# Patient Record
Sex: MALE | Race: ASIAN | NOT HISPANIC OR LATINO
[De-identification: names, ages, dates, MRNs, and addresses within clinical notes are randomized per-mention and may not be internally consistent; named-entity substitution may affect disease eponyms.]

---

## 2017-02-07 ENCOUNTER — APPOINTMENT (OUTPATIENT)
Dept: OPHTHALMOLOGY | Facility: CLINIC | Age: 82
End: 2017-02-07

## 2017-02-07 PROBLEM — Z00.00 ENCOUNTER FOR PREVENTIVE HEALTH EXAMINATION: Status: ACTIVE | Noted: 2017-02-07

## 2017-06-12 ENCOUNTER — APPOINTMENT (OUTPATIENT)
Dept: OPHTHALMOLOGY | Facility: CLINIC | Age: 82
End: 2017-06-12

## 2017-07-05 ENCOUNTER — INPATIENT (INPATIENT)
Facility: HOSPITAL | Age: 82
LOS: 2 days | Discharge: HOME | DRG: 682 | End: 2017-07-08
Attending: INTERNAL MEDICINE | Admitting: INTERNAL MEDICINE
Payer: MEDICARE

## 2017-07-05 VITALS
HEART RATE: 93 BPM | TEMPERATURE: 98 F | HEIGHT: 66 IN | SYSTOLIC BLOOD PRESSURE: 167 MMHG | OXYGEN SATURATION: 94 % | DIASTOLIC BLOOD PRESSURE: 65 MMHG | WEIGHT: 130.07 LBS | RESPIRATION RATE: 20 BRPM

## 2017-07-05 DIAGNOSIS — R07.89 OTHER CHEST PAIN: ICD-10-CM

## 2017-07-05 DIAGNOSIS — I10 ESSENTIAL (PRIMARY) HYPERTENSION: ICD-10-CM

## 2017-07-05 DIAGNOSIS — D63.1 ANEMIA IN CHRONIC KIDNEY DISEASE: ICD-10-CM

## 2017-07-05 DIAGNOSIS — R06.02 SHORTNESS OF BREATH: ICD-10-CM

## 2017-07-05 DIAGNOSIS — I48.91 UNSPECIFIED ATRIAL FIBRILLATION: ICD-10-CM

## 2017-07-05 DIAGNOSIS — J90 PLEURAL EFFUSION, NOT ELSEWHERE CLASSIFIED: ICD-10-CM

## 2017-07-05 DIAGNOSIS — E11.9 TYPE 2 DIABETES MELLITUS WITHOUT COMPLICATIONS: ICD-10-CM

## 2017-07-05 DIAGNOSIS — I12.0 HYPERTENSIVE CHRONIC KIDNEY DISEASE WITH STAGE 5 CHRONIC KIDNEY DISEASE OR END STAGE RENAL DISEASE: ICD-10-CM

## 2017-07-05 DIAGNOSIS — Z29.9 ENCOUNTER FOR PROPHYLACTIC MEASURES, UNSPECIFIED: ICD-10-CM

## 2017-07-05 DIAGNOSIS — R60.0 LOCALIZED EDEMA: ICD-10-CM

## 2017-07-05 DIAGNOSIS — N18.6 END STAGE RENAL DISEASE: ICD-10-CM

## 2017-07-05 DIAGNOSIS — R94.31 ABNORMAL ELECTROCARDIOGRAM [ECG] [EKG]: ICD-10-CM

## 2017-07-05 LAB
ALBUMIN SERPL ELPH-MCNC: 3.3 G/DL — LOW (ref 3.5–5)
ALP SERPL-CCNC: 93 U/L — SIGNIFICANT CHANGE UP (ref 40–120)
ALT FLD-CCNC: 19 U/L DA — SIGNIFICANT CHANGE UP (ref 10–60)
ANION GAP SERPL CALC-SCNC: 14 MMOL/L — SIGNIFICANT CHANGE UP (ref 5–17)
APPEARANCE UR: ABNORMAL
APTT BLD: 29.2 SEC — SIGNIFICANT CHANGE UP (ref 27.5–37.4)
AST SERPL-CCNC: 14 U/L — SIGNIFICANT CHANGE UP (ref 10–40)
BASE EXCESS BLDA CALC-SCNC: -4.7 MMOL/L — LOW (ref -2–2)
BASE EXCESS BLDV CALC-SCNC: -5.7 MMOL/L — LOW (ref -2–2)
BASOPHILS # BLD AUTO: 0 K/UL — SIGNIFICANT CHANGE UP (ref 0–0.2)
BASOPHILS NFR BLD AUTO: 0.4 % — SIGNIFICANT CHANGE UP (ref 0–2)
BILIRUB SERPL-MCNC: 0.4 MG/DL — SIGNIFICANT CHANGE UP (ref 0.2–1.2)
BILIRUB UR-MCNC: NEGATIVE — SIGNIFICANT CHANGE UP
BUN SERPL-MCNC: 67 MG/DL — HIGH (ref 7–18)
CALCIUM SERPL-MCNC: 7 MG/DL — LOW (ref 8.4–10.5)
CHLORIDE SERPL-SCNC: 109 MMOL/L — HIGH (ref 96–108)
CK MB BLD-MCNC: 1.8 % — SIGNIFICANT CHANGE UP (ref 0–3.5)
CK MB BLD-MCNC: 2.2 % — SIGNIFICANT CHANGE UP (ref 0–3.5)
CK MB CFR SERPL CALC: 3.4 NG/ML — SIGNIFICANT CHANGE UP (ref 0–3.6)
CK MB CFR SERPL CALC: 3.7 NG/ML — HIGH (ref 0–3.6)
CK SERPL-CCNC: 167 U/L — SIGNIFICANT CHANGE UP (ref 35–232)
CK SERPL-CCNC: 192 U/L — SIGNIFICANT CHANGE UP (ref 35–232)
CO2 SERPL-SCNC: 20 MMOL/L — LOW (ref 22–31)
COLOR SPEC: YELLOW — SIGNIFICANT CHANGE UP
CREAT ?TM UR-MCNC: 140 MG/DL — SIGNIFICANT CHANGE UP
CREAT SERPL-MCNC: 7.23 MG/DL — HIGH (ref 0.5–1.3)
DIFF PNL FLD: NEGATIVE — SIGNIFICANT CHANGE UP
EOSINOPHIL # BLD AUTO: 0 K/UL — SIGNIFICANT CHANGE UP (ref 0–0.5)
EOSINOPHIL NFR BLD AUTO: 0.2 % — SIGNIFICANT CHANGE UP (ref 0–6)
GLUCOSE SERPL-MCNC: 184 MG/DL — HIGH (ref 70–99)
GLUCOSE UR QL: 100 MG/DL
HBA1C BLD-MCNC: 5.7 % — HIGH (ref 4–5.6)
HCO3 BLDA-SCNC: 19 MMOL/L — LOW (ref 23–27)
HCO3 BLDV-SCNC: 19 MMOL/L — LOW (ref 21–29)
HCT VFR BLD CALC: 26.7 % — LOW (ref 39–50)
HGB BLD-MCNC: 9 G/DL — LOW (ref 13–17)
HOROWITZ INDEX BLDA+IHG-RTO: 28 — SIGNIFICANT CHANGE UP
HOROWITZ INDEX BLDV+IHG-RTO: 21 — SIGNIFICANT CHANGE UP
INR BLD: 0.98 RATIO — SIGNIFICANT CHANGE UP (ref 0.88–1.16)
KETONES UR-MCNC: NEGATIVE — SIGNIFICANT CHANGE UP
LEUKOCYTE ESTERASE UR-ACNC: NEGATIVE — SIGNIFICANT CHANGE UP
LYMPHOCYTES # BLD AUTO: 0.4 K/UL — LOW (ref 1–3.3)
LYMPHOCYTES # BLD AUTO: 4.8 % — LOW (ref 13–44)
MCHC RBC-ENTMCNC: 30.6 PG — SIGNIFICANT CHANGE UP (ref 27–34)
MCHC RBC-ENTMCNC: 33.7 GM/DL — SIGNIFICANT CHANGE UP (ref 32–36)
MCV RBC AUTO: 90.8 FL — SIGNIFICANT CHANGE UP (ref 80–100)
MONOCYTES # BLD AUTO: 0.4 K/UL — SIGNIFICANT CHANGE UP (ref 0–0.9)
MONOCYTES NFR BLD AUTO: 4.7 % — SIGNIFICANT CHANGE UP (ref 2–14)
NEUTROPHILS # BLD AUTO: 7.5 K/UL — HIGH (ref 1.8–7.4)
NEUTROPHILS NFR BLD AUTO: 89.9 % — HIGH (ref 43–77)
NITRITE UR-MCNC: NEGATIVE — SIGNIFICANT CHANGE UP
NT-PROBNP SERPL-SCNC: 3566 PG/ML — HIGH (ref 0–450)
OB PNL STL: NEGATIVE — SIGNIFICANT CHANGE UP
OSMOLALITY UR: 353 MOS/KG — SIGNIFICANT CHANGE UP (ref 50–1200)
PCO2 BLDA: 33 MMHG — SIGNIFICANT CHANGE UP (ref 32–46)
PCO2 BLDV: 36 MMHG — SIGNIFICANT CHANGE UP (ref 35–50)
PH BLDA: 7.39 — SIGNIFICANT CHANGE UP (ref 7.35–7.45)
PH BLDV: 7.34 — LOW (ref 7.35–7.45)
PH UR: 5 — SIGNIFICANT CHANGE UP (ref 5–8)
PLATELET # BLD AUTO: 188 K/UL — SIGNIFICANT CHANGE UP (ref 150–400)
PO2 BLDA: 68 MMHG — LOW (ref 74–108)
PO2 BLDV: 76 MMHG — HIGH (ref 25–45)
POTASSIUM SERPL-MCNC: 4.2 MMOL/L — SIGNIFICANT CHANGE UP (ref 3.5–5.3)
POTASSIUM SERPL-SCNC: 4.2 MMOL/L — SIGNIFICANT CHANGE UP (ref 3.5–5.3)
POTASSIUM UR-SCNC: 30 MMOL/L — SIGNIFICANT CHANGE UP (ref 25–125)
PROT ?TM UR-MCNC: 537 MG/DL — HIGH (ref 0–12)
PROT SERPL-MCNC: 7.1 G/DL — SIGNIFICANT CHANGE UP (ref 6–8.3)
PROT UR-MCNC: 500 MG/DL
PROTHROM AB SERPL-ACNC: 10.7 SEC — SIGNIFICANT CHANGE UP (ref 9.8–12.7)
RBC # BLD: 2.94 M/UL — LOW (ref 4.2–5.8)
RBC # FLD: 13.7 % — SIGNIFICANT CHANGE UP (ref 10.3–14.5)
SAO2 % BLDA: 93 % — SIGNIFICANT CHANGE UP (ref 92–96)
SAO2 % BLDV: 95 % — HIGH (ref 67–88)
SODIUM SERPL-SCNC: 143 MMOL/L — SIGNIFICANT CHANGE UP (ref 135–145)
SODIUM UR-SCNC: 34 MMOL/L — LOW (ref 40–220)
SP GR SPEC: 1.02 — SIGNIFICANT CHANGE UP (ref 1.01–1.02)
TROPONIN I SERPL-MCNC: 0.02 NG/ML — SIGNIFICANT CHANGE UP (ref 0–0.04)
TROPONIN I SERPL-MCNC: 0.05 NG/ML — HIGH (ref 0–0.04)
TROPONIN I SERPL-MCNC: 0.17 NG/ML — HIGH (ref 0–0.04)
UROBILINOGEN FLD QL: NEGATIVE — SIGNIFICANT CHANGE UP
WBC # BLD: 8.4 K/UL — SIGNIFICANT CHANGE UP (ref 3.8–10.5)
WBC # FLD AUTO: 8.4 K/UL — SIGNIFICANT CHANGE UP (ref 3.8–10.5)

## 2017-07-05 PROCEDURE — 71020: CPT | Mod: 26

## 2017-07-05 PROCEDURE — 76775 US EXAM ABDO BACK WALL LIM: CPT | Mod: 26

## 2017-07-05 PROCEDURE — 99223 1ST HOSP IP/OBS HIGH 75: CPT | Mod: AI,GC

## 2017-07-05 PROCEDURE — 99285 EMERGENCY DEPT VISIT HI MDM: CPT

## 2017-07-05 RX ORDER — INSULIN LISPRO 100/ML
VIAL (ML) SUBCUTANEOUS
Qty: 0 | Refills: 0 | Status: DISCONTINUED | OUTPATIENT
Start: 2017-07-05 | End: 2017-07-08

## 2017-07-05 RX ORDER — HYDRALAZINE HCL 50 MG
50 TABLET ORAL THREE TIMES A DAY
Qty: 0 | Refills: 0 | Status: DISCONTINUED | OUTPATIENT
Start: 2017-07-05 | End: 2017-07-08

## 2017-07-05 RX ORDER — SODIUM CHLORIDE 9 MG/ML
1000 INJECTION, SOLUTION INTRAVENOUS
Qty: 0 | Refills: 0 | Status: DISCONTINUED | OUTPATIENT
Start: 2017-07-05 | End: 2017-07-08

## 2017-07-05 RX ORDER — ATORVASTATIN CALCIUM 80 MG/1
40 TABLET, FILM COATED ORAL AT BEDTIME
Qty: 0 | Refills: 0 | Status: DISCONTINUED | OUTPATIENT
Start: 2017-07-05 | End: 2017-07-08

## 2017-07-05 RX ORDER — DEXTROSE 50 % IN WATER 50 %
25 SYRINGE (ML) INTRAVENOUS ONCE
Qty: 0 | Refills: 0 | Status: DISCONTINUED | OUTPATIENT
Start: 2017-07-05 | End: 2017-07-08

## 2017-07-05 RX ORDER — ASPIRIN/CALCIUM CARB/MAGNESIUM 324 MG
81 TABLET ORAL DAILY
Qty: 0 | Refills: 0 | Status: DISCONTINUED | OUTPATIENT
Start: 2017-07-05 | End: 2017-07-08

## 2017-07-05 RX ORDER — SODIUM CHLORIDE 9 MG/ML
3 INJECTION INTRAMUSCULAR; INTRAVENOUS; SUBCUTANEOUS ONCE
Qty: 0 | Refills: 0 | Status: COMPLETED | OUTPATIENT
Start: 2017-07-05 | End: 2017-07-05

## 2017-07-05 RX ORDER — FUROSEMIDE 40 MG
40 TABLET ORAL DAILY
Qty: 0 | Refills: 0 | Status: DISCONTINUED | OUTPATIENT
Start: 2017-07-05 | End: 2017-07-05

## 2017-07-05 RX ORDER — DEXTROSE 50 % IN WATER 50 %
12.5 SYRINGE (ML) INTRAVENOUS ONCE
Qty: 0 | Refills: 0 | Status: DISCONTINUED | OUTPATIENT
Start: 2017-07-05 | End: 2017-07-08

## 2017-07-05 RX ORDER — DEXTROSE 50 % IN WATER 50 %
1 SYRINGE (ML) INTRAVENOUS ONCE
Qty: 0 | Refills: 0 | Status: DISCONTINUED | OUTPATIENT
Start: 2017-07-05 | End: 2017-07-08

## 2017-07-05 RX ORDER — GLUCAGON INJECTION, SOLUTION 0.5 MG/.1ML
1 INJECTION, SOLUTION SUBCUTANEOUS ONCE
Qty: 0 | Refills: 0 | Status: DISCONTINUED | OUTPATIENT
Start: 2017-07-05 | End: 2017-07-08

## 2017-07-05 RX ORDER — FUROSEMIDE 40 MG
80 TABLET ORAL ONCE
Qty: 0 | Refills: 0 | Status: DISCONTINUED | OUTPATIENT
Start: 2017-07-05 | End: 2017-07-07

## 2017-07-05 RX ORDER — HEPARIN SODIUM 5000 [USP'U]/ML
5000 INJECTION INTRAVENOUS; SUBCUTANEOUS EVERY 8 HOURS
Qty: 0 | Refills: 0 | Status: DISCONTINUED | OUTPATIENT
Start: 2017-07-05 | End: 2017-07-08

## 2017-07-05 RX ORDER — METOPROLOL TARTRATE 50 MG
12.5 TABLET ORAL
Qty: 0 | Refills: 0 | Status: DISCONTINUED | OUTPATIENT
Start: 2017-07-05 | End: 2017-07-08

## 2017-07-05 RX ORDER — AMLODIPINE BESYLATE 2.5 MG/1
10 TABLET ORAL DAILY
Qty: 0 | Refills: 0 | Status: DISCONTINUED | OUTPATIENT
Start: 2017-07-05 | End: 2017-07-08

## 2017-07-05 RX ADMIN — SODIUM CHLORIDE 3 MILLILITER(S): 9 INJECTION INTRAMUSCULAR; INTRAVENOUS; SUBCUTANEOUS at 10:22

## 2017-07-05 RX ADMIN — Medication 50 MILLIGRAM(S): at 14:48

## 2017-07-05 RX ADMIN — Medication 81 MILLIGRAM(S): at 18:08

## 2017-07-05 RX ADMIN — HEPARIN SODIUM 5000 UNIT(S): 5000 INJECTION INTRAVENOUS; SUBCUTANEOUS at 23:34

## 2017-07-05 RX ADMIN — Medication 12.5 MILLIGRAM(S): at 18:07

## 2017-07-05 RX ADMIN — ATORVASTATIN CALCIUM 40 MILLIGRAM(S): 80 TABLET, FILM COATED ORAL at 23:34

## 2017-07-05 RX ADMIN — Medication 50 MILLIGRAM(S): at 23:34

## 2017-07-05 RX ADMIN — Medication 40 MILLIGRAM(S): at 12:47

## 2017-07-05 NOTE — H&P ADULT - PROBLEM SELECTOR PLAN 6
- Improve VTE score is 1 with 3 month risk of VTE is 0.6 , since patient is currently in bed , will give heparin .   - No need for GI prophylaxis - Anemia of chronic disease , likely due to CKD  - Hb stable , occult negative   - F/up on CBC, anemia panel

## 2017-07-05 NOTE — ED PROVIDER NOTE - MEDICAL DECISION MAKING DETAILS
Pt with SOB new onset, unclear etiology. Possibly related to CKD, with orthopnea possible PE. Will reassess. No signs of respiratory distress, vital signs WNL.

## 2017-07-05 NOTE — CONSULT NOTE ADULT - PROBLEM SELECTOR RECOMMENDATION 2
Likely due to volume overload with renal failure.  Maximize UF with HD.  Give Lasix 80kmg IV X 1 now to help temporize until urgent HD can be done

## 2017-07-05 NOTE — H&P ADULT - NSHPREVIEWOFSYSTEMS_GEN_ALL_CORE
CONSTITUTIONAL: No fever,   EYES: no acute visual disturbances  NECK: No pain or stiffness  RESPIRATORY: No cough; No shortness of breath  CARDIOVASCULAR: No chest pain, no palpitations  GASTROINTESTINAL: No pain. No nausea or vomiting; No diarrhea   NEUROLOGICAL: No headache or numbness, no tremors  HEME/LYMPH: No  bleeding gums CONSTITUTIONAL: No fever,   EYES: no acute visual disturbances  NECK: No pain or stiffness  RESPIRATORY: productive cough; shortness of breath  CARDIOVASCULAR: Intermittent  chest pain, no palpitations  GASTROINTESTINAL: No pain. No nausea or vomiting; No diarrhea   NEUROLOGICAL: No headache or numbness, no tremors  HEME/LYMPH: No  bleeding gums

## 2017-07-05 NOTE — CONSULT NOTE ADULT - SUBJECTIVE AND OBJECTIVE BOX
Patient is a 81y old  Male who presents with a chief complaint of worsening shortness of breath , chest pain, lower extremity swelling x couple of days (05 Jul 2017 14:39)      Called see and eval 81y.o. Male w/PMH significant for ?CKD, HTN, DM for Shiley catheter placement. Pt admitted today for increasing SOB, associated with productive cough, worse at night. According to chart, pt has been seeing nephrologist Dr Nik Cisneros. He was told that he is going to have the dialysis and surgery for creating the AV fistula. Last visit to PMD Dr Kulkarni ws a month ago. Last visit to nephrologist was about week ago for routine check up. He was told by PMD that his sugar is under control.     PAST MEDICAL & SURGICAL HISTORY:  DM  HTN  ?CKD    MEDICATIONS  (STANDING):  aspirin  chewable 81 milliGRAM(s) Oral daily  atorvastatin 40 milliGRAM(s) Oral at bedtime  hydrALAZINE 50 milliGRAM(s) Oral three times a day  amLODIPine   Tablet 10 milliGRAM(s) Oral daily  insulin lispro (HumaLOG) corrective regimen sliding scale   SubCutaneous three times a day before meals  dextrose 5%. 1000 milliLiter(s) (50 mL/Hr) IV Continuous <Continuous>  dextrose 50% Injectable 12.5 Gram(s) IV Push once  dextrose 50% Injectable 25 Gram(s) IV Push once  dextrose 50% Injectable 25 Gram(s) IV Push once  metoprolol 12.5 milliGRAM(s) Oral two times a day  heparin  Injectable 5000 Unit(s) SubCutaneous every 8 hours  furosemide   Injectable 80 milliGRAM(s) IV Push once    MEDICATIONS  (PRN):  dextrose Gel 1 Dose(s) Oral once PRN Blood Glucose LESS THAN 70 milliGRAM(s)/deciLiter  glucagon  Injectable 1 milliGRAM(s) IntraMuscular once PRN Glucose <70 milliGRAM(s)/deciLiter  guaiFENesin   Syrup  (Sugar-Free) 100 milliGRAM(s) Oral every 6 hours PRN Cough      Allergies    No Known Allergies    Intolerances        Vital Signs Last 24 Hrs  T(C): 36.5 (05 Jul 2017 16:10), Max: 36.9 (05 Jul 2017 14:08)  T(F): 97.7 (05 Jul 2017 16:10), Max: 98.4 (05 Jul 2017 14:08)  HR: 83 (05 Jul 2017 16:10) (83 - 93)  BP: 159/60 (05 Jul 2017 16:10) (156/62 - 168/63)  BP(mean): --  RR: 19 (05 Jul 2017 16:10) (18 - 20)  SpO2: 95% (05 Jul 2017 16:10) (94% - 98%)    Physical:  Gen: A&Ox3. NAD  LUE: Warm soft, radial pulse 2+  Pelvis: 2+ femoral pulse b/l    LABS:                        9.0    8.4   )-----------( 188      ( 05 Jul 2017 10:24 )             26.7              07-05    143  |  109<H>  |  67<H>  ----------------------------<  184<H>  4.2   |  20<L>  |  7.23<H>    Ca    7.0<L>      05 Jul 2017 10:24    TPro  7.1  /  Alb  3.3<L>  /  TBili  0.4  /  DBili  x   /  AST  14  /  ALT  19  /  AlkPhos  93  07-05            PT/INR - ( 05 Jul 2017 10:24 )   PT: 10.7 sec;   INR: 0.98 ratio

## 2017-07-05 NOTE — PATIENT PROFILE ADULT. - VISION (WITH CORRECTIVE LENSES IF THE PATIENT USUALLY WEARS THEM):
WITH GLASSES/Normal vision: sees adequately in most situations; can see medication labels, newsprint

## 2017-07-05 NOTE — H&P ADULT - ATTENDING COMMENTS
I have seen and examined this 82 yo man at the bedside with Dr. Lawrence.  His wife is also at the bedside.  Dr. Lawrence has called the office of his PMD, Dr. Benito Kulkarni, and I called the office of his nephrologist, Dr. Manuel Cisneros.  Both doctors are out of town.   The patient presented with worsening SOB for several days.  He has a 30 year history of DM and has known that he has progressive CKD.  He was told that he would soon need dialysis, and was referred to a surgeon for vascular access.   SOB worsened and he came to the hospital.   Examination reveals a WD 81 man in moderate respiratory distress  Vital Signs Last 24 Hrs  T(C): 36.5 (2017 16:10), Max: 36.9 (2017 14:08)  T(F): 97.7 (2017 16:10), Max: 98.4 (2017 14:08)  HR: 83 (2017 16:10) (83 - 93)  BP: 159/60 (2017 16:10) (156/62 - 168/63)  BP(mean): --  RR: 19 (2017 16:10) (18 - 20)  SpO2: 95% (2017 16:10) (94% - 98%)  Generalized edema  Lungs, bilateral rales and rhonchi.  Dullness to percussion at bases  Cor, irreg  Abdomen, soft  Ext + edema  EKG afib, rate about 100, no acute ischemic changes.   Troponin I, Serum (17 @ 15:57)                          9.0    8.4   )-----------( 188      ( 2017 10:24 )             26.7       07-    143  |  109<H>  |  67<H>  ----------------------------<  184<H>  4.2   |  20<L>  |  7.23<H>    Ca    7.0<L>      2017 10:24    TPro  7.1  /  Alb  3.3<L>  /  TBili  0.4  /  DBili  x   /  AST  14  /  ALT  19  /  AlkPhos  93  07-05          ABG - ( 2017 14:55 )  pH: 7.39  /  pCO2: 33    /  pO2: 68    / HCO3: 19    / Base Excess: -4.7  /  SaO2: 93                  Urinalysis Basic - ( 2017 13:15 )    Color: Yellow / Appearance: x / S.020 / pH: x  Gluc: x / Ketone: Negative  / Bili: Negative / Urobili: Negative   Blood: x / Protein: 500 mg/dL / Nitrite: Negative   Leuk Esterase: Negative / RBC: 0-2 /HPF / WBC 0-2 /HPF   Sq Epi: x / Non Sq Epi: Few / Bacteria: Few /HPF        PT/INR - ( 2017 10:24 )   PT: 10.7 sec;   INR: 0.98 ratio         PTT - ( 2017 10:24 )  PTT:29.2 sec    CARDIAC MARKERS ( 2017 15:57 )  0.054 ng/mL / x     / 192 U/L / x     / 3.4 ng/mL  CARDIAC MARKERS ( 2017 10:24 )  0.024 ng/mL / x     / x     / x     / x          < from: Xray Chest 2 Views PA/Lat (17 @ 10:34) >    Moderate left pleural effusion, small right pleural effusion tracking   towards the minor fissure. Mild diffuse interstitial prominence.    IMP:  Fluid overload secondary to ESRD          CHF, r/o ACS.  Afib of unknown duration, but patient has had no chest pain and troponins are rosas.           DM, longstanding, with ESRD likely a complication of DM          CKD 5  Plan: Trial of diuretic:  IV lasix, 40 mg IV push, given          Ultrasound of kidneys and bladder to r/o obstruction          Nephrology consultation-- will need dialysis urgently          Surgery consultation for placement of temporary (then permanent) dialysis access.

## 2017-07-05 NOTE — ED PROVIDER NOTE - OBJECTIVE STATEMENT
80 y/o male with significant PMHx of DM and "kidney problems" presents to the ED c/o SOB and b/l pedal edema x 1 day. Pt describes SOB as intermittent worsened with lying down and walking up/down stairs. Pt also notes associated cough: non productive, non-bloody in nature. Pt also reports CP described as constant and heavy in nature. Pt denies similar Sx or breathing problems in the past. Pt denies recent sick contacts, recent travel, long periods of immobilization or Hx of DVT/PE/CHF. Pt denies diaphoresis, palpitations, calf pain, fever, dysuria, hematuria, urine/bowel incontinence, or any other complaints. Pt relates he had stress test x 3 years ago, all results WNL. NKDA. Currently taking: Furosemide. 82 y/o male with significant PMHx of DM and "kidney problems" presents to the ED c/o SOB and b/l pedal edema x 1 day. Pt describes SOB as intermittent worsened with lying down and walking up/down stairs. Pt also notes associated cough: non productive, non-bloody in nature. Pt also reports CP described as constant and heavy in nature. Pt denies similar Sx or breathing problems in the past. Pt denies recent sick contacts, recent travel, long periods of immobilization or Hx of DVT/PE/CHF. Pt denies diaphoresis, palpitations, calf pain, fever, dysuria, hematuria, urine/bowel incontinence, or any other complaints. Pt relates he had stress test x 3 years ago, all results WNL. NKDA. Currently taking: Furosemide. PMD: Dr. Benito Kulkarni. Nephrologist: Dr. Ellen Lundberg.

## 2017-07-05 NOTE — ED PROVIDER NOTE - PROGRESS NOTE DETAILS
ekg with afib unclear if it is new or not, ac withheld as per admitting team to come assess. rate controlled, renal scan ordered r/o post obstructive uropathy with por renal function, no baseline. admitting team to f/u with his renal doc.   ashanti lu md

## 2017-07-05 NOTE — CONSULT NOTE ADULT - ATTENDING COMMENTS
[facility] NEPHROLOGY  Nahum Grubbs M.D.  Francisco Javier Hicks D.O.  Yovana Quiroz M.D.  Makeda Duron, MSN, ANP-C    Telephone: [phone]  Facsimile: [phone]    [address] [place] [address]

## 2017-07-05 NOTE — H&P ADULT - ASSESSMENT
In the ED initial vitals shows the RR 20/ 94, /60, physical exam generalized anasarca, 3 + pitting edema , bilateral crackles, EKG shows the new onset afib( doubt afib , has P waves ) , CXR shows left sided pleural effusion got 40 mg of IV lasix, has good urine output , UA shows protein around more than 500 mg protein 5-10 granular casts. Admitted to telemetry for ACS ruled out and evaluation  of arrythmia , also fluid overloaded .

## 2017-07-05 NOTE — ED ADULT NURSE REASSESSMENT NOTE - NS ED NURSE REASSESS COMMENT FT1
Patient transported to [facility] via patient escort and RN, cardiac monitor, and oxygen 2L via nasal cannula. Family at bedside.

## 2017-07-05 NOTE — H&P ADULT - PROBLEM SELECTOR PLAN 7
- Improve VTE score is 1 with 3 month risk of VTE is 0.6 , since patient is currently in bed , will give heparin .   - No need for GI prophylaxis

## 2017-07-05 NOTE — H&P ADULT - NSHPLABSRESULTS_GEN_ALL_CORE
9.0    8.4   )-----------( 188      ( 2017 10:24 )             26.7   -    143  |  109<H>  |  67<H>  ----------------------------<  184<H>  4.2   |  20<L>  |  7.23<H>    Ca    7.0<L>      2017 10:24    TPro  7.1  /  Alb  3.3<L>  /  TBili  0.4  /  DBili  x   /  AST  14  /  ALT  19  /  AlkPhos  93    Urinalysis Basic - ( 2017 13:15 )    Color: Yellow / Appearance: x / S.020 / pH: x  Gluc: x / Ketone: Negative  / Bili: Negative / Urobili: Negative   Blood: x / Protein: 500 mg/dL / Nitrite: Negative   Leuk Esterase: Negative / RBC: 0-2 /HPF / WBC 0-2 /HPF   Sq Epi: x / Non Sq Epi: Few / Bacteria: Few /HPF    CARDIAC MARKERS ( 2017 10:24 )  0.024 ng/mL / x     / x     / x     / x        US Renal (17 @ 14:09) >  Findings suggest medical renal disease. No hydronephrosis. Partially visualized bilateral pleural effusions, left larger than right.     Xray Chest 2 Views PA/Lat (17 @ 10:34) >  Moderate left pleural effusion, small right pleural effusion tracking  towards the minor fissure. Mild diffuse interstitial prominence.

## 2017-07-05 NOTE — H&P ADULT - HISTORY OF PRESENT ILLNESS
80 Y/O M, form home, lives with wife , PMHx of some kidney problem, HTN, Diabetes ( 30 yrs ) , no PShx presented with worsening shortness of breath , chest pain,. lower extremity swelling for few days. As per patient since last couple of days he is getting very short of breath , unable to walks a block  without getting  SOB , unable to lie flat. Chest pain is intermittent , centre , 4/10, worst with exertion, movement, relieved by leaning forward,. Also report productive cough , intermittent , worse at night. Denies any similar kind of pain in past. Denies sick contact, runny nose, recent travel.  According to patient he was told by PMD ( Dr joseline Kulkarni , [phone]) that he has the kidney problem ( he is unable to specify) . He denied any kind of heart problems. He followed the nephrologist Dr Nik montoya ( unable to contact to his nephrologist) . He was told that he is going to have the dialysis and surgery for creating the AV fistula. Last visit to PMD Dr Kulkarni ws a month ago. Last visit to nephrologist was about week ago for routine check up.     Spoke with PMD ( he was not in office so unable to get the prior records ) , as per him patient was told that he was going to get the dialysis soon, also PMD denies any history of atrial fibrillation. Patient last colonoscopy about 5 yrs ago with normal results. He was told by PMD that his sugar is under control. His last visit to ophthalmologist was about 6 months ago.

## 2017-07-05 NOTE — CONSULT NOTE ADULT - ASSESSMENT
81 year-old man with late stage CKD now progressed to ESRD.  Bilateral pleural effusions and LE/UE edema are likely due to volume overload with renal failure.  HTN with BP elevated. Anemia of renal disease.

## 2017-07-05 NOTE — H&P ADULT - PROBLEM SELECTOR PLAN 2
-Atypical kind of chest pain   -Risk factors HTN, DM  -SHERIF score is 2, with 8 % risk of having events in 14 days   -Troponin x 1 negative ,  T2 at 4;00 pm, T3 at 10;00 pm  -EKG shows afib ( unlikely afib as has P waves ) , no ST T wave changes   -Admit to telemetry to rule out ACS   -aspirin, statin, beta blocker, please dc BB if acs ruled out   -Cardio Dr Diehl

## 2017-07-05 NOTE — H&P ADULT - PROBLEM SELECTOR PLAN 3
-Seems to have afib , but doubt as has P waves   - Has QTC prolongation of about the 523 ms  - Admit to tele x 24 hours for evaluating the Afib   - Will hold the anticoagulation for now

## 2017-07-05 NOTE — H&P ADULT - PROBLEM SELECTOR PLAN 5
- Continue with home medications hydralazine 50 mg TID , amlodipine 10mg, torsemide 20 mg   - Hold of the torsemide for now as patient having the dialysis   - DASH/TLC diet

## 2017-07-05 NOTE — H&P ADULT - PROBLEM SELECTOR PLAN 1
-History of kidney problems , told by the outpatient nephrologist that he needs the dialysis and was planed to have the vascular acces in left arm , makes urine normally  -Outpatient nephrologist is Dr Manuel Cisneros ( currently on vacation )   -Labs pertinent for Cr of 7.38 , BUN is 67, mild metabolic acidosis, UA pertinent of protein of 500 mg, 5-10 granular casts, moderate amorphous urates   -Nephro  Dr Ge   -Physical exam generalized anasarca , 3 + pitting edema, bilateral crackles  -Renal U/s medical renal disease , no evidence of hydronephrosis , Fena of 1.2%   -Patient fluid overloaded, no hyperkalemia , no altered mental status , no asterixis   -As per nephrology will get the urgent dialysis via shiley   -Left arm precautions, outpatient dialysis set up , social work consult   -Avoid the nephrotoxic agents

## 2017-07-05 NOTE — CONSULT NOTE ADULT - SUBJECTIVE AND OBJECTIVE BOX
[facility] NEPHROLOGY- CONSULTATION NOTE    Patient is a 81y Male with known late stage kidney disease, was told to have AVF placed in preparation for ESRD, presented to the hospital with SOB worsening over the last 2-3 days with orthopnea.  Sitting up improves SOB; no aggravating factors.  He reports chronic LE edema that is no worse than usual.  He has long-standing DM2 and HTN, both of which are fairly well-controlled.  His outpatient nephrologist is Dr. Manuel Cisneros.      PAST MEDICAL & SURGICAL HISTORY:  DM2, HTN, HLD  No significant past surgical history    No Known Allergies    Home Medications Reviewed  Hospital Medications:   MEDICATIONS  (STANDING):  aspirin  chewable 81 milliGRAM(s) Oral daily  atorvastatin 40 milliGRAM(s) Oral at bedtime  hydrALAZINE 50 milliGRAM(s) Oral three times a day  amLODIPine   Tablet 10 milliGRAM(s) Oral daily  insulin lispro (HumaLOG) corrective regimen sliding scale   SubCutaneous three times a day before meals  dextrose 5%. 1000 milliLiter(s) (50 mL/Hr) IV Continuous <Continuous>  dextrose 50% Injectable 12.5 Gram(s) IV Push once  dextrose 50% Injectable 25 Gram(s) IV Push once  dextrose 50% Injectable 25 Gram(s) IV Push once  metoprolol 12.5 milliGRAM(s) Oral two times a day  heparin  Injectable 5000 Unit(s) SubCutaneous every 8 hours  furosemide   Injectable 80 milliGRAM(s) IV Push once    SOCIAL HISTORY:  Denies ETOh,Smoking,   FAMILY HISTORY: no kidney disease    REVIEW OF SYSTEMS:  CONSTITUTIONAL: No weakness, fevers or chills  EYES/ENT: No visual changes;  No vertigo or throat pain   NECK: No pain or stiffness  RESPIRATORY: No cough, wheezing, hemoptysis; No shortness of breath  CARDIOVASCULAR: No chest pain or palpitations.  GASTROINTESTINAL: No abdominal or epigastric pain. No nausea, vomiting, or hematemesis; No diarrhea or constipation. No melena or hematochezia.  GENITOURINARY: No dysuria, frequency, foamy urine, urinary urgency, incontinence or hematuria  NEUROLOGICAL: No numbness or weakness  SKIN: No itching, burning, rashes, or lesions   VASCULAR: No bilateral lower extremity edema.   All other review of systems is negative unless indicated above.    VITALS:  T(F): 97.7 (17 @ 16:10), Max: 98.4 (17 @ 14:08)  HR: 83 (17 @ 16:10)  BP: 159/60 (17 @ 16:10)  RR: 19 (17 @ 16:10)  SpO2: 95% (17 @ 16:10)  Wt(kg): --    Height (cm): 167.64 ( @ 08:42)  Weight (kg): 63 ( @ 16:10)  BMI (kg/m2): 22.4 ( @ 16:10)  BSA (m2): 1.71 ( @ 16:10)  PHYSICAL EXAM:  Constitutional: NAD  HEENT: anicteric sclera, oropharynx clear, MMM  Neck: No JVD  Respiratory: Decreased BS bilaterally with B basilar crackles  Cardiovascular: S1, S2, RRR  Gastrointestinal: BS+, soft, NT/ND  Extremities: No cyanosis or clubbing. 2+B LE pitting edema.  Arms/hands with 1+ edema as well.  Neurological: A/O x 3, no focal deficits  Psychiatric: Normal mood, normal affect  : No CVA tenderness. No rea.   Skin: No rashes  Vascular Access: none    LABS:      143  |  109<H>  |  67<H>  ----------------------------<  184<H>  4.2   |  20<L>  |  7.23<H>    Ca    7.0<L>      2017 10:24    TPro  7.1  /  Alb  3.3<L>  /  TBili  0.4  /  DBili      /  AST  14  /  ALT  19  /  AlkPhos  93      Creatinine Trend: 7.23 <--                        9.0    8.4   )-----------( 188      ( 2017 10:24 )             26.7     Urine Studies:  Urinalysis Basic - ( 2017 13:15 )    Color: Yellow / Appearance:  / S.020 / pH:   Gluc:  / Ketone: Negative  / Bili: Negative / Urobili: Negative   Blood:  / Protein: 500 mg/dL / Nitrite: Negative   Leuk Esterase: Negative / RBC: 0-2 /HPF / WBC 0-2 /HPF   Sq Epi:  / Non Sq Epi: Few / Bacteria: Few /HPF      Potassium, Random Urine: 30 mmol/L ( @ 13:15)  Osmolality, Random Urine: 353 mos/kg ( @ 13:15)  Sodium, Random Urine: 34 mmol/L ( @ :15)  Creatinine, Random Urine: 140 mg/dL ( @ 13:15)  FENa 1.3%      RADIOLOGY & ADDITIONAL STUDIES:        < from: US Renal (17 @ 14:09) >    EXAM:  US KIDNEY(S)                            PROCEDURE DATE:  2017          INTERPRETATION:  EXAM: US KIDNEY(S)   INDICATION: obstructive uropathy.  COMPARISON: None.    TECHNIQUE: Grayscale ultrasound of the kidneys was performed.    FINDINGS:    Right kidney: Normal size, measures 9.2 x 4.2 x 4.5 cm. Increased   echogenicity. No hydronephrosis, shadowing calculus, or perinephric fluid   collection.    Left kidney: Normal size, measures 9.1 x 5.1 x 3.7 cm. Increased   echogenicity. No hydronephrosis, shadowing calculus, or perinephric fluid   collection.    Mildly distended urinary bladder is grossly unremarkable.    Visualized proximal abdominal aorta measures 2.0 cm in AP dimension.   Visualized IVC is grossly unremarkable.    Partially visualized bilateral pleural effusions, left larger than right.    IMPRESSION:   Findings suggest medical renal disease. No hydronephrosis.    Partially visualized bilateral pleural effusions, left larger than right.                ANTOINETTE WEISS M.D., ATTENDING RADIOLOGIST  This document has been electronically signed. 2017  2:18PM                < end of copied text >    < from: Xray Chest 2 Views PA/Lat (17 @ 10:34) >      < end of copied text >

## 2017-07-05 NOTE — H&P ADULT - NSHPPHYSICALEXAM_GEN_ALL_CORE
PHYSICAL EXAM:  GENERAL: NAD,   HEAD:  , Normocephalic  EYES:  conjunctiva and sclera clear  NECK: Supple, No JVD    NERVOUS SYSTEM:  Alert & Oriented X3,   CHEST/LUNG: Clear to auscuitation bilaterally;   HEART: S1 S2+;   ABDOMEN: Soft, Nontender, Nondistended; Bowel sounds present  EXTREMITIES: no cyanosis; no edema; no calf tenderness PHYSICAL EXAM:  GENERAL: NAD, mild respiratory distress  HEAD:  , Normocephalic, periorbital edema   EYES:  conjunctiva and sclera clear  NECK: Supple, No JVD    NERVOUS SYSTEM:  Alert & Oriented X3,   CHEST/LUNG: inspiratory wheezes, bilateral crackles   HEART: S1 S2+; no murmur   ABDOMEN: Soft, Nontender, distended; Bowel sounds present  EXTREMITIES: no cyanosis; 3 + pitting  edema; no calf tenderness

## 2017-07-05 NOTE — H&P ADULT - NSHPSOCIALHISTORY_GEN_ALL_CORE
Non-smoker, non-alcoholic, no substance abuse   Lives with wife , independent in daily activities , walks w/o assistance

## 2017-07-06 DIAGNOSIS — E83.39 OTHER DISORDERS OF PHOSPHORUS METABOLISM: ICD-10-CM

## 2017-07-06 DIAGNOSIS — N25.81 SECONDARY HYPERPARATHYROIDISM OF RENAL ORIGIN: ICD-10-CM

## 2017-07-06 DIAGNOSIS — E83.51 HYPOCALCEMIA: ICD-10-CM

## 2017-07-06 LAB
24R-OH-CALCIDIOL SERPL-MCNC: 44 NG/ML — SIGNIFICANT CHANGE UP (ref 30–100)
ALBUMIN SERPL ELPH-MCNC: 2.7 G/DL — LOW (ref 3.5–5)
ALP SERPL-CCNC: 83 U/L — SIGNIFICANT CHANGE UP (ref 40–120)
ALT FLD-CCNC: 14 U/L DA — SIGNIFICANT CHANGE UP (ref 10–60)
ANION GAP SERPL CALC-SCNC: 9 MMOL/L — SIGNIFICANT CHANGE UP (ref 5–17)
AST SERPL-CCNC: 10 U/L — SIGNIFICANT CHANGE UP (ref 10–40)
BASOPHILS # BLD AUTO: 0 K/UL — SIGNIFICANT CHANGE UP (ref 0–0.2)
BASOPHILS NFR BLD AUTO: 0.7 % — SIGNIFICANT CHANGE UP (ref 0–2)
BILIRUB SERPL-MCNC: 0.4 MG/DL — SIGNIFICANT CHANGE UP (ref 0.2–1.2)
BUN SERPL-MCNC: 49 MG/DL — HIGH (ref 7–18)
CALCIUM SERPL-MCNC: 6.9 MG/DL — LOW (ref 8.4–10.5)
CALCIUM SERPL-MCNC: 7.2 MG/DL — LOW (ref 8.4–10.5)
CHLORIDE SERPL-SCNC: 107 MMOL/L — SIGNIFICANT CHANGE UP (ref 96–108)
CHOLEST SERPL-MCNC: 152 MG/DL — SIGNIFICANT CHANGE UP (ref 10–199)
CK MB BLD-MCNC: 1.9 % — SIGNIFICANT CHANGE UP (ref 0–3.5)
CK MB CFR SERPL CALC: 3 NG/ML — SIGNIFICANT CHANGE UP (ref 0–3.6)
CK SERPL-CCNC: 155 U/L — SIGNIFICANT CHANGE UP (ref 35–232)
CO2 SERPL-SCNC: 26 MMOL/L — SIGNIFICANT CHANGE UP (ref 22–31)
CREAT ?TM UR-MCNC: 68 MG/DL — SIGNIFICANT CHANGE UP
CREAT SERPL-MCNC: 5.66 MG/DL — HIGH (ref 0.5–1.3)
EOSINOPHIL # BLD AUTO: 0 K/UL — SIGNIFICANT CHANGE UP (ref 0–0.5)
EOSINOPHIL NFR BLD AUTO: 0.8 % — SIGNIFICANT CHANGE UP (ref 0–6)
FERRITIN SERPL-MCNC: 138 NG/ML — SIGNIFICANT CHANGE UP (ref 30–400)
FOLATE SERPL-MCNC: 14.9 NG/ML — SIGNIFICANT CHANGE UP (ref 4.8–24.2)
GLUCOSE SERPL-MCNC: 186 MG/DL — HIGH (ref 70–99)
HCT VFR BLD CALC: 24.9 % — LOW (ref 39–50)
HDLC SERPL-MCNC: 93 MG/DL — SIGNIFICANT CHANGE UP (ref 40–125)
HGB BLD-MCNC: 8.6 G/DL — LOW (ref 13–17)
IRON SATN MFR SERPL: 16 % — LOW (ref 20–55)
IRON SATN MFR SERPL: 35 UG/DL — LOW (ref 65–170)
LIPID PNL WITH DIRECT LDL SERPL: 46 MG/DL — SIGNIFICANT CHANGE UP
LYMPHOCYTES # BLD AUTO: 0.7 K/UL — LOW (ref 1–3.3)
LYMPHOCYTES # BLD AUTO: 12.8 % — LOW (ref 13–44)
MAGNESIUM SERPL-MCNC: 1.9 MG/DL — SIGNIFICANT CHANGE UP (ref 1.6–2.6)
MCHC RBC-ENTMCNC: 31.2 PG — SIGNIFICANT CHANGE UP (ref 27–34)
MCHC RBC-ENTMCNC: 34.5 GM/DL — SIGNIFICANT CHANGE UP (ref 32–36)
MCV RBC AUTO: 90.5 FL — SIGNIFICANT CHANGE UP (ref 80–100)
MICROALBUMIN UR-MCNC: 192.9 MG/DL — HIGH (ref 0–0.1)
MICROALBUMIN/CREAT UR-RTO: 2837 MG/G — HIGH (ref 0–30)
MONOCYTES # BLD AUTO: 0.5 K/UL — SIGNIFICANT CHANGE UP (ref 0–0.9)
MONOCYTES NFR BLD AUTO: 10.1 % — SIGNIFICANT CHANGE UP (ref 2–14)
NEUTROPHILS # BLD AUTO: 3.9 K/UL — SIGNIFICANT CHANGE UP (ref 1.8–7.4)
NEUTROPHILS NFR BLD AUTO: 75.5 % — SIGNIFICANT CHANGE UP (ref 43–77)
PHOSPHATE SERPL-MCNC: 5.5 MG/DL — HIGH (ref 2.5–4.5)
PLATELET # BLD AUTO: 178 K/UL — SIGNIFICANT CHANGE UP (ref 150–400)
POTASSIUM SERPL-MCNC: 4.5 MMOL/L — SIGNIFICANT CHANGE UP (ref 3.5–5.3)
POTASSIUM SERPL-SCNC: 4.5 MMOL/L — SIGNIFICANT CHANGE UP (ref 3.5–5.3)
PROT SERPL-MCNC: 6 G/DL — SIGNIFICANT CHANGE UP (ref 6–8.3)
PTH-INTACT FLD-MCNC: 149 PG/ML — HIGH (ref 15–65)
RBC # BLD: 2.75 M/UL — LOW (ref 4.2–5.8)
RBC # FLD: 14.2 % — SIGNIFICANT CHANGE UP (ref 10.3–14.5)
SODIUM SERPL-SCNC: 142 MMOL/L — SIGNIFICANT CHANGE UP (ref 135–145)
TIBC SERPL-MCNC: 223 UG/DL — LOW (ref 250–450)
TOTAL CHOLESTEROL/HDL RATIO MEASUREMENT: 1.6 RATIO — LOW (ref 3.4–9.6)
TRANSFERRIN SERPL-MCNC: 149 MG/DL — LOW (ref 200–360)
TRIGL SERPL-MCNC: 63 MG/DL — SIGNIFICANT CHANGE UP (ref 10–149)
TROPONIN I SERPL-MCNC: 0.08 NG/ML — HIGH (ref 0–0.04)
UIBC SERPL-MCNC: 188 UG/DL — SIGNIFICANT CHANGE UP (ref 110–370)
VIT B12 SERPL-MCNC: >2000 PG/ML — HIGH (ref 243–894)
WBC # BLD: 5.2 K/UL — SIGNIFICANT CHANGE UP (ref 3.8–10.5)
WBC # FLD AUTO: 5.2 K/UL — SIGNIFICANT CHANGE UP (ref 3.8–10.5)

## 2017-07-06 PROCEDURE — 99233 SBSQ HOSP IP/OBS HIGH 50: CPT | Mod: GC

## 2017-07-06 RX ORDER — IRON SUCROSE 20 MG/ML
100 INJECTION, SOLUTION INTRAVENOUS ONCE
Qty: 0 | Refills: 0 | Status: COMPLETED | OUTPATIENT
Start: 2017-07-07 | End: 2017-07-08

## 2017-07-06 RX ORDER — CALCIUM ACETATE 667 MG
667 TABLET ORAL
Qty: 0 | Refills: 0 | Status: DISCONTINUED | OUTPATIENT
Start: 2017-07-06 | End: 2017-07-08

## 2017-07-06 RX ORDER — ERYTHROPOIETIN 10000 [IU]/ML
6000 INJECTION, SOLUTION INTRAVENOUS; SUBCUTANEOUS ONCE
Qty: 0 | Refills: 0 | Status: COMPLETED | OUTPATIENT
Start: 2017-07-07 | End: 2017-07-08

## 2017-07-06 RX ORDER — TUBERCULIN PURIFIED PROTEIN DERIVATIVE 5 [IU]/.1ML
5 INJECTION, SOLUTION INTRADERMAL ONCE
Qty: 0 | Refills: 0 | Status: DISCONTINUED | OUTPATIENT
Start: 2017-07-06 | End: 2017-07-08

## 2017-07-06 RX ORDER — CALCIUM GLUCONATE 100 MG/ML
1 VIAL (ML) INTRAVENOUS ONCE
Qty: 1 | Refills: 0 | Status: COMPLETED | OUTPATIENT
Start: 2017-07-06 | End: 2017-07-06

## 2017-07-06 RX ADMIN — Medication 50 MILLIGRAM(S): at 06:26

## 2017-07-06 RX ADMIN — Medication 50 MILLIGRAM(S): at 14:06

## 2017-07-06 RX ADMIN — HEPARIN SODIUM 5000 UNIT(S): 5000 INJECTION INTRAVENOUS; SUBCUTANEOUS at 14:05

## 2017-07-06 RX ADMIN — Medication 81 MILLIGRAM(S): at 13:05

## 2017-07-06 RX ADMIN — Medication 200 GRAM(S): at 01:48

## 2017-07-06 RX ADMIN — ATORVASTATIN CALCIUM 40 MILLIGRAM(S): 80 TABLET, FILM COATED ORAL at 21:41

## 2017-07-06 RX ADMIN — Medication 12.5 MILLIGRAM(S): at 17:58

## 2017-07-06 RX ADMIN — Medication 1: at 17:56

## 2017-07-06 RX ADMIN — AMLODIPINE BESYLATE 10 MILLIGRAM(S): 2.5 TABLET ORAL at 06:26

## 2017-07-06 RX ADMIN — HEPARIN SODIUM 5000 UNIT(S): 5000 INJECTION INTRAVENOUS; SUBCUTANEOUS at 06:26

## 2017-07-06 RX ADMIN — Medication 50 MILLIGRAM(S): at 21:41

## 2017-07-06 RX ADMIN — Medication 12.5 MILLIGRAM(S): at 06:26

## 2017-07-06 RX ADMIN — HEPARIN SODIUM 5000 UNIT(S): 5000 INJECTION INTRAVENOUS; SUBCUTANEOUS at 21:41

## 2017-07-06 NOTE — CHART NOTE - NSCHARTNOTEFT_GEN_A_CORE
Last troponin 3 results: 0.166, an EKG was ordered and it showed no ST depressions or elevations, normal sinus rhythm. T wave inversions in V3 and V4. Troponin 3 to be followed by morning team. The corrected calcium was borderline low (7.8) and one gram of Calcium gluconate IV was ordered. Management discussed with PGY-3.

## 2017-07-06 NOTE — CONSULT NOTE ADULT - ASSESSMENT
82yo male from home, PMH HTN, DM, CKD p/w chest pain, shortness of breath, and leg swelling 2/2 worsening renal function

## 2017-07-06 NOTE — PROGRESS NOTE ADULT - PROBLEM SELECTOR PLAN 6
- Anemia of chronic disease , likely due to CKD  - Hb stable , occult negative   - F/up on CBC, anemia panel - Anemia of chronic disease , likely due to CKD  - Hb stable , occult negative   -

## 2017-07-06 NOTE — CONSULT NOTE ADULT - SUBJECTIVE AND OBJECTIVE BOX
80yo male from home PMH DM, HTN, CKD p/w chest pain, sob, cough, leg edema.  Patient's home medications HPI:  80 Y/O M, form home PMH DM, HTN, CKD p/w chest pain, shortness of breath and leg edema.  Patient has been followed by nephrologist and has been told will need dialysis, dialysis access to be scheduled. Denies fever, chills, vomiting, abdominal pain, diarrhea. Home medications include hydralazine, amlodipine and torsemide.     Interval History:     s/p right femoral shiley catheter  s/p 2 dialysis sessions with symptomatic improvement    MEDICATIONS  (STANDING):  aspirin  chewable 81 milliGRAM(s) Oral daily  atorvastatin 40 milliGRAM(s) Oral at bedtime  hydrALAZINE 50 milliGRAM(s) Oral three times a day  amLODIPine   Tablet 10 milliGRAM(s) Oral daily  insulin lispro (HumaLOG) corrective regimen sliding scale   SubCutaneous three times a day before meals  dextrose 5%. 1000 milliLiter(s) (50 mL/Hr) IV Continuous <Continuous>  dextrose 50% Injectable 12.5 Gram(s) IV Push once  dextrose 50% Injectable 25 Gram(s) IV Push once  dextrose 50% Injectable 25 Gram(s) IV Push once  metoprolol 12.5 milliGRAM(s) Oral two times a day  heparin  Injectable 5000 Unit(s) SubCutaneous every 8 hours  furosemide   Injectable 80 milliGRAM(s) IV Push once  PPD  5 Tuberculin Unit(s) Injectable 5 Unit(s) IntraDermal once  iron sucrose IVPB 100 milliGRAM(s) IV Intermittent once  epoetin zach Injectable 6000 Unit(s) IV Push once  calcium acetate 667 milliGRAM(s) Oral three times a day with meals    MEDICATIONS  (PRN):  dextrose Gel 1 Dose(s) Oral once PRN Blood Glucose LESS THAN 70 milliGRAM(s)/deciLiter  glucagon  Injectable 1 milliGRAM(s) IntraMuscular once PRN Glucose <70 milliGRAM(s)/deciLiter  guaiFENesin   Syrup  (Sugar-Free) 100 milliGRAM(s) Oral every 6 hours PRN Cough    PAST MEDICAL & SURGICAL HISTORY:  No pertinent past medical history  No significant past surgical history    SOCIAL HISTORY:  Smoker:  YES / NO        PACK YEARS:                         WHEN QUIT?  ETOH use:  YES / NO               FREQUENCY / QUANTITY:  Ilicit Drug use:  YES / NO      REVIEW OF SYSTEMS:    CONSTITUTIONAL: No weakness, fevers or chills  RESPIRATORY: No cough, wheezing, hemoptysis; No shortness of breath  CARDIOVASCULAR: No chest pain or palpitations  GASTROINTESTINAL: No abdominal or epigastric pain. No nausea, vomiting, or hematemesis; No diarrhea or constipation. No melena or hematochezia.  NEUROLOGICAL: No numbness or weakness  All other review of systems is negative unless indicated above.    Vital Signs Last 24 Hrs  T(C): 36.8 (07 Jul 2017 05:27), Max: 37.1 (06 Jul 2017 20:28)  T(F): 98.2 (07 Jul 2017 05:27), Max: 98.7 (06 Jul 2017 20:28)  HR: 60 (07 Jul 2017 05:27) (57 - 67)  BP: 156/55 (07 Jul 2017 05:27) (141/58 - 156/55)  BP(mean): --  RR: 17 (07 Jul 2017 05:27) (17 - 17)  SpO2: 100% (07 Jul 2017 05:27) (91% - 100%)  General: A/ox 3, No acute Distress  Neck: Supple, NO JVD  Cardiac: S1 S2, No M/R/G  Pulmonary: CTAB, Breathing unlabored, No Rhonchi/Rales/Wheezing  Abdomen: Soft, Non -tender, +BS x 4 quads  Extremities: No Rashes, No edema  Neuro: A/o x 3, No focal deficits        Telemetry: intermittent atrial tachycardia  [facility]                          8.5    5.1   )-----------( 164      ( 07 Jul 2017 06:16 )             25.1     07-07    144  |  108  |  55<H>  ----------------------------<  78  4.1   |  23  |  5.30<H>    Ca    7.2<L>      07 Jul 2017 06:16  Phos  4.8     07-07  Mg     2.1     07-07    TPro  6.0  /  Alb  2.7<L>  /  TBili  0.4  /  DBili  x   /  AST  10  /  ALT  14  /  AlkPhos  83  07-06 HPI:  82 Y/O M, form home PMH DM, HTN, CKD p/w chest pain, shortness of breath and leg edema.  Patient has been followed by nephrologist and has been told will need dialysis, dialysis access to be scheduled. Denies fever, chills, vomiting, abdominal pain, diarrhea. Home medications include hydralazine, amlodipine and torsemide.     Interval History:     s/p right femoral shiley catheter  s/p 2 dialysis sessions with symptomatic improvement    MEDICATIONS  (STANDING):  aspirin  chewable 81 milliGRAM(s) Oral daily  atorvastatin 40 milliGRAM(s) Oral at bedtime  hydrALAZINE 50 milliGRAM(s) Oral three times a day  amLODIPine   Tablet 10 milliGRAM(s) Oral daily  insulin lispro (HumaLOG) corrective regimen sliding scale   SubCutaneous three times a day before meals  dextrose 5%. 1000 milliLiter(s) (50 mL/Hr) IV Continuous <Continuous>  dextrose 50% Injectable 12.5 Gram(s) IV Push once  dextrose 50% Injectable 25 Gram(s) IV Push once  dextrose 50% Injectable 25 Gram(s) IV Push once  metoprolol 12.5 milliGRAM(s) Oral two times a day  heparin  Injectable 5000 Unit(s) SubCutaneous every 8 hours  furosemide   Injectable 80 milliGRAM(s) IV Push once  PPD  5 Tuberculin Unit(s) Injectable 5 Unit(s) IntraDermal once  iron sucrose IVPB 100 milliGRAM(s) IV Intermittent once  epoetin zach Injectable 6000 Unit(s) IV Push once  calcium acetate 667 milliGRAM(s) Oral three times a day with meals    MEDICATIONS  (PRN):  dextrose Gel 1 Dose(s) Oral once PRN Blood Glucose LESS THAN 70 milliGRAM(s)/deciLiter  glucagon  Injectable 1 milliGRAM(s) IntraMuscular once PRN Glucose <70 milliGRAM(s)/deciLiter  guaiFENesin   Syrup  (Sugar-Free) 100 milliGRAM(s) Oral every 6 hours PRN Cough    PAST MEDICAL & SURGICAL HISTORY:  No pertinent past medical history  No significant past surgical history    SOCIAL HISTORY:  Smoker:  YES / NO        PACK YEARS:                         WHEN QUIT?  ETOH use:  YES / NO               FREQUENCY / QUANTITY:  Ilicit Drug use:  YES / NO      REVIEW OF SYSTEMS:  All other review of systems is negative unless indicated above.    Vital Signs Last 24 Hrs  T(C): 36.8 (07 Jul 2017 05:27), Max: 37.1 (06 Jul 2017 20:28)  T(F): 98.2 (07 Jul 2017 05:27), Max: 98.7 (06 Jul 2017 20:28)  HR: 60 (07 Jul 2017 05:27) (57 - 67)  BP: 156/55 (07 Jul 2017 05:27) (141/58 - 156/55)  BP(mean): --  RR: 17 (07 Jul 2017 05:27) (17 - 17)  SpO2: 100% (07 Jul 2017 05:27) (91% - 100%)    General: A/ox 3, No acute Distress  Neck: Supple, No JVD  Cardiac: S1 S2, No M/R/G  Pulmonary: CTAB, Breathing unlabored, No Rhonchi/Rales/Wheezing; decreased breath sounds bilateral base  Abdomen: Soft, Non -tender, + BS  Extremities: No Rashes, No edema  Neuro: A/o x 3, No focal deficits        Telemetry: intermittent atrial tachycardia  EKG: IFRAH [facility]: NA                          8.5    5.1   )-----------( 164      ( 07 Jul 2017 06:16 )             25.1     07-07    144  |  108  |  55<H>  ----------------------------<  78  4.1   |  23  |  5.30<H>    Ca    7.2<L>      07 Jul 2017 06:16  Phos  4.8     07-07  Mg     2.1     07-07    TPro  6.0  /  Alb  2.7<L>  /  TBili  0.4  /  DBili  x   /  AST  10  /  ALT  14  /  AlkPhos  83  07-06

## 2017-07-06 NOTE — CONSULT NOTE ADULT - PROBLEM SELECTOR RECOMMENDATION 2
no evidence of atrial fibrillation on ekg and telemetry, possible MAT  - no need for AC  - can dc telemetry no evidence of atrial fibrillation on ekg and telemetry, possible MAT  - no need for AC  - c/w BB  - can dc telemetry

## 2017-07-06 NOTE — CONSULT NOTE ADULT - PROBLEM SELECTOR RECOMMENDATION 9
Right groin shiley catheter placed at bedside without complications. Pt tolerated procedure.  HD tonight.  LUE precaution.  Renal diet  Plan for AVF w/ PC when medically optimized.
Will have temporary HD catheter placed and begin dialysis urgently.  Will maximize UF.  Plan for HD tonight and again tomorrow.    Will evaluate for MBD as well.
presentation likely 2/2 worsening renal function. symptomatic and clinical improvement after dialysis, weight decreasing.  scheduled for tunneled catheter today and awaiting DC planning for outpatient dialysis  - f/u TTE but presentation unlikely 2/2 CHF

## 2017-07-07 DIAGNOSIS — I47.1 SUPRAVENTRICULAR TACHYCARDIA: ICD-10-CM

## 2017-07-07 DIAGNOSIS — I10 ESSENTIAL (PRIMARY) HYPERTENSION: ICD-10-CM

## 2017-07-07 DIAGNOSIS — E11.22 TYPE 2 DIABETES MELLITUS WITH DIABETIC CHRONIC KIDNEY DISEASE: ICD-10-CM

## 2017-07-07 LAB
ANION GAP SERPL CALC-SCNC: 13 MMOL/L — SIGNIFICANT CHANGE UP (ref 5–17)
BUN SERPL-MCNC: 55 MG/DL — HIGH (ref 7–18)
CALCIUM SERPL-MCNC: 7.2 MG/DL — LOW (ref 8.4–10.5)
CHLORIDE SERPL-SCNC: 108 MMOL/L — SIGNIFICANT CHANGE UP (ref 96–108)
CO2 SERPL-SCNC: 23 MMOL/L — SIGNIFICANT CHANGE UP (ref 22–31)
CREAT SERPL-MCNC: 5.3 MG/DL — HIGH (ref 0.5–1.3)
GLUCOSE SERPL-MCNC: 78 MG/DL — SIGNIFICANT CHANGE UP (ref 70–99)
HAV IGM SER-ACNC: SIGNIFICANT CHANGE UP
HBV CORE IGM SER-ACNC: SIGNIFICANT CHANGE UP
HBV SURFACE AG SER-ACNC: SIGNIFICANT CHANGE UP
HCT VFR BLD CALC: 25.1 % — LOW (ref 39–50)
HCV AB S/CO SERPL IA: 0.06 S/CO — SIGNIFICANT CHANGE UP
HCV AB SERPL-IMP: SIGNIFICANT CHANGE UP
HGB BLD-MCNC: 8.5 G/DL — LOW (ref 13–17)
MAGNESIUM SERPL-MCNC: 2.1 MG/DL — SIGNIFICANT CHANGE UP (ref 1.6–2.6)
MCHC RBC-ENTMCNC: 30.7 PG — SIGNIFICANT CHANGE UP (ref 27–34)
MCHC RBC-ENTMCNC: 33.7 GM/DL — SIGNIFICANT CHANGE UP (ref 32–36)
MCV RBC AUTO: 91.2 FL — SIGNIFICANT CHANGE UP (ref 80–100)
PHOSPHATE SERPL-MCNC: 4.8 MG/DL — HIGH (ref 2.5–4.5)
PLATELET # BLD AUTO: 164 K/UL — SIGNIFICANT CHANGE UP (ref 150–400)
POTASSIUM SERPL-MCNC: 4.1 MMOL/L — SIGNIFICANT CHANGE UP (ref 3.5–5.3)
POTASSIUM SERPL-SCNC: 4.1 MMOL/L — SIGNIFICANT CHANGE UP (ref 3.5–5.3)
RBC # BLD: 2.75 M/UL — LOW (ref 4.2–5.8)
RBC # FLD: 13.6 % — SIGNIFICANT CHANGE UP (ref 10.3–14.5)
SODIUM SERPL-SCNC: 144 MMOL/L — SIGNIFICANT CHANGE UP (ref 135–145)
WBC # BLD: 5.1 K/UL — SIGNIFICANT CHANGE UP (ref 3.8–10.5)
WBC # FLD AUTO: 5.1 K/UL — SIGNIFICANT CHANGE UP (ref 3.8–10.5)

## 2017-07-07 PROCEDURE — 77001 FLUOROGUIDE FOR VEIN DEVICE: CPT | Mod: 26

## 2017-07-07 PROCEDURE — 36558 INSERT TUNNELED CV CATH: CPT

## 2017-07-07 PROCEDURE — 99233 SBSQ HOSP IP/OBS HIGH 50: CPT | Mod: GC

## 2017-07-07 PROCEDURE — 76937 US GUIDE VASCULAR ACCESS: CPT | Mod: 26

## 2017-07-07 RX ORDER — FUROSEMIDE 40 MG
80 TABLET ORAL DAILY
Qty: 0 | Refills: 0 | Status: DISCONTINUED | OUTPATIENT
Start: 2017-07-07 | End: 2017-07-08

## 2017-07-07 RX ORDER — ACETAMINOPHEN 500 MG
650 TABLET ORAL EVERY 6 HOURS
Qty: 0 | Refills: 0 | Status: DISCONTINUED | OUTPATIENT
Start: 2017-07-07 | End: 2017-07-08

## 2017-07-07 RX ADMIN — Medication 650 MILLIGRAM(S): at 12:50

## 2017-07-07 RX ADMIN — ATORVASTATIN CALCIUM 40 MILLIGRAM(S): 80 TABLET, FILM COATED ORAL at 21:54

## 2017-07-07 RX ADMIN — Medication 50 MILLIGRAM(S): at 05:32

## 2017-07-07 RX ADMIN — Medication 50 MILLIGRAM(S): at 21:54

## 2017-07-07 RX ADMIN — Medication 667 MILLIGRAM(S): at 12:09

## 2017-07-07 RX ADMIN — AMLODIPINE BESYLATE 10 MILLIGRAM(S): 2.5 TABLET ORAL at 05:32

## 2017-07-07 RX ADMIN — Medication 650 MILLIGRAM(S): at 12:08

## 2017-07-07 RX ADMIN — Medication 81 MILLIGRAM(S): at 12:09

## 2017-07-07 RX ADMIN — Medication 12.5 MILLIGRAM(S): at 05:32

## 2017-07-07 RX ADMIN — HEPARIN SODIUM 5000 UNIT(S): 5000 INJECTION INTRAVENOUS; SUBCUTANEOUS at 15:42

## 2017-07-07 RX ADMIN — Medication 12.5 MILLIGRAM(S): at 17:49

## 2017-07-07 RX ADMIN — Medication 667 MILLIGRAM(S): at 17:49

## 2017-07-07 RX ADMIN — Medication 50 MILLIGRAM(S): at 15:42

## 2017-07-07 RX ADMIN — HEPARIN SODIUM 5000 UNIT(S): 5000 INJECTION INTRAVENOUS; SUBCUTANEOUS at 21:54

## 2017-07-07 RX ADMIN — Medication 2: at 17:49

## 2017-07-08 VITALS
RESPIRATION RATE: 17 BRPM | TEMPERATURE: 98 F | HEART RATE: 53 BPM | SYSTOLIC BLOOD PRESSURE: 165 MMHG | DIASTOLIC BLOOD PRESSURE: 61 MMHG | OXYGEN SATURATION: 99 %

## 2017-07-08 LAB
ALBUMIN SERPL ELPH-MCNC: 2.7 G/DL — LOW (ref 3.5–5)
ALP SERPL-CCNC: 77 U/L — SIGNIFICANT CHANGE UP (ref 40–120)
ALT FLD-CCNC: 17 U/L DA — SIGNIFICANT CHANGE UP (ref 10–60)
ANION GAP SERPL CALC-SCNC: 11 MMOL/L — SIGNIFICANT CHANGE UP (ref 5–17)
AST SERPL-CCNC: 13 U/L — SIGNIFICANT CHANGE UP (ref 10–40)
BASOPHILS # BLD AUTO: 0 K/UL — SIGNIFICANT CHANGE UP (ref 0–0.2)
BASOPHILS NFR BLD AUTO: 0.7 % — SIGNIFICANT CHANGE UP (ref 0–2)
BILIRUB SERPL-MCNC: 0.3 MG/DL — SIGNIFICANT CHANGE UP (ref 0.2–1.2)
BUN SERPL-MCNC: 61 MG/DL — HIGH (ref 7–18)
CALCIUM SERPL-MCNC: 7.7 MG/DL — LOW (ref 8.4–10.5)
CHLORIDE SERPL-SCNC: 106 MMOL/L — SIGNIFICANT CHANGE UP (ref 96–108)
CO2 SERPL-SCNC: 23 MMOL/L — SIGNIFICANT CHANGE UP (ref 22–31)
CREAT SERPL-MCNC: 5.82 MG/DL — HIGH (ref 0.5–1.3)
EOSINOPHIL # BLD AUTO: 0.2 K/UL — SIGNIFICANT CHANGE UP (ref 0–0.5)
EOSINOPHIL NFR BLD AUTO: 3 % — SIGNIFICANT CHANGE UP (ref 0–6)
GLUCOSE SERPL-MCNC: 91 MG/DL — SIGNIFICANT CHANGE UP (ref 70–99)
HCT VFR BLD CALC: 27.6 % — LOW (ref 39–50)
HGB BLD-MCNC: 9.2 G/DL — LOW (ref 13–17)
LYMPHOCYTES # BLD AUTO: 1.4 K/UL — SIGNIFICANT CHANGE UP (ref 1–3.3)
LYMPHOCYTES # BLD AUTO: 20.5 % — SIGNIFICANT CHANGE UP (ref 13–44)
MAGNESIUM SERPL-MCNC: 2 MG/DL — SIGNIFICANT CHANGE UP (ref 1.6–2.6)
MCHC RBC-ENTMCNC: 30.4 PG — SIGNIFICANT CHANGE UP (ref 27–34)
MCHC RBC-ENTMCNC: 33.3 GM/DL — SIGNIFICANT CHANGE UP (ref 32–36)
MCV RBC AUTO: 91.1 FL — SIGNIFICANT CHANGE UP (ref 80–100)
MONOCYTES # BLD AUTO: 0.9 K/UL — SIGNIFICANT CHANGE UP (ref 0–0.9)
MONOCYTES NFR BLD AUTO: 13 % — SIGNIFICANT CHANGE UP (ref 2–14)
NEUTROPHILS # BLD AUTO: 4.2 K/UL — SIGNIFICANT CHANGE UP (ref 1.8–7.4)
NEUTROPHILS NFR BLD AUTO: 62.8 % — SIGNIFICANT CHANGE UP (ref 43–77)
PHOSPHATE SERPL-MCNC: 4.8 MG/DL — HIGH (ref 2.5–4.5)
PLATELET # BLD AUTO: 174 K/UL — SIGNIFICANT CHANGE UP (ref 150–400)
POTASSIUM SERPL-MCNC: 4.2 MMOL/L — SIGNIFICANT CHANGE UP (ref 3.5–5.3)
POTASSIUM SERPL-SCNC: 4.2 MMOL/L — SIGNIFICANT CHANGE UP (ref 3.5–5.3)
PROT SERPL-MCNC: 6.2 G/DL — SIGNIFICANT CHANGE UP (ref 6–8.3)
RBC # BLD: 3.03 M/UL — LOW (ref 4.2–5.8)
RBC # FLD: 14 % — SIGNIFICANT CHANGE UP (ref 10.3–14.5)
SODIUM SERPL-SCNC: 140 MMOL/L — SIGNIFICANT CHANGE UP (ref 135–145)
WBC # BLD: 6.6 K/UL — SIGNIFICANT CHANGE UP (ref 3.8–10.5)
WBC # FLD AUTO: 6.6 K/UL — SIGNIFICANT CHANGE UP (ref 3.8–10.5)

## 2017-07-08 PROCEDURE — 71045 X-RAY EXAM CHEST 1 VIEW: CPT

## 2017-07-08 PROCEDURE — 84133 ASSAY OF URINE POTASSIUM: CPT

## 2017-07-08 PROCEDURE — 36415 COLL VENOUS BLD VENIPUNCTURE: CPT

## 2017-07-08 PROCEDURE — 76937 US GUIDE VASCULAR ACCESS: CPT

## 2017-07-08 PROCEDURE — 83550 IRON BINDING TEST: CPT

## 2017-07-08 PROCEDURE — 82043 UR ALBUMIN QUANTITATIVE: CPT

## 2017-07-08 PROCEDURE — 85610 PROTHROMBIN TIME: CPT

## 2017-07-08 PROCEDURE — 86900 BLOOD TYPING SEROLOGIC ABO: CPT

## 2017-07-08 PROCEDURE — 82746 ASSAY OF FOLIC ACID SERUM: CPT

## 2017-07-08 PROCEDURE — 81001 URINALYSIS AUTO W/SCOPE: CPT

## 2017-07-08 PROCEDURE — 86480 TB TEST CELL IMMUN MEASURE: CPT

## 2017-07-08 PROCEDURE — 97161 PT EVAL LOW COMPLEX 20 MIN: CPT

## 2017-07-08 PROCEDURE — 80061 LIPID PANEL: CPT

## 2017-07-08 PROCEDURE — 82607 VITAMIN B-12: CPT

## 2017-07-08 PROCEDURE — 93005 ELECTROCARDIOGRAM TRACING: CPT

## 2017-07-08 PROCEDURE — 82306 VITAMIN D 25 HYDROXY: CPT

## 2017-07-08 PROCEDURE — 83735 ASSAY OF MAGNESIUM: CPT

## 2017-07-08 PROCEDURE — 71020: CPT | Mod: 26

## 2017-07-08 PROCEDURE — 77001 FLUOROGUIDE FOR VEIN DEVICE: CPT

## 2017-07-08 PROCEDURE — C1769: CPT

## 2017-07-08 PROCEDURE — 83036 HEMOGLOBIN GLYCOSYLATED A1C: CPT

## 2017-07-08 PROCEDURE — 76775 US EXAM ABDO BACK WALL LIM: CPT

## 2017-07-08 PROCEDURE — 82570 ASSAY OF URINE CREATININE: CPT

## 2017-07-08 PROCEDURE — 82728 ASSAY OF FERRITIN: CPT

## 2017-07-08 PROCEDURE — C1894: CPT

## 2017-07-08 PROCEDURE — 99261: CPT

## 2017-07-08 PROCEDURE — 82272 OCCULT BLD FECES 1-3 TESTS: CPT

## 2017-07-08 PROCEDURE — 80048 BASIC METABOLIC PNL TOTAL CA: CPT

## 2017-07-08 PROCEDURE — 80053 COMPREHEN METABOLIC PANEL: CPT

## 2017-07-08 PROCEDURE — 82553 CREATINE MB FRACTION: CPT

## 2017-07-08 PROCEDURE — 71046 X-RAY EXAM CHEST 2 VIEWS: CPT

## 2017-07-08 PROCEDURE — 84484 ASSAY OF TROPONIN QUANT: CPT

## 2017-07-08 PROCEDURE — 84443 ASSAY THYROID STIM HORMONE: CPT

## 2017-07-08 PROCEDURE — 80074 ACUTE HEPATITIS PANEL: CPT

## 2017-07-08 PROCEDURE — 83880 ASSAY OF NATRIURETIC PEPTIDE: CPT

## 2017-07-08 PROCEDURE — 82550 ASSAY OF CK (CPK): CPT

## 2017-07-08 PROCEDURE — 83935 ASSAY OF URINE OSMOLALITY: CPT

## 2017-07-08 PROCEDURE — 99285 EMERGENCY DEPT VISIT HI MDM: CPT | Mod: 25

## 2017-07-08 PROCEDURE — 86850 RBC ANTIBODY SCREEN: CPT

## 2017-07-08 PROCEDURE — 84300 ASSAY OF URINE SODIUM: CPT

## 2017-07-08 PROCEDURE — 36558 INSERT TUNNELED CV CATH: CPT

## 2017-07-08 PROCEDURE — 86901 BLOOD TYPING SEROLOGIC RH(D): CPT

## 2017-07-08 PROCEDURE — 84156 ASSAY OF PROTEIN URINE: CPT

## 2017-07-08 PROCEDURE — 84466 ASSAY OF TRANSFERRIN: CPT

## 2017-07-08 PROCEDURE — 83970 ASSAY OF PARATHORMONE: CPT

## 2017-07-08 PROCEDURE — 96372 THER/PROPH/DIAG INJ SC/IM: CPT

## 2017-07-08 PROCEDURE — 82310 ASSAY OF CALCIUM: CPT

## 2017-07-08 PROCEDURE — 82803 BLOOD GASES ANY COMBINATION: CPT

## 2017-07-08 PROCEDURE — 85027 COMPLETE CBC AUTOMATED: CPT

## 2017-07-08 PROCEDURE — 93306 TTE W/DOPPLER COMPLETE: CPT

## 2017-07-08 PROCEDURE — 85730 THROMBOPLASTIN TIME PARTIAL: CPT

## 2017-07-08 PROCEDURE — 84100 ASSAY OF PHOSPHORUS: CPT

## 2017-07-08 PROCEDURE — 71010: CPT | Mod: 26

## 2017-07-08 PROCEDURE — C1750: CPT

## 2017-07-08 RX ORDER — METOPROLOL TARTRATE 50 MG
0.5 TABLET ORAL
Qty: 30 | Refills: 0 | OUTPATIENT
Start: 2017-07-08 | End: 2017-08-07

## 2017-07-08 RX ORDER — ATORVASTATIN CALCIUM 80 MG/1
1 TABLET, FILM COATED ORAL
Qty: 30 | Refills: 0
Start: 2017-07-08 | End: 2017-08-07

## 2017-07-08 RX ORDER — HYDRALAZINE HCL 50 MG
1 TABLET ORAL
Qty: 90 | Refills: 0
Start: 2017-07-08 | End: 2017-08-07

## 2017-07-08 RX ORDER — FUROSEMIDE 40 MG
1 TABLET ORAL
Qty: 30 | Refills: 0
Start: 2017-07-08 | End: 2017-08-07

## 2017-07-08 RX ORDER — ASPIRIN/CALCIUM CARB/MAGNESIUM 324 MG
1 TABLET ORAL
Qty: 30 | Refills: 0
Start: 2017-07-08 | End: 2017-08-07

## 2017-07-08 RX ORDER — AMLODIPINE BESYLATE 2.5 MG/1
1 TABLET ORAL
Qty: 30 | Refills: 0
Start: 2017-07-08 | End: 2017-08-07

## 2017-07-08 RX ADMIN — HEPARIN SODIUM 5000 UNIT(S): 5000 INJECTION INTRAVENOUS; SUBCUTANEOUS at 21:39

## 2017-07-08 RX ADMIN — Medication 2: at 17:59

## 2017-07-08 RX ADMIN — Medication 667 MILLIGRAM(S): at 12:20

## 2017-07-08 RX ADMIN — HEPARIN SODIUM 5000 UNIT(S): 5000 INJECTION INTRAVENOUS; SUBCUTANEOUS at 14:11

## 2017-07-08 RX ADMIN — Medication 12.5 MILLIGRAM(S): at 05:56

## 2017-07-08 RX ADMIN — ATORVASTATIN CALCIUM 40 MILLIGRAM(S): 80 TABLET, FILM COATED ORAL at 21:39

## 2017-07-08 RX ADMIN — Medication 81 MILLIGRAM(S): at 12:20

## 2017-07-08 RX ADMIN — Medication 80 MILLIGRAM(S): at 05:56

## 2017-07-08 RX ADMIN — Medication 667 MILLIGRAM(S): at 17:59

## 2017-07-08 RX ADMIN — Medication 50 MILLIGRAM(S): at 14:10

## 2017-07-08 RX ADMIN — Medication 12.5 MILLIGRAM(S): at 17:59

## 2017-07-08 RX ADMIN — AMLODIPINE BESYLATE 10 MILLIGRAM(S): 2.5 TABLET ORAL at 05:56

## 2017-07-08 RX ADMIN — Medication 50 MILLIGRAM(S): at 05:56

## 2017-07-08 RX ADMIN — HEPARIN SODIUM 5000 UNIT(S): 5000 INJECTION INTRAVENOUS; SUBCUTANEOUS at 05:57

## 2017-07-08 RX ADMIN — ERYTHROPOIETIN 6000 UNIT(S): 10000 INJECTION, SOLUTION INTRAVENOUS; SUBCUTANEOUS at 08:27

## 2017-07-08 RX ADMIN — IRON SUCROSE 210 MILLIGRAM(S): 20 INJECTION, SOLUTION INTRAVENOUS at 08:26

## 2017-07-08 RX ADMIN — Medication 50 MILLIGRAM(S): at 21:39

## 2017-07-08 NOTE — PROGRESS NOTE ADULT - PROBLEM SELECTOR PLAN 7
- Improve VTE score is 1 with 3 month risk of VTE is 0.6 , since patient is currently in bed , will give heparin .   - No need for GI prophylaxis
- Improve VTE score is 1 with 3 month risk of VTE is 0.6 , since patient is currently in bed , will give heparin .   - No need for GI prophylaxis
iPTH at goal.  Phos control will likely help control iPTH as well.

## 2017-07-08 NOTE — PROGRESS NOTE ADULT - PROBLEM SELECTOR PROBLEM 7
Secondary hyperparathyroidism
Need for prophylactic measure
Need for prophylactic measure
Secondary hyperparathyroidism

## 2017-07-08 NOTE — PROGRESS NOTE ADULT - NSHPATTENDINGPLANDISCUSS_GEN_ALL_CORE
Dr. Tolliver
Patient, resident, IR, Dr. Grubbs
JOSHUA Horan, Dr. Grubbs.
Housestaff
Housestaff, Dr. Tolliver

## 2017-07-08 NOTE — PROGRESS NOTE ADULT - ATTENDING COMMENTS
[facility] NEPHROLOGY  Nahum Grubbs M.D.  Francisco Javier Hicks D.O.  Yovana Quiroz M.D.  Makeda Duron, MSN, ANP-C    Telephone: [phone]  Facsimile: [phone]    [address] [place] [address]
I have examined this patient at the bedside and discussed his care with JOSHUA Horan, and Dr. Grubbs.   He is feeling much better after dialysis.  He has moderate pain at the site of catheter placement, which is relieved by analgesic.   Lungs are clear  He is ready to be discharged home with follow up dialysis as outpatient.   PPD will be placed tomorrow AM, and can be read either Monday or Tuesday by Dr. Grubbs.
Patient was examined at bedside and discussed with resident and with consultant.   Patient had his first dialysis last night.  He is feeling much better.   Generalized anasarca has resolved.   Lungs, clear  Cor, irreg  Abdomen, soft.   Discussed with Dr. Grubbs, patient will need to have a tunnel catheter placed for temporary dialysis access.   Fistula placement can be arranged after discharge.   We can make arrangements for him to be dialyzed at [facility] with his primary nephrologist, Dr. Manuel Cisneros.   We have taken the request to interventional radiology for the tunnel catheter.
DISPO- stable for discharge home today  Outpatient dialysis to be started on Tuesday.  Plan discussed with patient in details at bedside and all their questions / concerns were addressed.  Discussed with  and primary nurse.
[facility] NEPHROLOGY  Nahum Grubbs M.D.  Francisco Javier Hicks D.O.  Yovana Quiroz M.D.  Makeda Duron, MSN, ANP-C    Telephone: [phone]  Facsimile: [phone]    [address] [place] [address]
[facility] NEPHROLOGY  Nahum Grubbs M.D.  Francisco Javier Hicks D.O.  Yovana Quiroz M.D.  Makeda Duron, MSN, ANP-C    Telephone: [phone]  Facsimile: [phone]    [address] [place] [address]

## 2017-07-08 NOTE — PROGRESS NOTE ADULT - PROVIDER SPECIALTY LIST ADULT
Internal Medicine
Internal Medicine
Nephrology
Nephrology
Vascular Surgery
Nephrology
Internal Medicine

## 2017-07-08 NOTE — DISCHARGE NOTE ADULT - CARE PLAN
Principal Discharge DX:	Chronic kidney disease, unspecified stage  Goal:	Please follow up with Dialysis, and close follow up with nephrologist and PCP  Instructions for follow-up, activity and diet:	Pt to go to [facility] Dialysis center on Tuesday at 10AM.    [facility] (Dialysis Center)  [address] [place] [id number].   Please have close f/u with PCP and Nephrologist.  Secondary Diagnosis:	Essential hypertension  Goal:	Please maintain a pressure of <140/<80, and check pressures regularly  Instructions for follow-up, activity and diet:	Please continue BP medications and follow closely with PCP Principal Discharge DX:	Chronic kidney disease, unspecified stage  Goal:	Please follow up with Dialysis, and close follow up with nephrologist and PCP  Instructions for follow-up, activity and diet:	Pt to go to [facility] Dialysis center on Tuesday at 10AM.    [facility] (Dialysis Center)  [address] [place] [id number].   Please have close f/u with PCP and Nephrologist.  Secondary Diagnosis:	Essential hypertension  Goal:	Please maintain a pressure of <140/<80, and check pressures regularly  Instructions for follow-up, activity and diet:	Please continue BP medications and follow closely with PCP

## 2017-07-08 NOTE — PROGRESS NOTE ADULT - PROBLEM SELECTOR PROBLEM 1
ESRD (end stage renal disease)
ESRD (end stage renal disease)
End-stage renal disease (ESRD)
ESRD (end stage renal disease)

## 2017-07-08 NOTE — DISCHARGE NOTE ADULT - PLAN OF CARE
Please follow up with Dialysis, and close follow up with nephrologist and PCP Pt to go to [facility] Dialysis center on Tuesday at 10AM.    [facility] (Dialysis Center)  [address] [place] [id number].   Please have close f/u with PCP and Nephrologist. Please maintain a pressure of <140/<80, and check pressures regularly Please continue BP medications and follow closely with PCP

## 2017-07-08 NOTE — PROGRESS NOTE ADULT - PROBLEM SELECTOR PLAN 5
- Continue with home medications hydralazine 50 mg TID , amlodipine 10mg, torsemide 20 mg   - Hold of the torsemide for now as patient having the dialysis   - DASH/TLC diet
- Continue with home medications hydralazine 50 mg TID , amlodipine 10mg, torsemide 20 mg   - Hold of the torsemide for now as patient having the dialysis   - DASH/TLC diet
Begin SON with hemodialysis (Epogen 6000 units) starting tomorrow.  Give Venofer 100mg IV with HD X 10 doses, starting tomorrow.   Monitor hemoglobin.
Continue SON with hemodialysis. Monitor hemoglobin.
hemoglobin stable continue with procirt
Begin SON with hemodialysis (Epogen 6000 units) starting tomorrow.  Give Venofer 100mg IV with HD X 10 doses, starting tomorrow.   Monitor hemoglobin.

## 2017-07-08 NOTE — PROGRESS NOTE ADULT - PROBLEM SELECTOR PLAN 1
-History of kidney problems , told by the outpatient nephrologist that he needs the dialysis and was planed to have the vascular acces in left arm , makes urine normally  -Outpatient nephrologist is Dr Manuel Cisneros ( currently on vacation).    Dr. Grubbs on board for nephro.    -Labs pertinent for Cr of 5.66 today , BUN is 49  -Nephro  Dr Ge    -Physical exam: anasarca has reduced.  -Renal U/s medical renal disease , no evidence of hydronephrosis , Fena of 1.2%   -Patient fluid overloaded, no hyperkalemia , no altered mental status , no asterixis   -Pt will have tunnel catheter placed tomorrow with IR, and shiley will be removed.   -social work consult for dialysis as OP.  Possible d/c sat  -Avoid the nephrotoxic agents
-Outpatient nephrologist is Dr Manuel Cisneros ( currently on vacation).    Dr. Grubbs on board for nephro.    -Labs pertinent for Cr of 5.3 today , BUN is 55  -Nephro  Dr Ge    -Physical exam: anasarca has reduced.  -Renal U/s medical renal disease , no evidence of hydronephrosis , Fena of 1.2%   -No hyperkalemia , no altered mental status , no asterixis   -Tunnel catherter placed today, and shiley will be removed tomorrow after dialysis in the A.M.   -Avoid the nephrotoxic agents  -Social work coordinating with dialysis center- possible d/c monday.
HD #3 tomorrow.  Will need IR to place tunneled dialysis catheter.    (then can remove temporary HD catheter)  Will need AVF/G placement, but this can be done as an outpatient.  D/w social work re: HD placement  Continue with maintenance hemodialysis treatment. Monitor BMP.
Pt examined on dialysis.  Tunneled catheter working well.    MUST remove temporary HD catheter before patient  can be discharged home  Will need AVF/G placement, but this can be done as an outpatient.  Pt to f/u on Tuesday 7/11 @10am at  [facility] (Dialysis Center)  [address] [place] [id number]
had dialysis session this morning using the new perm cath and he tolerated well.   S/P removal of right groin tripp michelle cathter.  Feels ok.  Offers no new complaints.   Will be starting outpatient dialysis  using permacth on r chest wall- next session is Tuesday.
R alice ibrahim removed at bedside. Pt tolerated procedure. Good hemostasis achieved. Bedrest for 1 hr. Nurse to check dressing.  con't HD via PC per renal  outpatient f/u with Dr. Pratt for AVF placement  con't TANIYAE precaution
HD Rx #2 today and #3 tomorrow.  Will need IR to place tunneled dialysis catheter.    (then can remove temporary HD catheter)  Will need AVF/G placement, but this can be done as an outpatient.  D/w social work re: HD placement  Continue with maintenance hemodialysis treatment. Monitor BMP.

## 2017-07-08 NOTE — PROGRESS NOTE ADULT - ASSESSMENT
81 year-old man with late stage CKD now progressed to ESRD.  Bilateral pleural effusions and LE/UE edema are likely due to volume overload with renal failure.  HTN with BP elevated. Anemia of renal disease AND Iron deficiency.  Hyperphosphatremia is mild.  Secondary Hyperparathyroidism is mild and iPTH is at goal.
In the ED initial vitals shows the RR 20/ 94, /60, physical exam generalized anasarca, 3 + pitting edema , bilateral crackles, EKG shows the new onset afib( doubt afib , has P waves ) , CXR shows left sided pleural effusion got 40 mg of IV lasix, has good urine output , UA shows protein around more than 500 mg protein 5-10 granular casts. Admitted to telemetry for ACS ruled out and evaluation  of arrythmia , also fluid overloaded .
81 year-old man with late stage CKD now progressed to ESRD.  Bilateral pleural effusions and LE/UE edema are likely due to volume overload with renal failure.  HTN with BP elevated. Anemia of renal disease AND Iron deficiency.  Hyperphosphatremia is mild.  Secondary Hyperparathyroidism is mild and iPTH is at goal.
81y.o. Male w/ ESRD on HD via PC
In the ED initial vitals shows the RR 20/ 94, /60, physical exam generalized anasarca, 3 + pitting edema , bilateral crackles, EKG shows the new onset afib( doubt afib , has P waves ) , CXR shows left sided pleural effusion got 40 mg of IV lasix, has good urine output , UA shows protein around more than 500 mg protein 5-10 granular casts. Admitted to telemetry for ACS ruled out and evaluation  of arrythmia , also fluid overloaded .  Pt receiving hemodialysis therapy, and doing much better.

## 2017-07-08 NOTE — DISCHARGE NOTE ADULT - MEDICATION SUMMARY - MEDICATIONS TO TAKE
I will START or STAY ON the medications listed below when I get home from the hospital:    aspirin 81 mg oral tablet, chewable  -- 1 tab(s) by mouth once a day  -- Indication: For Need for prophylactic measure    atorvastatin 40 mg oral tablet  -- 1 tab(s) by mouth once a day (at bedtime)  -- Indication: For HTN (hypertension)    metoprolol tartrate 25 mg oral tablet  -- 0.5 tab(s) by mouth 2 times a day  -- It is very important that you take or use this exactly as directed.  Do not skip doses or discontinue unless directed by your doctor.  May cause drowsiness.  Alcohol may intensify this effect.  Use care when operating dangerous machinery.  Some non-prescription drugs may aggravate your condition.  Read all labels carefully.  If a warning appears, check with your doctor before taking.  Take with food or milk.  This drug may impair the ability to drive or operate machinery.  Use care until you become familiar with its effects.    -- Indication: For HTN (hypertension)    amLODIPine 10 mg oral tablet  -- 1 tab(s) by mouth once a day  -- Indication: For HTN (hypertension)    furosemide 80 mg oral tablet  -- 1 tab(s) by mouth once a day  -- Indication: For HTN (hypertension)    hydrALAZINE 50 mg oral tablet  -- 1 tab(s) by mouth 3 times a day  -- Indication: For HTN (hypertension)

## 2017-07-08 NOTE — PROGRESS NOTE ADULT - PROBLEM SELECTOR PROBLEM 3
Bilateral edema of lower extremity
EKG abnormalities
Bilateral edema of lower extremity
EKG abnormalities

## 2017-07-08 NOTE — PROGRESS NOTE ADULT - PROBLEM SELECTOR PLAN 4
- At home takes glimepiride 2 mg, januvia 25 mg daily   - Hold of the PO meds, c/w HSS   - 500 mg protein on UA  - F/up on HBA1c
- At home takes glimepiride 2 mg, januvia 25 mg daily   - Hold of the PO meds, c/w HSS   - 500 mg protein on UA  - F/up on HBA1c
BP stable;   continue with current meds.
Continue with current anti-hypertensive medications. Monitor BP.

## 2017-07-08 NOTE — PROGRESS NOTE ADULT - PROBLEM SELECTOR PLAN 8
Mild hypocalcemia.  No need to treat.  Additionally, phoslo (calcium acetate) will likely increase calcium levels mildly.
Mild hypocalcemia.  No need to treat.  Additionally, phoslo (calcium acetate) will likely increase calcium levels mildly.
stable; adjust dose of phoslo
Mild hypocalcemia.  No need to treat.  Additionally, phoslo (calcium acetate) will likely increase calcium levels mildly.

## 2017-07-08 NOTE — PROGRESS NOTE ADULT - SUBJECTIVE AND OBJECTIVE BOX
INTERVAL HPI/OVERNIGHT EVENTS:  Pt resting comfortably. No acute complaints. HD today via PC completed without issues. Denies numbness/tingling.    MEDICATIONS  (STANDING):  aspirin  chewable 81 milliGRAM(s) Oral daily  atorvastatin 40 milliGRAM(s) Oral at bedtime  hydrALAZINE 50 milliGRAM(s) Oral three times a day  amLODIPine   Tablet 10 milliGRAM(s) Oral daily  insulin lispro (HumaLOG) corrective regimen sliding scale   SubCutaneous three times a day before meals  dextrose 5%. 1000 milliLiter(s) (50 mL/Hr) IV Continuous <Continuous>  dextrose 50% Injectable 12.5 Gram(s) IV Push once  dextrose 50% Injectable 25 Gram(s) IV Push once  dextrose 50% Injectable 25 Gram(s) IV Push once  metoprolol 12.5 milliGRAM(s) Oral two times a day  heparin  Injectable 5000 Unit(s) SubCutaneous every 8 hours  PPD  5 Tuberculin Unit(s) Injectable 5 Unit(s) IntraDermal once  calcium acetate 667 milliGRAM(s) Oral three times a day with meals  furosemide    Tablet 80 milliGRAM(s) Oral daily    MEDICATIONS  (PRN):  dextrose Gel 1 Dose(s) Oral once PRN Blood Glucose LESS THAN 70 milliGRAM(s)/deciLiter  glucagon  Injectable 1 milliGRAM(s) IntraMuscular once PRN Glucose <70 milliGRAM(s)/deciLiter  guaiFENesin   Syrup  (Sugar-Free) 100 milliGRAM(s) Oral every 6 hours PRN Cough  acetaminophen   Tablet. 650 milliGRAM(s) Oral every 6 hours PRN Moderate Pain (4 - 6)      Vital Signs Last 24 Hrs  T(C): 36.3 (08 Jul 2017 11:15), Max: 37 (08 Jul 2017 05:10)  T(F): 97.3 (08 Jul 2017 11:15), Max: 98.6 (08 Jul 2017 05:10)  HR: 68 (08 Jul 2017 11:15) (54 - 80)  BP: 146/70 (08 Jul 2017 11:15) (120/57 - 157/73)  BP(mean): --  RR: 18 (08 Jul 2017 11:15) (17 - 18)  SpO2: 98% (08 Jul 2017 11:15) (92% - 100%)    Physical:  General: A&Ox3. NAD.  RLE: Warm, NVI, distal pulse 2+. Groin dressing C/D/I
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 Pt tolerating dialysis well.  SOB resolved.    Hospital Medications: Medications reviewed.  ROS: NO H/a, CP, abd pain.      VITALS:  T(F): 97.8 (17 @ 08:15), Max: 98.6 (17 @ 05:10)  HR: 59 (17 @ 08:15)  BP: 126/79 (17 @ 08:15)  RR: 17 (17 @ 08:15)  SpO2: 100% (17 @ 08:15)  Wt(kg): --     @ 07:01  -   @ 07:00  --------------------------------------------------------  IN: 350 mL / OUT: 0 mL / NET: 350 mL        PHYSICAL EXAM:  Constitutional: NAD  Neuroogical: A/O x 3  Psychiatric: Normal mood, normal affect  HEENT: anicteric sclera, oropharynx clear, MMM  Respiratory: CTAB, no wheezes, rales or rhonchi  Cardiovascular: S1, S2, RRR  Gastrointestinal: BS+, soft, NT/ND  : No CVA tenderness. No rea.  Extremities: No cyanosis or clubbing. + B LE peripheral edema improving          LABS:      140  |  106  |  61<H>  ----------------------------<  91  4.2   |  23  |  5.82<H>    Ca    7.7<L>      2017 08:18  Phos  4.8     -08  Mg     2.0     -08    TPro  6.2  /  Alb  2.7<L>  /  TBili  0.3  /  DBili      /  AST  13  /  ALT  17  /  AlkPhos  77  -    Creatinine Trend: 5.82 <--, 5.30 <--, 5.66 <--, 7.23 <--                        9.2    6.6   )-----------( 174      ( 2017 08:18 )             27.6     Urine Studies:  Urinalysis Basic - ( 2017 13:15 )    Color: Yellow / Appearance:  / S.020 / pH:   Gluc:  / Ketone: Negative  / Bili: Negative / Urobili: Negative   Blood:  / Protein: 500 mg/dL / Nitrite: Negative   Leuk Esterase: Negative / RBC: 0-2 /HPF / WBC 0-2 /HPF   Sq Epi:  / Non Sq Epi: Few / Bacteria: Few /HPF      Creatinine, Random Urine: 68 mg/dL ( @ 10:12)  Potassium, Random Urine: 30 mmol/L ( @ 13:15)  Osmolality, Random Urine: 353 mos/kg ( @ 13:15)  Sodium, Random Urine: 34 mmol/L ( @ 13:15)  Creatinine, Random Urine: 140 mg/dL ( @ 13:15)
MEDICAL ATTENDING NOTE    Patient is a 81y old  Male who presents with a chief complaint of worsening shortness of breath , chest pain, lower extremity swelling x couple of days (08 Jul 2017 11:24)      INTERVAL HPI/OVERNIGHT EVENTS: no new complaints    MEDICATIONS  (STANDING):  aspirin  chewable 81 milliGRAM(s) Oral daily  atorvastatin 40 milliGRAM(s) Oral at bedtime  hydrALAZINE 50 milliGRAM(s) Oral three times a day  amLODIPine   Tablet 10 milliGRAM(s) Oral daily  insulin lispro (HumaLOG) corrective regimen sliding scale   SubCutaneous three times a day before meals  dextrose 5%. 1000 milliLiter(s) (50 mL/Hr) IV Continuous <Continuous>  dextrose 50% Injectable 12.5 Gram(s) IV Push once  dextrose 50% Injectable 25 Gram(s) IV Push once  dextrose 50% Injectable 25 Gram(s) IV Push once  metoprolol 12.5 milliGRAM(s) Oral two times a day  heparin  Injectable 5000 Unit(s) SubCutaneous every 8 hours  PPD  5 Tuberculin Unit(s) Injectable 5 Unit(s) IntraDermal once  calcium acetate 667 milliGRAM(s) Oral three times a day with meals  furosemide    Tablet 80 milliGRAM(s) Oral daily    MEDICATIONS  (PRN):  dextrose Gel 1 Dose(s) Oral once PRN Blood Glucose LESS THAN 70 milliGRAM(s)/deciLiter  glucagon  Injectable 1 milliGRAM(s) IntraMuscular once PRN Glucose <70 milliGRAM(s)/deciLiter  guaiFENesin   Syrup  (Sugar-Free) 100 milliGRAM(s) Oral every 6 hours PRN Cough  acetaminophen   Tablet. 650 milliGRAM(s) Oral every 6 hours PRN Moderate Pain (4 - 6)      __________________________________________________  REVIEW OF SYSTEMS:    CONSTITUTIONAL: No fever,   EYES: no acute visual disturbances  NECK: No pain or stiffness  RESPIRATORY: No cough; No shortness of breath  CARDIOVASCULAR: No chest pain, no palpitations  GASTROINTESTINAL: No pain. No nausea or vomiting; No diarrhea   ALL OTHER  ROS negative        Vital Signs Last 24 Hrs  T(C): 36.2 (08 Jul 2017 12:12), Max: 37 (08 Jul 2017 05:10)  T(F): 97.2 (08 Jul 2017 12:12), Max: 98.6 (08 Jul 2017 05:10)  HR: 66 (08 Jul 2017 12:12) (54 - 68)  BP: 180/60 (08 Jul 2017 12:12) (126/79 - 180/60)  BP(mean): --  RR: 18 (08 Jul 2017 12:12) (17 - 18)  SpO2: 98% (08 Jul 2017 12:12) (92% - 100%)    ________________________________________________  PHYSICAL EXAM:  GENERAL: NAD  HEENT: Normocephalic;  conjunctivae and sclerae clear; moist mucous membranes;   NECK : supple  CHEST/LUNG: Clear to auscultation bilaterally with good air entry ; right upper chest wall PermCath clean and dry dressing; no swelling or erythema  HEART: S1 S2  regular; no murmurs, gallops or rubs  ABDOMEN: Soft, Nontender, Nondistended; Bowel sounds present  EXTREMITIES: no cyanosis; no edema; no calf tenderness  SKIN: warm and dry; no rash  NERVOUS SYSTEM:  Awake and alert; Oriented  to place, person and time ; no new deficits    _________________________________________________  LABS:                        9.2    6.6   )-----------( 174      ( 08 Jul 2017 08:18 )             27.6     07-08    140  |  106  |  61<H>  ----------------------------<  91  4.2   |  23  |  5.82<H>    Ca    7.7<L>      08 Jul 2017 08:18  Phos  4.8     07-08  Mg     2.0     07-08    TPro  6.2  /  Alb  2.7<L>  /  TBili  0.3  /  DBili  x   /  AST  13  /  ALT  17  /  AlkPhos  77  07-08        CAPILLARY BLOOD GLUCOSE  109 (08 Jul 2017 12:12)  97 (08 Jul 2017 11:15)  93 (08 Jul 2017 08:15)  84 (08 Jul 2017 08:08)  281 (07 Jul 2017 21:03)  248 (07 Jul 2017 15:40)            Consultant(s) Notes Reviewed:   YES- vascular and nephrology      Plan of care was discussed with patient; all questions and concerns were addressed and care was aligned with patient's wishes.
PGY 1 Note discussed with supervising resident and primary attending    Patient is a 81y old  Male who presents with a chief complaint of worsening shortness of breath , chest pain, lower extremity swelling x 2 days (2017 14:39)  Pt received dialysis last night. Overnight no acute events were reported.    Today pt presents in no acute distress.  Pt received tunnel catheter today.  Resting comfortably in bed, responding appropriately.  No complaints today.        INTERVAL HPI/OVERNIGHT EVENTS: no new complaints    MEDICATIONS  (STANDING):  aspirin  chewable 81 milliGRAM(s) Oral daily  atorvastatin 40 milliGRAM(s) Oral at bedtime  hydrALAZINE 50 milliGRAM(s) Oral three times a day  amLODIPine   Tablet 10 milliGRAM(s) Oral daily  insulin lispro (HumaLOG) corrective regimen sliding scale   SubCutaneous three times a day before meals  dextrose 5%. 1000 milliLiter(s) (50 mL/Hr) IV Continuous <Continuous>  dextrose 50% Injectable 12.5 Gram(s) IV Push once  dextrose 50% Injectable 25 Gram(s) IV Push once  dextrose 50% Injectable 25 Gram(s) IV Push once  metoprolol 12.5 milliGRAM(s) Oral two times a day  heparin  Injectable 5000 Unit(s) SubCutaneous every 8 hours  PPD  5 Tuberculin Unit(s) Injectable 5 Unit(s) IntraDermal once  iron sucrose IVPB 100 milliGRAM(s) IV Intermittent once  epoetin zach Injectable 6000 Unit(s) IV Push once  calcium acetate 667 milliGRAM(s) Oral three times a day with meals  furosemide    Tablet 80 milliGRAM(s) Oral daily    MEDICATIONS  (PRN):  dextrose Gel 1 Dose(s) Oral once PRN Blood Glucose LESS THAN 70 milliGRAM(s)/deciLiter  glucagon  Injectable 1 milliGRAM(s) IntraMuscular once PRN Glucose <70 milliGRAM(s)/deciLiter  guaiFENesin   Syrup  (Sugar-Free) 100 milliGRAM(s) Oral every 6 hours PRN Cough  acetaminophen   Tablet. 650 milliGRAM(s) Oral every 6 hours PRN Moderate Pain (4 - 6)  Cough      __________________________________________________  REVIEW OF SYSTEMS:    CONSTITUTIONAL: No fever,   EYES: no acute visual disturbances  NECK: No pain or stiffness  RESPIRATORY: No cough; No shortness of breath  CARDIOVASCULAR: No chest pain, no palpitations  GASTROINTESTINAL: No pain. No nausea or vomiting; No diarrhea   NEUROLOGICAL: No headache or numbness, no tremors  MUSCULOSKELETAL: No joint pain, no muscle pain  GENITOURINARY: no dysuria, no frequency, no hesitancy.  Temporary Shiley Catheter inserted into rt groin.  PSYCHIATRY: no depression , no anxiety  ALL OTHER  ROS negative        Vital Signs Last 24 Hrs  T(C): 36.5 (2017 12:11), Max: 37.1 (2017 20:28)  T(F): 97.7 (2017 12:11), Max: 98.7 (2017 20:28)  HR: 57 (2017 12:31) (56 - 80)  BP: 157/73 (2017 12:31) (120/57 - 157/73)  BP(mean): --  RR: 18 (2017 12:11) (17 - 18)  SpO2: 94% (2017 12:11) (94% - 100%)    ________________________________________________  PHYSICAL EXAM:  GENERAL: NAD  HEENT:Normocephalic;  conjunctivae and sclerae clear; moist mucous membranes;   NECK : supple  CHEST/LUNG: Clear to auscuitation bilaterally with good air entry   HEART: S1 S2  regular; no murmurs, gallops or rubs  ABDOMEN: Soft, Nontender, Nondistended; Bowel sounds present  EXTREMITIES: no cyanosis; no edema; no calf tenderness  NERVOUS SYSTEM:  Awake and alert; Oriented  to place, person and time ; no new deficits    _________________________________________________  LABS:                                    8.5    5.1   )-----------( 164      ( 2017 06:16 )             25.1       07-07    144  |  108  |  55<H>  ----------------------------<  78  4.1   |  23  |  5.30<H>    Ca    7.2<L>      2017 06:16  Phos  4.8     -  Mg     2.1     -    TPro  6.0  /  Alb  2.7<L>  /  TBili  0.4  /  DBili  x   /  AST  10  /  ALT  14  /  AlkPhos  83  07-06      PT/INR - ( 2017 10:24 )   PT: 10.7 sec;   INR: 0.98 ratio         PTT - ( 2017 10:24 )  PTT:29.2 sec  Urinalysis Basic - ( 2017 13:15 )    Color: Yellow / Appearance: x / S.020 / pH: x  Gluc: x / Ketone: Negative  / Bili: Negative / Urobili: Negative   Blood: x / Protein: 500 mg/dL / Nitrite: Negative   Leuk Esterase: Negative / RBC: 0-2 /HPF / WBC 0-2 /HPF   Sq Epi: x / Non Sq Epi: Few / Bacteria: Few /HPF      CAPILLARY BLOOD GLUCOSE  200 (2017 18:00)      RADIOLOGY & ADDITIONAL TESTS:   Xray Chest 2 Views PA/Lat (17 @ 10:34) >  IMPRESSION:  Moderate left pleural effusion, small right pleural effusion tracking   towards the minor fissure. Mild diffuse interstitial prominence.          Imaging Personally Reviewed:  YES    Consultant(s) Notes Reviewed:   YES    Care Discussed with Consultants :     Plan of care was discussed with patient and /or primary care giver; all questions and concerns were addressed and care was aligned with patient's wishes.
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 Pt tolerating dialysis well.  SOB resolved.    Hospital Medications: Medications reviewed.  ROS: NO H/a, CP, abd pain.      VITALS:  T(F): 98 (17 @ 07:41), Max: 98.7 (17 @ 20:28)  HR: 56 (17 @ 07:41)  BP: 143/48 (17 @ 07:41)  RR: 18 (17 @ 07:41)  SpO2: 97% (17 @ 07:41)  Wt(kg): --      PHYSICAL EXAM:  Constitutional: NAD  Neuroogical: A/O x 3  Psychiatric: Normal mood, normal affect  HEENT: anicteric sclera, oropharynx clear, MMM  Respiratory: CTAB, no wheezes, rales or rhonchi  Cardiovascular: S1, S2, RRR  Gastrointestinal: BS+, soft, NT/ND  : No CVA tenderness. No rea.  Extremities: No cyanosis or clubbing. + B LE peripheral edema    LABS:      144  |  108  |  55<H>  ----------------------------<  78  4.1   |  23  |  5.30<H>    Ca    7.2<L>      2017 06:16  Phos  4.8       Mg     2.1         TPro  6.0  /  Alb  2.7<L>  /  TBili  0.4  /  DBili      /  AST  10  /  ALT  14  /  AlkPhos  83  -    Creatinine Trend: 5.30 <--, 5.66 <--, 7.23 <--                        8.5    5.1   )-----------( 164      ( 2017 06:16 )             25.1     Urine Studies:  Urinalysis Basic - ( 2017 13:15 )    Color: Yellow / Appearance:  / S.020 / pH:   Gluc:  / Ketone: Negative  / Bili: Negative / Urobili: Negative   Blood:  / Protein: 500 mg/dL / Nitrite: Negative   Leuk Esterase: Negative / RBC: 0-2 /HPF / WBC 0-2 /HPF   Sq Epi:  / Non Sq Epi: Few / Bacteria: Few /HPF      Creatinine, Random Urine: 68 mg/dL ( @ 10:12)  Potassium, Random Urine: 30 mmol/L ( @ 13:15)  Osmolality, Random Urine: 353 mos/kg ( @ 13:15)  Sodium, Random Urine: 34 mmol/L ( @ 13:15)  Creatinine, Random Urine: 140 mg/dL ( @ 13:15)
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 Pt tolerated first dialysis treatment well. Pt feels better.  Still with mild SOB.    Hospital Medications: Medications reviewed.  ROS: NO H/a, CP, abd pain.      VITALS:  T(F): 98.7 (17 @ 20:28), Max: 98.7 (17 @ 20:28)  HR: 57 (17 @ 20:28)  BP: 142/52 (17 @ 20:28)  RR: 17 (17 @ 20:28)  SpO2: 97% (17 @ 20:28)  Wt(kg): --  Height (cm): 167.64 ( @ 08:42)  Weight (kg): 63 ( @ 16:10)  BMI (kg/m2): 22.4 ( @ 16:10)  BSA (m2): 1.71 ( @ 16:10)    PHYSICAL EXAM:  Constitutional: NAD  Neuroogical: A/O x 3  Psychiatric: Normal mood, normal affect  HEENT: anicteric sclera, oropharynx clear, MMM  Respiratory: CTAB, no wheezes, rales or rhonchi  Cardiovascular: S1, S2, RRR  Gastrointestinal: BS+, soft, NT/ND  : No CVA tenderness. No rea.  Extremities: No cyanosis or clubbing. + B LE peripheral edema    LABS:      142  |  107  |  49<H>  ----------------------------<  186<H>  4.5   |  26  |  5.66<H>    Ca    6.9<L>      2017 09:54   Corrected calcium 7.9  Phos  5.5       Mg     1.9     -    Parathyroid Hormone Intact, Serum (17 @ 14:03)    Intact PTH: 149:    TPro  6.0  /  Alb  2.7<L>  /  TBili  0.4  /  DBili      /  AST  10  /  ALT  14  /  AlkPhos  83      Creatinine Trend: 5.66 <--, 7.23 <--                        8.6    5.2   )-----------( 178      ( 2017 09:54 )             24.9   Iron with Total Binding Capacity in AM (17 @ 09:54)    % Saturation, Iron: 16 %    Iron - Total Binding Capacity.: 223 ug/dL    Iron Total, Serum: 35 ug/dL    Unsaturated Iron Binding Capacity: 188 ug/dL    Ferritin, Serum: 138.0 ng/mL      Urine Studies:  Urinalysis Basic - ( 2017 13:15 )    Color: Yellow / Appearance:  / S.020 / pH:   Gluc:  / Ketone: Negative  / Bili: Negative / Urobili: Negative   Blood:  / Protein: 500 mg/dL / Nitrite: Negative   Leuk Esterase: Negative / RBC: 0-2 /HPF / WBC 0-2 /HPF   Sq Epi:  / Non Sq Epi: Few / Bacteria: Few /HPF      Creatinine, Random Urine: 68 mg/dL ( @ 10:12)  Potassium, Random Urine: 30 mmol/L ( @ 13:15)  Osmolality, Random Urine: 353 mos/kg ( @ 13:15)  Sodium, Random Urine: 34 mmol/L ( @ 13:15)  Creatinine, Random Urine: 140 mg/dL ( @ 13:15)
PGY 1 Note discussed with supervising resident and primary attending    Patient is a 81y old  Male who presents with a chief complaint of worsening shortness of breath , chest pain, lower extremity swelling x 2 days (2017 14:39)  Pt received dialysis last night. Overnight no acute events were reported.    Today pt presents in no acute distress.  Resting comfortably in bed responding appropriately.  No complaints today.        INTERVAL HPI/OVERNIGHT EVENTS: no new complaints    MEDICATIONS  (STANDING):  aspirin  chewable 81 milliGRAM(s) Oral daily  atorvastatin 40 milliGRAM(s) Oral at bedtime  hydrALAZINE 50 milliGRAM(s) Oral three times a day  amLODIPine   Tablet 10 milliGRAM(s) Oral daily  insulin lispro (HumaLOG) corrective regimen sliding scale   SubCutaneous three times a day before meals  dextrose 5%. 1000 milliLiter(s) (50 mL/Hr) IV Continuous <Continuous>  dextrose 50% Injectable 12.5 Gram(s) IV Push once  dextrose 50% Injectable 25 Gram(s) IV Push once  dextrose 50% Injectable 25 Gram(s) IV Push once  metoprolol 12.5 milliGRAM(s) Oral two times a day  heparin  Injectable 5000 Unit(s) SubCutaneous every 8 hours  furosemide   Injectable 80 milliGRAM(s) IV Push once    MEDICATIONS  (PRN):  dextrose Gel 1 Dose(s) Oral once PRN Blood Glucose LESS THAN 70 milliGRAM(s)/deciLiter  glucagon  Injectable 1 milliGRAM(s) IntraMuscular once PRN Glucose <70 milliGRAM(s)/deciLiter  guaiFENesin   Syrup  (Sugar-Free) 100 milliGRAM(s) Oral every 6 hours PRN Cough      __________________________________________________  REVIEW OF SYSTEMS:    CONSTITUTIONAL: No fever,   EYES: no acute visual disturbances  NECK: No pain or stiffness  RESPIRATORY: No cough; No shortness of breath  CARDIOVASCULAR: No chest pain, no palpitations  GASTROINTESTINAL: No pain. No nausea or vomiting; No diarrhea   NEUROLOGICAL: No headache or numbness, no tremors  MUSCULOSKELETAL: No joint pain, no muscle pain  GENITOURINARY: no dysuria, no frequency, no hesitancy.  Temporary Shiley Catheter inserted into rt groin.  PSYCHIATRY: no depression , no anxiety  ALL OTHER  ROS negative        Vital Signs Last 24 Hrs  T(C): 36.9 (2017 09:40), Max: 36.9 (2017 09:40)  T(F): 98.5 (2017 09:40), Max: 98.5 (2017 09:40)  HR: 67 (2017 09:40) (67 - 83)  BP: 154/67 (2017 09:40) (146/68 - 165/61)  BP(mean): --  RR: 17 (2017 09:40) (16 - 19)  SpO2: 91% (2017 09:40) (91% - 98%)    ________________________________________________  PHYSICAL EXAM:  GENERAL: NAD  HEENT:Normocephalic;  conjunctivae and sclerae clear; moist mucous membranes;   NECK : supple  CHEST/LUNG: Clear to auscuitation bilaterally with good air entry   HEART: S1 S2  regular; no murmurs, gallops or rubs  ABDOMEN: Soft, Nontender, Nondistended; Bowel sounds present  EXTREMITIES: no cyanosis; no edema; no calf tenderness  NERVOUS SYSTEM:  Awake and alert; Oriented  to place, person and time ; no new deficits    _________________________________________________  LABS:                         8.6    5.2   )-----------( 178      ( 2017 09:54 )             24.9       07-06    142  |  107  |  49<H>  ----------------------------<  186<H>  4.5   |  26  |  5.66<H>    Ca    6.9<L>      2017 09:54  Phos  5.5     07-06  Mg     1.9     07-06    TPro  6.0  /  Alb  2.7<L>  /  TBili  0.4  /  DBili  x   /  AST  10  /  ALT  14  /  AlkPhos  83  07-06      PT/INR - ( 2017 10:24 )   PT: 10.7 sec;   INR: 0.98 ratio         PTT - ( 2017 10:24 )  PTT:29.2 sec  Urinalysis Basic - ( 2017 13:15 )    Color: Yellow / Appearance: x / S.020 / pH: x  Gluc: x / Ketone: Negative  / Bili: Negative / Urobili: Negative   Blood: x / Protein: 500 mg/dL / Nitrite: Negative   Leuk Esterase: Negative / RBC: 0-2 /HPF / WBC 0-2 /HPF   Sq Epi: x / Non Sq Epi: Few / Bacteria: Few /HPF      CAPILLARY BLOOD GLUCOSE  200 (2017 18:00)      RADIOLOGY & ADDITIONAL TESTS:   Xray Chest 2 Views PA/Lat (17 @ 10:34) >  IMPRESSION:  Moderate left pleural effusion, small right pleural effusion tracking   towards the minor fissure. Mild diffuse interstitial prominence.          Imaging Personally Reviewed:  YES    Consultant(s) Notes Reviewed:   YES    Care Discussed with Consultants :     Plan of care was discussed with patient and /or primary care giver; all questions and concerns were addressed and care was aligned with patient's wishes.

## 2017-07-08 NOTE — DISCHARGE NOTE ADULT - PATIENT PORTAL LINK FT
“You can access the Follow[employer] Patient Portal, offered by [facility], by registering with the following website: [email or web address]”

## 2017-07-08 NOTE — PROGRESS NOTE ADULT - PROBLEM SELECTOR PLAN 3
-Seems to have afib , but doubt as has P waves   - Has QTC prolongation of about the 523 ms  - Admit to tele x 24 hours for evaluating the Afib   - Will hold the anticoagulation for now
-Seems to have afib , but doubt as has P waves   - Has QTC prolongation of about the 523 ms  - Admit to tele x 24 hours for evaluating the Afib   - Will hold the anticoagulation for now
UF with HD  Change lasix to 80mg po daily for now and continue pat discharge.
UF with HD  Continue lasix to 80mg po on discharge
UF with HD  Continue lasix to 80mg po on discharge
UF with HD

## 2017-07-08 NOTE — DISCHARGE NOTE ADULT - HOSPITAL COURSE
Patient is an 81y Male with CKD 5, progressed to ESRD, admitted with volume overload, improving with HD.  Pt received four treatments of HD, and renal function is slowly improving.  Pt tolerating dialysis well.  Pt had temporary shiley catheter placed, and a permanent tunnel catheter placed.  Shiley catheter was removed by the surgery dept prior to D/C.  Pt also complained of shortness of breath on admission which has resolved.  Pt was followed by nephrologist, Dr. Grubbs, who has arranged for pt to receive dialysis at the [facility] upon discharge.  Pt received a hepatitis panel, and cxr which were negative for TB.  Pt also has a QuantiFeron pending which will be read as an OP.

## 2017-07-08 NOTE — PROGRESS NOTE ADULT - PROBLEM SELECTOR PLAN 2
-Atypical kind of chest pain   -Risk factors HTN, DM  -SHERIF score is 2, with 8 % risk of having events in 14 days   -Troponin x 1 negative ,  T2 at 4;00 pm, T3 at 10;00 pm  -EKG shows afib ( unlikely afib as has P waves ) , no ST T wave changes   -Admit to telemetry to rule out ACS   -aspirin, statin, beta blocker, please dc BB if acs ruled out   -Cardio Dr Diehl
-Atypical kind of chest pain   -Risk factors HTN, DM  -SHERIF score is 2, with 8 % risk of having events in 14 days   -Troponin elevation likely due to CKD, troponind falling post dialysis  -EKG shows afib ( unlikely afib as has P waves ) , no ST T wave changes   -Admit to telemetry to rule out ACS   -aspirin, statin, beta blocker, please dc BB if acs ruled out   -Cardio Dr Diehl
UF with HD  Change lasix to 80mg po daily for now and continue pat discharge.
UF with HD  Continue lasix to 80mg po on discharge
UF with HD  Continue lasix to 80mg po on discharge
UF with HD

## 2017-07-08 NOTE — PROGRESS NOTE ADULT - PROBLEM SELECTOR PROBLEM 5
Anemia of renal disease
HTN (hypertension)
Anemia of renal disease
HTN (hypertension)

## 2017-07-11 DIAGNOSIS — N18.6 END STAGE RENAL DISEASE: ICD-10-CM

## 2017-07-11 DIAGNOSIS — D63.1 ANEMIA IN CHRONIC KIDNEY DISEASE: ICD-10-CM

## 2017-07-11 DIAGNOSIS — I47.1 SUPRAVENTRICULAR TACHYCARDIA: ICD-10-CM

## 2017-07-11 DIAGNOSIS — J90 PLEURAL EFFUSION, NOT ELSEWHERE CLASSIFIED: ICD-10-CM

## 2017-07-11 DIAGNOSIS — Z99.2 DEPENDENCE ON RENAL DIALYSIS: ICD-10-CM

## 2017-07-11 DIAGNOSIS — I12.0 HYPERTENSIVE CHRONIC KIDNEY DISEASE WITH STAGE 5 CHRONIC KIDNEY DISEASE OR END STAGE RENAL DISEASE: ICD-10-CM

## 2017-07-11 DIAGNOSIS — E11.9 TYPE 2 DIABETES MELLITUS WITHOUT COMPLICATIONS: ICD-10-CM

## 2017-07-11 DIAGNOSIS — R94.31 ABNORMAL ELECTROCARDIOGRAM [ECG] [EKG]: ICD-10-CM

## 2017-07-11 DIAGNOSIS — R07.89 OTHER CHEST PAIN: ICD-10-CM

## 2017-07-11 DIAGNOSIS — E83.51 HYPOCALCEMIA: ICD-10-CM

## 2017-07-11 DIAGNOSIS — E21.3 HYPERPARATHYROIDISM, UNSPECIFIED: ICD-10-CM

## 2017-07-12 LAB
M TB TUBERC IFN-G BLD QL: 0.02 IU/ML — SIGNIFICANT CHANGE UP
M TB TUBERC IFN-G BLD QL: 0.5 IU/ML — SIGNIFICANT CHANGE UP
M TB TUBERC IFN-G BLD QL: POSITIVE
MITOGEN IGNF BCKGRD COR BLD-ACNC: >10 IU/ML — SIGNIFICANT CHANGE UP

## 2017-08-14 ENCOUNTER — OUTPATIENT (OUTPATIENT)
Dept: OUTPATIENT SERVICES | Facility: HOSPITAL | Age: 82
LOS: 1 days | End: 2017-08-14
Payer: MEDICARE

## 2017-08-14 VITALS
HEIGHT: 66 IN | SYSTOLIC BLOOD PRESSURE: 135 MMHG | WEIGHT: 119.93 LBS | RESPIRATION RATE: 16 BRPM | DIASTOLIC BLOOD PRESSURE: 70 MMHG | OXYGEN SATURATION: 99 % | HEART RATE: 99 BPM | TEMPERATURE: 99 F

## 2017-08-14 DIAGNOSIS — J90 PLEURAL EFFUSION, NOT ELSEWHERE CLASSIFIED: ICD-10-CM

## 2017-08-14 DIAGNOSIS — Z99.2 DEPENDENCE ON RENAL DIALYSIS: Chronic | ICD-10-CM

## 2017-08-14 DIAGNOSIS — N18.6 END STAGE RENAL DISEASE: ICD-10-CM

## 2017-08-14 DIAGNOSIS — Z90.49 ACQUIRED ABSENCE OF OTHER SPECIFIED PARTS OF DIGESTIVE TRACT: Chronic | ICD-10-CM

## 2017-08-14 DIAGNOSIS — I48.91 UNSPECIFIED ATRIAL FIBRILLATION: ICD-10-CM

## 2017-08-14 DIAGNOSIS — Z01.818 ENCOUNTER FOR OTHER PREPROCEDURAL EXAMINATION: ICD-10-CM

## 2017-08-14 PROCEDURE — 71010: CPT | Mod: 26

## 2017-08-14 PROCEDURE — 71045 X-RAY EXAM CHEST 1 VIEW: CPT

## 2017-08-14 PROCEDURE — G0463: CPT

## 2017-08-14 NOTE — H&P PST ADULT - NSANTHOSAYNRD_GEN_A_CORE
No. GENE screening performed.  STOP BANG Legend: 0-2 = LOW Risk; 3-4 = INTERMEDIATE Risk; 5-8 = HIGH Risk

## 2017-08-14 NOTE — H&P PST ADULT - MUSCULOSKELETAL
details… detailed exam no joint erythema/ROM intact/no joint warmth/normal strength/no calf tenderness/no joint swelling

## 2017-08-14 NOTE — H&P PST ADULT - ASSESSMENT
83 yo male is scheduled for Left Basilic Vein Transportation with Arteriovenous Fistula Creation, on 8/15/17

## 2017-08-14 NOTE — H&P PST ADULT - PROBLEM SELECTOR PLAN 3
Repeat CXR Repeat CXR 8/14/17, shows no evidence for focal infiltrate or lobar consolidation. No pleural effusion identified

## 2017-08-14 NOTE — H&P PST ADULT - NEGATIVE OPHTHALMOLOGIC SYMPTOMS
no photophobia/no lacrimation L/no lacrimation R/no blurred vision R/no diplopia/no blurred vision L

## 2017-08-14 NOTE — H&P PST ADULT - NEGATIVE ALLERGY TYPES
no reactions to medicines/no reactions to animals/no indoor environmental allergies/no outdoor environmental allergies/no reactions to insect bites/no reactions to food

## 2017-08-14 NOTE — H&P PST ADULT - PROBLEM SELECTOR PLAN 2
AFib in July 5, 2017 at [facility]  NSR on 7/27/17 at Dr joseline Kulkarni.   As per Dr Kulkarni no w/u done. Pt is cleared by Dr Kulkarni AFib in July 5, 2017 at [facility]  NSR on 7/27/17 at Dr joseline Kulkarni's office   As per Dr Kulkarni no w/u done. Pt is cleared by Dr Shad Oliver, cardiologist

## 2017-08-14 NOTE — H&P PST ADULT - FAMILY HISTORY
Sibling  Still living? Yes, Estimated age: Age Unknown  Family history of diabetes mellitus in sister, Age at diagnosis: Age Unknown

## 2017-08-14 NOTE — H&P PST ADULT - HISTORY OF PRESENT ILLNESS
83 yo male with history of HTN, ESRD, DM, HLD reports the above.  Pt is receiving HD via right chest catheter, now

## 2017-08-14 NOTE — H&P PST ADULT - NEGATIVE ENMT SYMPTOMS
no hearing difficulty/no tinnitus/no nasal discharge/no nasal congestion/no ear pain/no vertigo/no sinus symptoms

## 2017-08-15 ENCOUNTER — OUTPATIENT (OUTPATIENT)
Dept: OUTPATIENT SERVICES | Facility: HOSPITAL | Age: 82
LOS: 1 days | Discharge: HOME | End: 2017-08-15
Payer: MEDICARE

## 2017-08-15 ENCOUNTER — TRANSCRIPTION ENCOUNTER (OUTPATIENT)
Age: 82
End: 2017-08-15

## 2017-08-15 VITALS
RESPIRATION RATE: 14 BRPM | WEIGHT: 119.93 LBS | HEIGHT: 66 IN | SYSTOLIC BLOOD PRESSURE: 144 MMHG | HEART RATE: 87 BPM | DIASTOLIC BLOOD PRESSURE: 68 MMHG | TEMPERATURE: 98 F | OXYGEN SATURATION: 97 %

## 2017-08-15 VITALS
RESPIRATION RATE: 16 BRPM | SYSTOLIC BLOOD PRESSURE: 148 MMHG | HEART RATE: 87 BPM | DIASTOLIC BLOOD PRESSURE: 66 MMHG | TEMPERATURE: 98 F

## 2017-08-15 DIAGNOSIS — Z01.818 ENCOUNTER FOR OTHER PREPROCEDURAL EXAMINATION: ICD-10-CM

## 2017-08-15 DIAGNOSIS — I12.9 HYPERTENSIVE CHRONIC KIDNEY DISEASE WITH STAGE 1 THROUGH STAGE 4 CHRONIC KIDNEY DISEASE, OR UNSPECIFIED CHRONIC KIDNEY DISEASE: ICD-10-CM

## 2017-08-15 DIAGNOSIS — E11.22 TYPE 2 DIABETES MELLITUS WITH DIABETIC CHRONIC KIDNEY DISEASE: ICD-10-CM

## 2017-08-15 DIAGNOSIS — N18.6 END STAGE RENAL DISEASE: ICD-10-CM

## 2017-08-15 DIAGNOSIS — N18.9 CHRONIC KIDNEY DISEASE, UNSPECIFIED: ICD-10-CM

## 2017-08-15 DIAGNOSIS — Z99.2 DEPENDENCE ON RENAL DIALYSIS: Chronic | ICD-10-CM

## 2017-08-15 DIAGNOSIS — Z90.49 ACQUIRED ABSENCE OF OTHER SPECIFIED PARTS OF DIGESTIVE TRACT: Chronic | ICD-10-CM

## 2017-08-15 LAB
ANION GAP SERPL CALC-SCNC: 7 MMOL/L — SIGNIFICANT CHANGE UP (ref 5–17)
BUN SERPL-MCNC: 36 MG/DL — HIGH (ref 7–18)
CALCIUM SERPL-MCNC: 8.6 MG/DL — SIGNIFICANT CHANGE UP (ref 8.4–10.5)
CHLORIDE SERPL-SCNC: 103 MMOL/L — SIGNIFICANT CHANGE UP (ref 96–108)
CO2 SERPL-SCNC: 31 MMOL/L — SIGNIFICANT CHANGE UP (ref 22–31)
CREAT SERPL-MCNC: 4.71 MG/DL — HIGH (ref 0.5–1.3)
GLUCOSE SERPL-MCNC: 92 MG/DL — SIGNIFICANT CHANGE UP (ref 70–99)
POTASSIUM SERPL-MCNC: 4.6 MMOL/L — SIGNIFICANT CHANGE UP (ref 3.5–5.3)
POTASSIUM SERPL-SCNC: 4.6 MMOL/L — SIGNIFICANT CHANGE UP (ref 3.5–5.3)
SODIUM SERPL-SCNC: 141 MMOL/L — SIGNIFICANT CHANGE UP (ref 135–145)

## 2017-08-15 PROCEDURE — 80048 BASIC METABOLIC PNL TOTAL CA: CPT

## 2017-08-15 PROCEDURE — 36819 AV FUSE UPPR ARM BASILIC: CPT | Mod: LT

## 2017-08-15 RX ORDER — SODIUM CHLORIDE 9 MG/ML
3 INJECTION INTRAMUSCULAR; INTRAVENOUS; SUBCUTANEOUS EVERY 8 HOURS
Qty: 0 | Refills: 0 | Status: DISCONTINUED | OUTPATIENT
Start: 2017-08-15 | End: 2017-08-15

## 2017-08-15 RX ORDER — OXYCODONE AND ACETAMINOPHEN 5; 325 MG/1; MG/1
2 TABLET ORAL EVERY 4 HOURS
Qty: 0 | Refills: 0 | Status: DISCONTINUED | OUTPATIENT
Start: 2017-08-15 | End: 2017-08-15

## 2017-08-15 RX ORDER — OXYCODONE AND ACETAMINOPHEN 5; 325 MG/1; MG/1
2 TABLET ORAL EVERY 6 HOURS
Qty: 0 | Refills: 0 | Status: DISCONTINUED | OUTPATIENT
Start: 2017-08-15 | End: 2017-08-16

## 2017-08-15 RX ORDER — SODIUM CHLORIDE 9 MG/ML
1000 INJECTION INTRAMUSCULAR; INTRAVENOUS; SUBCUTANEOUS
Qty: 0 | Refills: 0 | Status: DISCONTINUED | OUTPATIENT
Start: 2017-08-15 | End: 2017-08-16

## 2017-08-15 RX ORDER — LABETALOL HCL 100 MG
10 TABLET ORAL ONCE
Qty: 0 | Refills: 0 | Status: COMPLETED | OUTPATIENT
Start: 2017-08-15 | End: 2017-08-15

## 2017-08-15 RX ORDER — FENTANYL CITRATE 50 UG/ML
25 INJECTION INTRAVENOUS
Qty: 0 | Refills: 0 | Status: DISCONTINUED | OUTPATIENT
Start: 2017-08-15 | End: 2017-08-16

## 2017-08-15 RX ADMIN — Medication 10 MILLIGRAM(S): at 19:22

## 2017-08-15 RX ADMIN — SODIUM CHLORIDE 3 MILLILITER(S): 9 INJECTION INTRAMUSCULAR; INTRAVENOUS; SUBCUTANEOUS at 13:13

## 2017-08-15 NOTE — BRIEF OPERATIVE NOTE - PROCEDURE
Anastomosis, arteriovenous, open, by basilic vein transposition  08/15/2017    Active  KRISTINACOMISA

## 2017-08-15 NOTE — ASU DISCHARGE PLAN (ADULT/PEDIATRIC). - MEDICATION SUMMARY - MEDICATIONS TO TAKE
I will START or STAY ON the medications listed below when I get home from the hospital:    oxycodone-acetaminophen 5mg-325mg oral tablet  -- 1 tab(s) by mouth every 6 hours MDD:4  -- Caution federal law prohibits the transfer of this drug to any person other  than the person for whom it was prescribed.  May cause drowsiness.  Alcohol may intensify this effect.  Use care when operating dangerous machinery.  This prescription cannot be refilled.  This product contains acetaminophen.  Do not use  with any other product containing acetaminophen to prevent possible liver damage.  Using more of this medication than prescribed may cause serious breathing problems.    -- Indication: For Pain management    Januvia 25 mg oral tablet  -- 1 tab(s) by mouth once a day  -- Indication: For Home medication    glimepiride 2 mg oral tablet  -- 1 tab(s) by mouth once a day  -- Indication: For Home medication    simvastatin 20 mg oral tablet  -- 1 tab(s) by mouth once a day (at bedtime)  -- Indication: For Home medication    amLODIPine 10 mg oral tablet  -- 1 tab(s) by mouth once a day  -- Indication: For Home medication    torsemide 20 mg oral tablet  -- 1 tab(s) by mouth once a day  -- Indication: For Home medication    sodium polystyrene sulfonate 15 g/60 mL oral and rectal suspension  --  between cheek and gums , As Needed  -- Indication: For Home medication    hydrALAZINE 50 mg oral tablet  -- 1 tab(s) by mouth 3 times a day  -- Indication: For Home medication

## 2017-08-15 NOTE — BRIEF OPERATIVE NOTE - OPERATION/FINDINGS
Left upper extremity basilic vein transposition w/ creation of brachiobasilic anastomosis.  Good thrill postoperatively.   MILENA drain placed.

## 2017-08-15 NOTE — ASU DISCHARGE PLAN (ADULT/PEDIATRIC). - NOTIFY
Swelling that continues/Fever greater than 101/Pain not relieved by Medications/Bleeding that does not stop

## 2017-08-15 NOTE — ASU DISCHARGE PLAN (ADULT/PEDIATRIC). - PAIN
prescription called to pharmacy/Take over-the-counter ibuprofen for pain. You have also been prescribed percocet for any additional pain, take as directed.

## 2017-12-11 ENCOUNTER — APPOINTMENT (OUTPATIENT)
Dept: OPHTHALMOLOGY | Facility: CLINIC | Age: 82
End: 2017-12-11
Payer: MEDICARE

## 2017-12-11 PROCEDURE — 92134 CPTRZ OPH DX IMG PST SGM RTA: CPT

## 2017-12-11 PROCEDURE — 92012 INTRM OPH EXAM EST PATIENT: CPT

## 2018-06-11 ENCOUNTER — APPOINTMENT (OUTPATIENT)
Dept: OPHTHALMOLOGY | Facility: CLINIC | Age: 83
End: 2018-06-11
Payer: MEDICARE

## 2018-06-11 PROCEDURE — 92134 CPTRZ OPH DX IMG PST SGM RTA: CPT

## 2018-06-11 PROCEDURE — 92226: CPT | Mod: RT

## 2018-06-11 PROCEDURE — 92014 COMPRE OPH EXAM EST PT 1/>: CPT

## 2018-12-10 ENCOUNTER — APPOINTMENT (OUTPATIENT)
Dept: OPHTHALMOLOGY | Facility: CLINIC | Age: 83
End: 2018-12-10
Payer: MEDICARE

## 2018-12-10 PROBLEM — I10 ESSENTIAL (PRIMARY) HYPERTENSION: Chronic | Status: ACTIVE | Noted: 2017-08-14

## 2018-12-10 PROBLEM — E78.5 HYPERLIPIDEMIA, UNSPECIFIED: Chronic | Status: ACTIVE | Noted: 2017-08-14

## 2018-12-10 PROBLEM — E11.9 TYPE 2 DIABETES MELLITUS WITHOUT COMPLICATIONS: Chronic | Status: ACTIVE | Noted: 2017-08-14

## 2018-12-10 PROBLEM — N18.6 END STAGE RENAL DISEASE: Chronic | Status: ACTIVE | Noted: 2017-08-14

## 2018-12-10 PROCEDURE — 92020 GONIOSCOPY: CPT

## 2018-12-10 PROCEDURE — 92014 COMPRE OPH EXAM EST PT 1/>: CPT

## 2018-12-10 PROCEDURE — 92134 CPTRZ OPH DX IMG PST SGM RTA: CPT

## 2019-04-24 NOTE — H&P PST ADULT - NS MD HP PULSE RADIAL
Pre-Chart Review                                                                              Recent labs:  PRX (DIABETES & CHOLESTEROL & GFR & PSA)  No results found for: GLUCOSE, CHOLESTEROL, CALCLDL, HDL, TRIGLYCERIDE, GFRNA, GFRA, PSA  BMP/LFT/CBC  Lab Results   Component Value Date/Time    WBC 10.3 2014 11:29 AM    HCT 34.5 (L) 2014 10:14 AM    HGB 12.7 2014 11:29 AM    MCV 90.1 2014 11:29 AM     2014 11:29 AM     ENDOCRINAL  No results found for: HGBA1C, MALBCR, VITD25, TSH, T4FREE, T3FREE                                                                                                               PRx: (OB) Pap (17; d22). Col (FHx, grandfather at 55) >>> LP. BMP.  Sum (M//Qsm/Medicare  at [employer]/Searching for a job)  - C sin (Tiffany-18)  - Trigger finger (Tiffany)    WELLNESS EXAM - FEMALE      CHIEF COMPLAINT:   Chief Complaint   Patient presents with   • Physical       HISTORY OF PRESENT ILLNESS:    Emerita Obrien is a 39 year old female who presented requesting female wellness visit and the followings issues:    1. Sinus congestion & headache.    Has been taking Allegra. Has been dealing with H/A since yesterday. Hadn't had any issues after sinus surgery last year.    OB/GYNECOLOGIC HISTORY:   - : 1, Living children: 1.  - Menstrual period:   Last menstrual period N/A,     - Contraception: IUD    ROS: 10 point review of systems is otherwise negative except as mentioned above.    MEDICATIONS:  Current Outpatient Medications   Medication Sig Dispense Refill   • fexofenadine (ALLEGRA) 180 MG tablet Take 180 mg by mouth daily.     • Levonorgestrel (MIRENA, 52 MG, IU) Placed 17 NDC [id number]     • clindamycin (CLINDAGEL) 1 % gel Apply to affected lesions BID prn. 60 g 0     No current facility-administered medications for this visit.      ALLERGIES:   Allergies as of 2019 - Reviewed 2019   Allergen Reaction Noted   • Sulfa antibiotics HIVES  1993     PAST MEDICAL HISTORY:     Sinusitis, chronic                                          SURGICAL HISTORY:    TRIGGER FINGER RELEASE                                      FAMILY HISTORY:  Family History   Problem Relation Age of Onset   • Stroke Paternal Grandmother    • Hypertension Paternal Grandmother    • Stroke Paternal Grandfather    • Hypertension Maternal Grandmother    • Diabetes Maternal Grandmother         type 2   • Hyperlipidemia Maternal Grandmother    • Hypertension Father    • Hyperlipidemia Father    • Cancer Father         lung   • Cancer Maternal Grandfather         colon   • Thyroid Mother      SOCIAL HISTORY:  Social History     Tobacco Use   • Smoking status: Former Smoker     Packs/day: 0.50     Years: 14.00     Pack years: 7.00     Types: Cigarettes     Last attempt to quit: 2014     Years since quittin.0   • Smokeless tobacco: Never Used   Substance Use Topics   • Alcohol use: Yes     Comment: rare   • Drug use: No        PHYSICAL EXAM:    Vital Signs:    Visit Vitals  /64   Pulse 72   Ht 5' 2.75\" (1.594 m)   Wt 66 kg   BMI 26.00 kg/m²     General: Well-developed, well-nourished, well-attired 39 year old female in no acute distress. Well-appearing.  Head:  Normocephalic, atraumatic.   Eyes:  Pupils equal, round and reactive to light. Extraocular movements intact.  Normal conjunctivae without pallor, erythema, injections. No icterus.   Ears:  Normal external ear anatomy, bilaterally. Normal TMs bilaterally without erythema, bulging or effusion. +R maxillary sinus tenderness.  Nose: The nasal septum is in midline. Pink, moist nasal mucosa.   Throat: No pharyngeal erythema. No tonsillar exudates.   Neck: Supple. Normal thyroid without mass or tenderness on palpation.   Cardiovascular:  S1 and S2 audible. Regular rate. Regular rhythm. No murmurs or gallops. 2+ radial pulse, bilaterally. No peripheral edema in lower extremity bilaterally.  Respiratory:  Normal respiratory  effort.  Lungs clear to auscultation bilaterally.  No wheezes. No crackles. No rhonchi.   Gastrointestinal:  Normoactive bowel sounds present. Soft. No TTP. No hepatomegaly or splenomegaly. No guarding or rigidity.   Musculoskeletal: Gait is normal. No clubbing. No cyanosis. 5/5 Strength in upper and lower extremity, bilaterally.  Neurologic: Sensation intact to light touch in upper and lower extremities, bilaterally. Cranial nerves 2 to 12 grossly intact.  Psychiatric: Alert and Oriented to person, place, and time. Appropriate mood. Appropriate affect.  Skin:  Warm and dry. No rashes, lesions, ulcers.    LABS:     IMAGES:     ASSESSMENT AND PLAN:    Well female exam without gynecological exam    Screening for diabetes mellitus (DM)  - GLUCOSE LEVEL; Future    Screening for cholesterol level  - LIPID PANEL WITH REFLEX; Future    Sinus congestion  - a trial of nasal steroid until resolution of sinus symptoms    Health Maintenance:    Immunizations: The following recommended.  - Tetanus booster  - Flu shot    Labs: The following recommended.  - Lipid screening if >35 with risk factors or >45 with risk factors  - Diabetes mellitus screening starting at age 40 if +risk factors    Preventive care: The following recommended.  - Annual visit to optometrist/ophthalmologist for vision screening   - Dental visit every 6 months for check-up  - Cervical cancer screening starting at age 21 until 65 (If history of CIN2 or more severe diagnosis, routine screening continued for at least 20 years)  - Mammogram for breast cancer screening starting at age 50 until 74, every 2 years(<50 based on breast cancer risk assessment tool)  - Colonoscopy for colorectal cancer screening starting age 50 until 75.    Lifestyle modification & Weight control Body mass index is 26 kg/m².: The following recommended.  - Smoking cessation encouraged  - If alcohol is consumed, up to one drink per day  - Healthy eating pattern: Limit saturated fats and  trans fats, added sugars, and sodium no more than 2.3 gram daily. Include whole grains, 5 servings of fruits (especially, whole fruits) and vegetables, fat-free or low-fat dairy (milk, yogurt, cheese, fortified soy beverages), oils.  - A moderate-intensity exercise at least 30 min daily 5x/week (brisk walking, swimming).     Advance directives: Five wishes handout given - No    Supplements: The following recommended.  - 400 mcg of folic acid daily for women of childbearing ages    Follow-up:  Return if symptoms worsen or fail to improve, for Complete Physical (30 min), Labs or Image fasting.    Labs and other orders to be done now or prior to next follow-up:    Orders Placed This Encounter   • Glucose Level   • Lipid Panel with Reflex        right normal/left normal

## 2019-06-10 ENCOUNTER — APPOINTMENT (OUTPATIENT)
Dept: OPHTHALMOLOGY | Facility: CLINIC | Age: 84
End: 2019-06-10
Payer: MEDICARE

## 2019-06-10 ENCOUNTER — NON-APPOINTMENT (OUTPATIENT)
Age: 84
End: 2019-06-10

## 2019-06-10 PROCEDURE — ZZZZZ: CPT

## 2019-06-10 PROCEDURE — 92014 COMPRE OPH EXAM EST PT 1/>: CPT

## 2019-06-10 PROCEDURE — 92134 CPTRZ OPH DX IMG PST SGM RTA: CPT

## 2019-12-11 ENCOUNTER — APPOINTMENT (OUTPATIENT)
Dept: OPHTHALMOLOGY | Facility: CLINIC | Age: 84
End: 2019-12-11
Payer: MEDICARE

## 2019-12-11 ENCOUNTER — NON-APPOINTMENT (OUTPATIENT)
Age: 84
End: 2019-12-11

## 2019-12-11 PROCEDURE — 92014 COMPRE OPH EXAM EST PT 1/>: CPT

## 2019-12-11 PROCEDURE — 92132 CPTRZD OPH DX IMG ANT SGM: CPT

## 2019-12-11 PROCEDURE — 92134 CPTRZ OPH DX IMG PST SGM RTA: CPT

## 2020-06-04 ENCOUNTER — EMERGENCY (EMERGENCY)
Facility: HOSPITAL | Age: 85
LOS: 1 days | Discharge: HOME | End: 2020-06-04
Attending: EMERGENCY MEDICINE
Payer: MEDICARE

## 2020-06-04 VITALS
RESPIRATION RATE: 16 BRPM | DIASTOLIC BLOOD PRESSURE: 66 MMHG | WEIGHT: 115.08 LBS | OXYGEN SATURATION: 97 % | TEMPERATURE: 98 F | HEART RATE: 71 BPM | SYSTOLIC BLOOD PRESSURE: 185 MMHG

## 2020-06-04 DIAGNOSIS — Z90.49 ACQUIRED ABSENCE OF OTHER SPECIFIED PARTS OF DIGESTIVE TRACT: Chronic | ICD-10-CM

## 2020-06-04 DIAGNOSIS — Z99.2 DEPENDENCE ON RENAL DIALYSIS: Chronic | ICD-10-CM

## 2020-06-04 PROCEDURE — 82962 GLUCOSE BLOOD TEST: CPT

## 2020-06-04 PROCEDURE — 99283 EMERGENCY DEPT VISIT LOW MDM: CPT

## 2020-06-04 NOTE — ED ADULT TRIAGE NOTE - CHIEF COMPLAINT QUOTE
as per ems report pt was confused and hypoglycemic when ems arrived in the house, blood glucose level was 56 , pt  was given dextrose, pt takes tablets for DM, on dialysis ( TTS )

## 2020-06-04 NOTE — ED PROVIDER NOTE - OBJECTIVE STATEMENT
84 year old male PMh ESRD on HD (TTS, last tuesday), DM, HTN, HLD coming in with hypoglycemia. as per pts family pt was acting strangely this morning so EMS called and fingerstick found to be 50. Given D50 and return to baseline. at this time pt states has no complaints. states takes glipizide for DM. states ate normally yesterday. denies all complaints.

## 2020-06-04 NOTE — ED PROVIDER NOTE - PATIENT PORTAL LINK FT
You can access the FollowMyHealth Patient Portal offered by [facility] by registering at the following website: [email or web address]. By joining [employer]’s FollowMyHealth portal, you will also be able to view your health information using other applications (apps) compatible with our system.

## 2020-06-04 NOTE — ED ADULT NURSE NOTE - OBJECTIVE STATEMENT
Patient brought in from home, found confused by family, EMS FS on scene was 56, dextrose given, patient is AXOX3 in ED  HD T,Th,Sa, has dialysis at 9AM today

## 2020-06-04 NOTE — ED PROVIDER NOTE - CLINICAL SUMMARY MEDICAL DECISION MAKING FREE TEXT BOX
84 year old male with episode of hypoglycemia. vitals WNL. PE as above. 84 year old male with episode of hypoglycemia. vitals WNL. PE as above.  pt ate in the ED. no complaints. repeat fingerstick after an hour stable. asking to leave with fam to go to dialysis. will dc. f/u with PMD. return precautions discussed.

## 2020-06-17 ENCOUNTER — NON-APPOINTMENT (OUTPATIENT)
Age: 85
End: 2020-06-17

## 2020-06-17 ENCOUNTER — APPOINTMENT (OUTPATIENT)
Dept: OPHTHALMOLOGY | Facility: CLINIC | Age: 85
End: 2020-06-17
Payer: MEDICARE

## 2020-06-17 PROCEDURE — 92134 CPTRZ OPH DX IMG PST SGM RTA: CPT

## 2020-06-17 PROCEDURE — 92020 GONIOSCOPY: CPT

## 2020-06-17 PROCEDURE — 92014 COMPRE OPH EXAM EST PT 1/>: CPT

## 2020-07-22 NOTE — CONSULT NOTE ADULT - CONSULT REQUESTED BY NAME
Called and spoke with patients daughter Yanni Graham  Offered 7/24, and 8/11  Patient unable to due to daughters work schedule as she transports patient  Patient on wait list for a cancellation   Patient currently scheduled 9/11/20 at 11:45am 
Patient of Dr Clif Ball seen in [facility] office  Patient daughter called to reschedule missed appointment due to covid  Patient was suppose to come in for a  3 MONTH F/U WITH MRI PTV  First opening 09/11/20 with Dr Clif Ball  Patient also added to waiting list   Please advise if acceptable time frame  Daughter offered appointment for 07/23/20 but she could not make it 
Romie
Dr Tolliver
Dr. Tolliver

## 2020-12-14 ENCOUNTER — APPOINTMENT (OUTPATIENT)
Dept: OPHTHALMOLOGY | Facility: CLINIC | Age: 85
End: 2020-12-14
Payer: MEDICARE

## 2020-12-14 ENCOUNTER — NON-APPOINTMENT (OUTPATIENT)
Age: 85
End: 2020-12-14

## 2020-12-14 PROCEDURE — 99072 ADDL SUPL MATRL&STAF TM PHE: CPT

## 2020-12-14 PROCEDURE — 92134 CPTRZ OPH DX IMG PST SGM RTA: CPT

## 2020-12-14 PROCEDURE — 92014 COMPRE OPH EXAM EST PT 1/>: CPT

## 2021-04-30 ENCOUNTER — INPATIENT (INPATIENT)
Facility: HOSPITAL | Age: 86
LOS: 3 days | Discharge: HOME | DRG: 308 | End: 2021-05-04
Attending: INTERNAL MEDICINE | Admitting: INTERNAL MEDICINE
Payer: MEDICARE

## 2021-04-30 VITALS
OXYGEN SATURATION: 92 % | WEIGHT: 115.74 LBS | DIASTOLIC BLOOD PRESSURE: 108 MMHG | HEART RATE: 137 BPM | TEMPERATURE: 99 F | RESPIRATION RATE: 18 BRPM | SYSTOLIC BLOOD PRESSURE: 196 MMHG | HEIGHT: 66 IN

## 2021-04-30 DIAGNOSIS — I48.91 UNSPECIFIED ATRIAL FIBRILLATION: ICD-10-CM

## 2021-04-30 DIAGNOSIS — N18.6 END STAGE RENAL DISEASE: ICD-10-CM

## 2021-04-30 DIAGNOSIS — Z99.2 DEPENDENCE ON RENAL DIALYSIS: Chronic | ICD-10-CM

## 2021-04-30 DIAGNOSIS — Z90.49 ACQUIRED ABSENCE OF OTHER SPECIFIED PARTS OF DIGESTIVE TRACT: Chronic | ICD-10-CM

## 2021-04-30 DIAGNOSIS — R77.8 OTHER SPECIFIED ABNORMALITIES OF PLASMA PROTEINS: ICD-10-CM

## 2021-04-30 DIAGNOSIS — I16.1 HYPERTENSIVE EMERGENCY: ICD-10-CM

## 2021-04-30 DIAGNOSIS — Z29.9 ENCOUNTER FOR PROPHYLACTIC MEASURES, UNSPECIFIED: ICD-10-CM

## 2021-04-30 LAB
ALBUMIN SERPL ELPH-MCNC: 3.5 G/DL — SIGNIFICANT CHANGE UP (ref 3.5–5)
ALP SERPL-CCNC: 140 U/L — HIGH (ref 40–120)
ALT FLD-CCNC: 36 U/L DA — SIGNIFICANT CHANGE UP (ref 10–60)
ANION GAP SERPL CALC-SCNC: 7 MMOL/L — SIGNIFICANT CHANGE UP (ref 5–17)
ANISOCYTOSIS BLD QL: SLIGHT — SIGNIFICANT CHANGE UP
APTT BLD: 34.6 SEC — SIGNIFICANT CHANGE UP (ref 27.5–35.5)
AST SERPL-CCNC: 22 U/L — SIGNIFICANT CHANGE UP (ref 10–40)
BASOPHILS # BLD AUTO: 0 K/UL — SIGNIFICANT CHANGE UP (ref 0–0.2)
BASOPHILS NFR BLD AUTO: 0 % — SIGNIFICANT CHANGE UP (ref 0–2)
BILIRUB SERPL-MCNC: 0.6 MG/DL — SIGNIFICANT CHANGE UP (ref 0.2–1.2)
BUN SERPL-MCNC: 38 MG/DL — HIGH (ref 7–18)
BURR CELLS BLD QL SMEAR: PRESENT — SIGNIFICANT CHANGE UP
CALCIUM SERPL-MCNC: 8.4 MG/DL — SIGNIFICANT CHANGE UP (ref 8.4–10.5)
CHLORIDE SERPL-SCNC: 102 MMOL/L — SIGNIFICANT CHANGE UP (ref 96–108)
CO2 SERPL-SCNC: 26 MMOL/L — SIGNIFICANT CHANGE UP (ref 22–31)
CREAT SERPL-MCNC: 6.18 MG/DL — HIGH (ref 0.5–1.3)
ELLIPTOCYTES BLD QL SMEAR: SLIGHT — SIGNIFICANT CHANGE UP
EOSINOPHIL # BLD AUTO: 0 K/UL — SIGNIFICANT CHANGE UP (ref 0–0.5)
EOSINOPHIL NFR BLD AUTO: 0 % — SIGNIFICANT CHANGE UP (ref 0–6)
GLUCOSE BLDC GLUCOMTR-MCNC: 136 MG/DL — HIGH (ref 70–99)
GLUCOSE BLDC GLUCOMTR-MCNC: 138 MG/DL — HIGH (ref 70–99)
GLUCOSE SERPL-MCNC: 177 MG/DL — HIGH (ref 70–99)
HCT VFR BLD CALC: 34.6 % — LOW (ref 39–50)
HGB BLD-MCNC: 11.1 G/DL — LOW (ref 13–17)
INR BLD: 0.99 RATIO — SIGNIFICANT CHANGE UP (ref 0.88–1.16)
LYMPHOCYTES # BLD AUTO: 0.38 K/UL — LOW (ref 1–3.3)
LYMPHOCYTES # BLD AUTO: 6 % — LOW (ref 13–44)
MANUAL SMEAR VERIFICATION: SIGNIFICANT CHANGE UP
MCHC RBC-ENTMCNC: 30.5 PG — SIGNIFICANT CHANGE UP (ref 27–34)
MCHC RBC-ENTMCNC: 32.1 GM/DL — SIGNIFICANT CHANGE UP (ref 32–36)
MCV RBC AUTO: 95.1 FL — SIGNIFICANT CHANGE UP (ref 80–100)
MICROCYTES BLD QL: SIGNIFICANT CHANGE UP
MONOCYTES # BLD AUTO: 0.25 K/UL — SIGNIFICANT CHANGE UP (ref 0–0.9)
MONOCYTES NFR BLD AUTO: 4 % — SIGNIFICANT CHANGE UP (ref 2–14)
NEUTROPHILS # BLD AUTO: 5.72 K/UL — SIGNIFICANT CHANGE UP (ref 1.8–7.4)
NEUTROPHILS NFR BLD AUTO: 90 % — HIGH (ref 43–77)
NRBC # BLD: 0 /100 — SIGNIFICANT CHANGE UP (ref 0–0)
OVALOCYTES BLD QL SMEAR: SLIGHT — SIGNIFICANT CHANGE UP
PLAT MORPH BLD: NORMAL — SIGNIFICANT CHANGE UP
PLATELET # BLD AUTO: 149 K/UL — LOW (ref 150–400)
POIKILOCYTOSIS BLD QL AUTO: SLIGHT — SIGNIFICANT CHANGE UP
POTASSIUM SERPL-MCNC: 5 MMOL/L — SIGNIFICANT CHANGE UP (ref 3.5–5.3)
POTASSIUM SERPL-SCNC: 5 MMOL/L — SIGNIFICANT CHANGE UP (ref 3.5–5.3)
PROT SERPL-MCNC: 7.1 G/DL — SIGNIFICANT CHANGE UP (ref 6–8.3)
PROTHROM AB SERPL-ACNC: 11.8 SEC — SIGNIFICANT CHANGE UP (ref 10.6–13.6)
RBC # BLD: 3.64 M/UL — LOW (ref 4.2–5.8)
RBC # FLD: 14.6 % — HIGH (ref 10.3–14.5)
RBC BLD AUTO: ABNORMAL
SARS-COV-2 RNA SPEC QL NAA+PROBE: SIGNIFICANT CHANGE UP
SODIUM SERPL-SCNC: 135 MMOL/L — SIGNIFICANT CHANGE UP (ref 135–145)
TARGETS BLD QL SMEAR: SLIGHT — SIGNIFICANT CHANGE UP
TROPONIN I SERPL-MCNC: 0.33 NG/ML — HIGH (ref 0–0.04)
TROPONIN I SERPL-MCNC: 0.38 NG/ML — HIGH (ref 0–0.04)
WBC # BLD: 6.35 K/UL — SIGNIFICANT CHANGE UP (ref 3.8–10.5)
WBC # FLD AUTO: 6.35 K/UL — SIGNIFICANT CHANGE UP (ref 3.8–10.5)

## 2021-04-30 PROCEDURE — 71045 X-RAY EXAM CHEST 1 VIEW: CPT | Mod: 26

## 2021-04-30 PROCEDURE — 99285 EMERGENCY DEPT VISIT HI MDM: CPT

## 2021-04-30 RX ORDER — METOPROLOL TARTRATE 50 MG
25 TABLET ORAL EVERY 12 HOURS
Refills: 0 | Status: DISCONTINUED | OUTPATIENT
Start: 2021-04-30 | End: 2021-04-30

## 2021-04-30 RX ORDER — CALCIUM ACETATE 667 MG
667 TABLET ORAL
Refills: 0 | Status: DISCONTINUED | OUTPATIENT
Start: 2021-04-30 | End: 2021-05-03

## 2021-04-30 RX ORDER — AMLODIPINE BESYLATE 2.5 MG/1
10 TABLET ORAL DAILY
Refills: 0 | Status: DISCONTINUED | OUTPATIENT
Start: 2021-04-30 | End: 2021-04-30

## 2021-04-30 RX ORDER — FUROSEMIDE 40 MG
80 TABLET ORAL
Refills: 0 | Status: DISCONTINUED | OUTPATIENT
Start: 2021-04-30 | End: 2021-05-04

## 2021-04-30 RX ORDER — HYDRALAZINE HCL 50 MG
1 TABLET ORAL
Qty: 0 | Refills: 0 | DISCHARGE

## 2021-04-30 RX ORDER — ERYTHROPOIETIN 10000 [IU]/ML
4000 INJECTION, SOLUTION INTRAVENOUS; SUBCUTANEOUS
Refills: 0 | Status: DISCONTINUED | OUTPATIENT
Start: 2021-04-30 | End: 2021-05-04

## 2021-04-30 RX ORDER — DILTIAZEM HCL 120 MG
30 CAPSULE, EXT RELEASE 24 HR ORAL EVERY 6 HOURS
Refills: 0 | Status: DISCONTINUED | OUTPATIENT
Start: 2021-04-30 | End: 2021-05-04

## 2021-04-30 RX ORDER — HEPARIN SODIUM 5000 [USP'U]/ML
INJECTION INTRAVENOUS; SUBCUTANEOUS
Qty: 25000 | Refills: 0 | Status: DISCONTINUED | OUTPATIENT
Start: 2021-04-30 | End: 2021-05-03

## 2021-04-30 RX ORDER — FINASTERIDE 5 MG/1
5 TABLET, FILM COATED ORAL DAILY
Refills: 0 | Status: DISCONTINUED | OUTPATIENT
Start: 2021-04-30 | End: 2021-05-04

## 2021-04-30 RX ORDER — SIMVASTATIN 20 MG/1
1 TABLET, FILM COATED ORAL
Qty: 0 | Refills: 0 | DISCHARGE

## 2021-04-30 RX ORDER — FUROSEMIDE 40 MG
40 TABLET ORAL ONCE
Refills: 0 | Status: COMPLETED | OUTPATIENT
Start: 2021-04-30 | End: 2021-04-30

## 2021-04-30 RX ORDER — LOSARTAN POTASSIUM 100 MG/1
100 TABLET, FILM COATED ORAL DAILY
Refills: 0 | Status: DISCONTINUED | OUTPATIENT
Start: 2021-04-30 | End: 2021-05-04

## 2021-04-30 RX ORDER — NIFEDIPINE 30 MG
60 TABLET, EXTENDED RELEASE 24 HR ORAL DAILY
Refills: 0 | Status: DISCONTINUED | OUTPATIENT
Start: 2021-04-30 | End: 2021-04-30

## 2021-04-30 RX ORDER — HYDRALAZINE HCL 50 MG
150 TABLET ORAL
Refills: 0 | Status: DISCONTINUED | OUTPATIENT
Start: 2021-04-30 | End: 2021-05-01

## 2021-04-30 RX ORDER — SITAGLIPTIN 50 MG/1
1 TABLET, FILM COATED ORAL
Qty: 0 | Refills: 0 | DISCHARGE

## 2021-04-30 RX ORDER — NITROGLYCERIN 6.5 MG
1 CAPSULE, EXTENDED RELEASE ORAL ONCE
Refills: 0 | Status: COMPLETED | OUTPATIENT
Start: 2021-04-30 | End: 2021-04-30

## 2021-04-30 RX ORDER — DILTIAZEM HCL 120 MG
10 CAPSULE, EXT RELEASE 24 HR ORAL ONCE
Refills: 0 | Status: COMPLETED | OUTPATIENT
Start: 2021-04-30 | End: 2021-04-30

## 2021-04-30 RX ORDER — ATORVASTATIN CALCIUM 80 MG/1
10 TABLET, FILM COATED ORAL AT BEDTIME
Refills: 0 | Status: DISCONTINUED | OUTPATIENT
Start: 2021-04-30 | End: 2021-05-04

## 2021-04-30 RX ORDER — SODIUM POLYSTYRENE SULFONATE 4.1 MEQ/G
0 POWDER, FOR SUSPENSION ORAL
Qty: 0 | Refills: 0 | DISCHARGE

## 2021-04-30 RX ORDER — GLIMEPIRIDE 1 MG
1 TABLET ORAL
Qty: 0 | Refills: 0 | DISCHARGE

## 2021-04-30 RX ADMIN — Medication 40 MILLIGRAM(S): at 15:34

## 2021-04-30 RX ADMIN — Medication 1 INCH(S): at 17:00

## 2021-04-30 RX ADMIN — Medication 10 MILLIGRAM(S): at 15:33

## 2021-04-30 RX ADMIN — ATORVASTATIN CALCIUM 10 MILLIGRAM(S): 80 TABLET, FILM COATED ORAL at 23:13

## 2021-04-30 RX ADMIN — HEPARIN SODIUM 1000 UNIT(S)/HR: 5000 INJECTION INTRAVENOUS; SUBCUTANEOUS at 23:11

## 2021-04-30 NOTE — H&P ADULT - BIRTH SEX
NEPHROLOGY INTERVAL HPI/OVERNIGHT EVENTS:  minimal diarrhea   toelrating po well  3 episodes of UOP yesterday          MEDICATIONS  (STANDING):  ALBUTerol/ipratropium for Nebulization. 3 milliLiter(s) Nebulizer once  allopurinol 100 milliGRAM(s) Oral daily  aspirin  chewable 81 milliGRAM(s) Oral daily  buDESOnide   0.5 milliGRAM(s) Respule 0.5 milliGRAM(s) Inhalation two times a day  cholestyramine Powder (Sugar-Free) 4 Gram(s) Oral two times a day  diltiazem    Tablet 30 milliGRAM(s) Oral every 6 hours  doxazosin 8 milliGRAM(s) Oral at bedtime  epoetin nelly Injectable 02794 Unit(s) IV Push <User Schedule>  finasteride 5 milliGRAM(s) Oral daily  heparin  Injectable 5000 Unit(s) SubCutaneous every 12 hours  metoclopramide 5 milliGRAM(s) Oral three times a day  pantoprazole    Tablet 40 milliGRAM(s) Oral every 12 hours  simvastatin 10 milliGRAM(s) Oral at bedtime  sodium ferric gluconate complex Injectable 125 milliGRAM(s) IV Push <User Schedule>  vancomycin    Solution 500 milliGRAM(s) Oral every 6 hours    MEDICATIONS  (PRN):  acetaminophen   Tablet 650 milliGRAM(s) Oral every 8 hours PRN For Temp greater than 38 C (100.4 F)  acetaminophen   Tablet. 650 milliGRAM(s) Oral every 8 hours PRN Mild Pain (1 - 3)  albumin human 25% IVPB 50 milliLiter(s) IV Intermittent <User Schedule> PRN sbp<=120mmhg  ALBUTerol   0.5% 2.5 milliGRAM(s) Nebulizer every 4 hours PRN Shortness of Breath and/or Wheezing  diphenhydrAMINE   Capsule 25 milliGRAM(s) Oral at bedtime PRN sleep  fluticasone propionate 50 MICROgram(s)/spray Nasal Spray 1 Spray(s) Both Nostrils two times a day PRN runny nose  nystatin Powder 1 Application(s) Topical two times a day PRN fungal rash  ondansetron Injectable 4 milliGRAM(s) IV Push every 6 hours PRN Nausea and/or Vomiting      Allergies    Zosyn (Rash)    Intolerances        I&O's Detail    04 Aug 2018 07:01  -  05 Aug 2018 07:00  --------------------------------------------------------  IN:    Oral Fluid: 300 mL  Total IN: 300 mL    OUT:  Total OUT: 0 mL    Total NET: 300 mL          Vital Signs Last 24 Hrs  T(C): 36.6 (05 Aug 2018 12:00), Max: 37.1 (04 Aug 2018 17:25)  T(F): 97.9 (05 Aug 2018 12:00), Max: 98.7 (04 Aug 2018 17:25)  HR: 77 (05 Aug 2018 12:00) (61 - 77)  BP: 139/48 (05 Aug 2018 12:00) (135/32 - 154/36)  BP(mean): --  RR: 18 (05 Aug 2018 12:00) (18 - 18)  SpO2: 96% (05 Aug 2018 12:00) (96% - 100%)  Daily     Daily     PHYSICAL EXAM:  General: alert. awake Ox3  HEENT: MMM  CV: s1s2 rrr  LUNGS: B/L CTA  EXT: no edema    LABS:                        7.6    6.24  )-----------( 202      ( 05 Aug 2018 08:00 )             24.9     08-03    143  |  110<H>  |  39<H>  ----------------------------<  116<H>  3.7   |  23  |  3.81<H>    Ca    7.8<L>      03 Aug 2018 13:55  Phos  2.7     08-03  Mg     1.9     08-05    TPro  x   /  Alb  2.6<L>  /  TBili  x   /  DBili  x   /  AST  x   /  ALT  x   /  AlkPhos  x   08-03        Magnesium, Serum: 1.9 mg/dL (08-05 @ 08:00) Male

## 2021-04-30 NOTE — H&P ADULT - PROBLEM SELECTOR PLAN 1
p/w SOB and palpitations since morning  EKG showed Afib with RVR @147  CHADVASC score is 3  Pt got diltiazem 10 IV push with improvement in HR to 90s  Trop 0.330  Started on cardizem 30 q6  Switched nifedipine to cardizem due to new onset Afib  Started on Heparin gtt  tele monitoring  f/u Serial troponins, TSH  f/u Echo  Cardio Dr. Diehl

## 2021-04-30 NOTE — H&P ADULT - PROBLEM SELECTOR PLAN 5
RISK                                                          Points  [  ] Previous VTE                                                3  [  ] Thrombophilia                                             2  [  ] Lower limb paralysis                                   2        (unable to hold up >15 seconds)    [  ] Current Cancer                                             2         (within 6 months)  [ x ] Immobilization > 24 hrs                              1  [  ] ICU/CCU stay > 24 hours                             1  [ x ] Age > 60                                                         1    IMPROVE VTE Score: 2  Heparin gtt for VTE prophylaxis.

## 2021-04-30 NOTE — CONSULT NOTE ADULT - SUBJECTIVE AND OBJECTIVE BOX
[facility] NEPHROLOGY- CONSULTATION NOTE    Patient is a 86yo Male with ESRD on HD, DM type 2, HTN, Anemia of renal dz, hyperphosphatemia p/w SOB. Pt a/w Hypertensive urgency, new onset Afib, and fluid overload. Nephrology consulted for ESRD status.     Pt well known to me; pt ESRD on HD TTS @ [facility]. Last HD 4/29 with 2L removed.   Pt states he felt SOB this am with dry cough. Denies any fevers or chills. Pt feels heart racing. He denies any chest pain, n/v/d or abd pain. Pt with LE edema which he thinks is worse since switching from Amlodipine to Nifedipine 60 mg PO qd ~1.5 weeks ago.         PAST MEDICAL & SURGICAL HISTORY:  ESRD (end stage renal disease)    Diabetes    Hypertension    Hyperlipidemia    S/P hemodialysis catheter insertion  7/17    History of appendectomy      No Known Allergies    Home Medications Reviewed  Hospital Medications:   MEDICATIONS  (STANDING):  nitroglycerin    2% Ointment 1 Inch(s) Transdermal Once    SOCIAL HISTORY:  Denies ETOh, Smoking, or drug use  FAMILY HISTORY:  Family history of diabetes mellitus in sister (Sibling)        REVIEW OF SYSTEMS:  Gen: no changes in weight  HEENT: no rhinorrhea  Neck: no sore throat  Cards: no chest pain  Resp: no dyspnea  GI: no nausea or vomiting or diarrhea  : no dysuria or hematuria  Vascular: no LE edema  Derm: no rashes  Neuro: no numbness/tingling  All other review of systems is negative unless indicated above.    VITALS:  T(F): 98.6 (04-30-21 @ 13:37), Max: 98.6 (04-30-21 @ 13:37)  HR: 137 (04-30-21 @ 13:37)  BP: 196/108 (04-30-21 @ 13:37)  RR: 18 (04-30-21 @ 13:37)  SpO2: 92% (04-30-21 @ 13:37)  Wt(kg): --    Height (cm): 167.6 (04-30 @ 13:37)  Weight (kg): 52.5 (04-30 @ 13:37)  BMI (kg/m2): 18.7 (04-30 @ 13:37)  BSA (m2): 1.59 (04-30 @ 13:37)  PHYSICAL EXAM:  Gen: NAD, calm  HEENT: MMM  Neck: no JVD  Cards: RRR, +S1/S2, no M/G/R  Resp: CTA B/L  GI: soft, NT/ND, NABS  : no CVA tenderness  Extremities: no LE edema B/L  Derm: no rashes  Neuro: non-focal    LABS:  04-30    135  |  102  |  38<H>  ----------------------------<  177<H>  5.0   |  26  |  6.18<H>    Ca    8.4      30 Apr 2021 15:48    TPro  7.1  /  Alb  3.5  /  TBili  0.6  /  DBili      /  AST  22  /  ALT  36  /  AlkPhos  140<H>  04-30    Creatinine Trend: 6.18 <--    Urine Studies:      RADIOLOGY & ADDITIONAL STUDIES:                 [facility] NEPHROLOGY- CONSULTATION NOTE    Patient is a 86yo Male with ESRD on HD, DM type 2, HTN, Anemia of renal dz, hyperphosphatemia p/w SOB. Pt a/w Hypertensive urgency, new onset Afib, and fluid overload. Nephrology consulted for ESRD status.     Pt well known to me; pt ESRD on HD TTS @ [facility]. Last HD 4/29 with 2L removed.   Pt states he felt SOB this am with dry cough. Denies any fevers or chills. Pt feels like his heart is racing. He denies any chest pain, n/v/d or abd pain. Pt with LE edema which he thinks is worse since switching from Amlodipine to Nifedipine 60 mg PO qd ~1.5 weeks ago.     No sick contacts    PAST MEDICAL & SURGICAL HISTORY:  ESRD (end stage renal disease)    Diabetes    Hypertension    Hyperlipidemia    S/P hemodialysis catheter insertion  7/17    History of appendectomy      No Known Allergies    Home Medications Reviewed  Hospital Medications:   MEDICATIONS  (STANDING):  nitroglycerin    2% Ointment 1 Inch(s) Transdermal Once    SOCIAL HISTORY:  Denies ETOh, Smoking, or drug use  FAMILY HISTORY:  Family history of diabetes mellitus in sister (Sibling)        REVIEW OF SYSTEMS:  Gen: no changes in weight  HEENT: no rhinorrhea  Neck: no sore throat  Cards: no chest pain +palipitations  Resp: +dyspnea +dry cough  GI: no nausea or vomiting or diarrhea  : no dysuria or hematuria  Vascular: +LE edema  Derm: no rashes  Neuro: no numbness/tingling  All other review of systems is negative unless indicated above.    VITALS:  T(F): 98.6 (04-30-21 @ 13:37), Max: 98.6 (04-30-21 @ 13:37)  HR: 137 (04-30-21 @ 13:37)  BP: 196/108 (04-30-21 @ 13:37)  RR: 18 (04-30-21 @ 13:37)  SpO2: 92% (04-30-21 @ 13:37)  Wt(kg): --    Height (cm): 167.6 (04-30 @ 13:37)  Weight (kg): 52.5 (04-30 @ 13:37)  BMI (kg/m2): 18.7 (04-30 @ 13:37)  BSA (m2): 1.59 (04-30 @ 13:37)    PHYSICAL EXAM:  Gen: NAD, calm  HEENT: MMM  Neck: no JVD  Cards: irregular/ tachy, +S1/S2, no M/G/R  Resp: +b/l rales at bases  GI: soft, NT/ND, NABS  : no CVA tenderness  Extremities: +2 pitting LE edema B/L  Derm: no rashes  Neuro: non-focal  Access: Left AVF +thrill +bruit    LABS:  04-30    135  |  102  |  38<H>  ----------------------------<  177<H>  5.0   |  26  |  6.18<H>    Ca    8.4      30 Apr 2021 15:48    TPro  7.1  /  Alb  3.5  /  TBili  0.6  /  DBili      /  AST  22  /  ALT  36  /  AlkPhos  140<H>  04-30    Creatinine Trend: 6.18 <--    Urine Studies:      RADIOLOGY & ADDITIONAL STUDIES:      < from: Xray Chest 1 View-PORTABLE IMMEDIATE (Xray Chest 1 View-PORTABLE IMMEDIATE .) (04.30.21 @ 15:23) >    EXAM:  XR CHEST PORTABLE IMMED 1V                            PROCEDURE DATE:  04/30/2021          INTERPRETATION:  CLINICAL STATEMENT: Chest Pain.    TECHNIQUE: AP view of the chest.    COMPARISON: 8/14/2017    FINDINGS/  IMPRESSION:  New moderate size left pleural effusion. Nonspecific patchy opacities right lung base with a round density overlying the right midlung zone. CT chest recommended.    Small right pleural effusion.    Heart size cannot be accurately assessed in this projection.      < end of copied text >

## 2021-04-30 NOTE — ED PROVIDER NOTE - CARE PLAN
Principal Discharge DX:	Rapid atrial fibrillation  Secondary Diagnosis:	Congestive heart failure, unspecified HF chronicity, unspecified heart failure type
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
## 2021-04-30 NOTE — H&P ADULT - PROBLEM SELECTOR PLAN 3
p/w /108  c/w losartan, hydralazine and Lasix  Switched nifedipine to cardizem due to new onset Afib  Started on cardizem 30 q6  Monitor BP

## 2021-04-30 NOTE — H&P ADULT - HISTORY OF PRESENT ILLNESS
85 yr M from home with PMH of HTN, ESRD on HD TTS via left AVF presents to ED with SOB and palpiations since morning. Pt reports he has been having SOB since last 3-4 days when he gets up in the morning but it gets relieved on its own and he is able to carry out his daily activities but today he had difficulty doing any activities so he came to hospital. Pt also reports palpitations since morning. Pt denies any chest pain. Pt reports his last HD was yesterday. Pt denies any  N/V/D, abdominal pain, urinary symptoms or any other acute complaints.   In ED, EKG showed Afib with RVR @147. Pt got diltiazem 10 IV push with improvement in HR to 90s.    In ED, /108,

## 2021-04-30 NOTE — H&P ADULT - ASSESSMENT
85 yr M from home with PMH of HTN, ESRD on HD TTS via left AVF presents to ED with SOB and palpiations since morning.     In ED, /108,       Pt is admitted for new onset Afib and HTN Urgency

## 2021-04-30 NOTE — H&P ADULT - NSICDXPASTMEDICALHX_GEN_ALL_CORE_FT
PAST MEDICAL HISTORY:  Diabetes     ESRD (end stage renal disease)     Hyperlipidemia     Hypertension

## 2021-04-30 NOTE — H&P ADULT - NSHPPHYSICALEXAM_GEN_ALL_CORE
General - NAD  Eyes - PERRLA, EOM intact  ENT - Nonicteric sclerae, PERRLA, EOMI. Oropharynx clear. Moist mucous membranes. Conjunctivae appear well perfused.   Neck - No noticeable or palpable swelling, redness or rash around throat or on face  Lymph Nodes - No lymphadenopathy  Cardiovascular - RRR no m/r/g, no JVD, no carotid bruits  Lungs - Clear to ascultation, no use of accessory muscles, no crackles or wheezes.  Skin - No rashes, skin warm and dry, no erythematous areas  Abdomen - Normal bowel sounds, abdomen soft and nontender, no hepatosplenomegaly.  Extremities -1+ pitting edema both legs   MusculoSkeletal - 5/5 strength, normal range of motion, no swollen or erythematous  joints.  Neurological – Alert and oriented x 3, CN 2-12 grossly intact.

## 2021-04-30 NOTE — ED ADULT NURSE NOTE - OBJECTIVE STATEMENT
As per pt, c/o racing heartbeat and SOB x4days. PT denies CP, h/a, f/c, n/v/d, or cough. As per pt, c/o racing heartbeat and SOB x4days. PT denies CP, h/a, f/c, n/v/d, or cough. AV fistula intact, +thrills/bruit, HD day tu/TH/Sat.

## 2021-04-30 NOTE — ED PROVIDER NOTE - OBJECTIVE STATEMENT
86 y/o M with a significant PMHx of HTN, HLD, DM and ESRD, on dialysis, presents to the ED with complaints of rapid heartrate and shortness of breath. Patient also relates cough. Denies chest pain, nausea, vomiting, diarrhea, fever or any other acute complaints.

## 2021-04-30 NOTE — ED PROVIDER NOTE - CLINICAL SUMMARY MEDICAL DECISION MAKING FREE TEXT BOX
86 y/o M presents with palpitations and shortness of breath. Will get labs, CXR, give Cardizem, Lasix and reevaluate. Consider BIPAP.

## 2021-04-30 NOTE — H&P ADULT - NSICDXFAMILYHX_GEN_ALL_CORE_FT
FAMILY HISTORY:  Sibling  Still living? Yes, Estimated age: Age Unknown  Family history of diabetes mellitus in sister, Age at diagnosis: Age Unknown

## 2021-04-30 NOTE — CONSULT NOTE ADULT - ASSESSMENT
y Female or Male with history of ESRD on HD presents with . Nephrology consulted for ESRD status.    1) ESRD: Last HD on 4/22/20 tolerated well with 1L fluid removal. Plan for next maintenance HD today; 4/24. Monitor electrolytes.  2) HTN with ESRD: BP well controlled. Continue with current medications and low sodium diet. Monitor BP.  3) Anemia of renal disease: Hb elevated. No need for SON at this time. Patient on micera as an outpatient. Monitor Hb.  4) Hyperphosphatemia: Phosphorus acceptable. Continue with renvela with meals. Monitor serum calcium and phosphorus.      [facility] NEPHROLOGY  Nahum Grubbs M.D.  Francisco Javier Hicks D.O.  Yovana Quiroz M.D.  Makeda Duron, MSN, ANP-C  [phone]    [address] [place] [address]   86 yo Male with history of ESRD on HD presents with SOB a/w Hypertensive urgency, new onset Afib, and fluid overload. Nephrology consulted for ESRD status. . Nephrology consulted for ESRD status.    1) ESRD: Last HD on 4/29 @ [facility]. Pt with fluid overload; plan for 2 hour PUF today with UF goal 3L. Plan for next maintenance HD 5/1. HepBsAg pending. Monitor electrolytes.  2) Hypertensive Urgency: BP uncontrolled. Restart home medications. Consider switching Nifedipine to Cardizem for rate control. Recc low sodium diet. Monitor BP. Increase UF with HD.   3) Anemia of renal disease: Hb acceptable. c/w Epogen 4k IV tiw with HD. Monitor Hb.  4) Hyperphosphatemia: Check serum phosphorus. Serum calcium acceptable. c/w Phoslo 1667mg PO tid with meals. Recc low phos/ renal diet. Monitor serum calcium and phosphorus.  5) Atrial Fibrillation- new onset. Plan as per Cards/ primary team. Monitor HR.     [facility] NEPHROLOGY  Nahum Grubbs M.D.  Francisco Javier Hicks D.O.  Yovana Quiroz M.D.  Makeda Duron, MSN, ANP-C  [phone]    [address] [place] [address]

## 2021-04-30 NOTE — H&P ADULT - PROBLEM SELECTOR PLAN 4
on HD TTS via left AVF   Last HD yesterday  Electrolytes wnl  c/w Phoslo with meals   Plan for HD today  Nephro

## 2021-04-30 NOTE — PHARMACOTHERAPY INTERVENTION NOTE - COMMENTS
Patient's home pharmacy ([facility] on [address] [facility] [phone]) was contacted and the Outside Medication Record was updated based on the prescription history.    Patient last picked up diabetes medication on 3/2020 (Glimepiride with 1 refill remaining, script is now ).

## 2021-05-01 LAB
A1C WITH ESTIMATED AVERAGE GLUCOSE RESULT: 6.4 % — HIGH (ref 4–5.6)
ALBUMIN SERPL ELPH-MCNC: 3.1 G/DL — LOW (ref 3.5–5)
ALP SERPL-CCNC: 123 U/L — HIGH (ref 40–120)
ALT FLD-CCNC: 26 U/L DA — SIGNIFICANT CHANGE UP (ref 10–60)
ANION GAP SERPL CALC-SCNC: 8 MMOL/L — SIGNIFICANT CHANGE UP (ref 5–17)
APTT BLD: 81.9 SEC — HIGH (ref 27.5–35.5)
APTT BLD: 96.3 SEC — HIGH (ref 27.5–35.5)
AST SERPL-CCNC: 12 U/L — SIGNIFICANT CHANGE UP (ref 10–40)
BASOPHILS # BLD AUTO: 0.02 K/UL — SIGNIFICANT CHANGE UP (ref 0–0.2)
BASOPHILS NFR BLD AUTO: 0.4 % — SIGNIFICANT CHANGE UP (ref 0–2)
BILIRUB SERPL-MCNC: 0.4 MG/DL — SIGNIFICANT CHANGE UP (ref 0.2–1.2)
BUN SERPL-MCNC: 55 MG/DL — HIGH (ref 7–18)
CALCIUM SERPL-MCNC: 8.1 MG/DL — LOW (ref 8.4–10.5)
CHLORIDE SERPL-SCNC: 102 MMOL/L — SIGNIFICANT CHANGE UP (ref 96–108)
CHOLEST SERPL-MCNC: 167 MG/DL — SIGNIFICANT CHANGE UP
CO2 SERPL-SCNC: 26 MMOL/L — SIGNIFICANT CHANGE UP (ref 22–31)
COVID-19 SPIKE DOMAIN AB INTERP: POSITIVE
COVID-19 SPIKE DOMAIN ANTIBODY RESULT: 138 U/ML — HIGH
CREAT SERPL-MCNC: 7.6 MG/DL — HIGH (ref 0.5–1.3)
EOSINOPHIL # BLD AUTO: 0.26 K/UL — SIGNIFICANT CHANGE UP (ref 0–0.5)
EOSINOPHIL NFR BLD AUTO: 5.1 % — SIGNIFICANT CHANGE UP (ref 0–6)
ESTIMATED AVERAGE GLUCOSE: 137 MG/DL — HIGH (ref 68–114)
FERRITIN SERPL-MCNC: 1291 NG/ML — HIGH (ref 30–400)
FOLATE SERPL-MCNC: >20 NG/ML — SIGNIFICANT CHANGE UP
GLUCOSE BLDC GLUCOMTR-MCNC: 200 MG/DL — HIGH (ref 70–99)
GLUCOSE SERPL-MCNC: 164 MG/DL — HIGH (ref 70–99)
HBV SURFACE AG SER-ACNC: SIGNIFICANT CHANGE UP
HCT VFR BLD CALC: 31 % — LOW (ref 39–50)
HDLC SERPL-MCNC: 95 MG/DL — SIGNIFICANT CHANGE UP
HGB BLD-MCNC: 10 G/DL — LOW (ref 13–17)
IMM GRANULOCYTES NFR BLD AUTO: 0.4 % — SIGNIFICANT CHANGE UP (ref 0–1.5)
IRON SATN MFR SERPL: 40 % — SIGNIFICANT CHANGE UP (ref 20–55)
IRON SATN MFR SERPL: 67 UG/DL — SIGNIFICANT CHANGE UP (ref 65–170)
LIPID PNL WITH DIRECT LDL SERPL: 61 MG/DL — SIGNIFICANT CHANGE UP
LYMPHOCYTES # BLD AUTO: 0.94 K/UL — LOW (ref 1–3.3)
LYMPHOCYTES # BLD AUTO: 18.5 % — SIGNIFICANT CHANGE UP (ref 13–44)
MAGNESIUM SERPL-MCNC: 2.4 MG/DL — SIGNIFICANT CHANGE UP (ref 1.6–2.6)
MCHC RBC-ENTMCNC: 30.6 PG — SIGNIFICANT CHANGE UP (ref 27–34)
MCHC RBC-ENTMCNC: 32.3 GM/DL — SIGNIFICANT CHANGE UP (ref 32–36)
MCV RBC AUTO: 94.8 FL — SIGNIFICANT CHANGE UP (ref 80–100)
MONOCYTES # BLD AUTO: 0.74 K/UL — SIGNIFICANT CHANGE UP (ref 0–0.9)
MONOCYTES NFR BLD AUTO: 14.5 % — HIGH (ref 2–14)
NEUTROPHILS # BLD AUTO: 3.11 K/UL — SIGNIFICANT CHANGE UP (ref 1.8–7.4)
NEUTROPHILS NFR BLD AUTO: 61.1 % — SIGNIFICANT CHANGE UP (ref 43–77)
NON HDL CHOLESTEROL: 72 MG/DL — SIGNIFICANT CHANGE UP
NRBC # BLD: 0 /100 WBCS — SIGNIFICANT CHANGE UP (ref 0–0)
PHOSPHATE SERPL-MCNC: 4.9 MG/DL — HIGH (ref 2.5–4.5)
PLATELET # BLD AUTO: 143 K/UL — LOW (ref 150–400)
POTASSIUM SERPL-MCNC: 4.9 MMOL/L — SIGNIFICANT CHANGE UP (ref 3.5–5.3)
POTASSIUM SERPL-SCNC: 4.9 MMOL/L — SIGNIFICANT CHANGE UP (ref 3.5–5.3)
PROT SERPL-MCNC: 6.4 G/DL — SIGNIFICANT CHANGE UP (ref 6–8.3)
RBC # BLD: 3.27 M/UL — LOW (ref 4.2–5.8)
RBC # FLD: 14.2 % — SIGNIFICANT CHANGE UP (ref 10.3–14.5)
SARS-COV-2 IGG+IGM SERPL QL IA: 138 U/ML — HIGH
SARS-COV-2 IGG+IGM SERPL QL IA: POSITIVE
SODIUM SERPL-SCNC: 136 MMOL/L — SIGNIFICANT CHANGE UP (ref 135–145)
TIBC SERPL-MCNC: 167 UG/DL — LOW (ref 250–450)
TRIGL SERPL-MCNC: 55 MG/DL — SIGNIFICANT CHANGE UP
TSH SERPL-MCNC: 2.01 UU/ML — SIGNIFICANT CHANGE UP (ref 0.34–4.82)
UIBC SERPL-MCNC: 100 UG/DL — LOW (ref 110–370)
VIT B12 SERPL-MCNC: 992 PG/ML — SIGNIFICANT CHANGE UP (ref 232–1245)
WBC # BLD: 5.09 K/UL — SIGNIFICANT CHANGE UP (ref 3.8–10.5)
WBC # FLD AUTO: 5.09 K/UL — SIGNIFICANT CHANGE UP (ref 3.8–10.5)

## 2021-05-01 RX ORDER — HYDRALAZINE HCL 50 MG
100 TABLET ORAL
Refills: 0 | Status: DISCONTINUED | OUTPATIENT
Start: 2021-05-01 | End: 2021-05-03

## 2021-05-01 RX ORDER — METOPROLOL TARTRATE 50 MG
25 TABLET ORAL
Refills: 0 | Status: DISCONTINUED | OUTPATIENT
Start: 2021-05-01 | End: 2021-05-04

## 2021-05-01 RX ADMIN — Medication 25 MILLIGRAM(S): at 22:05

## 2021-05-01 RX ADMIN — Medication 30 MILLIGRAM(S): at 22:05

## 2021-05-01 RX ADMIN — Medication 150 MILLIGRAM(S): at 05:26

## 2021-05-01 RX ADMIN — Medication 1 INCH(S): at 05:13

## 2021-05-01 RX ADMIN — ERYTHROPOIETIN 4000 UNIT(S): 10000 INJECTION, SOLUTION INTRAVENOUS; SUBCUTANEOUS at 19:40

## 2021-05-01 RX ADMIN — Medication 667 MILLIGRAM(S): at 11:59

## 2021-05-01 RX ADMIN — HEPARIN SODIUM 1000 UNIT(S)/HR: 5000 INJECTION INTRAVENOUS; SUBCUTANEOUS at 07:44

## 2021-05-01 RX ADMIN — FINASTERIDE 5 MILLIGRAM(S): 5 TABLET, FILM COATED ORAL at 11:59

## 2021-05-01 RX ADMIN — ATORVASTATIN CALCIUM 10 MILLIGRAM(S): 80 TABLET, FILM COATED ORAL at 22:05

## 2021-05-01 RX ADMIN — Medication 30 MILLIGRAM(S): at 11:59

## 2021-05-01 RX ADMIN — Medication 667 MILLIGRAM(S): at 08:26

## 2021-05-01 RX ADMIN — LOSARTAN POTASSIUM 100 MILLIGRAM(S): 100 TABLET, FILM COATED ORAL at 06:44

## 2021-05-01 RX ADMIN — Medication 30 MILLIGRAM(S): at 05:26

## 2021-05-01 RX ADMIN — Medication 30 MILLIGRAM(S): at 01:14

## 2021-05-01 RX ADMIN — Medication 100 MILLIGRAM(S): at 22:04

## 2021-05-01 RX ADMIN — HEPARIN SODIUM 1000 UNIT(S)/HR: 5000 INJECTION INTRAVENOUS; SUBCUTANEOUS at 16:19

## 2021-05-01 NOTE — CONSULT NOTE ADULT - SUBJECTIVE AND OBJECTIVE BOX
DATE OF SERVICE: 05/01/2021   Patient was seen,examined and evaluated  by me.ER evaluation, Labs and Hospital course was reviewed,    CHIEF COMPLAINT:Dyspnea    HPI:85 yr M from home with PMH of HTN, ESRD on HD TTS via left AVF presents to ED with SOB and palpiations since morning.   Pt reports he has been having SOB since last 3-4 days when he gets up in the morning but it gets relieved on its own and he is able to carry out his daily activities but today he had difficulty doing any activities so he came to hospital.   Pt also reports palpitations since morning. Pt denies any chest pain. Pt reports his last HD was yesterday. Pt denies any  N/V/D, abdominal pain, urinary symptoms or any other acute complaints.   In ED, EKG showed Afib with RVR @147. Pt got diltiazem 10 IV push with improvement in HR to 90s.Patient started on HD in 2017 prior ECG reviewed noted atrial Tachycardia     In ED, /108,  (30 Apr 2021 19:32)      PAST MEDICAL & SURGICAL HISTORY:  ESRD (end stage renal disease)  Diabetes  Hypertension  Hyperlipidemia    S/P hemodialysis catheter insertion  7/17    History of appendectomy        MEDICATIONS  (STANDING):  atorvastatin 10 milliGRAM(s) Oral at bedtime  calcium acetate 667 milliGRAM(s) Oral three times a day with meals  diltiazem    Tablet 30 milliGRAM(s) Oral every 6 hours  epoetin zach-epbx (RETACRIT) Injectable 4000 Unit(s) IV Push <User Schedule>  finasteride 5 milliGRAM(s) Oral daily  furosemide    Tablet 80 milliGRAM(s) Oral <User Schedule>  heparin  Infusion.  Unit(s)/Hr (10 mL/Hr) IV Continuous <Continuous>  hydrALAZINE 150 milliGRAM(s) Oral two times a day  losartan 100 milliGRAM(s) Oral daily        FAMILY HISTORY:  Family history of diabetes mellitus in sister (Sibling)  No family history of premature coronary artery disease or sudden cardiac death    SOCIAL HISTORY:  Smoking-[ ] Active  [ ] Former [x ] Non Smoker  Alcohol-[ x] Denies [ ] Social [ ] Daily  Ilicit Drug use-[x ] Denies [ ] Active user    REVIEW OF SYSTEMS:  Constitutional: [ ] fever, [ ]weight loss, [x ]fatigue   Activity [ ] Bedbound,[ x] Ambulates [x ] Unassisted[ ] Cane/Walker [ ] Assistence.  Effort tolerance:[ ] Excellent [ ] Good [ ] Fair [x ] Poor [ ]  Eyes: [ ] visual changes  Respiratory: [x ]shortness of breath;  [ ] cough, [ ]wheezing, [ ]chills, [ ]hemoptysis  Cardiovascular: [ ] chest pain, [x]palpitations, [ ]dizziness,  [ ]leg swelling[ ]orthopnea [ ]PND  Gastrointestinal: [ ] abdominal pain, [ ]nausea, [ ]vomiting,  [ ]diarrhea,[ ]constipation  Genitourinary: [ ] dysuria, [ ] hematuria  Neurologic: [ ] headaches [ ] tremors[ ] weakness  Skin: [ ] itching, [ ]burning, [ ] rashes  Endocrine: [ ] heat or cold intolerance  Musculoskeletal: [ ] joint pain or swelling; [ ] muscle, back, or extremity pain  Psychiatric: [ ] depression, [ ]anxiety, [ ]mood swings, or [ ]difficulty sleeping  Hematologic: [ ] easy bruising, [ ] bleeding gums       [ x] All others negative	  [ ] Unable to obtain    Vital Signs Last 24 Hrs  T(C): 36.9 (01 May 2021 04:38), Max: 37.1 (30 Apr 2021 17:35)  T(F): 98.4 (01 May 2021 04:38), Max: 98.8 (30 Apr 2021 17:35)  HR: 96 (01 May 2021 04:38) (87 - 137)  BP: 129/79 (01 May 2021 04:38) (114/62 - 196/108)  RR: 18 (01 May 2021 04:38) (18 - 18)  SpO2: 97% (01 May 2021 04:38) (92% - 100%)  I&O's Summary      PHYSICAL EXAM:  General: No acute distress BMI-25  HEENT: EOMI, PERRL[ ] Icteric  Neck: Supple, No JVD  Lungs: Equal air entry bilaterally; [ ] Rales [ ] Rhonchi [ ] Wheezing  Heart: Irregular rate and rhythm;[x ] Murmurs-   2/6 [x ] Systolic [ ] Diastolic [ ] Radiation,No rubs, or gallops  Abdomen: Nontender, bowel sounds present  Extremities: No clubbing, cyanosis, or edema[ ] Calf tenderness  Nervous system:  Alert & Oriented X3, no focal deficits  Psychiatric: Normal affect  Skin: No rashes or lesions      LABS:  05-01    136  |  102  |  55<H>  ----------------------------<  164<H>  4.9   |  26  |  7.60<H>    Ca    8.1<L>      01 May 2021 06:20  Phos  4.9     05-01  Mg     2.4     05-01    TPro  6.4  /  Alb  3.1<L>  /  TBili  0.4  /  DBili  x   /  AST  12  /  ALT  26  /  AlkPhos  123<H>  05-01    Creatinine Trend: 7.60<--, 6.18<--                        10.0   5.09  )-----------( 143      ( 01 May 2021 06:20 )             31.0     PT/INR - ( 30 Apr 2021 15:48 )   PT: 11.8 sec;   INR: 0.99 ratio         PTT - ( 01 May 2021 07:00 )  PTT:81.9 sec    Lipid Panel: Cholesterol, Serum 167  HDL Cholesterol, Serum 95  Triglycerides, Serum 55    Cardiac Enzymes: CARDIAC MARKERS ( 30 Apr 2021 21:38 )  0.378 ng/mL / x     / x     / x     / x      CARDIAC MARKERS ( 30 Apr 2021 15:48 )  0.330 ng/mL / x     / x     / x     / x              RADIOLOGY:XR CHEST PORTABLE IMMED 1V    FINDINGS/  IMPRESSION:  New moderate size left pleural effusion. Nonspecific patchy opacities right lung base with a round density overlying the right midlung zone. CT chest recommended.  Small right pleural effusion.  Heart size cannot be accurately assessed in this projection.    ECG [my interpretation]:Atrial Fibrillation No acute ST T wave abnormalities    TELEMETRY:Atrial Fibrillation    ECHO:Study Date: 7/6/2017  CONCLUSIONS:  1. Mitral annular calcification. Mild mitral regurgitation.  2. Moderate left atrial enlargement.  3. Moderate concentric left ventricular hypertrophy.  4. Endocardium not well visualized; grossly normal left ventricular function. Moderate diastolic dysfunction (Stage II).  5. Normal right ventricular size andfunction.  6. RVS Pressure is 52 mm Hg. Moderate pulmonary hypertension.  7. Bilateral pleural effusions.

## 2021-05-01 NOTE — CONSULT NOTE ADULT - ASSESSMENT
85 yr M from home with PMH of HTN, ESRD on HD TTS via left AVF presents to ED with SOB and palpiations    In ED, /108,       Pt is admitted for Atrial Fibrillation and HTN Urgency  Problem/Plan - 1:  ·  Problem: [place] fibrillation.  Plan: p/w SOB and palpitations since morning  EKG showed Afib with RVR @147  CHADVASC score is 3  Pt got diltiazem 10 IV push with improvement in HR to 90s  Trop 0.330  Started on cardizem 30 q6  Switched nifedipine to cardizem due to new onset Afib  Started on Heparin gtt  -Repeat TTE  -Will need long term AC      Problem/Plan - 2:  ·  Problem: Elevated troponin I level.  Plan: p/w trop 0.330  EKG showed Afib with RVR @147  Tele monitoring  Ischemic work-up NST       Problem/Plan - 3:  ·  Problem: Hypertensive emergency.  Plan: p/w /108  c/w losartan, hydralazine and Lasix  Switched nifedipine to cardizem due to new onset Afib  Started on cardizem 30 q6  Monitor BP.     Problem/Plan - 4:  ·  Problem: ESRD (end stage renal disease).  Plan: on HD TTS via left AVF   Last HD yesterday  Electrolytes wnl  c/w Phoslo with meals     Problem/Plan - 5:  ·  Problem: Need for prophylactic measure.  Plan: RISK                                                          Points  [  ] Previous VTE                                                3  [  ] Thrombophilia                                             2  [  ] Lower limb paralysis                                   2        (unable to hold up >15 seconds)    [  ] Current Cancer                                             2         (within 6 months)  [ x ] Immobilization > 24 hrs                              1  [  ] ICU/CCU stay > 24 hours                             1  [ x ] Age > 60                                                         1    IMPROVE VTE Score: 2  Heparin gtt for VTE prophylaxis.

## 2021-05-01 NOTE — CONSULT NOTE ADULT - TIME BILLING
- Review of records, telemetry, vital signs and daily labs.   - General and cardiovascular physical examination.  - Generation of cardiovascular treatment plan.  - Coordination of care.    Patient was seen and examined by me on 05/01/2021,interim events noted,labs and radiology studies reviewed.  Foster Diehl MD,FACC.  [address] [place], [place] [id number].  [phone]

## 2021-05-02 LAB
ALBUMIN SERPL ELPH-MCNC: 2.9 G/DL — LOW (ref 3.5–5)
ALP SERPL-CCNC: 119 U/L — SIGNIFICANT CHANGE UP (ref 40–120)
ALT FLD-CCNC: 23 U/L DA — SIGNIFICANT CHANGE UP (ref 10–60)
ANION GAP SERPL CALC-SCNC: 9 MMOL/L — SIGNIFICANT CHANGE UP (ref 5–17)
APTT BLD: 106.1 SEC — HIGH (ref 27.5–35.5)
APTT BLD: 71.1 SEC — HIGH (ref 27.5–35.5)
APTT BLD: 83.9 SEC — HIGH (ref 27.5–35.5)
AST SERPL-CCNC: 10 U/L — SIGNIFICANT CHANGE UP (ref 10–40)
BASOPHILS # BLD AUTO: 0.03 K/UL — SIGNIFICANT CHANGE UP (ref 0–0.2)
BASOPHILS NFR BLD AUTO: 0.6 % — SIGNIFICANT CHANGE UP (ref 0–2)
BILIRUB SERPL-MCNC: 0.5 MG/DL — SIGNIFICANT CHANGE UP (ref 0.2–1.2)
BUN SERPL-MCNC: 38 MG/DL — HIGH (ref 7–18)
CALCIUM SERPL-MCNC: 8.1 MG/DL — LOW (ref 8.4–10.5)
CHLORIDE SERPL-SCNC: 98 MMOL/L — SIGNIFICANT CHANGE UP (ref 96–108)
CO2 SERPL-SCNC: 28 MMOL/L — SIGNIFICANT CHANGE UP (ref 22–31)
CREAT SERPL-MCNC: 5.52 MG/DL — HIGH (ref 0.5–1.3)
EOSINOPHIL # BLD AUTO: 0.31 K/UL — SIGNIFICANT CHANGE UP (ref 0–0.5)
EOSINOPHIL NFR BLD AUTO: 6.4 % — HIGH (ref 0–6)
GLUCOSE SERPL-MCNC: 141 MG/DL — HIGH (ref 70–99)
HCT VFR BLD CALC: 32 % — LOW (ref 39–50)
HGB BLD-MCNC: 10.7 G/DL — LOW (ref 13–17)
IMM GRANULOCYTES NFR BLD AUTO: 0.4 % — SIGNIFICANT CHANGE UP (ref 0–1.5)
LYMPHOCYTES # BLD AUTO: 0.82 K/UL — LOW (ref 1–3.3)
LYMPHOCYTES # BLD AUTO: 17 % — SIGNIFICANT CHANGE UP (ref 13–44)
MAGNESIUM SERPL-MCNC: 2.3 MG/DL — SIGNIFICANT CHANGE UP (ref 1.6–2.6)
MCHC RBC-ENTMCNC: 30.9 PG — SIGNIFICANT CHANGE UP (ref 27–34)
MCHC RBC-ENTMCNC: 33.4 GM/DL — SIGNIFICANT CHANGE UP (ref 32–36)
MCV RBC AUTO: 92.5 FL — SIGNIFICANT CHANGE UP (ref 80–100)
MONOCYTES # BLD AUTO: 0.6 K/UL — SIGNIFICANT CHANGE UP (ref 0–0.9)
MONOCYTES NFR BLD AUTO: 12.4 % — SIGNIFICANT CHANGE UP (ref 2–14)
NEUTROPHILS # BLD AUTO: 3.05 K/UL — SIGNIFICANT CHANGE UP (ref 1.8–7.4)
NEUTROPHILS NFR BLD AUTO: 63.2 % — SIGNIFICANT CHANGE UP (ref 43–77)
NRBC # BLD: 0 /100 WBCS — SIGNIFICANT CHANGE UP (ref 0–0)
PHOSPHATE SERPL-MCNC: 4.1 MG/DL — SIGNIFICANT CHANGE UP (ref 2.5–4.5)
PLATELET # BLD AUTO: 148 K/UL — LOW (ref 150–400)
POTASSIUM SERPL-MCNC: 4.2 MMOL/L — SIGNIFICANT CHANGE UP (ref 3.5–5.3)
POTASSIUM SERPL-SCNC: 4.2 MMOL/L — SIGNIFICANT CHANGE UP (ref 3.5–5.3)
PROT SERPL-MCNC: 6.4 G/DL — SIGNIFICANT CHANGE UP (ref 6–8.3)
RBC # BLD: 3.46 M/UL — LOW (ref 4.2–5.8)
RBC # FLD: 14.1 % — SIGNIFICANT CHANGE UP (ref 10.3–14.5)
SODIUM SERPL-SCNC: 135 MMOL/L — SIGNIFICANT CHANGE UP (ref 135–145)
WBC # BLD: 4.83 K/UL — SIGNIFICANT CHANGE UP (ref 3.8–10.5)
WBC # FLD AUTO: 4.83 K/UL — SIGNIFICANT CHANGE UP (ref 3.8–10.5)

## 2021-05-02 RX ADMIN — LOSARTAN POTASSIUM 100 MILLIGRAM(S): 100 TABLET, FILM COATED ORAL at 06:18

## 2021-05-02 RX ADMIN — FINASTERIDE 5 MILLIGRAM(S): 5 TABLET, FILM COATED ORAL at 12:21

## 2021-05-02 RX ADMIN — Medication 30 MILLIGRAM(S): at 17:31

## 2021-05-02 RX ADMIN — Medication 100 MILLIGRAM(S): at 06:18

## 2021-05-02 RX ADMIN — Medication 667 MILLIGRAM(S): at 12:20

## 2021-05-02 RX ADMIN — Medication 667 MILLIGRAM(S): at 08:25

## 2021-05-02 RX ADMIN — Medication 80 MILLIGRAM(S): at 06:18

## 2021-05-02 RX ADMIN — Medication 30 MILLIGRAM(S): at 23:53

## 2021-05-02 RX ADMIN — ATORVASTATIN CALCIUM 10 MILLIGRAM(S): 80 TABLET, FILM COATED ORAL at 22:10

## 2021-05-02 RX ADMIN — Medication 30 MILLIGRAM(S): at 12:20

## 2021-05-02 RX ADMIN — Medication 30 MILLIGRAM(S): at 06:18

## 2021-05-02 RX ADMIN — Medication 25 MILLIGRAM(S): at 06:19

## 2021-05-02 RX ADMIN — Medication 25 MILLIGRAM(S): at 17:35

## 2021-05-02 RX ADMIN — HEPARIN SODIUM 900 UNIT(S)/HR: 5000 INJECTION INTRAVENOUS; SUBCUTANEOUS at 15:20

## 2021-05-02 RX ADMIN — HEPARIN SODIUM 900 UNIT(S)/HR: 5000 INJECTION INTRAVENOUS; SUBCUTANEOUS at 21:33

## 2021-05-02 RX ADMIN — HEPARIN SODIUM 900 UNIT(S)/HR: 5000 INJECTION INTRAVENOUS; SUBCUTANEOUS at 08:54

## 2021-05-02 RX ADMIN — Medication 667 MILLIGRAM(S): at 17:31

## 2021-05-02 RX ADMIN — Medication 100 MILLIGRAM(S): at 17:32

## 2021-05-03 ENCOUNTER — TRANSCRIPTION ENCOUNTER (OUTPATIENT)
Age: 86
End: 2021-05-03

## 2021-05-03 LAB
ALBUMIN SERPL ELPH-MCNC: 2.8 G/DL — LOW (ref 3.5–5)
ALP SERPL-CCNC: 103 U/L — SIGNIFICANT CHANGE UP (ref 40–120)
ALT FLD-CCNC: 17 U/L DA — SIGNIFICANT CHANGE UP (ref 10–60)
ANION GAP SERPL CALC-SCNC: 12 MMOL/L — SIGNIFICANT CHANGE UP (ref 5–17)
APTT BLD: 81.1 SEC — HIGH (ref 27.5–35.5)
AST SERPL-CCNC: 6 U/L — LOW (ref 10–40)
BASOPHILS # BLD AUTO: 0.04 K/UL — SIGNIFICANT CHANGE UP (ref 0–0.2)
BASOPHILS NFR BLD AUTO: 0.8 % — SIGNIFICANT CHANGE UP (ref 0–2)
BILIRUB SERPL-MCNC: 0.4 MG/DL — SIGNIFICANT CHANGE UP (ref 0.2–1.2)
BUN SERPL-MCNC: 54 MG/DL — HIGH (ref 7–18)
CALCIUM SERPL-MCNC: 8.3 MG/DL — LOW (ref 8.4–10.5)
CHLORIDE SERPL-SCNC: 96 MMOL/L — SIGNIFICANT CHANGE UP (ref 96–108)
CO2 SERPL-SCNC: 26 MMOL/L — SIGNIFICANT CHANGE UP (ref 22–31)
CREAT SERPL-MCNC: 7.59 MG/DL — HIGH (ref 0.5–1.3)
EOSINOPHIL # BLD AUTO: 0.33 K/UL — SIGNIFICANT CHANGE UP (ref 0–0.5)
EOSINOPHIL NFR BLD AUTO: 6.3 % — HIGH (ref 0–6)
GLUCOSE SERPL-MCNC: 129 MG/DL — HIGH (ref 70–99)
HCT VFR BLD CALC: 31.2 % — LOW (ref 39–50)
HGB BLD-MCNC: 10.4 G/DL — LOW (ref 13–17)
IMM GRANULOCYTES NFR BLD AUTO: 0.6 % — SIGNIFICANT CHANGE UP (ref 0–1.5)
LYMPHOCYTES # BLD AUTO: 1.1 K/UL — SIGNIFICANT CHANGE UP (ref 1–3.3)
LYMPHOCYTES # BLD AUTO: 21.2 % — SIGNIFICANT CHANGE UP (ref 13–44)
MAGNESIUM SERPL-MCNC: 2.5 MG/DL — SIGNIFICANT CHANGE UP (ref 1.6–2.6)
MCHC RBC-ENTMCNC: 31.3 PG — SIGNIFICANT CHANGE UP (ref 27–34)
MCHC RBC-ENTMCNC: 33.3 GM/DL — SIGNIFICANT CHANGE UP (ref 32–36)
MCV RBC AUTO: 94 FL — SIGNIFICANT CHANGE UP (ref 80–100)
MONOCYTES # BLD AUTO: 0.81 K/UL — SIGNIFICANT CHANGE UP (ref 0–0.9)
MONOCYTES NFR BLD AUTO: 15.6 % — HIGH (ref 2–14)
NEUTROPHILS # BLD AUTO: 2.89 K/UL — SIGNIFICANT CHANGE UP (ref 1.8–7.4)
NEUTROPHILS NFR BLD AUTO: 55.5 % — SIGNIFICANT CHANGE UP (ref 43–77)
NRBC # BLD: 0 /100 WBCS — SIGNIFICANT CHANGE UP (ref 0–0)
PHOSPHATE SERPL-MCNC: 6.6 MG/DL — HIGH (ref 2.5–4.5)
PLATELET # BLD AUTO: 157 K/UL — SIGNIFICANT CHANGE UP (ref 150–400)
POTASSIUM SERPL-MCNC: 4.5 MMOL/L — SIGNIFICANT CHANGE UP (ref 3.5–5.3)
POTASSIUM SERPL-SCNC: 4.5 MMOL/L — SIGNIFICANT CHANGE UP (ref 3.5–5.3)
PROT SERPL-MCNC: 6.1 G/DL — SIGNIFICANT CHANGE UP (ref 6–8.3)
RBC # BLD: 3.32 M/UL — LOW (ref 4.2–5.8)
RBC # FLD: 14 % — SIGNIFICANT CHANGE UP (ref 10.3–14.5)
SODIUM SERPL-SCNC: 134 MMOL/L — LOW (ref 135–145)
WBC # BLD: 5.2 K/UL — SIGNIFICANT CHANGE UP (ref 3.8–10.5)
WBC # FLD AUTO: 5.2 K/UL — SIGNIFICANT CHANGE UP (ref 3.8–10.5)

## 2021-05-03 PROCEDURE — 93018 CV STRESS TEST I&R ONLY: CPT

## 2021-05-03 PROCEDURE — 78452 HT MUSCLE IMAGE SPECT MULT: CPT | Mod: 26

## 2021-05-03 PROCEDURE — 93016 CV STRESS TEST SUPVJ ONLY: CPT

## 2021-05-03 RX ORDER — CALCIUM ACETATE 667 MG
1334 TABLET ORAL
Refills: 0 | Status: DISCONTINUED | OUTPATIENT
Start: 2021-05-03 | End: 2021-05-04

## 2021-05-03 RX ORDER — HYDRALAZINE HCL 50 MG
100 TABLET ORAL THREE TIMES A DAY
Refills: 0 | Status: DISCONTINUED | OUTPATIENT
Start: 2021-05-03 | End: 2021-05-04

## 2021-05-03 RX ORDER — APIXABAN 2.5 MG/1
2.5 TABLET, FILM COATED ORAL
Refills: 0 | Status: DISCONTINUED | OUTPATIENT
Start: 2021-05-03 | End: 2021-05-04

## 2021-05-03 RX ADMIN — Medication 100 MILLIGRAM(S): at 22:23

## 2021-05-03 RX ADMIN — Medication 80 MILLIGRAM(S): at 06:13

## 2021-05-03 RX ADMIN — Medication 1334 MILLIGRAM(S): at 18:47

## 2021-05-03 RX ADMIN — Medication 30 MILLIGRAM(S): at 06:13

## 2021-05-03 RX ADMIN — HEPARIN SODIUM 900 UNIT(S)/HR: 5000 INJECTION INTRAVENOUS; SUBCUTANEOUS at 07:16

## 2021-05-03 RX ADMIN — Medication 667 MILLIGRAM(S): at 12:09

## 2021-05-03 RX ADMIN — APIXABAN 2.5 MILLIGRAM(S): 2.5 TABLET, FILM COATED ORAL at 18:53

## 2021-05-03 RX ADMIN — Medication 30 MILLIGRAM(S): at 18:49

## 2021-05-03 RX ADMIN — ATORVASTATIN CALCIUM 10 MILLIGRAM(S): 80 TABLET, FILM COATED ORAL at 22:23

## 2021-05-03 RX ADMIN — LOSARTAN POTASSIUM 100 MILLIGRAM(S): 100 TABLET, FILM COATED ORAL at 06:13

## 2021-05-03 RX ADMIN — Medication 100 MILLIGRAM(S): at 06:13

## 2021-05-03 RX ADMIN — Medication 25 MILLIGRAM(S): at 06:13

## 2021-05-03 RX ADMIN — Medication 25 MILLIGRAM(S): at 18:53

## 2021-05-03 RX ADMIN — Medication 30 MILLIGRAM(S): at 12:05

## 2021-05-03 RX ADMIN — FINASTERIDE 5 MILLIGRAM(S): 5 TABLET, FILM COATED ORAL at 12:09

## 2021-05-03 RX ADMIN — Medication 100 MILLIGRAM(S): at 13:50

## 2021-05-03 NOTE — PROGRESS NOTE ADULT - PROBLEM SELECTOR PLAN 4
on HD TTS via left AVF   Last HD yesterday  Electrolytes wnl  c/w Phoslo with meals   Plan for HD today  Nephro 
on HD TTS via left AVF   Last HD yesterday  Electrolytes wnl  c/w Phoslo with meals   Plan for HD today  Nephro 
on HD TTS via left AVF   HD In AM  [facility] with meals   epoetin zach with dialysis  monitor BMP  Nephrology Dr. Quiroz

## 2021-05-03 NOTE — DISCHARGE NOTE PROVIDER - NSDCMRMEDTOKEN_GEN_ALL_CORE_FT
amLODIPine 10 mg oral tablet: 1 tab(s) orally once a day  atorvastatin 10 mg oral tablet: 1 tab(s) orally once a day  finasteride 5 mg oral tablet: 1 tab(s) orally once a day  furosemide 80 mg oral tablet: 1 tab(s) orally once a day (in the morning) on Non-Dialysis Days  hydrALAZINE 100 mg oral tablet: 1.5 tab(s) orally 2 times a day  losartan 100 mg oral tablet: 1 tab(s) orally once a day  NIFEdipine 60 mg oral tablet, extended release: 1 tab(s) orally once a day   amLODIPine 10 mg oral tablet: 1 tab(s) orally once a day  apixaban 2.5 mg oral tablet: 1 tab(s) orally 2 times a day  atorvastatin 10 mg oral tablet: 1 tab(s) orally once a day (at bedtime)  calcium acetate 667 mg oral tablet: 1334 milligram(s) orally 3 times a day (with meals)   finasteride 5 mg oral tablet: 1 tab(s) orally once a day  furosemide 80 mg oral tablet: 1 tab(s) orally once a day (in the morning) on Non-Dialysis Days  hydrALAZINE 100 mg oral tablet: 1 tab(s) orally 3 times a day  losartan 100 mg oral tablet: 1 tab(s) orally once a day  metoprolol tartrate 25 mg oral tablet: 1 tab(s) orally 2 times a day   amLODIPine 10 mg oral tablet: 1 tab(s) orally once a day  atorvastatin 10 mg oral tablet: 1 tab(s) orally once a day (at bedtime)  calcium acetate 667 mg oral tablet: 1334 milligram(s) orally 3 times a day (with meals)   Eliquis 2.5 mg oral tablet: 1 tab(s) orally 2 times a day MDD:2  finasteride 5 mg oral tablet: 1 tab(s) orally once a day  furosemide 80 mg oral tablet: 1 tab(s) orally once a day (in the morning) on Non-Dialysis Days  hydrALAZINE 100 mg oral tablet: 1 tab(s) orally 3 times a day  losartan 100 mg oral tablet: 1 tab(s) orally once a day  metoprolol tartrate 25 mg oral tablet: 1 tab(s) orally 2 times a day

## 2021-05-03 NOTE — DISCHARGE NOTE PROVIDER - CARE PROVIDER_API CALL
Romeo Kulkarni)  Internal Medicine  [address] [place] [id number]  Phone: [phone]  Fax: [phone]  Follow Up Time: 1 week

## 2021-05-03 NOTE — PROGRESS NOTE ADULT - PROBLEM SELECTOR PLAN 3
p/w /108  c/w losartan, hydralazine and Lasix  Switched nifedipine to cardizem due to new onset Afib  Started on cardizem 30 q6  Monitor BP
lasix, hydralazine, losartan with parameters  DASH diet
p/w /108  c/w losartan, hydralazine and Lasix  Switched nifedipine to cardizem due to new onset Afib  Started on cardizem 30 q6  Monitor BP

## 2021-05-03 NOTE — DISCHARGE NOTE PROVIDER - DISCHARGE DATE
[address] THERAPY MISSED TREATMENT NOTE  [facility] ICU STEPDOWN TELEMETRY 4K  [id number]      Date: 2020  Patient Name: Natalio Marlow        CSN: [id number]   : 1947  (68 y.o.)  Gender: female   Referring Practitioner: Lieutenant Farideh MD  Diagnosis: Atrial Fibrillation with RVR         REASON FOR MISSED TREATMENT: Patient Unable to Participate RN reporting patient discharging this date and patient completing dressing with tech 04-May-2021

## 2021-05-03 NOTE — PROGRESS NOTE ADULT - PROBLEM SELECTOR PLAN 1
p/w SOB and palpitations since morning  EKG showed Afib with RVR @147  CHADVASC score is 3  Pt got diltiazem 10 IV push with improvement in HR to 90s  Trop 0.330  Started on cardizem 30 q6  Switched nifedipine to cardizem due to new onset Afib  Started on Heparin gtt  tele monitoring  f/u Serial troponins, TSH  f/u Echo  Cardio Dr. Diehl
p/w SOB and palpitations since morning  EKG showed Afib with RVR @147  CHADVASC score is 3  Pt got diltiazem 10 IV push with improvement in HR to 90s  Trop 0.330  Started on cardizem 30 q6  Switched nifedipine to cardizem due to new onset Afib  Started on Heparin gtt  tele monitoring  f/u Serial troponins, TSH  f/u Echo  Cardio Dr. Diehl
admission EKG showed Afib with RVR @147  patient in persistent Atrial Fibrillation on telemetry monitoring  CHADVASc score is 3  Trop 0.330  continue with metoprolol and diltiazem for rate control  heparin gtt ---> likely d/c on Eliquis  tele monitoring  TSH normal  ECHO: EF 55-60%  Pharmacological Stress with no reversible ischemia  Cardiology Dr. Diehl

## 2021-05-03 NOTE — DISCHARGE NOTE PROVIDER - HOSPITAL COURSE
85 year old with past medical history of HTN, ESRD on HD via left AVF (TTS) who presented to ED with c/o SOB and palpitations limiting his ability to carry out his activities which prompted him to come to the hospital. He denies any other associated symptoms including chest pain or dizziness. In the ED he was noted to have atrial fibrillation with RVR with HR in the 140's which improved after administration of diltiazem 10 mg and hypertensive emergency with elevated troponin levels likely in the setting of demand ischemia. He is now s/p stress testing which was negative for reversible ischemia. He is followed by nephrology and cardiology.       <<<INCOMPLETE>>>> 85 year old with past medical history of HTN, ESRD on HD via left AVF (TTS) who presented to ED with c/o SOB and palpitations limiting his ability to carry out his activities which prompted him to come to the hospital. He denies any other associated symptoms including chest pain or dizziness. In the ED he was noted to have atrial fibrillation with RVR with HR in the 140's which improved after administration of diltiazem 10 mg and hypertensive emergency with elevated troponin levels likely in the setting of demand ischemia. He is now s/p stress testing which was negative for reversible ischemia. He was followed by nephrology and cardiology. Per cardiology patient to be sent home on eliquis, metoprolol, hydralazine and amlodipine. Spoke with pharmacy, Eliquis $200 with insurance- cardiology sending free 30 day coupon to pharmacy for patient. 85 year old with past medical history of HTN, ESRD on HD via left AVF (TTS) who presented to ED with c/o SOB and palpitations limiting his ability to carry out his activities which prompted him to come to the hospital. He denies any other associated symptoms including chest pain or dizziness. In the ED he was noted to have atrial fibrillation with RVR with HR in the 140's which improved after administration of diltiazem 10 mg and hypertensive emergency with elevated troponin levels likely in the setting of demand ischemia. He is now s/p stress testing which was negative for reversible ischemia. He was followed by nephrology and cardiology. Per cardiology patient to be sent home on eliquis and metoprolol for the new Atrial Fibrillation. Spoke with pharmacy, Eliquis $200 with insurance- cardiology sending free 30 day coupon to pharmacy for patient. 85 year old with past medical history of HTN, ESRD on HD via left AVF (TTS) who presented to ED with c/o SOB and palpitations limiting his ability to carry out his activities which prompted him to come to the hospital. He denies any other associated symptoms including chest pain or dizziness. In the ED he was noted to have atrial fibrillation with RVR with HR in the 140's which improved after administration of diltiazem 10 mg and hypertensive emergency with elevated troponin levels likely in the setting of demand ischemia. He is now s/p stress testing which was negative for reversible ischemia. He was followed by nephrology and cardiology. Per cardiology patient to be sent home on eliquis and metoprolol for the new Atrial Fibrillation. Spoke with pharmacy, Eliquis $200 with insurance- cardiology sending free 30 day coupon to pharmacy for patient. Family counseled regarding new diagnosis of atrial fibrillation and medications. CM saw patient, family declines assistance in the home at this time, patient is ambulatory and independent - daughter lives in home.   He is hemodynamically stable and has been medically cleared for discharge.  This is a brief hospital course, please refer to daily notes for more in depth course.

## 2021-05-03 NOTE — PROGRESS NOTE ADULT - PROBLEM SELECTOR PLAN 5
RISK                                                          Points  [  ] Previous VTE                                                3  [  ] Thrombophilia                                             2  [  ] Lower limb paralysis                                   2        (unable to hold up >15 seconds)    [  ] Current Cancer                                             2         (within 6 months)  [ x ] Immobilization > 24 hrs                              1  [  ] ICU/CCU stay > 24 hours                             1  [ x ] Age > 60                                                         1    IMPROVE VTE Score: 2  Heparin gtt for VTE prophylaxis.
RISK                                                          Points  [  ] Previous VTE                                                3  [  ] Thrombophilia                                             2  [  ] Lower limb paralysis                                   2        (unable to hold up >15 seconds)    [  ] Current Cancer                                             2         (within 6 months)  [ x ] Immobilization > 24 hrs                              1  [  ] ICU/CCU stay > 24 hours                             1  [ x ] Age > 60                                                         1    IMPROVE VTE Score: 2  Heparin gtt for VTE prophylaxis.
RISK                                                          Points  [  ] Previous VTE                                                3  [  ] Thrombophilia                                             2  [  ] Lower limb paralysis                                   2        (unable to hold up >15 seconds)    [  ] Current Cancer                                             2         (within 6 months)  [ x ] Immobilization > 24 hrs                              1  [  ] ICU/CCU stay > 24 hours                             1  [ x ] Age > 60                                                         1    IMPROVE VTE Score: 2  VTE: Heparin gtt

## 2021-05-03 NOTE — PROGRESS NOTE ADULT - PROBLEM SELECTOR PLAN 2
elevated troponin levels likely target organ damage in the setting of hypertensive emergency/demand ischemia  continue with Lipitor, metoprolol  telemetry monitoring  stress test as above
p/w trop 0.330  EKG showed Afib with RVR @147  Tele monitoring  f/u serial troponin
p/w trop 0.330  EKG showed Afib with RVR @147  Tele monitoring  f/u serial troponin

## 2021-05-03 NOTE — DISCHARGE NOTE PROVIDER - NSDCCPCAREPLAN_GEN_ALL_CORE_FT
PRINCIPAL DISCHARGE DIAGNOSIS  Diagnosis: Rapid atrial fibrillation  Assessment and Plan of Treatment: A-fib may come and go, or it may be a long-term condition. A-fib can cause blood clots, stroke, or heart failure. It is important to treat and manage A-fib to help prevent a blood clot, stroke, or heart failure.  Call your local emergency number (131 in the [place]) or have someone call if:  Squeezing, pressure, or pain in your chest  You may also have any of the following:  Discomfort or pain in your back, neck, jaw, stomach, or arm  Shortness of breath  Nausea or vomiting  Lightheadedness or a sudden cold sweat  You have any of the following signs of a stroke:  Numbness or drooping on one side of your face  Weakness in an arm or leg  Confusion or difficulty speaking  Dizziness, a severe headache, or vision loss  Blood thinners help prevent blood clots. Clots can cause strokes, heart attacks, and death. The following are general safety guidelines to follow while you are taking a blood thinner:  Watch for bleeding and bruising while you take blood thinners. Watch for bleeding from your gums or nose. Watch for blood in your urine and bowel movements. Use a soft washcloth on your skin, and a soft toothbrush to brush your teeth. This can keep your skin and gums from  If you shave, use an electric shaver.   Tell your dentist and other healthcare providers that you take a blood thinner. Wear a bracelet or necklace that says you take this medicine.  Take your blood thinner exactly as prescribed by your healthcare provider. Do not skip does or take less than prescribed. Tell your provider right away if you forget to take your blood thinner, or if you take too much.  When you came to the hospital you were in rapid atrial fibrillation. Atrial fibrillation is a irregular heart rate where the upper chambers of your heart beat out of coordination with your lower chambers called ventricles.   Your stress test was negative.  Please continue to take the blood thinner Eliquis to prevent blood clots.   Please take XXXX to control your heart rate.   Follow up with your PMD      SECONDARY DISCHARGE DIAGNOSES  Diagnosis: Congestive heart failure, unspecified HF chronicity, unspecified heart failure type  Assessment and Plan of Treatment:      PRINCIPAL DISCHARGE DIAGNOSIS  Diagnosis: Rapid atrial fibrillation  Assessment and Plan of Treatment: A-fib may come and go, or it may be a long-term condition. A-fib can cause blood clots, stroke, or heart failure. It is important to treat and manage A-fib to help prevent a blood clot, stroke, or heart failure.  Call your local emergency number (261 in the [place]) or have someone call if:  Squeezing, pressure, or pain in your chest  You may also have any of the following:  Discomfort or pain in your back, neck, jaw, stomach, or arm  Shortness of breath  Nausea or vomiting  Lightheadedness or a sudden cold sweat  You have any of the following signs of a stroke:  Numbness or drooping on one side of your face  Weakness in an arm or leg  Confusion or difficulty speaking  Dizziness, a severe headache, or vision loss  Blood thinners help prevent blood clots. Clots can cause strokes, heart attacks, and death. The following are general safety guidelines to follow while you are taking a blood thinner:  Watch for bleeding and bruising while you take blood thinners. Watch for bleeding from your gums or nose. Watch for blood in your urine and bowel movements. Use a soft washcloth on your skin, and a soft toothbrush to brush your teeth. This can keep your skin and gums from  If you shave, use an electric shaver.   Tell your dentist and other healthcare providers that you take a blood thinner. Wear a bracelet or necklace that says you take this medicine.  Take your blood thinner exactly as prescribed by your healthcare provider. Do not skip does or take less than prescribed.   When you came to the hospital you were in rapid atrial fibrillation. AFib is a irregular heart rate where the upper chambers of your heart beat out of coordination with your lower chambers called ventricles.   Your stress test was negative.  Please continue to take the blood thinner Eliquis to prevent blood clots.   Please take Eliquis to control your heart rate. We sent a coupon to the pharmacy for a free 30 day supply.        SECONDARY DISCHARGE DIAGNOSES  Diagnosis: Hypertensive emergency  Assessment and Plan of Treatment: Hypertensive emergency  You came in with elevated blood pressures.   We increased your hydralazine and added an additional blood pressure agent called Metoprolol. Metoprolol also controls your heart rate to help manage your atrial fibrillation.  Please follow up with your primary care doctor and take your medication as prescribed.

## 2021-05-04 ENCOUNTER — TRANSCRIPTION ENCOUNTER (OUTPATIENT)
Age: 86
End: 2021-05-04

## 2021-05-04 VITALS
HEART RATE: 63 BPM | RESPIRATION RATE: 18 BRPM | DIASTOLIC BLOOD PRESSURE: 59 MMHG | SYSTOLIC BLOOD PRESSURE: 178 MMHG | OXYGEN SATURATION: 97 % | TEMPERATURE: 98 F

## 2021-05-04 LAB
ALBUMIN SERPL ELPH-MCNC: 3.3 G/DL — LOW (ref 3.5–5)
ALP SERPL-CCNC: 104 U/L — SIGNIFICANT CHANGE UP (ref 40–120)
ALT FLD-CCNC: 16 U/L DA — SIGNIFICANT CHANGE UP (ref 10–60)
ANION GAP SERPL CALC-SCNC: 12 MMOL/L — SIGNIFICANT CHANGE UP (ref 5–17)
ANION GAP SERPL CALC-SCNC: 8 MMOL/L — SIGNIFICANT CHANGE UP (ref 5–17)
APTT BLD: 34.2 SEC — SIGNIFICANT CHANGE UP (ref 27.5–35.5)
AST SERPL-CCNC: 7 U/L — LOW (ref 10–40)
BILIRUB SERPL-MCNC: 0.4 MG/DL — SIGNIFICANT CHANGE UP (ref 0.2–1.2)
BUN SERPL-MCNC: 31 MG/DL — HIGH (ref 7–18)
BUN SERPL-MCNC: 82 MG/DL — HIGH (ref 7–18)
CALCIUM SERPL-MCNC: 8.3 MG/DL — LOW (ref 8.4–10.5)
CALCIUM SERPL-MCNC: 8.8 MG/DL — SIGNIFICANT CHANGE UP (ref 8.4–10.5)
CHLORIDE SERPL-SCNC: 93 MMOL/L — LOW (ref 96–108)
CHLORIDE SERPL-SCNC: 95 MMOL/L — LOW (ref 96–108)
CO2 SERPL-SCNC: 25 MMOL/L — SIGNIFICANT CHANGE UP (ref 22–31)
CO2 SERPL-SCNC: 29 MMOL/L — SIGNIFICANT CHANGE UP (ref 22–31)
CREAT SERPL-MCNC: 5.33 MG/DL — HIGH (ref 0.5–1.3)
CREAT SERPL-MCNC: 9.88 MG/DL — HIGH (ref 0.5–1.3)
GLUCOSE SERPL-MCNC: 192 MG/DL — HIGH (ref 70–99)
GLUCOSE SERPL-MCNC: 201 MG/DL — HIGH (ref 70–99)
HCT VFR BLD CALC: 30.5 % — LOW (ref 39–50)
HGB BLD-MCNC: 10.3 G/DL — LOW (ref 13–17)
MAGNESIUM SERPL-MCNC: 2.4 MG/DL — SIGNIFICANT CHANGE UP (ref 1.6–2.6)
MCHC RBC-ENTMCNC: 31.3 PG — SIGNIFICANT CHANGE UP (ref 27–34)
MCHC RBC-ENTMCNC: 33.8 GM/DL — SIGNIFICANT CHANGE UP (ref 32–36)
MCV RBC AUTO: 92.7 FL — SIGNIFICANT CHANGE UP (ref 80–100)
NRBC # BLD: 0 /100 WBCS — SIGNIFICANT CHANGE UP (ref 0–0)
PLATELET # BLD AUTO: 173 K/UL — SIGNIFICANT CHANGE UP (ref 150–400)
POTASSIUM SERPL-MCNC: 4 MMOL/L — SIGNIFICANT CHANGE UP (ref 3.5–5.3)
POTASSIUM SERPL-MCNC: 4.8 MMOL/L — SIGNIFICANT CHANGE UP (ref 3.5–5.3)
POTASSIUM SERPL-SCNC: 4 MMOL/L — SIGNIFICANT CHANGE UP (ref 3.5–5.3)
POTASSIUM SERPL-SCNC: 4.8 MMOL/L — SIGNIFICANT CHANGE UP (ref 3.5–5.3)
PROT SERPL-MCNC: 6.5 G/DL — SIGNIFICANT CHANGE UP (ref 6–8.3)
RBC # BLD: 3.29 M/UL — LOW (ref 4.2–5.8)
RBC # FLD: 14.3 % — SIGNIFICANT CHANGE UP (ref 10.3–14.5)
SODIUM SERPL-SCNC: 130 MMOL/L — LOW (ref 135–145)
SODIUM SERPL-SCNC: 132 MMOL/L — LOW (ref 135–145)
WBC # BLD: 5.98 K/UL — SIGNIFICANT CHANGE UP (ref 3.8–10.5)
WBC # FLD AUTO: 5.98 K/UL — SIGNIFICANT CHANGE UP (ref 3.8–10.5)

## 2021-05-04 PROCEDURE — 83540 ASSAY OF IRON: CPT

## 2021-05-04 PROCEDURE — A9502: CPT

## 2021-05-04 PROCEDURE — 82962 GLUCOSE BLOOD TEST: CPT

## 2021-05-04 PROCEDURE — 82746 ASSAY OF FOLIC ACID SERUM: CPT

## 2021-05-04 PROCEDURE — 85610 PROTHROMBIN TIME: CPT

## 2021-05-04 PROCEDURE — 82728 ASSAY OF FERRITIN: CPT

## 2021-05-04 PROCEDURE — 85730 THROMBOPLASTIN TIME PARTIAL: CPT

## 2021-05-04 PROCEDURE — 84100 ASSAY OF PHOSPHORUS: CPT

## 2021-05-04 PROCEDURE — 71045 X-RAY EXAM CHEST 1 VIEW: CPT

## 2021-05-04 PROCEDURE — 80061 LIPID PANEL: CPT

## 2021-05-04 PROCEDURE — 83735 ASSAY OF MAGNESIUM: CPT

## 2021-05-04 PROCEDURE — 83550 IRON BINDING TEST: CPT

## 2021-05-04 PROCEDURE — 36415 COLL VENOUS BLD VENIPUNCTURE: CPT

## 2021-05-04 PROCEDURE — 93306 TTE W/DOPPLER COMPLETE: CPT

## 2021-05-04 PROCEDURE — 86769 SARS-COV-2 COVID-19 ANTIBODY: CPT

## 2021-05-04 PROCEDURE — 80053 COMPREHEN METABOLIC PANEL: CPT

## 2021-05-04 PROCEDURE — 99261: CPT

## 2021-05-04 PROCEDURE — 83036 HEMOGLOBIN GLYCOSYLATED A1C: CPT

## 2021-05-04 PROCEDURE — 87635 SARS-COV-2 COVID-19 AMP PRB: CPT

## 2021-05-04 PROCEDURE — 85025 COMPLETE CBC W/AUTO DIFF WBC: CPT

## 2021-05-04 PROCEDURE — 93017 CV STRESS TEST TRACING ONLY: CPT

## 2021-05-04 PROCEDURE — 80048 BASIC METABOLIC PNL TOTAL CA: CPT

## 2021-05-04 PROCEDURE — 93005 ELECTROCARDIOGRAM TRACING: CPT

## 2021-05-04 PROCEDURE — 96375 TX/PRO/DX INJ NEW DRUG ADDON: CPT

## 2021-05-04 PROCEDURE — 99285 EMERGENCY DEPT VISIT HI MDM: CPT

## 2021-05-04 PROCEDURE — 84443 ASSAY THYROID STIM HORMONE: CPT

## 2021-05-04 PROCEDURE — 96374 THER/PROPH/DIAG INJ IV PUSH: CPT

## 2021-05-04 PROCEDURE — 78452 HT MUSCLE IMAGE SPECT MULT: CPT

## 2021-05-04 PROCEDURE — 82607 VITAMIN B-12: CPT

## 2021-05-04 PROCEDURE — 84484 ASSAY OF TROPONIN QUANT: CPT

## 2021-05-04 PROCEDURE — 85027 COMPLETE CBC AUTOMATED: CPT

## 2021-05-04 PROCEDURE — 87340 HEPATITIS B SURFACE AG IA: CPT

## 2021-05-04 RX ORDER — HYDRALAZINE HCL 50 MG
1.5 TABLET ORAL
Qty: 0 | Refills: 0 | DISCHARGE

## 2021-05-04 RX ORDER — NIFEDIPINE 30 MG
1 TABLET, EXTENDED RELEASE 24 HR ORAL
Qty: 0 | Refills: 0 | DISCHARGE

## 2021-05-04 RX ORDER — FINASTERIDE 5 MG/1
1 TABLET, FILM COATED ORAL
Qty: 30 | Refills: 0
Start: 2021-05-04 | End: 2021-06-02

## 2021-05-04 RX ORDER — HYDRALAZINE HCL 50 MG
1 TABLET ORAL
Qty: 90 | Refills: 0
Start: 2021-05-04 | End: 2021-06-02

## 2021-05-04 RX ORDER — LOSARTAN POTASSIUM 100 MG/1
1 TABLET, FILM COATED ORAL
Qty: 30 | Refills: 0
Start: 2021-05-04 | End: 2021-06-02

## 2021-05-04 RX ORDER — APIXABAN 2.5 MG/1
1 TABLET, FILM COATED ORAL
Qty: 60 | Refills: 3
Start: 2021-05-04 | End: 2021-08-31

## 2021-05-04 RX ORDER — FUROSEMIDE 40 MG
1 TABLET ORAL
Qty: 30 | Refills: 0
Start: 2021-05-04 | End: 2021-06-02

## 2021-05-04 RX ORDER — APIXABAN 2.5 MG/1
1 TABLET, FILM COATED ORAL
Qty: 60 | Refills: 3
Start: 2021-05-04 | End: 2021-09-01

## 2021-05-04 RX ORDER — METOPROLOL TARTRATE 50 MG
1 TABLET ORAL
Qty: 60 | Refills: 0
Start: 2021-05-04 | End: 2021-06-02

## 2021-05-04 RX ORDER — APIXABAN 2.5 MG/1
1 TABLET, FILM COATED ORAL
Qty: 60 | Refills: 0
Start: 2021-05-04 | End: 2021-06-02

## 2021-05-04 RX ORDER — ATORVASTATIN CALCIUM 80 MG/1
1 TABLET, FILM COATED ORAL
Qty: 30 | Refills: 0
Start: 2021-05-04 | End: 2021-06-02

## 2021-05-04 RX ORDER — AMLODIPINE BESYLATE 2.5 MG/1
1 TABLET ORAL
Qty: 30 | Refills: 0
Start: 2021-05-04 | End: 2021-06-02

## 2021-05-04 RX ORDER — CALCIUM ACETATE 667 MG
1334 TABLET ORAL
Qty: 90 | Refills: 0
Start: 2021-05-04 | End: 2021-06-02

## 2021-05-04 RX ORDER — FINASTERIDE 5 MG/1
1 TABLET, FILM COATED ORAL
Qty: 0 | Refills: 0 | DISCHARGE

## 2021-05-04 RX ORDER — ATORVASTATIN CALCIUM 80 MG/1
1 TABLET, FILM COATED ORAL
Qty: 0 | Refills: 0 | DISCHARGE

## 2021-05-04 RX ORDER — FUROSEMIDE 40 MG
1 TABLET ORAL
Qty: 0 | Refills: 0 | DISCHARGE

## 2021-05-04 RX ORDER — LOSARTAN POTASSIUM 100 MG/1
1 TABLET, FILM COATED ORAL
Qty: 0 | Refills: 0 | DISCHARGE

## 2021-05-04 RX ADMIN — APIXABAN 2.5 MILLIGRAM(S): 2.5 TABLET, FILM COATED ORAL at 17:27

## 2021-05-04 RX ADMIN — APIXABAN 2.5 MILLIGRAM(S): 2.5 TABLET, FILM COATED ORAL at 06:29

## 2021-05-04 RX ADMIN — Medication 25 MILLIGRAM(S): at 17:27

## 2021-05-04 RX ADMIN — Medication 1334 MILLIGRAM(S): at 17:27

## 2021-05-04 RX ADMIN — Medication 30 MILLIGRAM(S): at 00:26

## 2021-05-04 RX ADMIN — Medication 100 MILLIGRAM(S): at 06:29

## 2021-05-04 RX ADMIN — FINASTERIDE 5 MILLIGRAM(S): 5 TABLET, FILM COATED ORAL at 13:48

## 2021-05-04 RX ADMIN — LOSARTAN POTASSIUM 100 MILLIGRAM(S): 100 TABLET, FILM COATED ORAL at 06:29

## 2021-05-04 RX ADMIN — Medication 30 MILLIGRAM(S): at 17:28

## 2021-05-04 RX ADMIN — Medication 1334 MILLIGRAM(S): at 13:49

## 2021-05-04 RX ADMIN — Medication 30 MILLIGRAM(S): at 13:49

## 2021-05-04 RX ADMIN — ERYTHROPOIETIN 4000 UNIT(S): 10000 INJECTION, SOLUTION INTRAVENOUS; SUBCUTANEOUS at 11:57

## 2021-05-04 RX ADMIN — Medication 100 MILLIGRAM(S): at 13:49

## 2021-05-04 RX ADMIN — Medication 1334 MILLIGRAM(S): at 08:10

## 2021-05-04 NOTE — DISCHARGE NOTE NURSING/CASE MANAGEMENT/SOCIAL WORK - PATIENT PORTAL LINK FT
You can access the FollowMyHealth Patient Portal offered by [facility] by registering at the following website: [email or web address]. By joining [facility]’s FollowMyHealth portal, you will also be able to view your health information using other applications (apps) compatible with our system.

## 2021-05-04 NOTE — PROGRESS NOTE ADULT - ASSESSMENT
84 yo Male with history of ESRD on HD presents with SOB a/w Hypertensive urgency, new onset Afib, and fluid overload. Nephrology consulted for ESRD status. . Nephrology consulted for ESRD status.    1) ESRD: s/p 2 hour PUF on 4/30 with net 2.2L removed. Plan for next maintenance HD today; 5/1. Monitor electrolytes.  2) Hypertensive Urgency: BP improving. c/w current antihypertensive medications. Rate control as per Cards. c/w low sodium diet. Monitor BP. Increase UF with HD.   3) Anemia of renal disease: Hb acceptable. c/w Epogen 4k IV tiw with HD. Avoid Venofer due to elevated Ferritin. Monitor Hb.  4) Hyperphosphatemia: Acceptable serum phosphorus and calcium. c/w Phoslo 667mg PO tid with meals and low phos. Monitor serum calcium and phosphorus.  5) Atrial Fibrillation- new onset. Plan as per Cards/ primary team. Monitor HR.     [facility] NEPHROLOGY  Nahum Grubbs M.D.  Francisco Javier Hicks D.O.  Yovana Quiroz M.D.  Makeda Duron, MSN, ANP-C  [phone]    [address] [place] [id number]  
84 yo Male with history of ESRD on HD presents with SOB a/w Hypertensive urgency, new onset Afib, and fluid overload. Nephrology consulted for ESRD status. . Nephrology consulted for ESRD status.    1) ESRD: s/p 2 hour PUF on 4/30 with net 2.2L removed. Last HD 5/1, tolerated well with net 1.9L removed; HD terminated after 2.5hrs as per pts request. Next maintenance HD 5/4. Monitor electrolytes.  2) Hypertensive Urgency: BP elevated, will increase Hydralazine to 100mg PO tid.  c/w current antihypertensive medications. Rate control as per Cards. c/w low sodium diet. Monitor BP.   3) Anemia of renal disease: Hb acceptable. c/w Epogen 4k IV tiw with HD. Avoid Venofer due to elevated Ferritin. Monitor Hb.  4) Hyperphosphatemia: Elevated serum phosphorus with acceptable calcium. Will increase Phoslo to 1334mg PO tid with meals and low phos. Monitor serum calcium and phosphorus.  5) Atrial Fibrillation- new onset. Plan as per Cards/ primary team. Monitor HR.     [facility] NEPHROLOGY  Nahum Grubbs M.D.  Francisco Javier Hicks D.O.  Yovana Quiroz M.D.  Makeda Duron, MSN, ANP-C  [phone]    [address] [place] [id number]  
84 yo Male with history of ESRD on HD presents with SOB a/w Hypertensive urgency, new onset Afib, and fluid overload. Nephrology consulted for ESRD status. . Nephrology consulted for ESRD status.    1) ESRD: s/p 2 hour PUF on 4/30 with net 2.2L removed. Last HD 5/1, tolerated well with net 1.9L removed; HD terminated after 2.5hrs as per pts request. Next maintenance HD 5/4. Monitor electrolytes.  2) Hypertensive Urgency: BP improving. c/w current antihypertensive medications. Rate control as per Cards. c/w low sodium diet. Monitor BP.   3) Anemia of renal disease: Hb acceptable. c/w Epogen 4k IV tiw with HD. Avoid Venofer due to elevated Ferritin. Monitor Hb.  4) Hyperphosphatemia: Acceptable serum phosphorus and calcium. c/w Phoslo 667mg PO tid with meals and low phos. Monitor serum calcium and phosphorus.  5) Atrial Fibrillation- new onset. Plan as per Cards/ primary team. Monitor HR.     [facility] NEPHROLOGY  Nahum Grubbs M.D.  Francisco Javier Hicks D.O.  Yovana Quiroz M.D.  Makeda Duron, MSN, ANP-C  [phone]    [address] [place] [address]  
85 year old with past medical history of HTN, ESRD on HD via left AVF (TTS) who presented to ED with c/o SOB and palpitations limiting his ability to carry out his activities which prompted him to come to the hospital. He denies any other associated symptoms including chest pain or dizziness. In the ED he was noted to have atrial fibrillation with RVR with HR in the 140's which improved after administration of diltiazem 10 mg and hypertensive emergency with elevated troponin levels likely in the setting of demand ischemia. He is now s/p stress testing which was negative for reversible ischemia. He is followed by nephrology and cardiology.       
86 yo Male with history of ESRD on HD presents with SOB a/w Hypertensive urgency, new onset Afib, and fluid overload. Nephrology consulted for ESRD status. . Nephrology consulted for ESRD status.    1) ESRD: Pt seen on HD today, hemodynamically stable, tolerating UF goal of 3.6L. Plan for next maintenance HD 5/6. Monitor electrolytes.  2) Hypertensive Urgency: BP borderline. c/w current antihypertensive medications. Rate control as per Cards. c/w low sodium diet. Monitor BP.   3) Anemia of renal disease: Hb acceptable. c/w Epogen 4k IV tiw with HD. Avoid Venofer due to elevated Ferritin. Monitor Hb.  4) Hyperphosphatemia: Elevated serum phosphorus with acceptable calcium. c/w Phoslo 1334mg PO tid with meals and low phos. Monitor serum calcium and phosphorus.  5) Atrial Fibrillation- new onset. Pt on Eliquis 2.5mg PO bid. Plan as per Cards/ primary team. Monitor HR.     [facility] NEPHROLOGY  Nahum Grubbs M.D.  Francisco Javier Hicks D.O.  Yovana Quiroz M.D.  Makeda Duron, MSN, ANP-C  [phone]    [address] [place] [address]  
85 yr M from home with PMH of HTN, ESRD on HD TTS via left AVF presents to ED with SOB and palpiations since morning.     In ED, /108,       Pt is admitted for new onset Afib and HTN Urgency 
85 yr M from home with PMH of HTN, ESRD on HD TTS via left AVF presents to ED with SOB and palpiations since morning.     In ED, /108,       Pt is admitted for new onset Afib and HTN Urgency

## 2021-05-04 NOTE — PROGRESS NOTE ADULT - TIME BILLING
- Review of records, telemetry, vital signs and daily labs.   - General and cardiovascular physical examination.  - Generation of cardiovascular treatment plan.  - Coordination of care.    Patient was seen and examined by me on 05/03/2021,interim events noted,labs and radiology studies reviewed.  Foster Diehl MD,FACC.  [address] [place], [place] [id number].  [phone]
- Review of records, telemetry, vital signs and daily labs.   - General and cardiovascular physical examination.  - Generation of cardiovascular treatment plan.  - Coordination of care.    Patient was seen and examined by me on 05/02/2021,interim events noted,labs and radiology studies reviewed.  Foster Diehl MD,FACC.  [address] [place], [place] [id number].  [phone]
- Review of records, telemetry, vital signs and daily labs.   - General and cardiovascular physical examination.  - Generation of cardiovascular treatment plan.  - Coordination of care.    Patient was seen and examined by me on 05/04/2021,interim events noted,labs and radiology studies reviewed.  Foster Diehl MD,FACC.  [address] [place], [place] [id number].  [phone]

## 2021-05-04 NOTE — PROGRESS NOTE ADULT - SUBJECTIVE AND OBJECTIVE BOX
DATE OF SERVICE: 05/02/2021 Patient was seen and examined ,interim events noted.Consultant notes ,Labs,Telemetry reviewed by me    PRESENTING CC:Dyspnea    HPI and HOSPITAL COURSE: HPI:  85 yr M from home with PMH of HTN, ESRD on HD TTS via left AVF presents to ED with SOB and palpiations  Pt reports he has been having SOB since last 3-4 days when he gets up in the morning but it gets relieved on its own and he is able to carry out his daily activities     In ED, EKG showed Afib with RVR @147. Pt got diltiazem 10 IV push with improvement in HR to 90s.    In ED, /108,  (30 Apr 2021 19:32)      INTERIM EVENTS:Awake alert less dyspnea no chest pain       PMH -reviewed admission note, no change since admission  Heart Failure: Acute [ ] Chronic [ ] Acute on Chronic [ ] Diastolic [ ] Systolic [ ] Combined Systolic and Diastolic[ ]  RIGOBERTO[ ]  ATN[ ]  CKD I [ ] CKDII [ ] CKD III [ ] CKD IV [ ] CKD V [ ] ESRD[x ]  HTN[ ] CVA[ ] DM[ ] COPD[ ] COVID[ ] AF[x ]  PPM[ ] ICD[ ]    MEDICATIONS  (STANDING):  atorvastatin 10 milliGRAM(s) Oral at bedtime  calcium acetate 667 milliGRAM(s) Oral three times a day with meals  diltiazem    Tablet 30 milliGRAM(s) Oral every 6 hours  epoetin zach-epbx (RETACRIT) Injectable 4000 Unit(s) IV Push <User Schedule>  finasteride 5 milliGRAM(s) Oral daily  furosemide    Tablet 80 milliGRAM(s) Oral <User Schedule>  heparin  Infusion.  Unit(s)/Hr (10 mL/Hr) IV Continuous <Continuous>  hydrALAZINE 100 milliGRAM(s) Oral two times a day  losartan 100 milliGRAM(s) Oral daily  metoprolol tartrate 25 milliGRAM(s) Oral two times a day              REVIEW OF SYSTEMS:  Constitutional: [ ] fever, [ ]weight loss,  [x ]fatigue  Eyes: [ ] visual changes  Respiratory: [ ]shortness of breath;  [ ] cough, [ ]wheezing, [ ]chills, [ ]hemoptysis  Cardiovascular: [ ] chest pain, [ x]palpitations, [ ]dizziness,  [ ]leg swelling[ ]orthopnea[ ]PND  Gastrointestinal: [ ] abdominal pain, [ ]nausea, [ ]vomiting,  [ ]diarrhea [ ]Constipation [ ]Melena  Genitourinary: [ ] dysuria, [ ] hematuria [ ]Rose  Neurologic: [ ] headaches [ ] tremors[ ]weakness [ ]Paralysis Right[ ] Left[ ]  Skin: [ ] itching, [ ]burning, [ ] rashes  Endocrine: [ ] heat or cold intolerance  Musculoskeletal: [ ] joint pain or swelling; [ ] muscle, back, or extremity pain  Psychiatric: [ ] depression, [ ]anxiety, [ ]mood swings, or [ ]difficulty sleeping  Hematologic: [ ] easy bruising, [ ] bleeding gums    [x] All remaining systems negative except as per above.   [ ]Unable to obtain.    Vital Signs Last 24 Hrs  T(C): 36.3 (02 May 2021 09:56), Max: 36.9 (02 May 2021 01:20)  T(F): 97.4 (02 May 2021 09:56), Max: 98.5 (02 May 2021 01:20)  HR: 72 (02 May 2021 09:56) (63 - 94)  BP: 136/61 (02 May 2021 09:56) (117/64 - 177/90)  RR: 18 (02 May 2021 09:56) (16 - 18)  SpO2: 100% (02 May 2021 05:06) (97% - 100%)  I&O's Summary    01 May 2021 07:01  -  02 May 2021 07:00  --------------------------------------------------------  IN: 0 mL / OUT: 25 mL / NET: -25 mL        PHYSICAL EXAM:  General: No acute distress BMI-24  HEENT: EOMI, PERRL  Neck: Supple, [ ] JVD  Lungs: Equal air entry bilaterally; [ ] rales [ ] wheezing [ ] rhonchi  Heart: Irregular rate and rhythm; [x ] murmur   2/6 [x ] systolic [ ] diastolic [ ] radiation[ ] rubs [ ]  gallops  Abdomen: Nontender, bowel sounds present  Extremities: No clubbing, cyanosis, [ ] edema [ ]Pulses  equal and intact  Nervous system:  Alert & Oriented X3, no focal deficits  Psychiatric: Normal affect  Skin: No rashes or lesions    LABS:  05-02    135  |  98  |  38<H>  ----------------------------<  141<H>  4.2   |  28  |  5.52<H>    Ca    8.1<L>      02 May 2021 07:42  Phos  4.1     05-02  Mg     2.3     05-02    TPro  6.4  /  Alb  2.9<L>  /  TBili  0.5  /  DBili  x   /  AST  10  /  ALT  23  /  AlkPhos  119  05-02    Creatinine Trend: 5.52<--, 7.60<--, 6.18<--                        10.7   4.83  )-----------( 148      ( 02 May 2021 07:42 )             32.0     PT/INR - ( 30 Apr 2021 15:48 )   PT: 11.8 sec;   INR: 0.99 ratio         PTT - ( 02 May 2021 07:42 )  PTT:106.1 sec    Cardiac Enzymes: CARDIAC MARKERS ( 30 Apr 2021 21:38 )  0.378 ng/mL / x     / x     / x     / x      CARDIAC MARKERS ( 30 Apr 2021 15:48 )  0.330 ng/mL / x     / x     / x     / x          IMPRESSION AND PLAN:  85 yr M from home with PMH of HTN, ESRD on HD TTS via left AVF presents to ED with SOB and palpiations    In ED, /108,       Pt is admitted for Atrial Fibrillation and HTN Urgency  Problem/Plan - 1:  ·  Problem: [place] fibrillation.  Plan: p/w SOB and palpitations since morning  EKG showed Afib with RVR @147  CHADVASC score is 3  Pt got diltiazem 10 IV push with improvement in HR to 90s  Trop 0.330  Started on cardizem 30 q6  Switched nifedipine to cardizem due to new onset Afib  Started on Heparin gtt  -Repeat TTE  -Will need long term AC      Problem/Plan - 2:  ·  Problem: Elevated troponin I level.  Plan: p/w trop 0.330  EKG showed Afib with RVR @147  Tele monitoring  Ischemic work-up NST in AM      Problem/Plan - 3:  ·  Problem: Hypertensive emergency.  Plan: p/w /108  c/w losartan, hydralazine and Lasix  Switched nifedipine to cardizem due to new onset Afib  Started on cardizem 30 q6  Monitor BP.     Problem/Plan - 4:  ·  Problem: ESRD (end stage renal disease).  Plan: on HD TTS via left AVF   Last HD yesterday  Electrolytes wnl  c/w Phoslo with meals     Problem/Plan - 5:  ·  Problem: Need for prophylactic measure.  Plan: RISK                                                          Points  [  ] Previous VTE                                                3  [  ] Thrombophilia                                             2  [  ] Lower limb paralysis                                   2        (unable to hold up >15 seconds)    [  ] Current Cancer                                             2         (within 6 months)  [ x ] Immobilization > 24 hrs                              1  [  ] ICU/CCU stay > 24 hours                             1  [ x ] Age > 60                                                         1    IMPROVE VTE Score: 2  Heparin gtt for VTE prophylaxis.     
DATE OF SERVICE: 05/04/2021 Patient was seen and examined ,interim events noted.Consultant notes ,Labs,Telemetry reviewed by me    PRESENTING CC:Dyspnea    HPI and HOSPITAL COURSE: HPI:  85 yr M from home with PMH of HTN, ESRD on HD TTS via left AVF presents to ED with SOB and palpiations since morning. Pt reports he has been having SOB since last 3-4 days when he gets up in the morning but it gets relieved on its own and he is able to carry out his daily activities but today he had difficulty doing any activities so he came to hospital. Pt also reports palpitations since morning. Pt denies any chest pain. Pt reports his last HD was yesterday. Pt denies any  N/V/D, abdominal pain, urinary symptoms or any other acute complaints.   In ED, EKG showed Afib with RVR @147. Pt got diltiazem 10 IV push with improvement in HR to 90s.    In ED, /108,  (30 Apr 2021 19:32)      INTERIM EVENTS:Patient awake alert no dyspnea hasreverted back to sinus rhythm      PMH -reviewed admission note, no change since admission  Heart Failure: Acute [ ] Chronic [ x] Acute on Chronic [ ] Diastolic [x ] Systolic [ ] Combined Systolic and Diastolic[ ]  RIGOBERTO[ ]  ATN[ ]  CKD I [ ] CKDII [ ] CKD III [ ] CKD IV [ ] CKD V [ ] ESRD[ x]  HTN[ ] CVA[ ] DM[ ] COPD[ ] COVID[ ] AF[x ]  PPM[ ] ICD[ ]    MEDICATIONS  (STANDING):  atorvastatin 10 milliGRAM(s) Oral at bedtime  calcium acetate 1334 milliGRAM(s) Oral three times a day with meals  diltiazem    Tablet 30 milliGRAM(s) Oral every 6 hours  epoetin zach-epbx (RETACRIT) Injectable 4000 Unit(s) IV Push <User Schedule>  finasteride 5 milliGRAM(s) Oral daily  furosemide    Tablet 80 milliGRAM(s) Oral <User Schedule>  Eliquis 2.5 mg BID  hydrALAZINE 100 milliGRAM(s) Oral three times a day  losartan 100 milliGRAM(s) Oral daily  metoprolol tartrate 25 milliGRAM(s) Oral two times a day    REVIEW OF SYSTEMS:  Constitutional: [ ] fever, [ ]weight loss,  [ ]fatigue  Eyes: [ ] visual changes  Respiratory: [ ]shortness of breath;  [ ] cough, [ ]wheezing, [ ]chills, [ ]hemoptysis  Cardiovascular: [ ] chest pain, [ ]palpitations, [ ]dizziness,  [ ]leg swelling[ ]orthopnea[ ]PND  Gastrointestinal: [ ] abdominal pain, [ ]nausea, [ ]vomiting,  [ ]diarrhea [ ]Constipation [ ]Melena  Genitourinary: [ ] dysuria, [ ] hematuria [ ]Rose  Neurologic: [ ] headaches [ ] tremors[ ]weakness [ ]Paralysis Right[ ] Left[ ]  Skin: [ ] itching, [ ]burning, [ ] rashes  Endocrine: [ ] heat or cold intolerance  Musculoskeletal: [ ] joint pain or swelling; [ ] muscle, back, or extremity pain  Psychiatric: [ ] depression, [ ]anxiety, [ ]mood swings, or [ ]difficulty sleeping  Hematologic: [ ] easy bruising, [ ] bleeding gums    [x] All remaining systems negative except as per above.   [ ]Unable to obtain.    Vital Signs Last 24 Hrs  T(C): 36.8 (04 May 2021 15:57), Max: 37.1 (04 May 2021 09:20)  T(F): 98.3 (04 May 2021 15:57), Max: 98.7 (04 May 2021 09:20)  HR: 63 (04 May 2021 15:57) (59 - 78)  BP: 178/59 (04 May 2021 15:57) (168/66 - 187/60)  RR: 18 (04 May 2021 15:57) (16 - 21)  SpO2: 97% (04 May 2021 15:57) (97% - 99%)  I&O's Summary    04 May 2021 07:01  -  05 May 2021 06:57  --------------------------------------------------------  IN: 600 mL / OUT: 3600 mL / NET: -3000 mL        PHYSICAL EXAM:  General: No acute distress BMI-24  HEENT: EOMI, PERRL  Neck: Supple, [ ] JVD  Lungs: Equal air entry bilaterally; [ ] rales [ ] wheezing [ ] rhonchi  Heart: Regular rate and rhythm; [x ] murmur   2/6 [x ] systolic [ ] diastolic [ ] radiation[ ] rubs [ ]  gallops  Abdomen: Nontender, bowel sounds present  Extremities: No clubbing, cyanosis, [ ] edema [ ]Pulses  equal and intact  Nervous system:  Alert & Oriented X3, no focal deficits  Psychiatric: Normal affect  Skin: No rashes or lesions    LABS:  05-04    132<L>  |  95<L>  |  31<H>  ----------------------------<  192<H>  4.0   |  29  |  5.33<H>    Ca    8.8      04 May 2021 15:58  Mg     2.4     05-04    TPro  6.5  /  Alb  3.3<L>  /  TBili  0.4  /  DBili  x   /  AST  7<L>  /  ALT  16  /  AlkPhos  104  05-04    Creatinine Trend: 5.33<--, 9.88<--, 7.59<--, 5.52<--, 7.60<--, 6.18<--                        10.3   5.98  )-----------( 173      ( 04 May 2021 10:35 )             30.5     PTT - ( 04 May 2021 10:35 )  PTT:34.2 sec        IMPRESSION AND PLAN:  85 year old with past medical history of HTN, ESRD on HD via left AVF (TTS) who presented to ED with c/o SOB and palpitations limiting his ability to carry out his activities which prompted him to come to the hospital. He denies any other associated symptoms including chest pain or dizziness. In the ED he was noted to have atrial fibrillation with RVR with HR in the 140's which improved after administration of diltiazem 10 mg and hypertensive emergency with elevated troponin levels likely in the setting of demand ischemia. He is now s/p stress testing which was negative for reversible ischemia.    Patient to be sent home on eliquis and metoprolol for the new Atrial Fibrillation.     Problem/Plan - 1:  ·  Problem: New onset atrial fibrillation.  Plan: admission EKG showed Afib with RVR @147  patient in persistent Atrial Fibrillation on telemetry monitoring  CHADVASc score is 3  Trop 0.330  continue with metoprolol  for rate control  heparin gtt --->d/c on Eliquis  TSH normal  ECHO: EF 55-60%  Pharmacological Stress with no reversible ischemia    Problem/Plan - 2:  ·  Problem: Elevated troponin I level.  Plan: elevated troponin levels likely target organ damage in the setting of hypertensive emergency/demand ischemia  continue with Lipitor, metoprolol  telemetry monitoring  stress test as above.     Problem/Plan - 3:  ·  Problem: Hypertensive emergency.  Plan: lasix, hydralazine, losartan with parameters  DASH diet.     Problem/Plan - 4:  ·  Problem: ESRD (end stage renal disease).  Plan: on HD TTS via left AVF   HD In AM  [facility] with meals   epoetin zach with dialysis  monitor BMP      Problem/Plan - 5:  ·  Problem: Need for prophylactic measure.  Plan: RISK                                                          Points  [  ] Previous VTE                                                3  [  ] Thrombophilia                                             2  [  ] Lower limb paralysis                                   2        (unable to hold up >15 seconds)    [  ] Current Cancer                                             2         (within 6 months)  [ x ] Immobilization > 24 hrs                              1  [  ] ICU/CCU stay > 24 hours                             1  [ x ] Age > 60                                                         1    IMPROVE VTE Score: 2  
[facility] NEPHROLOGY- PROGRESS NOTE    86 yo Male with history of ESRD on HD presents with SOB a/w Hypertensive urgency, new onset Afib, and fluid overload. Nephrology consulted for ESRD status. . Nephrology consulted for ESRD status.  As per family, pt has h/o GI bleed and use to be on A/C in past (unknown reason why he was on A/C)    Hospital Medications: Medications reviewed.  REVIEW OF SYSTEMS:  CONSTITUTIONAL: No fevers or chills  RESPIRATORY: No shortness of breath  CARDIOVASCULAR: No chest pain.  GASTROINTESTINAL: No nausea, vomiting, diarrhea or abdominal pain.   VASCULAR: No bilateral lower extremity edema.     VITALS:  T(F): 98.7 (05-04-21 @ 09:20), Max: 98.7 (05-04-21 @ 09:20)  HR: 78 (05-04-21 @ 09:20)  BP: 168/66 (05-04-21 @ 09:20)  RR: 18 (05-04-21 @ 09:20)  SpO2: 98% (05-04-21 @ 09:20)  Wt(kg): --      PHYSICAL EXAM:  Constitutional: NAD  Neurological: Awake and Alert  HEENT: anicteric sclera,   Respiratory: decreased BS at bases  Cardiovascular: S1, S2, irregular  Gastrointestinal: BS+, soft, NT/ND  Extremities: No peripheral edema- resolved  Access: Left AVF +thrill +bruit       LABS:  05-04    130<L>  |  93<L>  |  82<H>  ----------------------------<  201<H>  4.8   |  25  |  9.88<H>    Ca    8.3<L>      04 May 2021 10:35  Phos  6.6     05-03  Mg     2.4     05-04    TPro  6.5  /  Alb  3.3<L>  /  TBili  0.4  /  DBili      /  AST  7<L>  /  ALT  16  /  AlkPhos  104  05-04    Creatinine Trend: 9.88 <--, 7.59 <--, 5.52 <--, 7.60 <--, 6.18 <--                        10.3   5.98  )-----------( 173      ( 04 May 2021 10:35 )             30.5     Urine Studies:                
PATIENT SEEN AND EXAMINED ON -05/03/2021  DATE OF SERVICE:   05/03/2021     Interim events noted,Labs ,Radiological studies and Cardiology tests reviewed .    Patient is a 85y old  Male who presents with a chief complaint of SOB (02 May 2021 13:49)      INTERVAL HPI/OVERNIGHT EVENTS: no new complaints    MEDICATIONS  (STANDING):  atorvastatin 10 milliGRAM(s) Oral at bedtime  calcium acetate 1334 milliGRAM(s) Oral three times a day with meals  diltiazem    Tablet 30 milliGRAM(s) Oral every 6 hours  epoetin zach-epbx (RETACRIT) Injectable 4000 Unit(s) IV Push <User Schedule>  finasteride 5 milliGRAM(s) Oral daily  furosemide    Tablet 80 milliGRAM(s) Oral <User Schedule>  heparin  Infusion.  Unit(s)/Hr (10 mL/Hr) IV Continuous <Continuous>  hydrALAZINE 100 milliGRAM(s) Oral three times a day  losartan 100 milliGRAM(s) Oral daily  metoprolol tartrate 25 milliGRAM(s) Oral two times a day    MEDICATIONS  (PRN):      __________________________________________________  REVIEW OF SYSTEMS:    CONSTITUTIONAL: No fever,   EYES: no acute visual disturbances  NECK: No pain or stiffness  RESPIRATORY: No cough; No shortness of breath  CARDIOVASCULAR: No chest pain, no palpitations  GASTROINTESTINAL: No pain. No nausea or vomiting; No diarrhea   NEUROLOGICAL: No headache or numbness, no tremors  MUSCULOSKELETAL: No joint pain, no muscle pain  GENITOURINARY: no dysuria, no frequency, no hesitancy  PSYCHIATRY: no depression , no anxiety  ALL OTHER  ROS negative        Vital Signs Last 24 Hrs  T(C): 36.4 (03 May 2021 05:23), Max: 36.6 (02 May 2021 18:35)  T(F): 97.6 (03 May 2021 05:23), Max: 97.9 (02 May 2021 18:35)  HR: 72 (03 May 2021 12:05) (51 - 98)  BP: 171/70 (03 May 2021 12:05) (141/65 - 171/70)  BP(mean): --  RR: 19 (03 May 2021 12:05) (17 - 19)  SpO2: 97% (03 May 2021 12:05) (97% - 100%)    ________________________________________________  PHYSICAL EXAM:  GENERAL: NAD  HEENT: Normocephalic;  conjunctivae and sclerae clear; moist mucous membranes;   NECK : supple  CHEST/LUNG: Clear to auscultation bilaterally with good air entry   HEART: S1 S2  regular; no murmurs, gallops or rubs  ABDOMEN: Soft, Nontender, Nondistended; Bowel sounds present  EXTREMITIES: no cyanosis; no edema; no calf tenderness  SKIN: warm and dry; no rash  NERVOUS SYSTEM:  Awake and alert; Oriented  to place, person and time ; no new deficits    _________________________________________________  LABS:                        10.4   5.20  )-----------( 157      ( 03 May 2021 06:36 )             31.2     05-03    134<L>  |  96  |  54<H>  ----------------------------<  129<H>  4.5   |  26  |  7.59<H>    Ca    8.3<L>      03 May 2021 06:36  Phos  6.6     05-03  Mg     2.5     05-03    TPro  6.1  /  Alb  2.8<L>  /  TBili  0.4  /  DBili  x   /  AST  6<L>  /  ALT  17  /  AlkPhos  103  05-03    PTT - ( 03 May 2021 06:36 )  PTT:81.1 sec    CAPILLARY BLOOD GLUCOSE            RADIOLOGY & ADDITIONAL TESTS:    Imaging  Reviewed:  YES  < from: Xray Chest 1 View-PORTABLE IMMEDIATE (Xray Chest 1 View-PORTABLE IMMEDIATE .) (04.30.21 @ 15:23) >  New moderate size left pleural effusion. Nonspecific patchy opacities right lung base with a round density overlying the right midlung zone. CT chest recommended.    Small right pleural effusion.          Assessment and Plan:   · Assessment	  85 year old with past medical history of HTN, ESRD on HD via left AVF (TTS) who presented to ED with c/o SOB and palpitations limiting his ability to carry out his activities which prompted him to come to the hospital. He denies any other associated symptoms including chest pain or dizziness. In the ED he was noted to have atrial fibrillation with RVR with HR in the 140's which improved after administration of diltiazem 10 mg and hypertensive emergency with elevated troponin levels likely in the setting of demand ischemia. He is now s/p stress testing which was negative for reversible ischemia. He is followed by nephrology and cardiology.           Problem/Plan - 1:  ·  Problem: New onset atrial fibrillation.  Plan: admission EKG showed Afib with RVR @147  patient in persistent Atrial Fibrillation on telemetry monitoring  CHADVASc score is 3  Trop 0.330  continue with metoprolol and diltiazem for rate control  heparin gtt ---> likely d/c on Eliquis  tele monitoring  TSH normal  ECHO: EF 55-60%  Pharmacological Stress with no reversible ischemia    Problem/Plan - 2:  ·  Problem: Elevated troponin I level.  Plan: elevated troponin levels likely target organ damage in the setting of hypertensive emergency/demand ischemia  continue with Lipitor, metoprolol  telemetry monitoring  stress test as above.     Problem/Plan - 3:  ·  Problem: Hypertensive emergency.  Plan: lasix, hydralazine, losartan with parameters  DASH diet.     Problem/Plan - 4:  ·  Problem: ESRD (end stage renal disease).  Plan: on HD TTS via left AVF   HD In AM  [facility] with meals   epoetin zach with dialysis  monitor BMP  Nephrology Dr. Quiroz.     Problem/Plan - 5:  ·  Problem: Need for prophylactic measure.  Plan: RISK                                                          Points  [  ] Previous VTE                                                3  [  ] Thrombophilia                                             2  [  ] Lower limb paralysis                                   2        (unable to hold up >15 seconds)    [  ] Current Cancer                                             2         (within 6 months)  [ x ] Immobilization > 24 hrs                              1  [  ] ICU/CCU stay > 24 hours                             1  [ x ] Age > 60                                                         1    IMPROVE VTE Score: 2  VTE: Heparin gtt. 
[facility] NEPHROLOGY- PROGRESS NOTE    86 yo Male with history of ESRD on HD presents with SOB a/w Hypertensive urgency, new onset Afib, and fluid overload. Nephrology consulted for ESRD status. . Nephrology consulted for ESRD status.  As per family, pt has h/o GI bleed and use to be on A/C in past (unknown reason why he was on A/C)    Hospital Medications: Medications reviewed.  REVIEW OF SYSTEMS:  CONSTITUTIONAL: No fevers or chills  RESPIRATORY: No shortness of breath  CARDIOVASCULAR: No chest pain.  GASTROINTESTINAL: No nausea, vomiting, diarrhea or abdominal pain.   VASCULAR: No bilateral lower extremity edema. +LUE AVF bleeding which was bandaged O/N     VITALS:  T(F): 97.4 (05-02-21 @ 09:56), Max: 98.5 (05-02-21 @ 01:20)  HR: 72 (05-02-21 @ 09:56)  BP: 136/61 (05-02-21 @ 09:56)  RR: 18 (05-02-21 @ 09:56)  SpO2: 100% (05-02-21 @ 05:06)  Wt(kg): --    05-01 @ 07:01  -  05-02 @ 07:00  --------------------------------------------------------  IN: 0 mL / OUT: 25 mL / NET: -25 mL      PHYSICAL EXAM:  Constitutional: NAD  Neurological: Awake and Alert  HEENT: anicteric sclera,   Respiratory: decreased BS at bases  Cardiovascular: S1, S2, irregular  Gastrointestinal: BS+, soft, NT/ND  Extremities: No peripheral edema- resolved  Access: Left AVF +thrill +bruit +bandage with dried blood; no active bleeding      LABS:  05-02    135  |  98  |  38<H>  ----------------------------<  141<H>  4.2   |  28  |  5.52<H>    Ca    8.1<L>      02 May 2021 07:42  Phos  4.1     05-02  Mg     2.3     05-02    TPro  6.4  /  Alb  2.9<L>  /  TBili  0.5  /  DBili      /  AST  10  /  ALT  23  /  AlkPhos  119  05-02    Creatinine Trend: 5.52 <--, 7.60 <--, 6.18 <--                        10.7   4.83  )-----------( 148      ( 02 May 2021 07:42 )             32.0     Urine Studies:      
    SUBJECTIVE / OVERNIGHT EVENTS: pt seen and examined    MEDICATIONS  (STANDING):  atorvastatin 10 milliGRAM(s) Oral at bedtime  calcium acetate 667 milliGRAM(s) Oral three times a day with meals  diltiazem    Tablet 30 milliGRAM(s) Oral every 6 hours  epoetin zach-epbx (RETACRIT) Injectable 4000 Unit(s) IV Push <User Schedule>  finasteride 5 milliGRAM(s) Oral daily  furosemide    Tablet 80 milliGRAM(s) Oral <User Schedule>  heparin  Infusion.  Unit(s)/Hr (10 mL/Hr) IV Continuous <Continuous>  hydrALAZINE 100 milliGRAM(s) Oral two times a day  losartan 100 milliGRAM(s) Oral daily  metoprolol tartrate 25 milliGRAM(s) Oral two times a day    MEDICATIONS  (PRN):    Vital Signs Last 24 Hrs  T(C): 36.6 (02 May 2021 21:29), Max: 36.9 (02 May 2021 01:20)  T(F): 97.8 (02 May 2021 21:29), Max: 98.5 (02 May 2021 01:20)  HR: 73 (02 May 2021 21:29) (51 - 98)  BP: 144/67 (02 May 2021 21:29) (136/61 - 169/71)  BP(mean): --  RR: 18 (02 May 2021 21:29) (17 - 18)  SpO2: 100% (02 May 2021 21:29) (100% - 100%)    Constitutional: No fever, fatigue  Skin: No rash.  Eyes: No recent vision problems or eye pain.  ENT: No congestion, ear pain, or sore throat.  Cardiovascular: No chest pain or palpation.  Respiratory: No cough, shortness of breath, congestion, or wheezing.  Gastrointestinal: No abdominal pain, nausea, vomiting, or diarrhea.  Genitourinary: No dysuria.  Musculoskeletal: No joint swelling.  Neurologic: No headache.    PHYSICAL EXAM:  GENERAL: NAD  EYES: EOMI, PERRLA  NECK: Supple, No JVD  CHEST/LUNG: dec breath sounds at bases  HEART:  S1 , S2 +  ABDOMEN: soft , bs+  EXTREMITIES:  no edema  NEUROLOGY:alert awake      LABS:  05-02    135  |  98  |  38<H>  ----------------------------<  141<H>  4.2   |  28  |  5.52<H>    Ca    8.1<L>      02 May 2021 07:42  Phos  4.1     05-02  Mg     2.3     05-02    TPro  6.4  /  Alb  2.9<L>  /  TBili  0.5  /  DBili      /  AST  10  /  ALT  23  /  AlkPhos  119  05-02    Creatinine Trend: 5.52 <--, 7.60 <--, 6.18 <--                        10.7   4.83  )-----------( 148      ( 02 May 2021 07:42 )             32.0     Urine Studies:            LIVER FUNCTIONS - ( 02 May 2021 07:42 )  Alb: 2.9 g/dL / Pro: 6.4 g/dL / ALK PHOS: 119 U/L / ALT: 23 U/L DA / AST: 10 U/L / GGT: x           PTT - ( 02 May 2021 21:09 )  PTT:71.1 sec  
[facility] NEPHROLOGY- PROGRESS NOTE    84 yo Male with history of ESRD on HD presents with SOB a/w Hypertensive urgency, new onset Afib, and fluid overload. Nephrology consulted for ESRD status. . Nephrology consulted for ESRD status.  As per family, pt has h/o GI bleed and use to be on A/C in past (unknown reason why he was on A/C)    Hospital Medications: Medications reviewed.  REVIEW OF SYSTEMS:  CONSTITUTIONAL: No fevers or chills  RESPIRATORY: No shortness of breath  CARDIOVASCULAR: No chest pain.  GASTROINTESTINAL: No nausea, vomiting, diarrhea or abdominal pain.   VASCULAR: No bilateral lower extremity edema.     VITALS:  T(F): 98 (05-03-21 @ 13:27), Max: 98 (05-03-21 @ 13:27)  HR: 76 (05-03-21 @ 13:27)  BP: 160/70 (05-03-21 @ 13:27)  RR: 18 (05-03-21 @ 13:27)  SpO2: 97% (05-03-21 @ 13:27)  Wt(kg): --    05-02 @ 07:01  -  05-03 @ 07:00  --------------------------------------------------------  IN: 0 mL / OUT: 1 mL / NET: -1 mL      PHYSICAL EXAM:  Constitutional: NAD  Neurological: Awake and Alert  HEENT: anicteric sclera,   Respiratory: decreased BS at bases  Cardiovascular: S1, S2, irregular  Gastrointestinal: BS+, soft, NT/ND  Extremities: No peripheral edema- resolved  Access: Left AVF +thrill +bruit       LABS:  05-03    134<L>  |  96  |  54<H>  ----------------------------<  129<H>  4.5   |  26  |  7.59<H>    Ca    8.3<L>      03 May 2021 06:36  Phos  6.6     05-03  Mg     2.5     05-03    TPro  6.1  /  Alb  2.8<L>  /  TBili  0.4  /  DBili      /  AST  6<L>  /  ALT  17  /  AlkPhos  103  05-03    Creatinine Trend: 7.59 <--, 5.52 <--, 7.60 <--, 6.18 <--                        10.4   5.20  )-----------( 157      ( 03 May 2021 06:36 )             31.2     Urine Studies:            
[facility] NEPHROLOGY- PROGRESS NOTE    86 yo Male with history of ESRD on HD presents with SOB a/w Hypertensive urgency, new onset Afib, and fluid overload. Nephrology consulted for ESRD status. . Nephrology consulted for ESRD status.  As per family, pt has h/o GI bleed and use to be on A/C in past (unknown reason why he was on A/C)    Hospital Medications: Medications reviewed.  REVIEW OF SYSTEMS:  CONSTITUTIONAL: No fevers or chills  RESPIRATORY: No shortness of breath  CARDIOVASCULAR: No chest pain.  GASTROINTESTINAL: No nausea, vomiting, diarrhea or abdominal pain.   VASCULAR: No bilateral lower extremity edema.     VITALS:  T(F): 98 (05-01-21 @ 14:08), Max: 98.8 (04-30-21 @ 17:35)  HR: 63 (05-01-21 @ 14:08)  BP: 136/63 (05-01-21 @ 14:08)  RR: 17 (05-01-21 @ 14:08)  SpO2: 97% (05-01-21 @ 14:08)  Wt(kg): --  Height (cm): 167.6 (04-30 @ 13:37)  Weight (kg): 52.5 (04-30 @ 13:37)  BMI (kg/m2): 18.7 (04-30 @ 13:37)  BSA (m2): 1.59 (04-30 @ 13:37)    05-01 @ 07:01  -  05-01 @ 15:15  --------------------------------------------------------  IN: 0 mL / OUT: 25 mL / NET: -25 mL      PHYSICAL EXAM:  Constitutional: NAD  Neurological: Awake and Alert  HEENT: anicteric sclera,   Respiratory: CTAB,   Cardiovascular: S1, S2, irregular  Gastrointestinal: BS+, soft, NT/ND  Extremities: No peripheral edema- resolved  Access: Left AVF +thrill +bruit      LABS:  05-01    136  |  102  |  55<H>  ----------------------------<  164<H>  4.9   |  26  |  7.60<H>    Ca    8.1<L>      01 May 2021 06:20  Phos  4.9     05-01  Mg     2.4     05-01    TPro  6.4  /  Alb  3.1<L>  /  TBili  0.4  /  DBili      /  AST  12  /  ALT  26  /  AlkPhos  123<H>  05-01    Creatinine Trend: 7.60 <--, 6.18 <--                        10.0   5.09  )-----------( 143      ( 01 May 2021 06:20 )             31.0     Urine Studies:      RADIOLOGY & ADDITIONAL STUDIES:      < from: Xray Chest 1 View-PORTABLE IMMEDIATE (Xray Chest 1 View-PORTABLE IMMEDIATE .) (04.30.21 @ 15:23) >    EXAM:  XR CHEST PORTABLE IMMED 1V                            PROCEDURE DATE:  04/30/2021          INTERPRETATION:  CLINICAL STATEMENT: Chest Pain.    TECHNIQUE: AP view of the chest.    COMPARISON: 8/14/2017    FINDINGS/  IMPRESSION:  New moderate size left pleural effusion. Nonspecific patchy opacities right lung base with a round density overlying the right midlung zone. CT chest recommended.    Small right pleural effusion.    Heart size cannot be accurately assessed in this projection.    < end of copied text >  
NP Note discussed with  Primary Attending    Patient is a 85y old  Male who presents with a chief complaint of SOB (02 May 2021 13:49)      INTERVAL HPI/OVERNIGHT EVENTS: no new complaints    MEDICATIONS  (STANDING):  atorvastatin 10 milliGRAM(s) Oral at bedtime  calcium acetate 1334 milliGRAM(s) Oral three times a day with meals  diltiazem    Tablet 30 milliGRAM(s) Oral every 6 hours  epoetin zach-epbx (RETACRIT) Injectable 4000 Unit(s) IV Push <User Schedule>  finasteride 5 milliGRAM(s) Oral daily  furosemide    Tablet 80 milliGRAM(s) Oral <User Schedule>  heparin  Infusion.  Unit(s)/Hr (10 mL/Hr) IV Continuous <Continuous>  hydrALAZINE 100 milliGRAM(s) Oral three times a day  losartan 100 milliGRAM(s) Oral daily  metoprolol tartrate 25 milliGRAM(s) Oral two times a day    MEDICATIONS  (PRN):      __________________________________________________  REVIEW OF SYSTEMS:    CONSTITUTIONAL: No fever,   EYES: no acute visual disturbances  NECK: No pain or stiffness  RESPIRATORY: No cough; No shortness of breath  CARDIOVASCULAR: No chest pain, no palpitations  GASTROINTESTINAL: No pain. No nausea or vomiting; No diarrhea   NEUROLOGICAL: No headache or numbness, no tremors  MUSCULOSKELETAL: No joint pain, no muscle pain  GENITOURINARY: no dysuria, no frequency, no hesitancy  PSYCHIATRY: no depression , no anxiety  ALL OTHER  ROS negative        Vital Signs Last 24 Hrs  T(C): 36.4 (03 May 2021 05:23), Max: 36.6 (02 May 2021 18:35)  T(F): 97.6 (03 May 2021 05:23), Max: 97.9 (02 May 2021 18:35)  HR: 72 (03 May 2021 12:05) (51 - 98)  BP: 171/70 (03 May 2021 12:05) (141/65 - 171/70)  BP(mean): --  RR: 19 (03 May 2021 12:05) (17 - 19)  SpO2: 97% (03 May 2021 12:05) (97% - 100%)    ________________________________________________  PHYSICAL EXAM:  GENERAL: NAD  HEENT: Normocephalic;  conjunctivae and sclerae clear; moist mucous membranes;   NECK : supple  CHEST/LUNG: Clear to auscultation bilaterally with good air entry   HEART: S1 S2  regular; no murmurs, gallops or rubs  ABDOMEN: Soft, Nontender, Nondistended; Bowel sounds present  EXTREMITIES: no cyanosis; no edema; no calf tenderness  SKIN: warm and dry; no rash  NERVOUS SYSTEM:  Awake and alert; Oriented  to place, person and time ; no new deficits    _________________________________________________  LABS:                        10.4   5.20  )-----------( 157      ( 03 May 2021 06:36 )             31.2     05-03    134<L>  |  96  |  54<H>  ----------------------------<  129<H>  4.5   |  26  |  7.59<H>    Ca    8.3<L>      03 May 2021 06:36  Phos  6.6     05-03  Mg     2.5     05-03    TPro  6.1  /  Alb  2.8<L>  /  TBili  0.4  /  DBili  x   /  AST  6<L>  /  ALT  17  /  AlkPhos  103  05-03    PTT - ( 03 May 2021 06:36 )  PTT:81.1 sec    CAPILLARY BLOOD GLUCOSE            RADIOLOGY & ADDITIONAL TESTS:    Imaging  Reviewed:  YES  < from: Xray Chest 1 View-PORTABLE IMMEDIATE (Xray Chest 1 View-PORTABLE IMMEDIATE .) (04.30.21 @ 15:23) >  New moderate size left pleural effusion. Nonspecific patchy opacities right lung base with a round density overlying the right midlung zone. CT chest recommended.    Small right pleural effusion.        Consultant(s) Notes Reviewed:   YES      Plan of care was discussed with patient and /or primary care giver; all questions and concerns were addressed 
    SUBJECTIVE / OVERNIGHT EVENTS: pt seen and examined      MEDICATIONS  (STANDING):  atorvastatin 10 milliGRAM(s) Oral at bedtime  calcium acetate 667 milliGRAM(s) Oral three times a day with meals  diltiazem    Tablet 30 milliGRAM(s) Oral every 6 hours  epoetin zach-epbx (RETACRIT) Injectable 4000 Unit(s) IV Push <User Schedule>  finasteride 5 milliGRAM(s) Oral daily  furosemide    Tablet 80 milliGRAM(s) Oral <User Schedule>  heparin  Infusion.  Unit(s)/Hr (10 mL/Hr) IV Continuous <Continuous>  hydrALAZINE 100 milliGRAM(s) Oral two times a day  losartan 100 milliGRAM(s) Oral daily  metoprolol tartrate 25 milliGRAM(s) Oral two times a day    MEDICATIONS  (PRN):  Vital Signs Last 24 Hrs  T(C): 36.6 (01 May 2021 22:06), Max: 36.9 (01 May 2021 00:37)  T(F): 97.9 (01 May 2021 22:06), Max: 98.5 (01 May 2021 00:37)  HR: 88 (01 May 2021 22:06) (63 - 97)  BP: 152/72 (01 May 2021 22:06) (114/62 - 177/90)  BP(mean): --  RR: 16 (01 May 2021 22:06) (16 - 18)  SpO2: 99% (01 May 2021 22:06) (97% - 99%)      CAPILLARY BLOOD GLUCOSE      POCT Blood Glucose.: 200 mg/dL (01 May 2021 21:47)    I&O's Summary    01 May 2021 07:01  -  01 May 2021 22:34  --------------------------------------------------------  IN: 0 mL / OUT: 25 mL / NET: -25 mL        Constitutional: No fever, fatigue  Skin: No rash.  Eyes: No recent vision problems or eye pain.  ENT: No congestion, ear pain, or sore throat.  Cardiovascular: No chest pain or palpation.  Respiratory: No cough, shortness of breath, congestion, or wheezing.  Gastrointestinal: No abdominal pain, nausea, vomiting, or diarrhea.  Genitourinary: No dysuria.  Musculoskeletal: No joint swelling.  Neurologic: No headache.    PHYSICAL EXAM:  GENERAL: NAD  EYES: EOMI, PERRLA  NECK: Supple, No JVD  CHEST/LUNG: dec breath sounds at bases  HEART:  S1 , S2 +  ABDOMEN: soft , bs+  EXTREMITIES:  no edema  NEUROLOGY:alert awake      LABS:                        10.0   5.09  )-----------( 143      ( 01 May 2021 06:20 )             31.0     05-01    136  |  102  |  55<H>  ----------------------------<  164<H>  4.9   |  26  |  7.60<H>    Ca    8.1<L>      01 May 2021 06:20  Phos  4.9     05-01  Mg     2.4     05-01    TPro  6.4  /  Alb  3.1<L>  /  TBili  0.4  /  DBili  x   /  AST  12  /  ALT  26  /  AlkPhos  123<H>  05-01    PT/INR - ( 30 Apr 2021 15:48 )   PT: 11.8 sec;   INR: 0.99 ratio         PTT - ( 01 May 2021 15:56 )  PTT:96.3 sec  CARDIAC MARKERS ( 30 Apr 2021 21:38 )  0.378 ng/mL / x     / x     / x     / x      CARDIAC MARKERS ( 30 Apr 2021 15:48 )  0.330 ng/mL / x     / x     / x     / x              RADIOLOGY & ADDITIONAL TESTS:    Imaging Personally Reviewed:    Consultant(s) Notes Reviewed:      Care Discussed with Consultants/Other Providers:

## 2021-05-04 NOTE — PROGRESS NOTE ADULT - PROVIDER SPECIALTY LIST ADULT
Nephrology
Cardiology
Internal Medicine
Nephrology
Cardiology
Internal Medicine
Nephrology
Nephrology
Cardiology
Internal Medicine

## 2021-06-14 ENCOUNTER — APPOINTMENT (OUTPATIENT)
Dept: OPHTHALMOLOGY | Facility: CLINIC | Age: 86
End: 2021-06-14

## 2021-06-21 ENCOUNTER — APPOINTMENT (OUTPATIENT)
Dept: OPHTHALMOLOGY | Facility: CLINIC | Age: 86
End: 2021-06-21
Payer: MEDICARE

## 2021-06-21 ENCOUNTER — NON-APPOINTMENT (OUTPATIENT)
Age: 86
End: 2021-06-21

## 2021-06-21 PROCEDURE — 92134 CPTRZ OPH DX IMG PST SGM RTA: CPT

## 2021-06-21 PROCEDURE — 92014 COMPRE OPH EXAM EST PT 1/>: CPT

## 2021-06-21 PROCEDURE — 99072 ADDL SUPL MATRL&STAF TM PHE: CPT

## 2021-06-22 ENCOUNTER — INPATIENT (INPATIENT)
Facility: HOSPITAL | Age: 86
LOS: 8 days | Discharge: HOME | DRG: 186 | End: 2021-07-01
Attending: INTERNAL MEDICINE | Admitting: INTERNAL MEDICINE
Payer: MEDICARE

## 2021-06-22 VITALS
SYSTOLIC BLOOD PRESSURE: 161 MMHG | HEART RATE: 112 BPM | TEMPERATURE: 97 F | WEIGHT: 113.54 LBS | DIASTOLIC BLOOD PRESSURE: 78 MMHG | OXYGEN SATURATION: 88 % | RESPIRATION RATE: 18 BRPM | HEIGHT: 66 IN

## 2021-06-22 DIAGNOSIS — I10 ESSENTIAL (PRIMARY) HYPERTENSION: ICD-10-CM

## 2021-06-22 DIAGNOSIS — N18.6 END STAGE RENAL DISEASE: ICD-10-CM

## 2021-06-22 DIAGNOSIS — Z90.49 ACQUIRED ABSENCE OF OTHER SPECIFIED PARTS OF DIGESTIVE TRACT: Chronic | ICD-10-CM

## 2021-06-22 DIAGNOSIS — Z99.2 DEPENDENCE ON RENAL DIALYSIS: Chronic | ICD-10-CM

## 2021-06-22 DIAGNOSIS — Z29.9 ENCOUNTER FOR PROPHYLACTIC MEASURES, UNSPECIFIED: ICD-10-CM

## 2021-06-22 DIAGNOSIS — J90 PLEURAL EFFUSION, NOT ELSEWHERE CLASSIFIED: ICD-10-CM

## 2021-06-22 DIAGNOSIS — J81.0 ACUTE PULMONARY EDEMA: ICD-10-CM

## 2021-06-22 DIAGNOSIS — I48.91 UNSPECIFIED ATRIAL FIBRILLATION: ICD-10-CM

## 2021-06-22 DIAGNOSIS — E11.9 TYPE 2 DIABETES MELLITUS WITHOUT COMPLICATIONS: ICD-10-CM

## 2021-06-22 DIAGNOSIS — E78.5 HYPERLIPIDEMIA, UNSPECIFIED: ICD-10-CM

## 2021-06-22 LAB
ALBUMIN SERPL ELPH-MCNC: 3.4 G/DL — LOW (ref 3.5–5)
ALP SERPL-CCNC: 155 U/L — HIGH (ref 40–120)
ALT FLD-CCNC: 25 U/L DA — SIGNIFICANT CHANGE UP (ref 10–60)
ANION GAP SERPL CALC-SCNC: 16 MMOL/L — SIGNIFICANT CHANGE UP (ref 5–17)
AST SERPL-CCNC: 12 U/L — SIGNIFICANT CHANGE UP (ref 10–40)
BASOPHILS # BLD AUTO: 0.02 K/UL — SIGNIFICANT CHANGE UP (ref 0–0.2)
BASOPHILS NFR BLD AUTO: 0.3 % — SIGNIFICANT CHANGE UP (ref 0–2)
BILIRUB SERPL-MCNC: 0.5 MG/DL — SIGNIFICANT CHANGE UP (ref 0.2–1.2)
BUN SERPL-MCNC: 90 MG/DL — HIGH (ref 7–18)
CALCIUM SERPL-MCNC: 9.1 MG/DL — SIGNIFICANT CHANGE UP (ref 8.4–10.5)
CHLORIDE SERPL-SCNC: 98 MMOL/L — SIGNIFICANT CHANGE UP (ref 96–108)
CO2 SERPL-SCNC: 22 MMOL/L — SIGNIFICANT CHANGE UP (ref 22–31)
CREAT SERPL-MCNC: 9.63 MG/DL — HIGH (ref 0.5–1.3)
EOSINOPHIL # BLD AUTO: 0.09 K/UL — SIGNIFICANT CHANGE UP (ref 0–0.5)
EOSINOPHIL NFR BLD AUTO: 1.4 % — SIGNIFICANT CHANGE UP (ref 0–6)
GLUCOSE BLDC GLUCOMTR-MCNC: 223 MG/DL — HIGH (ref 70–99)
GLUCOSE BLDC GLUCOMTR-MCNC: 86 MG/DL — SIGNIFICANT CHANGE UP (ref 70–99)
GLUCOSE SERPL-MCNC: 252 MG/DL — HIGH (ref 70–99)
HCT VFR BLD CALC: 32.1 % — LOW (ref 39–50)
HGB BLD-MCNC: 10.6 G/DL — LOW (ref 13–17)
IMM GRANULOCYTES NFR BLD AUTO: 0.3 % — SIGNIFICANT CHANGE UP (ref 0–1.5)
LYMPHOCYTES # BLD AUTO: 0.35 K/UL — LOW (ref 1–3.3)
LYMPHOCYTES # BLD AUTO: 5.6 % — LOW (ref 13–44)
MAGNESIUM SERPL-MCNC: 2.4 MG/DL — SIGNIFICANT CHANGE UP (ref 1.6–2.6)
MCHC RBC-ENTMCNC: 31.2 PG — SIGNIFICANT CHANGE UP (ref 27–34)
MCHC RBC-ENTMCNC: 33 GM/DL — SIGNIFICANT CHANGE UP (ref 32–36)
MCV RBC AUTO: 94.4 FL — SIGNIFICANT CHANGE UP (ref 80–100)
MONOCYTES # BLD AUTO: 0.44 K/UL — SIGNIFICANT CHANGE UP (ref 0–0.9)
MONOCYTES NFR BLD AUTO: 7.1 % — SIGNIFICANT CHANGE UP (ref 2–14)
NEUTROPHILS # BLD AUTO: 5.3 K/UL — SIGNIFICANT CHANGE UP (ref 1.8–7.4)
NEUTROPHILS NFR BLD AUTO: 85.3 % — HIGH (ref 43–77)
NRBC # BLD: 0 /100 WBCS — SIGNIFICANT CHANGE UP (ref 0–0)
NT-PROBNP SERPL-SCNC: HIGH PG/ML (ref 0–450)
PHOSPHATE SERPL-MCNC: 4.3 MG/DL — SIGNIFICANT CHANGE UP (ref 2.5–4.5)
PLATELET # BLD AUTO: 144 K/UL — LOW (ref 150–400)
POTASSIUM SERPL-MCNC: 5.1 MMOL/L — SIGNIFICANT CHANGE UP (ref 3.5–5.3)
POTASSIUM SERPL-SCNC: 5.1 MMOL/L — SIGNIFICANT CHANGE UP (ref 3.5–5.3)
PROT SERPL-MCNC: 7.1 G/DL — SIGNIFICANT CHANGE UP (ref 6–8.3)
RAPID RVP RESULT: SIGNIFICANT CHANGE UP
RBC # BLD: 3.4 M/UL — LOW (ref 4.2–5.8)
RBC # FLD: 13.9 % — SIGNIFICANT CHANGE UP (ref 10.3–14.5)
SARS-COV-2 RNA SPEC QL NAA+PROBE: SIGNIFICANT CHANGE UP
SODIUM SERPL-SCNC: 136 MMOL/L — SIGNIFICANT CHANGE UP (ref 135–145)
TROPONIN I SERPL-MCNC: 0.03 NG/ML — SIGNIFICANT CHANGE UP (ref 0–0.04)
WBC # BLD: 6.22 K/UL — SIGNIFICANT CHANGE UP (ref 3.8–10.5)
WBC # FLD AUTO: 6.22 K/UL — SIGNIFICANT CHANGE UP (ref 3.8–10.5)

## 2021-06-22 PROCEDURE — 71045 X-RAY EXAM CHEST 1 VIEW: CPT | Mod: 26

## 2021-06-22 PROCEDURE — 71250 CT THORAX DX C-: CPT | Mod: 26

## 2021-06-22 PROCEDURE — 71045 X-RAY EXAM CHEST 1 VIEW: CPT | Mod: 26,77

## 2021-06-22 PROCEDURE — 99285 EMERGENCY DEPT VISIT HI MDM: CPT

## 2021-06-22 RX ORDER — HYDRALAZINE HCL 50 MG
100 TABLET ORAL EVERY 8 HOURS
Refills: 0 | Status: DISCONTINUED | OUTPATIENT
Start: 2021-06-22 | End: 2021-06-26

## 2021-06-22 RX ORDER — CALCIUM ACETATE 667 MG
1334 TABLET ORAL
Refills: 0 | Status: DISCONTINUED | OUTPATIENT
Start: 2021-06-22 | End: 2021-06-23

## 2021-06-22 RX ORDER — DEXTROSE 50 % IN WATER 50 %
12.5 SYRINGE (ML) INTRAVENOUS ONCE
Refills: 0 | Status: DISCONTINUED | OUTPATIENT
Start: 2021-06-22 | End: 2021-07-01

## 2021-06-22 RX ORDER — METOPROLOL TARTRATE 50 MG
25 TABLET ORAL
Refills: 0 | Status: DISCONTINUED | OUTPATIENT
Start: 2021-06-22 | End: 2021-07-01

## 2021-06-22 RX ORDER — FINASTERIDE 5 MG/1
5 TABLET, FILM COATED ORAL DAILY
Refills: 0 | Status: DISCONTINUED | OUTPATIENT
Start: 2021-06-22 | End: 2021-07-01

## 2021-06-22 RX ORDER — APIXABAN 2.5 MG/1
2.5 TABLET, FILM COATED ORAL
Refills: 0 | Status: DISCONTINUED | OUTPATIENT
Start: 2021-06-22 | End: 2021-06-23

## 2021-06-22 RX ORDER — ATORVASTATIN CALCIUM 80 MG/1
10 TABLET, FILM COATED ORAL AT BEDTIME
Refills: 0 | Status: DISCONTINUED | OUTPATIENT
Start: 2021-06-22 | End: 2021-07-01

## 2021-06-22 RX ORDER — INSULIN LISPRO 100/ML
VIAL (ML) SUBCUTANEOUS
Refills: 0 | Status: DISCONTINUED | OUTPATIENT
Start: 2021-06-22 | End: 2021-07-01

## 2021-06-22 RX ORDER — ERYTHROPOIETIN 10000 [IU]/ML
4000 INJECTION, SOLUTION INTRAVENOUS; SUBCUTANEOUS
Refills: 0 | Status: DISCONTINUED | OUTPATIENT
Start: 2021-06-24 | End: 2021-06-25

## 2021-06-22 RX ORDER — FUROSEMIDE 40 MG
80 TABLET ORAL
Refills: 0 | Status: DISCONTINUED | OUTPATIENT
Start: 2021-06-22 | End: 2021-07-01

## 2021-06-22 RX ORDER — AMLODIPINE BESYLATE 2.5 MG/1
10 TABLET ORAL DAILY
Refills: 0 | Status: DISCONTINUED | OUTPATIENT
Start: 2021-06-22 | End: 2021-06-27

## 2021-06-22 RX ORDER — HEPARIN SODIUM 5000 [USP'U]/ML
5000 INJECTION INTRAVENOUS; SUBCUTANEOUS EVERY 8 HOURS
Refills: 0 | Status: DISCONTINUED | OUTPATIENT
Start: 2021-06-22 | End: 2021-06-22

## 2021-06-22 RX ORDER — LOSARTAN POTASSIUM 100 MG/1
100 TABLET, FILM COATED ORAL DAILY
Refills: 0 | Status: DISCONTINUED | OUTPATIENT
Start: 2021-06-22 | End: 2021-07-01

## 2021-06-22 RX ADMIN — AMLODIPINE BESYLATE 10 MILLIGRAM(S): 2.5 TABLET ORAL at 18:03

## 2021-06-22 RX ADMIN — Medication 2: at 22:22

## 2021-06-22 RX ADMIN — Medication 25 MILLIGRAM(S): at 18:03

## 2021-06-22 RX ADMIN — Medication 1334 MILLIGRAM(S): at 18:03

## 2021-06-22 RX ADMIN — Medication 25 MILLIGRAM(S): at 11:33

## 2021-06-22 RX ADMIN — Medication 100 MILLIGRAM(S): at 22:22

## 2021-06-22 RX ADMIN — ATORVASTATIN CALCIUM 10 MILLIGRAM(S): 80 TABLET, FILM COATED ORAL at 22:22

## 2021-06-22 RX ADMIN — Medication 2: at 11:20

## 2021-06-22 RX ADMIN — LOSARTAN POTASSIUM 100 MILLIGRAM(S): 100 TABLET, FILM COATED ORAL at 18:03

## 2021-06-22 NOTE — H&P ADULT - ASSESSMENT
86yo Male with ESRD on HD, DM type 2, HTN, Anemia of renal disease, hyperphosphatemia, Afib on A/C Presented with SOB. Pt a/w acute hypoxic respiratory failure 2/2 fluid overload. 86yo Male with ESRD on HD, DM type 2, HTN, Anemia of renal disease, hyperphosphatemia, Afib on A/C Presented with SOB. Pt a/w acute hypoxic respiratory failure 2/2 fluid overload.  Primary team to confirm Medication from Family; unable to reach during day at [phone]

## 2021-06-22 NOTE — ED PROVIDER NOTE - OBJECTIVE STATEMENT
Patient's wife reports patient has been short of breath since last night. Dry cough. No fever, cp, ap, n/v/d. Due for dialysis today. Last dialysis Saturday.

## 2021-06-22 NOTE — ED PROVIDER NOTE - PMH
Atrial fibrillation    Diabetes    ESRD (end stage renal disease)    Hyperlipidemia    Hypertension

## 2021-06-22 NOTE — H&P ADULT - PROBLEM SELECTOR PLAN 3
Pt wwith ESRD on HD t/th/s  may benefit from increase fluid removal  last HD 6/22   electrolytes wnl   plan as above

## 2021-06-22 NOTE — ED PROVIDER NOTE - CLINICAL SUMMARY MEDICAL DECISION MAKING FREE TEXT BOX
Patient with hypoxia, infectious, vs cardiac vs fluid overload. WIll check core temp, labs, CXR, admit.

## 2021-06-22 NOTE — H&P ADULT - NSHPPOAPULMEMBOLUS_GEN_A_CORE
no Mohs Histo Method Verbiage: Each section was then chromacoded and processed in the Mohs lab using the Mohs protocol and submitted for frozen section.

## 2021-06-22 NOTE — PATIENT PROFILE ADULT - DOMESTIC TRAVEL HIGH RISK QUESTION
This note was copied from a baby's chart.  INPATIENT LACTATION CONSULT      Consult with Nury and mercedes regarding breastfeeding.  Obvious rooting with a strong latch observed this feeding session.  Rhythmic and aggressive suckling also noted.  Instructed Nury on correct positioning and technique when latching babe on.  Nury is independent with latching babe onto breast.  Minimal assistance required.  Encouraged Nury on the importance of frequent feedings throughout the day (at least 8-12 feedings in a 24 hour period) and skin to skin contact.  Nury demonstrated and states she understands all information given.    Joaquina Lopez RN, IBCLC  Lactation Consultant  [facility]  
No

## 2021-06-22 NOTE — H&P ADULT - PROBLEM SELECTOR PLAN 1
Pt presented with SOB   CXR showed Moderate left and small right pleural effusion. There is compressive atelectasis at the left lung base. A rounded area of opacity in the right midlung may represent loculated fluid.  Last HD on 6/19 @ [facility]; Pt was taken to HD 6/22  will cw fluid restriction and repeat CXR   Dr. Quiroz following   Pulm Dr. Brandon consulted

## 2021-06-22 NOTE — H&P ADULT - NSICDXPASTMEDICALHX_GEN_ALL_CORE_FT
PAST MEDICAL HISTORY:  Atrial fibrillation     Diabetes     ESRD (end stage renal disease)     Hyperlipidemia     Hypertension

## 2021-06-22 NOTE — H&P ADULT - NSHPLABSRESULTS_GEN_ALL_CORE
< from: Xray Chest 1 View- PORTABLE-Urgent (06.22.21 @ 10:34) >    FINDINGS:    The heart size is not well assessed on AP film.  There is a moderate left pleural effusion and small right pleural effusion again identified.  There is compressive atelectasis at the left lung base. A rounded opacity in the right midlung may be related to loculated fluid.  There are degenerative changes in the thoracic spine.    IMPRESSION:    Moderate left and small right pleural effusion. There is compressive atelectasis at the left lung base.    A rounded area of opacity in the right midlung may represent loculated fluid.    A chest CT could be performed as clinically.      < end of copied text >

## 2021-06-22 NOTE — H&P ADULT - PROBLEM SELECTOR PLAN 8
IMPROVE VTE Individual Risk Assessment  RISK                                                         Points  [  ] Previous VTE                                      3  [  ] Thrombophilia                                   2  [  ] Lower limb paralysis                         2 (unable to hold up >15 seconds)    [  ] Current Cancer                                  2       (within 6 months)  [  ] Immobilization > 24 hrs                    1  [  ] ICU/CCU stay > 24 hrs                         1  [  ] Age > 60                                              1  eliquis bid for dvt ppx

## 2021-06-22 NOTE — H&P ADULT - PROBLEM SELECTOR PLAN 4
Pt at home on lopressor  continue with home medications Pt at home on lopressor and eliquis   continue with home medications

## 2021-06-22 NOTE — ED PROVIDER NOTE - PROGRESS NOTE DETAILS
Spoke with Dr. Quiroz. Requested admission to Dr. Krishnan. She will dialyze patient today. Spoke with Dr. Quiroz. Requested admission to Dr. Krishnan. She will dialyze patient today. Reports patient is supposed to f/u with pulmonary for abnormal lung findings but has refused. Patient is resting comfortably, NAD. Endorsed to Dr. Krishnan.

## 2021-06-22 NOTE — H&P ADULT - PROBLEM SELECTOR PLAN 2
Pt with Loculated Right sided pleural effusion and left basilar Pleural effusion   Fu CT chest   will consult IR for Pleural effusion   Pulm Dr. Brandon consulted

## 2021-06-22 NOTE — H&P ADULT - NSHPPHYSICALEXAM_GEN_ALL_CORE
Vital Signs Last 24 Hrs  T(C): 36.6 (22 Jun 2021 12:38), Max: 36.6 (22 Jun 2021 12:38)  T(F): 97.9 (22 Jun 2021 12:38), Max: 97.9 (22 Jun 2021 12:38)  HR: 82 (22 Jun 2021 12:38) (82 - 113)  BP: 171/85 (22 Jun 2021 12:38) (158/76 - 181/92)  BP(mean): --  RR: 18 (22 Jun 2021 12:38) (18 - 20)  SpO2: 97% (22 Jun 2021 12:38) (88% - 100%)    Physical exam:  GENERAL: NAD, lying in bed comfortably  HEAD:  Atraumatic, Normocephalic  EYES: EOMI, PERRLA, conjunctiva and sclera clear  ENT: Moist mucous membranes  NECK: Supple, No JVD  CHEST/LUNG: Rhales on Left lower lobes   HEART: Regular rate and rhythm; S1+ S2+   ABDOMEN: Bowel sounds present; Soft, Nontender, Nondistended   EXTREMITIES:  2+ Peripheral Pulses, brisk capillary refill. No clubbing, cyanosis, or edema  NERVOUS SYSTEM:  Alert & awake responding appropriately   MSK: FROM all 4 extremities, full and equal strength, LAVf   SKIN: No rashes or lesions

## 2021-06-22 NOTE — CONSULT NOTE ADULT - TIME BILLING
- Review of records, telemetry, vital signs and daily labs.   - General and cardiovascular physical examination.  - Generation of cardiovascular treatment plan.  - Coordination of care.      Patient was seen and examined by me on 06/22/2021,interim events noted,labs and radiology studies reviewed.  Foster Diehl MD,FACC.  [address] [place], [place] [id number].  [phone]

## 2021-06-22 NOTE — H&P ADULT - HISTORY OF PRESENT ILLNESS
86 yo Male with ESRD on HD T/Th/sat, DM type 2, HTN, Anemia of renal disease, hyperphosphatemia, Afib on A/C Presented with SOB.Pt was feeling sob since yesterday.  Pt was recently seen by outpt Pulmonologist who recc thoracentesis of pleural effusion but pt declined since he was asymptomatic. Pt denies any fever chills abdominal pain or any other complaints  84 yo Male with ESRD on HD T/Th/sat, DM type 2, HTN, Anemia of renal disease, hyperphosphatemia, Afib on A/C Presented with SOB.Pt was feeling sob since yesterday.  Pt was recently seen by outpt Pulmonologist who recc thoracentesis of pleural effusion but pt declined since he was asymptomatic. Pt denies any fever chills abdominal pain or any other complaints     GOC: unable to reach family, primary team to follow up

## 2021-06-23 DIAGNOSIS — Z02.9 ENCOUNTER FOR ADMINISTRATIVE EXAMINATIONS, UNSPECIFIED: ICD-10-CM

## 2021-06-23 DIAGNOSIS — Z71.89 OTHER SPECIFIED COUNSELING: ICD-10-CM

## 2021-06-23 LAB
A1C WITH ESTIMATED AVERAGE GLUCOSE RESULT: 7.2 % — HIGH (ref 4–5.6)
ANION GAP SERPL CALC-SCNC: 13 MMOL/L — SIGNIFICANT CHANGE UP (ref 5–17)
BUN SERPL-MCNC: 58 MG/DL — HIGH (ref 7–18)
CALCIUM SERPL-MCNC: 8.5 MG/DL — SIGNIFICANT CHANGE UP (ref 8.4–10.5)
CHLORIDE SERPL-SCNC: 97 MMOL/L — SIGNIFICANT CHANGE UP (ref 96–108)
CHOLEST SERPL-MCNC: 152 MG/DL — SIGNIFICANT CHANGE UP
CO2 SERPL-SCNC: 27 MMOL/L — SIGNIFICANT CHANGE UP (ref 22–31)
COVID-19 SPIKE DOMAIN AB INTERP: POSITIVE
COVID-19 SPIKE DOMAIN ANTIBODY RESULT: 67.7 U/ML — HIGH
CREAT SERPL-MCNC: 6.95 MG/DL — HIGH (ref 0.5–1.3)
ESTIMATED AVERAGE GLUCOSE: 160 MG/DL — HIGH (ref 68–114)
FOLATE SERPL-MCNC: 15.2 NG/ML — SIGNIFICANT CHANGE UP
GLUCOSE BLDC GLUCOMTR-MCNC: 110 MG/DL — HIGH (ref 70–99)
GLUCOSE BLDC GLUCOMTR-MCNC: 170 MG/DL — HIGH (ref 70–99)
GLUCOSE BLDC GLUCOMTR-MCNC: 228 MG/DL — HIGH (ref 70–99)
GLUCOSE BLDC GLUCOMTR-MCNC: 237 MG/DL — HIGH (ref 70–99)
GLUCOSE SERPL-MCNC: 111 MG/DL — HIGH (ref 70–99)
HBV SURFACE AG SER-ACNC: SIGNIFICANT CHANGE UP
HCT VFR BLD CALC: 31.6 % — LOW (ref 39–50)
HDLC SERPL-MCNC: 93 MG/DL — SIGNIFICANT CHANGE UP
HGB BLD-MCNC: 10.5 G/DL — LOW (ref 13–17)
IRON SATN MFR SERPL: 23 % — SIGNIFICANT CHANGE UP (ref 20–55)
IRON SATN MFR SERPL: 41 UG/DL — LOW (ref 65–170)
LDH SERPL L TO P-CCNC: 197 U/L — SIGNIFICANT CHANGE UP (ref 120–225)
LIPID PNL WITH DIRECT LDL SERPL: 50 MG/DL — SIGNIFICANT CHANGE UP
MAGNESIUM SERPL-MCNC: 2.3 MG/DL — SIGNIFICANT CHANGE UP (ref 1.6–2.6)
MCHC RBC-ENTMCNC: 31.3 PG — SIGNIFICANT CHANGE UP (ref 27–34)
MCHC RBC-ENTMCNC: 33.2 GM/DL — SIGNIFICANT CHANGE UP (ref 32–36)
MCV RBC AUTO: 94.3 FL — SIGNIFICANT CHANGE UP (ref 80–100)
NON HDL CHOLESTEROL: 59 MG/DL — SIGNIFICANT CHANGE UP
NRBC # BLD: 0 /100 WBCS — SIGNIFICANT CHANGE UP (ref 0–0)
PHOSPHATE SERPL-MCNC: 3.7 MG/DL — SIGNIFICANT CHANGE UP (ref 2.5–4.5)
PLATELET # BLD AUTO: 154 K/UL — SIGNIFICANT CHANGE UP (ref 150–400)
POTASSIUM SERPL-MCNC: 4 MMOL/L — SIGNIFICANT CHANGE UP (ref 3.5–5.3)
POTASSIUM SERPL-SCNC: 4 MMOL/L — SIGNIFICANT CHANGE UP (ref 3.5–5.3)
RBC # BLD: 3.35 M/UL — LOW (ref 4.2–5.8)
RBC # BLD: 3.35 M/UL — LOW (ref 4.2–5.8)
RBC # FLD: 13.7 % — SIGNIFICANT CHANGE UP (ref 10.3–14.5)
RETICS #: 46.2 K/UL — SIGNIFICANT CHANGE UP (ref 25–125)
RETICS/RBC NFR: 1.4 % — SIGNIFICANT CHANGE UP (ref 0.5–2.5)
SARS-COV-2 IGG+IGM SERPL QL IA: 67.7 U/ML — HIGH
SARS-COV-2 IGG+IGM SERPL QL IA: POSITIVE
SODIUM SERPL-SCNC: 137 MMOL/L — SIGNIFICANT CHANGE UP (ref 135–145)
TIBC SERPL-MCNC: 175 UG/DL — LOW (ref 250–450)
TRIGL SERPL-MCNC: 44 MG/DL — SIGNIFICANT CHANGE UP
TSH SERPL-MCNC: 2.32 UU/ML — SIGNIFICANT CHANGE UP (ref 0.34–4.82)
UIBC SERPL-MCNC: 134 UG/DL — SIGNIFICANT CHANGE UP (ref 110–370)
VIT B12 SERPL-MCNC: 1122 PG/ML — SIGNIFICANT CHANGE UP (ref 232–1245)
WBC # BLD: 5.2 K/UL — SIGNIFICANT CHANGE UP (ref 3.8–10.5)
WBC # FLD AUTO: 5.2 K/UL — SIGNIFICANT CHANGE UP (ref 3.8–10.5)

## 2021-06-23 RX ORDER — CHLORHEXIDINE GLUCONATE 213 G/1000ML
1 SOLUTION TOPICAL DAILY
Refills: 0 | Status: DISCONTINUED | OUTPATIENT
Start: 2021-06-23 | End: 2021-07-01

## 2021-06-23 RX ORDER — CALCIUM ACETATE 667 MG
667 TABLET ORAL
Refills: 0 | Status: DISCONTINUED | OUTPATIENT
Start: 2021-06-23 | End: 2021-06-26

## 2021-06-23 RX ADMIN — ATORVASTATIN CALCIUM 10 MILLIGRAM(S): 80 TABLET, FILM COATED ORAL at 21:22

## 2021-06-23 RX ADMIN — Medication 2: at 17:35

## 2021-06-23 RX ADMIN — APIXABAN 2.5 MILLIGRAM(S): 2.5 TABLET, FILM COATED ORAL at 17:34

## 2021-06-23 RX ADMIN — APIXABAN 2.5 MILLIGRAM(S): 2.5 TABLET, FILM COATED ORAL at 06:04

## 2021-06-23 RX ADMIN — FINASTERIDE 5 MILLIGRAM(S): 5 TABLET, FILM COATED ORAL at 15:23

## 2021-06-23 RX ADMIN — Medication 1334 MILLIGRAM(S): at 14:08

## 2021-06-23 RX ADMIN — Medication 1334 MILLIGRAM(S): at 08:38

## 2021-06-23 RX ADMIN — Medication 100 MILLIGRAM(S): at 21:22

## 2021-06-23 RX ADMIN — Medication 2: at 21:30

## 2021-06-23 RX ADMIN — Medication 100 MILLIGRAM(S): at 14:08

## 2021-06-23 RX ADMIN — Medication 80 MILLIGRAM(S): at 06:59

## 2021-06-23 RX ADMIN — Medication 667 MILLIGRAM(S): at 17:34

## 2021-06-23 RX ADMIN — Medication 100 MILLIGRAM(S): at 06:04

## 2021-06-23 RX ADMIN — LOSARTAN POTASSIUM 100 MILLIGRAM(S): 100 TABLET, FILM COATED ORAL at 06:04

## 2021-06-23 RX ADMIN — Medication 25 MILLIGRAM(S): at 06:04

## 2021-06-23 RX ADMIN — AMLODIPINE BESYLATE 10 MILLIGRAM(S): 2.5 TABLET ORAL at 06:08

## 2021-06-23 NOTE — PROGRESS NOTE ADULT - ASSESSMENT
Patient is a 86yo Male with ESRD on HD, DM type 2, HTN, Anemia of renal dz, hyperphosphatemia, Afib on A/C p/w SOB. Pt a/w acute hypoxic respiratory failure 2/2 fluid overload. Nephrology consulted for ESRD status.     1) ESRD: Last HD 6/22. s/p 2hour PUF today with net 2.5L removed. Plan for next maintenance HD 6/24. Monitor electrolytes.  2) Hypertension 2/2 CKD: BP improved.  Continue with current anti-hypertensive medications. c/w low sodium diet and fluid restriction. Monitor BP. Increase UF with HD.   3) Anemia of renal disease: Hb acceptable. c/w Epogen 4k IV tiw with HD. Monitor Hb.  4) Hyperphosphatemia: serum phosphorus and serum calcium acceptable. Will decrease Phoslo to 667mg PO tid with meals. c/w low phos diet. Monitor serum calcium and phosphorus.  5) Acute hypoxic resp failure : 2/2 fluid overload. c/w fluid restriction. Will increase UF with HD. s/p CT chest; Pulmonary following. Pt for IR thoracentesis on Monday 6/28    [facility] NEPHROLOGY  Nahum Grubbs M.D.  Francisco Javier Hicks D.O.  Yovana Quiroz M.D.  Makeda Duron, MSN, ANP-C  [phone]    [address] [place] [address]

## 2021-06-23 NOTE — PROGRESS NOTE ADULT - PROBLEM SELECTOR PLAN 3
Pt wwith ESRD on HD t/th/s  may benefit from increase fluid removal  s/p HD 6/22, 6/23  electrolytes wnl   nephrology dr. Quiroz (also outpt nephrology)  Nutrition consulted  plan as above

## 2021-06-23 NOTE — PROGRESS NOTE ADULT - SUBJECTIVE AND OBJECTIVE BOX
Time of Visit:  Patient seen and examined.     MEDICATIONS  (STANDING):  amLODIPine   Tablet 10 milliGRAM(s) Oral daily  atorvastatin 10 milliGRAM(s) Oral at bedtime  calcium acetate 667 milliGRAM(s) Oral three times a day with meals  chlorhexidine 2% Cloths 1 Application(s) Topical daily  dextrose 50% Injectable 12.5 Gram(s) IV Push once  finasteride 5 milliGRAM(s) Oral daily  furosemide    Tablet 80 milliGRAM(s) Oral <User Schedule>  hydrALAZINE 100 milliGRAM(s) Oral every 8 hours  insulin lispro (ADMELOG) corrective regimen sliding scale   SubCutaneous Before meals and at bedtime  losartan 100 milliGRAM(s) Oral daily  metoprolol tartrate 25 milliGRAM(s) Oral two times a day      MEDICATIONS  (PRN):       Medications up to date at time of exam.      PHYSICAL EXAMINATION:  Patient has no new complaints.  GENERAL: The patient is a well-developed, well-nourished, in no apparent distress.     Vital Signs Last 24 Hrs  T(C): 36.8 (23 Jun 2021 21:12), Max: 37.1 (22 Jun 2021 21:42)  T(F): 98.2 (23 Jun 2021 21:12), Max: 98.7 (22 Jun 2021 21:42)  HR: 86 (23 Jun 2021 21:12) (71 - 98)  BP: 135/48 (23 Jun 2021 21:12) (103/54 - 165/78)  BP(mean): --  RR: 18 (23 Jun 2021 21:12) (18 - 18)  SpO2: 98% (23 Jun 2021 21:12) (96% - 100%)   (if applicable)    Chest Tube (if applicable)    HEENT: Head is normocephalic and atraumatic. Extraocular muscles are intact. Mucous membranes are moist.     NECK: Supple, no palpable adenopathy.    LUNGS: Clear to auscultation, no wheezing, rales, or rhonchi.    HEART: Regular rate and rhythm without murmur.    ABDOMEN: Soft, nontender, and nondistended.  No hepatosplenomegaly is noted.    : No painful voiding, no pelvic pain    EXTREMITIES: Without any cyanosis, clubbing, rash, lesions or edema.    NEUROLOGIC: Awake, alert, oriented, grossly intact    SKIN: Warm, dry, good turgor.      LABS:                        10.5   5.20  )-----------( 154      ( 23 Jun 2021 06:51 )             31.6     06-23    137  |  97  |  58<H>  ----------------------------<  111<H>  4.0   |  27  |  6.95<H>    Ca    8.5      23 Jun 2021 06:51  Phos  3.7     06-23  Mg     2.3     06-23    TPro  7.1  /  Alb  3.4<L>  /  TBili  0.5  /  DBili  x   /  AST  12  /  ALT  25  /  AlkPhos  155<H>  06-22          CARDIAC MARKERS ( 22 Jun 2021 09:44 )  0.034 ng/mL / x     / x     / x     / x            Serum Pro-Brain Natriuretic Peptide: [id number] pg/mL (06-22-21 @ 09:44)          MICROBIOLOGY: (if applicable)    RADIOLOGY & ADDITIONAL STUDIES:  EKG:   CXR:  ECHO:    IMPRESSION: 85y Male PAST MEDICAL & SURGICAL HISTORY:  ESRD (end stage renal disease)    Diabetes    Hypertension    Hyperlipidemia    Atrial fibrillation    S/P hemodialysis catheter insertion  7/17    History of appendectomy     p/w     IMP: This is an  85 yr old man  with ESRD on HD T/Th/sat, DM type 2, HTN, Anemia of renal disease, hyperphosphatemia, Afib on A/C Presented with SOB. Pt was feeling sob since yesterday.  Pt was recently seen by outpt Pulmonologist who recc thoracentesis of pleural effusion but pt declined since he was asymptomatic. Pat sob is due to pulmonary edema from fluid over load . Doubt if  loculated effusion contributed to his symptoms     Sugg  -dyspnea improved   -CXR  -continue dialysis as per Neph  -IR eval for thoracentesis   -eliquis d/c 6/23  -continue meds   -dvt/gi prophy

## 2021-06-23 NOTE — CHART NOTE - NSCHARTNOTEFT_GEN_A_CORE
Paged by RN, pt is on supplemental O2, needs active order. Admitted for shortness of breath sob due to pulmonary edema from fluid over load.     -Supplemental O2 2L, placed  -Continue current/present care Paged by RN, pt is on supplemental O2, needs active order. Admitted for shortness of breath due to pulmonary edema from fluid over load.     -Supplemental O2 2L, placed  -Continue current/present care

## 2021-06-23 NOTE — PROGRESS NOTE ADULT - SUBJECTIVE AND OBJECTIVE BOX
PATIENT SEEN AND EXAMINED ON :-06/23/2021  DATE OF SERVICE:     06/23/2021       Interim events noted,Labs ,Radiological studies and Cardiology tests reviewed .    Patient is a 85y old  Male who presents with a chief complaint of Shortness of breath (22 Jun 2021 22:51)      INTERVAL HPI/OVERNIGHT EVENTS: seen at bedside, states feeling a lot better today. denies any pain or sob.     MEDICATIONS  (STANDING):  amLODIPine   Tablet 10 milliGRAM(s) Oral daily  apixaban 2.5 milliGRAM(s) Oral two times a day  atorvastatin 10 milliGRAM(s) Oral at bedtime  calcium acetate 1334 milliGRAM(s) Oral three times a day with meals  chlorhexidine 2% Cloths 1 Application(s) Topical daily  dextrose 50% Injectable 12.5 Gram(s) IV Push once  finasteride 5 milliGRAM(s) Oral daily  furosemide    Tablet 80 milliGRAM(s) Oral <User Schedule>  hydrALAZINE 100 milliGRAM(s) Oral every 8 hours  insulin lispro (ADMELOG) corrective regimen sliding scale   SubCutaneous Before meals and at bedtime  losartan 100 milliGRAM(s) Oral daily  metoprolol tartrate 25 milliGRAM(s) Oral two times a day        __________________________________________________  REVIEW OF SYSTEMS:    CONSTITUTIONAL: No fever,   EYES: no acute visual disturbances  NECK: No pain or stiffness  RESPIRATORY: No cough; No shortness of breath  CARDIOVASCULAR: No chest pain, no palpitations  GASTROINTESTINAL: No pain. No nausea or vomiting; No diarrhea   NEUROLOGICAL: No headache or numbness, no tremors  MUSCULOSKELETAL: No joint pain, no muscle pain  GENITOURINARY: no dysuria, no frequency, no hesitancy  PSYCHIATRY: no depression , no anxiety  ALL OTHER  ROS negative        Vital Signs Last 24 Hrs  T(C): 36.4 (23 Jun 2021 13:41), Max: 37.1 (22 Jun 2021 21:42)  T(F): 97.5 (23 Jun 2021 13:41), Max: 98.7 (22 Jun 2021 21:42)  HR: 87 (23 Jun 2021 14:32) (71 - 98)  BP: 103/54 (23 Jun 2021 14:32) (103/54 - 165/78)  RR: 18 (23 Jun 2021 13:41) (18 - 18)  SpO2: 100% (23 Jun 2021 14:32) (96% - 100%)    ________________________________________________  PHYSICAL EXAM:  GENERAL: NAD weakness  HEENT: Normocephalic;  conjunctivae and sclerae clear; moist mucous membranes;   NECK : supple  CHEST/LUNG: Clear to auscultation bilaterally with good air entry   HEART: Irregular; n2/6 systolic murmur, No gallops or rubs  ABDOMEN: Soft, Nontender, Nondistended; Bowel sounds present  EXTREMITIES: no cyanosis; no edema; no calf tenderness  SKIN: warm and dry; no rash, left upper arm HD access + bruit  NERVOUS SYSTEM:  Awake and alert; Oriented  to place, person and time ;      _________________________________________________  LABS:                        10.5   5.20  )-----------( 154      ( 23 Jun 2021 06:51 )             31.6     06-23    137  |  97  |  58<H>  ----------------------------<  111<H>  4.0   |  27  |  6.95<H>    Ca    8.5      23 Jun 2021 06:51  Phos  3.7     06-23  Mg     2.3     06-23    TPro  7.1  /  Alb  3.4<L>  /  TBili  0.5  /  DBili  x   /  AST  12  /  ALT  25  /  AlkPhos  155<H>  06-22        CAPILLARY BLOOD GLUCOSE      POCT Blood Glucose.: 170 mg/dL (23 Jun 2021 13:31)  POCT Blood Glucose.: 110 mg/dL (23 Jun 2021 07:55)  POCT Blood Glucose.: 223 mg/dL (22 Jun 2021 22:07)  POCT Blood Glucose.: 86 mg/dL (22 Jun 2021 16:11)    EKG: Atrial Fibrillation at 105 BPM No acute ST T wave abnormalities    CXR: FINDINGS:  The heart size is not well assessed on AP film.  There is a moderate left pleural effusion and small right pleural effusion again identified.  There is compressive atelectasis at the left lung base. A rounded opacity in the right midlung may be related to loculated fluid.  There are degenerative changes in the thoracic spine.  IMPRESSION:  Moderate left and small right pleural effusion. There is compressive atelectasis at the left lung base.  A rounded area of opacity in the right midlung may represent loculated fluid.    EXAM:  CT CHEST        IMPRESSION:  Density on the chest x-ray corresponds to loculated fluid in the right fissures. There is a small amount of free pleural fluid on the right. There is moderate left pleural effusion  There is patchy linear atelectasis versus infiltrate in the right lower lobe.  Significant collapse in the left lower lobe likely secondary to pleural fluid          Assessment and Recommendation:   · Assessment	  84yo Male with ESRD on HD, DM type 2, HTN, Anemia of renal disease, hyperphosphatemia, Afib on A/C Presented with SOB. Pt a/w acute hypoxic respiratory failure 2/2 fluid overload.    Problem/Plan - 1:  ·  Problem: Acute pulmonary edema.  Plan: Pt presented with SOB   CXR showed Moderate left and small right pleural effusion. There is compressive atelectasis at the left lung base. A rounded area of opacity in the right midlung may represent loculated fluid.  Last HD on 6/19 @ [facility]; Pt was taken to HD 6/22  will cw fluid restriction and repeat CXR     Problem/Plan - 2:  ·  Problem: Pleural effusion.  Plan: Pt with Loculated Right sided pleural effusion and left basilar Pleural effusion   - CT chest -IMPRESSION:  Density on the chest x-ray corresponds to loculated fluid in the right fissures. There is a small amount of free pleural fluid on the right. There is moderate left pleural effusion  There is patchy linear atelectasis versus infiltrate in the right lower lobe.  Significant collapse in the left lower lobe likely secondary to pleural fluid  will consult IR for Pleural effusion       Problem/Plan - 3:  ·  Problem: ESRD (end stage renal disease).  Plan: Pt wwith ESRD on HD t/th/s  may benefit from increase fluid removal  last HD 6/22   electrolytes wnl   plan as above.     Problem/Plan - 4:  ·  Problem: Atrial fibrillation.  Plan: Pt at home on lopressor and eliquis   continue with home medications.   Rate controlled  Stroke prevention Eliquis 2.5 mg BID    Problem/Plan - 5:  ·  Problem: Hypertension.  Plan: continue with home medications.     Problem/Plan - 6:  Problem: Diabetes. Plan: will start on HSS   a1c in april 6.4  unable to reach family for home medication.    Problem/Plan - 7:  ·  Problem: Hyperlipidemia.  Plan: fu lipid panel and resume home medications.     Problem/Plan - 8:  ·  Problem: Prophylactic measure.  Plan: IMPROVE VTE Individual Risk Assessment  RISK                                                         Points  [  ] Previous VTE                                      3  [  ] Thrombophilia                                   2  [  ] Lower limb paralysis                         2 (unable to hold up >15 seconds)    [  ] Current Cancer                                  2       (within 6 months)  [  ] Immobilization > 24 hrs                    1  [  ] ICU/CCU stay > 24 hrs                         1  [  ] Age > 60                                              1  eliquis bid for dvt ppx.

## 2021-06-23 NOTE — PROGRESS NOTE ADULT - PROBLEM SELECTOR PLAN 10
pending clinical improvement  will repeat CXR in AM  possible IR thoracentesis on Monday  need to hold Eliquis on Friday x 3days

## 2021-06-23 NOTE — PHYSICAL THERAPY INITIAL EVALUATION ADULT - DIAGNOSIS, PT EVAL
Patient was slightly unsteady during ambulation; spo2 99 and 98 before and after ambulation without supplemental oxygen

## 2021-06-23 NOTE — PROGRESS NOTE ADULT - PROBLEM SELECTOR PLAN 9
Full code  pt has a capacity for health decision  wife Stewart [phone], [phone], spoke with daughter and wife at bedside. wants aggressive treatment for now.

## 2021-06-23 NOTE — PROGRESS NOTE ADULT - PROBLEM SELECTOR PLAN 2
-Pt with Loculated Right sided pleural effusion and left basilar Pleural effusion   -Fu CT chest as above  -consulted IR for Pleural effusion, will need to hold Eliquis x  3days prior to procedure  -Pulm Dr. Brandon consulted  -outside pulm dr. Kulkarni ([phone])  -f/u CXR in AM

## 2021-06-23 NOTE — PROGRESS NOTE ADULT - ASSESSMENT
84yo Male with ESRD on HD, DM type 2, HTN, Anemia of renal disease, hyperphosphatemia, Afib on A/C Presented with SOB. Pt a/w acute hypoxic respiratory failure 2/2 fluid overload.  Followed by pulmonary and nephrology. s/p urgent HD 6/22. CT chest result Density on the chest x-ray corresponds to loculated fluid in the right fissures. There is a small amount of free pleural fluid on the right. There is moderate left pleural effusion. There is patchy linear atelectasis versus infiltrate in the right lower lobe. Significant collapse in the left lower lobe likely secondary to pleural fluid.  IR consulted for possible thoracentesis. will need to hold Eliquis x 3 days prior to procedure. pt already took AM dose today.   Pt had HD for extra fluid removal today. PT consulted for weakness, recommended home PT.  Nutrition consulted.

## 2021-06-23 NOTE — PROGRESS NOTE ADULT - SUBJECTIVE AND OBJECTIVE BOX
NP Note discussed with  Primary Attending    Patient is a 85y old  Male who presents with a chief complaint of Shortness of breath (22 Jun 2021 22:51)      INTERVAL HPI/OVERNIGHT EVENTS: seen at bedside, states feeling a lot better today. denies any pain or sob.     MEDICATIONS  (STANDING):  amLODIPine   Tablet 10 milliGRAM(s) Oral daily  apixaban 2.5 milliGRAM(s) Oral two times a day  atorvastatin 10 milliGRAM(s) Oral at bedtime  calcium acetate 1334 milliGRAM(s) Oral three times a day with meals  chlorhexidine 2% Cloths 1 Application(s) Topical daily  dextrose 50% Injectable 12.5 Gram(s) IV Push once  finasteride 5 milliGRAM(s) Oral daily  furosemide    Tablet 80 milliGRAM(s) Oral <User Schedule>  hydrALAZINE 100 milliGRAM(s) Oral every 8 hours  insulin lispro (ADMELOG) corrective regimen sliding scale   SubCutaneous Before meals and at bedtime  losartan 100 milliGRAM(s) Oral daily  metoprolol tartrate 25 milliGRAM(s) Oral two times a day    MEDICATIONS  (PRN):      __________________________________________________  REVIEW OF SYSTEMS:    CONSTITUTIONAL: No fever,   EYES: no acute visual disturbances  NECK: No pain or stiffness  RESPIRATORY: No cough; No shortness of breath  CARDIOVASCULAR: No chest pain, no palpitations  GASTROINTESTINAL: No pain. No nausea or vomiting; No diarrhea   NEUROLOGICAL: No headache or numbness, no tremors  MUSCULOSKELETAL: No joint pain, no muscle pain  GENITOURINARY: no dysuria, no frequency, no hesitancy  PSYCHIATRY: no depression , no anxiety  ALL OTHER  ROS negative        Vital Signs Last 24 Hrs  T(C): 36.4 (23 Jun 2021 13:41), Max: 37.1 (22 Jun 2021 21:42)  T(F): 97.5 (23 Jun 2021 13:41), Max: 98.7 (22 Jun 2021 21:42)  HR: 87 (23 Jun 2021 14:32) (71 - 98)  BP: 103/54 (23 Jun 2021 14:32) (103/54 - 165/78)  BP(mean): --  RR: 18 (23 Jun 2021 13:41) (18 - 18)  SpO2: 100% (23 Jun 2021 14:32) (96% - 100%)    ________________________________________________  PHYSICAL EXAM:  GENERAL: NAD weakness  HEENT: Normocephalic;  conjunctivae and sclerae clear; moist mucous membranes;   NECK : supple  CHEST/LUNG: Clear to auscultation bilaterally with good air entry   HEART: S1 S2  regular; no murmurs, gallops or rubs  ABDOMEN: Soft, Nontender, Nondistended; Bowel sounds present  EXTREMITIES: no cyanosis; no edema; no calf tenderness  SKIN: warm and dry; no rash, left upper arm HD access + bruit  NERVOUS SYSTEM:  Awake and alert; Oriented  to place, person and time ;      _________________________________________________  LABS:                        10.5   5.20  )-----------( 154      ( 23 Jun 2021 06:51 )             31.6     06-23    137  |  97  |  58<H>  ----------------------------<  111<H>  4.0   |  27  |  6.95<H>    Ca    8.5      23 Jun 2021 06:51  Phos  3.7     06-23  Mg     2.3     06-23    TPro  7.1  /  Alb  3.4<L>  /  TBili  0.5  /  DBili  x   /  AST  12  /  ALT  25  /  AlkPhos  155<H>  06-22        CAPILLARY BLOOD GLUCOSE      POCT Blood Glucose.: 170 mg/dL (23 Jun 2021 13:31)  POCT Blood Glucose.: 110 mg/dL (23 Jun 2021 07:55)  POCT Blood Glucose.: 223 mg/dL (22 Jun 2021 22:07)  POCT Blood Glucose.: 86 mg/dL (22 Jun 2021 16:11)        RADIOLOGY & ADDITIONAL TESTS:    Imaging  Reviewed:  YES  < from: CT Chest No Cont (06.22.21 @ 19:00) >    EXAM:  CT CHEST                            PROCEDURE DATE:  06/22/2021          INTERPRETATION:  CLINICAL INFORMATION: Chest x-ray question loculated right pleural fluid    COMPARISON: Chest x-ray of earlier in the day    CONTRAST/COMPLICATIONS:  IV Contrast: NONE  Oral Contrast: NONE  Complications: None reported at time of study completion    PROCEDURE:  CT of the Chest was performed.  Sagittal and coronal reformats were performed.    FINDINGS:    LUNGS AND AIRWAYS: Patent central airways.  Lungs are remarkable for significant compressive atelectasis in the left lower lobe and a small amount of passive atelectasis in the lingula. There is linear atelectasis versus infiltrate in the right lower lobe  PLEURA: Moderate free-flowing left pleural effusion as seen on the chest x-ray . Loculated fluid in the right ager and minor fissures corresponding to the x-ray abnormality. Small amount of free-flowing right pleural fluid.  MEDIASTINUM AND LETITIA: No lymphadenopathy.  VESSELS: Atherosclerotic calcification of aorta and coronary arteries  HEART: Heart size is normal. No pericardial effusion. Mitral annular calcification  CHEST WALL AND LOWER NECK: Within normal limits.  VISUALIZED UPPER ABDOMEN: Within normal limits.  BONES: Degenerativechanges of the spine. Osteopenia    IMPRESSION:  Density on the chest x-ray corresponds to loculated fluid in the right fissures. There is a small amount of free pleural fluid on the right. There is moderate left pleural effusion  There is patchy linear atelectasis versus infiltrate in the right lower lobe.  Significant collapse in the left lower lobe likely secondary to pleural fluid            FLO LANIER MD; Attending Radiologist  This document has been electronically signed. Jun 22 2021  7:18PM    < end of copied text >  < from: Xray Chest 1 View-PORTABLE IMMEDIATE (Xray Chest 1 View-PORTABLE IMMEDIATE .) (06.22.21 @ 19:17) >    EXAM:  XR CHEST PORTABLE IMMED 1V                            PROCEDURE DATE:  06/22/2021          INTERPRETATION:  DATE OF STUDY: 6/22/21 a 7:09PM    PRIOR:   6/22/21 plain chest. Had 6/22/21 CT scan of chest.    CLINICAL INDICATION: Post hemodialysis.    TECHNIQUE: portable chest.    IMPRESSION:  Heart is top normal in size.  Mild right pleural effusion with associated right basilar atelectasis.  Moderate, left pleural effusion -lower left lung atelectasis and/or pneumonia.  Persistent left-sided, rounded opacity in mid-right lung probably due to interfissural fluid.  No pneumothorax.  Degenerative changes of the thoracic spine.              FLORIN SANCHEZ MD; Attending Radiologist  This document has been electronically signed. Jun 23 202110:39AM    < end of copied text >    Consultant(s) Notes Reviewed:   YES      Plan of care was discussed with patient and /or primary care giver; all questions and concerns were addressed

## 2021-06-23 NOTE — PROGRESS NOTE ADULT - PROBLEM SELECTOR PLAN 1
-Pt presented with SOB   -CXR showed Moderate left and small right pleural effusion. There is compressive atelectasis at the left lung base. A rounded area of opacity in the right midlung may represent loculated fluid.  -CT chest (6/22) Density on the chest x-ray corresponds to loculated fluid in the right fissures. There is a small amount of free pleural fluid on the right. There is moderate left pleural effusion  There is patchy linear atelectasis versus infiltrate in the right lower lobe.  Significant collapse in the left lower lobe likely secondary to pleural fluid  -Last HD on 6/19 @ [facility]; Pt was taken to HD 6/22  -cw fluid restriction and repeat CXR   -Nephro [facility]iro[facility] following   -Pulm Dr. Brandon consulted

## 2021-06-23 NOTE — PROGRESS NOTE ADULT - SUBJECTIVE AND OBJECTIVE BOX
[facility] NEPHROLOGY- PROGRESS NOTE    Patient is a 84yo Male with ESRD on HD, DM type 2, HTN, Anemia of renal dz, hyperphosphatemia, Afib on A/C p/w SOB. Pt a/w acute hypoxic respiratory failure 2/2 fluid overload. Nephrology consulted for ESRD status.     Hospital Medications: Medications reviewed.  REVIEW OF SYSTEMS:  CONSTITUTIONAL: No fevers or chills  RESPIRATORY: No shortness of breath- resolved, +dry cough intermittent  CARDIOVASCULAR: No chest pain.  GASTROINTESTINAL: No nausea, vomiting, diarrhea or abdominal pain.   VASCULAR: +bilateral lower extremity edema improved    VITALS:  T(F): 97.5 (06-23-21 @ 13:41), Max: 98.7 (06-22-21 @ 21:42)  HR: 87 (06-23-21 @ 14:32)  BP: 103/54 (06-23-21 @ 14:32)  RR: 18 (06-23-21 @ 13:41)  SpO2: 100% (06-23-21 @ 14:32)  Wt(kg): --  Height (cm): 167.6 (06-22 @ 08:32)  Weight (kg): 51.5 (06-22 @ 08:32)  BMI (kg/m2): 18.3 (06-22 @ 08:32)  BSA (m2): 1.57 (06-22 @ 08:32)    06-23 @ 07:01  -  06-23 @ 16:46  --------------------------------------------------------  IN: 500 mL / OUT: 3000 mL / NET: -2500 mL        PHYSICAL EXAM:  Gen: NAD, calm  Cards: +S1/S2, no M/G/R  Resp: decreased BS at basese  GI: soft, NT/ND, NABS  Extremities: No LE edema B/L- resolved  Access: Left AVF +thrill +bruit      LABS:  06-23    137  |  97  |  58<H>  ----------------------------<  111<H>  4.0   |  27  |  6.95<H>    Ca    8.5      23 Jun 2021 06:51  Phos  3.7     06-23  Mg     2.3     06-23    TPro  7.1  /  Alb  3.4<L>  /  TBili  0.5  /  DBili      /  AST  12  /  ALT  25  /  AlkPhos  155<H>  06-22    Creatinine Trend: 6.95 <--, 9.63 <--                        10.5   5.20  )-----------( 154      ( 23 Jun 2021 06:51 )             31.6     Urine Studies:      RADIOLOGY & ADDITIONAL STUDIES:    < from: CT Chest No Cont (06.22.21 @ 19:00) >    EXAM:  CT CHEST                            PROCEDURE DATE:  06/22/2021      < end of copied text >  < from: CT Chest No Cont (06.22.21 @ 19:00) >  IMPRESSION:  Density on the chest x-ray corresponds to loculated fluid in the right fissures. There is a small amount of free pleural fluid on the right. There is moderate left pleural effusion  There is patchy linear atelectasis versus infiltrate in the right lower lobe.  Significant collapse in the left lower lobe likely secondary to pleural fluid        < end of copied text >

## 2021-06-24 LAB
ALBUMIN SERPL ELPH-MCNC: 3 G/DL — LOW (ref 3.5–5)
ALP SERPL-CCNC: 139 U/L — HIGH (ref 40–120)
ALT FLD-CCNC: 17 U/L DA — SIGNIFICANT CHANGE UP (ref 10–60)
ANION GAP SERPL CALC-SCNC: 14 MMOL/L — SIGNIFICANT CHANGE UP (ref 5–17)
AST SERPL-CCNC: 8 U/L — LOW (ref 10–40)
BILIRUB SERPL-MCNC: 0.4 MG/DL — SIGNIFICANT CHANGE UP (ref 0.2–1.2)
BUN SERPL-MCNC: 92 MG/DL — HIGH (ref 7–18)
CALCIUM SERPL-MCNC: 8.4 MG/DL — SIGNIFICANT CHANGE UP (ref 8.4–10.5)
CHLORIDE SERPL-SCNC: 97 MMOL/L — SIGNIFICANT CHANGE UP (ref 96–108)
CO2 SERPL-SCNC: 25 MMOL/L — SIGNIFICANT CHANGE UP (ref 22–31)
CREAT SERPL-MCNC: 8.8 MG/DL — HIGH (ref 0.5–1.3)
GLUCOSE BLDC GLUCOMTR-MCNC: 135 MG/DL — HIGH (ref 70–99)
GLUCOSE BLDC GLUCOMTR-MCNC: 154 MG/DL — HIGH (ref 70–99)
GLUCOSE BLDC GLUCOMTR-MCNC: 244 MG/DL — HIGH (ref 70–99)
GLUCOSE BLDC GLUCOMTR-MCNC: 271 MG/DL — HIGH (ref 70–99)
GLUCOSE SERPL-MCNC: 146 MG/DL — HIGH (ref 70–99)
HCT VFR BLD CALC: 32.6 % — LOW (ref 39–50)
HGB BLD-MCNC: 10.8 G/DL — LOW (ref 13–17)
MAGNESIUM SERPL-MCNC: 2.4 MG/DL — SIGNIFICANT CHANGE UP (ref 1.6–2.6)
MCHC RBC-ENTMCNC: 31.1 PG — SIGNIFICANT CHANGE UP (ref 27–34)
MCHC RBC-ENTMCNC: 33.1 GM/DL — SIGNIFICANT CHANGE UP (ref 32–36)
MCV RBC AUTO: 93.9 FL — SIGNIFICANT CHANGE UP (ref 80–100)
NRBC # BLD: 0 /100 WBCS — SIGNIFICANT CHANGE UP (ref 0–0)
PHOSPHATE SERPL-MCNC: 5.5 MG/DL — HIGH (ref 2.5–4.5)
PLATELET # BLD AUTO: 168 K/UL — SIGNIFICANT CHANGE UP (ref 150–400)
POTASSIUM SERPL-MCNC: 4.3 MMOL/L — SIGNIFICANT CHANGE UP (ref 3.5–5.3)
POTASSIUM SERPL-SCNC: 4.3 MMOL/L — SIGNIFICANT CHANGE UP (ref 3.5–5.3)
PREALB SERPL-MCNC: 25 MG/DL — SIGNIFICANT CHANGE UP (ref 20–40)
PROT SERPL-MCNC: 6.4 G/DL — SIGNIFICANT CHANGE UP (ref 6–8.3)
RBC # BLD: 3.47 M/UL — LOW (ref 4.2–5.8)
RBC # FLD: 13.6 % — SIGNIFICANT CHANGE UP (ref 10.3–14.5)
SODIUM SERPL-SCNC: 136 MMOL/L — SIGNIFICANT CHANGE UP (ref 135–145)
WBC # BLD: 5.88 K/UL — SIGNIFICANT CHANGE UP (ref 3.8–10.5)
WBC # FLD AUTO: 5.88 K/UL — SIGNIFICANT CHANGE UP (ref 3.8–10.5)

## 2021-06-24 PROCEDURE — 71045 X-RAY EXAM CHEST 1 VIEW: CPT | Mod: 26

## 2021-06-24 RX ORDER — APIXABAN 2.5 MG/1
2.5 TABLET, FILM COATED ORAL ONCE
Refills: 0 | Status: COMPLETED | OUTPATIENT
Start: 2021-06-24 | End: 2021-06-24

## 2021-06-24 RX ORDER — APIXABAN 2.5 MG/1
2.5 TABLET, FILM COATED ORAL EVERY 12 HOURS
Refills: 0 | Status: DISCONTINUED | OUTPATIENT
Start: 2021-06-24 | End: 2021-06-24

## 2021-06-24 RX ADMIN — Medication 667 MILLIGRAM(S): at 07:57

## 2021-06-24 RX ADMIN — CHLORHEXIDINE GLUCONATE 1 APPLICATION(S): 213 SOLUTION TOPICAL at 17:24

## 2021-06-24 RX ADMIN — Medication 100 MILLIGRAM(S): at 21:12

## 2021-06-24 RX ADMIN — Medication 25 MILLIGRAM(S): at 18:17

## 2021-06-24 RX ADMIN — Medication 2: at 21:32

## 2021-06-24 RX ADMIN — Medication 3: at 17:24

## 2021-06-24 RX ADMIN — FINASTERIDE 5 MILLIGRAM(S): 5 TABLET, FILM COATED ORAL at 17:25

## 2021-06-24 RX ADMIN — Medication 25 MILLIGRAM(S): at 05:31

## 2021-06-24 RX ADMIN — LOSARTAN POTASSIUM 100 MILLIGRAM(S): 100 TABLET, FILM COATED ORAL at 05:31

## 2021-06-24 RX ADMIN — Medication 1: at 07:57

## 2021-06-24 RX ADMIN — ERYTHROPOIETIN 4000 UNIT(S): 10000 INJECTION, SOLUTION INTRAVENOUS; SUBCUTANEOUS at 13:38

## 2021-06-24 RX ADMIN — APIXABAN 2.5 MILLIGRAM(S): 2.5 TABLET, FILM COATED ORAL at 17:28

## 2021-06-24 RX ADMIN — AMLODIPINE BESYLATE 10 MILLIGRAM(S): 2.5 TABLET ORAL at 05:31

## 2021-06-24 RX ADMIN — Medication 667 MILLIGRAM(S): at 17:25

## 2021-06-24 RX ADMIN — Medication 100 MILLIGRAM(S): at 05:31

## 2021-06-24 RX ADMIN — ATORVASTATIN CALCIUM 10 MILLIGRAM(S): 80 TABLET, FILM COATED ORAL at 21:12

## 2021-06-24 NOTE — PROGRESS NOTE ADULT - PROBLEM SELECTOR PLAN 10
pending clinical improvement  will repeat CXR in AM  possible IR thoracentesis on Monday  need to hold Eliquis on Friday x 3days pending clinical improvement  possible IR thoracentesis on Monday 6/28  need to hold Eliquis on Friday x 3days

## 2021-06-24 NOTE — PROGRESS NOTE ADULT - PROBLEM SELECTOR PLAN 4
-Pt at home on lopressor and eliquis   -continue with home medications  -renal dosing  -need to hold 3 days prior to IR procedure

## 2021-06-24 NOTE — PROGRESS NOTE ADULT - SUBJECTIVE AND OBJECTIVE BOX
PATIENT SEEN AND EXAMINED ON :-06/24/2021  DATE OF SERVICE:    06/24/2021  Interim events noted,Labs ,Radiological studies and Cardiology tests reviewed .      History of Present Illness:   84 yo Male with ESRD on HD T/Th/sat, DM type 2, HTN, Anemia of renal disease, hyperphosphatemia, Afib on A/C Presented with SOB.Pt was feeling sob since yesterday.  Pt was recently seen by outpt Pulmonologist who recc thoracentesis of pleural effusion but pt declined since he was asymptomatic. Pt denies any fever chills abdominal pain or any other complaints     INTERVAL HPI/OVERNIGHT EVENTS: no new complaints, states feeling good. willing to stay for IR thoracentesis on Monday. Saturating well on room air 95%    MEDICATIONS  (STANDING):  amLODIPine   Tablet 10 milliGRAM(s) Oral daily  atorvastatin 10 milliGRAM(s) Oral at bedtime  calcium acetate 667 milliGRAM(s) Oral three times a day with meals  chlorhexidine 2% Cloths 1 Application(s) Topical daily  dextrose 50% Injectable 12.5 Gram(s) IV Push once  epoetin zach-epbx (RETACRIT) Injectable 4000 Unit(s) IV Push <User Schedule>  finasteride 5 milliGRAM(s) Oral daily  furosemide    Tablet 80 milliGRAM(s) Oral <User Schedule>  hydrALAZINE 100 milliGRAM(s) Oral every 8 hours  insulin lispro (ADMELOG) corrective regimen sliding scale   SubCutaneous Before meals and at bedtime  losartan 100 milliGRAM(s) Oral daily  metoprolol tartrate 25 milliGRAM(s) Oral two times a day    MEDICATIONS  (PRN):      __________________________________________________  REVIEW OF SYSTEMS:    CONSTITUTIONAL: No fever,   EYES: no acute visual disturbances  NECK: No pain or stiffness  RESPIRATORY: No cough; No shortness of breath  CARDIOVASCULAR: No chest pain, no palpitations  GASTROINTESTINAL: No pain. No nausea or vomiting; No diarrhea   NEUROLOGICAL: No headache or numbness, no tremors  MUSCULOSKELETAL: No joint pain, no muscle pain  GENITOURINARY: no dysuria, no frequency, no hesitancy  PSYCHIATRY: no depression , no anxiety  ALL OTHER  ROS negative        Vital Signs Last 24 Hrs  T(C): 36.6 (24 Jun 2021 05:27), Max: 36.8 (23 Jun 2021 10:41)  T(F): 97.8 (24 Jun 2021 05:27), Max: 98.3 (23 Jun 2021 10:41)  HR: 87 (24 Jun 2021 05:27) (71 - 93)  BP: 160/62 (24 Jun 2021 05:27) (103/54 - 160/62)  BP(mean): --  RR: 18 (24 Jun 2021 05:27) (18 - 18)  SpO2: 97% (24 Jun 2021 05:27) (97% - 100%)    ________________________________________________  PHYSICAL EXAM:  GENERAL: NAD  HEENT: Normocephalic;  conjunctivae and sclerae clear; moist mucous membranes;   NECK : supple  CHEST/LUNG: Clear to auscultation bilaterally with decreased air entry   HEART: Irregular; 2/6 systolic  murmur,no gallops or rubs  ABDOMEN: Soft, Nontender, Nondistended; Bowel sounds present  EXTREMITIES: no cyanosis; no edema; no calf tenderness, Left upper arm HD access intact + thril  SKIN: warm and dry; no rash  NERVOUS SYSTEM:  Awake and alert; Oriented  to place, person and time ; no new deficits    _________________________________________________  LABS:                        10.8   5.88  )-----------( 168      ( 24 Jun 2021 06:00 )             32.6     06-24    136  |  97  |  92<H>  ----------------------------<  146<H>  4.3   |  25  |  8.80<H>    Ca    8.4      24 Jun 2021 06:00  Phos  5.5     06-24  Mg     2.4     06-24    TPro  6.4  /  Alb  3.0<L>  /  TBili  0.4  /  DBili  x   /  AST  8<L>  /  ALT  17  /  AlkPhos  139<H>  06-24        CAPILLARY BLOOD GLUCOSE    EKG: Atrial Fibrillation at 105 BPM No acute ST T wave abnormalities    CXR: FINDINGS:  The heart size is not well assessed on AP film.  There is a moderate left pleural effusion and small right pleural effusion again identified.  There is compressive atelectasis at the left lung base. A rounded opacity in the right midlung may be related to loculated fluid.  There are degenerative changes in the thoracic spine.  IMPRESSION:  Moderate left and small right pleural effusion. There is compressive atelectasis at the left lung base.  A rounded area of opacity in the right midlung may represent loculated fluid.    EXAM:  CT CHEST        IMPRESSION:  Density on the chest x-ray corresponds to loculated fluid in the right fissures. There is a small amount of free pleural fluid on the right. There is moderate left pleural effusion  There is patchy linear atelectasis versus infiltrate in the right lower lobe.  Significant collapse in the left lower lobe likely secondary to pleural fluid    ECHO:Study Date: 5/2/2021  [facility]  1. Mitral annular calcification. Mild mitral regurgitation.  2. Calcified trileaflet aortic valve with normal opening.  3. Aortic Root: 3.3 cm.  4. Severely dilated left atrium.  LA volume index = 67 cc/m2.  5. Normal left ventricular internal dimensions and wall thicknesses.  6. Normal Left Ventricular Systolic Function,  (EF = 55 to 60%)  7. Normal diastolic function.  8. Normal right atrium.  9. Normal right ventricular size and systolic function (TAPSE  1.7cm).  10. RV systolic pressure is mildly increased at  46 mm Hg.  11. There is mild tricuspidregurgitation.  12. There is mild pulmonic regurgitation.  13. Normal pericardium with no pericardial effusion.  14. Left pleural effusion.    Assessment and Plan:   · Assessment	  84yo Male with ESRD on HD, DM type 2, HTN, Anemia of renal disease, hyperphosphatemia, Afib on A/C Presented with SOB. Pt a/w acute hypoxic respiratory failure 2/2 fluid overload.Pt had HD 6/22, 6/23, another HD  for extra fluid removal today. IR consulted for possible thoracentesis. will need to hold Eliquis x 3 days prior to procedure. pt already took dose on 6/23. Plan for procedure on Monday 6/28. will hold AC 6/25 x 3 days.       Problem/Plan - 1:  ·  Problem: Acute pulmonary edema.  Plan: -Pt presented with SOB   -CXR showed Moderate left and small right pleural effusion. There is compressive atelectasis at the left lung base. A rounded area of opacity in the right midlung may represent loculated fluid.  -CT chest (6/22) Density on the chest x-ray corresponds to loculated fluid in the right fissures. There is a small amount of free pleural fluid on the right. There is moderate left pleural effusion  There is patchy linear atelectasis versus infiltrate in the right lower lobe.  Significant collapse in the left lower lobe likely secondary to pleural fluid  -Last HD on 6/19 @ [facility]; Pt was taken to HD 6/22, 6/23, 6/24  -cw fluid restriction       Problem/Plan - 2:  ·  Problem: Pleural effusion.  Plan: -Pt with Loculated Right sided pleural effusion and left basilar Pleural effusion moderate   -Fu CT chest as above  -consulted IR for Pleural effusion, will need to hold Eliquis x  3days prior to procedure need both therapeuric/diagnostic       Problem/Plan - 3:  ·  Problem: Atrial fibrillation.  Plan: -Pt at home on lopressor and eliquis   -continue with home medications  -renal dosing  -need to hold 3 days prior to IR procedure.     Problem/Plan - 3:  ·  Problem: ESRD (end stage renal disease).  Plan: -Pt wwith ESRD on HD t/th/s  -may benefit from increase fluid removal  -s/p HD 6/22, 6/23HD 6/24 for extra fluid removal.

## 2021-06-24 NOTE — DIETITIAN INITIAL EVALUATION ADULT. - OTHER INFO
visited patient in dialysis, reports losing involuntarily 14% from usual in 5 years(61 to 52kg) . Reports adequate oral intake in-house , No complaints of Nausea, vomiting or diarrhea. No problems with chewing or swallowing food . No food allergies. Will diagnose patient as malnutrition of moderate degree.

## 2021-06-24 NOTE — PROGRESS NOTE ADULT - PROBLEM SELECTOR PLAN 9
Full code  pt has a capacity for health decision  wife Stewart [phone], [phone], spoke with daughter and wife at bedside. wants aggressive treatment for now.

## 2021-06-24 NOTE — PROGRESS NOTE ADULT - PROBLEM SELECTOR PLAN 2
-Pt with Loculated Right sided pleural effusion and left basilar Pleural effusion moderate   -Fu CT chest as above  -consulted IR for Pleural effusion, will need to hold Eliquis x  3days prior to procedure need both therapeuric/diagnostic   -Pulm Dr. Brandon consulted  -outside pulm dr. Kulkarni ([phone])  -f/u CXR in AM -Pt with Loculated Right sided pleural effusion and left basilar Pleural effusion moderate   -Fu CT chest as above  -consulted IR for Pleural effusion, will need to hold Eliquis x  3days prior to procedure need both therapeuric/diagnostic   -Pulm Dr. Brandon consulted  -outside pulm dr. Kulkarni ([phone])  -repeat CXR improving  -IR thoracentesis on Monday. need to hold eliquis x 3 days

## 2021-06-24 NOTE — PROGRESS NOTE ADULT - ASSESSMENT
84yo Male with ESRD on HD, DM type 2, HTN, Anemia of renal disease, hyperphosphatemia, Afib on A/C Presented with SOB. Pt a/w acute hypoxic respiratory failure 2/2 fluid overload.  Followed by pulmonary and nephrology. s/p urgent HD 6/22. CT chest result Density on the chest x-ray corresponds to loculated fluid in the right fissures. There is a small amount of free pleural fluid on the right. There is moderate left pleural effusion. There is patchy linear atelectasis versus infiltrate in the right lower lobe. Significant collapse in the left lower lobe likely secondary to pleural fluid.  IR consulted for possible thoracentesis. will need to hold Eliquis x 3 days prior to procedure. pt already took dose on 6/23. Pt likely need procedure on Monday by IR.   Pt had HD 6/22, 6/24, another HD  for extra fluid removal today. PT consulted for weakness, recommended home PT.  Nutrition consulted.   CXR ordered for re- evaluation pleural effusion.      86yo Male with ESRD on HD, DM type 2, HTN, Anemia of renal disease, hyperphosphatemia, Afib on A/C Presented with SOB. Pt a/w acute hypoxic respiratory failure 2/2 fluid overload.  Followed by pulmonary and nephrology. s/p urgent HD 6/22. CT chest result Density on the chest x-ray corresponds to loculated fluid in the right fissures. There is a small amount of free pleural fluid on the right. There is moderate left pleural effusion. There is patchy linear atelectasis versus infiltrate in the right lower lobe. Significant collapse in the left lower lobe likely secondary to pleural fluid.  Pt had HD 6/22, 6/23, another HD  for extra fluid removal today. PT consulted for weakness, recommended home PT.  Nutrition consulted.   CXR repeated and slightly improving pleural effusion.   IR consulted for possible thoracentesis. will need to hold Eliquis x 3 days prior to procedure. pt already took dose on 6/23. Plan for procedure on Monday 6/28. will hold AC 6/25 x 3 days.

## 2021-06-24 NOTE — PROGRESS NOTE ADULT - SUBJECTIVE AND OBJECTIVE BOX
[facility] NEPHROLOGY- PROGRESS NOTE    Patient is a 86yo Male with ESRD on HD, DM type 2, HTN, Anemia of renal dz, hyperphosphatemia, Afib on A/C p/w SOB. Pt a/w acute hypoxic respiratory failure 2/2 fluid overload. Nephrology consulted for ESRD status.     Hospital Medications: Medications reviewed.  REVIEW OF SYSTEMS:  CONSTITUTIONAL: No fevers or chills  RESPIRATORY: No shortness of breath- resolved, +dry cough intermittent  CARDIOVASCULAR: No chest pain.  GASTROINTESTINAL: No nausea, vomiting, diarrhea or abdominal pain.   VASCULAR: +bilateral lower extremity edema resolved    VITALS:  T(F): 98 (06-24-21 @ 11:00), Max: 98.2 (06-23-21 @ 21:12)  HR: 68 (06-24-21 @ 11:00)  BP: 114/62 (06-24-21 @ 11:00)  RR: 17 (06-24-21 @ 11:00)  SpO2: 100% (06-24-21 @ 11:00)  Wt(kg): --    06-23 @ 07:01  -  06-24 @ 07:00  --------------------------------------------------------  IN: 500 mL / OUT: 3000 mL / NET: -2500 mL      PHYSICAL EXAM:  Gen: NAD, calm  Cards: +S1/S2, no M/G/R  Resp: decreased BS at bases  GI: soft, NT/ND, NABS  Extremities: No LE edema B/L- resolved  Access: Left AVF +thrill +bruit    LABS:  06-24    136  |  97  |  92<H>  ----------------------------<  146<H>  4.3   |  25  |  8.80<H>    Ca    8.4      24 Jun 2021 06:00  Phos  5.5     06-24  Mg     2.4     06-24    TPro  6.4  /  Alb  3.0<L>  /  TBili  0.4  /  DBili      /  AST  8<L>  /  ALT  17  /  AlkPhos  139<H>  06-24    Creatinine Trend: 8.80 <--, 6.95 <--, 9.63 <--                        10.8   5.88  )-----------( 901      ( 24 Jun 2021 06:00 )             32.6     Urine Studies:

## 2021-06-24 NOTE — PROGRESS NOTE ADULT - PROBLEM SELECTOR PLAN 1
-Pt presented with SOB   -CXR showed Moderate left and small right pleural effusion. There is compressive atelectasis at the left lung base. A rounded area of opacity in the right midlung may represent loculated fluid.  -CT chest (6/22) Density on the chest x-ray corresponds to loculated fluid in the right fissures. There is a small amount of free pleural fluid on the right. There is moderate left pleural effusion  There is patchy linear atelectasis versus infiltrate in the right lower lobe.  Significant collapse in the left lower lobe likely secondary to pleural fluid  -Last HD on 6/19 @ [facility]; Pt was taken to HD 6/22, 6/23, 6/24  -cw fluid restriction   -Nephro Richie following   -Pulm Dr. Brandon consulted  - repeat CXR AM -Pt presented with SOB   -CXR showed Moderate left and small right pleural effusion. There is compressive atelectasis at the left lung base. A rounded area of opacity in the right midlung may represent loculated fluid.  -CT chest (6/22) Density on the chest x-ray corresponds to loculated fluid in the right fissures. There is a small amount of free pleural fluid on the right. There is moderate left pleural effusion  There is patchy linear atelectasis versus infiltrate in the right lower lobe.  Significant collapse in the left lower lobe likely secondary to pleural fluid  -Last HD on 6/19 @ [facility]; Pt was taken to HD 6/22, 6/23, 6/24  -cw fluid restriction   -Nephro Richie following   -Pulm Dr. Brandon consulted  -repeat CXR slightly improving

## 2021-06-24 NOTE — DIETITIAN INITIAL EVALUATION ADULT. - PROBLEM SELECTOR PROBLEM 8
Prophylactic measure Melolabial Interpolation Flap Text: A decision was made to reconstruct the defect utilizing an interpolation axial flap and a staged reconstruction.  A telfa template was made of the defect.  This telfa template was then used to outline the melolabial interpolation flap.  The donor area for the pedicle flap was then injected with anesthesia.  The flap was excised through the skin and subcutaneous tissue down to the layer of the underlying musculature.  The pedicle flap was carefully excised within this deep plane to maintain its blood supply.  The edges of the donor site were undermined.   The donor site was closed in a primary fashion.  The pedicle was then rotated into position and sutured.  Once the tube was sutured into place, adequate blood supply was confirmed with blanching and refill.  The pedicle was then wrapped with xeroform gauze and dressed appropriately with a telfa and gauze bandage to ensure continued blood supply and protect the attached pedicle.

## 2021-06-24 NOTE — PROGRESS NOTE ADULT - SUBJECTIVE AND OBJECTIVE BOX
NP Note discussed with  Primary Attending    Patient is a 85y old  Male who presents with a chief complaint of Shortness of breath (23 Jun 2021 21:34)      INTERVAL HPI/OVERNIGHT EVENTS: no new complaints, states feeling good. willing to stay for IR thoracentesis on Monday. Saturating well on room air 95%    MEDICATIONS  (STANDING):  amLODIPine   Tablet 10 milliGRAM(s) Oral daily  atorvastatin 10 milliGRAM(s) Oral at bedtime  calcium acetate 667 milliGRAM(s) Oral three times a day with meals  chlorhexidine 2% Cloths 1 Application(s) Topical daily  dextrose 50% Injectable 12.5 Gram(s) IV Push once  epoetin zach-epbx (RETACRIT) Injectable 4000 Unit(s) IV Push <User Schedule>  finasteride 5 milliGRAM(s) Oral daily  furosemide    Tablet 80 milliGRAM(s) Oral <User Schedule>  hydrALAZINE 100 milliGRAM(s) Oral every 8 hours  insulin lispro (ADMELOG) corrective regimen sliding scale   SubCutaneous Before meals and at bedtime  losartan 100 milliGRAM(s) Oral daily  metoprolol tartrate 25 milliGRAM(s) Oral two times a day    MEDICATIONS  (PRN):      __________________________________________________  REVIEW OF SYSTEMS:    CONSTITUTIONAL: No fever,   EYES: no acute visual disturbances  NECK: No pain or stiffness  RESPIRATORY: No cough; No shortness of breath  CARDIOVASCULAR: No chest pain, no palpitations  GASTROINTESTINAL: No pain. No nausea or vomiting; No diarrhea   NEUROLOGICAL: No headache or numbness, no tremors  MUSCULOSKELETAL: No joint pain, no muscle pain  GENITOURINARY: no dysuria, no frequency, no hesitancy  PSYCHIATRY: no depression , no anxiety  ALL OTHER  ROS negative        Vital Signs Last 24 Hrs  T(C): 36.6 (24 Jun 2021 05:27), Max: 36.8 (23 Jun 2021 10:41)  T(F): 97.8 (24 Jun 2021 05:27), Max: 98.3 (23 Jun 2021 10:41)  HR: 87 (24 Jun 2021 05:27) (71 - 93)  BP: 160/62 (24 Jun 2021 05:27) (103/54 - 160/62)  BP(mean): --  RR: 18 (24 Jun 2021 05:27) (18 - 18)  SpO2: 97% (24 Jun 2021 05:27) (97% - 100%)    ________________________________________________  PHYSICAL EXAM:  GENERAL: NAD  HEENT: Normocephalic;  conjunctivae and sclerae clear; moist mucous membranes;   NECK : supple  CHEST/LUNG: Clear to auscultation bilaterally with fair air entry Eupneic  HEART: S1 S2  regular; no murmurs, gallops or rubs  ABDOMEN: Soft, Nontender, Nondistended; Bowel sounds present  EXTREMITIES: no cyanosis; no edema; no calf tenderness, Left upper arm HD access intact + thril  SKIN: warm and dry; no rash  NERVOUS SYSTEM:  Awake and alert; Oriented  to place, person and time ; no new deficits    _________________________________________________  LABS:                        10.8   5.88  )-----------( 168      ( 24 Jun 2021 06:00 )             32.6     06-24    136  |  97  |  92<H>  ----------------------------<  146<H>  4.3   |  25  |  8.80<H>    Ca    8.4      24 Jun 2021 06:00  Phos  5.5     06-24  Mg     2.4     06-24    TPro  6.4  /  Alb  3.0<L>  /  TBili  0.4  /  DBili  x   /  AST  8<L>  /  ALT  17  /  AlkPhos  139<H>  06-24        CAPILLARY BLOOD GLUCOSE      POCT Blood Glucose.: 154 mg/dL (24 Jun 2021 07:46)  POCT Blood Glucose.: 228 mg/dL (23 Jun 2021 21:28)  POCT Blood Glucose.: 237 mg/dL (23 Jun 2021 16:42)  POCT Blood Glucose.: 170 mg/dL (23 Jun 2021 13:31)        RADIOLOGY & ADDITIONAL TESTS:    Imaging  Reviewed:  YES  < from: CT Chest No Cont (06.22.21 @ 19:00) >    EXAM:  CT CHEST                            PROCEDURE DATE:  06/22/2021          INTERPRETATION:  CLINICAL INFORMATION: Chest x-ray question loculated right pleural fluid    COMPARISON: Chest x-ray of earlier in the day    CONTRAST/COMPLICATIONS:  IV Contrast: NONE  Oral Contrast: NONE  Complications: None reported at time of study completion    PROCEDURE:  CT of the Chest was performed.  Sagittal and coronal reformats were performed.    FINDINGS:    LUNGS AND AIRWAYS: Patent central airways.  Lungs are remarkable for significant compressive atelectasis in the left lower lobe and a small amount of passive atelectasis in the lingula. There is linear atelectasis versus infiltrate in the right lower lobe  PLEURA: Moderate free-flowing left pleural effusion as seen on the chest x-ray . Loculated fluid in the right ager and minor fissures corresponding to the x-ray abnormality. Small amount of free-flowing right pleural fluid.  MEDIASTINUM AND LETITIA: No lymphadenopathy.  VESSELS: Atherosclerotic calcification of aorta and coronary arteries  HEART: Heart size is normal. No pericardial effusion. Mitral annular calcification  CHEST WALL AND LOWER NECK: Within normal limits.  VISUALIZED UPPER ABDOMEN: Within normal limits.  BONES: Degenerativechanges of the spine. Osteopenia    IMPRESSION:  Density on the chest x-ray corresponds to loculated fluid in the right fissures. There is a small amount of free pleural fluid on the right. There is moderate left pleural effusion  There is patchy linear atelectasis versus infiltrate in the right lower lobe.  Significant collapse in the left lower lobe likely secondary to pleural fluid            FLO LANIER MD; Attending Radiologist  This document has been electronically signed. Jun 22 2021  7:18PM    < end of copied text >  < from: Xray Chest 1 View-PORTABLE IMMEDIATE (Xray Chest 1 View-PORTABLE IMMEDIATE .) (06.22.21 @ 19:17) >    EXAM:  XR CHEST PORTABLE IMMED 1V                            PROCEDURE DATE:  06/22/2021          INTERPRETATION:  DATE OF STUDY: 6/22/21 a 7:09PM    PRIOR:   6/22/21 plain chest. Had 6/22/21 CT scan of chest.    CLINICAL INDICATION: Post hemodialysis.    TECHNIQUE: portable chest.    IMPRESSION:  Heart is top normal in size.  Mild right pleural effusion with associated right basilar atelectasis.  Moderate, left pleural effusion -lower left lung atelectasis and/or pneumonia.  Persistent left-sided, rounded opacity in mid-right lung probably due to interfissural fluid.  No pneumothorax.  Degenerative changes of the thoracic spine.              FLORIN SANCHEZ MD; Attending Radiologist  This document has been electronically signed. Jun 23 202110:39AM    < end of copied text >    Consultant(s) Notes Reviewed:   YES      Plan of care was discussed with patient and /or primary care giver; all questions and concerns were addressed

## 2021-06-24 NOTE — PROGRESS NOTE ADULT - ASSESSMENT
Patient is a 86yo Male with ESRD on HD, DM type 2, HTN, Anemia of renal dz, hyperphosphatemia, Afib on A/C p/w SOB. Pt a/w acute hypoxic respiratory failure 2/2 fluid overload. Nephrology consulted for ESRD status.     1) ESRD: Pt seen on HD today, hemodynamically stable, tolerating UF of 2.5L. Plan for 2 hour PUF 6/25 for extra fluid removal. Monitor electrolytes.  2) Hypertension 2/2 CKD: BP improved.  Continue with current anti-hypertensive medications. c/w low sodium diet and fluid restriction. Monitor BP. Increase UF with HD.   3) Anemia of renal disease: Hb acceptable. c/w Epogen 4k IV tiw with HD. Monitor Hb.  4) Hyperphosphatemia: serum phosphorus and serum calcium acceptable. c/w Phoslo 667mg PO tid with meals. c/w low phos diet. Monitor serum calcium and phosphorus.  5) Acute hypoxic resp failure : 2/2 fluid overload. c/w fluid restriction. Will increase UF with HD. s/p CT chest; Pulmonary following. Pt for IR thoracentesis on Monday 6/28    [facility] NEPHROLOGY  Nahum Grubbs M.D.  Francisco Javier Hicks D.O.  Yovana Quiroz M.D.  Makeda Duron, MSN, ANP-C  [phone]    [id number] [id number]

## 2021-06-24 NOTE — DIETITIAN INITIAL EVALUATION ADULT. - CONTINUE CURRENT NUTRITION CARE PLAN
Provide DASH/TLC, carbohydrate consistent , 800ml fluid restriction, renal replacement, and nepro 1 can/d

## 2021-06-24 NOTE — PROGRESS NOTE ADULT - PROBLEM SELECTOR PLAN 3
-Pt wwith ESRD on HD t/th/s  -may benefit from increase fluid removal  -s/p HD 6/22, 6/23  -electrolytes wnl   -nephrology dr. Quiroz (also outpt nephrology)  -Nutrition consulted  -HD 6/24 for extra fluid removal

## 2021-06-24 NOTE — DIETITIAN NUTRITION RISK NOTIFICATION - TREATMENT: THE FOLLOWING DIET HAS BEEN RECOMMENDED
Diet, DASH/TLC:   Sodium & Cholesterol Restricted  Consistent Carbohydrate {Evening Snacks}  800mL Fluid Restriction ([id number])  For patients receiving Renal Replacement - No Protein Restr, No Conc K, No Conc Phos, Low Sodium (RENAL)  Supplement Feeding Modality:  Oral  Nepro Cans or Servings Per Day:  1       Frequency:  Daily (06-23-21 @ 14:19) [Active]

## 2021-06-24 NOTE — PROGRESS NOTE ADULT - SUBJECTIVE AND OBJECTIVE BOX
Time of Visit:  Patient seen and examined.     MEDICATIONS  (STANDING):  amLODIPine   Tablet 10 milliGRAM(s) Oral daily  apixaban 2.5 milliGRAM(s) Oral once  atorvastatin 10 milliGRAM(s) Oral at bedtime  calcium acetate 667 milliGRAM(s) Oral three times a day with meals  chlorhexidine 2% Cloths 1 Application(s) Topical daily  dextrose 50% Injectable 12.5 Gram(s) IV Push once  epoetin zach-epbx (RETACRIT) Injectable 4000 Unit(s) IV Push <User Schedule>  finasteride 5 milliGRAM(s) Oral daily  furosemide    Tablet 80 milliGRAM(s) Oral <User Schedule>  hydrALAZINE 100 milliGRAM(s) Oral every 8 hours  insulin lispro (ADMELOG) corrective regimen sliding scale   SubCutaneous Before meals and at bedtime  losartan 100 milliGRAM(s) Oral daily  metoprolol tartrate 25 milliGRAM(s) Oral two times a day      MEDICATIONS  (PRN):       Medications up to date at time of exam.    ROS; No fever, chills, cough, congestion.   PHYSICAL EXAMINATION:    Vital Signs Last 24 Hrs  T(C): 36.7 (24 Jun 2021 14:00), Max: 36.8 (23 Jun 2021 21:12)  T(F): 98 (24 Jun 2021 14:00), Max: 98.2 (23 Jun 2021 21:12)  HR: 79 (24 Jun 2021 14:00) (68 - 87)  BP: 106/64 (24 Jun 2021 14:00) (106/64 - 160/62)  BP(mean): --  RR: 16 (24 Jun 2021 14:00) (16 - 18)  SpO2: 100% (24 Jun 2021 14:00) (97% - 100%)   (if applicable)    General; Alert and oriented. Able to answer question with no SOB. No acute distress.     HEENT: Head is normocephalic and atraumatic. No nasal tenderness. Extraocular muscles are intact. Mucous membranes are moist.     NECK: Supple, no palpable adenopathy.    LUNGS: Decreased breath sounds with no wheezing, rales . No use of accessory muscle.     HEART: S1 S2 Regular rate and no click/ rub.     ABDOMEN: Soft, nontender, and nondistended.  No hepatosplenomegaly is noted. Active bowel sounds.     EXTREMITIES: Without any cyanosis, clubbing, rash, lesions or edema. Has Left AV fistula .     NEUROLOGIC: Awake, alert, oriented.     SKIN: Warm and moist. Non diaphoretic.       LABS:                        10.8   5.88  )-----------( 168      ( 24 Jun 2021 06:00 )             32.6     06-24    136  |  97  |  92<H>  ----------------------------<  146<H>  4.3   |  25  |  8.80<H>    Ca    8.4      24 Jun 2021 06:00  Phos  5.5     06-24  Mg     2.4     06-24    TPro  6.4  /  Alb  3.0<L>  /  TBili  0.4  /  DBili  x   /  AST  8<L>  /  ALT  17  /  AlkPhos  139<H>  06-24                Serum Pro-Brain Natriuretic Peptide: [id number] pg/mL (06-22-21 @ 09:44)          MICROBIOLOGY: (if applicable)    RADIOLOGY & ADDITIONAL STUDIES:  EKG:   CXR:  ECHO:    IMPRESSION: 85y Male PAST MEDICAL & SURGICAL HISTORY:  ESRD (end stage renal disease)    Diabetes    Hypertension    Hyperlipidemia    Atrial fibrillation    S/P hemodialysis catheter insertion  7/17    History of appendectomy    Impression; 84 Y/O male presented to ED with SOB x 1 day. Episode of SOB due to Pulmonary Edema from fluid overload . CXR Mild right pleural effusion. Moderate left pleural effusion- left lower lung atelectasis and Pneumonia. CT Chest with Moderate Left Pleural Effusion . Small right pleural free fluid.  Pt was recently seen by outpatient Pulmonologist who recommended  thoracentesis of pleural effusion but pt declined since he was asymptomatic. Negative Blood Cx x2 and Negative RVP .       Suggestion:  O2 saturation 100 % room air. So far saturating good room air.   Renal following , on HD , had dialysis today.   Dyspnea improved .    IR evaluation for thoracentesis for Left moderate pleural effusion ( maybe Monday )   Eliquis to be on hold x 3 days.   GI prophylactic .    Time of Visit:  Patient seen and examined.     MEDICATIONS  (STANDING):  amLODIPine   Tablet 10 milliGRAM(s) Oral daily  apixaban 2.5 milliGRAM(s) Oral once  atorvastatin 10 milliGRAM(s) Oral at bedtime  calcium acetate 667 milliGRAM(s) Oral three times a day with meals  chlorhexidine 2% Cloths 1 Application(s) Topical daily  dextrose 50% Injectable 12.5 Gram(s) IV Push once  epoetin zach-epbx (RETACRIT) Injectable 4000 Unit(s) IV Push <User Schedule>  finasteride 5 milliGRAM(s) Oral daily  furosemide    Tablet 80 milliGRAM(s) Oral <User Schedule>  hydrALAZINE 100 milliGRAM(s) Oral every 8 hours  insulin lispro (ADMELOG) corrective regimen sliding scale   SubCutaneous Before meals and at bedtime  losartan 100 milliGRAM(s) Oral daily  metoprolol tartrate 25 milliGRAM(s) Oral two times a day      MEDICATIONS  (PRN):       Medications up to date at time of exam.    ROS; No fever, chills, cough, congestion.   PHYSICAL EXAMINATION:    Vital Signs Last 24 Hrs  T(C): 36.7 (24 Jun 2021 14:00), Max: 36.8 (23 Jun 2021 21:12)  T(F): 98 (24 Jun 2021 14:00), Max: 98.2 (23 Jun 2021 21:12)  HR: 79 (24 Jun 2021 14:00) (68 - 87)  BP: 106/64 (24 Jun 2021 14:00) (106/64 - 160/62)  BP(mean): --  RR: 16 (24 Jun 2021 14:00) (16 - 18)  SpO2: 100% (24 Jun 2021 14:00) (97% - 100%)   (if applicable)    General; Alert and oriented. Able to answer question with no SOB. No acute distress.     HEENT: Head is normocephalic and atraumatic. No nasal tenderness. Extraocular muscles are intact. Mucous membranes are moist.     NECK: Supple, no palpable adenopathy.    LUNGS: Decreased breath sounds with no wheezing, rales . No use of accessory muscle.     HEART: S1 S2 Regular rate and no click/ rub.     ABDOMEN: Soft, nontender, and nondistended.  No hepatosplenomegaly is noted. Active bowel sounds.     EXTREMITIES: Without any cyanosis, clubbing, rash, lesions or edema. Has Left AV fistula .     NEUROLOGIC: Awake, alert, oriented.     SKIN: Warm and moist. Non diaphoretic.       LABS:                        10.8   5.88  )-----------( 168      ( 24 Jun 2021 06:00 )             32.6     06-24    136  |  97  |  92<H>  ----------------------------<  146<H>  4.3   |  25  |  8.80<H>    Ca    8.4      24 Jun 2021 06:00  Phos  5.5     06-24  Mg     2.4     06-24    TPro  6.4  /  Alb  3.0<L>  /  TBili  0.4  /  DBili  x   /  AST  8<L>  /  ALT  17  /  AlkPhos  139<H>  06-24                Serum Pro-Brain Natriuretic Peptide: 68444 pg/mL (06-22-21 @ 09:44)          MICROBIOLOGY: (if applicable)    RADIOLOGY & ADDITIONAL STUDIES:  EKG:   CXR: improved . f/u official report  ECHO:    IMPRESSION: 85y Male PAST MEDICAL & SURGICAL HISTORY:  ESRD (end stage renal disease)    Diabetes    Hypertension    Hyperlipidemia    Atrial fibrillation    S/P hemodialysis catheter insertion  7/17    History of appendectomy    Impression; 84 Y/O male presented to ED with SOB x 1 day. Episode of SOB due to Pulmonary Edema from fluid overload . CXR Mild right pleural effusion. Moderate left pleural effusion- left lower lung atelectasis and Pneumonia. CT Chest with Moderate Left Pleural Effusion . Small right pleural free fluid.  Pt was recently seen by outpatient Pulmonologist who recommended  thoracentesis of pleural effusion but pt declined since he was asymptomatic. Negative Blood Cx x2 and Negative RVP .       Suggestion:  f/u cxr  official report  O2 saturation 100 % room air. So far saturating good room air.   Renal following , on HD , had dialysis today.   Dyspnea improved .    IR evaluation for thoracentesis for Left moderate pleural effusion ( maybe Monday )   Eliquis to be on hold x 3 days.   GI prophylactic .

## 2021-06-24 NOTE — DIETITIAN INITIAL EVALUATION ADULT. - PERTINENT LABORATORY DATA
06-24 Na136 mmol/L Glu 146 mg/dL<H> K+ 4.3 mmol/L Cr  8.80 mg/dL<H> BUN 92 mg/dL<H> 06-24 Phos 5.5 mg/dL<H> 06-24 Alb 3.0 g/dL<L> 06-24 PAB 25 mg/dL 06-23 Chol 152 mg/dL LDL --    HDL 93 mg/dL Trig 44 mg/dL

## 2021-06-24 NOTE — PROGRESS NOTE ADULT - PROBLEM SELECTOR PLAN 8
IMPROVE VTE Individual Risk Assessment  RISK                                                         Points  [  ] Previous VTE                                      3  [  ] Thrombophilia                                   2  [  ] Lower limb paralysis                         2 (unable to hold up >15 seconds)    [  ] Current Cancer                                  2       (within 6 months)  [  ] Immobilization > 24 hrs                    1  [  ] ICU/CCU stay > 24 hrs                         1  [  ] Age > 60                                              1  eliquis bid for dvt ppx IMPROVE VTE Individual Risk Assessment  RISK                                                         Points  [  ] Previous VTE                                      3  [  ] Thrombophilia                                   2  [  ] Lower limb paralysis                         2 (unable to hold up >15 seconds)    [  ] Current Cancer                                  2       (within 6 months)  [  ] Immobilization > 24 hrs                    1  [  ] ICU/CCU stay > 24 hrs                         1  [  ] Age > 60                                              1  eliquis bid for dvt ppx, need to hold x 3 days

## 2021-06-25 ENCOUNTER — TRANSCRIPTION ENCOUNTER (OUTPATIENT)
Age: 86
End: 2021-06-25

## 2021-06-25 LAB
ALBUMIN SERPL ELPH-MCNC: 3 G/DL — LOW (ref 3.5–5)
ALP SERPL-CCNC: 130 U/L — HIGH (ref 40–120)
ALT FLD-CCNC: 15 U/L DA — SIGNIFICANT CHANGE UP (ref 10–60)
ANION GAP SERPL CALC-SCNC: 14 MMOL/L — SIGNIFICANT CHANGE UP (ref 5–17)
AST SERPL-CCNC: 5 U/L — LOW (ref 10–40)
BILIRUB SERPL-MCNC: 0.4 MG/DL — SIGNIFICANT CHANGE UP (ref 0.2–1.2)
BUN SERPL-MCNC: 66 MG/DL — HIGH (ref 7–18)
CALCIUM SERPL-MCNC: 8.3 MG/DL — LOW (ref 8.4–10.5)
CHLORIDE SERPL-SCNC: 98 MMOL/L — SIGNIFICANT CHANGE UP (ref 96–108)
CO2 SERPL-SCNC: 25 MMOL/L — SIGNIFICANT CHANGE UP (ref 22–31)
CREAT SERPL-MCNC: 7.02 MG/DL — HIGH (ref 0.5–1.3)
GLUCOSE BLDC GLUCOMTR-MCNC: 142 MG/DL — HIGH (ref 70–99)
GLUCOSE BLDC GLUCOMTR-MCNC: 189 MG/DL — HIGH (ref 70–99)
GLUCOSE BLDC GLUCOMTR-MCNC: 209 MG/DL — HIGH (ref 70–99)
GLUCOSE BLDC GLUCOMTR-MCNC: 312 MG/DL — HIGH (ref 70–99)
GLUCOSE BLDC GLUCOMTR-MCNC: 316 MG/DL — HIGH (ref 70–99)
GLUCOSE BLDC GLUCOMTR-MCNC: 325 MG/DL — HIGH (ref 70–99)
GLUCOSE SERPL-MCNC: 142 MG/DL — HIGH (ref 70–99)
HCT VFR BLD CALC: 33.9 % — LOW (ref 39–50)
HGB BLD-MCNC: 11.6 G/DL — LOW (ref 13–17)
MAGNESIUM SERPL-MCNC: 2.2 MG/DL — SIGNIFICANT CHANGE UP (ref 1.6–2.6)
MCHC RBC-ENTMCNC: 31.9 PG — SIGNIFICANT CHANGE UP (ref 27–34)
MCHC RBC-ENTMCNC: 34.2 GM/DL — SIGNIFICANT CHANGE UP (ref 32–36)
MCV RBC AUTO: 93.1 FL — SIGNIFICANT CHANGE UP (ref 80–100)
NRBC # BLD: 0 /100 WBCS — SIGNIFICANT CHANGE UP (ref 0–0)
PHOSPHATE SERPL-MCNC: 4.8 MG/DL — HIGH (ref 2.5–4.5)
PLATELET # BLD AUTO: 178 K/UL — SIGNIFICANT CHANGE UP (ref 150–400)
POTASSIUM SERPL-MCNC: 4 MMOL/L — SIGNIFICANT CHANGE UP (ref 3.5–5.3)
POTASSIUM SERPL-SCNC: 4 MMOL/L — SIGNIFICANT CHANGE UP (ref 3.5–5.3)
PROT SERPL-MCNC: 6.5 G/DL — SIGNIFICANT CHANGE UP (ref 6–8.3)
RBC # BLD: 3.64 M/UL — LOW (ref 4.2–5.8)
RBC # FLD: 13.6 % — SIGNIFICANT CHANGE UP (ref 10.3–14.5)
SODIUM SERPL-SCNC: 137 MMOL/L — SIGNIFICANT CHANGE UP (ref 135–145)
WBC # BLD: 5.26 K/UL — SIGNIFICANT CHANGE UP (ref 3.8–10.5)
WBC # FLD AUTO: 5.26 K/UL — SIGNIFICANT CHANGE UP (ref 3.8–10.5)

## 2021-06-25 RX ORDER — HEPARIN SODIUM 5000 [USP'U]/ML
4000 INJECTION INTRAVENOUS; SUBCUTANEOUS EVERY 6 HOURS
Refills: 0 | Status: DISCONTINUED | OUTPATIENT
Start: 2021-06-25 | End: 2021-06-28

## 2021-06-25 RX ORDER — HEPARIN SODIUM 5000 [USP'U]/ML
2000 INJECTION INTRAVENOUS; SUBCUTANEOUS EVERY 6 HOURS
Refills: 0 | Status: DISCONTINUED | OUTPATIENT
Start: 2021-06-25 | End: 2021-06-28

## 2021-06-25 RX ORDER — HEPARIN SODIUM 5000 [USP'U]/ML
4000 INJECTION INTRAVENOUS; SUBCUTANEOUS ONCE
Refills: 0 | Status: COMPLETED | OUTPATIENT
Start: 2021-06-25 | End: 2021-06-25

## 2021-06-25 RX ORDER — HEPARIN SODIUM 5000 [USP'U]/ML
INJECTION INTRAVENOUS; SUBCUTANEOUS
Qty: 25000 | Refills: 0 | Status: DISCONTINUED | OUTPATIENT
Start: 2021-06-25 | End: 2021-06-28

## 2021-06-25 RX ADMIN — Medication 100 MILLIGRAM(S): at 05:16

## 2021-06-25 RX ADMIN — Medication 25 MILLIGRAM(S): at 17:25

## 2021-06-25 RX ADMIN — ATORVASTATIN CALCIUM 10 MILLIGRAM(S): 80 TABLET, FILM COATED ORAL at 21:37

## 2021-06-25 RX ADMIN — HEPARIN SODIUM 1000 UNIT(S)/HR: 5000 INJECTION INTRAVENOUS; SUBCUTANEOUS at 18:26

## 2021-06-25 RX ADMIN — Medication 1: at 17:09

## 2021-06-25 RX ADMIN — HEPARIN SODIUM 4000 UNIT(S): 5000 INJECTION INTRAVENOUS; SUBCUTANEOUS at 18:25

## 2021-06-25 RX ADMIN — CHLORHEXIDINE GLUCONATE 1 APPLICATION(S): 213 SOLUTION TOPICAL at 12:01

## 2021-06-25 RX ADMIN — Medication 667 MILLIGRAM(S): at 12:01

## 2021-06-25 RX ADMIN — Medication 4: at 12:00

## 2021-06-25 RX ADMIN — Medication 667 MILLIGRAM(S): at 08:03

## 2021-06-25 RX ADMIN — Medication 25 MILLIGRAM(S): at 05:15

## 2021-06-25 RX ADMIN — Medication 80 MILLIGRAM(S): at 05:15

## 2021-06-25 RX ADMIN — AMLODIPINE BESYLATE 10 MILLIGRAM(S): 2.5 TABLET ORAL at 05:16

## 2021-06-25 RX ADMIN — Medication 2: at 21:37

## 2021-06-25 RX ADMIN — LOSARTAN POTASSIUM 100 MILLIGRAM(S): 100 TABLET, FILM COATED ORAL at 05:15

## 2021-06-25 RX ADMIN — FINASTERIDE 5 MILLIGRAM(S): 5 TABLET, FILM COATED ORAL at 12:01

## 2021-06-25 RX ADMIN — Medication 667 MILLIGRAM(S): at 17:09

## 2021-06-25 NOTE — PROGRESS NOTE ADULT - PROBLEM SELECTOR PLAN 2
-Pt with Loculated Right sided pleural effusion and left basilar Pleural effusion moderate   -Fu CT chest as above  -consulted IR for Pleural effusion, will need to hold Eliquis x  3days prior to procedure need both therapeuric/diagnostic   -Pulm Dr. Brandon consulted  -outside pulm dr. Kulkarni ([phone])  -repeat CXR improving  -IR thoracentesis on Monday. need to hold eliquis x 3 days  -started heparin drip, need to hold on 6/28 midnight

## 2021-06-25 NOTE — PROGRESS NOTE ADULT - SUBJECTIVE AND OBJECTIVE BOX
Time of Visit:  Patient seen and examined.     MEDICATIONS  (STANDING):  amLODIPine   Tablet 10 milliGRAM(s) Oral daily  atorvastatin 10 milliGRAM(s) Oral at bedtime  calcium acetate 667 milliGRAM(s) Oral three times a day with meals  chlorhexidine 2% Cloths 1 Application(s) Topical daily  dextrose 50% Injectable 12.5 Gram(s) IV Push once  finasteride 5 milliGRAM(s) Oral daily  furosemide    Tablet 80 milliGRAM(s) Oral <User Schedule>  hydrALAZINE 100 milliGRAM(s) Oral every 8 hours  insulin lispro (ADMELOG) corrective regimen sliding scale   SubCutaneous Before meals and at bedtime  losartan 100 milliGRAM(s) Oral daily  metoprolol tartrate 25 milliGRAM(s) Oral two times a day      MEDICATIONS  (PRN):       Medications up to date at time of exam.    ROS; No fever, chills, cough, congestion. Denies SOB on exam.     PHYSICAL EXAMINATION:    Vital Signs Last 24 Hrs  T(C): 36.3 (25 Jun 2021 14:16), Max: 36.8 (24 Jun 2021 18:06)  T(F): 97.4 (25 Jun 2021 14:16), Max: 98.2 (24 Jun 2021 18:06)  HR: 86 (25 Jun 2021 14:29) (74 - 93)  BP: 92/32 (25 Jun 2021 14:29) (92/32 - 130/66)  BP(mean): --  RR: 18 (25 Jun 2021 14:16) (17 - 18)  SpO2: 97% (25 Jun 2021 14:16) (97% - 100%)   (if applicable)    General; Alert and oriented. Able to answer question with no SOB. No acute distress.     HEENT: Head is normocephalic and atraumatic. No nasal tenderness. Extraocular muscles are intact. Mucous membranes are moist.     NECK: Supple, no palpable adenopathy.    LUNGS: Decreased breath sounds with no wheezing, rales . Non labored. No use of accessory muscle.     HEART: S1 S2 Regular rate and no click/ rub.     ABDOMEN: Soft, nontender, and nondistended.  No hepatosplenomegaly is noted. Active bowel sounds.     EXTREMITIES: Without any cyanosis, clubbing, rash, lesions or edema. Has Left AV fistula .     NEUROLOGIC: Awake, alert, oriented.     SKIN: Warm and moist. Non diaphoretic.       LABS:                        11.6   5.26  )-----------( 178      ( 25 Jun 2021 05:53 )             33.9     06-25    137  |  98  |  66<H>  ----------------------------<  142<H>  4.0   |  25  |  7.02<H>    Ca    8.3<L>      25 Jun 2021 05:53  Phos  4.8     06-25  Mg     2.2     06-25    TPro  6.5  /  Alb  3.0<L>  /  TBili  0.4  /  DBili  x   /  AST  5<L>  /  ALT  15  /  AlkPhos  130<H>  06-25                        MICROBIOLOGY: (if applicable)    RADIOLOGY & ADDITIONAL STUDIES:  EKG:   CXR:  ECHO:    IMPRESSION: 85y Male PAST MEDICAL & SURGICAL HISTORY:  ESRD (end stage renal disease)    Diabetes    Hypertension    Hyperlipidemia    Atrial fibrillation    S/P hemodialysis catheter insertion  7/17    History of appendectomy     Impression; 86 Y/O male presented to ED with SOB x 1 day. Episode of SOB due to Pulmonary Edema from fluid overload . CXR Mild right pleural effusion. Moderate left pleural effusion- left lower lung atelectasis and Pneumonia. CT Chest with Moderate Left Pleural Effusion . Small right pleural free fluid.  Pt was recently seen by outpatient Pulmonologist who recommended  thoracentesis of pleural effusion but pt declined since he was asymptomatic. Negative Blood Cx x2 and Negative RVP .  06-24-21 CXR with  reduction  left pleural effusion.     Suggestion:  O2 saturation ranges 97% - 100 % room air. So far saturating good room air.   Renal following , on HD .   Dyspnea improved .    IR evaluation for thoracentesis for Left moderate pleural effusion ( maybe Monday ) . Will repeat CXR Monday CXR A & P in A.M. on Monday .    Eliquis to be on hold x 3 days.   GI prophylactic .     Time of Visit:  Patient seen and examined.     MEDICATIONS  (STANDING):  amLODIPine   Tablet 10 milliGRAM(s) Oral daily  atorvastatin 10 milliGRAM(s) Oral at bedtime  calcium acetate 667 milliGRAM(s) Oral three times a day with meals  chlorhexidine 2% Cloths 1 Application(s) Topical daily  dextrose 50% Injectable 12.5 Gram(s) IV Push once  finasteride 5 milliGRAM(s) Oral daily  furosemide    Tablet 80 milliGRAM(s) Oral <User Schedule>  hydrALAZINE 100 milliGRAM(s) Oral every 8 hours  insulin lispro (ADMELOG) corrective regimen sliding scale   SubCutaneous Before meals and at bedtime  losartan 100 milliGRAM(s) Oral daily  metoprolol tartrate 25 milliGRAM(s) Oral two times a day      MEDICATIONS  (PRN):       Medications up to date at time of exam.    ROS; No fever, chills, cough, congestion. Denies SOB on exam.     PHYSICAL EXAMINATION:    Vital Signs Last 24 Hrs  T(C): 36.3 (25 Jun 2021 14:16), Max: 36.8 (24 Jun 2021 18:06)  T(F): 97.4 (25 Jun 2021 14:16), Max: 98.2 (24 Jun 2021 18:06)  HR: 86 (25 Jun 2021 14:29) (74 - 93)  BP: 92/32 (25 Jun 2021 14:29) (92/32 - 130/66)  BP(mean): --  RR: 18 (25 Jun 2021 14:16) (17 - 18)  SpO2: 97% (25 Jun 2021 14:16) (97% - 100%)   (if applicable)    General; Alert and oriented. Able to answer question with no SOB. No acute distress.     HEENT: Head is normocephalic and atraumatic. No nasal tenderness. Extraocular muscles are intact. Mucous membranes are moist.     NECK: Supple, no palpable adenopathy.    LUNGS: Decreased breath sounds with no wheezing, rales . Non labored. No use of accessory muscle.     HEART: S1 S2 Regular rate and no click/ rub.     ABDOMEN: Soft, nontender, and nondistended.  No hepatosplenomegaly is noted. Active bowel sounds.     EXTREMITIES: Without any cyanosis, clubbing, rash, lesions or edema. Has Left AV fistula .     NEUROLOGIC: Awake, alert, oriented.     SKIN: Warm and moist. Non diaphoretic.       LABS:                        11.6   5.26  )-----------( 178      ( 25 Jun 2021 05:53 )             33.9     06-25    137  |  98  |  66<H>  ----------------------------<  142<H>  4.0   |  25  |  7.02<H>    Ca    8.3<L>      25 Jun 2021 05:53  Phos  4.8     06-25  Mg     2.2     06-25    TPro  6.5  /  Alb  3.0<L>  /  TBili  0.4  /  DBili  x   /  AST  5<L>  /  ALT  15  /  AlkPhos  130<H>  06-25                        MICROBIOLOGY: (if applicable)    RADIOLOGY & ADDITIONAL STUDIES:  EKG:   CXR:  ECHO:    IMPRESSION: 85y Male PAST MEDICAL & SURGICAL HISTORY:  ESRD (end stage renal disease)    Diabetes    Hypertension    Hyperlipidemia    Atrial fibrillation    S/P hemodialysis catheter insertion  7/17    History of appendectomy     Impression; 84 Y/O male presented to ED with SOB x 1 day. Episode of SOB due to Pulmonary Edema from fluid overload . CXR Mild right pleural effusion. Moderate left pleural effusion- left lower lung atelectasis and Pneumonia. CT Chest with Moderate Left Pleural Effusion . Small right pleural free fluid.  Pt was recently seen by outpatient Pulmonologist who recommended  thoracentesis of pleural effusion but pt declined since he was asymptomatic. Negative Blood Cx x2 and Negative RVP .  06-24-21 CXR with  reduction  left pleural effusion.     Suggestion:  O2 saturation ranges 97% - 100 % room air. So far saturating good room air.   Renal following , on HD .   Dyspnea improved .    IR evaluation for thoracentesis for Left moderate pleural effusion ( maybe Monday ) . Will repeat CXR Monday CXR A & P in A.M. on Monday .    Eliquis to be on hold x 3 days.   GI prophylactic .   Agree with above assessment and plan as transcribed.

## 2021-06-25 NOTE — PROGRESS NOTE ADULT - PROBLEM SELECTOR PLAN 10
pending clinical improvement  possible IR thoracentesis on Monday 6/28  need to hold Eliquis on Friday x 3days, need to hold heparin drip 6/28 3am

## 2021-06-25 NOTE — DISCHARGE NOTE PROVIDER - DETAILS OF MALNUTRITION DIAGNOSIS/DIAGNOSES
This patient has been assessed with a concern for Malnutrition and was treated during this hospitalization for the following Nutrition diagnosis/diagnoses:     -  06/24/2021: Moderate protein-calorie malnutrition   -  06/24/2021: Underweight (BMI < 19)

## 2021-06-25 NOTE — PROGRESS NOTE ADULT - SUBJECTIVE AND OBJECTIVE BOX
NP Note discussed with  Primary Attending    Patient is a 85y old  Male who presents with a chief complaint of Shortness of breath (25 Jun 2021 14:20)      INTERVAL HPI/OVERNIGHT EVENTS: pt states feeling better. denies any pain or  discomfort. appetite fair    MEDICATIONS  (STANDING):  amLODIPine   Tablet 10 milliGRAM(s) Oral daily  atorvastatin 10 milliGRAM(s) Oral at bedtime  calcium acetate 667 milliGRAM(s) Oral three times a day with meals  chlorhexidine 2% Cloths 1 Application(s) Topical daily  dextrose 50% Injectable 12.5 Gram(s) IV Push once  finasteride 5 milliGRAM(s) Oral daily  furosemide    Tablet 80 milliGRAM(s) Oral <User Schedule>  hydrALAZINE 100 milliGRAM(s) Oral every 8 hours  insulin lispro (ADMELOG) corrective regimen sliding scale   SubCutaneous Before meals and at bedtime  losartan 100 milliGRAM(s) Oral daily  metoprolol tartrate 25 milliGRAM(s) Oral two times a day    MEDICATIONS  (PRN):      __________________________________________________  REVIEW OF SYSTEMS:    CONSTITUTIONAL: No fever,   EYES: no acute visual disturbances  NECK: No pain or stiffness  RESPIRATORY: No cough; No shortness of breath  CARDIOVASCULAR: No chest pain, no palpitations  GASTROINTESTINAL: No pain. No nausea or vomiting; No diarrhea   NEUROLOGICAL: No headache or numbness, no tremors  MUSCULOSKELETAL: No joint pain, no muscle pain  GENITOURINARY: no dysuria, no frequency, no hesitancy  PSYCHIATRY: no depression , no anxiety  ALL OTHER  ROS negative        Vital Signs Last 24 Hrs  T(C): 36.3 (25 Jun 2021 14:16), Max: 36.8 (24 Jun 2021 18:06)  T(F): 97.4 (25 Jun 2021 14:16), Max: 98.2 (24 Jun 2021 18:06)  HR: 86 (25 Jun 2021 14:29) (74 - 93)  BP: 92/32 (25 Jun 2021 14:29) (92/32 - 130/66)  BP(mean): --  RR: 18 (25 Jun 2021 14:16) (17 - 18)  SpO2: 97% (25 Jun 2021 14:16) (97% - 100%)    ________________________________________________  PHYSICAL EXAM:  GENERAL: NAD  HEENT: Normocephalic;  conjunctivae and sclerae clear; moist mucous membranes;   NECK : supple  CHEST/LUNG: Clear to auscultation bilaterally with good air entry   HEART: S1 S2  regular; no murmurs, gallops or rubs  ABDOMEN: Soft, Nontender, Nondistended; Bowel sounds present  EXTREMITIES: no cyanosis; no edema; no calf tenderness, left arm AFV + bruit/thrills   SKIN: warm and dry; no rash  NERVOUS SYSTEM:  Awake and alert; Oriented  to place, person and time ; no new deficits    _________________________________________________  LABS:                        11.6   5.26  )-----------( 178      ( 25 Jun 2021 05:53 )             33.9     06-25    137  |  98  |  66<H>  ----------------------------<  142<H>  4.0   |  25  |  7.02<H>    Ca    8.3<L>      25 Jun 2021 05:53  Phos  4.8     06-25  Mg     2.2     06-25    TPro  6.5  /  Alb  3.0<L>  /  TBili  0.4  /  DBili  x   /  AST  5<L>  /  ALT  15  /  AlkPhos  130<H>  06-25        CAPILLARY BLOOD GLUCOSE      POCT Blood Glucose.: 312 mg/dL (25 Jun 2021 11:04)  POCT Blood Glucose.: 316 mg/dL (25 Jun 2021 11:00)  POCT Blood Glucose.: 325 mg/dL (25 Jun 2021 10:58)  POCT Blood Glucose.: 142 mg/dL (25 Jun 2021 07:47)  POCT Blood Glucose.: 244 mg/dL (24 Jun 2021 21:25)  POCT Blood Glucose.: 271 mg/dL (24 Jun 2021 16:44)        RADIOLOGY & ADDITIONAL TESTS:    Imaging  Reviewed:  YES  < from: Xray Chest 1 View- PORTABLE-Routine (Xray Chest 1 View- PORTABLE-Routine in AM.) (06.24.21 @ 09:33) >    EXAM:  XR CHEST PORTABLE ROUTINE 1V                            PROCEDURE DATE:  06/24/2021          INTERPRETATION:  Chest one view    HISTORY: Shortness of breath    COMPARISON STUDY: 6/22/2021    Frontal view of the chest shows the heart to be similar in size. The lungs show reduction of left pleural effusion and what appears to be right pseudotumor in the right minor fissure. There is no evidence of pneumothorax.    IMPRESSION:  Decreased effusions.    Thank you for the courtesy of this referral.            ELIDIA LOPEZ MD; Attending Interventional Radiologist  This document has been electronically signed. Jun 25 2021  8:04AM    < end of copied text >  < from: Xray Chest 1 View-PORTABLE IMMEDIATE (Xray Chest 1 View-PORTABLE IMMEDIATE .) (06.22.21 @ 19:17) >    EXAM:  XR CHEST PORTABLE IMMED 1V                            PROCEDURE DATE:  06/22/2021          INTERPRETATION:  DATE OF STUDY: 6/22/21 a 7:09PM    PRIOR:   6/22/21 plain chest. Had 6/22/21 CT scan of chest.    CLINICAL INDICATION: Post hemodialysis.    TECHNIQUE: portable chest.    IMPRESSION:  Heart is top normal in size.  Mild right pleural effusion with associated right basilar atelectasis.  Moderate, left pleural effusion -lower left lung atelectasis and/or pneumonia.  Persistent left-sided, rounded opacity in mid-right lung probably due to interfissural fluid.  No pneumothorax.  Degenerative changes of the thoracic spine.              FLORIN SANCHEZ MD; Attending Radiologist  This document has been electronically signed. Jun 23 202110:39AM    < end of copied text >    Consultant(s) Notes Reviewed:   YES      Plan of care was discussed with patient and /or primary care giver; all questions and concerns were addressed

## 2021-06-25 NOTE — PROGRESS NOTE ADULT - ASSESSMENT
Patient is a 86yo Male with ESRD on HD, DM type 2, HTN, Anemia of renal dz, hyperphosphatemia, Afib on A/C p/w SOB. Pt a/w acute hypoxic respiratory failure 2/2 fluid overload. Nephrology consulted for ESRD status.     1) ESRD: s/p HD 6/24, tolerated well with net 1.9L removed. s/p 2hour PUF earlier today with net 1.5L removed. Plan for next maintenance HD 6/26.  Monitor electrolytes.  2) Hypertension 2/2 CKD: BP low normal.  Continue with current anti-hypertensive medications. c/w low sodium diet and fluid restriction. Monitor BP. Increase UF with HD.   3) Anemia of renal disease: Hb elevated; will hold Epogen 4k IV tiw with HD. Monitor Hb.  4) Hyperphosphatemia: serum phosphorus and serum calcium acceptable. c/w Phoslo 667mg PO tid with meals. c/w low phos diet. Monitor serum calcium and phosphorus.  5) Acute hypoxic resp failure: 2/2 fluid overload. c/w fluid restriction. Increase UF with HD. s/p CT chest; Pulmonary following. Pt for IR thoracentesis on Monday 6/28 off Eliquis for 3 days    [facility] NEPHROLOGY  Nahum Grubbs M.D.  Francisco Javier Hicks D.O.  Yovana Quiroz M.D.  Makeda Duron, MSN, ANP-C  [phone]    [address] [place] [id number]

## 2021-06-25 NOTE — PROGRESS NOTE ADULT - SUBJECTIVE AND OBJECTIVE BOX
PATIENT SEEN AND EXAMINED ON :-06/25/2021  DATE OF SERVICE:   06/25/2021    Interim events noted,Labs ,Radiological studies and Cardiology tests reviewed .      Patient is a 85y old  Male who presents with a chief complaint of Shortness of breath (25 Jun 2021 14:20)      INTERVAL HPI/OVERNIGHT EVENTS: pt states feeling better. denies any pain or  discomfort. appetite fair    MEDICATIONS  (STANDING):  amLODIPine   Tablet 10 milliGRAM(s) Oral daily  atorvastatin 10 milliGRAM(s) Oral at bedtime  calcium acetate 667 milliGRAM(s) Oral three times a day with meals  chlorhexidine 2% Cloths 1 Application(s) Topical daily  dextrose 50% Injectable 12.5 Gram(s) IV Push once  finasteride 5 milliGRAM(s) Oral daily  furosemide    Tablet 80 milliGRAM(s) Oral <User Schedule>  hydrALAZINE 100 milliGRAM(s) Oral every 8 hours  insulin lispro (ADMELOG) corrective regimen sliding scale   SubCutaneous Before meals and at bedtime  losartan 100 milliGRAM(s) Oral daily  metoprolol tartrate 25 milliGRAM(s) Oral two times a day    MEDICATIONS  (PRN):      __________________________________________________  REVIEW OF SYSTEMS:    CONSTITUTIONAL: No fever,   EYES: no acute visual disturbances  NECK: No pain or stiffness  RESPIRATORY: No cough; No shortness of breath  CARDIOVASCULAR: No chest pain, no palpitations  GASTROINTESTINAL: No pain. No nausea or vomiting; No diarrhea   NEUROLOGICAL: No headache or numbness, no tremors  MUSCULOSKELETAL: No joint pain, no muscle pain  GENITOURINARY: no dysuria, no frequency, no hesitancy  PSYCHIATRY: no depression , no anxiety  ALL OTHER  ROS negative        Vital Signs Last 24 Hrs  T(C): 36.3 (25 Jun 2021 14:16), Max: 36.8 (24 Jun 2021 18:06)  T(F): 97.4 (25 Jun 2021 14:16), Max: 98.2 (24 Jun 2021 18:06)  HR: 86 (25 Jun 2021 14:29) (74 - 93)  BP: 92/32 (25 Jun 2021 14:29) (92/32 - 130/66)  BP(mean): --  RR: 18 (25 Jun 2021 14:16) (17 - 18)  SpO2: 97% (25 Jun 2021 14:16) (97% - 100%)    ________________________________________________  PHYSICAL EXAM:  GENERAL: NAD  HEENT: Normocephalic;  conjunctivae and sclerae clear; moist mucous membranes;   NECK : supple  CHEST/LUNG: Clear to auscultation bilaterally with good air entry   HEART: S1 S2  regular; no murmurs, gallops or rubs  ABDOMEN: Soft, Nontender, Nondistended; Bowel sounds present  EXTREMITIES: no cyanosis; no edema; no calf tenderness, left arm AFV + bruit/thrills   SKIN: warm and dry; no rash  NERVOUS SYSTEM:  Awake and alert; Oriented  to place, person and time ; no new deficits    _________________________________________________  LABS:                        11.6   5.26  )-----------( 178      ( 25 Jun 2021 05:53 )             33.9     06-25    137  |  98  |  66<H>  ----------------------------<  142<H>  4.0   |  25  |  7.02<H>    Ca    8.3<L>      25 Jun 2021 05:53  Phos  4.8     06-25  Mg     2.2     06-25    TPro  6.5  /  Alb  3.0<L>  /  TBili  0.4  /  DBili  x   /  AST  5<L>  /  ALT  15  /  AlkPhos  130<H>  06-25  ASSESSMENT:    Problem/Plan - 1:  ·  Problem: Acute pulmonary edema.  Plan: -Pt presented with SOB   -CXR showed Moderate left and small right pleural effusion. There is compressive atelectasis at the left lung base. A rounded area of opacity in the right midlung may represent loculated fluid.  -CT chest (6/22) Density on the chest x-ray corresponds to loculated fluid in the right fissures. There is a small amount of free pleural fluid on the right. There is moderate left pleural effusion  There is patchy linear atelectasis versus infiltrate in the right lower lobe.  Significant collapse in the left lower lobe likely secondary to pleural fluid  -Last HD on 6/19 @ [facility]; Pt was taken to HD 6/22, 6/23, 6/24, 6/25 to remove extra fluid  -cw fluid restriction   -Nephro Quiroz following   -repeat CXR slightly improving  -pt eupneic on room air.     Problem/Plan - 2:  ·  Problem: Pleural effusion.  Plan: -Pt with Loculated Right sided pleural effusion and left basilar Pleural effusion moderate   -Fu CT chest as above  -consulted IR for Pleural effusion, will need to hold Eliquis x  3days prior to procedure need both therapeuric/diagnostic   -outside pulm dr. Kulkarni ([phone])  -repeat CXR improving  -IR thoracentesis on Monday. need to hold eliquis x 3 days  -started heparin drip, need to hold on 6/28 midnight.     Problem/Plan - 3:  ·  Problem: ESRD (end stage renal disease).  Plan: -Pt wwith ESRD on HD t/th/s  -Left arm AVF + bruit/thril  -may benefit from increase fluid removal  -s/p HD 6/22, 6/23  -electrolytes wnl   --Nutrition consulted  -HD 6/24 for extra fluid removal  -will c/w regular HD 6/25 per dr. Quiroz.     Problem/Plan - 4:  ·  Problem: Atrial fibrillation.  Plan: -Pt at home on lopressor and eliquis   -continue with home medications  -renal dosing  -need to hold 3 days prior to IR procedure  -on heparin drip, need to hold from 6/28 3am per IR.   No cardiac contraindications for IR guided thoracentesis    Problem/Plan - 5:  ·  Problem: Hypertension.  Plan: -continue with home medications  -monitor BP.     Problem/Plan - 6:  Problem: Diabetes. Plan: -will start on HSS   -a1c in april 2021 ---> 6.4  -no DM meds per family and patient.    Problem/Plan - 7:  ·  Problem: Hyperlipidemia.  Plan: c/w home meds.     Problem/Plan - 8:  ·  Problem: Prophylactic measure.  Plan: IMPROVE VTE Individual Risk Assessment  RISK                                                         Points  [  ] Previous VTE                                      3  [  ] Thrombophilia                                   2  [  ] Lower limb paralysis                         2 (unable to hold up >15 seconds)    [  ] Current Cancer                                  2       (within 6 months)  [  ] Immobilization > 24 hrs                    1  [  ] ICU/CCU stay > 24 hrs                         1  [  ] Age > 60                                              1  eliquis bid for dvt ppx, need to hold x 3 days.

## 2021-06-25 NOTE — PROGRESS NOTE ADULT - ASSESSMENT
84yo Male with ESRD on HD, DM type 2, HTN, Anemia of renal disease, hyperphosphatemia, Afib on A/C Presented with SOB. Pt a/w acute hypoxic respiratory failure 2/2 fluid overload.  Followed by pulmonary and nephrology. s/p urgent HD 6/22. CT chest result Density on the chest x-ray corresponds to loculated fluid in the right fissures. There is a small amount of free pleural fluid on the right. There is moderate left pleural effusion. There is patchy linear atelectasis versus infiltrate in the right lower lobe. Significant collapse in the left lower lobe likely secondary to pleural fluid.  Pt had HD 6/22, 6/23, another HD  for extra fluid removal today. PT consulted for weakness, recommended home PT.  Nutrition consulted.   CXR repeated and slightly improving pleural effusion.   IR consulted for possible thoracentesis, scheduled on 6/28, held Eliuis today x 3 days, started heparin drip. need to hold 6/28 at 3 am per IR.

## 2021-06-25 NOTE — PROGRESS NOTE ADULT - PROBLEM SELECTOR PLAN 4
-Pt at home on lopressor and eliquis   -continue with home medications  -renal dosing  -need to hold 3 days prior to IR procedure  -on heparin drip, need to hold from 6/28 3am per IR

## 2021-06-25 NOTE — PROGRESS NOTE ADULT - SUBJECTIVE AND OBJECTIVE BOX
[facility] NEPHROLOGY- PROGRESS NOTE    Patient is a 84yo Male with ESRD on HD, DM type 2, HTN, Anemia of renal dz, hyperphosphatemia, Afib on A/C p/w SOB. Pt a/w acute hypoxic respiratory failure 2/2 fluid overload. Nephrology consulted for ESRD status.     Hospital Medications: Medications reviewed.  REVIEW OF SYSTEMS:  CONSTITUTIONAL: No fevers or chills  RESPIRATORY: No shortness of breath- resolved, +dry cough intermittent  CARDIOVASCULAR: No chest pain.  GASTROINTESTINAL: No nausea, vomiting, diarrhea or abdominal pain.   VASCULAR: +bilateral lower extremity edema resolved    VITALS:  T(F): 97.4 (06-25-21 @ 14:16), Max: 98.2 (06-24-21 @ 18:06)  HR: 76 (06-25-21 @ 14:16)  BP: 100/61 (06-25-21 @ 14:16)  RR: 18 (06-25-21 @ 14:16)  SpO2: 97% (06-25-21 @ 14:16)  Wt(kg): --    06-24 @ 07:01  -  06-25 @ 07:00  --------------------------------------------------------  IN: 240 mL / OUT: 400 mL / NET: -160 mL        PHYSICAL EXAM:  Gen: NAD, calm  Cards: +S1/S2, no M/G/R  Resp: decreased BS at bases L>R  GI: soft, NT/ND, NABS  Extremities: No LE edema B/L- resolved  Access: Left AVF +thrill +bruit    LABS:  06-25    137  |  98  |  66<H>  ----------------------------<  142<H>  4.0   |  25  |  7.02<H>    Ca    8.3<L>      25 Jun 2021 05:53  Phos  4.8     06-25  Mg     2.2     06-25    TPro  6.5  /  Alb  3.0<L>  /  TBili  0.4  /  DBili      /  AST  5<L>  /  ALT  15  /  AlkPhos  130<H>  06-25    Creatinine Trend: 7.02 <--, 8.80 <--, 6.95 <--, 9.63 <--                        11.6   5.26  )-----------( 178      ( 25 Jun 2021 05:53 )             33.9     Urine Studies:      < from: Xray Chest 1 View- PORTABLE-Routine (Xray Chest 1 View- PORTABLE-Routine in AM.) (06.24.21 @ 09:33) >    EXAM:  XR CHEST PORTABLE ROUTINE 1V                            PROCEDURE DATE:  06/24/2021          INTERPRETATION:  Chest one view    HISTORY: Shortness of breath    COMPARISON STUDY: 6/22/2021    Frontal view of the chest shows the heart to be similar in size. The lungs show reduction of left pleural effusion and what appears to be right pseudotumor in the right minor fissure. There is no evidence of pneumothorax.    IMPRESSION:  Decreased effusions.    Thank you for the courtesy of this referral.        < end of copied text >

## 2021-06-25 NOTE — PROGRESS NOTE ADULT - PROBLEM SELECTOR PLAN 8
IMPROVE VTE Individual Risk Assessment  RISK                                                         Points  [  ] Previous VTE                                      3  [  ] Thrombophilia                                   2  [  ] Lower limb paralysis                         2 (unable to hold up >15 seconds)    [  ] Current Cancer                                  2       (within 6 months)  [  ] Immobilization > 24 hrs                    1  [  ] ICU/CCU stay > 24 hrs                         1  [  ] Age > 60                                              1  eliquis bid for dvt ppx, need to hold x 3 days

## 2021-06-25 NOTE — PROGRESS NOTE ADULT - PROBLEM SELECTOR PLAN 3
-Pt wwith ESRD on HD t/th/s  -Left arm AVF + bruit/thril  -may benefit from increase fluid removal  -s/p HD 6/22, 6/23  -electrolytes wnl   -nephrology dr. Quiroz (also outpt nephrology)  -Nutrition consulted  -HD 6/24 for extra fluid removal  -will c/w regular HD 6/25 per dr. Quiroz

## 2021-06-25 NOTE — PROGRESS NOTE ADULT - PROBLEM SELECTOR PLAN 1
-Pt presented with SOB   -CXR showed Moderate left and small right pleural effusion. There is compressive atelectasis at the left lung base. A rounded area of opacity in the right midlung may represent loculated fluid.  -CT chest (6/22) Density on the chest x-ray corresponds to loculated fluid in the right fissures. There is a small amount of free pleural fluid on the right. There is moderate left pleural effusion  There is patchy linear atelectasis versus infiltrate in the right lower lobe.  Significant collapse in the left lower lobe likely secondary to pleural fluid  -Last HD on 6/19 @ [facility]; Pt was taken to HD 6/22, 6/23, 6/24, 6/25 to remove extra fluid  -cw fluid restriction   -Nephro Quiroz following   -Pulm Dr. Brandon consulted  -repeat CXR slightly improving  -pt eupneic on room air.

## 2021-06-25 NOTE — DISCHARGE NOTE PROVIDER - HOSPITAL COURSE
84yo Male with ESRD on HD, DM type 2, HTN, Anemia of renal disease, hyperphosphatemia, Afib on A/C Presented with SOB. Pt a/w acute hypoxic respiratory failure 2/2 fluid overload.  Followed by pulmonary and nephrology. s/p urgent HD 6/22. CT chest result Density on the chest x-ray corresponds to loculated fluid in the right fissures. There is a small amount of free pleural fluid on the right. There is moderate left pleural effusion. There is patchy linear atelectasis versus infiltrate in the right lower lobe. Significant collapse in the left lower lobe likely secondary to pleural fluid.  Pt had HD 6/22, 6/23, another HD  for extra fluid removal 6/24. PT consulted for weakness, recommended home PT.  Nutrition consulted.   CXR repeated and slightly improving pleural effusion.   IR consulted for possible thoracentesis, scheduled on 6/28, held Eliquis today x 3 days, started heparin drip. need to hold 6/28 at 3 am per IR.     incomplete 6/28   86yo Male with ESRD on HD, DM type 2, HTN, Anemia of renal disease, hyperphosphatemia, Afib on A/C Presented with SOB. Pt a/w acute hypoxic respiratory failure 2/2 fluid overload.  Followed by pulmonary and nephrology. s/p urgent HD 6/22. CT chest result Density on the chest x-ray corresponds to loculated fluid in the right fissures. There is a small amount of free pleural fluid on the right. There is moderate left pleural effusion. There is patchy linear atelectasis versus infiltrate in the right lower lobe. Significant collapse in the left lower lobe likely secondary to pleural fluid.  Pt had HD 6/22, 6/23, another HD  for extra fluid removal 6/24. PT consulted for weakness, recommended home PT.  Nutrition consulted.   CXR repeated and slightly improving pleural effusion.   IR consulted for possible thoracentesis, scheduled on 6/28, held Eliquis today x 3 days, started heparin drip. need to hold 6/28 at 3 am per IR.     incomplete 6/25   84yo Male with ESRD on HD, DM type 2, HTN, Anemia of renal disease, hyperphosphatemia, Afib on A/C Presented with SOB. Pt a/w acute hypoxic respiratory failure 2/2 fluid overload.  Followed by pulmonary and nephrology. s/p urgent HD 6/22. CT chest result Density on the chest x-ray corresponds to loculated fluid in the right fissures. There is a small amount of free pleural fluid on the right. There is moderate left pleural effusion. There is patchy linear atelectasis versus infiltrate in the right lower lobe. Significant collapse in the left lower lobe likely secondary to pleural fluid.  Pt had HD 6/22, 6/23, another HD  for extra fluid removal 6/24. PT consulted for weakness, recommended home PT.  Nutrition consulted.   CXR repeated and slightly improving pleural effusion.   IR consulted for  thoracentesis which was performed on 6/28. Patient will undergo HD on 07/01 and discharge afterwards with reinstatement of HD treatment outpatient. CM and SW is fallowing. Patien tA1C on this admission was 7.2 endocrinology consulted patient will be d/c home on tradjenta 5 or januvia 25 with fsg ac and hs    Given patient's improved clinical status and current hemodynamic stability, decision was made to discharge.  Please refer to patient's complete medical chart with documents for a full hospital course, for this is only a brief summary.

## 2021-06-25 NOTE — DISCHARGE NOTE PROVIDER - PROVIDER TOKENS
PROVIDER:[TOKEN:[[id number]],FOLLOWUP:[1 week]] PROVIDER:[TOKEN:[[id number]],FOLLOWUP:[1 week]],PROVIDER:[TOKEN:[[id number]],FOLLOWUP:[1 week]] PROVIDER:[TOKEN:[[id number]],FOLLOWUP:[1 week]],PROVIDER:[TOKEN:[[id number]],FOLLOWUP:[1 week]],PROVIDER:[TOKEN:[[id number]],FOLLOWUP:[2 weeks]]

## 2021-06-25 NOTE — DISCHARGE NOTE PROVIDER - NSDCCPCAREPLAN_GEN_ALL_CORE_FT
PRINCIPAL DISCHARGE DIAGNOSIS  Diagnosis: Acute pulmonary edema  Assessment and Plan of Treatment:       SECONDARY DISCHARGE DIAGNOSES  Diagnosis: Pleural effusion  Assessment and Plan of Treatment:     Diagnosis: Atrial fibrillation  Assessment and Plan of Treatment: Atrial fibrillation is the most common heart rhythm problem.  The condition puts you at risk for has stroke and heart attack  It helps if you control your blood pressure, not drink more than 1-2 alcohol drinks per day, cut down on caffeine, getting treatment for over active thyroid gland, and get regular exercise  Call your doctor if you feel your heart racing or beating unusually, chest tightness or pain, lightheaded, faint, shortness of breath especially with exercise  It is important to take your heart medication as prescribed  You may be on anticoagulation which is very important to take as directed - you may need blood work to monitor drug levels      Diagnosis: Hypertension  Assessment and Plan of Treatment: Low salt diet  Activity as tolerated.  Take all medication as prescribed.  Follow up with your medical doctor for routine blood pressure monitoring at your next visit.  Notify your doctor if you have any of the following symptoms:   Dizziness, Lightheadedness, Blurry vision, Headache, Chest pain, Shortness of breath      Diagnosis: Diabetes  Assessment and Plan of Treatment: HgA1C 7.2 this admission.  Make sure you get your HgA1c checked every three months.  If you take oral diabetes medications, check your blood glucose two times a day.  If you take insulin, check your blood glucose before meals and at bedtime.  It's important not to skip any meals.  Keep a log of your blood glucose results and always take it with you to your doctor appointments.  Keep a list of your current medications including injectables and over the counter medications and bring this medication list with you to all your doctor appointments.  If you have not seen your ophthalmologist this year call for appointment.  Check your feet daily for redness, sores, or openings. Do not self treat. If no improvement in two days call your primary care physician for an appointment.  Low blood sugar (hypoglycemia) is a blood sugar below 70mg/dl. Check your blood sugar if you feel signs/symptoms of hypoglycemia. If your blood sugar is below 70 take 15 grams of carbohydrates (ex 4 oz of apple juice, 3-4 glucose tablets, or 4-6 oz of regular soda) wait 15 minutes and repeat blood sugar to make sure it comes up above 70.  If your blood sugar is above 70 and you are due for a meal, have a meal.  If you are not due for a meal have a snack.  This snack helps keeps your blood sugar at a safe range.      Diagnosis: ESRD (end stage renal disease)  Assessment and Plan of Treatment: Continue medication as prescribed.  Follow up with nephrology for routine HD session.       Diagnosis: Moderate protein-calorie malnutrition  Assessment and Plan of Treatment: Eat balanced food. Follow up with primary MD.        PRINCIPAL DISCHARGE DIAGNOSIS  Diagnosis: Acute pulmonary edema  Assessment and Plan of Treatment: Edema is swelling throughout your body. Edema is usually a sign that you are retaining fluid. The swelling may be caused by heart failure or kidney, thyroid, or liver disease. It may also be caused by medicines such as antidepressants, blood pressure medicines, or hormones.   Seek care immediately if:  You have shortness of breath at rest, especially when you lie down.  You cough up pink, foamy sputum.  You have chest pain.  Your heartbeat is fast or uneven.  Contact your healthcare provider if:  The swollen area feels cold and is pale or blue in color.  The swollen area feels warm, painful, and is red in color.  You have increased swelling or swelling in other parts of your body.  You have questions or concerns about your condition or care.  .        SECONDARY DISCHARGE DIAGNOSES  Diagnosis: ESRD (end stage renal disease)  Assessment and Plan of Treatment: Continue medication as prescribed.  Follow up with nephrology for routine HD session.       Diagnosis: Atrial fibrillation  Assessment and Plan of Treatment: Atrial fibrillation is the most common heart rhythm problem.  The condition puts you at risk for has stroke and heart attack  It helps if you control your blood pressure, not drink more than 1-2 alcohol drinks per day, cut down on caffeine, getting treatment for over active thyroid gland, and get regular exercise  Call your doctor if you feel your heart racing or beating unusually, chest tightness or pain, lightheaded, faint, shortness of breath especially with exercise  It is important to take your heart medication as prescribed  You may be on anticoagulation which is very important to take as directed - you may need blood work to monitor drug levels      Diagnosis: Hypertension  Assessment and Plan of Treatment: Low salt diet  Activity as tolerated.  Take all medication as prescribed.  Follow up with your medical doctor for routine blood pressure monitoring at your next visit.  Notify your doctor if you have any of the following symptoms:   Dizziness, Lightheadedness, Blurry vision, Headache, Chest pain, Shortness of breath      Diagnosis: Diabetes  Assessment and Plan of Treatment: HgA1C 7.2 this admission.  Make sure you get your HgA1c checked every three months.  If you take oral diabetes medications, check your blood glucose two times a day.  If you take insulin, check your blood glucose before meals and at bedtime.  It's important not to skip any meals.  Keep a log of your blood glucose results and always take it with you to your doctor appointments.  Keep a list of your current medications including injectables and over the counter medications and bring this medication list with you to all your doctor appointments.  If you have not seen your ophthalmologist this year call for appointment.  Check your feet daily for redness, sores, or openings. Do not self treat. If no improvement in two days call your primary care physician for an appointment.  Low blood sugar (hypoglycemia) is a blood sugar below 70mg/dl. Check your blood sugar if you feel signs/symptoms of hypoglycemia. If your blood sugar is below 70 take 15 grams of carbohydrates (ex 4 oz of apple juice, 3-4 glucose tablets, or 4-6 oz of regular soda) wait 15 minutes and repeat blood sugar to make sure it comes up above 70.  If your blood sugar is above 70 and you are due for a meal, have a meal.  If you are not due for a meal have a snack.  This snack helps keeps your blood sugar at a safe range.      Diagnosis: Pleural effusion  Assessment and Plan of Treatment:     Diagnosis: Moderate protein-calorie malnutrition  Assessment and Plan of Treatment: Eat balanced food. Follow up with primary MD.

## 2021-06-25 NOTE — DISCHARGE NOTE PROVIDER - CARE PROVIDER_API CALL
Yovana Quiroz)  Internal Medicine  [address] [place] [address]  Phone: [phone]  Fax: [phone]  Follow Up Time: 1 week   Yovana Quiroz)  Internal Medicine  [address] [place] [address]  Phone: [phone]  Fax: [phone]  Follow Up Time: 1 week    [facility] WRIGHT [facility] by [facility]  Cardiology  [address] [place] [address]  Phone: [phone]  Fax: [phone]  Follow Up Time: 1 week   Yovana Quiroz)  Internal Medicine  [address] [place] [address]  Phone: [phone]  Fax: [phone]  Follow Up Time: 1 week    WESLY WRIGHT  Cardiology  [address] [place] [address]  Phone: [phone]  Fax: [phone]  Follow Up Time: 1 week    Sonja Krishnan)  EndocrinologyMetabDiabetes  [address] [place] [address]  Phone: [phone]  Fax: [phone]  Follow Up Time: 2 weeks

## 2021-06-25 NOTE — PROGRESS NOTE ADULT - PROBLEM SELECTOR PLAN 9
Full code  pt has a capacity for health decision  wife Stewart [phone], [phone], spoke with daughter and wife at bedside.

## 2021-06-25 NOTE — DISCHARGE NOTE PROVIDER - NSDCMRMEDTOKEN_GEN_ALL_CORE_FT
amLODIPine 10 mg oral tablet: 1 tab(s) orally once a day  atorvastatin 10 mg oral tablet: 1 tab(s) orally once a day (at bedtime)  calcium acetate 667 mg oral tablet: 1334 milligram(s) orally 3 times a day (with meals)   Eliquis 2.5 mg oral tablet: 1 tab(s) orally 2 times a day  finasteride 5 mg oral tablet: 1 tab(s) orally once a day  furosemide 80 mg oral tablet: 1 tab(s) orally once a day (in the morning) on Non-Dialysis Days  hydrALAZINE 100 mg oral tablet: 1 tab(s) orally 3 times a day  losartan 100 mg oral tablet: 1 tab(s) orally once a day  metoprolol tartrate 25 mg oral tablet: 1 tab(s) orally 2 times a day   alcohol swabs : Apply topically to affected area 4 times a day   amLODIPine 10 mg oral tablet: 1 tab(s) orally once a day  atorvastatin 10 mg oral tablet: 1 tab(s) orally once a day (at bedtime)  calcium acetate 667 mg oral tablet: 1334 milligram(s) orally 3 times a day (with meals)   Eliquis 2.5 mg oral tablet: 1 tab(s) orally 2 times a day  finasteride 5 mg oral tablet: 1 tab(s) orally once a day  furosemide 80 mg oral tablet: 1 tab(s) orally once a day (in the morning) on Non-Dialysis Days  glucometer (per patient&#x27;s insurance): Test blood sugars four times a day. Dispense #1 glucometer.  hydrALAZINE 100 mg oral tablet: 1 tab(s) orally 3 times a day  lancets: 1 application subcutaneously 4 times a day   losartan 100 mg oral tablet: 1 tab(s) orally once a day  metoprolol tartrate 25 mg oral tablet: 1 tab(s) orally 2 times a day   alcohol swabs : Apply topically to affected area 4 times a day   amLODIPine 10 mg oral tablet: 1 tab(s) orally once a day  atorvastatin 10 mg oral tablet: 1 tab(s) orally once a day (at bedtime)  calcium acetate 667 mg oral tablet: 1334 milligram(s) orally 3 times a day (with meals)   Eliquis 2.5 mg oral tablet: 1 tab(s) orally 2 times a day  finasteride 5 mg oral tablet: 1 tab(s) orally once a day  furosemide 80 mg oral tablet: 1 tab(s) orally once a day (in the morning) on Non-Dialysis Days  glucometer (per patient&#x27;s insurance): Test blood sugars four times a day. Dispense #1 glucometer.  hydrALAZINE 100 mg oral tablet: 1 tab(s) orally 3 times a day  Januvia 25 mg oral tablet: 1 tab(s) orally once a day   lancets: 1 application subcutaneously 4 times a day   losartan 100 mg oral tablet: 1 tab(s) orally once a day  metoprolol tartrate 25 mg oral tablet: 1 tab(s) orally 2 times a day   alcohol swabs : Apply topically to affected area 4 times a day   amLODIPine 10 mg oral tablet: 1 tab(s) orally once a day  atorvastatin 10 mg oral tablet: 1 tab(s) orally once a day (at bedtime)  calcium acetate 667 mg oral tablet: 1334 milligram(s) orally 3 times a day (with meals)   Eliquis 2.5 mg oral tablet: 1 tab(s) orally 2 times a day  finasteride 5 mg oral tablet: 1 tab(s) orally once a day  furosemide 80 mg oral tablet: 1 tab(s) orally once a day (in the morning) on Non-Dialysis Days  glucometer (per patient&#x27;s insurance): Test blood sugars four times a day. Dispense #1 glucometer.  hydrALAZINE 100 mg oral tablet: 1 tab(s) orally 3 times a day  Januvia 25 mg oral tablet: 1 tab(s) orally once a day   lancets: 1 application subcutaneously 4 times a day   losartan 100 mg oral tablet: 1 tab(s) orally once a day  metoprolol tartrate 25 mg oral tablet: 1 tab(s) orally 2 times a day  Physical therapy outpatient 3x/week for 4 weeks: 4 week(s) buccal 3 times a week

## 2021-06-26 LAB
ALBUMIN SERPL ELPH-MCNC: 3 G/DL — LOW (ref 3.5–5)
ALP SERPL-CCNC: 119 U/L — SIGNIFICANT CHANGE UP (ref 40–120)
ALT FLD-CCNC: 15 U/L DA — SIGNIFICANT CHANGE UP (ref 10–60)
ANION GAP SERPL CALC-SCNC: 17 MMOL/L — SIGNIFICANT CHANGE UP (ref 5–17)
APTT BLD: 69 SEC — HIGH (ref 27.5–35.5)
APTT BLD: 76.9 SEC — HIGH (ref 27.5–35.5)
APTT BLD: 80.9 SEC — HIGH (ref 27.5–35.5)
APTT BLD: >200 SEC — CRITICAL HIGH (ref 27.5–35.5)
AST SERPL-CCNC: 6 U/L — LOW (ref 10–40)
BILIRUB SERPL-MCNC: 0.4 MG/DL — SIGNIFICANT CHANGE UP (ref 0.2–1.2)
BUN SERPL-MCNC: 102 MG/DL — HIGH (ref 7–18)
CALCIUM SERPL-MCNC: 8.3 MG/DL — LOW (ref 8.4–10.5)
CHLORIDE SERPL-SCNC: 94 MMOL/L — LOW (ref 96–108)
CO2 SERPL-SCNC: 23 MMOL/L — SIGNIFICANT CHANGE UP (ref 22–31)
CREAT SERPL-MCNC: 9.63 MG/DL — HIGH (ref 0.5–1.3)
GLUCOSE BLDC GLUCOMTR-MCNC: 142 MG/DL — HIGH (ref 70–99)
GLUCOSE BLDC GLUCOMTR-MCNC: 143 MG/DL — HIGH (ref 70–99)
GLUCOSE BLDC GLUCOMTR-MCNC: 236 MG/DL — HIGH (ref 70–99)
GLUCOSE BLDC GLUCOMTR-MCNC: 286 MG/DL — HIGH (ref 70–99)
GLUCOSE SERPL-MCNC: 225 MG/DL — HIGH (ref 70–99)
HCT VFR BLD CALC: 33.5 % — LOW (ref 39–50)
HCT VFR BLD CALC: 33.9 % — LOW (ref 39–50)
HGB BLD-MCNC: 11.1 G/DL — LOW (ref 13–17)
HGB BLD-MCNC: 11.7 G/DL — LOW (ref 13–17)
INR BLD: 1.1 RATIO — SIGNIFICANT CHANGE UP (ref 0.88–1.16)
MAGNESIUM SERPL-MCNC: 2.2 MG/DL — SIGNIFICANT CHANGE UP (ref 1.6–2.6)
MCHC RBC-ENTMCNC: 30.7 PG — SIGNIFICANT CHANGE UP (ref 27–34)
MCHC RBC-ENTMCNC: 31.5 PG — SIGNIFICANT CHANGE UP (ref 27–34)
MCHC RBC-ENTMCNC: 33.1 GM/DL — SIGNIFICANT CHANGE UP (ref 32–36)
MCHC RBC-ENTMCNC: 34.5 GM/DL — SIGNIFICANT CHANGE UP (ref 32–36)
MCV RBC AUTO: 91.4 FL — SIGNIFICANT CHANGE UP (ref 80–100)
MCV RBC AUTO: 92.5 FL — SIGNIFICANT CHANGE UP (ref 80–100)
NRBC # BLD: 0 /100 WBCS — SIGNIFICANT CHANGE UP (ref 0–0)
NRBC # BLD: 0 /100 WBCS — SIGNIFICANT CHANGE UP (ref 0–0)
PHOSPHATE SERPL-MCNC: 6.3 MG/DL — HIGH (ref 2.5–4.5)
PLATELET # BLD AUTO: 194 K/UL — SIGNIFICANT CHANGE UP (ref 150–400)
PLATELET # BLD AUTO: 197 K/UL — SIGNIFICANT CHANGE UP (ref 150–400)
POTASSIUM SERPL-MCNC: 4.7 MMOL/L — SIGNIFICANT CHANGE UP (ref 3.5–5.3)
POTASSIUM SERPL-SCNC: 4.7 MMOL/L — SIGNIFICANT CHANGE UP (ref 3.5–5.3)
PROT SERPL-MCNC: 6.5 G/DL — SIGNIFICANT CHANGE UP (ref 6–8.3)
PROTHROM AB SERPL-ACNC: 13 SEC — SIGNIFICANT CHANGE UP (ref 10.6–13.6)
RBC # BLD: 3.62 M/UL — LOW (ref 4.2–5.8)
RBC # BLD: 3.71 M/UL — LOW (ref 4.2–5.8)
RBC # FLD: 13.5 % — SIGNIFICANT CHANGE UP (ref 10.3–14.5)
RBC # FLD: 13.5 % — SIGNIFICANT CHANGE UP (ref 10.3–14.5)
SODIUM SERPL-SCNC: 134 MMOL/L — LOW (ref 135–145)
WBC # BLD: 6.01 K/UL — SIGNIFICANT CHANGE UP (ref 3.8–10.5)
WBC # BLD: 8.98 K/UL — SIGNIFICANT CHANGE UP (ref 3.8–10.5)
WBC # FLD AUTO: 6.01 K/UL — SIGNIFICANT CHANGE UP (ref 3.8–10.5)
WBC # FLD AUTO: 8.98 K/UL — SIGNIFICANT CHANGE UP (ref 3.8–10.5)

## 2021-06-26 RX ORDER — CALCIUM ACETATE 667 MG
1334 TABLET ORAL
Refills: 0 | Status: DISCONTINUED | OUTPATIENT
Start: 2021-06-26 | End: 2021-07-01

## 2021-06-26 RX ADMIN — HEPARIN SODIUM 800 UNIT(S)/HR: 5000 INJECTION INTRAVENOUS; SUBCUTANEOUS at 09:20

## 2021-06-26 RX ADMIN — ATORVASTATIN CALCIUM 10 MILLIGRAM(S): 80 TABLET, FILM COATED ORAL at 21:40

## 2021-06-26 RX ADMIN — HEPARIN SODIUM 800 UNIT(S)/HR: 5000 INJECTION INTRAVENOUS; SUBCUTANEOUS at 15:35

## 2021-06-26 RX ADMIN — Medication 667 MILLIGRAM(S): at 08:20

## 2021-06-26 RX ADMIN — Medication 3: at 17:23

## 2021-06-26 RX ADMIN — FINASTERIDE 5 MILLIGRAM(S): 5 TABLET, FILM COATED ORAL at 14:03

## 2021-06-26 RX ADMIN — Medication 1334 MILLIGRAM(S): at 17:24

## 2021-06-26 RX ADMIN — LOSARTAN POTASSIUM 100 MILLIGRAM(S): 100 TABLET, FILM COATED ORAL at 05:52

## 2021-06-26 RX ADMIN — CHLORHEXIDINE GLUCONATE 1 APPLICATION(S): 213 SOLUTION TOPICAL at 14:04

## 2021-06-26 RX ADMIN — Medication 100 MILLIGRAM(S): at 05:52

## 2021-06-26 RX ADMIN — AMLODIPINE BESYLATE 10 MILLIGRAM(S): 2.5 TABLET ORAL at 05:52

## 2021-06-26 RX ADMIN — Medication 1334 MILLIGRAM(S): at 14:03

## 2021-06-26 RX ADMIN — HEPARIN SODIUM 0 UNIT(S)/HR: 5000 INJECTION INTRAVENOUS; SUBCUTANEOUS at 01:29

## 2021-06-26 RX ADMIN — HEPARIN SODIUM 800 UNIT(S)/HR: 5000 INJECTION INTRAVENOUS; SUBCUTANEOUS at 02:30

## 2021-06-26 RX ADMIN — Medication 2: at 21:46

## 2021-06-26 RX ADMIN — Medication 25 MILLIGRAM(S): at 05:52

## 2021-06-26 NOTE — PROGRESS NOTE ADULT - SUBJECTIVE AND OBJECTIVE BOX
DATE OF SERVICE:    Patient was seen and examined on     .Interim events noted.Consultant notes ,Labs,Telemetry reviewed by me    PRESENTING CC:    HPI and HOSPITAL COURSE: HPI:  86 yo Male with ESRD on HD T/Th/sat, DM type 2, HTN, Anemia of renal disease, hyperphosphatemia, Afib on A/C Presented with SOB.Pt was feeling sob since yesterday.  Pt was recently seen by outpt Pulmonologist who recc thoracentesis of pleural effusion but pt declined since he was asymptomatic. Pt denies any fever chills abdominal pain or any other complaints     GOC: unable to reach family, primary team to follow up  (22 Jun 2021 17:57)      INTERIM EVENTS:      PMH -reviewed admission note, no change since admission  Heart Failure: Acute [ ] Chronic [ ] Acute on Chronic [ ] Diastolic [ ] Systolic [ ] Combined Systolic and Diastolic[ ]  RIGOBERTO[ ]  ATN[ ]  CKD I [ ] CKDII [ ] CKD III [ ] CKD IV [ ] CKD V [ ] ESRD[ ]  HTN[ ] CVA[ ] DM[ ] COPD[ ] COVID[ ] AF[ ]  PPM[ ] ICD[ ]    MEDICATIONS  (STANDING):  amLODIPine   Tablet 10 milliGRAM(s) Oral daily  atorvastatin 10 milliGRAM(s) Oral at bedtime  calcium acetate 667 milliGRAM(s) Oral three times a day with meals  chlorhexidine 2% Cloths 1 Application(s) Topical daily  dextrose 50% Injectable 12.5 Gram(s) IV Push once  finasteride 5 milliGRAM(s) Oral daily  furosemide    Tablet 80 milliGRAM(s) Oral <User Schedule>  heparin  Infusion.  Unit(s)/Hr (10 mL/Hr) IV Continuous <Continuous>  hydrALAZINE 100 milliGRAM(s) Oral every 8 hours  insulin lispro (ADMELOG) corrective regimen sliding scale   SubCutaneous Before meals and at bedtime  losartan 100 milliGRAM(s) Oral daily  metoprolol tartrate 25 milliGRAM(s) Oral two times a day    MEDICATIONS  (PRN):  heparin   Injectable 4000 Unit(s) IV Push every 6 hours PRN For aPTT less than 40  heparin   Injectable 2000 Unit(s) IV Push every 6 hours PRN For aPTT between 40 - 57            REVIEW OF SYSTEMS:  Constitutional: [ ] fever, [ ]weight loss,  [ ]fatigue  Eyes: [ ] visual changes  Respiratory: [ ]shortness of breath;  [ ] cough, [ ]wheezing, [ ]chills, [ ]hemoptysis  Cardiovascular: [ ] chest pain, [ ]palpitations, [ ]dizziness,  [ ]leg swelling[ ]orthopnea[ ]PND  Gastrointestinal: [ ] abdominal pain, [ ]nausea, [ ]vomiting,  [ ]diarrhea [ ]Constipation [ ]Melena  Genitourinary: [ ] dysuria, [ ] hematuria [ ]Rose  Neurologic: [ ] headaches [ ] tremors[ ]weakness [ ]Paralysis Right[ ] Left[ ]  Skin: [ ] itching, [ ]burning, [ ] rashes  Endocrine: [ ] heat or cold intolerance  Musculoskeletal: [ ] joint pain or swelling; [ ] muscle, back, or extremity pain  Psychiatric: [ ] depression, [ ]anxiety, [ ]mood swings, or [ ]difficulty sleeping  Hematologic: [ ] easy bruising, [ ] bleeding gums    [x] All remaining systems negative except as per above.   [ ]Unable to obtain.    Vital Signs Last 24 Hrs  T(C): 36.4 (26 Jun 2021 05:21), Max: 36.4 (26 Jun 2021 05:21)  T(F): 97.5 (26 Jun 2021 05:21), Max: 97.5 (26 Jun 2021 05:21)  HR: 90 (26 Jun 2021 05:21) (45 - 90)  BP: 135/66 (26 Jun 2021 05:21) (92/32 - 135/66)  BP(mean): --  RR: 16 (26 Jun 2021 05:21) (16 - 18)  SpO2: 98% (26 Jun 2021 05:21) (97% - 100%)  I&O's Summary    25 Jun 2021 07:01  -  26 Jun 2021 07:00  --------------------------------------------------------  IN: 500 mL / OUT: 2000 mL / NET: -1500 mL        PHYSICAL EXAM:  General: No acute distress BMI-  HEENT: EOMI, PERRL  Neck: Supple, [ ] JVD  Lungs: Equal air entry bilaterally; [ ] rales [ ] wheezing [ ] rhonchi  Heart: Regular rate and rhythm; [ ] murmur   /6 [ ] systolic [ ] diastolic [ ] radiation[ ] rubs [ ]  gallops  Abdomen: Nontender, bowel sounds present  Extremities: No clubbing, cyanosis, [ ] edema [ ]Pulses  equal and intact  Nervous system:  Alert & Oriented X3, no focal deficits  Psychiatric: Normal affect  Skin: No rashes or lesions    LABS:  06-25    137  |  98  |  66<H>  ----------------------------<  142<H>  4.0   |  25  |  7.02<H>    Ca    8.3<L>      25 Jun 2021 05:53  Phos  4.8     06-25  Mg     2.2     06-25    TPro  6.5  /  Alb  3.0<L>  /  TBili  0.4  /  DBili  x   /  AST  5<L>  /  ALT  15  /  AlkPhos  130<H>  06-25    Creatinine Trend: 7.02<--, 8.80<--, 6.95<--, 9.63<--                        11.1   6.01  )-----------( 197      ( 26 Jun 2021 10:12 )             33.5     PTT - ( 26 Jun 2021 08:39 )  PTT:80.9 sec    Cardiac Enzymes:           RADIOLOGY:    ECG [my interpretation]:    TELEMETRY:Reviewed monitor tracings-    ECHO:    STRESS TEST:    CATHETERIZATION:      IMPRESSION AND PLAN:       DATE OF SERVICE:  06/26/2021  Patient was seen and examined on 06/26/2021    .Interim events noted.Consultant notes ,Labs,Telemetry reviewed by me    PRESENTING CC:Dyspnea    HPI and HOSPITAL COURSE: HPI:  86 yo Male with ESRD on HD T/Th/sat, DM type 2, HTN, Anemia of renal disease, hyperphosphatemia, Afib on A/C Presented with SOB.Pt was feeling sob since yesterday.  Pt was recently seen by outpt Pulmonologist who recc thoracentesis of pleural effusion but pt declined since he was asymptomatic. Pt denies any fever chills abdominal pain or any other complaints     GOC: unable to reach family, primary team to follow up  (22 Jun 2021 17:57)      INTERIM EVENTS:Awake alert scheduled for IR pleural tap      PMH -reviewed admission note, no change since admission  Heart Failure: Acute [ ] Chronic [ ] Acute on Chronic [ ] Diastolic [ ] Systolic [ ] Combined Systolic and Diastolic[ ]  RIGOBERTO[ ]  ATN[ ]  CKD I [ ] CKDII [ ] CKD III [ ] CKD IV [ ] CKD V [ ] ESRD[ ]  HTN[ ] CVA[ ] DM[ ] COPD[ ] COVID[ ] AF[ ]  PPM[ ] ICD[ ]    MEDICATIONS  (STANDING):  amLODIPine   Tablet 10 milliGRAM(s) Oral daily  atorvastatin 10 milliGRAM(s) Oral at bedtime  calcium acetate 667 milliGRAM(s) Oral three times a day with meals  chlorhexidine 2% Cloths 1 Application(s) Topical daily  dextrose 50% Injectable 12.5 Gram(s) IV Push once  finasteride 5 milliGRAM(s) Oral daily  furosemide    Tablet 80 milliGRAM(s) Oral <User Schedule>  heparin  Infusion.  Unit(s)/Hr (10 mL/Hr) IV Continuous <Continuous>  hydrALAZINE 100 milliGRAM(s) Oral every 8 hours  insulin lispro (ADMELOG) corrective regimen sliding scale   SubCutaneous Before meals and at bedtime  losartan 100 milliGRAM(s) Oral daily  metoprolol tartrate 25 milliGRAM(s) Oral two times a day    MEDICATIONS  (PRN):  heparin   Injectable 4000 Unit(s) IV Push every 6 hours PRN For aPTT less than 40  heparin   Injectable 2000 Unit(s) IV Push every 6 hours PRN For aPTT between 40 - 57            REVIEW OF SYSTEMS:  Constitutional: [ ] fever, [ ]weight loss,  [x ]fatigue  Eyes: [ ] visual changes  Respiratory: [x ]shortness of breath;  [ ] cough, [ ]wheezing, [ ]chills, [ ]hemoptysis  Cardiovascular: [ ] chest pain, [ ]palpitations, [ ]dizziness,  [ ]leg swelling[ ]orthopnea[ ]PND  Gastrointestinal: [ ] abdominal pain, [ ]nausea, [ ]vomiting,  [ ]diarrhea [ ]Constipation [ ]Melena  Genitourinary: [ ] dysuria, [ ] hematuria [ ]Rose  Neurologic: [ ] headaches [ ] tremors[ ]weakness [ ]Paralysis Right[ ] Left[ ]  Skin: [ ] itching, [ ]burning, [ ] rashes  Endocrine: [ ] heat or cold intolerance  Musculoskeletal: [ ] joint pain or swelling; [ ] muscle, back, or extremity pain  Psychiatric: [ ] depression, [ ]anxiety, [ ]mood swings, or [ ]difficulty sleeping  Hematologic: [ ] easy bruising, [ ] bleeding gums    [x] All remaining systems negative except as per above.   [ ]Unable to obtain.    Vital Signs Last 24 Hrs  T(C): 36.4 (26 Jun 2021 05:21), Max: 36.4 (26 Jun 2021 05:21)  T(F): 97.5 (26 Jun 2021 05:21), Max: 97.5 (26 Jun 2021 05:21)  HR: 90 (26 Jun 2021 05:21) (45 - 90)  BP: 135/66 (26 Jun 2021 05:21) (92/32 - 135/66)  RR: 16 (26 Jun 2021 05:21) (16 - 18)  SpO2: 98% (26 Jun 2021 05:21) (97% - 100%)  I&O's Summary    25 Jun 2021 07:01  -  26 Jun 2021 07:00  --------------------------------------------------------  IN: 500 mL / OUT: 2000 mL / NET: -1500 mL        PHYSICAL EXAM:  General: No acute distress BMI-26  HEENT: EOMI, PERRL  Neck: Supple, [ ] JVD  Lungs: Equal air entry bilaterally; [ ] rales [ ] wheezing [ ] rhonchi  Heart: Regular rate and rhythm; [x ] murmur  2/6 [x ] systolic [ ] diastolic [ ] radiation[ ] rubs [ ]  gallops  Abdomen: Nontender, bowel sounds present  Extremities: No clubbing, cyanosis, [ ] edema [ ]Pulses  equal and intact  Nervous system:  Alert & Oriented X3, no focal deficits  Psychiatric: Normal affect  Skin: No rashes or lesions    LABS:  06-25    137  |  98  |  66<H>  ----------------------------<  142<H>  4.0   |  25  |  7.02<H>    Ca    8.3<L>      25 Jun 2021 05:53  Phos  4.8     06-25  Mg     2.2     06-25    TPro  6.5  /  Alb  3.0<L>  /  TBili  0.4  /  DBili  x   /  AST  5<L>  /  ALT  15  /  AlkPhos  130<H>  06-25    Creatinine Trend: 7.02<--, 8.80<--, 6.95<--, 9.63<--                        11.1   6.01  )-----------( 197      ( 26 Jun 2021 10:12 )             33.5     PTT - ( 26 Jun 2021 08:39 )  PTT:80.9 sec      IMPRESSION AND PLAN:      Problem/Plan - 1:  ·  Problem: Acute pulmonary edema.  Plan: -Pt presented with SOB   -CXR showed Moderate left and small right pleural effusion. There is compressive atelectasis at the left lung base. A rounded area of opacity in the right midlung may represent loculated fluid.  -CT chest (6/22) Density on the chest x-ray corresponds to loculated fluid in the right fissures. There is a small amount of free pleural fluid on the right. There is moderate left pleural effusion  There is patchy linear atelectasis versus infiltrate in the right lower lobe.  Significant collapse in the left lower lobe likely secondary to pleural fluid  -Last HD on 6/19 @ [facility]; Pt was taken to HD 6/22, 6/23, 6/24, 6/25 to remove extra fluid  -cw fluid restriction   -Nephro Quiroz following   -repeat CXR slightly improving  -pt eupneic on room air.     Problem/Plan - 2:  ·  Problem: Pleural effusion.  Plan: -Pt with Loculated Right sided pleural effusion and left basilar Pleural effusion moderate   -Fu CT chest as above  -consulted IR for Pleural effusion, will need to hold Eliquis x  3days prior to procedure need both therapeuric/diagnostic   -outside pulm dr. Kulkarni ([phone])  -repeat CXR improving  -IR thoracentesis on Monday. need to hold eliquis x 3 days  -started heparin drip, need to hold on 6/28 midnight.     Problem/Plan - 3:  ·  Problem: ESRD (end stage renal disease).  Plan: -Pt wwith ESRD on HD t/th/s  -Left arm AVF + bruit/thril  -may benefit from increase fluid removal  -s/p HD 6/22, 6/23  -electrolytes wnl   --Nutrition consulted  -HD 6/24 for extra fluid removal  -will c/w regular HD 6/25 per dr. Quiroz.     Problem/Plan - 4:  ·  Problem: Atrial fibrillation.  Plan: -Pt at home on lopressor and eliquis   -continue with home medications  -renal dosing  -need to hold 3 days prior to IR procedure  -on heparin drip, need to hold from 6/28 3am per IR.   No cardiac contraindications for IR guided thoracentesis    Problem/Plan - 5:  ·  Problem: Hypertension.  Plan: -continue with home medications  -monitor BP.     Problem/Plan - 6:  Problem: Diabetes. Plan: -will start on HSS   -a1c in april 2021 ---> 6.4  -no DM meds per family and patient.    Problem/Plan - 7:  ·  Problem: Hyperlipidemia.  Plan: c/w home meds.     Problem/Plan - 8:  ·  Problem: Prophylactic measure.  Plan: IMPROVE VTE Individual Risk Assessment  RISK                                                         Points  [  ] Previous VTE                                      3  [  ] Thrombophilia                                   2  [  ] Lower limb paralysis                         2 (unable to hold up >15 seconds)    [  ] Current Cancer                                  2       (within 6 months)  [  ] Immobilization > 24 hrs                    1  [  ] ICU/CCU stay > 24 hrs                         1  [  ] Age > 60                                              1  eliquis bid for dvt ppx, need to hold x 3 days.

## 2021-06-26 NOTE — PROGRESS NOTE ADULT - SUBJECTIVE AND OBJECTIVE BOX
Time of Visit:  Patient seen and examined.     MEDICATIONS  (STANDING):  amLODIPine   Tablet 10 milliGRAM(s) Oral daily  atorvastatin 10 milliGRAM(s) Oral at bedtime  calcium acetate 1334 milliGRAM(s) Oral three times a day with meals  chlorhexidine 2% Cloths 1 Application(s) Topical daily  dextrose 50% Injectable 12.5 Gram(s) IV Push once  finasteride 5 milliGRAM(s) Oral daily  furosemide    Tablet 80 milliGRAM(s) Oral <User Schedule>  heparin  Infusion.  Unit(s)/Hr (10 mL/Hr) IV Continuous <Continuous>  insulin lispro (ADMELOG) corrective regimen sliding scale   SubCutaneous Before meals and at bedtime  losartan 100 milliGRAM(s) Oral daily  metoprolol tartrate 25 milliGRAM(s) Oral two times a day      MEDICATIONS  (PRN):  heparin   Injectable 4000 Unit(s) IV Push every 6 hours PRN For aPTT less than 40  heparin   Injectable 2000 Unit(s) IV Push every 6 hours PRN For aPTT between 40 - 57       Medications up to date at time of exam.      PHYSICAL EXAMINATION:  Patient has no new complaints.  GENERAL: The patient is a well-developed, well-nourished, in no apparent distress.     Vital Signs Last 24 Hrs  T(C): 36.4 (26 Jun 2021 16:53), Max: 36.5 (26 Jun 2021 10:06)  T(F): 97.6 (26 Jun 2021 16:53), Max: 97.7 (26 Jun 2021 10:06)  HR: 76 (26 Jun 2021 16:53) (45 - 93)  BP: 115/54 (26 Jun 2021 16:53) (95/60 - 135/66)  BP(mean): --  RR: 17 (26 Jun 2021 16:53) (16 - 17)  SpO2: 97% (26 Jun 2021 16:53) (97% - 99%)   (if applicable)    Chest Tube (if applicable)    HEENT: Head is normocephalic and atraumatic. Extraocular muscles are intact. Mucous membranes are moist.     NECK: Supple, no palpable adenopathy.    LUNGS: Clear to auscultation, no wheezing, rales, or rhonchi.    HEART: Regular rate and rhythm without murmur.    ABDOMEN: Soft, nontender, and nondistended.  No hepatosplenomegaly is noted.    : No painful voiding, no pelvic pain    EXTREMITIES: Without any cyanosis, clubbing, rash, lesions or edema.    NEUROLOGIC: Awake, alert, oriented, grossly intact    SKIN: Warm, dry, good turgor.      LABS:                        11.1   6.01  )-----------( 197      ( 26 Jun 2021 10:12 )             33.5     06-26    134<L>  |  94<L>  |  102<H>  ----------------------------<  225<H>  4.7   |  23  |  9.63<H>    Ca    8.3<L>      26 Jun 2021 10:12  Phos  6.3     06-26  Mg     2.2     06-26    TPro  6.5  /  Alb  3.0<L>  /  TBili  0.4  /  DBili  x   /  AST  6<L>  /  ALT  15  /  AlkPhos  119  06-26    PT/INR - ( 26 Jun 2021 10:12 )   PT: 13.0 sec;   INR: 1.10 ratio         PTT - ( 26 Jun 2021 15:19 )  PTT:69.0 sec                    MICROBIOLOGY: (if applicable)    RADIOLOGY & ADDITIONAL STUDIES:  EKG:   CXR:  ECHO:    IMPRESSION: 85y Male PAST MEDICAL & SURGICAL HISTORY:  ESRD (end stage renal disease)    Diabetes    Hypertension    Hyperlipidemia    Atrial fibrillation    S/P hemodialysis catheter insertion  7/17    History of appendectomy     p/w           RECOMMENDATIONS:   Time of Visit:  Patient seen and examined.     MEDICATIONS  (STANDING):  amLODIPine   Tablet 10 milliGRAM(s) Oral daily  atorvastatin 10 milliGRAM(s) Oral at bedtime  calcium acetate 1334 milliGRAM(s) Oral three times a day with meals  chlorhexidine 2% Cloths 1 Application(s) Topical daily  dextrose 50% Injectable 12.5 Gram(s) IV Push once  finasteride 5 milliGRAM(s) Oral daily  furosemide    Tablet 80 milliGRAM(s) Oral <User Schedule>  heparin  Infusion.  Unit(s)/Hr (10 mL/Hr) IV Continuous <Continuous>  insulin lispro (ADMELOG) corrective regimen sliding scale   SubCutaneous Before meals and at bedtime  losartan 100 milliGRAM(s) Oral daily  metoprolol tartrate 25 milliGRAM(s) Oral two times a day      MEDICATIONS  (PRN):  heparin   Injectable 4000 Unit(s) IV Push every 6 hours PRN For aPTT less than 40  heparin   Injectable 2000 Unit(s) IV Push every 6 hours PRN For aPTT between 40 - 57       Medications up to date at time of exam.      PHYSICAL EXAMINATION:  Patient has no new complaints.  GENERAL: The patient is a well-developed, well-nourished, in no apparent distress.     Vital Signs Last 24 Hrs  T(C): 36.4 (26 Jun 2021 16:53), Max: 36.5 (26 Jun 2021 10:06)  T(F): 97.6 (26 Jun 2021 16:53), Max: 97.7 (26 Jun 2021 10:06)  HR: 76 (26 Jun 2021 16:53) (45 - 93)  BP: 115/54 (26 Jun 2021 16:53) (95/60 - 135/66)  BP(mean): --  RR: 17 (26 Jun 2021 16:53) (16 - 17)  SpO2: 97% (26 Jun 2021 16:53) (97% - 99%)   (if applicable)    Chest Tube (if applicable)    HEENT: Head is normocephalic and atraumatic. Extraocular muscles are intact. Mucous membranes are moist.     NECK: Supple, no palpable adenopathy.    LUNGS: Clear to auscultation, no wheezing, rales, or rhonchi.    HEART: Regular rate and rhythm without murmur.    ABDOMEN: Soft, nontender, and nondistended.  No hepatosplenomegaly is noted.    : No painful voiding, no pelvic pain    EXTREMITIES: Without any cyanosis, clubbing, rash, lesions or edema.    NEUROLOGIC: Awake, alert, oriented, grossly intact    SKIN: Warm, dry, good turgor.      LABS:                        11.1   6.01  )-----------( 197      ( 26 Jun 2021 10:12 )             33.5     06-26    134<L>  |  94<L>  |  102<H>  ----------------------------<  225<H>  4.7   |  23  |  9.63<H>    Ca    8.3<L>      26 Jun 2021 10:12  Phos  6.3     06-26  Mg     2.2     06-26    TPro  6.5  /  Alb  3.0<L>  /  TBili  0.4  /  DBili  x   /  AST  6<L>  /  ALT  15  /  AlkPhos  119  06-26    PT/INR - ( 26 Jun 2021 10:12 )   PT: 13.0 sec;   INR: 1.10 ratio         PTT - ( 26 Jun 2021 15:19 )  PTT:69.0 sec                    MICROBIOLOGY: (if applicable)    RADIOLOGY & ADDITIONAL STUDIES:  EKG:   CXR:  ECHO:    IMPRESSION: 85y Male PAST MEDICAL & SURGICAL HISTORY:  ESRD (end stage renal disease)    Diabetes    Hypertension    Hyperlipidemia    Atrial fibrillation    S/P hemodialysis catheter insertion  7/17    History of appendectomy     p/w            Impression; 86 Y/O male presented to ED with SOB x 1 day. Episode of SOB due to Pulmonary Edema from fluid overload . CXR Mild right pleural effusion. Moderate left pleural effusion- left lower lung atelectasis and Pneumonia. CT Chest with Moderate Left Pleural Effusion . Small right pleural free fluid.  Pt was recently seen by outpatient Pulmonologist who recommended  thoracentesis of pleural effusion but pt declined since he was asymptomatic. Negative Blood Cx x2 and Negative RVP .  06-24-21 CXR with  reduction  left pleural effusion.     Suggestion:  O2 saturation ranges 97% - 100 % room air. So far saturating good room air.   Renal following , on HD .   Dyspnea improved .    IR evaluation for thoracentesis for Left moderate pleural effusion ( maybe Monday ) . Will repeat CXR Monday CXR A & P in A.M. on Monday .    Eliquis to be on hold x 3 days , on heparin gtt  GI prophylactic .

## 2021-06-26 NOTE — PROGRESS NOTE ADULT - SUBJECTIVE AND OBJECTIVE BOX
[facility] NEPHROLOGY- PROGRESS NOTE    Patient is a 86yo Male with ESRD on HD, DM type 2, HTN, Anemia of renal dz, hyperphosphatemia, Afib on A/C p/w SOB. Pt a/w acute hypoxic respiratory failure 2/2 fluid overload. Nephrology consulted for ESRD status.     Hospital Medications: Medications reviewed.  REVIEW OF SYSTEMS:  CONSTITUTIONAL: No fevers or chills  RESPIRATORY: No shortness of breath- resolved, +dry cough intermittent  CARDIOVASCULAR: No chest pain.  GASTROINTESTINAL: No nausea, vomiting, diarrhea or abdominal pain.   VASCULAR: +bilateral lower extremity edema resolved      VITALS:  T(F): 97.7 (06-26-21 @ 10:06), Max: 97.7 (06-26-21 @ 10:06)  HR: 84 (06-26-21 @ 10:06)  BP: 95/60 (06-26-21 @ 10:06)  RR: 17 (06-26-21 @ 10:06)  SpO2: 99% (06-26-21 @ 10:06)  Wt(kg): --    06-25 @ 07:01  -  06-26 @ 07:00  --------------------------------------------------------  IN: 500 mL / OUT: 2000 mL / NET: -1500 mL        PHYSICAL EXAM:  Gen: NAD, calm  Cards: +S1/S2, no M/G/R  Resp: decreased BS at bases L>R  GI: soft, NT/ND, NABS  Extremities: No LE edema B/L- resolved  Access: Left AVF +thrill +bruit      LABS:  06-26    134<L>  |  94<L>  |  102<H>  ----------------------------<  225<H>  4.7   |  23  |  9.63<H>    Ca    8.3<L>      26 Jun 2021 10:12  Phos  6.3     06-26  Mg     2.2     06-26    TPro  6.5  /  Alb  3.0<L>  /  TBili  0.4  /  DBili      /  AST  6<L>  /  ALT  15  /  AlkPhos  119  06-26    Creatinine Trend: 9.63 <--, 7.02 <--, 8.80 <--, 6.95 <--, 9.63 <--                        11.1   6.01  )-----------( 197      ( 26 Jun 2021 10:12 )             33.5     Urine Studies:

## 2021-06-26 NOTE — PROGRESS NOTE ADULT - ASSESSMENT
Patient is a 84yo Male with ESRD on HD, DM type 2, HTN, Anemia of renal dz, hyperphosphatemia, Afib on A/C p/w SOB. Pt a/w acute hypoxic respiratory failure 2/2 fluid overload. Nephrology consulted for ESRD status.     1) ESRD: s/p HD 6/24, tolerated well with net 1.9L removed. s/p 2hour PUF on 6/25 with net 1.5L removed. Plan for next maintenance HD today.  Monitor electrolytes.  2) Hypertension 2/2 CKD: BP low normal. Discontinue hydralazine 100 mg TID. c/w low sodium diet and fluid restriction. Monitor BP.   3) Anemia of renal disease: Hb elevated; will hold Epogen 4k IV tiw with HD. Monitor Hb.  4) Hyperphosphatemia: serum phosphorus uncontrolled. Increase phoslo to 2 tabs TID with meals. c/w low phos diet. Monitor serum calcium and phosphorus.  5) Acute hypoxic resp failure: 2/2 fluid overload. c/w fluid restriction. Increase UF with HD. s/p CT chest; Pulmonary following. Pt for IR thoracentesis on Monday 6/28 off Eliquis for 3 days    [facility] NEPHROLOGY  Nahum Grubbs M.D.  Francisco Javier Hicks D.O.  Yovana Quiroz M.D.  Makeda Duron, MSN, ANP-C  [phone]    [address] [place] [address]

## 2021-06-27 LAB
ALBUMIN SERPL ELPH-MCNC: 3.1 G/DL — LOW (ref 3.5–5)
ALP SERPL-CCNC: 131 U/L — HIGH (ref 40–120)
ALT FLD-CCNC: 13 U/L DA — SIGNIFICANT CHANGE UP (ref 10–60)
ANION GAP SERPL CALC-SCNC: 14 MMOL/L — SIGNIFICANT CHANGE UP (ref 5–17)
APTT BLD: 73.6 SEC — HIGH (ref 27.5–35.5)
AST SERPL-CCNC: 11 U/L — SIGNIFICANT CHANGE UP (ref 10–40)
BILIRUB SERPL-MCNC: 0.5 MG/DL — SIGNIFICANT CHANGE UP (ref 0.2–1.2)
BUN SERPL-MCNC: 68 MG/DL — HIGH (ref 7–18)
CALCIUM SERPL-MCNC: 9 MG/DL — SIGNIFICANT CHANGE UP (ref 8.4–10.5)
CHLORIDE SERPL-SCNC: 93 MMOL/L — LOW (ref 96–108)
CO2 SERPL-SCNC: 26 MMOL/L — SIGNIFICANT CHANGE UP (ref 22–31)
CREAT SERPL-MCNC: 7.18 MG/DL — HIGH (ref 0.5–1.3)
CULTURE RESULTS: SIGNIFICANT CHANGE UP
CULTURE RESULTS: SIGNIFICANT CHANGE UP
GLUCOSE BLDC GLUCOMTR-MCNC: 154 MG/DL — HIGH (ref 70–99)
GLUCOSE BLDC GLUCOMTR-MCNC: 161 MG/DL — HIGH (ref 70–99)
GLUCOSE BLDC GLUCOMTR-MCNC: 173 MG/DL — HIGH (ref 70–99)
GLUCOSE BLDC GLUCOMTR-MCNC: 242 MG/DL — HIGH (ref 70–99)
GLUCOSE SERPL-MCNC: 134 MG/DL — HIGH (ref 70–99)
HCT VFR BLD CALC: 35.9 % — LOW (ref 39–50)
HGB BLD-MCNC: 12.2 G/DL — LOW (ref 13–17)
MCHC RBC-ENTMCNC: 31.4 PG — SIGNIFICANT CHANGE UP (ref 27–34)
MCHC RBC-ENTMCNC: 34 GM/DL — SIGNIFICANT CHANGE UP (ref 32–36)
MCV RBC AUTO: 92.3 FL — SIGNIFICANT CHANGE UP (ref 80–100)
NRBC # BLD: 0 /100 WBCS — SIGNIFICANT CHANGE UP (ref 0–0)
PLATELET # BLD AUTO: 221 K/UL — SIGNIFICANT CHANGE UP (ref 150–400)
POTASSIUM SERPL-MCNC: 4.3 MMOL/L — SIGNIFICANT CHANGE UP (ref 3.5–5.3)
POTASSIUM SERPL-SCNC: 4.3 MMOL/L — SIGNIFICANT CHANGE UP (ref 3.5–5.3)
PROT SERPL-MCNC: 7.2 G/DL — SIGNIFICANT CHANGE UP (ref 6–8.3)
RBC # BLD: 3.89 M/UL — LOW (ref 4.2–5.8)
RBC # FLD: 13.6 % — SIGNIFICANT CHANGE UP (ref 10.3–14.5)
SODIUM SERPL-SCNC: 133 MMOL/L — LOW (ref 135–145)
SPECIMEN SOURCE: SIGNIFICANT CHANGE UP
SPECIMEN SOURCE: SIGNIFICANT CHANGE UP
WBC # BLD: 6.21 K/UL — SIGNIFICANT CHANGE UP (ref 3.8–10.5)
WBC # FLD AUTO: 6.21 K/UL — SIGNIFICANT CHANGE UP (ref 3.8–10.5)

## 2021-06-27 RX ORDER — AMLODIPINE BESYLATE 2.5 MG/1
5 TABLET ORAL DAILY
Refills: 0 | Status: DISCONTINUED | OUTPATIENT
Start: 2021-06-28 | End: 2021-07-01

## 2021-06-27 RX ADMIN — Medication 1: at 21:22

## 2021-06-27 RX ADMIN — Medication 2: at 16:52

## 2021-06-27 RX ADMIN — FINASTERIDE 5 MILLIGRAM(S): 5 TABLET, FILM COATED ORAL at 11:57

## 2021-06-27 RX ADMIN — Medication 1: at 11:56

## 2021-06-27 RX ADMIN — Medication 1334 MILLIGRAM(S): at 16:53

## 2021-06-27 RX ADMIN — Medication 1334 MILLIGRAM(S): at 07:43

## 2021-06-27 RX ADMIN — ATORVASTATIN CALCIUM 10 MILLIGRAM(S): 80 TABLET, FILM COATED ORAL at 21:22

## 2021-06-27 RX ADMIN — CHLORHEXIDINE GLUCONATE 1 APPLICATION(S): 213 SOLUTION TOPICAL at 13:15

## 2021-06-27 RX ADMIN — Medication 1334 MILLIGRAM(S): at 11:57

## 2021-06-27 RX ADMIN — HEPARIN SODIUM 800 UNIT(S)/HR: 5000 INJECTION INTRAVENOUS; SUBCUTANEOUS at 08:52

## 2021-06-27 RX ADMIN — Medication 1: at 07:43

## 2021-06-27 NOTE — PROGRESS NOTE ADULT - ASSESSMENT
Patient is a 84yo Male with ESRD on HD, DM type 2, HTN, Anemia of renal dz, hyperphosphatemia, Afib on A/C p/w SOB. Pt a/w acute hypoxic respiratory failure 2/2 fluid overload. Nephrology consulted for ESRD status.     1) ESRD: s/p HD on 6/26 with intradialytic hypotension and only 0.9L removed. Plan for next maintenance HD on 6/29. Monitor electrolytes.  2) Hypertension 2/2 CKD: BP low normal for which hydralazine 100 mg TID discontinued on 6/26. BP remains low for which would decrease amlodipine to 5 mg daily. c/w low sodium diet and fluid restriction. Monitor BP.   3) Anemia of renal disease: Hb elevated; will hold Epogen 4k IV tiw with HD. Monitor Hb.  4) Hyperphosphatemia: serum phosphorus uncontrolled for which phoslo increased to 2 tabs TID with meals. c/w low phos diet. Monitor serum calcium and phosphorus.  5) Acute hypoxic resp failure: 2/2 fluid overload. c/w fluid restriction. Increase UF with HD. s/p CT chest; Pulmonary following. Pt for IR thoracentesis on Monday 6/28 off Eliquis for 3 days    [facility] [place] NEPHROLOGY  Nahum Grubbs M.D.  Francisco Javier Hicks D.O.  Yovana Quiroz M.D.  Makeda Duron, MSN, ANP-C  [phone]    [address] [place] [address]

## 2021-06-27 NOTE — PROGRESS NOTE ADULT - PROBLEM SELECTOR PLAN 2
-Pt with Loculated Right sided pleural effusion and left basilar Pleural effusion moderate   -Fu CT chest as above  -consulted IR for Pleural effusion, will need to hold Eliquis x  3days prior to procedure need both therapeuric/diagnostic   -Pulm Dr. Brandon consulted  -outside pulm dr. Kulkarni ([phone])  -repeat CXR improving  -IR thoracentesis on Monday. need to hold eliquis x 3 days  -started heparin drip, need to hold on 6/28 midnight

## 2021-06-27 NOTE — GOALS OF CARE CONVERSATION - ADVANCED CARE PLANNING - CONVERSATION DETAILS
Patient had previously discussed GOC and MOLST form with our team, they have thoroughly discussed amongst themselves and patient and family are comfortable deciding.    Wants DNR/DNI, no abx, no feeding tube, no IV fluids, comfort measures only.    Wants to pursue IR guided thoracentesis tomorrow, made aware DNR/DNI may need to be temporarily reversed for the procedure depending on the provider.

## 2021-06-27 NOTE — PROGRESS NOTE ADULT - SUBJECTIVE AND OBJECTIVE BOX
Time of Visit:  Patient seen and examined.  Pat seen earlier laying in bed sleeping comfortable     MEDICATIONS  (STANDING):  atorvastatin 10 milliGRAM(s) Oral at bedtime  calcium acetate 1334 milliGRAM(s) Oral three times a day with meals  chlorhexidine 2% Cloths 1 Application(s) Topical daily  dextrose 50% Injectable 12.5 Gram(s) IV Push once  finasteride 5 milliGRAM(s) Oral daily  furosemide    Tablet 80 milliGRAM(s) Oral <User Schedule>  heparin  Infusion.  Unit(s)/Hr (10 mL/Hr) IV Continuous <Continuous>  insulin lispro (ADMELOG) corrective regimen sliding scale   SubCutaneous Before meals and at bedtime  losartan 100 milliGRAM(s) Oral daily  metoprolol tartrate 25 milliGRAM(s) Oral two times a day      MEDICATIONS  (PRN):  heparin   Injectable 4000 Unit(s) IV Push every 6 hours PRN For aPTT less than 40  heparin   Injectable 2000 Unit(s) IV Push every 6 hours PRN For aPTT between 40 - 57       Medications up to date at time of exam.      PHYSICAL EXAMINATION:  Patient has no new complaints.  GENERAL: The patient is a well-developed, well-nourished, in no apparent distress.     Vital Signs Last 24 Hrs  T(C): 36.4 (27 Jun 2021 14:28), Max: 36.4 (26 Jun 2021 16:53)  T(F): 97.6 (27 Jun 2021 14:28), Max: 97.6 (26 Jun 2021 16:53)  HR: 86 (27 Jun 2021 14:28) (76 - 93)  BP: 114/52 (27 Jun 2021 14:28) (114/52 - 118/48)  BP(mean): --  RR: 18 (27 Jun 2021 14:28) (17 - 18)  SpO2: 100% (27 Jun 2021 14:28) (97% - 100%)   (if applicable)    Chest Tube (if applicable)    HEENT: Head is normocephalic and atraumatic. Extraocular muscles are intact. Mucous membranes are moist.     NECK: Supple, no palpable adenopathy.    LUNGS: Clear to auscultation, no wheezing, rales, or rhonchi.    HEART: Regular rate and rhythm without murmur.    ABDOMEN: Soft, nontender, and nondistended.  No hepatosplenomegaly is noted.    : No painful voiding, no pelvic pain    EXTREMITIES: Without any cyanosis, clubbing, rash, lesions or edema.    NEUROLOGIC: Awake, alert, oriented, grossly intact    SKIN: Warm, dry, good turgor.      LABS:                        12.2   6.21  )-----------( 221      ( 27 Jun 2021 08:34 )             35.9     06-27    133<L>  |  93<L>  |  68<H>  ----------------------------<  134<H>  4.3   |  26  |  7.18<H>    Ca    9.0      27 Jun 2021 08:34  Phos  6.3     06-26  Mg     2.2     06-26    TPro  7.2  /  Alb  3.1<L>  /  TBili  0.5  /  DBili  x   /  AST  11  /  ALT  13  /  AlkPhos  131<H>  06-27    PT/INR - ( 26 Jun 2021 10:12 )   PT: 13.0 sec;   INR: 1.10 ratio         PTT - ( 27 Jun 2021 08:34 )  PTT:73.6 sec                    MICROBIOLOGY: (if applicable)    RADIOLOGY & ADDITIONAL STUDIES:  EKG:   CXR:  ECHO:    IMPRESSION: 85y Male PAST MEDICAL & SURGICAL HISTORY:  ESRD (end stage renal disease)    Diabetes    Hypertension    Hyperlipidemia    Atrial fibrillation    S/P hemodialysis catheter insertion  7/17    History of appendectomy     p/w       Impression; 84 Y/O male presented to ED with SOB x 1 day. Episode of SOB due to Pulmonary Edema from fluid overload . CXR Mild right pleural effusion. Moderate left pleural effusion- left lower lung atelectasis and Pneumonia. CT Chest with Moderate Left Pleural Effusion . Small right pleural free fluid.  Pt was recently seen by outpatient Pulmonologist who recommended  thoracentesis of pleural effusion but pt declined since he was asymptomatic. Negative Blood Cx x2 and Negative RVP .  06-24-21 CXR with  reduction  left pleural effusion.     Suggestion:  -tolerating room air   -Renal following , on HD .   -Dyspnea improved .    -CXR in AM  before going to IR   -IR evaluation for thoracentesis for Left moderate pleural effusion ( maybe Monday ) . Will repeat CXR Monday CXR A & P in A.M. on Monday .    -Eliquis to be on hold x 3 days , on heparin gtt  -GI prophylactic .

## 2021-06-27 NOTE — PROGRESS NOTE ADULT - SUBJECTIVE AND OBJECTIVE BOX
Patient is a 85y old  Male who presents with a chief complaint of Shortness of breath (25 Jun 2021 14:20)      INTERVAL HPI/OVERNIGHT EVENTS: pt states feeling better. denies any pain or  discomfort. appetite fair    MEDICATIONS  (STANDING):  atorvastatin 10 milliGRAM(s) Oral at bedtime  calcium acetate 1334 milliGRAM(s) Oral three times a day with meals  chlorhexidine 2% Cloths 1 Application(s) Topical daily  dextrose 50% Injectable 12.5 Gram(s) IV Push once  finasteride 5 milliGRAM(s) Oral daily  furosemide    Tablet 80 milliGRAM(s) Oral <User Schedule>  heparin  Infusion.  Unit(s)/Hr (10 mL/Hr) IV Continuous <Continuous>  insulin lispro (ADMELOG) corrective regimen sliding scale   SubCutaneous Before meals and at bedtime  losartan 100 milliGRAM(s) Oral daily  metoprolol tartrate 25 milliGRAM(s) Oral two times a day    MEDICATIONS  (PRN):  heparin   Injectable 4000 Unit(s) IV Push every 6 hours PRN For aPTT less than 40  heparin   Injectable 2000 Unit(s) IV Push every 6 hours PRN For aPTT between 40 - 57    __________________________________________________  REVIEW OF SYSTEMS:    CONSTITUTIONAL: No fever,   EYES: no acute visual disturbances  NECK: No pain or stiffness  RESPIRATORY: No cough; No shortness of breath  CARDIOVASCULAR: No chest pain, no palpitations  GASTROINTESTINAL: No pain. No nausea or vomiting; No diarrhea   NEUROLOGICAL: No headache or numbness, no tremors  MUSCULOSKELETAL: No joint pain, no muscle pain  GENITOURINARY: no dysuria, no frequency, no hesitancy  PSYCHIATRY: no depression , no anxiety  ALL OTHER  ROS negative        Vital Signs Last 24 Hrs  T(C): 36.3 (27 Jun 2021 21:32), Max: 36.4 (27 Jun 2021 05:18)  T(F): 97.3 (27 Jun 2021 21:32), Max: 97.6 (27 Jun 2021 05:18)  HR: 106 (27 Jun 2021 21:32) (74 - 106)  BP: 120/46 (27 Jun 2021 21:32) (111/45 - 120/46)  BP(mean): --  RR: 18 (27 Jun 2021 21:32) (18 - 18)  SpO2: 100% (27 Jun 2021 21:32) (100% - 100%)________________________________________________  PHYSICAL EXAM:  GENERAL: NAD  HEENT: Normocephalic;  conjunctivae and sclerae clear; moist mucous membranes;   NECK : supple  CHEST/LUNG: Clear to auscultation bilaterally with good air entry   HEART: S1 S2  regular; no murmurs, gallops or rubs  ABDOMEN: Soft, Nontender, Nondistended; Bowel sounds present  EXTREMITIES: no cyanosis; no edema; no calf tenderness, left arm AFV + bruit/thrills   SKIN: warm and dry; no rash  NERVOUS SYSTEM:  Awake and alert; Oriented  to place, person and time ; no new deficits    _________________________________________________    LABS:  06-27    133<L>  |  93<L>  |  68<H>  ----------------------------<  134<H>  4.3   |  26  |  7.18<H>    Ca    9.0      27 Jun 2021 08:34  Phos  6.3     06-26  Mg     2.2     06-26    TPro  7.2  /  Alb  3.1<L>  /  TBili  0.5  /  DBili      /  AST  11  /  ALT  13  /  AlkPhos  131<H>  06-27    Creatinine Trend: 7.18 <--, 9.63 <--, 7.02 <--, 8.80 <--, 6.95 <--, 9.63 <--                        12.2   6.21  )-----------( 221      ( 27 Jun 2021 08:34 )             35.9     Urine Studies:            LIVER FUNCTIONS - ( 27 Jun 2021 08:34 )  Alb: 3.1 g/dL / Pro: 7.2 g/dL / ALK PHOS: 131 U/L / ALT: 13 U/L DA / AST: 11 U/L / GGT: x           PT/INR - ( 26 Jun 2021 10:12 )   PT: 13.0 sec;   INR: 1.10 ratio         PTT - ( 27 Jun 2021 08:34 )  PTT:73.6 sec    CAPILLARY BLOOD GLUCOSE      POCT Blood Glucose.: 312 mg/dL (25 Jun 2021 11:04)  POCT Blood Glucose.: 316 mg/dL (25 Jun 2021 11:00)  POCT Blood Glucose.: 325 mg/dL (25 Jun 2021 10:58)  POCT Blood Glucose.: 142 mg/dL (25 Jun 2021 07:47)  POCT Blood Glucose.: 244 mg/dL (24 Jun 2021 21:25)  POCT Blood Glucose.: 271 mg/dL (24 Jun 2021 16:44)        RADIOLOGY & ADDITIONAL TESTS:    Imaging  Reviewed:  YES  < from: Xray Chest 1 View- PORTABLE-Routine (Xray Chest 1 View- PORTABLE-Routine in AM.) (06.24.21 @ 09:33) >    EXAM:  XR CHEST PORTABLE ROUTINE 1V                            PROCEDURE DATE:  06/24/2021          INTERPRETATION:  Chest one view    HISTORY: Shortness of breath    COMPARISON STUDY: 6/22/2021    Frontal view of the chest shows the heart to be similar in size. The lungs show reduction of left pleural effusion and what appears to be right pseudotumor in the right minor fissure. There is no evidence of pneumothorax.    IMPRESSION:  Decreased effusions.    Thank you for the courtesy of this referral.            ELIDIA LOPEZ MD; Attending Interventional Radiologist  This document has been electronically signed. Jun 25 2021  8:04AM    < end of copied text >  < from: Xray Chest 1 View-PORTABLE IMMEDIATE (Xray Chest 1 View-PORTABLE IMMEDIATE .) (06.22.21 @ 19:17) >    EXAM:  XR CHEST PORTABLE IMMED 1V                            PROCEDURE DATE:  06/22/2021          INTERPRETATION:  DATE OF STUDY: 6/22/21 a 7:09PM    PRIOR:   6/22/21 plain chest. Had 6/22/21 CT scan of chest.    CLINICAL INDICATION: Post hemodialysis.    TECHNIQUE: portable chest.    IMPRESSION:  Heart is top normal in size.  Mild right pleural effusion with associated right basilar atelectasis.  Moderate, left pleural effusion -lower left lung atelectasis and/or pneumonia.  Persistent left-sided, rounded opacity in mid-right lung probably due to interfissural fluid.  No pneumothorax.  Degenerative changes of the thoracic spine.              FLORIN SANCHEZ MD; Attending Radiologist  This document has been electronically signed. Jun 23 202110:39AM    < end of copied text >    Consultant(s) Notes Reviewed:   YES      Plan of care was discussed with patient and /or primary care giver; all questions and concerns were addressed

## 2021-06-27 NOTE — PROGRESS NOTE ADULT - SUBJECTIVE AND OBJECTIVE BOX
[facility] NEPHROLOGY- PROGRESS NOTE    Patient is a 84yo Male with ESRD on HD, DM type 2, HTN, Anemia of renal dz, hyperphosphatemia, Afib on A/C p/w SOB. Pt a/w acute hypoxic respiratory failure 2/2 fluid overload. Nephrology consulted for ESRD status.     Hospital Medications: Medications reviewed.    REVIEW OF SYSTEMS:  CONSTITUTIONAL: No fevers or chills  RESPIRATORY: No shortness of breath- resolved, +dry cough intermittent  CARDIOVASCULAR: No chest pain.  GASTROINTESTINAL: No nausea, vomiting, diarrhea or abdominal pain.   VASCULAR: +bilateral lower extremity edema resolved      VITALS:  T(F): 97.6 (06-27-21 @ 05:18), Max: 97.6 (06-26-21 @ 16:53)  HR: 82 (06-27-21 @ 05:18)  BP: 118/48 (06-27-21 @ 05:18)  RR: 18 (06-27-21 @ 05:18)  SpO2: 100% (06-27-21 @ 05:18)  Wt(kg): --      PHYSICAL EXAM:  Gen: NAD, calm  Cards: +S1/S2, no M/G/R  Resp: decreased BS at bases L>R  GI: soft, NT/ND, NABS  Extremities: No LE edema B/L- resolved  Access: Left AVF +thrill +bruit      LABS:  06-27    133<L>  |  93<L>  |  68<H>  ----------------------------<  134<H>  4.3   |  26  |  7.18<H>    Ca    9.0      27 Jun 2021 08:34  Phos  6.3     06-26  Mg     2.2     06-26    TPro  7.2  /  Alb  3.1<L>  /  TBili  0.5  /  DBili      /  AST  11  /  ALT  13  /  AlkPhos  131<H>  06-27    Creatinine Trend: 7.18 <--, 9.63 <--, 7.02 <--, 8.80 <--, 6.95 <--, 9.63 <--                        12.2   6.21  )-----------( 221      ( 27 Jun 2021 08:34 )             35.9     Urine Studies:

## 2021-06-27 NOTE — PROGRESS NOTE ADULT - PROBLEM SELECTOR PLAN 9
Full code  pt has a capacity for health decision  wife Stewart [phone], [phone], spoke with daughter and wife at bedside.

## 2021-06-27 NOTE — GOALS OF CARE CONVERSATION - ADVANCED CARE PLANNING - TREATMENT GUIDELINES
DNR Order/Comfort measures only/Do not re-hospitalize/No artificial nutrition/No antibiotics/No IV fluids

## 2021-06-27 NOTE — PROGRESS NOTE ADULT - SUBJECTIVE AND OBJECTIVE BOX
PATIENT SEEN AND EXAMINED ON :-06/27/2021  DATE OF SERVICE: 06/27/2021   Interim events noted,Labs ,Radiological studies and Cardiology tests reviewed .      Patient is a 85y old  Male who presents with a chief complaint of Shortness of breath (25 Jun 2021 14:20)      INTERVAL HPI/OVERNIGHT EVENTS: pt states feeling better. denies any pain or  discomfort. appetite fair    MEDICATIONS  (STANDING):  atorvastatin 10 milliGRAM(s) Oral at bedtime  calcium acetate 1334 milliGRAM(s) Oral three times a day with meals  chlorhexidine 2% Cloths 1 Application(s) Topical daily  dextrose 50% Injectable 12.5 Gram(s) IV Push once  finasteride 5 milliGRAM(s) Oral daily  furosemide    Tablet 80 milliGRAM(s) Oral <User Schedule>  heparin  Infusion.  Unit(s)/Hr (10 mL/Hr) IV Continuous <Continuous>  insulin lispro (ADMELOG) corrective regimen sliding scale   SubCutaneous Before meals and at bedtime  losartan 100 milliGRAM(s) Oral daily  metoprolol tartrate 25 milliGRAM(s) Oral two times a day    MEDICATIONS  (PRN):  heparin   Injectable 4000 Unit(s) IV Push every 6 hours PRN For aPTT less than 40  heparin   Injectable 2000 Unit(s) IV Push every 6 hours PRN For aPTT between 40 - 57    __________________________________________________  REVIEW OF SYSTEMS:    CONSTITUTIONAL: No fever,   EYES: no acute visual disturbances  NECK: No pain or stiffness  RESPIRATORY: No cough; No shortness of breath  CARDIOVASCULAR: No chest pain, no palpitations  GASTROINTESTINAL: No pain. No nausea or vomiting; No diarrhea   NEUROLOGICAL: No headache or numbness, no tremors  MUSCULOSKELETAL: No joint pain, no muscle pain  GENITOURINARY: no dysuria, no frequency, no hesitancy  PSYCHIATRY: no depression , no anxiety  ALL OTHER  ROS negative        Vital Signs Last 24 Hrs  T(C): 36.3 (27 Jun 2021 21:32), Max: 36.4 (27 Jun 2021 05:18)  T(F): 97.3 (27 Jun 2021 21:32), Max: 97.6 (27 Jun 2021 05:18)  HR: 106 (27 Jun 2021 21:32) (74 - 106)  BP: 120/46 (27 Jun 2021 21:32) (111/45 - 120/46)  BP(mean): --  RR: 18 (27 Jun 2021 21:32) (18 - 18)  SpO2: 100% (27 Jun 2021 21:32) (100% - 100%)          ________________________________________________  PHYSICAL EXAM:  GENERAL: NAD  HEENT: Normocephalic;  conjunctivae and sclerae clear; moist mucous membranes;   NECK : supple  CHEST/LUNG: Clear to auscultation bilaterally with good air entry   HEART: S1 S2  regular; no murmurs, gallops or rubs  ABDOMEN: Soft, Nontender, Nondistended; Bowel sounds present  EXTREMITIES: no cyanosis; no edema; no calf tenderness, left arm AFV + bruit/thrills   SKIN: warm and dry; no rash  NERVOUS SYSTEM:  Awake and alert; Oriented  to place, person and time ; no new deficits    _________________________________________________    LABS:  06-27    133<L>  |  93<L>  |  68<H>  ----------------------------<  134<H>  4.3   |  26  |  7.18<H>    Ca    9.0      27 Jun 2021 08:34  Phos  6.3     06-26  Mg     2.2     06-26    TPro  7.2  /  Alb  3.1<L>  /  TBili  0.5  /  DBili      /  AST  11  /  ALT  13  /  AlkPhos  131<H>  06-27    Creatinine Trend: 7.18 <--, 9.63 <--, 7.02 <--, 8.80 <--, 6.95 <--, 9.63 <--                        12.2   6.21  )-----------( 221      ( 27 Jun 2021 08:34 )             35.9     Urine Studies:            LIVER FUNCTIONS - ( 27 Jun 2021 08:34 )  Alb: 3.1 g/dL / Pro: 7.2 g/dL / ALK PHOS: 131 U/L / ALT: 13 U/L DA / AST: 11 U/L / GGT: x           PT/INR - ( 26 Jun 2021 10:12 )   PT: 13.0 sec;   INR: 1.10 ratio         PTT - ( 27 Jun 2021 08:34 )  PTT:73.6 secProblem/Plan - 1:  ·  Problem: Acute pulmonary edema.  Plan: -Pt presented with SOB   -CXR showed Moderate left and small right pleural effusion. There is compressive atelectasis at the left lung base. A rounded area of opacity in the right midlung may represent loculated fluid.  -CT chest (6/22) Density on the chest x-ray corresponds to loculated fluid in the right fissures. There is a small amount of free pleural fluid on the right. There is moderate left pleural effusion  There is patchy linear atelectasis versus infiltrate in the right lower lobe.  Significant collapse in the left lower lobe likely secondary to pleural fluid  -Last HD on 6/19 @ [facility]; Pt was taken to HD 6/22, 6/23, 6/24, 6/25 to remove extra fluid  -cw fluid restriction   -Nephro Quiroz following   -Pulm Dr. Brandon consulted  -repeat CXR slightly improving  -pt eupneic on room air.     Problem/Plan - 2:  ·  Problem: Pleural effusion.  Plan: -Pt with Loculated Right sided pleural effusion and left basilar Pleural effusion moderate   -Fu CT chest as above  -consulted IR for Pleural effusion, will need to hold Eliquis x  3days prior to procedure need both therapeuric/diagnostic   -Pulm Dr. Brandon consulted  -outside pulm dr. Kulkarni ([phone])  -repeat CXR improving  -IR thoracentesis on Monday. need to hold eliquis x 3 days  -started heparin drip, need to hold on 6/28 midnight.     Problem/Plan - 3:  ·  Problem: ESRD (end stage renal disease).  Plan: -Pt wwith ESRD on HD t/th/s  -Left arm AVF + bruit/thril  -may benefit from increase fluid removal  -s/p HD 6/22, 6/23  -electrolytes wnl   -nephrology dr. Quiroz (also outpt nephrology)  -Nutrition consulted  -HD 6/24 for extra fluid removal  -will c/w regular HD 6/25 per dr. Quiroz.     Problem/Plan - 4:  ·  Problem: Atrial fibrillation.  Plan: -Pt at home on lopressor and eliquis   -continue with home medications  -renal dosing  -need to hold 3 days prior to IR procedure  -on heparin drip, need to hold from 6/28 3am per IR.     Problem/Plan - 5:  ·  Problem: Hypertension.  Plan: -continue with home medications  -monitor BP.     Problem/Plan - 6:  Problem: Diabetes. Plan: -will start on HSS   -a1c in april 2021 ---> 6.4  -no DM meds per family and patient.    Problem/Plan - 7:  ·  Problem: Hyperlipidemia.  Plan: c/w home meds.     Problem/Plan - 8:  ·  Problem: Prophylactic measure.  Plan: IMPROVE VTE Individual Risk Assessment  RISK                                                         Points  [  ] Previous VTE                                      3  [  ] Thrombophilia                                   2  [  ] Lower limb paralysis                         2 (unable to hold up >15 seconds)    [  ] Current Cancer                                  2       (within 6 months)  [  ] Immobilization > 24 hrs                    1  [  ] ICU/CCU stay > 24 hrs                         1  [  ] Age > 60                                              1  eliquis bid for dvt ppx, need to hold x 3 days.

## 2021-06-28 ENCOUNTER — RESULT REVIEW (OUTPATIENT)
Age: 86
End: 2021-06-28

## 2021-06-28 LAB
ALBUMIN FLD-MCNC: 2.2 G/DL — SIGNIFICANT CHANGE UP
ALBUMIN SERPL ELPH-MCNC: 3 G/DL — LOW (ref 3.5–5)
ALP SERPL-CCNC: 114 U/L — SIGNIFICANT CHANGE UP (ref 40–120)
ALT FLD-CCNC: 14 U/L DA — SIGNIFICANT CHANGE UP (ref 10–60)
ANION GAP SERPL CALC-SCNC: 15 MMOL/L — SIGNIFICANT CHANGE UP (ref 5–17)
APTT BLD: 30.9 SEC — SIGNIFICANT CHANGE UP (ref 27.5–35.5)
AST SERPL-CCNC: 9 U/L — LOW (ref 10–40)
B PERT IGG+IGM PNL SER: CLEAR — SIGNIFICANT CHANGE UP
BILIRUB SERPL-MCNC: 0.4 MG/DL — SIGNIFICANT CHANGE UP (ref 0.2–1.2)
BUN SERPL-MCNC: 94 MG/DL — HIGH (ref 7–18)
CALCIUM SERPL-MCNC: 8.5 MG/DL — SIGNIFICANT CHANGE UP (ref 8.4–10.5)
CHLORIDE SERPL-SCNC: 91 MMOL/L — LOW (ref 96–108)
CO2 SERPL-SCNC: 25 MMOL/L — SIGNIFICANT CHANGE UP (ref 22–31)
COLOR FLD: YELLOW — SIGNIFICANT CHANGE UP
CREAT SERPL-MCNC: 8.99 MG/DL — HIGH (ref 0.5–1.3)
FLUID INTAKE SUBSTANCE CLASS: SIGNIFICANT CHANGE UP
FLUID SEGMENTED GRANULOCYTES: 4 % — SIGNIFICANT CHANGE UP
GLUCOSE BLDC GLUCOMTR-MCNC: 130 MG/DL — HIGH (ref 70–99)
GLUCOSE BLDC GLUCOMTR-MCNC: 206 MG/DL — HIGH (ref 70–99)
GLUCOSE BLDC GLUCOMTR-MCNC: 227 MG/DL — HIGH (ref 70–99)
GLUCOSE FLD-MCNC: 146 MG/DL — SIGNIFICANT CHANGE UP
GLUCOSE SERPL-MCNC: 131 MG/DL — HIGH (ref 70–99)
HCT VFR BLD CALC: 32.9 % — LOW (ref 39–50)
HGB BLD-MCNC: 11.4 G/DL — LOW (ref 13–17)
LDH SERPL L TO P-CCNC: 97 U/L — SIGNIFICANT CHANGE UP
LYMPHOCYTES # FLD: 32 % — SIGNIFICANT CHANGE UP
MCHC RBC-ENTMCNC: 31.8 PG — SIGNIFICANT CHANGE UP (ref 27–34)
MCHC RBC-ENTMCNC: 34.7 GM/DL — SIGNIFICANT CHANGE UP (ref 32–36)
MCV RBC AUTO: 91.9 FL — SIGNIFICANT CHANGE UP (ref 80–100)
MESOTHL CELL # FLD: 3 % — SIGNIFICANT CHANGE UP
MONOS+MACROS # FLD: 61 % — SIGNIFICANT CHANGE UP
NRBC # BLD: 0 /100 WBCS — SIGNIFICANT CHANGE UP (ref 0–0)
PLATELET # BLD AUTO: 215 K/UL — SIGNIFICANT CHANGE UP (ref 150–400)
POTASSIUM SERPL-MCNC: 4.7 MMOL/L — SIGNIFICANT CHANGE UP (ref 3.5–5.3)
POTASSIUM SERPL-SCNC: 4.7 MMOL/L — SIGNIFICANT CHANGE UP (ref 3.5–5.3)
PROT FLD-MCNC: 3.2 G/DL — SIGNIFICANT CHANGE UP
PROT SERPL-MCNC: 6.5 G/DL — SIGNIFICANT CHANGE UP (ref 6–8.3)
RBC # BLD: 3.58 M/UL — LOW (ref 4.2–5.8)
RBC # FLD: 13.4 % — SIGNIFICANT CHANGE UP (ref 10.3–14.5)
RCV VOL RI: 15 /UL — HIGH (ref 0–5)
SODIUM SERPL-SCNC: 131 MMOL/L — LOW (ref 135–145)
TOTAL NUCLEATED CELL COUNT, BODY FLUID: 480 /UL — SIGNIFICANT CHANGE UP
TUBE TYPE: SIGNIFICANT CHANGE UP
WBC # BLD: 5.35 K/UL — SIGNIFICANT CHANGE UP (ref 3.8–10.5)
WBC # FLD AUTO: 5.35 K/UL — SIGNIFICANT CHANGE UP (ref 3.8–10.5)

## 2021-06-28 PROCEDURE — 71045 X-RAY EXAM CHEST 1 VIEW: CPT | Mod: 26,77

## 2021-06-28 PROCEDURE — 71045 X-RAY EXAM CHEST 1 VIEW: CPT | Mod: 26

## 2021-06-28 PROCEDURE — 88112 CYTOPATH CELL ENHANCE TECH: CPT | Mod: 26

## 2021-06-28 PROCEDURE — 32555 ASPIRATE PLEURA W/ IMAGING: CPT | Mod: LT

## 2021-06-28 PROCEDURE — 88305 TISSUE EXAM BY PATHOLOGIST: CPT | Mod: 26

## 2021-06-28 PROCEDURE — 32554 ASPIRATE PLEURA W/O IMAGING: CPT

## 2021-06-28 RX ADMIN — FINASTERIDE 5 MILLIGRAM(S): 5 TABLET, FILM COATED ORAL at 13:04

## 2021-06-28 RX ADMIN — Medication 1334 MILLIGRAM(S): at 09:23

## 2021-06-28 RX ADMIN — Medication 25 MILLIGRAM(S): at 17:09

## 2021-06-28 RX ADMIN — Medication 25 MILLIGRAM(S): at 06:11

## 2021-06-28 RX ADMIN — Medication 80 MILLIGRAM(S): at 06:11

## 2021-06-28 RX ADMIN — ATORVASTATIN CALCIUM 10 MILLIGRAM(S): 80 TABLET, FILM COATED ORAL at 21:58

## 2021-06-28 RX ADMIN — Medication 2: at 21:58

## 2021-06-28 RX ADMIN — AMLODIPINE BESYLATE 5 MILLIGRAM(S): 2.5 TABLET ORAL at 06:12

## 2021-06-28 RX ADMIN — Medication 1334 MILLIGRAM(S): at 13:04

## 2021-06-28 RX ADMIN — LOSARTAN POTASSIUM 100 MILLIGRAM(S): 100 TABLET, FILM COATED ORAL at 06:12

## 2021-06-28 RX ADMIN — Medication 1334 MILLIGRAM(S): at 17:09

## 2021-06-28 RX ADMIN — CHLORHEXIDINE GLUCONATE 1 APPLICATION(S): 213 SOLUTION TOPICAL at 13:04

## 2021-06-28 RX ADMIN — Medication 2: at 17:08

## 2021-06-28 NOTE — PROCEDURE NOTE - PROCEDURE FINDINGS AND DETAILS
550 cc serous fluid drained.  Specimens obtained and sent for a complete evaluation including cytology. 550 cc serous fluid drained.  Specimens obtained and sent for a complete evaluation including cytology.    Do not restart Heparin drip for 12 hours  Eliquis may not be restarted for 72 hours

## 2021-06-28 NOTE — PROGRESS NOTE ADULT - SUBJECTIVE AND OBJECTIVE BOX
[facility] NEPHROLOGY- PROGRESS NOTE    Patient is a 84yo Male with ESRD on HD, DM type 2, HTN, Anemia of renal dz, hyperphosphatemia, Afib on A/C p/w SOB. Pt a/w acute hypoxic respiratory failure 2/2 fluid overload. Nephrology consulted for ESRD status.   s/p IR left thoracentesis today with 550 cc serous fluid drained.    Hospital Medications: Medications reviewed.    REVIEW OF SYSTEMS:  CONSTITUTIONAL: No fevers or chills  RESPIRATORY: No shortness of breath  CARDIOVASCULAR: No chest pain.  GASTROINTESTINAL: No nausea, vomiting, diarrhea or abdominal pain.   VASCULAR: No bilateral lower extremity edema       VITALS:  T(F): 97.3 (06-28-21 @ 12:44), Max: 98.6 (06-28-21 @ 05:15)  HR: 75 (06-28-21 @ 12:44)  BP: 149/52 (06-28-21 @ 12:44)  RR: 18 (06-28-21 @ 12:44)  SpO2: 100% (06-28-21 @ 12:44)  Wt(kg): --      PHYSICAL EXAM:  Gen: NAD, calm  Cards: +S1/S2, no M/G/R  Resp: clear left side; decreased Rt base  GI: soft, NT/ND, NABS  Extremities: No LE edema B/L  Access: Left AVF +thrill +bruit      LABS:  06-28    131<L>  |  91<L>  |  94<H>  ----------------------------<  131<H>  4.7   |  25  |  8.99<H>    Ca    8.5      28 Jun 2021 06:55    TPro  6.5  /  Alb  3.0<L>  /  TBili  0.4  /  DBili      /  AST  9<L>  /  ALT  14  /  AlkPhos  114  06-28    Creatinine Trend: 8.99 <--, 7.18 <--, 9.63 <--, 7.02 <--, 8.80 <--, 6.95 <--, 9.63 <--                        11.4   5.35  )-----------( 215      ( 28 Jun 2021 06:55 )             32.9     Urine Studies:

## 2021-06-28 NOTE — CHART NOTE - NSCHARTNOTEFT_GEN_A_CORE
Heparin gtt discontinued in prep for possible left thoracentesis in AM per IR    -Continue current/present care Heparin gtt discontinued in prep for possible left thoracentesis in AM per IR    -F/u repeat CXR in AM  -Continue current/present care

## 2021-06-28 NOTE — PROGRESS NOTE ADULT - SUBJECTIVE AND OBJECTIVE BOX
NP Note discussed with Primary Attending      Patient is a 85y old  Male who presents with a chief complaint of Shortness of breath (27 Jun 2021 15:06)      INTERVAL HPI/OVERNIGHT EVENTS: no new complaints    MEDICATIONS  (STANDING):  amLODIPine   Tablet 5 milliGRAM(s) Oral daily  atorvastatin 10 milliGRAM(s) Oral at bedtime  calcium acetate 1334 milliGRAM(s) Oral three times a day with meals  chlorhexidine 2% Cloths 1 Application(s) Topical daily  dextrose 50% Injectable 12.5 Gram(s) IV Push once  finasteride 5 milliGRAM(s) Oral daily  furosemide    Tablet 80 milliGRAM(s) Oral <User Schedule>  insulin lispro (ADMELOG) corrective regimen sliding scale   SubCutaneous Before meals and at bedtime  losartan 100 milliGRAM(s) Oral daily  metoprolol tartrate 25 milliGRAM(s) Oral two times a day    MEDICATIONS  (PRN):      __________________________________________________  REVIEW OF SYSTEMS:    CONSTITUTIONAL: No fever,   EYES: no acute visual disturbances  NECK: No pain or stiffness  RESPIRATORY: No cough; No shortness of breath  CARDIOVASCULAR: No chest pain, no palpitations  GASTROINTESTINAL: No pain. No nausea or vomiting; No diarrhea   NEUROLOGICAL: No headache or numbness, no tremors  MUSCULOSKELETAL: No joint pain, no muscle pain  GENITOURINARY: no dysuria, no frequency, no hesitancy  PSYCHIATRY: no depression , no anxiety  ALL OTHER  ROS negative        Vital Signs Last 24 Hrs  T(C): 36.3 (28 Jun 2021 12:44), Max: 37 (28 Jun 2021 05:15)  T(F): 97.3 (28 Jun 2021 12:44), Max: 98.6 (28 Jun 2021 05:15)  HR: 75 (28 Jun 2021 12:44) (74 - 106)  BP: 149/52 (28 Jun 2021 12:44) (111/45 - 149/52)  BP(mean): --  RR: 18 (28 Jun 2021 12:44) (18 - 18)  SpO2: 100% (28 Jun 2021 12:44) (99% - 100%)    ________________________________________________  PHYSICAL EXAM:  GENERAL: NAD  HEENT: Normocephalic;  conjunctivae and sclerae clear; moist mucous membranes;   NECK : supple  CHEST/LUNG: Clear to auscultation bilaterally with good air entry   HEART: S1 S2  regular; no murmurs, gallops or rubs  ABDOMEN: Soft, Nontender, Nondistended; Bowel sounds present  EXTREMITIES: no cyanosis; no edema; no calf tenderness  SKIN: warm and dry; no rash  NERVOUS SYSTEM:  Awake and alert; Oriented  to place, person and time ; no new deficits    _________________________________________________  LABS:                        11.4   5.35  )-----------( 215      ( 28 Jun 2021 06:55 )             32.9     06-28    131<L>  |  91<L>  |  94<H>  ----------------------------<  131<H>  4.7   |  25  |  8.99<H>    Ca    8.5      28 Jun 2021 06:55    TPro  6.5  /  Alb  3.0<L>  /  TBili  0.4  /  DBili  x   /  AST  9<L>  /  ALT  14  /  AlkPhos  114  06-28    PTT - ( 28 Jun 2021 06:55 )  PTT:30.9 sec    CAPILLARY BLOOD GLUCOSE      POCT Blood Glucose.: 130 mg/dL (28 Jun 2021 12:41)  POCT Blood Glucose.: 161 mg/dL (27 Jun 2021 21:12)  POCT Blood Glucose.: 242 mg/dL (27 Jun 2021 16:46)        RADIOLOGY & ADDITIONAL TESTS:  EXAM:  XR CHEST PORTABLE IMMED 1V                            PROCEDURE DATE:  06/28/2021          INTERPRETATION:  TECHNIQUE: A single AP view of the chest was obtained. Ordered time:   6/28/2021 12:19 PM    COMPARISON: 6/28/2021 at 8:49 AM.    CLINICAL INFORMATION: Status post left thoracentesis    FINDINGS:    The heart size is not well assessed on AP film.  There is platelike atelectasis in the right midlung.  There are no pleural effusions.  There is no pneumothorax.  There are no acute osseous abnormalities.    IMPRESSION:    Status post left thoracentesis. No pneumothorax.    Right midlung platelike atelectasis.          CHEPE PICHARDO MD; Attending Radiologist  This document has been electronically signed.  CHEPE PICHARDO MD; Attending Radiologist  This document has been electronically signed. Jun 28 2021 12:33PM    EXAM:  CT CHEST                            PROCEDURE DATE:  06/22/2021          INTERPRETATION:  CLINICAL INFORMATION: Chest x-ray question loculated right pleural fluid    COMPARISON: Chest x-ray of earlier in the day    CONTRAST/COMPLICATIONS:  IV Contrast: NONE  Oral Contrast: NONE  Complications: None reported at time of study completion    PROCEDURE:  CT of the Chest was performed.  Sagittal and coronal reformats were performed.    FINDINGS:    LUNGS AND AIRWAYS: Patent central airways.  Lungs are remarkable for significant compressive atelectasis in the left lower lobe and a small amount of passive atelectasis in the lingula. There is linear atelectasis versus infiltrate in the right lower lobe  PLEURA: Moderate free-flowing left pleural effusion as seen on the chest x-ray . Loculated fluid in the right ager and minor fissures corresponding to the x-ray abnormality. Small amount of free-flowing right pleural fluid.  MEDIASTINUM AND LETITIA: No lymphadenopathy.  VESSELS: Atherosclerotic calcification of aorta and coronary arteries  HEART: Heart size is normal. No pericardial effusion. Mitral annular calcification  CHEST WALL AND LOWER NECK: Within normal limits.  VISUALIZED UPPER ABDOMEN: Within normal limits.  BONES: Degenerativechanges of the spine. Osteopenia    IMPRESSION:  Density on the chest x-ray corresponds to loculated fluid in the right fissures. There is a small amount of free pleural fluid on the right. There is moderate left pleural effusion  There is patchy linear atelectasis versus infiltrate in the right lower lobe.  Significant collapse in the left lower lobe likely secondary to pleural fluid            FLO LANIER MD; Attending Radiologist  This document has been electronically signed. Jun 22 2021  7:18PM        Imaging  Reviewed:  YES/NO    Consultant(s) Notes Reviewed:   YES/ No      Plan of care was discussed with patient and /or primary care giver; all questions and concerns were addressed

## 2021-06-28 NOTE — PROCEDURE NOTE - SPECIMEN OBTAINED
Cores given to Cytopathology Technologist/Pathologist/Fluid sent for chemistry/Fluid sent for cytology/Fluid sent for gram stain and culture

## 2021-06-28 NOTE — PROGRESS NOTE ADULT - SUBJECTIVE AND OBJECTIVE BOX
PATIENT SEEN AND EXAMINED ON :-06/27/2021  DATE OF SERVICE:   06/27/2021   Interim events noted,Labs ,Radiological studies and Cardiology tests reviewed .      INTERVAL HPI/OVERNIGHT EVENTS: no new complaints    MEDICATIONS  (STANDING):  amLODIPine   Tablet 5 milliGRAM(s) Oral daily  atorvastatin 10 milliGRAM(s) Oral at bedtime  calcium acetate 1334 milliGRAM(s) Oral three times a day with meals  chlorhexidine 2% Cloths 1 Application(s) Topical daily  dextrose 50% Injectable 12.5 Gram(s) IV Push once  finasteride 5 milliGRAM(s) Oral daily  furosemide    Tablet 80 milliGRAM(s) Oral <User Schedule>  insulin lispro (ADMELOG) corrective regimen sliding scale   SubCutaneous Before meals and at bedtime  losartan 100 milliGRAM(s) Oral daily  metoprolol tartrate 25 milliGRAM(s) Oral two times a day        __________________________________________________  REVIEW OF SYSTEMS:    CONSTITUTIONAL: No fever,   EYES: no acute visual disturbances  NECK: No pain or stiffness  RESPIRATORY: No cough; No shortness of breath  CARDIOVASCULAR: No chest pain, no palpitations  GASTROINTESTINAL: No pain. No nausea or vomiting; No diarrhea   NEUROLOGICAL: No headache or numbness, no tremors  MUSCULOSKELETAL: No joint pain, no muscle pain  GENITOURINARY: no dysuria, no frequency, no hesitancy  PSYCHIATRY: no depression , no anxiety  ALL OTHER  ROS negative        Vital Signs Last 24 Hrs  T(C): 36.3 (28 Jun 2021 12:44), Max: 37 (28 Jun 2021 05:15)  T(F): 97.3 (28 Jun 2021 12:44), Max: 98.6 (28 Jun 2021 05:15)  HR: 75 (28 Jun 2021 12:44) (74 - 106)  BP: 149/52 (28 Jun 2021 12:44) (111/45 - 149/52)  BP(mean): --  RR: 18 (28 Jun 2021 12:44) (18 - 18)  SpO2: 100% (28 Jun 2021 12:44) (99% - 100%)    ________________________________________________  PHYSICAL EXAM:  GENERAL: NAD  HEENT: Normocephalic;  conjunctivae and sclerae clear; moist mucous membranes;   NECK : supple  CHEST/LUNG: Clear to auscultation bilaterally with good air entry   HEART: S1 S2  regular; no murmurs, gallops or rubs  ABDOMEN: Soft, Nontender, Nondistended; Bowel sounds present  EXTREMITIES: no cyanosis; no edema; no calf tenderness  SKIN: warm and dry; no rash  NERVOUS SYSTEM:  Awake and alert; Oriented  to place, person and time ; no new deficits    _________________________________________________  LABS:                        11.4   5.35  )-----------( 215      ( 28 Jun 2021 06:55 )             32.9     06-28    131<L>  |  91<L>  |  94<H>  ----------------------------<  131<H>  4.7   |  25  |  8.99<H>    Ca    8.5      28 Jun 2021 06:55    TPro  6.5  /  Alb  3.0<L>  /  TBili  0.4  /  DBili  x   /  AST  9<L>  /  ALT  14  /  AlkPhos  114  06-28    PTT - ( 28 Jun 2021 06:55 )  PTT:30.9 sec    CAPILLARY BLOOD GLUCOSE      POCT Blood Glucose.: 130 mg/dL (28 Jun 2021 12:41)  POCT Blood Glucose.: 161 mg/dL (27 Jun 2021 21:12)  POCT Blood Glucose.: 242 mg/dL (27 Jun 2021 16:46)    Assessment and Plan:   · Assessment	  84yo Male with ESRD on HD, DM type 2, HTN, Anemia of renal disease, hyperphosphatemia, Afib on A/C Presented with SOB. Pt a/w acute hypoxic respiratory failure 2/2 fluid overload.  Followed by pulmonary and nephrology. s/p urgent HD 6/22. CT chest result Density on the chest x-ray corresponds to loculated fluid in the right fissures. There is a small amount of free pleural fluid on the right. There is moderate left pleural effusion. There is patchy linear atelectasis versus infiltrate in the right lower lobe. Significant collapse in the left lower lobe likely secondary to pleural fluid.  Pt had HD 6/22, 6/23, another HD  for tomorroe. PT consulted for weakness, recommended home PT.  Nutrition consulted.   CXR repeated and slightly improving pleural effusion.   Thoracentesis, performed today, will resume regular diet, hold Eliquis for 72 hours and Heparin for 12 hours as per IR.              Problem/Plan - 1:  ·  Problem: Acute pulmonary edema.  Plan: Pt presented with SOB   CXR showed Moderate left and small right pleural effusion. There is compressive atelectasis at the left lung base. A rounded area of opacity in the right midlung may represent loculated fluid.  CT chest (6/22) Density on the chest x-ray corresponds to loculated fluid in the right fissures. There is a small amount of free pleural fluid on the right. There is moderate left pleural effusion  There is patchy linear atelectasis versus infiltrate in the right lower lobe.  Significant collapse in the left lower lobe likely secondary to pleural fluid  Last HD on 6/19 @ [facility]; Pt was taken to HD 6/22, 6/23, 6/24, 6/25 to remove extra fluid  cw fluid restriction   Nephro Richie following   Pulm Dr. Brandon consulted  repeat CXR slightly improving  pt eupneic on room air.  s/p Thoracentesis today, will hold Heparin for 12h and Eliquis for 72h as per IR.     Problem/Plan - 2:  ·  Problem: Pleural effusion.  Plan: Pt with Loculated Right sided pleural effusion and left basilar Pleural effusion moderate   f/u CT chest as above  Pulm Dr. Brandon consulted  outside pulm dr. Kulkarni ([phone])  repeat CXR improving  IR thoracentesis done today. need to hold eliquis x 72h and Heparin for 12h.     Problem/Plan - 3:  ·  Problem: ESRD (end stage renal disease).  Plan: Pt wwith ESRD on HD t/th/s  Left arm AVF + bruit/thril  may benefit from increase fluid removal  s/p HD 6/22, 6/23  electrolytes wnl   nephrology dr. Quiroz (also outpt nephrology)  Nutrition consulted  will c/w regular HD 6/29 per dr. Quiroz.     Problem/Plan - 4:  ·  Problem: Atrial fibrillation.  Plan: Pt at home on lopressor and eliquis   continue with home medications  renal dosing  on heparin drip, need to hold for 12h as per IR (after Thoracentesis).     Problem/Plan - 5:  ·  Problem: Hypertension.  Plan: continue with home medications  monitor BP.     Problem/Plan - 6:  Problem: Diabetes. Plan: will start on HSS   a1c 7.2  no DM meds per family and patient  Endo dr. Krishnan consulted.    Problem/Plan - 7:  ·  Problem: Hyperlipidemia.  Plan: c/w home meds.     Problem/Plan - 8:  ·  Problem: Prophylactic measure.  Plan: IMPROVE VTE Individual Risk Assessment  RISK                                                         Points  [  ] Previous VTE                                      3  [  ] Thrombophilia                                   2  [  ] Lower limb paralysis                         2 (unable to hold up >15 seconds)    [  ] Current Cancer                                  2       (within 6 months)  [  ] Immobilization > 24 hrs                    1  [  ] ICU/CCU stay > 24 hrs                         1  [  ] Age > 60                                              1  eliquis bid for dvt ppx, need to hold x 3 days.

## 2021-06-28 NOTE — PROGRESS NOTE ADULT - ASSESSMENT
86yo Male with ESRD on HD, DM type 2, HTN, Anemia of renal disease, hyperphosphatemia, Afib on A/C Presented with SOB. Pt a/w acute hypoxic respiratory failure 2/2 fluid overload.  Followed by pulmonary and nephrology. s/p urgent HD 6/22. CT chest result Density on the chest x-ray corresponds to loculated fluid in the right fissures. There is a small amount of free pleural fluid on the right. There is moderate left pleural effusion. There is patchy linear atelectasis versus infiltrate in the right lower lobe. Significant collapse in the left lower lobe likely secondary to pleural fluid.  Pt had HD 6/22, 6/23, another HD  for tomorroe. PT consulted for weakness, recommended home PT.  Nutrition consulted.   CXR repeated and slightly improving pleural effusion.   Thoracentesis, performed today, will resume regular diet, hold Eliquis for 72 hours and Heparin for 12 hours as per IR.    Endocrinology consulted for DM management as pt A1C 7.2 on this admission. HD scheduled for tomorrow as per dr. Quiroz, discharge planning for tomorrow.

## 2021-06-28 NOTE — PROGRESS NOTE ADULT - PROBLEM SELECTOR PLAN 2
Pt with Loculated Right sided pleural effusion and left basilar Pleural effusion moderate   f/u CT chest as above  Pulm Dr. Brandon consulted  outside pulm dr. Kulkarni ([phone])  repeat CXR improving  IR thoracentesis done today. need to hold eliquis x 72h and Heparin for 12h

## 2021-06-28 NOTE — PROGRESS NOTE ADULT - PROBLEM SELECTOR PLAN 4
Pt at home on lopressor and eliquis   continue with home medications  renal dosing  on heparin drip, need to hold for 12h as per IR (after Thoracentesis).

## 2021-06-28 NOTE — CHART NOTE - NSCHARTNOTEFT_GEN_A_CORE
Assessment:      Nutrition consult verbally requested by nursing for food/nutrition related issues. Chart reviewed, pt visited, also spoke to family (wife/daughter) at bedside. NPO today for pending procedure; Food/nutrition related issues from family discussed food choices updated and forwarded to Dietary, also pt/family assisted by Diet Technician for food preferences/daily menu selection. Poor appetite, willing to try increased Nepro oral nutritional supplement between meals; on HD x 4y, followed by dietitian at out-pt dialysis center when medically ready to be discharged; s/p Palliative consult, Goal of Care noted, comfort care only per MD;     Factors impacting intake: [ ] none [ ] nausea  [ ] vomiting [ ] diarrhea [ ] constipation  [ ]chewing problems [ ] swallowing issues  [ X ] other: acute on chronic comorbidities including ESRD on HD; Lack of appetite; Cultural/individual food preferences    Diet Presciption: Diet, NPO:   NPO for Procedure/Test     NPO Start Date: 2021,   NPO Start Time: 00:00  Except Medications  With Ice Chips/Sips of Water (21 @ 18:59)  Diet, DASH/TLC:   Sodium & Cholesterol Restricted  Consistent Carbohydrate {Evening Snacks}  800mL Fluid Restriction ([id number])  For patients receiving Renal Replacement - No Protein Restr, No Conc K, No Conc Phos, Low Sodium (RENAL)  Supplement Feeding Modality:  Oral  Nepro Cans or Servings Per Day:  1       Frequency:  Daily (21 @ 14:19)    Intake: not eating much ( see above)    Daily Weight in k.7 (2021 10:06)  Weight in k.8 (2021 11:14)  Weight in k.8 (2021 09:14)  Weight in k.4 (2021 14:00)  Weight in k.4 (2021 11:00)  Weight in k (2021 12:41)  Weight in k.7 (2021 10:41)  Weight in k.6 (2021 05:29)    % Weight Change: a bit fluctuated, may due to fluid shift from HD and/or scale variance     Pertinent Medications: MEDICATIONS  (STANDING):  amLODIPine   Tablet 5 milliGRAM(s) Oral daily  atorvastatin 10 milliGRAM(s) Oral at bedtime  calcium acetate 1334 milliGRAM(s) Oral three times a day with meals  chlorhexidine 2% Cloths 1 Application(s) Topical daily  dextrose 50% Injectable 12.5 Gram(s) IV Push once  finasteride 5 milliGRAM(s) Oral daily  furosemide    Tablet 80 milliGRAM(s) Oral <User Schedule>  insulin lispro (ADMELOG) corrective regimen sliding scale   SubCutaneous Before meals and at bedtime  losartan 100 milliGRAM(s) Oral daily  metoprolol tartrate 25 milliGRAM(s) Oral two times a day    MEDICATIONS  (PRN):    Pertinent Labs:  Na131 mmol/L<L> Glu 131 mg/dL<H> K+ 4.7 mmol/L Cr  8.99 mg/dL<H> BUN 94 mg/dL<H>  Phos 6.3 mg/dL<H>  Alb 3.0 g/dL<L>  PAB 25 mg/dL  Chol 152 mg/dL LDL --    HDL 93 mg/dL Trig 44 mg/dL     CAPILLARY BLOOD GLUCOSE    POCT Blood Glucose.: 161 mg/dL (2021 21:12)  POCT Blood Glucose.: 242 mg/dL (2021 16:46)  POCT Blood Glucose.: 173 mg/dL (2021 11:22)    Skin: intact     Estimated Needs:   [ X ] no change since previous assessment  [ ] recalculated:     Previous Nutrition Diagnosis:   [ X ] Malnutrition ( MODERATE )    Nutrition Diagnosis is [ X ] ongoing  [ ] Improving   [ ] resolved [ ] not applicable     New Nutrition Diagnosis: [ X ] not applicable       Interventions:   Recommend  [ ] Change Diet To:  [ X ] Nutrition Supplement: increased oral nutritional supplement to Nepro 1can bid daily as medically feasible ( 850 kcal, 38 g protein)   [ ] Nutrition Support  [ X ] Other: Discussed with RN                   Provide food choices within diet Rx as available/updated                    Nursing to continue feeding assistance and encouragement    Monitoring and Evaluation:   [ X ] PO intake [ x ] Tolerance to diet prescription [ x ] weights [ x ] labs[ x ] follow up per protocol  [ ] other:

## 2021-06-28 NOTE — PROGRESS NOTE ADULT - PROBLEM SELECTOR PLAN 3
Pt wwith ESRD on HD t/th/s  Left arm AVF + bruit/thril  may benefit from increase fluid removal  s/p HD 6/22, 6/23  electrolytes wnl   nephrology dr. Quiroz (also outpt nephrology)  Nutrition consulted  will c/w regular HD 6/29 per dr. Quiroz

## 2021-06-28 NOTE — PROGRESS NOTE ADULT - PROBLEM SELECTOR PLAN 9
Full code  pt has a capacity for health decision  wife Stewart [phone], [phone], spoke with daughter and wife at bedside.

## 2021-06-28 NOTE — PROGRESS NOTE ADULT - PROBLEM SELECTOR PLAN 1
Pt presented with SOB   CXR showed Moderate left and small right pleural effusion. There is compressive atelectasis at the left lung base. A rounded area of opacity in the right midlung may represent loculated fluid.  CT chest (6/22) Density on the chest x-ray corresponds to loculated fluid in the right fissures. There is a small amount of free pleural fluid on the right. There is moderate left pleural effusion  There is patchy linear atelectasis versus infiltrate in the right lower lobe.  Significant collapse in the left lower lobe likely secondary to pleural fluid  Last HD on 6/19 @ [facility]; Pt was taken to HD 6/22, 6/23, 6/24, 6/25 to remove extra fluid  cw fluid restriction   Nephro Quiroz following   Pulm Dr. Brandon consulted  repeat CXR slightly improving  pt eupneic on room air.  s/p Thoracentesis today, will hold Heparin for 12h and Eliquis for 72h as per IR.

## 2021-06-28 NOTE — PROGRESS NOTE ADULT - ASSESSMENT
Patient is a 86yo Male with ESRD on HD, DM type 2, HTN, Anemia of renal dz, hyperphosphatemia, Afib on A/C p/w SOB. Pt a/w acute hypoxic respiratory failure 2/2 fluid overload. Nephrology consulted for ESRD status.     1) ESRD: s/p HD on 6/26 with intradialytic hypotension and only 0.9L removed. Plan for next maintenance HD on 6/29 (1st shift pending d/c). Monitor electrolytes.  2) Hypertension 2/2 CKD: BP improved. Pt with low normal BP previously for which hydralazine 100 mg TID discontinued on 6/26 and amlodipine decreased to 5 mg daily. c/w low sodium diet and fluid restriction. Monitor BP.   3) Anemia of renal disease: Hb elevated; continue to hold Epogen 4k IV tiw with HD. Monitor Hb.  4) Hyperphosphatemia: check serum phosphorus c/w phoslo 2 tabs TID with meals. c/w low phos diet. Monitor serum calcium and phosphorus.  5) Acute hypoxic resp failure: 2/2 fluid overload. resolved.  c/w fluid restriction. s/p CT chest; Pulmonary following. s/p left sided thoracentesis today.     [facility] NEPHROLOGY  Nahum Grubbs M.D.  Francisco Javier Hicks D.O.  Yovana Quiroz M.D.  Makeda Duron, MSN, ANP-C  [phone]    [id number] [id number]

## 2021-06-28 NOTE — PROGRESS NOTE ADULT - SUBJECTIVE AND OBJECTIVE BOX
Time of Visit:  Patient seen and examined.     MEDICATIONS  (STANDING):  amLODIPine   Tablet 5 milliGRAM(s) Oral daily  atorvastatin 10 milliGRAM(s) Oral at bedtime  calcium acetate 1334 milliGRAM(s) Oral three times a day with meals  chlorhexidine 2% Cloths 1 Application(s) Topical daily  dextrose 50% Injectable 12.5 Gram(s) IV Push once  finasteride 5 milliGRAM(s) Oral daily  furosemide    Tablet 80 milliGRAM(s) Oral <User Schedule>  insulin lispro (ADMELOG) corrective regimen sliding scale   SubCutaneous Before meals and at bedtime  losartan 100 milliGRAM(s) Oral daily  metoprolol tartrate 25 milliGRAM(s) Oral two times a day      MEDICATIONS  (PRN):       Medications up to date at time of exam.    ROS; No fever, chills, cough, congestion. Denies SOB on exam.   PHYSICAL EXAMINATION:    Vital Signs Last 24 Hrs  T(C): 36.3 (28 Jun 2021 12:44), Max: 37 (28 Jun 2021 05:15)  T(F): 97.3 (28 Jun 2021 12:44), Max: 98.6 (28 Jun 2021 05:15)  HR: 75 (28 Jun 2021 12:44) (74 - 106)  BP: 149/52 (28 Jun 2021 12:44) (111/45 - 149/52)  BP(mean): --  RR: 18 (28 Jun 2021 12:44) (18 - 18)  SpO2: 100% (28 Jun 2021 12:44) (99% - 100%)   (if applicable)    General; Alert and oriented. Able to answer question with no SOB. No acute distress.     HEENT: Head is normocephalic and atraumatic. No nasal tenderness. Extraocular muscles are intact. Mucous membranes are moist.     NECK: Supple, no palpable adenopathy.    LUNGS: Decreased breath sounds with no wheezing, rales . Non labored. No use of accessory muscle. Left flank dressing dry and intact.     HEART: S1 S2 Regular rate and no click/ rub.     ABDOMEN: Soft, nontender, and nondistended.  No hepatosplenomegaly is noted. Active bowel sounds.     EXTREMITIES: Without any cyanosis, clubbing, rash, lesions or edema. Has Left AV fistula .     NEUROLOGIC: Awake, alert, oriented.     SKIN: Warm and moist. Non diaphoretic.       LABS:                        11.4   5.35  )-----------( 215      ( 28 Jun 2021 06:55 )             32.9     06-28    131<L>  |  91<L>  |  94<H>  ----------------------------<  131<H>  4.7   |  25  |  8.99<H>    Ca    8.5      28 Jun 2021 06:55    TPro  6.5  /  Alb  3.0<L>  /  TBili  0.4  /  DBili  x   /  AST  9<L>  /  ALT  14  /  AlkPhos  114  06-28    PTT - ( 28 Jun 2021 06:55 )  PTT:30.9 sec                    MICROBIOLOGY: (if applicable)    RADIOLOGY & ADDITIONAL STUDIES:  EKG:   CXR:  ECHO:    IMPRESSION: 85y Male PAST MEDICAL & SURGICAL HISTORY:  ESRD (end stage renal disease)    Diabetes    Hypertension    Hyperlipidemia    Atrial fibrillation    S/P hemodialysis catheter insertion  7/17    History of appendectomy     Impression; 84 Y/O male presented to ED with SOB x 1 day. Episode of SOB due to Pulmonary Edema from fluid overload . CXR Mild right pleural effusion. Moderate left pleural effusion- left lower lung atelectasis and Pneumonia. CT Chest with Moderate Left Pleural Effusion . Small right pleural free fluid.  Pt was recently seen by outpatient Pulmonologist who recommended  thoracentesis of pleural effusion but pt declined since he was asymptomatic. Negative Blood Cx x2 and Negative RVP .  06-24-21 CXR with  reduction  left pleural effusion. Repeat CXR Post Left IR Thoracentesis today with no pneumothorax and Right midlung atelectasis.     Suggestion:  O2 saturation 100 % room air. So far saturating good room air.   Renal following , on HD .   Dyspnea improved .    Heparin Drip to be restarted in 12 Hours.     Eliquis to be restarted in 72 Hours.    GI prophylactic .   incentive spirometer to start tomorrow 3x daily.     Time of Visit:  Patient seen and examined.     MEDICATIONS  (STANDING):  amLODIPine   Tablet 5 milliGRAM(s) Oral daily  atorvastatin 10 milliGRAM(s) Oral at bedtime  calcium acetate 1334 milliGRAM(s) Oral three times a day with meals  chlorhexidine 2% Cloths 1 Application(s) Topical daily  dextrose 50% Injectable 12.5 Gram(s) IV Push once  finasteride 5 milliGRAM(s) Oral daily  furosemide    Tablet 80 milliGRAM(s) Oral <User Schedule>  insulin lispro (ADMELOG) corrective regimen sliding scale   SubCutaneous Before meals and at bedtime  losartan 100 milliGRAM(s) Oral daily  metoprolol tartrate 25 milliGRAM(s) Oral two times a day      MEDICATIONS  (PRN):       Medications up to date at time of exam.    ROS; No fever, chills, cough, congestion. Denies SOB on exam.   PHYSICAL EXAMINATION:    Vital Signs Last 24 Hrs  T(C): 36.3 (28 Jun 2021 12:44), Max: 37 (28 Jun 2021 05:15)  T(F): 97.3 (28 Jun 2021 12:44), Max: 98.6 (28 Jun 2021 05:15)  HR: 75 (28 Jun 2021 12:44) (74 - 106)  BP: 149/52 (28 Jun 2021 12:44) (111/45 - 149/52)  BP(mean): --  RR: 18 (28 Jun 2021 12:44) (18 - 18)  SpO2: 100% (28 Jun 2021 12:44) (99% - 100%)   (if applicable)    General; Alert and oriented. Able to answer question with no SOB. No acute distress.     HEENT: Head is normocephalic and atraumatic. No nasal tenderness. Extraocular muscles are intact. Mucous membranes are moist.     NECK: Supple, no palpable adenopathy.    LUNGS: Decreased breath sounds with no wheezing, rales . Non labored. No use of accessory muscle. Left flank dressing dry and intact.     HEART: S1 S2 Regular rate and no click/ rub.     ABDOMEN: Soft, nontender, and nondistended.  No hepatosplenomegaly is noted. Active bowel sounds.     EXTREMITIES: Without any cyanosis, clubbing, rash, lesions or edema. Has Left AV fistula .     NEUROLOGIC: Awake, alert, oriented.     SKIN: Warm and moist. Non diaphoretic.       LABS:                        11.4   5.35  )-----------( 215      ( 28 Jun 2021 06:55 )             32.9     06-28    131<L>  |  91<L>  |  94<H>  ----------------------------<  131<H>  4.7   |  25  |  8.99<H>    Ca    8.5      28 Jun 2021 06:55    TPro  6.5  /  Alb  3.0<L>  /  TBili  0.4  /  DBili  x   /  AST  9<L>  /  ALT  14  /  AlkPhos  114  06-28    PTT - ( 28 Jun 2021 06:55 )  PTT:30.9 sec                    MICROBIOLOGY: (if applicable)    RADIOLOGY & ADDITIONAL STUDIES:  EKG:   CXR:  ECHO:    IMPRESSION: 85y Male PAST MEDICAL & SURGICAL HISTORY:  ESRD (end stage renal disease)    Diabetes    Hypertension    Hyperlipidemia    Atrial fibrillation    S/P hemodialysis catheter insertion  7/17    History of appendectomy     Impression; 84 Y/O male presented to ED with SOB x 1 day. Episode of SOB due to Pulmonary Edema from fluid overload . CXR Mild right pleural effusion. Moderate left pleural effusion- left lower lung atelectasis and Pneumonia. CT Chest with Moderate Left Pleural Effusion . Small right pleural free fluid.  Pt was recently seen by outpatient Pulmonologist who recommended  thoracentesis of pleural effusion but pt declined since he was asymptomatic. Negative Blood Cx x2 and Negative RVP .  06-24-21 CXR with  reduction  left pleural effusion. Repeat CXR Post Left IR Thoracentesis today with no pneumothorax and Right midlung atelectasis.     Suggestion:  O2 saturation 100 % room air. So far saturating good room air.   Renal following , on HD .   Dyspnea improved .    Heparin Drip to be restarted in 12 Hours.     Eliquis to be restarted in 72 Hours.    GI prophylactic .   incentive spirometer to start tomorrow 3x daily.    Agree with above assessment and plan as transcribed.

## 2021-06-29 LAB
ALBUMIN SERPL ELPH-MCNC: 3 G/DL — LOW (ref 3.5–5)
ALP SERPL-CCNC: 119 U/L — SIGNIFICANT CHANGE UP (ref 40–120)
ALT FLD-CCNC: 15 U/L DA — SIGNIFICANT CHANGE UP (ref 10–60)
ANION GAP SERPL CALC-SCNC: 15 MMOL/L — SIGNIFICANT CHANGE UP (ref 5–17)
APTT BLD: 115.8 SEC — HIGH (ref 27.5–35.5)
APTT BLD: 30.1 SEC — SIGNIFICANT CHANGE UP (ref 27.5–35.5)
APTT BLD: 63.5 SEC — HIGH (ref 27.5–35.5)
AST SERPL-CCNC: 9 U/L — LOW (ref 10–40)
BILIRUB SERPL-MCNC: 0.4 MG/DL — SIGNIFICANT CHANGE UP (ref 0.2–1.2)
BUN SERPL-MCNC: 124 MG/DL — HIGH (ref 7–18)
CALCIUM SERPL-MCNC: 8.7 MG/DL — SIGNIFICANT CHANGE UP (ref 8.4–10.5)
CHLORIDE SERPL-SCNC: 87 MMOL/L — LOW (ref 96–108)
CO2 SERPL-SCNC: 26 MMOL/L — SIGNIFICANT CHANGE UP (ref 22–31)
CREAT SERPL-MCNC: 11.1 MG/DL — HIGH (ref 0.5–1.3)
GLUCOSE BLDC GLUCOMTR-MCNC: 129 MG/DL — HIGH (ref 70–99)
GLUCOSE BLDC GLUCOMTR-MCNC: 165 MG/DL — HIGH (ref 70–99)
GLUCOSE BLDC GLUCOMTR-MCNC: 175 MG/DL — HIGH (ref 70–99)
GLUCOSE BLDC GLUCOMTR-MCNC: 321 MG/DL — HIGH (ref 70–99)
GLUCOSE BLDC GLUCOMTR-MCNC: 341 MG/DL — HIGH (ref 70–99)
GLUCOSE SERPL-MCNC: 162 MG/DL — HIGH (ref 70–99)
GRAM STN FLD: SIGNIFICANT CHANGE UP
HCT VFR BLD CALC: 32.6 % — LOW (ref 39–50)
HGB BLD-MCNC: 11.2 G/DL — LOW (ref 13–17)
MCHC RBC-ENTMCNC: 30.9 PG — SIGNIFICANT CHANGE UP (ref 27–34)
MCHC RBC-ENTMCNC: 34.4 GM/DL — SIGNIFICANT CHANGE UP (ref 32–36)
MCV RBC AUTO: 90.1 FL — SIGNIFICANT CHANGE UP (ref 80–100)
NRBC # BLD: 0 /100 WBCS — SIGNIFICANT CHANGE UP (ref 0–0)
PH FLD: 8.2 — SIGNIFICANT CHANGE UP
PLATELET # BLD AUTO: 219 K/UL — SIGNIFICANT CHANGE UP (ref 150–400)
POTASSIUM SERPL-MCNC: 6 MMOL/L — HIGH (ref 3.5–5.3)
POTASSIUM SERPL-SCNC: 6 MMOL/L — HIGH (ref 3.5–5.3)
PROT SERPL-MCNC: 6.8 G/DL — SIGNIFICANT CHANGE UP (ref 6–8.3)
RBC # BLD: 3.62 M/UL — LOW (ref 4.2–5.8)
RBC # FLD: 13.2 % — SIGNIFICANT CHANGE UP (ref 10.3–14.5)
SODIUM SERPL-SCNC: 128 MMOL/L — LOW (ref 135–145)
SPECIMEN SOURCE: SIGNIFICANT CHANGE UP
WBC # BLD: 5.41 K/UL — SIGNIFICANT CHANGE UP (ref 3.8–10.5)
WBC # FLD AUTO: 5.41 K/UL — SIGNIFICANT CHANGE UP (ref 3.8–10.5)

## 2021-06-29 RX ORDER — HEPARIN SODIUM 5000 [USP'U]/ML
4000 INJECTION INTRAVENOUS; SUBCUTANEOUS EVERY 6 HOURS
Refills: 0 | Status: DISCONTINUED | OUTPATIENT
Start: 2021-06-29 | End: 2021-07-01

## 2021-06-29 RX ORDER — HEPARIN SODIUM 5000 [USP'U]/ML
2000 INJECTION INTRAVENOUS; SUBCUTANEOUS EVERY 6 HOURS
Refills: 0 | Status: DISCONTINUED | OUTPATIENT
Start: 2021-06-29 | End: 2021-07-01

## 2021-06-29 RX ORDER — HEPARIN SODIUM 5000 [USP'U]/ML
INJECTION INTRAVENOUS; SUBCUTANEOUS
Qty: 25000 | Refills: 0 | Status: DISCONTINUED | OUTPATIENT
Start: 2021-06-29 | End: 2021-07-01

## 2021-06-29 RX ADMIN — CHLORHEXIDINE GLUCONATE 1 APPLICATION(S): 213 SOLUTION TOPICAL at 12:16

## 2021-06-29 RX ADMIN — LOSARTAN POTASSIUM 100 MILLIGRAM(S): 100 TABLET, FILM COATED ORAL at 05:50

## 2021-06-29 RX ADMIN — Medication 1334 MILLIGRAM(S): at 12:17

## 2021-06-29 RX ADMIN — AMLODIPINE BESYLATE 5 MILLIGRAM(S): 2.5 TABLET ORAL at 05:50

## 2021-06-29 RX ADMIN — Medication 1: at 12:15

## 2021-06-29 RX ADMIN — ATORVASTATIN CALCIUM 10 MILLIGRAM(S): 80 TABLET, FILM COATED ORAL at 22:34

## 2021-06-29 RX ADMIN — Medication 4: at 23:09

## 2021-06-29 RX ADMIN — Medication 25 MILLIGRAM(S): at 18:10

## 2021-06-29 RX ADMIN — Medication 25 MILLIGRAM(S): at 05:51

## 2021-06-29 RX ADMIN — FINASTERIDE 5 MILLIGRAM(S): 5 TABLET, FILM COATED ORAL at 12:17

## 2021-06-29 RX ADMIN — HEPARIN SODIUM 1000 UNIT(S)/HR: 5000 INJECTION INTRAVENOUS; SUBCUTANEOUS at 10:34

## 2021-06-29 RX ADMIN — Medication 1334 MILLIGRAM(S): at 18:09

## 2021-06-29 RX ADMIN — HEPARIN SODIUM 900 UNIT(S)/HR: 5000 INJECTION INTRAVENOUS; SUBCUTANEOUS at 16:45

## 2021-06-29 RX ADMIN — Medication 1334 MILLIGRAM(S): at 09:20

## 2021-06-29 RX ADMIN — HEPARIN SODIUM 1000 UNIT(S)/HR: 5000 INJECTION INTRAVENOUS; SUBCUTANEOUS at 02:22

## 2021-06-29 NOTE — PROGRESS NOTE ADULT - PROBLEM SELECTOR PLAN 6
will start on HSS   a1c 7.2  no DM meds per family and patient  Endo dr. Krishnan consulted c/w home meds lipid panel acceptable  c/w statin

## 2021-06-29 NOTE — PROGRESS NOTE ADULT - PROBLEM SELECTOR PLAN 1
CXR showed Moderate left and small right pleural effusion. There is compressive atelectasis at the left lung base. A rounded area of opacity in the right midlung may represent loculated fluid.  CT chest (6/22) Density on the chest x-ray corresponds to loculated fluid in the right fissures. There is a small amount of free pleural fluid on the right. There is moderate left pleural effusion  There is patchy linear atelectasis versus infiltrate in the right lower lobe.  Significant collapse in the left lower lobe likely secondary to pleural fluid  Last HD on 6/19 @ [facility]; Pt was taken to HD 6/22, 6/23, 6/24, 6/25 to remove extra fluid  cw fluid restriction   Nephro Quiroz following   Pulm Dr. Brandon consulted  repeat CXR slightly improving  pt eupneic on room air.  s/p Thoracentesis today, will hold Heparin for 12h and Eliquis for 72h as per IR. CT with moderate sized left pleural effusion now s/p thoracentesis  There is patchy linear atelectasis versus infiltrate in the right lower lobe  continue with HD T, Th, Sat  repeat CXR 6/28 with improvement  Patient % on room air  s/p Thoracentesis today, will hold Heparin for 12h and Eliquis for 72h as per IR.  OP pulm Dr. Kulkarni ([phone])  Nephro Quiroz following   Pulm Dr. Brandon consulted

## 2021-06-29 NOTE — PROGRESS NOTE ADULT - PROBLEM SELECTOR PLAN 9
Full code  pt has a capacity for health decision  wife Stewart [phone], [phone], spoke with daughter and wife at bedside. pending clinical improvement  possible IR thoracentesis on Monday 6/28  need to hold Eliquis on Friday x 3days, need to hold heparin drip 6/28 3am will be discharged once transitioned from heparin gtt to eliquis (72 hours post-procedure may restart AC's)

## 2021-06-29 NOTE — PROGRESS NOTE ADULT - ASSESSMENT
84yo Male with ESRD on HD, DM type 2, HTN, Anemia of renal disease, hyperphosphatemia, Afib on A/C Presented with SOB. Pt a/w acute hypoxic respiratory failure 2/2 fluid overload.  Followed by pulmonary and nephrology. s/p urgent HD 6/22. CT chest result Density on the chest x-ray corresponds to loculated fluid in the right fissures. There is a small amount of free pleural fluid on the right. There is moderate left pleural effusion. There is patchy linear atelectasis versus infiltrate in the right lower lobe. Significant collapse in the left lower lobe likely secondary to pleural fluid.  Pt had HD 6/22, 6/23, another HD  for tomorroe. PT consulted for weakness, recommended home PT.  Nutrition consulted.   CXR repeated and slightly improving pleural effusion.   Thoracentesis, performed today, will resume regular diet, hold Eliquis for 72 hours and Heparin for 12 hours as per IR.    Endocrinology consulted for DM management as pt A1C 7.2 on this admission. HD scheduled for tomorrow as per dr. Quiroz, discharge planning for tomorrow.   84yo Male with ESRD on HD, DM type 2, HTN, Anemia of renal disease, hyperphosphatemia, Afib on A/C Presented with SOB. Pt a/w acute hypoxic respiratory failure 2/2 fluid overload.  Followed by pulmonary and nephrology. s/p urgent HD 6/22. CT chest result Density on the chest x-ray corresponds to loculated fluid in the right fissures. There is a small amount of free pleural fluid on the right. There is moderate left pleural effusion. There is patchy linear atelectasis versus infiltrate in the right lower lobe. Significant collapse in the left lower lobe likely secondary to pleural fluid.  Pt had HD 6/22, 6/23, another HD  for tomorroe. PT consulted for weakness, recommended home PT.  Nutrition consulted.   CXR repeated and slightly improving pleural effusion.   Thoracentesis, performed today, will resume regular diet, hold Eliquis for 72 hours and Heparin for 12 hours as per IR.    Endocrinology consulted for DM management as pt A1C 7.2 on this admission. HD scheduled for tomorrow as per dr. Quiroz, discharge planning for tomorrow.    <<<INCOMPLETE>>> 85 year old male with past medical history of ESRD on HD T, Th, Sat, type II DM, HTN, anemia of CKD, hyperphosphatemia, and A-Fib on Eliquis who presented with c/o acute hypoxic respiratory failure in the setting of fluid overload. He is now s/p emergent HD on 6/22. CT chest with loculated fluid in the right fissures and moderate left pleural effusion ---> now s/p thoracentesis on 6/28, 550 mL of serous fluid drained and sent for cytology. Heparin gtt restarted 12 hours post procedure, Eliquis on hold for 72 hours. HbA1C noted to be 7.2, endocrinology following, recommend either Tradjenta (if insurance covers) or Januvia for outpatient management.

## 2021-06-29 NOTE — PROGRESS NOTE ADULT - SUBJECTIVE AND OBJECTIVE BOX
NP Note discussed with Primary Attending      Patient is a 85y old  Male who presents with a chief complaint of Shortness of breath (27 Jun 2021 15:06)      INTERVAL HPI/OVERNIGHT EVENTS: no new complaints    MEDICATIONS  (STANDING):  amLODIPine   Tablet 5 milliGRAM(s) Oral daily  atorvastatin 10 milliGRAM(s) Oral at bedtime  calcium acetate 1334 milliGRAM(s) Oral three times a day with meals  chlorhexidine 2% Cloths 1 Application(s) Topical daily  dextrose 50% Injectable 12.5 Gram(s) IV Push once  finasteride 5 milliGRAM(s) Oral daily  furosemide    Tablet 80 milliGRAM(s) Oral <User Schedule>  insulin lispro (ADMELOG) corrective regimen sliding scale   SubCutaneous Before meals and at bedtime  losartan 100 milliGRAM(s) Oral daily  metoprolol tartrate 25 milliGRAM(s) Oral two times a day    MEDICATIONS  (PRN):      __________________________________________________  REVIEW OF SYSTEMS:    CONSTITUTIONAL: No fever,   EYES: no acute visual disturbances  NECK: No pain or stiffness  RESPIRATORY: No cough; No shortness of breath  CARDIOVASCULAR: No chest pain, no palpitations  GASTROINTESTINAL: No pain. No nausea or vomiting; No diarrhea   NEUROLOGICAL: No headache or numbness, no tremors  MUSCULOSKELETAL: No joint pain, no muscle pain  GENITOURINARY: no dysuria, no frequency, no hesitancy  PSYCHIATRY: no depression , no anxiety  ALL OTHER  ROS negative        Vital Signs Last 24 Hrs  T(C): 36.3 (28 Jun 2021 12:44), Max: 37 (28 Jun 2021 05:15)  T(F): 97.3 (28 Jun 2021 12:44), Max: 98.6 (28 Jun 2021 05:15)  HR: 75 (28 Jun 2021 12:44) (74 - 106)  BP: 149/52 (28 Jun 2021 12:44) (111/45 - 149/52)  BP(mean): --  RR: 18 (28 Jun 2021 12:44) (18 - 18)  SpO2: 100% (28 Jun 2021 12:44) (99% - 100%)    ________________________________________________  PHYSICAL EXAM:  GENERAL: NAD  HEENT: Normocephalic;  conjunctivae and sclerae clear; moist mucous membranes;   NECK : supple  CHEST/LUNG: Clear to auscultation bilaterally with good air entry   HEART: S1 S2  regular; no murmurs, gallops or rubs  ABDOMEN: Soft, Nontender, Nondistended; Bowel sounds present  EXTREMITIES: no cyanosis; no edema; no calf tenderness  SKIN: gauze and Tegaderm to left posterior chest without drainage  NERVOUS SYSTEM:  Awake and alert; Oriented  to place, person and time ; no new deficits    _________________________________________________  LABS:                        11.4   5.35  )-----------( 215      ( 28 Jun 2021 06:55 )             32.9     06-28    131<L>  |  91<L>  |  94<H>  ----------------------------<  131<H>  4.7   |  25  |  8.99<H>    Ca    8.5      28 Jun 2021 06:55    TPro  6.5  /  Alb  3.0<L>  /  TBili  0.4  /  DBili  x   /  AST  9<L>  /  ALT  14  /  AlkPhos  114  06-28    PTT - ( 28 Jun 2021 06:55 )  PTT:30.9 sec    CAPILLARY BLOOD GLUCOSE      POCT Blood Glucose.: 130 mg/dL (28 Jun 2021 12:41)  POCT Blood Glucose.: 161 mg/dL (27 Jun 2021 21:12)  POCT Blood Glucose.: 242 mg/dL (27 Jun 2021 16:46)        RADIOLOGY & ADDITIONAL TESTS:  EXAM:  XR CHEST PORTABLE IMMED 1V                            PROCEDURE DATE:  06/28/2021          INTERPRETATION:  TECHNIQUE: A single AP view of the chest was obtained. Ordered time:   6/28/2021 12:19 PM    COMPARISON: 6/28/2021 at 8:49 AM.    CLINICAL INFORMATION: Status post left thoracentesis    FINDINGS:    The heart size is not well assessed on AP film.  There is platelike atelectasis in the right midlung.  There are no pleural effusions.  There is no pneumothorax.  There are no acute osseous abnormalities.    IMPRESSION:    Status post left thoracentesis. No pneumothorax.    Right midlung platelike atelectasis.          CHEPE PICHARDO MD; Attending Radiologist  This document has been electronically signed.  CHEPE PICHARDO MD; Attending Radiologist  This document has been electronically signed. Jun 28 2021 12:33PM    EXAM:  CT CHEST                            PROCEDURE DATE:  06/22/2021          INTERPRETATION:  CLINICAL INFORMATION: Chest x-ray question loculated right pleural fluid    COMPARISON: Chest x-ray of earlier in the day    CONTRAST/COMPLICATIONS:  IV Contrast: NONE  Oral Contrast: NONE  Complications: None reported at time of study completion    PROCEDURE:  CT of the Chest was performed.  Sagittal and coronal reformats were performed.    FINDINGS:    LUNGS AND AIRWAYS: Patent central airways.  Lungs are remarkable for significant compressive atelectasis in the left lower lobe and a small amount of passive atelectasis in the lingula. There is linear atelectasis versus infiltrate in the right lower lobe  PLEURA: Moderate free-flowing left pleural effusion as seen on the chest x-ray . Loculated fluid in the right ager and minor fissures corresponding to the x-ray abnormality. Small amount of free-flowing right pleural fluid.  MEDIASTINUM AND LETITIA: No lymphadenopathy.  VESSELS: Atherosclerotic calcification of aorta and coronary arteries  HEART: Heart size is normal. No pericardial effusion. Mitral annular calcification  CHEST WALL AND LOWER NECK: Within normal limits.  VISUALIZED UPPER ABDOMEN: Within normal limits.  BONES: Degenerativechanges of the spine. Osteopenia    IMPRESSION:  Density on the chest x-ray corresponds to loculated fluid in the right fissures. There is a small amount of free pleural fluid on the right. There is moderate left pleural effusion  There is patchy linear atelectasis versus infiltrate in the right lower lobe.  Significant collapse in the left lower lobe likely secondary to pleural fluid            FLO LANIER MD; Attending Radiologist  This document has been electronically signed. Jun 22 2021  7:18PM        Imaging  Reviewed:  YES    Consultant(s) Notes Reviewed:   YES      Plan of care was discussed with patient and /or primary care giver; all questions and concerns were addressed

## 2021-06-29 NOTE — CONSULT NOTE ADULT - PROBLEM SELECTOR RECOMMENDATION 9
hx of dm  Pt stopped using dm meds since the onset of JHD last 3-4 yrs  now with post meal hyperglycemia and a1c- 7.2  cont low dose admelog for now  may d/c home on tradjenta 5 or januvia 25  fsg ac and hs  d/w np  d/w pts wife at the [facility]

## 2021-06-29 NOTE — PROGRESS NOTE ADULT - PROBLEM SELECTOR PLAN 8
IMPROVE VTE Individual Risk Assessment  RISK                                                         Points  [  ] Previous VTE                                      3  [  ] Thrombophilia                                   2  [  ] Lower limb paralysis                         2 (unable to hold up >15 seconds)    [  ] Current Cancer                                  2       (within 6 months)  [  ] Immobilization > 24 hrs                    1  [  ] ICU/CCU stay > 24 hrs                         1  [  ] Age > 60                                              1  eliquis bid for dvt ppx, need to hold x 3 days Full code  pt has a capacity for health decision  wife Stewart [phone], [phone], spoke with daughter and wife at bedside. FULL CODE  pt has capacity   wife Stewart [phone], [phone], spoke with daughter and wife at bedside.

## 2021-06-29 NOTE — CONSULT NOTE ADULT - SUBJECTIVE AND OBJECTIVE BOX
[facility] NEPHROLOGY- CONSULTATION NOTE    Patient is a 84yo Male with ESRD on HD, DM type 2, HTN, Anemia of renal dz, hyperphosphatemia, Afib on A/C p/w SOB. Pt a/w acute hypoxic respiratory failure 2/2 fluid overload. Nephrology consulted for ESRD status.     Pt well known to me; pt ESRD on HD TTS @ [facility]. Last HD 6/19.   Pt states he felt SOB yesterday am ~7am. He states he called [facility] for extra HD session but for Friday. Spoke with HD RN manager, pt declined extra HD session 6/21 and 6/23 and scheduled for Friday.   Pt denies any fevers or chills, chest pain, n/v/d or abd pain. Pt states his SOB is improving.   Pt was recently seen by outpt Pulmonologist who recc thoracentesis of Rt pleural effusion but pt declined since he was asymptomatic.       PAST MEDICAL & SURGICAL HISTORY:  ESRD (end stage renal disease)    Diabetes    Hypertension    Hyperlipidemia    S/P hemodialysis catheter insertion  7/17    History of appendectomy      No Known Allergies    Home Medications Reviewed  Hospital Medications:   MEDICATIONS  (STANDING): Reviewed      SOCIAL HISTORY:  Denies ETOh, Smoking, or drug use  FAMILY HISTORY:  Family history of diabetes mellitus in sister (Sibling)        REVIEW OF SYSTEMS:  Gen: no changes in weight  HEENT: no rhinorrhea  Neck: no sore throat  Cards: no chest pain or palipitations  Resp: +dyspnea improving  GI: no nausea or vomiting or diarrhea  : no dysuria or hematuria  Vascular: +LE edema  Derm: no rashes  Neuro: no numbness/tingling  All other review of systems is negative unless indicated above.    VITALS:  T(F): 97.9 (06-22-21 @ 12:38), Max: 97.9 (06-22-21 @ 12:38)  HR: 82 (06-22-21 @ 12:38)  BP: 171/85 (06-22-21 @ 12:38)  RR: 18 (06-22-21 @ 12:38)  SpO2: 97% (06-22-21 @ 12:38)  Wt(kg): --    Height (cm): 167.6 (06-22 @ 08:32)  Weight (kg): 51.5 (06-22 @ 08:32)  BMI (kg/m2): 18.3 (06-22 @ 08:32)  BSA (m2): 1.57 (06-22 @ 08:32)    PHYSICAL EXAM:  Gen: NAD, calm  HEENT: MMM  Neck: no JVD  Cards: i+S1/S2, no M/G/R  Resp: +b/l rales at bases  GI: soft, NT/ND, NABS  : no CVA tenderness  Extremities: No LE edema B/L- resolved  Derm: no rashes  Neuro: non-focal  Access: Left AVF +thrill +bruit    LABS:  06-22    136  |  98  |  90<H>  ----------------------------<  252<H>  5.1   |  22  |  9.63<H>    Ca    9.1      22 Jun 2021 09:44  Phos  4.3     06-22  Mg     2.4     06-22    TPro  7.1  /  Alb  3.4<L>  /  TBili  0.5  /  DBili      /  AST  12  /  ALT  25  /  AlkPhos  155<H>  06-22    Creatinine Trend: 9.63 <--                        10.6   6.22  )-----------( 144      ( 22 Jun 2021 09:44 )             32.1     Urine Studies:    < from: Xray Chest 1 View- PORTABLE-Urgent (06.22.21 @ 10:34) >    EXAM:  XR CHEST PORTABLE URGENT 1V                            PROCEDURE DATE:  06/22/2021          INTERPRETATION:  TECHNIQUE: A single AP view of the chest was obtained. Ordered time:   6/22/2021 10:34 AM    COMPARISON: 4/30/2021    CLINICAL INFORMATION:    FINDINGS:    The heart size is not well assessed on AP film.  There is a moderate left pleural effusion and small right pleural effusion again identified.  There is compressive atelectasis at the left lung base. A rounded opacity in the right midlung may be related to loculated fluid.  There are degenerative changes in the thoracic spine.    IMPRESSION:    Moderate left and small right pleural effusion. There is compressive atelectasis at the left lung base.    A rounded area of opacityin the right midlung may represent loculated fluid.    A chest CT could be performed as clinically.    < end of copied text >          
Patient is a 85y old  Male who presents with a chief complaint of Shortness of breath (28 Jun 2021 15:04)      HPI:  84 yo Male with ESRD on HD T/Th/sat, DM type 2, HTN, Anemia of renal disease, hyperphosphatemia, Afib on A/C Presented with SOB.Pt was feeling sob since yesterday.  Pt was recently seen by outpt Pulmonologist who recc thoracentesis of pleural effusion but pt declined since he was asymptomatic. Pt denies any fever chills abdominal pain or any other complaints. Found to have hyperglycemia. Pt stopped using dm meds since the onset of JHD last 3-4 yrs. Eating well.     GOC: unable to reach family, primary team to follow up  (22 Jun 2021 17:57)      PAST MEDICAL & SURGICAL HISTORY:  ESRD (end stage renal disease)    Diabetes    Hypertension    Hyperlipidemia    Atrial fibrillation    S/P hemodialysis catheter insertion  7/17    History of appendectomy           MEDICATIONS  (STANDING):  amLODIPine   Tablet 5 milliGRAM(s) Oral daily  atorvastatin 10 milliGRAM(s) Oral at bedtime  calcium acetate 1334 milliGRAM(s) Oral three times a day with meals  chlorhexidine 2% Cloths 1 Application(s) Topical daily  dextrose 50% Injectable 12.5 Gram(s) IV Push once  finasteride 5 milliGRAM(s) Oral daily  furosemide    Tablet 80 milliGRAM(s) Oral <User Schedule>  heparin  Infusion.  Unit(s)/Hr (10 mL/Hr) IV Continuous <Continuous>  insulin lispro (ADMELOG) corrective regimen sliding scale   SubCutaneous Before meals and at bedtime  losartan 100 milliGRAM(s) Oral daily  metoprolol tartrate 25 milliGRAM(s) Oral two times a day    MEDICATIONS  (PRN):  heparin   Injectable 4000 Unit(s) IV Push every 6 hours PRN For aPTT less than 40  heparin   Injectable 2000 Unit(s) IV Push every 6 hours PRN For aPTT between 40 - 57      FAMILY HISTORY:  Family history of diabetes mellitus in sister (Sibling)        SOCIAL HISTORY:      REVIEW OF SYSTEMS:  CONSTITUTIONAL: No fever, weight loss, or fatigue  EYES: No eye pain, visual disturbances, or discharge  ENT:  No difficulty hearing, tinnitus, vertigo; No sinus or throat pain  NECK: No pain or stiffness  RESPIRATORY: No cough, wheezing, chills or hemoptysis; No Shortness of Breath  CARDIOVASCULAR: No chest pain, palpitations, passing out, dizziness, or leg swelling  GASTROINTESTINAL: No abdominal or epigastric pain. No nausea, vomiting, or hematemesis; No diarrhea or constipation. No melena or hematochezia.  GENITOURINARY: No dysuria, frequency, hematuria, or incontinence  NEUROLOGICAL: No headaches, memory loss, loss of strength, numbness, or tremors  SKIN: No itching, burning, rashes, or lesions   LYMPH Nodes: No enlarged glands  ENDOCRINE: No heat or cold intolerance; No hair loss  MUSCULOSKELETAL: No joint pain or swelling; No muscle, back, or extremity pain  PSYCHIATRIC: No depression, anxiety, mood swings, or difficulty sleeping  HEME/LYMPH: No easy bruising, or bleeding gums  ALLERGY AND IMMUNOLOGIC: No hives or eczema	        Vital Signs Last 24 Hrs  T(C): 36.4 (29 Jun 2021 05:12), Max: 36.7 (28 Jun 2021 21:27)  T(F): 97.6 (29 Jun 2021 05:12), Max: 98 (28 Jun 2021 21:27)  HR: 62 (29 Jun 2021 05:12) (62 - 76)  BP: 134/52 (29 Jun 2021 05:12) (107/58 - 149/52)  BP(mean): --  RR: 17 (29 Jun 2021 05:12) (16 - 18)  SpO2: 100% (29 Jun 2021 05:12) (98% - 100%)      Constitutional:    HEENT: nad    Neck:  No JVD, bruits or thyromegaly    Respiratory:  Clear without rales or rhonchi    Cardiovascular:  RR without murmur, rub or gallop.    Gastrointestinal: Soft without hepatosplenomegaly.    Extremities: without cyanosis, clubbing or edema.    Neurological:  Oriented   x   3   . No gross sensory or motor defects.        LABS:                        11.2   5.41  )-----------( 219      ( 29 Jun 2021 09:34 )             32.6     06-28    131<L>  |  91<L>  |  94<H>  ----------------------------<  131<H>  4.7   |  25  |  8.99<H>    Ca    8.5      28 Jun 2021 06:55    TPro  6.5  /  Alb  3.0<L>  /  TBili  0.4  /  DBili  x   /  AST  9<L>  /  ALT  14  /  AlkPhos  114  06-28        PTT - ( 29 Jun 2021 09:34 )  PTT:63.5 sec    CAPILLARY BLOOD GLUCOSE      POCT Blood Glucose.: 129 mg/dL (29 Jun 2021 07:42)  POCT Blood Glucose.: 206 mg/dL (28 Jun 2021 21:46)  POCT Blood Glucose.: 227 mg/dL (28 Jun 2021 16:32)  POCT Blood Glucose.: 130 mg/dL (28 Jun 2021 12:41)      RADIOLOGY & ADDITIONAL STUDIES:
Time of visit:    CHIEF COMPLAINT: Patient is a 85y old  Male who presents with a chief complaint of Shortness of breath (22 Jun 2021 17:57)      HPI:  86 yo Male with ESRD on HD T/Th/sat, DM type 2, HTN, Anemia of renal disease, hyperphosphatemia, Afib on A/C Presented with SOB.Pt was feeling sob since yesterday.  Pt was recently seen by outpt Pulmonologist who recc thoracentesis of pleural effusion but pt declined since he was asymptomatic. Pt denies any fever chills abdominal pain or any other complaints     GOC: unable to reach family, primary team to follow up  (22 Jun 2021 17:57)   Patient seen and examined.     PAST MEDICAL & SURGICAL HISTORY:  ESRD (end stage renal disease)    Diabetes    Hypertension    Hyperlipidemia    Atrial fibrillation    S/P hemodialysis catheter insertion  7/17    History of appendectomy        Allergies    No Known Allergies    Intolerances        MEDICATIONS  (STANDING):  amLODIPine   Tablet 10 milliGRAM(s) Oral daily  apixaban 2.5 milliGRAM(s) Oral two times a day  atorvastatin 10 milliGRAM(s) Oral at bedtime  calcium acetate 1334 milliGRAM(s) Oral three times a day with meals  dextrose 50% Injectable 12.5 Gram(s) IV Push once  finasteride 5 milliGRAM(s) Oral daily  furosemide    Tablet 80 milliGRAM(s) Oral <User Schedule>  hydrALAZINE 100 milliGRAM(s) Oral every 8 hours  insulin lispro (ADMELOG) corrective regimen sliding scale   SubCutaneous Before meals and at bedtime  losartan 100 milliGRAM(s) Oral daily  metoprolol tartrate 25 milliGRAM(s) Oral two times a day      MEDICATIONS  (PRN):   Medications up to date at time of exam.    Medications up to date at time of exam.    FAMILY HISTORY:  Family history of diabetes mellitus in sister (Sibling)        SOCIAL HISTORY  Smoking History: [ x   ]  smoking/smoke exposure, [   ] former smoker  Living Condition: [   ] apartment, [   ] private house  Work History:   Travel History: denies recent travel  Illicit Substance Use: denies  Alcohol Use: denies    REVIEW OF SYSTEMS:    CONSTITUTIONAL:  denies fevers, chills, sweats, weight loss    HEENT:  denies diplopia or blurred vision, sore throat or runny nose.    CARDIOVASCULAR:  denies pressure, squeezing, tightness, or heaviness about the chest; no palpitations.    RESPIRATORY:  denies SOB, cough, HANNAH, wheezing.    GASTROINTESTINAL:  denies abdominal pain, nausea, vomiting or diarrhea.    GENITOURINARY: denies dysuria, frequency or urgency.    NEUROLOGIC:  denies numbness, tingling, seizures or weakness.    PSYCHIATRIC:  denies disorder of thought or mood.    MSK: denies swelling, redness      PHYSICAL EXAMINATION:    GENERAL: The patient is a well-developed, well-nourished, in no apparent distress.     Vital Signs Last 24 Hrs  T(C): 37.1 (22 Jun 2021 21:42), Max: 37.1 (22 Jun 2021 21:42)  T(F): 98.7 (22 Jun 2021 21:42), Max: 98.7 (22 Jun 2021 21:42)  HR: 98 (22 Jun 2021 21:42) (82 - 113)  BP: 159/77 (22 Jun 2021 21:42) (155/86 - 181/92)  BP(mean): --  RR: 18 (22 Jun 2021 21:42) (18 - 20)  SpO2: 99% (22 Jun 2021 21:42) (88% - 100%)   (if applicable)    Chest Tube (if applicable)    HEENT: Head is normocephalic and atraumatic. Extraocular muscles are intact. Mucous membranes are moist.     NECK: Supple, no palpable adenopathy.    LUNGS: decrease breath sounds in left lower lung bases     HEART: Regular rate and rhythm without murmur.    ABDOMEN: Soft, nontender, and nondistended.  No hepatosplenomegaly is noted.    RENAL: No difficulty voiding, no pelvic pain    EXTREMITIES: Without any cyanosis, clubbing, rash, lesions or edema.    NEUROLOGIC: Awake, alert,    SKIN: Warm, dry, good turgor.      LABS:                        10.6   6.22  )-----------( 144      ( 22 Jun 2021 09:44 )             32.1     06-22    136  |  98  |  90<H>  ----------------------------<  252<H>  5.1   |  22  |  9.63<H>    Ca    9.1      22 Jun 2021 09:44  Phos  4.3     06-22  Mg     2.4     06-22    TPro  7.1  /  Alb  3.4<L>  /  TBili  0.5  /  DBili  x   /  AST  12  /  ALT  25  /  AlkPhos  155<H>  06-22          CARDIAC MARKERS ( 22 Jun 2021 09:44 )  0.034 ng/mL / x     / x     / x     / x            Serum Pro-Brain Natriuretic Peptide: [id number] pg/mL (06-22-21 @ 09:44)          MICROBIOLOGY: (if applicable)    RADIOLOGY & ADDITIONAL STUDIES:  EKG:     CXR: < from: Xray Chest 1 View- PORTABLE-Urgent (06.22.21 @ 10:34) >    EXAM:  XR CHEST PORTABLE URGENT 1V                            PROCEDURE DATE:  06/22/2021          INTERPRETATION:  TECHNIQUE: A single AP view of the chest was obtained. Ordered time:   6/22/2021 10:34 AM    COMPARISON: 4/30/2021    CLINICAL INFORMATION:    FINDINGS:    The heart size is not well assessed on AP film.  There is a moderate left pleural effusion and small right pleural effusion again identified.  There is compressive atelectasis at the left lung base. A rounded opacity in the right midlung may be related to loculated fluid.  There are degenerative changes in the thoracic spine.    IMPRESSION:    Moderate left and small right pleural effusion. There is compressive atelectasis at the left lung base.    A rounded area of opacityin the right midlung may represent loculated fluid.    A chest CT could be performed as clinically.          CHEPE PICHARDO MD; Attending Radiologist  This document has been electronically signed.  CHEPE PICHARDO MD; Attending Radiologist  Thisdocument has been electronically signed. Jun 22 2021  1:12PM    < end of copied text >    CT chest :< from: CT Chest No Cont (06.22.21 @ 19:00) >    EXAM:  CT CHEST                            PROCEDURE DATE:  06/22/2021          INTERPRETATION:  CLINICAL INFORMATION: Chest x-ray question loculated right pleural fluid    COMPARISON: Chest x-ray of earlier in the day    CONTRAST/COMPLICATIONS:  IV Contrast: NONE  Oral Contrast: NONE  Complications: None reported at time of study completion    PROCEDURE:  CT of the Chest was performed.  Sagittal and coronal reformats were performed.    FINDINGS:    LUNGS AND AIRWAYS: Patent central airways.  Lungs are remarkable for significant compressive atelectasis in the left lower lobe and a small amount of passive atelectasis in the lingula. There is linear atelectasis versus infiltrate in the right lower lobe  PLEURA: Moderate free-flowing left pleural effusion as seen on the chest x-ray . Loculated fluid in the right ager and minor fissures corresponding to the x-ray abnormality. Small amount of free-flowing right pleural fluid.  MEDIASTINUM AND LETITIA: No lymphadenopathy.  VESSELS: Atherosclerotic calcification of aorta and coronary arteries  HEART: Heart size is normal. No pericardial effusion. Mitral annular calcification  CHEST WALL AND LOWER NECK: Within normal limits.  VISUALIZED UPPER ABDOMEN: Within normal limits.  BONES: Degenerativechanges of the spine. Osteopenia    IMPRESSION:  Density on the chest x-ray corresponds to loculated fluid in the right fissures. There is a small amount of free pleural fluid on the right. There is moderate left pleural effusion  There is patchy linear atelectasis versus infiltrate in the right lower lobe.  Significant collapse in the left lower lobe likely secondary to pleural fluid            FLO LANIER MD; Attending Radiologist  This document has been electronically signed. Jun 22 2021  7:18PM    < end of copied text >    ECHO:    IMPRESSION: 85y Male PAST MEDICAL & SURGICAL HISTORY:  ESRD (end stage renal disease)    Diabetes    Hypertension    Hyperlipidemia    Atrial fibrillation    S/P hemodialysis catheter insertion  7/17    History of appendectomy     p/w         IMP: This is an  85 yr old man  with ESRD on HD T/Th/sat, DM type 2, HTN, Anemia of renal disease, hyperphosphatemia, Afib on A/C Presented with SOB. Pt was feeling sob since yesterday.  Pt was recently seen by outpt Pulmonologist who recc thoracentesis of pleural effusion but pt declined since he was asymptomatic. Pat sob is due to pulmonary edema from fluid over load . Doubt if  loculated effusion contributed to his symptoms     Sugg  -continue dialysis as per Neph  -IR eval for thoracentesis   -continue meds   -dvt/gi prophy    d/c with dr vizcarra and dr vargas   
PATIENT SEEN AND EXAMINED ON :-06/22/2021  DATE OF SERVICE:   06/22/2021  Interim events noted,Labs ,Radiological studies and Cardiology tests reviewed .      History of Present Illness:   86 yo Male with ESRD on HD T/Th/sat, DM type 2, HTN, Anemia of renal disease, hyperphosphatemia, Afib on A/C Presented with SOB.Pt was feeling sob since yesterday.  Pt was recently seen by outpt Pulmonologist who recc thoracentesis of pleural effusion but pt declined since he was asymptomatic. Pt denies any fever chills abdominal pain or any other complaints     GOC: unable to reach family, primary team to follow up     Review of Systems:  Other Review of Systems: All other review of systems negative, except as noted in HPI      Allergies and Intolerances:        Allergies:  	No Known Allergies:     Home Medications:   * Patient Currently Takes Medications as of 22-Jun-2021 10:44 documented in Structured Notes  · 	finasteride 5 mg oral tablet: Last Dose Taken:  , 1 tab(s) orally once a day  · 	losartan 100 mg oral tablet: Last Dose Taken:  , 1 tab(s) orally once a day  · 	atorvastatin 10 mg oral tablet: Last Dose Taken:  , 1 tab(s) orally once a day (at bedtime)  · 	metoprolol tartrate 25 mg oral tablet: Last Dose Taken:  , 1 tab(s) orally 2 times a day  · 	amLODIPine 10 mg oral tablet: Last Dose Taken:  , 1 tab(s) orally once a day  · 	hydrALAZINE 100 mg oral tablet: Last Dose Taken:  , 1 tab(s) orally 3 times a day  · 	furosemide 80 mg oral tablet: Last Dose Taken:  , 1 tab(s) orally once a day (in the morning) on Non-Dialysis Days  · 	calcium acetate 667 mg oral tablet: Last Dose Taken:  , 1334 milligram(s) orally 3 times a day (with meals)     Patient History:   Past Medical, Past Surgical, and Family History:  PAST MEDICAL HISTORY:  Atrial fibrillation     Diabetes     ESRD (end stage renal disease)     Hyperlipidemia     Hypertension.     PAST SURGICAL HISTORY:  History of appendectomy     S/P hemodialysis catheter insertion 7/17.     FAMILY HISTORY:  Sibling  Still living? Yes, Estimated age: Age Unknown  Family history of diabetes mellitus in sister, Age at diagnosis: Age Unknown.    Social History:  Social History (marital status, living situation, occupation, tobacco use, alcohol and drug use, and sexual history): Alcohol: Denied  Smoking: Denied  Illicit drugs: Denied  Physical Exam:   Physical Exam: Vital Signs Last 24 Hrs  T(C): 36.6 (22 Jun 2021 12:38), Max: 36.6 (22 Jun 2021 12:38)  T(F): 97.9 (22 Jun 2021 12:38), Max: 97.9 (22 Jun 2021 12:38)  HR: 82 (22 Jun 2021 12:38) (82 - 113)  BP: 171/85 (22 Jun 2021 12:38) (158/76 - 181/92)  BP(mean): --  RR: 18 (22 Jun 2021 12:38) (18 - 20)  SpO2: 97% (22 Jun 2021 12:38) (88% - 100%)    Physical exam:  GENERAL: NAD, lying in bed comfortably  HEAD:  Atraumatic, Normocephalic  EYES: EOMI, PERRLA, conjunctiva and sclera clear  ENT: Moist mucous membranes  NECK: Supple, No JVD  CHEST/LUNG: Rhales on Left lower lobes   HEART: Regular rate and rhythm; S1+ S2+   ABDOMEN: Bowel sounds present; Soft, Nontender, Nondistended   EXTREMITIES:  2+ Peripheral Pulses, brisk capillary refill. No clubbing, cyanosis, or edema  NERVOUS SYSTEM:  Alert & awake responding appropriately   MSK: FROM all 4 extremities, full and equal strength, LAVf   SKIN: No rashes or lesions  LABS:                        10.6   6.22  )-----------( 144      ( 22 Jun 2021 09:44 )             32.1     06-22    136  |  98  |  90<H>  ----------------------------<  252<H>  5.1   |  22  |  9.63<H>    Ca    9.1      22 Jun 2021 09:44  Phos  4.3     06-22  Mg     2.4     06-22    TPro  7.1  /  Alb  3.4<L>  /  TBili  0.5  /  DBili  x   /  AST  12  /  ALT  25  /  AlkPhos  155<H>  06-22          CARDIAC MARKERS ( 22 Jun 2021 09:44 )  0.034 ng/mL / x     / x     / x     / x            Serum Pro-Brain Natriuretic Peptide: [id number] pg/mL (06-22-21 @ 09:44)        RADIOLOGY & ADDITIONAL STUDIES:      EKG: Atrial Fibrillation at 105 BPM No acute ST T wave abnormalities    CXR: FINDINGS:  The heart size is not well assessed on AP film.  There is a moderate left pleural effusion and small right pleural effusion again identified.  There is compressive atelectasis at the left lung base. A rounded opacity in the right midlung may be related to loculated fluid.  There are degenerative changes in the thoracic spine.  IMPRESSION:  Moderate left and small right pleural effusion. There is compressive atelectasis at the left lung base.  A rounded area of opacity in the right midlung may represent loculated fluid.    EXAM:  CT CHEST        IMPRESSION:  Density on the chest x-ray corresponds to loculated fluid in the right fissures. There is a small amount of free pleural fluid on the right. There is moderate left pleural effusion  There is patchy linear atelectasis versus infiltrate in the right lower lobe.  Significant collapse in the left lower lobe likely secondary to pleural fluid

## 2021-06-29 NOTE — PROGRESS NOTE ADULT - PROBLEM SELECTOR PLAN 7
c/w home meds IMPROVE VTE Individual Risk Assessment  RISK                                                         Points  [  ] Previous VTE                                      3  [  ] Thrombophilia                                   2  [  ] Lower limb paralysis                         2 (unable to hold up >15 seconds)    [  ] Current Cancer                                  2       (within 6 months)  [  ] Immobilization > 24 hrs                    1  [  ] ICU/CCU stay > 24 hrs                         1  [  ] Age > 60                                              1  eliquis bid for dvt ppx, need to hold x 3 days holding Eliquis due to recent thoracentesis

## 2021-06-29 NOTE — PROGRESS NOTE ADULT - PROBLEM SELECTOR PLAN 5
continue with home medications  monitor BP will start on HSS   a1c 7.2  no DM meds per family and patient  Endo dr. Krishnan consulted [facility] per protocol   accuchlety AC, HS  home regimen: Tradjenta 5 mg or Januvia 25 mg   A1C 7.4  Endocrinology Dr. Krishnan

## 2021-06-29 NOTE — PROGRESS NOTE ADULT - PROBLEM SELECTOR PLAN 4
Pt at home on lopressor and eliquis   continue with home medications  renal dosing  on heparin drip, need to hold for 12h as per IR (after Thoracentesis). amlodipine, losartan with parameters  DASH/Renal diet

## 2021-06-29 NOTE — PROGRESS NOTE ADULT - PROBLEM SELECTOR PLAN 3
Pt wwith ESRD on HD t/th/s  Left arm AVF + bruit/thril  may benefit from increase fluid removal  s/p HD 6/22, 6/23  electrolytes wnl   nephrology dr. Quiroz (also outpt nephrology)  Nutrition consulted  will c/w regular HD 6/29 per dr. Quiroz holding Eliquis x 72 hours post-procedure (thoracentesis on 6/28) per IR  heparin gtt held x 12 hours, restarted  metoprolol with parameters

## 2021-06-29 NOTE — CONSULT NOTE ADULT - ASSESSMENT
Patient is a 86yo Male with ESRD on HD, DM type 2, HTN, Anemia of renal dz, hyperphosphatemia, Afib on A/C p/w SOB. Pt a/w acute hypoxic respiratory failure 2/2 fluid overload. Nephrology consulted for ESRD status.     1) ESRD: Last HD on 6/19 @ [facility]. Pt seen on HD today, tolerating UF goal of 4L. Monitor electrolytes.  If schedule allows will plan for PUF 6/23 for extra fluid removal .   2) Hypertension 2/2 CKD: BP uncontrolled. Home medications restarted. c/w low sodium diet and fluid restriction. Monitor BP. Increase UF with HD.   3) Anemia of renal disease: Hb acceptable. c/w Epogen 4k IV tiw with HD. Monitor Hb.  4) Hyperphosphatemia: serum phosphorus and serum calcium acceptable. c/w Phoslo 1667mg PO tid with meals. c/w low phos diet. Monitor serum calcium and phosphorus.  5) Acute hypoxic resp failure : 2/2 fluid overload. c/w fluid restriction. Will increase UF with HD. Pt with ?loculated Rt midlung fluid. Check CT chest. Pulmonary consulted.     [facility] NEPHROLOGY  Nahum Grubbs M.D.  Francisco Javier Hicks D.O.  Yovana Quiroz M.D.  Makeda Duron, MSN, ANP-C  [phone]    [address] [place] [id number]  
86yo Male with ESRD on HD, DM type 2, HTN, Anemia of renal disease, hyperphosphatemia, Afib on A/C Presented with SOB. Pt a/w acute hypoxic respiratory failure 2/2 fluid overload.    Problem/Plan - 1:  ·  Problem: Acute pulmonary edema.  Plan: Pt presented with SOB   CXR showed Moderate left and small right pleural effusion. There is compressive atelectasis at the left lung base. A rounded area of opacity in the right midlung may represent loculated fluid.  Last HD on 6/19 @ [facility]; Pt was taken to HD 6/22  will cw fluid restriction and repeat CXR     Problem/Plan - 2:  ·  Problem: Pleural effusion.  Plan: Pt with Loculated Right sided pleural effusion and left basilar Pleural effusion   Fu CT chest   will consult IR for Pleural effusion       Problem/Plan - 3:  ·  Problem: ESRD (end stage renal disease).  Plan: Pt wwith ESRD on HD t/th/s  may benefit from increase fluid removal  last HD 6/22   electrolytes wnl   plan as above.     Problem/Plan - 4:  ·  Problem: Atrial fibrillation.  Plan: Pt at home on lopressor and eliquis   continue with home medications.   Rate control  Stroke prevention Eliquis 2.5 mg BID    Problem/Plan - 5:  ·  Problem: Hypertension.  Plan: continue with home medications.     Problem/Plan - 6:  Problem: Diabetes. Plan: will start on HSS   a1c in april 6.4  unable to reach family for home medication.    Problem/Plan - 7:  ·  Problem: Hyperlipidemia.  Plan: fu lipid panel and resume home medications.     Problem/Plan - 8:  ·  Problem: Prophylactic measure.  Plan: IMPROVE VTE Individual Risk Assessment  RISK                                                         Points  [  ] Previous VTE                                      3  [  ] Thrombophilia                                   2  [  ] Lower limb paralysis                         2 (unable to hold up >15 seconds)    [  ] Current Cancer                                  2       (within 6 months)  [  ] Immobilization > 24 hrs                    1  [  ] ICU/CCU stay > 24 hrs                         1  [  ] Age > 60                                              1  eliquis bid for dvt ppx.   
86 yo Male with ESRD on HD T/Th/sat, DM type 2, HTN, Anemia of renal disease, hyperphosphatemia, Afib on A/C Presented with SOB.Pt was feeling sob. Found to be in acute Pulm edema and hyperglycemia. Pt stopped using dm meds since the onset of JHD last 3-4 yrs. Eating well.

## 2021-06-29 NOTE — PROGRESS NOTE ADULT - PROBLEM SELECTOR PLAN 2
Pt with Loculated Right sided pleural effusion and left basilar Pleural effusion moderate   f/u CT chest as above  Pulm Dr. Brandon consulted  outside pulm dr. Kulkarni ([phone])  repeat CXR improving  IR thoracentesis done today. need to hold eliquis x 72h and Heparin for 12h ESRD T, Th, Sat  c/b intradialytic hypotension  hyperkalemic this AM - HD with 2K [facility]  nephrology Dr. Quiroz

## 2021-06-29 NOTE — PROGRESS NOTE ADULT - SUBJECTIVE AND OBJECTIVE BOX
[facility] NEPHROLOGY- PROGRESS NOTE    Patient is a 84yo Male with ESRD on HD, DM type 2, HTN, Anemia of renal dz, hyperphosphatemia, Afib on A/C p/w SOB. Pt a/w acute hypoxic respiratory failure 2/2 fluid overload. Nephrology consulted for ESRD status.   s/p IR left thoracentesis 6/28 with 550 cc serous fluid drained.    Hospital Medications: Medications reviewed.    REVIEW OF SYSTEMS:  CONSTITUTIONAL: No fevers or chills  RESPIRATORY: No shortness of breath  CARDIOVASCULAR: No chest pain.  GASTROINTESTINAL: No nausea, vomiting, diarrhea or abdominal pain.   VASCULAR: No bilateral lower extremity edema       VITALS:  T(F): 97.6 (06-29-21 @ 05:12), Max: 98 (06-28-21 @ 21:27)  HR: 62 (06-29-21 @ 05:12)  BP: 134/52 (06-29-21 @ 05:12)  RR: 17 (06-29-21 @ 05:12)  SpO2: 100% (06-29-21 @ 05:12)  Wt(kg): --        PHYSICAL EXAM:  Gen: NAD, calm  Cards: +S1/S2, no M/G/R  Resp: clear left side; decreased Rt base  GI: soft, NT/ND, NABS  Extremities: No LE edema B/L  Access: Left AVF +thrill +bruit      LABS:  06-29    128<L>  |  87<L>  |  124<H>  ----------------------------<  162<H>  6.0<H>   |  26  |  x     Ca    8.7      29 Jun 2021 11:14    TPro      /  Alb  3.0<L>  /  TBili      /  DBili      /  AST      /  ALT      /  AlkPhos      06-29    Creatinine Trend: 8.99 <--, 7.18 <--, 9.63 <--, 7.02 <--, 8.80 <--, 6.95 <--                        11.2   5.41  )-----------( 219      ( 29 Jun 2021 09:34 )             32.6     Urine Studies:

## 2021-06-29 NOTE — PROGRESS NOTE ADULT - SUBJECTIVE AND OBJECTIVE BOX
PATIENT SEEN AND EXAMINED ON :-06/29/2021  DATE OF SERVICE:    06/29/2021   Interim events noted,Labs ,Radiological studies and Cardiology tests reviewed .    INTERVAL HPI/OVERNIGHT EVENTS: no new complaints    MEDICATIONS  (STANDING):  amLODIPine   Tablet 5 milliGRAM(s) Oral daily  atorvastatin 10 milliGRAM(s) Oral at bedtime  calcium acetate 1334 milliGRAM(s) Oral three times a day with meals  chlorhexidine 2% Cloths 1 Application(s) Topical daily  dextrose 50% Injectable 12.5 Gram(s) IV Push once  finasteride 5 milliGRAM(s) Oral daily  furosemide    Tablet 80 milliGRAM(s) Oral <User Schedule>  insulin lispro (ADMELOG) corrective regimen sliding scale   SubCutaneous Before meals and at bedtime  losartan 100 milliGRAM(s) Oral daily  metoprolol tartrate 25 milliGRAM(s) Oral two times a day        __________________________________________________  REVIEW OF SYSTEMS:    CONSTITUTIONAL: No fever,   EYES: no acute visual disturbances  NECK: No pain or stiffness  RESPIRATORY: No cough; No shortness of breath  CARDIOVASCULAR: No chest pain, no palpitations  GASTROINTESTINAL: No pain. No nausea or vomiting; No diarrhea   NEUROLOGICAL: No headache or numbness, no tremors  MUSCULOSKELETAL: No joint pain, no muscle pain  GENITOURINARY: no dysuria, no frequency, no hesitancy  PSYCHIATRY: no depression , no anxiety  ALL OTHER  ROS negative        Vital Signs Last 24 Hrs  T(C): 36.3 (28 Jun 2021 12:44), Max: 37 (28 Jun 2021 05:15)  T(F): 97.3 (28 Jun 2021 12:44), Max: 98.6 (28 Jun 2021 05:15)  HR: 75 (28 Jun 2021 12:44) (74 - 106)  BP: 149/52 (28 Jun 2021 12:44) (111/45 - 149/52)  BP(mean): --  RR: 18 (28 Jun 2021 12:44) (18 - 18)  SpO2: 100% (28 Jun 2021 12:44) (99% - 100%)    ________________________________________________  PHYSICAL EXAM:  GENERAL: NAD  HEENT: Normocephalic;  conjunctivae and sclerae clear; moist mucous membranes;   NECK : supple  CHEST/LUNG: Clear to auscultation bilaterally with good air entry   HEART: S1 S2  regular; no murmurs, gallops or rubs  ABDOMEN: Soft, Nontender, Nondistended; Bowel sounds present  EXTREMITIES: no cyanosis; no edema; no calf tenderness  SKIN: gauze and Tegaderm to left posterior chest without drainage  NERVOUS SYSTEM:  Awake and alert; Oriented  to place, person and time ; no new deficits    _________________________________________________  LABS:                        11.4   5.35  )-----------( 215      ( 28 Jun 2021 06:55 )             32.9     06-28    131<L>  |  91<L>  |  94<H>  ----------------------------<  131<H>  4.7   |  25  |  8.99<H>    Ca    8.5      28 Jun 2021 06:55    TPro  6.5  /  Alb  3.0<L>  /  TBili  0.4  /  DBili  x   /  AST  9<L>  /  ALT  14  /  AlkPhos  114  06-28    PTT - ( 28 Jun 2021 06:55 )  PTT:30.9 sec    CAPILLARY BLOOD GLUCOSE      POCT Blood Glucose.: 130 mg/dL (28 Jun 2021 12:41)  POCT Blood Glucose.: 161 mg/dL (27 Jun 2021 21:12)  POCT Blood Glucose.: 242 mg/dL (27 Jun 2021 16:46)          CXR:  IMPRESSION:    Status post left thoracentesis. No pneumothorax.    Right midlung platelike atelectasis          Problem/Plan - 1:  ·  Problem: Acute pulmonary edema.  Plan: CT with moderate sized left pleural effusion now s/p thoracentesis  There is patchy linear atelectasis versus infiltrate in the right lower lobe  continue with HD T, Th, Sat  repeat CXR 6/28 with improvement  Patient % on room air  s/p Thoracentesis today, will hold Heparin for 12h and Eliquis for 72h as per IR.  Nephro Quiroz following   Pulm Dr. Brandon consulted.     Problem/Plan - 2:  ·  Problem: ESRD (end stage renal disease).  Plan: ESRD T, Th, Sat  c/b intradialytic hypotension  hyperkalemic this AM - HD with 2K bath     Problem/Plan - 3:  ·  Problem: Atrial fibrillation.  Plan: holding Eliquis x 72 hours post-procedure (thoracentesis on 6/28) per IR  heparin gtt held x 12 hours, restarted  metoprolol with parameters.     Problem/Plan - 4:  ·  Problem: Hypertension.  Plan: amlodipine, losartan with parameters  DASH/Renal diet.     Problem/Plan - 5:  ·  Problem: Diabetes.  Plan: ISS per protocol   accuchecks AC, HS  home regimen: Tradjenta 5 mg or Januvia 25 mg   A1C 7.4  Endocrinology Dr. Krishnan.     Problem/Plan - 6:  Problem: Hyperlipidemia. Plan: lipid panel acceptable  c/w statin.    Problem/Plan - 7:  ·  Problem: Prophylactic measure.  Plan: holding Eliquis due to recent thoracentesis.     Problem/Plan - 8:  ·  Problem: Goals of care, counseling/discussion.  Plan: FULL CODE  pt has capacity   wife Stewart [phone], [phone], spoke with daughter and wife at bedside.     Problem/Plan - 9:  ·  Problem: Discharge planning issues.  Plan: will be discharged once transitioned from heparin gtt to eliquis (72 hours post-procedure may restart AC's).

## 2021-06-29 NOTE — CHART NOTE - NSCHARTNOTEFT_GEN_A_CORE
Pt is s/p left thoracentesis on 6/28 with removal of 550 cc of fluid. Heparin was held for 12 hours per IR. Now, ok to resume.    -Heparin gtt resumed at previous rate  -PTT in AM  -Continue to hold Eliquis for 72 hours per IR

## 2021-06-29 NOTE — PROGRESS NOTE ADULT - ASSESSMENT
Patient is a 86yo Male with ESRD on HD, DM type 2, HTN, Anemia of renal dz, hyperphosphatemia, Afib on A/C p/w SOB. Pt a/w acute hypoxic respiratory failure 2/2 fluid overload. Nephrology consulted for ESRD status.     1) ESRD: s/p HD on 6/26 with intradialytic hypotension and only 0.9L removed. Plan for next maintenance HD today with 2K bath due to hyperkalemia. c/w renal diet. Monitor electrolytes.  2) Hypertension 2/2 CKD: BP improved. Pt with low normal BP previously for which hydralazine 100 mg TID discontinued on 6/26 and amlodipine decreased to 5 mg daily. c/w low sodium diet and fluid restriction. Monitor BP.   3) Anemia of renal disease: Hb elevated; continue to hold Epogen 4k IV tiw with HD. Monitor Hb.  4) Hyperphosphatemia: check serum phosphorus c/w phoslo 2 tabs TID with meals. c/w low phos diet. Monitor serum calcium and phosphorus.  5) Acute hypoxic resp failure: 2/2 fluid overload. resolved.  c/w fluid restriction. s/p left sided thoracentesis 6/28. s/p CT chest; Pulmonary following.     [facility] NEPHROLOGY  Nahum Grubbs M.D.  Francisco Javier Hicks D.O.  Yovana Quiroz M.D.  Makeda Duron, MSN, ANP-C  [phone]    [address] [place] [address]

## 2021-06-30 LAB
APTT BLD: 101.6 SEC — HIGH (ref 27.5–35.5)
APTT BLD: 128.2 SEC — CRITICAL HIGH (ref 27.5–35.5)
APTT BLD: 67.9 SEC — HIGH (ref 27.5–35.5)
APTT BLD: 84.3 SEC — HIGH (ref 27.5–35.5)
APTT BLD: >200 SEC — CRITICAL HIGH (ref 27.5–35.5)
GLUCOSE BLDC GLUCOMTR-MCNC: 126 MG/DL — HIGH (ref 70–99)
GLUCOSE BLDC GLUCOMTR-MCNC: 160 MG/DL — HIGH (ref 70–99)
GLUCOSE BLDC GLUCOMTR-MCNC: 185 MG/DL — HIGH (ref 70–99)
GLUCOSE BLDC GLUCOMTR-MCNC: 208 MG/DL — HIGH (ref 70–99)
HCT VFR BLD CALC: 30.1 % — LOW (ref 39–50)
HGB BLD-MCNC: 10.1 G/DL — LOW (ref 13–17)
MCHC RBC-ENTMCNC: 31.2 PG — SIGNIFICANT CHANGE UP (ref 27–34)
MCHC RBC-ENTMCNC: 33.6 GM/DL — SIGNIFICANT CHANGE UP (ref 32–36)
MCV RBC AUTO: 92.9 FL — SIGNIFICANT CHANGE UP (ref 80–100)
NRBC # BLD: 0 /100 WBCS — SIGNIFICANT CHANGE UP (ref 0–0)
PLATELET # BLD AUTO: 180 K/UL — SIGNIFICANT CHANGE UP (ref 150–400)
RBC # BLD: 3.24 M/UL — LOW (ref 4.2–5.8)
RBC # FLD: 13.3 % — SIGNIFICANT CHANGE UP (ref 10.3–14.5)
WBC # BLD: 4.61 K/UL — SIGNIFICANT CHANGE UP (ref 3.8–10.5)
WBC # FLD AUTO: 4.61 K/UL — SIGNIFICANT CHANGE UP (ref 3.8–10.5)

## 2021-06-30 RX ORDER — ACETAMINOPHEN 500 MG
650 TABLET ORAL ONCE
Refills: 0 | Status: COMPLETED | OUTPATIENT
Start: 2021-06-30 | End: 2021-06-30

## 2021-06-30 RX ORDER — ERYTHROPOIETIN 10000 [IU]/ML
4000 INJECTION, SOLUTION INTRAVENOUS; SUBCUTANEOUS
Refills: 0 | Status: DISCONTINUED | OUTPATIENT
Start: 2021-06-30 | End: 2021-07-01

## 2021-06-30 RX ORDER — ISOPROPYL ALCOHOL, BENZOCAINE .7; .06 ML/ML; ML/ML
1 SWAB TOPICAL
Qty: 100 | Refills: 1
Start: 2021-06-30 | End: 2021-08-18

## 2021-06-30 RX ADMIN — Medication 1334 MILLIGRAM(S): at 09:07

## 2021-06-30 RX ADMIN — Medication 1334 MILLIGRAM(S): at 11:44

## 2021-06-30 RX ADMIN — Medication 1: at 17:32

## 2021-06-30 RX ADMIN — HEPARIN SODIUM 800 UNIT(S)/HR: 5000 INJECTION INTRAVENOUS; SUBCUTANEOUS at 09:06

## 2021-06-30 RX ADMIN — ATORVASTATIN CALCIUM 10 MILLIGRAM(S): 80 TABLET, FILM COATED ORAL at 22:32

## 2021-06-30 RX ADMIN — CHLORHEXIDINE GLUCONATE 1 APPLICATION(S): 213 SOLUTION TOPICAL at 11:45

## 2021-06-30 RX ADMIN — HEPARIN SODIUM 900 UNIT(S)/HR: 5000 INJECTION INTRAVENOUS; SUBCUTANEOUS at 02:41

## 2021-06-30 RX ADMIN — HEPARIN SODIUM 700 UNIT(S)/HR: 5000 INJECTION INTRAVENOUS; SUBCUTANEOUS at 23:44

## 2021-06-30 RX ADMIN — AMLODIPINE BESYLATE 5 MILLIGRAM(S): 2.5 TABLET ORAL at 05:32

## 2021-06-30 RX ADMIN — HEPARIN SODIUM 700 UNIT(S)/HR: 5000 INJECTION INTRAVENOUS; SUBCUTANEOUS at 15:26

## 2021-06-30 RX ADMIN — Medication 650 MILLIGRAM(S): at 03:10

## 2021-06-30 RX ADMIN — Medication 2: at 22:32

## 2021-06-30 RX ADMIN — Medication 25 MILLIGRAM(S): at 17:33

## 2021-06-30 RX ADMIN — Medication 1334 MILLIGRAM(S): at 17:32

## 2021-06-30 RX ADMIN — FINASTERIDE 5 MILLIGRAM(S): 5 TABLET, FILM COATED ORAL at 11:44

## 2021-06-30 RX ADMIN — Medication 25 MILLIGRAM(S): at 05:32

## 2021-06-30 RX ADMIN — LOSARTAN POTASSIUM 100 MILLIGRAM(S): 100 TABLET, FILM COATED ORAL at 05:32

## 2021-06-30 RX ADMIN — Medication 80 MILLIGRAM(S): at 05:32

## 2021-06-30 RX ADMIN — Medication 650 MILLIGRAM(S): at 02:37

## 2021-06-30 RX ADMIN — Medication 1: at 11:43

## 2021-06-30 NOTE — PROGRESS NOTE ADULT - PROBLEM SELECTOR PLAN 1
CT with moderate sized left pleural effusion now s/p thoracentesis  There is patchy linear atelectasis versus infiltrate in the right lower lobe  continue with HD T, Th, Sat  repeat CXR 6/28 with improvement  Patient % on room air  s/p Thoracentesis today, will hold Heparin for 12h and Eliquis for 72h as per IR.  OP pulm Dr. Kulkarni ([phone])  Nephro Quiroz following   Pulm Dr. Brandon consulted

## 2021-06-30 NOTE — PROGRESS NOTE ADULT - SUBJECTIVE AND OBJECTIVE BOX
PATIENT SEEN AND EXAMINED ON :-06/30/2021  DATE OF SERVICE:  06/30/2021 events noted,Labs ,Radiological studies and Cardiology tests reviewed .      Patient is a 85y old  Male who presents with a chief complaint of Shortness of breath     INTERVAL HPI/OVERNIGHT EVENTS: no new complaints dyspnea improved ambulating    MEDICATIONS  (STANDING):  amLODIPine   Tablet 5 milliGRAM(s) Oral daily  atorvastatin 10 milliGRAM(s) Oral at bedtime  calcium acetate 1334 milliGRAM(s) Oral three times a day with meals  chlorhexidine 2% Cloths 1 Application(s) Topical daily  dextrose 50% Injectable 12.5 Gram(s) IV Push once  finasteride 5 milliGRAM(s) Oral daily  furosemide    Tablet 80 milliGRAM(s) Oral <User Schedule>  insulin lispro (ADMELOG) corrective regimen sliding scale   SubCutaneous Before meals and at bedtime  losartan 100 milliGRAM(s) Oral daily  metoprolol tartrate 25 milliGRAM(s) Oral two times a day          __________________________________________________  REVIEW OF SYSTEMS:    CONSTITUTIONAL: No fever,   EYES: no acute visual disturbances  NECK: No pain or stiffness  RESPIRATORY: No cough; No shortness of breath  CARDIOVASCULAR: No chest pain, no palpitations  GASTROINTESTINAL: No pain. No nausea or vomiting; No diarrhea   NEUROLOGICAL: No headache or numbness, no tremors  MUSCULOSKELETAL: No joint pain, no muscle pain  GENITOURINARY: no dysuria, no frequency, no hesitancy  PSYCHIATRY: no depression , no anxiety  ALL OTHER  ROS negative        Vital Signs Last 24 Hrs  T(C): 36.3 (28 Jun 2021 12:44), Max: 37 (28 Jun 2021 05:15)  T(F): 97.3 (28 Jun 2021 12:44), Max: 98.6 (28 Jun 2021 05:15)  HR: 75 (28 Jun 2021 12:44) (74 - 106)  BP: 149/52 (28 Jun 2021 12:44) (111/45 - 149/52)    RR: 18 (28 Jun 2021 12:44) (18 - 18)  SpO2: 100% (28 Jun 2021 12:44) (99% - 100%)    ________________________________________________  PHYSICAL EXAM:  GENERAL: NAD  HEENT: Normocephalic;  conjunctivae and sclerae clear; moist mucous membranes;   NECK : supple  CHEST/LUNG: Clear to auscultation bilaterally with good air entry   HEART: Irreg; systolic, gallops or rubs  ABDOMEN: Soft, Nontender, Nondistended; Bowel sounds present  EXTREMITIES: no cyanosis; no edema; no calf tenderness  SKIN: gauze and Tegaderm to left posterior chest without drainage  NERVOUS SYSTEM:  Awake and alert; Oriented  to place, person and time ; no new deficits    _________________________________________________  LABS:                        11.4   5.35  )-----------( 215      ( 28 Jun 2021 06:55 )             32.9     06-28    131<L>  |  91<L>  |  94<H>  ----------------------------<  131<H>  4.7   |  25  |  8.99<H>    Ca    8.5      28 Jun 2021 06:55    TPro  6.5  /  Alb  3.0<L>  /  TBili  0.4  /  DBili  x   /  AST  9<L>  /  ALT  14  /  AlkPhos  114  06-28    PTT - ( 28 Jun 2021 06:55 )  PTT:30.9 sec    CAPILLARY BLOOD GLUCOSE      POCT Blood Glucose.: 130 mg/dL (28 Jun 2021 12:41)  POCT Blood Glucose.: 161 mg/dL (27 Jun 2021 21:12)  POCT Blood Glucose.: 242 mg/dL (27 Jun 2021 16:46)      · Assessment	  85 year old male with past medical history of ESRD on HD T, Th, Sat, type II DM, HTN, anemia of CKD, hyperphosphatemia, and A-Fib on Eliquis who presented with c/o acute hypoxic respiratory failure in the setting of fluid overload. He is now s/p emergent HD on 6/22. CT chest with loculated fluid in the right fissures and moderate left pleural effusion ---> now s/p thoracentesis on 6/28, 550 mL of serous fluid drained and sent for cytology. Heparin gtt restarted 12 hours post procedure, Eliquis on hold for 72 hours. HbA1C noted to be 7.2, endocrinology following, recommend either Tradjenta 5 mg or Januvia 25 mg (both have 47$ monthly copay) for outpatient management        Problem/Plan - 1:  ·  Problem: Acute pulmonary edema.  Plan: CT with moderate sized left pleural effusion now s/p thoracentesis  There is patchy linear atelectasis versus infiltrate in the right lower lobe  continue with HD T, Th, Sat  repeat CXR 6/28 with improvement  Patient % on room air  s/p Thoracentesis today, will hold Heparin for 12h and Eliquis for 72h as per IR.    Problem/Plan - 2:  ·  Problem: ESRD (end stage renal disease).  Plan: ESRD T, Th, Sat  c/b intradialytic hypotension  hyperkalemic this AM - HD with 2K bath  nephrology Dr. Quiroz.     Problem/Plan - 3:  ·  Problem: Atrial fibrillation.  Plan: holding Eliquis x 72 hours post-procedure (thoracentesis on 6/28) per IR  heparin gtt held x 12 hours, restarted  metoprolol with parameters.     Problem/Plan - 4:  ·  Problem: Hypertension.  Plan: amlodipine, losartan with parameters  DASH/Renal diet.     Problem/Plan - 5:  ·  Problem: Diabetes.  Plan: ISS per protocol   long MULLER, HS  plan for home regimen: Tradjenta 5 mg or Januvia 25 mg ---> both have 47$ monthly copay  spoke with family regarding DM medications, gave them both choices and drug fact sheets  A1C 7.4  .     Problem/Plan - 6:  Problem: Hyperlipidemia. Plan: lipid panel acceptable  c/w statin.    Problem/Plan - 7:  ·  Problem: Prophylactic measure.  Plan: holding Eliquis due to recent thoracentesis.     Problem/Plan - 8:  ·  Problem: Goals of care, counseling/discussion.  Plan: FULL CODE  pt has capacity   wife Stewart [phone], [phone], spoke with daughter and wife at bedside.     Problem/Plan - 9:  ·  Problem: Discharge planning issues.  Plan: will be discharged once transitioned from heparin gtt to eliquis (72 hours post-procedure may restart AC's).

## 2021-06-30 NOTE — CHART NOTE - NSCHARTNOTEFT_GEN_A_CORE
EVENT:     HPI    SUBJECTIVE:    OBJECTIVE:  Vital Signs Last 24 Hrs  T(C): 36.9 (29 Jun 2021 21:07), Max: 36.9 (29 Jun 2021 21:07)  T(F): 98.4 (29 Jun 2021 21:07), Max: 98.4 (29 Jun 2021 21:07)  HR: 70 (29 Jun 2021 21:07) (62 - 81)  BP: 124/51 (29 Jun 2021 21:07) (111/64 - 134/52)  BP(mean): --  RR: 18 (29 Jun 2021 21:07) (16 - 18)  SpO2: 99% (29 Jun 2021 21:07) (96% - 100%)    PHYSICAL EXAM:  Neuro: Awake and alert, oriented to person, place, and time  Cardiovascular: + S1, S2, no murmurs, rubs, or bruits  Respiratory: clear to auscultation bilaterally with good air entry   GI: Abdomen soft, non-tender, bowel sounds present   : Non distended;   Skin: warm and dry; no rash      LABS:                        11.2   5.41  )-----------( 219      ( 29 Jun 2021 09:34 )             32.6     06-29    128<L>  |  87<L>  |  124<H>  ----------------------------<  162<H>  6.0<H>   |  26  |  11.10<H>    Ca    8.7      29 Jun 2021 11:14    TPro  6.8  /  Alb  3.0<L>  /  TBili  0.4  /  DBili  x   /  AST  9<L>  /  ALT  15  /  AlkPhos  119  06-29        EKG:   IMAGING:    ASSESSMENT:    PLAN:     FOLLOW UP / RESULT: EVENT: Paged by RN, pt c/o pain at left AVF site    HPI:  85 year old male with past medical history of ESRD on HD T, Th, Sat, type II DM, HTN, anemia of CKD, hyperphosphatemia, and A-Fib on Eliquis who presented with c/o acute hypoxic respiratory failure in the setting of fluid overload. He is now s/p emergent HD on 6/22. CT chest with loculated fluid in the right fissures and moderate left pleural effusion ---> now s/p thoracentesis on 6/28, 550 mL of serous fluid drained and sent for cytology. Heparin gtt restarted 12 hours post procedure, Eliquis on hold for 72 hours. HbA1C noted to be 7.2, endocrinology following, recommend either Tradjenta (if insurance covers) or Januvia for outpatient management.     SUBJECTIVE: Pt reports discomfort at left AVF, s/p dialysis yesterday 6/29. Site is intact without signs of bleeding.     OBJECTIVE:  Vital Signs Last 24 Hrs  T(C): 36.9 (29 Jun 2021 21:07), Max: 36.9 (29 Jun 2021 21:07)  T(F): 98.4 (29 Jun 2021 21:07), Max: 98.4 (29 Jun 2021 21:07)  HR: 70 (29 Jun 2021 21:07) (62 - 81)  BP: 124/51 (29 Jun 2021 21:07) (111/64 - 134/52)  BP(mean): --  RR: 18 (29 Jun 2021 21:07) (16 - 18)  SpO2: 99% (29 Jun 2021 21:07) (96% - 100%)    PHYSICAL EXAM:  Neuro: Awake and alert, oriented to person, place, and time  Cardiovascular: + S1, S2, no murmurs, rubs, or bruits  Respiratory: Clear to auscultation bilaterally with good air entry   GI: Abdomen soft, non-tender, bowel sounds present   : Non distended;   Extremities: Left AVF (+) bruit/(+) thrill  Skin: warm and dry; no rash      LABS:                        11.2   5.41  )-----------( 219      ( 29 Jun 2021 09:34 )             32.6     06-29    128<L>  |  87<L>  |  124<H>  ----------------------------<  162<H>  6.0<H>   |  26  |  11.10<H>    Ca    8.7      29 Jun 2021 11:14    TPro  6.8  /  Alb  3.0<L>  /  TBili  0.4  /  DBili  x   /  AST  9<L>  /  ALT  15  /  AlkPhos  119  06-29        EKG:   IMAGING:    ASSESSMENT: Pain at left AVF likely r/t recent dialysis 6/29    PLAN:     -Tylenol 650 mg PO x 1 dose  -Continue current/supportive care    FOLLOW UP / RESULT:    -Monitor effectiveness of pain med  -Reassess pain per hospital policy

## 2021-06-30 NOTE — PROGRESS NOTE ADULT - PROBLEM SELECTOR PLAN 5
ISS per protocol   accuchlety AC, HS  home regimen: Tradjenta 5 mg or Januvia 25 mg   A1C 7.4  Endocrinology Dr. Krishnan ISS per protocol   long MULLER, HS  plan for home regimen: Tradjenta 5 mg or Januvia 25 mg ---> both have 47$ monthly copay  A1C 7.4  Endocrinology Dr. Krishnan ISS per protocol   long MULLER, HS  plan for home regimen: Tradjenta 5 mg or Januvia 25 mg ---> both have 47$ monthly copay  spoke with family regarding DM medications, gave them both choices and drug fact sheets  A1C 7.4  Endocrinology Dr. Krishnan

## 2021-06-30 NOTE — PROGRESS NOTE ADULT - ASSESSMENT
85 year old male with past medical history of ESRD on HD T, Th, Sat, type II DM, HTN, anemia of CKD, hyperphosphatemia, and A-Fib on Eliquis who presented with c/o acute hypoxic respiratory failure in the setting of fluid overload. He is now s/p emergent HD on 6/22. CT chest with loculated fluid in the right fissures and moderate left pleural effusion ---> now s/p thoracentesis on 6/28, 550 mL of serous fluid drained and sent for cytology. Heparin gtt restarted 12 hours post procedure, Eliquis on hold for 72 hours. HbA1C noted to be 7.2, endocrinology following, recommend either Tradjenta (if insurance covers) or Januvia for outpatient management. Plan for discharge 7/1 after HD.          85 year old male with past medical history of ESRD on HD T, Th, Sat, type II DM, HTN, anemia of CKD, hyperphosphatemia, and A-Fib on Eliquis who presented with c/o acute hypoxic respiratory failure in the setting of fluid overload. He is now s/p emergent HD on 6/22. CT chest with loculated fluid in the right fissures and moderate left pleural effusion ---> now s/p thoracentesis on 6/28, 550 mL of serous fluid drained and sent for cytology. Heparin gtt restarted 12 hours post procedure, Eliquis on hold for 72 hours. HbA1C noted to be 7.2, endocrinology following, recommend either Tradjenta 5 mg or Januvia 25 mg (both have 47$ monthly copay) for outpatient management. Plan for discharge 7/1 after HD.

## 2021-06-30 NOTE — CHART NOTE - NSCHARTNOTEFT_GEN_A_CORE
Nutrition Focus Note:   Diet education requested by family (wife) at bedside for HD diet, reported on HD x 4y, unclear/limited knowledge of dietary modification. Chart reviewed, nutrition information on HD Nutrition Therapy given with written copy provided, RD business card attached for contact as needed, basic principle discussed, may consider more liberalized diet with oral nutritional supplement between meals if persistent poor intake; also encouraged to follow up with dietitian at out-pt dialysis center when medically ready to be discharged. Family very receptive to information given. Pt visited at breakfast and lunch-time, very satisfied with food served, tolerating meals well, no new nutrition related issues reported. RD available for consult as needed. Nutrition Focus Note:   Diet education requested by family (wife) at bedside for HD diet, reported on HD x 4y, unclear/limited knowledge of dietary modification. Chart reviewed, nutrition information on HD Nutrition Therapy given with written copy provided, RD business card attached for contact as needed, basic principle discussed, may consider more liberalized diet with oral nutritional supplement between meals if persistent poor intake; also encouraged to follow up with dietitian at out-pt dialysis center when medically ready to be discharged. Family very receptive to information given. Pt visited at breakfast and lunch-time, weak, very satisfied with food served, tolerating meals well, no new nutrition related issues reported. RD available for consult as needed.

## 2021-06-30 NOTE — PROGRESS NOTE ADULT - SUBJECTIVE AND OBJECTIVE BOX
[facility] NEPHROLOGY- PROGRESS NOTE    Patient is a 86yo Male with ESRD on HD, DM type 2, HTN, Anemia of renal dz, hyperphosphatemia, Afib on A/C p/w SOB. Pt a/w acute hypoxic respiratory failure 2/2 fluid overload. Nephrology consulted for ESRD status.   s/p IR left thoracentesis 6/28 with 550 cc serous fluid drained.    Hospital Medications: Medications reviewed.    REVIEW OF SYSTEMS:  CONSTITUTIONAL: No fevers or chills  RESPIRATORY: No shortness of breath  CARDIOVASCULAR: No chest pain.  GASTROINTESTINAL: No nausea, vomiting, diarrhea or abdominal pain.   VASCULAR: No bilateral lower extremity edema; AVF pain resolved      VITALS:  T(F): 98 (06-30-21 @ 14:26), Max: 98.4 (06-29-21 @ 21:07)  HR: 87 (06-30-21 @ 14:26)  BP: 116/56 (06-30-21 @ 14:26)  RR: 18 (06-30-21 @ 14:26)  SpO2: 100% (06-30-21 @ 14:26)  Wt(kg): --    06-29 @ 07:01  -  06-30 @ 07:00  --------------------------------------------------------  IN: 600 mL / OUT: 1600 mL / NET: -1000 mL      PHYSICAL EXAM:  Gen: NAD, calm  Cards: +S1/S2, no M/G/R  Resp: clear left side; decreased Rt base  GI: soft, NT/ND, NABS  Extremities: No LE edema B/L  Access: Left AVF +thrill +bruit      LABS:  06-29    128<L>  |  87<L>  |  124<H>  ----------------------------<  162<H>  6.0<H>   |  26  |  11.10<H>    Ca    8.7      29 Jun 2021 11:14    TPro  6.8  /  Alb  3.0<L>  /  TBili  0.4  /  DBili      /  AST  9<L>  /  ALT  15  /  AlkPhos  119  06-29    Creatinine Trend: 11.10 <--, 8.99 <--, 7.18 <--, 9.63 <--, 7.02 <--, 8.80 <--                        10.1   4.61  )-----------( 180      ( 30 Jun 2021 07:20 )             30.1     Urine Studies:

## 2021-06-30 NOTE — PROGRESS NOTE ADULT - PROBLEM SELECTOR PLAN 3
holding Eliquis x 72 hours post-procedure (thoracentesis on 6/28) per IR  heparin gtt held x 12 hours, restarted  metoprolol with parameters

## 2021-06-30 NOTE — PROGRESS NOTE ADULT - PROBLEM SELECTOR PLAN 9
will be discharged once transitioned from heparin gtt to eliquis (72 hours post-procedure may restart AC's)

## 2021-06-30 NOTE — PROVIDER CONTACT NOTE (CRITICAL VALUE NOTIFICATION) - BACKGROUND
Patient is on heparin drip
pt admitted with acute pulmonary edema. On heparin drip to bridge to eliquis

## 2021-06-30 NOTE — PROGRESS NOTE ADULT - ATTENDING COMMENTS
discussed management plan with acp  covering
discussed management plan with acp covering
discussed management plan with apc covering
discussed management plan in detail in length with pt and acp covering
discussed management plan with apc covering
discussed management plan with acp covering

## 2021-06-30 NOTE — PROGRESS NOTE ADULT - PROBLEM SELECTOR PLAN 8
FULL CODE  pt has capacity   wife Stewart [phone], [phone], spoke with daughter and wife at bedside.

## 2021-06-30 NOTE — PROGRESS NOTE ADULT - TIME BILLING
- Review of records, telemetry, vital signs and daily labs.   - General and cardiovascular physical examination.  - Generation of cardiovascular treatment plan.  - Coordination of care.      Patient was seen and examined by me on 06/28/2021,interim events noted,labs and radiology studies reviewed.  Foster Diehl MD,FACC.  [address] [place], [place] [id number].  [phone]
- Review of records, telemetry, vital signs and daily labs.   - General and cardiovascular physical examination.  - Generation of cardiovascular treatment plan.  - Coordination of care.      Patient was seen and examined by me on 06/24/2021,interim events noted,labs and radiology studies reviewed.  Foster Diehl MD,FACC.  [address] [place], [place] [id number].  [phone]
- Review of records, telemetry, vital signs and daily labs.   - General and cardiovascular physical examination.  - Generation of cardiovascular treatment plan.  - Coordination of care.      Patient was seen and examined by me on 06/25/2021,interim events noted,labs and radiology studies reviewed.  Foster Diehl MD,FACC.  [address] [place], [place] [id number].  [phone]
- Review of records, telemetry, vital signs and daily labs.   - General and cardiovascular physical examination.  - Generation of cardiovascular treatment plan.  - Coordination of care.      Patient was seen and examined by me on 06/27/2021,interim events noted,labs and radiology studies reviewed.  Foster Diehl MD,FACC.  [address] [place], [place] [id number].  [phone]
- Review of records, telemetry, vital signs and daily labs.   - General and cardiovascular physical examination.  - Generation of cardiovascular treatment plan.  - Coordination of care.      Patient was seen and examined by me on 06/23/2021,interim events noted,labs and radiology studies reviewed.  Foster Diehl MD,FACC.  [address] [place], [place] [id number].  [phone]
- Review of records, telemetry, vital signs and daily labs.   - General and cardiovascular physical examination.  - Generation of cardiovascular treatment plan.  - Coordination of care.      Patient was seen and examined by me on 06/29/2021,interim events noted,labs and radiology studies reviewed.  Foster Diehl MD,FACC.  [address] [place], [place] [id number].  [phone]
- Review of records, telemetry, vital signs and daily labs.   - General and cardiovascular physical examination.  - Generation of cardiovascular treatment plan.  - Coordination of care.      Patient was seen and examined by me on 06/30/2021,interim events noted,labs and radiology studies reviewed.  Foster Diehl MD,FACC.  [address] [place], [place] [id number].  [phone]
- Review of records, telemetry, vital signs and daily labs.   - General and cardiovascular physical examination.  - Generation of cardiovascular treatment plan.  - Coordination of care.      Patient was seen and examined by me on 06/26/2021,interim events noted,labs and radiology studies reviewed.  Foster Diehl MD,FACC.  [address] [place], [place] [id number].  [phone]

## 2021-06-30 NOTE — PROGRESS NOTE ADULT - SUBJECTIVE AND OBJECTIVE BOX
Interval Events:  pt in nad    Allergies    No Known Allergies    Intolerances      Endocrine/Metabolic Medications:  atorvastatin 10 milliGRAM(s) Oral at bedtime  dextrose 50% Injectable 12.5 Gram(s) IV Push once  finasteride 5 milliGRAM(s) Oral daily  insulin lispro (ADMELOG) corrective regimen sliding scale   SubCutaneous Before meals and at bedtime      Vital Signs Last 24 Hrs  T(C): 36.4 (30 Jun 2021 04:48), Max: 36.9 (29 Jun 2021 21:07)  T(F): 97.5 (30 Jun 2021 04:48), Max: 98.4 (29 Jun 2021 21:07)  HR: 83 (30 Jun 2021 04:48) (64 - 83)  BP: 133/57 (30 Jun 2021 04:48) (111/64 - 133/67)  BP(mean): --  RR: 17 (30 Jun 2021 04:48) (16 - 18)  SpO2: 100% (30 Jun 2021 04:48) (96% - 100%)      PHYSICAL EXAM  All physical exam findings normal, except those marked:  General:	Alert, active, cooperative, NAD, well hydrated  .		[] Abnormal:  Neck		Normal: supple, no cervical adenopathy, no palpable thyroid  .		[] Abnormal:  Cardiovascular	Normal: regular rate, normal S1, S2, no murmurs  .		[] Abnormal:  Respiratory	Normal: no chest wall deformity, normal respiratory pattern, CTA B/L  .		[] Abnormal:  Abdominal	Normal: soft, ND, NT, bowel sounds present, no masses, no organomegaly  .		[] Abnormal:  		Normal normal genitalia, testes descended, circumcised/uncircumcised  .		Dia stage:			Breast dia:  .		Menstrual history:  .		[] Abnormal:  Extremities	Normal: FROM x4  .		[] Abnormal:  Skin		Normal: intact and not indurated, no rash, no acanthosis nigricans  .		[] Abnormal:  Neurologic	Normal: grossly intact  .		[] Abnormal:    LABS                        10.1   4.61  )-----------( 180      ( 30 Jun 2021 07:20 )             30.1                               128    |  87     |  124                 Calcium: 8.7   / iCa: x      (06-29 @ 11:14)    ----------------------------<  162       Magnesium: x                                6.0     |  26     |  11.10            Phosphorous: x        TPro  6.8    /  Alb  3.0    /  TBili  0.4    /  DBili  x      /  AST  9      /  ALT  15     /  AlkPhos  119    29 Jun 2021 11:14    CAPILLARY BLOOD GLUCOSE      POCT Blood Glucose.: 126 mg/dL (30 Jun 2021 07:51)  POCT Blood Glucose.: 321 mg/dL (29 Jun 2021 22:46)  POCT Blood Glucose.: 341 mg/dL (29 Jun 2021 21:23)  POCT Blood Glucose.: 165 mg/dL (29 Jun 2021 15:55)  POCT Blood Glucose.: 175 mg/dL (29 Jun 2021 11:36)        Assesment/plan    84 yo Male with ESRD on HD T/Th/sat, DM type 2, HTN, Anemia of renal disease, hyperphosphatemia, Afib on A/C Presented with SOB.Pt was feeling sob. Found to be in acute Pulm edema and hyperglycemia. Pt stopped using dm meds since the onset of JHD last 3-4 yrs. Eating well.      Problem/Recommendation - 1:  Problem: Diabetes. Recommendation: hx of dm  Pt stopped using dm meds since the onset of JHD last 3-4 yrs  now with post meal hyperglycemia and a1c- 7.2  admits to increased appetite   cont low dose admelog for now  may d/c home on tradjenta 5 or januvia 25  fsg ac and hs  d/w np     Problem/Recommendation - 2:  ·  Problem: ESRD (end stage renal disease).  Recommendation: on HD  cont tx per nephro.      Problem/Recommendation - 3:  ·  Problem: Acute pulmonary edema.  Recommendation: with pl effusion  s/p thoracentesis   in no resp distress.

## 2021-06-30 NOTE — PROGRESS NOTE ADULT - ASSESSMENT
Patient is a 86yo Male with ESRD on HD, DM type 2, HTN, Anemia of renal dz, hyperphosphatemia, Afib on A/C p/w SOB. Pt a/w acute hypoxic respiratory failure 2/2 fluid overload. Nephrology consulted for ESRD status.     1) ESRD: Last HD 6/29, tolerated well with net 1L removed. Plan for next HD 7/1. c/w renal diet. Monitor electrolytes.  2) Hypertension 2/2 CKD: BP improved. Pt with low normal BP previously for which hydralazine 100 mg TID discontinued on 6/26 and amlodipine decreased to 5 mg daily. c/w low sodium diet and fluid restriction. Monitor BP.   3) Anemia of renal disease: Hb acceptable. Will restart Epogen 4k IV tiw with HD. Monitor Hb.  4) Hyperphosphatemia: check serum phosphorus c/w phoslo 2 tabs TID with meals. c/w low phos diet. Monitor serum calcium and phosphorus.  5) Acute hypoxic resp failure: 2/2 fluid overload. resolved.  c/w fluid restriction. s/p left sided thoracentesis 6/28. Pulmonary following.     [facility] NEPHROLOGY  Nahum Grubbs M.D.  Francisco Javier Hicks D.O.  Yovana Quiroz M.D.  Makeda Duron, MSN, ANP-C  [phone]    [address] [place] [id number]

## 2021-06-30 NOTE — PROGRESS NOTE ADULT - PROBLEM SELECTOR PLAN 2
ESRD T, Th, Sat  c/b intradialytic hypotension  hyperkalemic this AM - HD with 2K Black  nephrology Dr. Quiroz

## 2021-07-01 ENCOUNTER — TRANSCRIPTION ENCOUNTER (OUTPATIENT)
Age: 86
End: 2021-07-01

## 2021-07-01 VITALS
SYSTOLIC BLOOD PRESSURE: 125 MMHG | OXYGEN SATURATION: 100 % | RESPIRATION RATE: 17 BRPM | DIASTOLIC BLOOD PRESSURE: 75 MMHG | TEMPERATURE: 97 F | HEART RATE: 84 BPM

## 2021-07-01 LAB
ANION GAP SERPL CALC-SCNC: 15 MMOL/L — SIGNIFICANT CHANGE UP (ref 5–17)
APTT BLD: 76.4 SEC — HIGH (ref 27.5–35.5)
BUN SERPL-MCNC: 92 MG/DL — HIGH (ref 7–18)
CALCIUM SERPL-MCNC: 8.6 MG/DL — SIGNIFICANT CHANGE UP (ref 8.4–10.5)
CHLORIDE SERPL-SCNC: 97 MMOL/L — SIGNIFICANT CHANGE UP (ref 96–108)
CO2 SERPL-SCNC: 23 MMOL/L — SIGNIFICANT CHANGE UP (ref 22–31)
CREAT SERPL-MCNC: 8.86 MG/DL — HIGH (ref 0.5–1.3)
GLUCOSE BLDC GLUCOMTR-MCNC: 119 MG/DL — HIGH (ref 70–99)
GLUCOSE BLDC GLUCOMTR-MCNC: 147 MG/DL — HIGH (ref 70–99)
GLUCOSE BLDC GLUCOMTR-MCNC: 206 MG/DL — HIGH (ref 70–99)
GLUCOSE SERPL-MCNC: 108 MG/DL — HIGH (ref 70–99)
HCT VFR BLD CALC: 30.2 % — LOW (ref 39–50)
HGB BLD-MCNC: 10.2 G/DL — LOW (ref 13–17)
MCHC RBC-ENTMCNC: 31.8 PG — SIGNIFICANT CHANGE UP (ref 27–34)
MCHC RBC-ENTMCNC: 33.8 GM/DL — SIGNIFICANT CHANGE UP (ref 32–36)
MCV RBC AUTO: 94.1 FL — SIGNIFICANT CHANGE UP (ref 80–100)
NRBC # BLD: 0 /100 WBCS — SIGNIFICANT CHANGE UP (ref 0–0)
PLATELET # BLD AUTO: 173 K/UL — SIGNIFICANT CHANGE UP (ref 150–400)
POTASSIUM SERPL-MCNC: 5.4 MMOL/L — HIGH (ref 3.5–5.3)
POTASSIUM SERPL-SCNC: 5.4 MMOL/L — HIGH (ref 3.5–5.3)
RBC # BLD: 3.21 M/UL — LOW (ref 4.2–5.8)
RBC # FLD: 13.7 % — SIGNIFICANT CHANGE UP (ref 10.3–14.5)
SARS-COV-2 RNA SPEC QL NAA+PROBE: SIGNIFICANT CHANGE UP
SODIUM SERPL-SCNC: 135 MMOL/L — SIGNIFICANT CHANGE UP (ref 135–145)
WBC # BLD: 5.51 K/UL — SIGNIFICANT CHANGE UP (ref 3.8–10.5)
WBC # FLD AUTO: 5.51 K/UL — SIGNIFICANT CHANGE UP (ref 3.8–10.5)

## 2021-07-01 PROCEDURE — 82607 VITAMIN B-12: CPT

## 2021-07-01 PROCEDURE — 82746 ASSAY OF FOLIC ACID SERUM: CPT

## 2021-07-01 PROCEDURE — 83550 IRON BINDING TEST: CPT

## 2021-07-01 PROCEDURE — 84443 ASSAY THYROID STIM HORMONE: CPT

## 2021-07-01 PROCEDURE — 85045 AUTOMATED RETICULOCYTE COUNT: CPT

## 2021-07-01 PROCEDURE — 85025 COMPLETE CBC W/AUTO DIFF WBC: CPT

## 2021-07-01 PROCEDURE — 97116 GAIT TRAINING THERAPY: CPT

## 2021-07-01 PROCEDURE — 99285 EMERGENCY DEPT VISIT HI MDM: CPT

## 2021-07-01 PROCEDURE — 83880 ASSAY OF NATRIURETIC PEPTIDE: CPT

## 2021-07-01 PROCEDURE — 82962 GLUCOSE BLOOD TEST: CPT

## 2021-07-01 PROCEDURE — 84484 ASSAY OF TROPONIN QUANT: CPT

## 2021-07-01 PROCEDURE — 99261: CPT

## 2021-07-01 PROCEDURE — 84157 ASSAY OF PROTEIN OTHER: CPT

## 2021-07-01 PROCEDURE — 87340 HEPATITIS B SURFACE AG IA: CPT

## 2021-07-01 PROCEDURE — 93005 ELECTROCARDIOGRAM TRACING: CPT

## 2021-07-01 PROCEDURE — 85027 COMPLETE CBC AUTOMATED: CPT

## 2021-07-01 PROCEDURE — 84134 ASSAY OF PREALBUMIN: CPT

## 2021-07-01 PROCEDURE — 76942 ECHO GUIDE FOR BIOPSY: CPT

## 2021-07-01 PROCEDURE — 83615 LACTATE (LD) (LDH) ENZYME: CPT

## 2021-07-01 PROCEDURE — 84100 ASSAY OF PHOSPHORUS: CPT

## 2021-07-01 PROCEDURE — 80053 COMPREHEN METABOLIC PANEL: CPT

## 2021-07-01 PROCEDURE — 85730 THROMBOPLASTIN TIME PARTIAL: CPT

## 2021-07-01 PROCEDURE — 83036 HEMOGLOBIN GLYCOSYLATED A1C: CPT

## 2021-07-01 PROCEDURE — 88305 TISSUE EXAM BY PATHOLOGIST: CPT

## 2021-07-01 PROCEDURE — 87635 SARS-COV-2 COVID-19 AMP PRB: CPT

## 2021-07-01 PROCEDURE — 87070 CULTURE OTHR SPECIMN AEROBIC: CPT

## 2021-07-01 PROCEDURE — 32555 ASPIRATE PLEURA W/ IMAGING: CPT

## 2021-07-01 PROCEDURE — 83986 ASSAY PH BODY FLUID NOS: CPT

## 2021-07-01 PROCEDURE — 83540 ASSAY OF IRON: CPT

## 2021-07-01 PROCEDURE — 86769 SARS-COV-2 COVID-19 ANTIBODY: CPT

## 2021-07-01 PROCEDURE — 82945 GLUCOSE OTHER FLUID: CPT

## 2021-07-01 PROCEDURE — 87040 BLOOD CULTURE FOR BACTERIA: CPT

## 2021-07-01 PROCEDURE — 87075 CULTR BACTERIA EXCEPT BLOOD: CPT

## 2021-07-01 PROCEDURE — 87205 SMEAR GRAM STAIN: CPT

## 2021-07-01 PROCEDURE — 82042 OTHER SOURCE ALBUMIN QUAN EA: CPT

## 2021-07-01 PROCEDURE — 89051 BODY FLUID CELL COUNT: CPT

## 2021-07-01 PROCEDURE — 80048 BASIC METABOLIC PNL TOTAL CA: CPT

## 2021-07-01 PROCEDURE — 80061 LIPID PANEL: CPT

## 2021-07-01 PROCEDURE — 83735 ASSAY OF MAGNESIUM: CPT

## 2021-07-01 PROCEDURE — 0225U NFCT DS DNA&RNA 21 SARSCOV2: CPT

## 2021-07-01 PROCEDURE — 71045 X-RAY EXAM CHEST 1 VIEW: CPT

## 2021-07-01 PROCEDURE — 36415 COLL VENOUS BLD VENIPUNCTURE: CPT

## 2021-07-01 PROCEDURE — 85610 PROTHROMBIN TIME: CPT

## 2021-07-01 PROCEDURE — 88112 CYTOPATH CELL ENHANCE TECH: CPT

## 2021-07-01 PROCEDURE — 71250 CT THORAX DX C-: CPT

## 2021-07-01 RX ORDER — SITAGLIPTIN 50 MG/1
1 TABLET, FILM COATED ORAL
Qty: 30 | Refills: 0
Start: 2021-07-01 | End: 2021-07-30

## 2021-07-01 RX ADMIN — LOSARTAN POTASSIUM 100 MILLIGRAM(S): 100 TABLET, FILM COATED ORAL at 05:46

## 2021-07-01 RX ADMIN — Medication 25 MILLIGRAM(S): at 05:46

## 2021-07-01 RX ADMIN — AMLODIPINE BESYLATE 5 MILLIGRAM(S): 2.5 TABLET ORAL at 05:45

## 2021-07-01 RX ADMIN — Medication 1334 MILLIGRAM(S): at 08:21

## 2021-07-01 RX ADMIN — HEPARIN SODIUM 700 UNIT(S)/HR: 5000 INJECTION INTRAVENOUS; SUBCUTANEOUS at 07:36

## 2021-07-01 RX ADMIN — ERYTHROPOIETIN 4000 UNIT(S): 10000 INJECTION, SOLUTION INTRAVENOUS; SUBCUTANEOUS at 13:32

## 2021-07-01 NOTE — PROGRESS NOTE ADULT - SUBJECTIVE AND OBJECTIVE BOX
Interval Events: no acute events overnight     Allergies    No Known Allergies    Intolerances      Endocrine/Metabolic Medications:  atorvastatin 10 milliGRAM(s) Oral at bedtime  dextrose 50% Injectable 12.5 Gram(s) IV Push once  finasteride 5 milliGRAM(s) Oral daily  insulin lispro (ADMELOG) corrective regimen sliding scale   SubCutaneous Before meals and at bedtime      Vital Signs Last 24 Hrs  T(C): 36.3 (01 Jul 2021 05:05), Max: 36.7 (30 Jun 2021 14:26)  T(F): 97.3 (01 Jul 2021 05:05), Max: 98 (30 Jun 2021 14:26)  HR: 99 (01 Jul 2021 05:05) (63 - 99)  BP: 173/56 (01 Jul 2021 05:05) (116/56 - 173/56)  BP(mean): --  RR: 17 (01 Jul 2021 05:05) (17 - 18)  SpO2: 99% (01 Jul 2021 05:05) (98% - 100%)        PHYSICAL EXAM:  GENERAL: NAD  HEENT: Normocephalic;  conjunctivae and sclerae clear; moist mucous membranes;   NECK : supple  CHEST/LUNG: Clear to auscultation bilaterally with good air entry   HEART: S1 S2  regular; no murmurs, gallops or rubs  ABDOMEN: Soft, Nontender, Nondistended; Bowel sounds present  EXTREMITIES: no cyanosis; no edema; no calf tenderness  SKIN: gauze and Tegaderm to left posterior chest without drainage  NERVOUS SYSTEM:  Awake and alert; Oriented  to place, person and time ; no new deficits        LABS                        10.2   5.51  )-----------( 173      ( 01 Jul 2021 05:49 )             30.2                               135    |  97     |  92                  Calcium: 8.6   / iCa: x      (07-01 @ 05:49)    ----------------------------<  108       Magnesium: x                                5.4     |  23     |  8.86             Phosphorous: x          CAPILLARY BLOOD GLUCOSE      POCT Blood Glucose.: 119 mg/dL (01 Jul 2021 07:43)  POCT Blood Glucose.: 208 mg/dL (30 Jun 2021 22:05)  POCT Blood Glucose.: 185 mg/dL (30 Jun 2021 16:52)

## 2021-07-01 NOTE — PROGRESS NOTE ADULT - REASON FOR ADMISSION

## 2021-07-01 NOTE — DISCHARGE NOTE NURSING/CASE MANAGEMENT/SOCIAL WORK - PATIENT PORTAL LINK FT
You can access the FollowMyHealth Patient Portal offered by [facility] by registering at the following website: [email or web address]. By joining [facility]’s FollowMyHealth portal, you will also be able to view your health information using other applications (apps) compatible with our system.

## 2021-07-01 NOTE — PROGRESS NOTE ADULT - ASSESSMENT
Patient is a 86yo Male with ESRD on HD, DM type 2, HTN, Anemia of renal dz, hyperphosphatemia, Afib on A/C p/w SOB. Pt a/w acute hypoxic respiratory failure 2/2 fluid overload. Nephrology consulted for ESRD status.     1) ESRD: Pt seen on HD today, tolerated UF goal of 1.5L. Plan for next maintenance HD 7/3 @ [facility]. Pt with small area of hypopigmentation on AVF; will avoid area for cannulation. Advised to f/u Vascular Surgery; Dr. Quintana on discharge. Monitor electrolytes.  2) Hypertension 2/2 CKD: BP elevated this am, now improving on HD. Pt with low normal BP previously for which hydralazine 100 mg TID discontinued on 6/26 and amlodipine decreased to 5 mg daily. c/w low sodium diet and fluid restriction. Monitor BP.   3) Anemia of renal disease: Hb acceptable. c/w Epogen 4k IV tiw with HD. Monitor Hb.  4) Hyperphosphatemia: check serum phosphorus c/w phoslo 2 tabs TID with meals. c/w low phos diet. Monitor serum calcium and phosphorus.  5) Acute hypoxic resp failure: 2/2 fluid overload. resolved.  c/w fluid restriction. s/p left sided thoracentesis 6/28. Pulmonary following.     [facility] NEPHROLOGY  Nahum Grubbs M.D.  Francisco Javier Hicks D.O.  Yovana Quiroz M.D.  Makeda Duron, THOMAS, ANP-C  [phone]    [address] [place] [address]

## 2021-07-01 NOTE — PROGRESS NOTE ADULT - NSICDXPILOT_GEN_ALL_CORE
[place]

## 2021-07-01 NOTE — CHART NOTE - NSCHARTNOTESELECT_GEN_ALL_CORE
Event Note
Event Note
Nutrition Services
Event Note
Event Note
Nutrition Services
Nutrition Services

## 2021-07-01 NOTE — PROGRESS NOTE ADULT - ASSESSMENT
86 yo Male with ESRD on HD T/Th/sat, DM type 2, HTN, Anemia of renal disease, hyperphosphatemia, Afib on A/C Presented with SOB.Pt was feeling sob. Found to be in acute Pulm edema and hyperglycemia. Pt stopped using dm meds since the onset of JHD last 3-4 yrs. Eating well.     1-Diabetes. Recommendation: hx of dm  Pt stopped using dm meds since the onset of JHD last 3-4 yrs  now with post meal hyperglycemia and a1c- 7.2  admits to increased appetite   cont low dose admelog for now  may d/c home on tradjenta 5 or januvia 25; depending on insurance coverage   fsg ac and hs      2- ESRD (end stage renal disease).  Recommendation: on HD  cont tx per nephro.     3-Acute pulmonary edema.  Recommendation: with pl effusion  s/p thoracentesis   in no resp distress.  86 yo Male with ESRD on HD T/Th/sat, DM type 2, HTN, Anemia of renal disease, hyperphosphatemia, Afib on A/C Presented with SOB.Pt was feeling sob. Found to be in acute Pulm edema and hyperglycemia. Pt stopped using dm meds since the onset of JHD last 3-4 yrs. Eating well.     1-Diabetes. Recommendation: hx of dm  Pt stopped using dm meds since the onset of JHD last 3-4 yrs  now with post meal hyperglycemia and a1c- 7.2  cont low dose admelog for now  may d/c home on  januvia 25 qd  fsg ac and hs  d/w hs       2- ESRD (end stage renal disease).  Recommendation: on HD  cont tx per nephro.     3-Acute pulmonary edema.  Recommendation: with pl effusion  s/p thoracentesis   in no resp distress.

## 2021-07-01 NOTE — PROGRESS NOTE ADULT - SUBJECTIVE AND OBJECTIVE BOX
Time of Visit:  Patient seen and examined.     MEDICATIONS  (STANDING):  amLODIPine   Tablet 5 milliGRAM(s) Oral daily  atorvastatin 10 milliGRAM(s) Oral at bedtime  calcium acetate 1334 milliGRAM(s) Oral three times a day with meals  chlorhexidine 2% Cloths 1 Application(s) Topical daily  dextrose 50% Injectable 12.5 Gram(s) IV Push once  epoetin zach-epbx (RETACRIT) Injectable 4000 Unit(s) IV Push <User Schedule>  finasteride 5 milliGRAM(s) Oral daily  furosemide    Tablet 80 milliGRAM(s) Oral <User Schedule>  heparin  Infusion.  Unit(s)/Hr (10 mL/Hr) IV Continuous <Continuous>  insulin lispro (ADMELOG) corrective regimen sliding scale   SubCutaneous Before meals and at bedtime  losartan 100 milliGRAM(s) Oral daily  metoprolol tartrate 25 milliGRAM(s) Oral two times a day      MEDICATIONS  (PRN):  heparin   Injectable 4000 Unit(s) IV Push every 6 hours PRN For aPTT less than 40  heparin   Injectable 2000 Unit(s) IV Push every 6 hours PRN For aPTT between 40 - 57       Medications up to date at time of exam.    ROS; No fever, chills, cough, congestion. Denies SOB on exam.   PHYSICAL EXAMINATION:    Vital Signs Last 24 Hrs  T(C): 36.3 (01 Jul 2021 05:05), Max: 36.7 (30 Jun 2021 14:26)  T(F): 97.3 (01 Jul 2021 05:05), Max: 98 (30 Jun 2021 14:26)  HR: 99 (01 Jul 2021 05:05) (63 - 99)  BP: 173/56 (01 Jul 2021 05:05) (116/56 - 173/56)  BP(mean): --  RR: 17 (01 Jul 2021 05:05) (17 - 18)  SpO2: 99% (01 Jul 2021 05:05) (98% - 100%)   (if applicable)    General; Alert and oriented. Able to answer question with no SOB. No acute distress.     HEENT: Head is normocephalic and atraumatic. No nasal tenderness. Extraocular muscles are intact. Mucous membranes are moist.     NECK: Supple, no palpable adenopathy.    LUNGS: Clear to auscultation bilaterally with no rhonchi ,  no wheezing, rales . Non labored. No use of accessory muscle. Left flank dressing dry and intact.     HEART: S1 S2 Regular rate and no click/ rub.     ABDOMEN: Soft, nontender, and nondistended.  No hepatosplenomegaly is noted. Active bowel sounds.     EXTREMITIES: Without any cyanosis, clubbing, rash, lesions or edema. Has Left AV fistula .     NEUROLOGIC: Awake, alert, oriented.     SKIN: Warm and moist. Non diaphoretic.         LABS:                        10.2   5.51  )-----------( 173      ( 01 Jul 2021 05:49 )             30.2     07-01    135  |  97  |  92<H>  ----------------------------<  108<H>  5.4<H>   |  23  |  8.86<H>    Ca    8.6      01 Jul 2021 05:49      PTT - ( 01 Jul 2021 05:49 )  PTT:76.4 sec                    MICROBIOLOGY: (if applicable)    RADIOLOGY & ADDITIONAL STUDIES:  EKG:   CXR:  ECHO:    IMPRESSION: 85y Male PAST MEDICAL & SURGICAL HISTORY:  ESRD (end stage renal disease)    Diabetes    Hypertension    Hyperlipidemia    Atrial fibrillation    S/P hemodialysis catheter insertion  7/17    History of appendectomy     Impression; 84 Y/O male presented to ED with SOB x 1 day. Episode of SOB due to Pulmonary Edema from fluid overload . CXR Mild right pleural effusion. Moderate left pleural effusion- left lower lung atelectasis and Pneumonia. CT Chest with Moderate Left Pleural Effusion . Small right pleural free fluid.  Pt was recently seen by outpatient Pulmonologist who recommended  thoracentesis of pleural effusion but pt declined since he was asymptomatic. Negative Blood Cx x2 and Negative RVP .  06-24-21 CXR with  reduction  left pleural effusion. Repeat CXR Post Left IR Thoracentesis with no pneumothorax and Right midlung atelectasis. s/p IR Left Thoracentesis on 06-28-21 and drained 550 ml.     Suggestion:  O2 saturation 98 % room air. So far saturating good room air.   Renal following , on HD .   Dyspnea improved .    GI prophylactic .   incentive spirometer  3x daily.     Time of Visit:  Patient seen and examined.     MEDICATIONS  (STANDING):  amLODIPine   Tablet 5 milliGRAM(s) Oral daily  atorvastatin 10 milliGRAM(s) Oral at bedtime  calcium acetate 1334 milliGRAM(s) Oral three times a day with meals  chlorhexidine 2% Cloths 1 Application(s) Topical daily  dextrose 50% Injectable 12.5 Gram(s) IV Push once  epoetin zach-epbx (RETACRIT) Injectable 4000 Unit(s) IV Push <User Schedule>  finasteride 5 milliGRAM(s) Oral daily  furosemide    Tablet 80 milliGRAM(s) Oral <User Schedule>  heparin  Infusion.  Unit(s)/Hr (10 mL/Hr) IV Continuous <Continuous>  insulin lispro (ADMELOG) corrective regimen sliding scale   SubCutaneous Before meals and at bedtime  losartan 100 milliGRAM(s) Oral daily  metoprolol tartrate 25 milliGRAM(s) Oral two times a day      MEDICATIONS  (PRN):  heparin   Injectable 4000 Unit(s) IV Push every 6 hours PRN For aPTT less than 40  heparin   Injectable 2000 Unit(s) IV Push every 6 hours PRN For aPTT between 40 - 57       Medications up to date at time of exam.    ROS; No fever, chills, cough, congestion. Denies SOB on exam.   PHYSICAL EXAMINATION:    Vital Signs Last 24 Hrs  T(C): 36.3 (01 Jul 2021 05:05), Max: 36.7 (30 Jun 2021 14:26)  T(F): 97.3 (01 Jul 2021 05:05), Max: 98 (30 Jun 2021 14:26)  HR: 99 (01 Jul 2021 05:05) (63 - 99)  BP: 173/56 (01 Jul 2021 05:05) (116/56 - 173/56)  BP(mean): --  RR: 17 (01 Jul 2021 05:05) (17 - 18)  SpO2: 99% (01 Jul 2021 05:05) (98% - 100%)   (if applicable)    General; Alert and oriented. Able to answer question with no SOB. No acute distress.     HEENT: Head is normocephalic and atraumatic. No nasal tenderness. Extraocular muscles are intact. Mucous membranes are moist.     NECK: Supple, no palpable adenopathy.    LUNGS: Clear to auscultation bilaterally with no rhonchi ,  no wheezing, rales . Non labored. No use of accessory muscle. Left flank dressing dry and intact.     HEART: S1 S2 Regular rate and no click/ rub.     ABDOMEN: Soft, nontender, and nondistended.  No hepatosplenomegaly is noted. Active bowel sounds.     EXTREMITIES: Without any cyanosis, clubbing, rash, lesions or edema. Has Left AV fistula .     NEUROLOGIC: Awake, alert, oriented.     SKIN: Warm and moist. Non diaphoretic.         LABS:                        10.2   5.51  )-----------( 173      ( 01 Jul 2021 05:49 )             30.2     07-01    135  |  97  |  92<H>  ----------------------------<  108<H>  5.4<H>   |  23  |  8.86<H>    Ca    8.6      01 Jul 2021 05:49      PTT - ( 01 Jul 2021 05:49 )  PTT:76.4 sec                    MICROBIOLOGY: (if applicable)    RADIOLOGY & ADDITIONAL STUDIES:  EKG:   CXR:  ECHO:    IMPRESSION: 85y Male PAST MEDICAL & SURGICAL HISTORY:  ESRD (end stage renal disease)    Diabetes    Hypertension    Hyperlipidemia    Atrial fibrillation    S/P hemodialysis catheter insertion  7/17    History of appendectomy     Impression; 84 Y/O male presented to ED with SOB x 1 day. Episode of SOB due to Pulmonary Edema from fluid overload . CXR Mild right pleural effusion. Moderate left pleural effusion- left lower lung atelectasis and Pneumonia. CT Chest with Moderate Left Pleural Effusion . Small right pleural free fluid.  Pt was recently seen by outpatient Pulmonologist who recommended  thoracentesis of pleural effusion but pt declined since he was asymptomatic. Negative Blood Cx x2 and Negative RVP .  06-24-21 CXR with  reduction  left pleural effusion. Repeat CXR Post Left IR Thoracentesis with no pneumothorax and Right midlung atelectasis. s/p IR Left Thoracentesis on 06-28-21 and drained 550 ml.     Suggestion:  O2 saturation 98 % room air. So far saturating good room air.   Renal following , on HD .   Dyspnea improved .    GI prophylactic .   incentive spirometer  3x daily.      Agree with above assessment and plan as transcribed.

## 2021-07-01 NOTE — PROGRESS NOTE ADULT - PROVIDER SPECIALTY LIST ADULT
Cardiology
Nephrology
Pulmonology
Cardiology
Endocrinology
Internal Medicine
Nephrology
Nephrology
Pulmonology
Cardiology
Endocrinology
Nephrology
Pulmonology
Cardiology
Nephrology
Nephrology
Pulmonology
Pulmonology
Cardiology
Nephrology
Pulmonology
Pulmonology
Cardiology
Internal Medicine

## 2021-07-01 NOTE — PROGRESS NOTE ADULT - NUTRITIONAL ASSESSMENT
This patient has been assessed with a concern for Malnutrition and has been determined to have a diagnosis/diagnoses of Moderate protein-calorie malnutrition and Underweight (BMI < 19).    This patient is being managed with:   Diet DASH/TLC-  Sodium & Cholesterol Restricted  Consistent Carbohydrate {Evening Snacks}  800mL Fluid Restriction ([id number])  For patients receiving Renal Replacement - No Protein Restr No Conc K No Conc Phos Low Sodium (RENAL)  Supplement Feeding Modality:  Oral  Nepro Cans or Servings Per Day:  1       Frequency:  Two Times a day  Entered: Jun 28 2021 10:04AM    
This patient has been assessed with a concern for Malnutrition and has been determined to have a diagnosis/diagnoses of Moderate protein-calorie malnutrition and Underweight (BMI < 19).    This patient is being managed with:   Diet DASH/TLC-  Sodium & Cholesterol Restricted  Consistent Carbohydrate {Evening Snacks}  800mL Fluid Restriction ([id number])  For patients receiving Renal Replacement - No Protein Restr No Conc K No Conc Phos Low Sodium (RENAL)  Supplement Feeding Modality:  Oral  Nepro Cans or Servings Per Day:  1       Frequency:  Two Times a day  Entered: Jun 28 2021 10:04AM    
This patient has been assessed with a concern for Malnutrition and has been determined to have a diagnosis/diagnoses of Moderate protein-calorie malnutrition and Underweight (BMI < 19).    This patient is being managed with:   Diet NPO-  NPO for Procedure/Test     NPO Start Date: 28-Jun-2021   NPO Start Time: 00:00  Except Medications  With Ice Chips/Sips of Water  Entered: Jun 25 2021  6:59PM    Diet DASH/TLC-  Sodium & Cholesterol Restricted  Consistent Carbohydrate {Evening Snacks}  800mL Fluid Restriction ([id number])  For patients receiving Renal Replacement - No Protein Restr No Conc K No Conc Phos Low Sodium (RENAL)  Supplement Feeding Modality:  Oral  Nepro Cans or Servings Per Day:  1       Frequency:  Daily  Entered: Jun 23 2021  2:18PM      This patient has been assessed with a concern for Malnutrition and has been determined to have a diagnosis/diagnoses of Moderate protein-calorie malnutrition and Underweight (BMI < 19).    This patient is being managed with:   Diet NPO-  NPO for Procedure/Test     NPO Start Date: 28-Jun-2021   NPO Start Time: 00:00  Except Medications  With Ice Chips/Sips of Water  Entered: Jun 25 2021  6:59PM    Diet DASH/TLC-  Sodium & Cholesterol Restricted  Consistent Carbohydrate {Evening Snacks}  800mL Fluid Restriction ([id number])  For patients receiving Renal Replacement - No Protein Restr No Conc K No Conc Phos Low Sodium (RENAL)  Supplement Feeding Modality:  Oral  Nepro Cans or Servings Per Day:  1       Frequency:  Daily  Entered: Jun 23 2021  2:18PM    
This patient has been assessed with a concern for Malnutrition and has been determined to have a diagnosis/diagnoses of Moderate protein-calorie malnutrition and Underweight (BMI < 19).    This patient is being managed with:   Diet DASH/TLC-  Sodium & Cholesterol Restricted  Consistent Carbohydrate {Evening Snacks}  800mL Fluid Restriction ([id number])  For patients receiving Renal Replacement - No Protein Restr No Conc K No Conc Phos Low Sodium (RENAL)  Supplement Feeding Modality:  Oral  Nepro Cans or Servings Per Day:  1       Frequency:  Two Times a day  Entered: Jun 28 2021 10:04AM    
This patient has been assessed with a concern for Malnutrition and has been determined to have a diagnosis/diagnoses of Moderate protein-calorie malnutrition and Underweight (BMI < 19).    This patient is being managed with:   Diet DASH/TLC-  Sodium & Cholesterol Restricted  Consistent Carbohydrate {Evening Snacks}  800mL Fluid Restriction ([id number])  For patients receiving Renal Replacement - No Protein Restr No Conc K No Conc Phos Low Sodium (RENAL)  Supplement Feeding Modality:  Oral  Nepro Cans or Servings Per Day:  1       Frequency:  Two Times a day  Entered: Jun 28 2021 10:04AM    
This patient has been assessed with a concern for Malnutrition and has been determined to have a diagnosis/diagnoses of Moderate protein-calorie malnutrition and Underweight (BMI < 19).    This patient is being managed with:   Diet DASH/TLC-  Sodium & Cholesterol Restricted  Consistent Carbohydrate {Evening Snacks}  800mL Fluid Restriction ([id number])  For patients receiving Renal Replacement - No Protein Restr No Conc K No Conc Phos Low Sodium (RENAL)  Supplement Feeding Modality:  Oral  Nepro Cans or Servings Per Day:  1       Frequency:  Two Times a day  Entered: Jun 28 2021 10:04AM    
This patient has been assessed with a concern for Malnutrition and has been determined to have a diagnosis/diagnoses of Moderate protein-calorie malnutrition and Underweight (BMI < 19).    This patient is being managed with:   Diet DASH/TLC-  Sodium & Cholesterol Restricted  Consistent Carbohydrate {Evening Snacks}  800mL Fluid Restriction ([id number])  For patients receiving Renal Replacement - No Protein Restr No Conc K No Conc Phos Low Sodium (RENAL)  Supplement Feeding Modality:  Oral  Nepro Cans or Servings Per Day:  1       Frequency:  Daily  Entered: Jun 23 2021  2:18PM

## 2021-07-01 NOTE — CHART NOTE - NSCHARTNOTEFT_GEN_A_CORE
Nutrition Focus Note:   Pt's family (wife) requesting further diet education on food shopping for dialysis pt. Reported she had a hard time in [employer] for food shopping. Nutrition information on Grocery Shopping List for Dialysis Patient given, very receptive to further diet reinforcement. Food/nutrition related concerns addressed, family also expressing frustration about communication with patient, "he doesn't talk",  consult noted, encouraged to follow up with dietitian and health care team including  at out-pt dialysis center, family very receptive to information given. on discharge planning per team. Nutrition Focus Note:   Pt's family (wife) requesting further diet education on food shopping for dialysis pt. Reported she had a hard time in [employer] for food shopping, pt went for HD Tx. Nutrition information on Grocery Shopping List for Dialysis Patient given, very receptive to further diet reinforcement. Food/nutrition related concerns addressed, family also expressing frustration about communication with patient, "he doesn't talk",  consult noted, encouraged to follow up with dietitian and health care team including  at out-pt dialysis center, family very receptive to information given. on discharge planning per team.

## 2021-07-01 NOTE — PROGRESS NOTE ADULT - SUBJECTIVE AND OBJECTIVE BOX
[facility] NEPHROLOGY- PROGRESS NOTE    Patient is a 86yo Male with ESRD on HD, DM type 2, HTN, Anemia of renal dz, hyperphosphatemia, Afib on A/C p/w SOB. Pt a/w acute hypoxic respiratory failure 2/2 fluid overload. Nephrology consulted for ESRD status.   s/p IR left thoracentesis 6/28 with 550 cc serous fluid drained.    Hospital Medications: Medications reviewed.    REVIEW OF SYSTEMS:  CONSTITUTIONAL: No fevers or chills  RESPIRATORY: No shortness of breath  CARDIOVASCULAR: No chest pain.  GASTROINTESTINAL: No nausea, vomiting, diarrhea or abdominal pain.   VASCULAR: No bilateral lower extremity edema;     VITALS:  T(F): 97.3 (07-01-21 @ 05:05), Max: 98 (06-30-21 @ 14:26)  HR: 99 (07-01-21 @ 05:05)  BP: 173/56 (07-01-21 @ 05:05)  RR: 17 (07-01-21 @ 05:05)  SpO2: 99% (07-01-21 @ 05:05)  Wt(kg): --    06-30 @ 07:01  -  07-01 @ 07:00  --------------------------------------------------------  IN: 0 mL / OUT: 0 mL / NET: 0 mL        PHYSICAL EXAM:  Gen: NAD, calm  Cards: +S1/S2, no M/G/R  Resp: clear left side; decreased Rt base  GI: soft, NT/ND, NABS  Extremities: No LE edema B/L  Access: Left AVF +thrill +bruit  small area of hypopigmentation      LABS:  07-01    135  |  97  |  92<H>  ----------------------------<  108<H>  5.4<H>   |  23  |  8.86<H>    Ca    8.6      01 Jul 2021 05:49      Creatinine Trend: 8.86 <--, 11.10 <--, 8.99 <--, 7.18 <--, 9.63 <--, 7.02 <--                        10.2   5.51  )-----------( 173      ( 01 Jul 2021 05:49 )             30.2     Urine Studies:

## 2021-07-03 LAB
CULTURE RESULTS: SIGNIFICANT CHANGE UP
SPECIMEN SOURCE: SIGNIFICANT CHANGE UP

## 2021-08-21 ENCOUNTER — INPATIENT (INPATIENT)
Facility: HOSPITAL | Age: 86
LOS: 5 days | Discharge: ANOTHER HOSPITAL | DRG: 305 | End: 2021-08-27
Attending: INTERNAL MEDICINE | Admitting: INTERNAL MEDICINE
Payer: MEDICARE

## 2021-08-21 VITALS
HEIGHT: 66 IN | TEMPERATURE: 97 F | RESPIRATION RATE: 16 BRPM | OXYGEN SATURATION: 100 % | DIASTOLIC BLOOD PRESSURE: 73 MMHG | SYSTOLIC BLOOD PRESSURE: 206 MMHG | HEART RATE: 61 BPM | WEIGHT: 110.01 LBS

## 2021-08-21 DIAGNOSIS — I48.91 UNSPECIFIED ATRIAL FIBRILLATION: ICD-10-CM

## 2021-08-21 DIAGNOSIS — I10 ESSENTIAL (PRIMARY) HYPERTENSION: ICD-10-CM

## 2021-08-21 DIAGNOSIS — K56.609 UNSPECIFIED INTESTINAL OBSTRUCTION, UNSPECIFIED AS TO PARTIAL VERSUS COMPLETE OBSTRUCTION: ICD-10-CM

## 2021-08-21 DIAGNOSIS — E11.9 TYPE 2 DIABETES MELLITUS WITHOUT COMPLICATIONS: ICD-10-CM

## 2021-08-21 DIAGNOSIS — R93.89 ABNORMAL FINDINGS ON DIAGNOSTIC IMAGING OF OTHER SPECIFIED BODY STRUCTURES: ICD-10-CM

## 2021-08-21 DIAGNOSIS — Z90.49 ACQUIRED ABSENCE OF OTHER SPECIFIED PARTS OF DIGESTIVE TRACT: Chronic | ICD-10-CM

## 2021-08-21 DIAGNOSIS — Z29.9 ENCOUNTER FOR PROPHYLACTIC MEASURES, UNSPECIFIED: ICD-10-CM

## 2021-08-21 DIAGNOSIS — N18.6 END STAGE RENAL DISEASE: ICD-10-CM

## 2021-08-21 DIAGNOSIS — Z99.2 DEPENDENCE ON RENAL DIALYSIS: Chronic | ICD-10-CM

## 2021-08-21 DIAGNOSIS — E87.8 OTHER DISORDERS OF ELECTROLYTE AND FLUID BALANCE, NOT ELSEWHERE CLASSIFIED: ICD-10-CM

## 2021-08-21 LAB
ALBUMIN SERPL ELPH-MCNC: 3.9 G/DL — SIGNIFICANT CHANGE UP (ref 3.5–5)
ALP SERPL-CCNC: 78 U/L — SIGNIFICANT CHANGE UP (ref 40–120)
ALT FLD-CCNC: 15 U/L DA — SIGNIFICANT CHANGE UP (ref 10–60)
ANION GAP SERPL CALC-SCNC: 9 MMOL/L — SIGNIFICANT CHANGE UP (ref 5–17)
APTT BLD: 35.2 SEC — SIGNIFICANT CHANGE UP (ref 27.5–35.5)
APTT BLD: 69 SEC — HIGH (ref 27.5–35.5)
AST SERPL-CCNC: 10 U/L — SIGNIFICANT CHANGE UP (ref 10–40)
BASOPHILS # BLD AUTO: 0.03 K/UL — SIGNIFICANT CHANGE UP (ref 0–0.2)
BASOPHILS NFR BLD AUTO: 0.3 % — SIGNIFICANT CHANGE UP (ref 0–2)
BILIRUB SERPL-MCNC: 0.7 MG/DL — SIGNIFICANT CHANGE UP (ref 0.2–1.2)
BUN SERPL-MCNC: 65 MG/DL — HIGH (ref 7–18)
CALCIUM SERPL-MCNC: 9.5 MG/DL — SIGNIFICANT CHANGE UP (ref 8.4–10.5)
CHLORIDE SERPL-SCNC: 99 MMOL/L — SIGNIFICANT CHANGE UP (ref 96–108)
CO2 SERPL-SCNC: 30 MMOL/L — SIGNIFICANT CHANGE UP (ref 22–31)
CREAT SERPL-MCNC: 7.99 MG/DL — HIGH (ref 0.5–1.3)
EOSINOPHIL # BLD AUTO: 0.57 K/UL — HIGH (ref 0–0.5)
EOSINOPHIL NFR BLD AUTO: 6.1 % — HIGH (ref 0–6)
GLUCOSE BLDC GLUCOMTR-MCNC: 136 MG/DL — HIGH (ref 70–99)
GLUCOSE BLDC GLUCOMTR-MCNC: 137 MG/DL — HIGH (ref 70–99)
GLUCOSE SERPL-MCNC: 145 MG/DL — HIGH (ref 70–99)
HAV IGM SER-ACNC: SIGNIFICANT CHANGE UP
HBV CORE IGM SER-ACNC: SIGNIFICANT CHANGE UP
HBV SURFACE AG SER-ACNC: SIGNIFICANT CHANGE UP
HCT VFR BLD CALC: 34.7 % — LOW (ref 39–50)
HCT VFR BLD CALC: 36.1 % — LOW (ref 39–50)
HCV AB S/CO SERPL IA: 0.1 S/CO — SIGNIFICANT CHANGE UP (ref 0–0.99)
HCV AB SERPL-IMP: SIGNIFICANT CHANGE UP
HGB BLD-MCNC: 11.5 G/DL — LOW (ref 13–17)
HGB BLD-MCNC: 11.9 G/DL — LOW (ref 13–17)
IMM GRANULOCYTES NFR BLD AUTO: 0.5 % — SIGNIFICANT CHANGE UP (ref 0–1.5)
INR BLD: 1.12 RATIO — SIGNIFICANT CHANGE UP (ref 0.88–1.16)
LACTATE SERPL-SCNC: 1.5 MMOL/L — SIGNIFICANT CHANGE UP (ref 0.7–2)
LYMPHOCYTES # BLD AUTO: 1.21 K/UL — SIGNIFICANT CHANGE UP (ref 1–3.3)
LYMPHOCYTES # BLD AUTO: 13.1 % — SIGNIFICANT CHANGE UP (ref 13–44)
MAGNESIUM SERPL-MCNC: 2.6 MG/DL — SIGNIFICANT CHANGE UP (ref 1.6–2.6)
MCHC RBC-ENTMCNC: 31.3 PG — SIGNIFICANT CHANGE UP (ref 27–34)
MCHC RBC-ENTMCNC: 31.9 PG — SIGNIFICANT CHANGE UP (ref 27–34)
MCHC RBC-ENTMCNC: 33 GM/DL — SIGNIFICANT CHANGE UP (ref 32–36)
MCHC RBC-ENTMCNC: 33.1 GM/DL — SIGNIFICANT CHANGE UP (ref 32–36)
MCV RBC AUTO: 95 FL — SIGNIFICANT CHANGE UP (ref 80–100)
MCV RBC AUTO: 96.1 FL — SIGNIFICANT CHANGE UP (ref 80–100)
MONOCYTES # BLD AUTO: 0.76 K/UL — SIGNIFICANT CHANGE UP (ref 0–0.9)
MONOCYTES NFR BLD AUTO: 8.2 % — SIGNIFICANT CHANGE UP (ref 2–14)
NEUTROPHILS # BLD AUTO: 6.65 K/UL — SIGNIFICANT CHANGE UP (ref 1.8–7.4)
NEUTROPHILS NFR BLD AUTO: 71.8 % — SIGNIFICANT CHANGE UP (ref 43–77)
NRBC # BLD: 0 /100 WBCS — SIGNIFICANT CHANGE UP (ref 0–0)
NRBC # BLD: 0 /100 WBCS — SIGNIFICANT CHANGE UP (ref 0–0)
PHOSPHATE SERPL-MCNC: 3.9 MG/DL — SIGNIFICANT CHANGE UP (ref 2.5–4.5)
PLATELET # BLD AUTO: 130 K/UL — LOW (ref 150–400)
PLATELET # BLD AUTO: 137 K/UL — LOW (ref 150–400)
POTASSIUM SERPL-MCNC: 5.6 MMOL/L — HIGH (ref 3.5–5.3)
POTASSIUM SERPL-SCNC: 5.6 MMOL/L — HIGH (ref 3.5–5.3)
PROT SERPL-MCNC: 7.8 G/DL — SIGNIFICANT CHANGE UP (ref 6–8.3)
PROTHROM AB SERPL-ACNC: 13.3 SEC — SIGNIFICANT CHANGE UP (ref 10.6–13.6)
RBC # BLD: 3.61 M/UL — LOW (ref 4.2–5.8)
RBC # BLD: 3.8 M/UL — LOW (ref 4.2–5.8)
RBC # FLD: 13.2 % — SIGNIFICANT CHANGE UP (ref 10.3–14.5)
RBC # FLD: 13.5 % — SIGNIFICANT CHANGE UP (ref 10.3–14.5)
SARS-COV-2 RNA SPEC QL NAA+PROBE: SIGNIFICANT CHANGE UP
SODIUM SERPL-SCNC: 138 MMOL/L — SIGNIFICANT CHANGE UP (ref 135–145)
TROPONIN I SERPL-MCNC: 0.03 NG/ML — SIGNIFICANT CHANGE UP (ref 0–0.04)
WBC # BLD: 7.51 K/UL — SIGNIFICANT CHANGE UP (ref 3.8–10.5)
WBC # BLD: 9.27 K/UL — SIGNIFICANT CHANGE UP (ref 3.8–10.5)
WBC # FLD AUTO: 7.51 K/UL — SIGNIFICANT CHANGE UP (ref 3.8–10.5)
WBC # FLD AUTO: 9.27 K/UL — SIGNIFICANT CHANGE UP (ref 3.8–10.5)

## 2021-08-21 PROCEDURE — 74177 CT ABD & PELVIS W/CONTRAST: CPT | Mod: 26,MA

## 2021-08-21 PROCEDURE — 99221 1ST HOSP IP/OBS SF/LOW 40: CPT

## 2021-08-21 PROCEDURE — 93010 ELECTROCARDIOGRAM REPORT: CPT

## 2021-08-21 PROCEDURE — 71045 X-RAY EXAM CHEST 1 VIEW: CPT | Mod: 26

## 2021-08-21 PROCEDURE — 99284 EMERGENCY DEPT VISIT MOD MDM: CPT

## 2021-08-21 RX ORDER — METOPROLOL TARTRATE 50 MG
5 TABLET ORAL EVERY 6 HOURS
Refills: 0 | Status: DISCONTINUED | OUTPATIENT
Start: 2021-08-21 | End: 2021-08-23

## 2021-08-21 RX ORDER — CALCIUM GLUCONATE 100 MG/ML
2 VIAL (ML) INTRAVENOUS ONCE
Refills: 0 | Status: COMPLETED | OUTPATIENT
Start: 2021-08-21 | End: 2021-08-21

## 2021-08-21 RX ORDER — METOPROLOL TARTRATE 50 MG
25 TABLET ORAL
Refills: 0 | Status: DISCONTINUED | OUTPATIENT
Start: 2021-08-21 | End: 2021-08-21

## 2021-08-21 RX ORDER — APIXABAN 2.5 MG/1
2.5 TABLET, FILM COATED ORAL
Refills: 0 | Status: DISCONTINUED | OUTPATIENT
Start: 2021-08-21 | End: 2021-08-21

## 2021-08-21 RX ORDER — MORPHINE SULFATE 50 MG/1
4 CAPSULE, EXTENDED RELEASE ORAL ONCE
Refills: 0 | Status: DISCONTINUED | OUTPATIENT
Start: 2021-08-21 | End: 2021-08-21

## 2021-08-21 RX ORDER — CALCIUM ACETATE 667 MG
1334 TABLET ORAL
Refills: 0 | Status: DISCONTINUED | OUTPATIENT
Start: 2021-08-21 | End: 2021-08-21

## 2021-08-21 RX ORDER — HYDRALAZINE HCL 50 MG
5 TABLET ORAL ONCE
Refills: 0 | Status: COMPLETED | OUTPATIENT
Start: 2021-08-21 | End: 2021-08-21

## 2021-08-21 RX ORDER — HEPARIN SODIUM 5000 [USP'U]/ML
5000 INJECTION INTRAVENOUS; SUBCUTANEOUS EVERY 8 HOURS
Refills: 0 | Status: DISCONTINUED | OUTPATIENT
Start: 2021-08-21 | End: 2021-08-21

## 2021-08-21 RX ORDER — HEPARIN SODIUM 5000 [USP'U]/ML
INJECTION INTRAVENOUS; SUBCUTANEOUS
Qty: 25000 | Refills: 0 | Status: DISCONTINUED | OUTPATIENT
Start: 2021-08-21 | End: 2021-08-23

## 2021-08-21 RX ORDER — SODIUM CHLORIDE 9 MG/ML
3 INJECTION INTRAMUSCULAR; INTRAVENOUS; SUBCUTANEOUS EVERY 8 HOURS
Refills: 0 | Status: DISCONTINUED | OUTPATIENT
Start: 2021-08-21 | End: 2021-08-27

## 2021-08-21 RX ORDER — PANTOPRAZOLE SODIUM 20 MG/1
40 TABLET, DELAYED RELEASE ORAL DAILY
Refills: 0 | Status: DISCONTINUED | OUTPATIENT
Start: 2021-08-21 | End: 2021-08-23

## 2021-08-21 RX ORDER — ATORVASTATIN CALCIUM 80 MG/1
10 TABLET, FILM COATED ORAL AT BEDTIME
Refills: 0 | Status: DISCONTINUED | OUTPATIENT
Start: 2021-08-21 | End: 2021-08-21

## 2021-08-21 RX ORDER — CHLORHEXIDINE GLUCONATE 213 G/1000ML
1 SOLUTION TOPICAL DAILY
Refills: 0 | Status: DISCONTINUED | OUTPATIENT
Start: 2021-08-21 | End: 2021-08-27

## 2021-08-21 RX ORDER — METOPROLOL TARTRATE 50 MG
5 TABLET ORAL EVERY 6 HOURS
Refills: 0 | Status: DISCONTINUED | OUTPATIENT
Start: 2021-08-21 | End: 2021-08-21

## 2021-08-21 RX ORDER — DEXTROSE 50 % IN WATER 50 %
50 SYRINGE (ML) INTRAVENOUS ONCE
Refills: 0 | Status: COMPLETED | OUTPATIENT
Start: 2021-08-21 | End: 2021-08-21

## 2021-08-21 RX ORDER — HYDRALAZINE HCL 50 MG
10 TABLET ORAL ONCE
Refills: 0 | Status: COMPLETED | OUTPATIENT
Start: 2021-08-21 | End: 2021-08-21

## 2021-08-21 RX ORDER — FINASTERIDE 5 MG/1
5 TABLET, FILM COATED ORAL DAILY
Refills: 0 | Status: DISCONTINUED | OUTPATIENT
Start: 2021-08-21 | End: 2021-08-21

## 2021-08-21 RX ORDER — HYDRALAZINE HCL 50 MG
100 TABLET ORAL THREE TIMES A DAY
Refills: 0 | Status: DISCONTINUED | OUTPATIENT
Start: 2021-08-21 | End: 2021-08-21

## 2021-08-21 RX ORDER — LOSARTAN POTASSIUM 100 MG/1
100 TABLET, FILM COATED ORAL DAILY
Refills: 0 | Status: DISCONTINUED | OUTPATIENT
Start: 2021-08-21 | End: 2021-08-21

## 2021-08-21 RX ORDER — LABETALOL HCL 100 MG
10 TABLET ORAL EVERY 6 HOURS
Refills: 0 | Status: DISCONTINUED | OUTPATIENT
Start: 2021-08-21 | End: 2021-08-21

## 2021-08-21 RX ORDER — INSULIN HUMAN 100 [IU]/ML
8 INJECTION, SOLUTION SUBCUTANEOUS ONCE
Refills: 0 | Status: COMPLETED | OUTPATIENT
Start: 2021-08-21 | End: 2021-08-21

## 2021-08-21 RX ORDER — HYDRALAZINE HCL 50 MG
5 TABLET ORAL EVERY 8 HOURS
Refills: 0 | Status: DISCONTINUED | OUTPATIENT
Start: 2021-08-21 | End: 2021-08-22

## 2021-08-21 RX ORDER — HYDRALAZINE HCL 50 MG
10 TABLET ORAL EVERY 8 HOURS
Refills: 0 | Status: DISCONTINUED | OUTPATIENT
Start: 2021-08-21 | End: 2021-08-23

## 2021-08-21 RX ORDER — LABETALOL HCL 100 MG
10 TABLET ORAL ONCE
Refills: 0 | Status: COMPLETED | OUTPATIENT
Start: 2021-08-21 | End: 2021-08-21

## 2021-08-21 RX ORDER — INSULIN LISPRO 100/ML
VIAL (ML) SUBCUTANEOUS EVERY 6 HOURS
Refills: 0 | Status: DISCONTINUED | OUTPATIENT
Start: 2021-08-21 | End: 2021-08-24

## 2021-08-21 RX ORDER — ALBUTEROL 90 UG/1
2.5 AEROSOL, METERED ORAL
Refills: 0 | Status: DISCONTINUED | OUTPATIENT
Start: 2021-08-21 | End: 2021-08-22

## 2021-08-21 RX ADMIN — MORPHINE SULFATE 4 MILLIGRAM(S): 50 CAPSULE, EXTENDED RELEASE ORAL at 07:36

## 2021-08-21 RX ADMIN — SODIUM CHLORIDE 3 MILLILITER(S): 9 INJECTION INTRAMUSCULAR; INTRAVENOUS; SUBCUTANEOUS at 16:29

## 2021-08-21 RX ADMIN — HEPARIN SODIUM 900 UNIT(S)/HR: 5000 INJECTION INTRAVENOUS; SUBCUTANEOUS at 11:40

## 2021-08-21 RX ADMIN — Medication 10 MILLIGRAM(S): at 04:31

## 2021-08-21 RX ADMIN — Medication 200 GRAM(S): at 05:31

## 2021-08-21 RX ADMIN — MORPHINE SULFATE 4 MILLIGRAM(S): 50 CAPSULE, EXTENDED RELEASE ORAL at 08:10

## 2021-08-21 RX ADMIN — INSULIN HUMAN 8 UNIT(S): 100 INJECTION, SOLUTION SUBCUTANEOUS at 05:31

## 2021-08-21 RX ADMIN — ALBUTEROL 2.5 MILLIGRAM(S): 90 AEROSOL, METERED ORAL at 05:31

## 2021-08-21 RX ADMIN — SODIUM CHLORIDE 3 MILLILITER(S): 9 INJECTION INTRAMUSCULAR; INTRAVENOUS; SUBCUTANEOUS at 21:56

## 2021-08-21 RX ADMIN — PANTOPRAZOLE SODIUM 40 MILLIGRAM(S): 20 TABLET, DELAYED RELEASE ORAL at 17:12

## 2021-08-21 RX ADMIN — Medication 10 MILLIGRAM(S): at 09:32

## 2021-08-21 RX ADMIN — MORPHINE SULFATE 4 MILLIGRAM(S): 50 CAPSULE, EXTENDED RELEASE ORAL at 06:41

## 2021-08-21 RX ADMIN — HEPARIN SODIUM 900 UNIT(S)/HR: 5000 INJECTION INTRAVENOUS; SUBCUTANEOUS at 18:09

## 2021-08-21 RX ADMIN — Medication 5 MILLIGRAM(S): at 16:34

## 2021-08-21 RX ADMIN — Medication 50 MILLILITER(S): at 05:31

## 2021-08-21 RX ADMIN — Medication 10 MILLIGRAM(S): at 23:50

## 2021-08-21 RX ADMIN — Medication 5 MILLIGRAM(S): at 20:25

## 2021-08-21 RX ADMIN — MORPHINE SULFATE 4 MILLIGRAM(S): 50 CAPSULE, EXTENDED RELEASE ORAL at 04:31

## 2021-08-21 RX ADMIN — Medication 5 MILLIGRAM(S): at 17:13

## 2021-08-21 RX ADMIN — SODIUM CHLORIDE 3 MILLILITER(S): 9 INJECTION INTRAMUSCULAR; INTRAVENOUS; SUBCUTANEOUS at 07:34

## 2021-08-21 NOTE — H&P ADULT - PROBLEM SELECTOR PLAN 8
IMPROVE VTE Individual Risk Assessment  RISK                                                                Points  [  ] Previous VTE                                                  3  [  ] Thrombophilia                                               2  [  ] Lower limb paralysis                                      2        (unable to hold up >15 seconds)    [  ] Current Cancer                                              2         (within 6 months)  [  ] Immobilization > 24 hrs                                1  [  ] ICU/CCU stay > 24 hours                              1  [  ] Age > 60                                                      1  IMPROVE VTE Score __2_______    PPI for GI prophylaxis  Heparin drip

## 2021-08-21 NOTE — ED PROVIDER NOTE - PROGRESS NOTE DETAILS
NGT placed by surgery. admit to medicine for optimization. as per surgery, conservative management for now, no current surgical intervention at this time. Will go to dialysis this morning. Patient awake alert, will give hydralazine IVP for BP control

## 2021-08-21 NOTE — H&P ADULT - PROBLEM SELECTOR PLAN 5
- ESRD Maintenance HD (T/T/S ) via AV fistula L forearm, Cr 7.9 on admission  - Last full dialysis Thursday, next HD today  - Avoid Nephrotoxic Meds/ Agents such as (NSAIDs, IV contrast, Aminoglycosides such as gentamicin, Gadolinium contrast, Phosphate containing enemas, etc..)  - Nephrology Dr Hicks consulted - ESRD Maintenance HD (T/T/S ) via AV fistula L forearm, Cr 7.9 on admission  - Last full dialysis Thursday, next HD today  - Avoid Nephrotoxic Meds/ Agents such as (NSAIDs, IV contrast, Aminoglycosides such as gentamicin, Gadolinium contrast, Phosphate containing enemas, etc..)  - F/u labs post dialysis   - Monitor BMP daily  - Nephrology Dr Hicks consulted

## 2021-08-21 NOTE — H&P ADULT - PROBLEM SELECTOR PLAN 6
- Patient has hx of atrial fibrillation on Eliquis and metoprolol at home   - EKG showed NSR, prolonged QTc 526, Troponin x1 0.028, CXR showed   - Started on Heparin drip and Lopressor 5 mg IVP as patient is NPO   - Cardio Dr Gonzalez consulted

## 2021-08-21 NOTE — CONSULT NOTE ADULT - SUBJECTIVE AND OBJECTIVE BOX
[facility] NEPHROLOGY- CONSULTATION NOTE    86y Male with history of below presents with abdominal pain. Nephrology consulted for ESRD status.    Patient known to our practice for h/o ESRD on HD TTS at [facility] where he follows with Dr. Quiroz as an outpatient. Last HD on 8/19/21 via LUE AVF.    Patient found to have SBO s/p NGT placement by surgery.    REVIEW OF SYSTEMS:  Gen: no changes in weight  HEENT: no rhinorrhea  Neck: no sore throat  Cards: no chest pain  Resp: no dyspnea  GI: no nausea or vomiting or diarrhea, + ab pain  : no dysuria or hematuria  Vascular: no LE edema  Derm: no rashes  Neuro: no numbness/tingling    No Known Allergies      Home Medications Reviewed  Hospital Medications:   MEDICATIONS  (STANDING):  ALBUTerol    0.083%.. 2.5 milliGRAM(s) Nebulizer every 20 minutes  chlorhexidine 2% Cloths 1 Application(s) Topical daily  heparin   Injectable 5000 Unit(s) SubCutaneous every 8 hours  sodium chloride 0.9% lock flush 3 milliLiter(s) IV Push every 8 hours      PAST MEDICAL & SURGICAL HISTORY:  ESRD (end stage renal disease)    Diabetes    Hypertension    Hyperlipidemia    Atrial fibrillation    S/P hemodialysis catheter insertion  7/17    History of appendectomy        FAMILY HISTORY:  Family history of diabetes mellitus in sister (Sibling)        SOCIAL HISTORY:  Denies toxic substance use     VITALS:  T(F): 97.4 (08-21-21 @ 02:56), Max: 97.4 (08-21-21 @ 02:56)  HR: 71 (08-21-21 @ 07:27)  BP: 221/64 (08-21-21 @ 07:27)  RR: 17 (08-21-21 @ 07:27)  SpO2: 97% (08-21-21 @ 07:27)  Wt(kg): --    Height (cm): 167.6 (08-21 @ 02:56)  Weight (kg): 49.9 (08-21 @ 02:56)  BMI (kg/m2): 17.8 (08-21 @ 02:56)  BSA (m2): 1.55 (08-21 @ 02:56)    PHYSICAL EXAM:  Gen: NAD, calm  HEENT: MMM  Neck: no JVD  Cards: RRR, +S1/S2, no M/G/R  Resp: CTA B/L  GI: soft, NT/ND, NABS  : no CVA tenderness  Vascular: no LE edema B/L, LUE AVF + bruit/thrill  Derm: no rashes  Neuro: non-focal    LABS:  08-21    138  |  99  |  65<H>  ----------------------------<  145<H>  5.6<H>   |  30  |  7.99<H>    Ca    9.5      21 Aug 2021 04:17    TPro  7.8  /  Alb  3.9  /  TBili  0.7  /  DBili      /  AST  10  /  ALT  15  /  AlkPhos  78  08-21    Creatinine Trend: 7.99 <--                        11.9   9.27  )-----------( 130      ( 21 Aug 2021 04:17 )             36.1     Urine Studies:        RADIOLOGY & ADDITIONAL STUDIES:    < from: Xray Chest 1 View-PORTABLE IMMEDIATE (Xray Chest 1 View-PORTABLE IMMEDIATE .) (08.21.21 @ 09:19) >  IMPRESSION: Increasing bilateral effusions. NG tube in place    < end of copied text >      < from: CT Abdomen and Pelvis w/ IV Cont (08.21.21 @ 06:47) >  IMPRESSION:    Small bowel obstruction, with a transition point in the left upper quadrant, and a 2nd transition point in the right lower quadrant. Closed loop obstruction or internal hernia cannot be excluded.    < end of copied text >

## 2021-08-21 NOTE — CONSULT NOTE ADULT - ASSESSMENT
86y Male with history of ESRD on HD presents with abdominal pain. Nephrology consulted for ESRD status.    1) ESRD: Last HD on 8/19 as an outpatient. Plan for next maintenance HD today. Monitor electrolytes.    2) HTN with ESRD: No PO medications given SBO with NGT in place. Start hydralazine 10 mg IV Q6 hours and can start concurrent labetalol 10 mg IV Q6 hours if BP remains elevated. Increase UF with HD given pleural effusions on CT chest. Monitor BP.    3) Anemia of renal disease: Hb acceptable. Monitor off SON.    4) Hyperphosphatemia: Hold phoslo 2 tabs with meals as patient NPO. Monitor serum calcium and phosphorus.      [facility] NEPHROLOGY  Nahum Grubbs M.D.  Francisco Javier Hicks D.O.  Yovana Quiroz M.D.  Makeda Duron, MSN, ANP-C    Telephone: [phone]  Facsimile: [phone]    [address] [place] [address]   86y Male with history of ESRD on HD presents with abdominal pain. Nephrology consulted for ESRD status.    1) ESRD: Last HD on 8/19 as an outpatient. Plan for next maintenance HD today. Monitor electrolytes.    2) HTN with ESRD: Can start hydralazine 10 mg IV Q6 hours and concurrent labetalol 10 mg IV Q6 hours if BP remains elevated and patient unable to take PO home medications. Increase UF with HD given pleural effusions on CT chest. Monitor BP.    3) Anemia of renal disease: Hb acceptable. Monitor off SON.    4) Hyperphosphatemia: Hold phoslo 2 tabs with meals as patient NPO. Monitor serum calcium and phosphorus.      [facility] NEPHROLOGY  Nahum Grubbs M.D.  Francisco Javier Hicks D.O.  Yovana Quiroz M.D.  Makeda Duron, MSN, ANP-C    Telephone: [phone]  Facsimile: [phone]    [address] [place] [id number]   86y Male with history of ESRD on HD presents with abdominal pain. Nephrology consulted for ESRD status.    1) ESRD: Last HD on 8/19 as an outpatient. Plan for next maintenance HD today. Monitor electrolytes.    2) HTN with ESRD: Can start hydralazine 10 mg IV Q6 hours and concurrent labetalol 10 mg IV Q6 hours if BP remains elevated and patient unable to take PO home medications. Increase UF with HD given pleural effusions on CT chest. Monitor BP.    3) Anemia of renal disease: Hb acceptable. Monitor off SON.    4) Hyperphosphatemia: Hold phoslo 2 tabs with meals as patient NPO. Monitor serum calcium and phosphorus.    5) Hyperkalemia: Mild. For HD today. Monitor serum K.      [facility] NEPHROLOGY  Nahum Grubbs M.D.  Francisco Javier Hicks D.O.  Yovana Quiroz M.D.  Makeda Duron, THOMAS, ANP-C    Telephone: [phone]  Facsimile: [phone]    [address] [place] [id number]

## 2021-08-21 NOTE — ED PROVIDER NOTE - PHYSICAL EXAMINATION
Vital Signs Reviewed  GEN: Uncomfortable, NAD, AAOx3  HEENT: NCAT, MMM, Neck Supple  RESP: CTAB, No rales/rhonchi/wheezing  CV: RRR, S1S2, No murmurs  ABD: Lower ABD TTP with voluntary guarding, Soft, ND, No masses, No CVA Tenderness  Extrem/Skin: Equal pulses bilat, No cyanosis/edema/rashes  Neuro: No focal deficits

## 2021-08-21 NOTE — ED ADULT NURSE NOTE - ED STAT RN HANDOFF DETAILS 2
patient admitted to Tele, report given to KOBE Thurman scheduled for dialysis, report given to Rachel. Pending transport

## 2021-08-21 NOTE — ED ADULT NURSE NOTE - NSIMPLEMENTINTERV_GEN_ALL_ED
Implemented All Fall with Harm Risk Interventions:  Leo to call system. Call bell, personal items and telephone within reach. Instruct patient to call for assistance. Room bathroom lighting operational. Non-slip footwear when patient is off stretcher. Physically safe environment: no spills, clutter or unnecessary equipment. Stretcher in lowest position, wheels locked, appropriate side rails in place. Provide visual cue, wrist band, yellow gown, etc. Monitor gait and stability. Monitor for mental status changes and reorient to person, place, and time. Review medications for side effects contributing to fall risk. Reinforce activity limits and safety measures with patient and family. Provide visual clues: red socks.

## 2021-08-21 NOTE — H&P ADULT - ATTENDING COMMENTS
85 yo Male with ESRD on HD TTS, DM type 2, HTN, Anemia of renal disease, hyperphosphatemia, Afib on A/C Eliquis Presented with diffuse abdominal pain x 1 day, denies any nausea, vomiting or diarrhea. As per wife, patient had abdominal pain before going to bed last night, he took Tums and Pepto-Bismol even tough he was not having diarrhea. Patient was expecting these 2 medications to help him however it did not improve symptoms.   Of note, patient was recently admitted for acute hypoxic respiratory failure 2/2 fluid overload and abnormal CXR and CT chest which showed Density on the chest x-ray corresponds to loculated fluid in the right fissures. There is a small amount of free pleural fluid on the right. There is moderate left pleural effusion, patchy linear atelectasis versus infiltrate in the right lower lobe. Significant collapse in the left lower lobe likely secondary to pleural fluid.     GOC DNR DNI       assessment  -- sbo, b/l pleural eff, h/o ESRD on HD TTS, DM type 2, HTN, Anemia of renal disease, hyperphosphatemia, Afib on A/C Eliquis    plan  --  admit to med, npo, ngt to lws, hold eliquis, start hep gtt as per hospital normogram, change po meds to iv, lispro ss, gi and dvt profilaxis,  cbc, bmp, mg, phos, lipids, tsh, bld cx, ua, ucx, hgba1c    hd as per renal     serial abd xrays      surg f/u   renal cons  cardio cons  pulm cons

## 2021-08-21 NOTE — H&P ADULT - HISTORY OF PRESENT ILLNESS
87 yo Male with ESRD on HD TTS, DM type 2, HTN, Anemia of renal disease, hyperphosphatemia, Afib on A/C Eliquis Presented with diffuse abdominal pain x 1 day, denies any nausea, vomiting or diarrhea. As per wife, patient had abdominal pain before going to bed last night, he took Tums and Pepto-Bismol even tough he was not having diarrhea. Patient was expecting these 2 medications to help him however it did not improve symptoms.   Of note, patient was recently admitted for acute hypoxic respiratory failure 2/2 fluid overload and abnormal CXR and CT chest which showed Density on the chest x-ray corresponds to loculated fluid in the right fissures. There is a small amount of free pleural fluid on the right. There is moderate left pleural effusion, patchy linear atelectasis versus infiltrate in the right lower lobe. Significant collapse in the left lower lobe likely secondary to pleural fluid.     GOC DNR DNI

## 2021-08-21 NOTE — H&P ADULT - PROBLEM SELECTOR PLAN 1
- Patient came to ED c/o diffuse abdominal pain x1 day, denies any nausea, vomiting, diarrhea  - CT abdomen showed   Small bowel obstruction, with a transition point in the left upper quadrant, and a 2nd transition point in the right lower quadrant. Closed loop obstruction or internal hernia cannot be excluded. - Patient came to ED c/o diffuse abdominal pain x1 day, denies any nausea, vomiting, diarrhea  - CT abdomen showed Small bowel obstruction, with a transition point in the left upper quadrant, and a 2nd transition point in the right lower quadrant. Closed loop obstruction or internal hernia cannot be excluded.  - C/w NGT, serial abdominal exams and x ray of abdomen   - Try to avoid opioids as it may worsen pain due to obstruction   - Monitor for any signs of hemodynamic instability   - Surgery on board- recommends conservative management - Patient came to ED c/o diffuse abdominal pain x1 day, denies any nausea, vomiting, diarrhea  - CT abdomen showed Small bowel obstruction, with a transition point in the left upper quadrant, and a 2nd transition point in the right lower quadrant. Closed loop obstruction or internal hernia cannot be excluded.  - C/w NGT- low continuous suction, serial abdominal exams and x ray of abdomen   - Try to avoid opioids as it may worsen pain due to obstruction   - Monitor for any signs of hemodynamic instability   - Surgery on board- recommends conservative management

## 2021-08-21 NOTE — H&P ADULT - NSHPPHYSICALEXAM_GEN_ALL_CORE
ICU Vital Signs Last 24 Hrs  T(C): 36.3 (21 Aug 2021 02:56), Max: 36.3 (21 Aug 2021 02:56)  T(F): 97.4 (21 Aug 2021 02:56), Max: 97.4 (21 Aug 2021 02:56)  HR: 71 (21 Aug 2021 07:27) (61 - 71)  BP: 221/64 (21 Aug 2021 07:27) (206/73 - 221/64)  RR: 17 (21 Aug 2021 07:27) (16 - 17)  SpO2: 97% (21 Aug 2021 07:27) (97% - 100%)    GENERAL: NAD, overweight   HEAD:  Atraumatic, Normocephalic  EYES:  conjunctiva and sclera clear  NECK: Supple, No JVD, Normal thyroid  CHEST/LUNG: Clear to auscultation. Clear to percussion bilaterally; No rales, rhonchi, wheezing, or rubs  HEART: Regular rate and rhythm; No murmurs, rubs, or gallops  ABDOMEN: Soft, Nontender, Nondistended; Bowel sounds present, no pain or masses on palpation   NERVOUS SYSTEM:  Alert & Oriented X3  EXTREMITIES:  2+ Peripheral Pulses, No clubbing, cyanosis, or edema  : voiding well   SKIN: warm, dry ICU Vital Signs Last 24 Hrs  T(C): 36.3 (21 Aug 2021 02:56), Max: 36.3 (21 Aug 2021 02:56)  T(F): 97.4 (21 Aug 2021 02:56), Max: 97.4 (21 Aug 2021 02:56)  HR: 71 (21 Aug 2021 07:27) (61 - 71)  BP: 221/64 (21 Aug 2021 07:27) (206/73 - 221/64)  RR: 17 (21 Aug 2021 07:27) (16 - 17)  SpO2: 97% (21 Aug 2021 07:27) (97% - 100%)    GENERAL: NAD, very thin, sat well on RA, NGT   HEAD:  Atraumatic, Normocephalic  EYES:  conjunctiva and sclera clear  NECK: Supple, No JVD, Normal thyroid  CHEST/LUNG: Clear to auscultation. Clear to percussion bilaterally; No rales, rhonchi, wheezing, or rubs  HEART: Regular rate and rhythm; No murmurs, rubs, or gallops  ABDOMEN: Soft, mild distention; Bowel sounds decreased-absent, mild pain on palpation   NERVOUS SYSTEM:  Alert & Oriented X3  EXTREMITIES:  2+ Peripheral Pulses, No clubbing, cyanosis, or edema. Patient w/ L arm AV fistula   SKIN: warm, dry

## 2021-08-21 NOTE — H&P ADULT - PROBLEM SELECTOR PLAN 7
- Patient takes Januvia at home  - Holding home po medications  - C/w with HSS and FS q 6 hrs while NPO  - F/u A1c

## 2021-08-21 NOTE — CONSULT NOTE ADULT - ASSESSMENT
1. SBO   CT noted  NGT   Surgical F/U   Zofran PRN for nausea  Pain control.     2. Pleural Effusion  On HD; continue TTS   Repeat CXR  O2 Supp PRN   Check Echo     3. Atelectasis  Bronchodilators  O2 Supp  Incentive Spirometry    4. Abnormal CXR   XR notes increasing pseudotumor in minor fissure compared to 6/2021.  Obtain CT chest without contrast to further eval.     5. ESRD   Avoid nephrotoxic drugs  HD - TTS  Renal F/U   Monitor labs     6. Afib   A/C   Cardio consult     7. DM   Accuchecks ac/hs  Insulin coverage per HSS   A1C level

## 2021-08-21 NOTE — ED ADULT NURSE NOTE - ED STAT RN HANDOFF DETAILS
Patient received 7AM, c/o abdominal pain, pending CT results. IV intact. Safety maintained. will continue to monitor.

## 2021-08-21 NOTE — H&P ADULT - NSHPREVIEWOFSYSTEMS_GEN_ALL_CORE
REVIEW OF SYSTEMS:  CONSTITUTIONAL: No fever, weight loss, or fatigue  RESPIRATORY: No cough, wheezing, chills or hemoptysis; No shortness of breath  CARDIOVASCULAR: No chest pain, palpitations, dizziness, or leg swelling  GASTROINTESTINAL: No abdominal pain. No nausea, vomiting, or hematemesis; No diarrhea or constipation. No melena or hematochezia.  GENITOURINARY: No dysuria or hematuria, urinary frequency  NEUROLOGICAL: No headaches, memory loss, loss of strength, numbness, or tremors  SKIN: No itching, burning, rashes, or lesions REVIEW OF SYSTEMS:  CONSTITUTIONAL: No fever, weight loss+, no fatigue  RESPIRATORY: No cough, wheezing, chills or hemoptysis; No shortness of breath  CARDIOVASCULAR: No chest pain, palpitations, dizziness, or leg swelling  GASTROINTESTINAL: abdominal pain+. No nausea, vomiting, or hematemesis; No diarrhea or constipation. No melena or hematochezia.  GENITOURINARY: No dysuria or hematuria, urinary frequency  NEUROLOGICAL: No headaches, memory loss, loss of strength, numbness, or tremors  SKIN: No itching, burning, rashes, or lesions

## 2021-08-21 NOTE — CONSULT NOTE ADULT - SUBJECTIVE AND OBJECTIVE BOX
CHIEF COMPLAINT:Patient is a 86y old  Male who presents with a chief complaint of ABDOMINAL PAIN.      HPI:Pt is a 86 yr old male with PMHX of PAF, ESRD on HD, DM, HTN, Lipid d/o who presents to ER with abdominal pain, nausea and vomiting.Pt found to have SBO, NGT in place and given morphine IV, now feels better. As per daughter at bedside no hx of CAD or CVA only PAF which was recently diagnosed.      PAST MEDICAL & SURGICAL HISTORY:  ESRD (end stage renal disease)    Diabetes    Hypertension    Hyperlipidemia    Atrial fibrillation    History of appendectomy    s/p AVF    MEDICATIONS  (STANDING):  ALBUTerol    0.083%.. 2.5 milliGRAM(s) Nebulizer every 20 minutes  chlorhexidine 2% Cloths 1 Application(s) Topical daily  heparin  Infusion.  Unit(s)/Hr (9 mL/Hr) IV Continuous <Continuous>  labetalol Injectable 10 milliGRAM(s) IV Push every 6 hours  sodium chloride 0.9% lock flush 3 milliLiter(s) IV Push every 8 hours    MEDICATIONS  (PRN):      FAMILY HISTORY:  Family history of diabetes mellitus in sister (Sibling)        SOCIAL HISTORY:    [ x] Non-smoker    [x ] Alcohol-denies    Allergies    No Known Allergies    Intolerances    	    REVIEW OF SYSTEMS:  CONSTITUTIONAL: No fever, weight loss, or fatigue  EYES: No eye pain, visual disturbances, or discharge  ENT:  No difficulty hearing, tinnitus, vertigo; No sinus or throat pain  NECK: No pain or stiffness  RESPIRATORY: No cough, wheezing, chills or hemoptysis; No Shortness of Breath  CARDIOVASCULAR: No chest pain, palpitations, passing out, dizziness, or leg swelling  GASTROINTESTINAL: + abdominal or epigastric pain. + nausea, vomiting, No hematemesis; No diarrhea or constipation. No melena or hematochezia.  GENITOURINARY: No dysuria, frequency, hematuria, or incontinence  NEUROLOGICAL: No headaches, memory loss, loss of strength, numbness, or tremors  SKIN: No itching, burning, rashes, or lesions   LYMPH Nodes: No enlarged glands  ENDOCRINE: No heat or cold intolerance; No hair loss  MUSCULOSKELETAL: No joint pain or swelling; No muscle, back, or extremity pain  PSYCHIATRIC: No depression, anxiety, mood swings, or difficulty sleeping  HEME/LYMPH: No easy bruising, or bleeding gums  ALLERGY AND IMMUNOLOGIC: No hives or eczema	      PHYSICAL EXAM:  T(C): 36.3 (08-21-21 @ 02:56), Max: 36.3 (08-21-21 @ 02:56)  HR: 71 (08-21-21 @ 07:27) (61 - 71)  BP: 221/64 (08-21-21 @ 07:27) (206/73 - 221/64)  RR: 17 (08-21-21 @ 07:27) (16 - 17)  SpO2: 97% (08-21-21 @ 07:27) (97% - 100%)      Appearance: Normal	  HEENT:   Normal oral mucosa, PERRL, EOMI	  Lymphatic: No lymphadenopathy  Cardiovascular: Normal S1 S2, No JVD, No murmurs, No edema  Respiratory: Lungs clear to auscultation	  Psychiatry: A & O x 3, Mood & affect appropriate  Gastrointestinal:  Soft, Non-tender, + BS	  Skin: No rashes, No ecchymoses, No cyanosis	  Neurologic: Non-focal  Extremities: Normal range of motion, No clubbing, cyanosis or edema  Vascular: Peripheral pulses palpable 2+ bilaterally        ECG: NSR with nl axis 	    	  LABS:	 	    CARDIAC MARKERS:  CARDIAC MARKERS ( 21 Aug 2021 04:17 )  0.028 ng/mL / x     / x     / x     / x                                  11.9   9.27  )-----------( 130      ( 21 Aug 2021 04:17 )             36.1     08-21    138  |  99  |  65<H>  ----------------------------<  145<H>  5.6<H>   |  30  |  7.99<H>    Ca    9.5      21 Aug 2021 04:17  Phos  3.9     08-21  Mg     2.6     08-21    TPro  7.8  /  Alb  3.9  /  TBili  0.7  /  DBili  x   /  AST  10  /  ALT  15  /  AlkPhos  78  08-21        < from: Xray Chest 1 View-PORTABLE IMMEDIATE (Xray Chest 1 View-PORTABLE IMMEDIATE .) (08.21.21 @ 09:19) >  EXAM:  XR CHEST PORTABLE IMMED 1V                            PROCEDURE DATE:  08/21/2021          INTERPRETATION:  AP semierect chest on August 21, 2021 at 9:16 AM. Patient had NG tube placement.    Heart magnified by technique.    There is a moderate left effusion and increasing pseudotumor in the minor fissure compared to June 28 of this year.    Clips in the left upper arm again noted.    An NG tube is presently in place.    IMPRESSION: Increasing bilateral effusions. NG tube in place.    --- End of Report ---            SHAYLA GODINEZ MD; Attending Radiologist  This document has been electronically signed. Aug 21 2021 10:47AM    < end of copied text >  < from: CT Abdomen and Pelvis w/ IV Cont (08.21.21 @ 06:47) >  EXAM:  CT ABDOMEN AND PELVIS IC                            PROCEDURE DATE:  08/21/2021          INTERPRETATION:  CLINICAL INFORMATION: 86 years old. Male. lower ABD pain.    COMPARISON: None.    CONTRAST/COMPLICATIONS:  IV Contrast: Omnipaque 350  90 cc administered  10 cc discarded.  Oral Contrast: NONE  Complications: None reported at time of study completion    PROCEDURE:  CT of the Abdomen and Pelvis was performed.  Sagittal and coronal reformats were performed.    FINDINGS:    LOWER CHEST:Partially visualized moderate to large left pleural effusion left lower lobe atelectasis/collapse. Streaky right basilar atelectasis with trace right pleural effusion.    LIVER: Small probable cyst in the right hepatic lobe and a a few subcentimeter hypodensities, too small to characterize.  BILE DUCTS: Normal caliber.  GALLBLADDER: Within normal limits.  SPLEEN: Within normal limits.  PANCREAS: Within normal limits.  ADRENALS: Within normal limits.  KIDNEYS/URETERS: Enhance symmetrically. No hydronephrosis. Bilateral native kidneys are atrophic. Small right renal cyst.    BLADDER: Undistended.  REPRODUCTIVE ORGANS: Prostate is enlarged.    BOWEL: Dilated small bowel loops with a transition point in the left upper quadrant, and a 2nd transition point in the right lower quadrant. Appendix is is not definitively visualized.  PERITONEUM: Trace ascites.  VESSELS: Normal caliber abdominal aorta. Moderate scoliosis  RETROPERITONEUM/LYMPH NODES: No lymphadenopathy.  ABDOMINAL WALL: Within normallimits.  BONES: Degenerative changes in the spine.    IMPRESSION:    Small bowel obstruction, with a transition point in the left upper quadrant, and a 2nd transition point in the right lower quadrant. Closed loop obstruction or internal hernia cannot be excluded.                      ANTOINETTE WEISS MD; Attending Radiologist    < end of copied text >        Transthoracic Echocardiogram (05.02.21 @ 07:20) >  OBSERVATIONS:  Mitral Valve: Mitral annular calcification. Mild mitral  regurgitation.  Aortic Root: Aortic Root: 3.3 cm.    Aortic Valve: Calcified trileaflet aortic valve with normal  opening.  Left Atrium: Severely dilated left atrium.  LA volume index  = 67 cc/m2.  Left Ventricle: Normal Left Ventricular Systolic Function,  (EF = 55 to 60%) Normal left ventricular internal  dimensions and wall thicknesses. Normal diastolic function.  Right Heart: Normal right atrium. Normal right ventricular  size and systolic function (TAPSE  1.7cm). There is mild  tricuspid regurgitation. There is mild pulmonic  regurgitation.  Pericardium/PleuraNormal pericardium with no pericardial  effusion. Left pleural effusion.  Hemodynamic: RV systolic pressure is mildly increased at  46 mm Hg.      < from: Nuclear Stress Test-Pharmacologic (05.03.21 @ 08:01) >  IMPRESSIONS:  * There is a small, fixed, mild intensity defect in the  mid inferolateral wall that thickens, consistent with  attenuation artifact.  * Post-stress resting myocardial perfusion gated SPECT  imaging was performed (LVEF > 70%;LVEDV = 94 ml.)  * Negative study for reversible ischemia    ------------------------------------------------------------------------      ------------------------------------------------------------------------    Confirmed on  5/3/2021 - 12:37:33 at [place] by  Alhaji Lay MD

## 2021-08-21 NOTE — CONSULT NOTE ADULT - SUBJECTIVE AND OBJECTIVE BOX
HPI:      PAST MEDICAL & SURGICAL HISTORY:  ESRD (end stage renal disease)    Diabetes    Hypertension    Hyperlipidemia    Atrial fibrillation    S/P hemodialysis catheter insertion  7/17    History of appendectomy        Vital Signs Last 24 Hrs  T(C): 36.3 (21 Aug 2021 02:56), Max: 36.3 (21 Aug 2021 02:56)  T(F): 97.4 (21 Aug 2021 02:56), Max: 97.4 (21 Aug 2021 02:56)  HR: 71 (21 Aug 2021 07:27) (61 - 71)  BP: 221/64 (21 Aug 2021 07:27) (206/73 - 221/64)  BP(mean): --  RR: 17 (21 Aug 2021 07:27) (16 - 17)  SpO2: 97% (21 Aug 2021 07:27) (97% - 100%)                          11.9   9.27  )-----------( 130      ( 21 Aug 2021 04:17 )             36.1     08-21    138  |  99  |  65<H>  ----------------------------<  145<H>  5.6<H>   |  30  |  7.99<H>    Ca    9.5      21 Aug 2021 04:17    TPro  7.8  /  Alb  3.9  /  TBili  0.7  /  DBili  x   /  AST  10  /  ALT  15  /  AlkPhos  78  08-21    PT/INR - ( 21 Aug 2021 04:17 )   PT: 13.3 sec;   INR: 1.12 ratio         PTT - ( 21 Aug 2021 04:17 )  PTT:35.2 sec    PHYSICAL EXAM  General: WN/WD NAD  Neurology: A&Ox3, nonfocal, EDWARD x 4  Respiratory: CTA B/L  CV: RRR, S1S2, no murmurs, rubs or gallops  Abdominal: Soft, NT, ND +BS, Last BM  Extremities: No edema, + peripheral pulses        ASSESSMENT/ PLAN:   HPI:  85 yo M h/o ESRD (T,Th,Sa; last HD on Th), DM, HTN, AFib on eliquis p/w lower ABD pain that woke him from sleep with no other associated symptoms. Pt still makes urine and denies missing HD recently. Pt denies associated Bloody or Black Stools, N/V, Diarrhea/Constipation,   denies prev h/o obstruction, PSH-appendectomy 40 yrs ago  < from: CT Abdomen and Pelvis w/ IV Cont (08.21.21 @ 06:47) >  FINDINGS:    LOWER CHEST:Partially visualized moderate to large left pleural effusion left lower lobe atelectasis/collapse. Streaky right basilar atelectasis with trace right pleural effusion.    LIVER: Small probable cyst in the right hepatic lobe and a a few subcentimeter hypodensities, too small to characterize.  BILE DUCTS: Normal caliber.  GALLBLADDER: Within normal limits.  SPLEEN: Within normal limits.  PANCREAS: Within normal limits.  ADRENALS: Within normal limits.  KIDNEYS/URETERS: Enhance symmetrically. No hydronephrosis. Bilateral native kidneys are atrophic. Small right renal cyst.    BLADDER: Undistended.  REPRODUCTIVE ORGANS: Prostate is enlarged.    BOWEL: Dilated small bowel loops with a transition point in the left upper quadrant, and a 2nd transition point in the right lower quadrant. Appendix is is not definitively visualized.  PERITONEUM: Trace ascites.  VESSELS: Normal caliber abdominal aorta. Moderate scoliosis  RETROPERITONEUM/LYMPH NODES: No lymphadenopathy.  ABDOMINAL WALL: Within normallimits.  BONES: Degenerative changes in the spine.    IMPRESSION:    Small bowel obstruction, with a transition point in the left upper quadrant, and a 2nd transition point in the right lower quadrant. Closed loop obstruction or internal hernia cannot be excluded.    < end of copied text >      PAST MEDICAL & SURGICAL HISTORY:  ESRD (end stage renal disease)    Diabetes    Hypertension    Hyperlipidemia    Atrial fibrillation    S/P hemodialysis catheter insertion  7/17    History of appendectomy        Vital Signs Last 24 Hrs  T(C): 36.3 (21 Aug 2021 02:56), Max: 36.3 (21 Aug 2021 02:56)  T(F): 97.4 (21 Aug 2021 02:56), Max: 97.4 (21 Aug 2021 02:56)  HR: 71 (21 Aug 2021 07:27) (61 - 71)  BP: 221/64 (21 Aug 2021 07:27) (206/73 - 221/64)  BP(mean): --  RR: 17 (21 Aug 2021 07:27) (16 - 17)  SpO2: 97% (21 Aug 2021 07:27) (97% - 100%)                          11.9   9.27  )-----------( 130      ( 21 Aug 2021 04:17 )             36.1     08-21    138  |  99  |  65<H>  ----------------------------<  145<H>  5.6<H>   |  30  |  7.99<H>    Ca    9.5      21 Aug 2021 04:17    TPro  7.8  /  Alb  3.9  /  TBili  0.7  /  DBili  x   /  AST  10  /  ALT  15  /  AlkPhos  78  08-21    PT/INR - ( 21 Aug 2021 04:17 )   PT: 13.3 sec;   INR: 1.12 ratio         PTT - ( 21 Aug 2021 04:17 )  PTT:35.2 sec    PHYSICAL EXAM  General: WN/WD NAD  Neurology: A&Ox3, nonfocal, EDWARD x 4  Respiratory: good b/l air entry  CV: S1S2  Abdominal: Softly distended, NT at this time(received morphine), no guarding or peritoneal signs  Extremities: No edema, + peripheral pulses        ASSESSMENT/ PLAN:  87 y/o male with SBO  h/o afib on elliquis  hyperkalemia, renal on board for urgent HD  admitted Dr Aviles  hypertensive urgency, SBP>200  cont medical optimization, high risk surgical candidate on elliquis  cont NPO, ivf per renal/med  cont NGT, serial abd exams, labs, xrays  will cont conservative mgmt of sbo for now, no fever or leukocytosis, lactate wnl  discussed with Dr Palencia

## 2021-08-21 NOTE — ED ADULT NURSE NOTE - OBJECTIVE STATEMENT
pt came in c/o abdominal apin  for 30 min prior to arrival , pt is on hemodialysis with left arm a-v fistula , arm precaution  band applied

## 2021-08-21 NOTE — H&P ADULT - ASSESSMENT
85 yo Male with ESRD on HD TTS, DM type 2, HTN, Anemia of renal disease, hyperphosphatemia, Afib on A/C Eliquis Presented with diffuse abdominal pain x 1 day. Patient admitted for SBO management and abnormal CXR findings

## 2021-08-21 NOTE — PATIENT PROFILE ADULT - PUBLIC BENEFITS
[id number] to (Evaluate:[id number]); Last Rx:[id number] AS DIRECTED; Therapy: (Recorded:[id number]) [id number] to (Netta Friend)  Requested for: [id number]; AS DIRECTED; Therapy: (Recorded:[id number]) Travis Daily, [facility]; Feb 23 2016  2:22PM EST                       (Author) no

## 2021-08-21 NOTE — H&P ADULT - PROBLEM SELECTOR PLAN 3
- Patient has hyperkalemia 5.6, no EKG changes, asymptomatic  - Next HD today  - Monitor BMP, f/u repeat in the evening after HD  - Nephrology Dr Hicks consulted

## 2021-08-21 NOTE — ED PROVIDER NOTE - CLINICAL SUMMARY MEDICAL DECISION MAKING FREE TEXT BOX
Pt p/w lower ABD pain with no other symptoms and tenderness on exam. Pt very hypertensive with mild hyperK. Paged Dr Yovana Quiroz (pt's renal doc) to arrange for emergent HD. Concern for surgical path given the exam. Plan to sign out pending CT scan results and admission. Pt p/w lower ABD pain with no other symptoms and tenderness on exam. Pt very hypertensive with mild hyperK. Paged Dr Yovana Quiroz (pt's renal doc) to arrange for emergent HD. Concern for surgical path given the exam. Plan to sign out pending CT scan results and admission.    CT showing SBO, gen surg PA to come eval pt in the ED. Spoke with Dr Hicks from pt's HD center. Dr Hicks will arrange for emergent HD. Signing out to incoming doc pending surgery eval and likely admission to medicine.

## 2021-08-21 NOTE — H&P ADULT - PROBLEM SELECTOR PLAN 2
- CXR showed increased bilateral effusions and moderate left effusion and increasing pseudotumor in the minor fissure compared to June 28 of this year.  - CT abdomen non contrast showed moderate to large left pleural effusion left lower lobe atelectasis/collapse. Streaky right basilar atelectasis with trace right pleural effusion.  - F/u CT chest non contrast  - Pulm Dr Kaminski consulted

## 2021-08-21 NOTE — H&P ADULT - PROBLEM SELECTOR PLAN 4
- Patient has history of Hypertension on amlodipine, metoprolol, losartan, hydralazine and Lasix at home   - Hold po meds as patient has SBO and he is NPO with NGT  - Started on Lopressor 5 mg IVP  - May start Hydralazine if BP not controlled after HD  - Monitor BP and adjust meds as needed

## 2021-08-21 NOTE — ED PROVIDER NOTE - OBJECTIVE STATEMENT
87 yo M h/o ESRD (T,Th,Sa; last HD on Th), DM, HTN, AFib on eliquis p/w lower ABD pain that woke him from sleep with no other associated symptoms. Pt still makes urine and denies missing HD recently. Pt denies associated Bloody or Black Stools, N/V, Diarrhea/Constipation, Dysuria or other Urinary symptoms, Fever, Syncope, Chest Pain, SOB, Focal Numbness/Weakness. No other recent illness/ hospitalizations.

## 2021-08-21 NOTE — H&P ADULT - CONVERSATION DETAILS
Spoke to patient and wife regarding GOC. As per chart review, patient is DNR DNI, patient confirmed those are his wishes.

## 2021-08-21 NOTE — CONSULT NOTE ADULT - SUBJECTIVE AND OBJECTIVE BOX
PULMONARY CONSULT NOTE      BIGG MORTON  MRN-[id number]    Patient is a 86y old  Male who presents with a chief complaint of ABDOMINAL PAIN (21 Aug 2021 10:50)    HISTORY OF PRESENT ILLNESS:  On HD TTS, Presented with abdominal pain and found to have SBO.     MEDICATIONS  (STANDING):  ALBUTerol    0.083%.. 2.5 milliGRAM(s) Nebulizer every 20 minutes  chlorhexidine 2% Cloths 1 Application(s) Topical daily  heparin  Infusion.  Unit(s)/Hr (9 mL/Hr) IV Continuous <Continuous>  sodium chloride 0.9% lock flush 3 milliLiter(s) IV Push every 8 hours      MEDICATIONS  (PRN):  metoprolol tartrate Injectable 5 milliGRAM(s) IV Push every 6 hours PRN SBP>140      Allergies    No Known Allergies    Intolerances        PAST MEDICAL & SURGICAL HISTORY:  ESRD (end stage renal disease)    Diabetes    Hypertension    Hyperlipidemia    Atrial fibrillation    History of appendectomy        FAMILY HISTORY:  Family history of diabetes mellitus in sister (Sibling)        SOCIAL HISTORY  Smoking History:     REVIEW OF SYSTEMS:    CONSTITUTIONAL:  No fevers, chills, sweats    HEENT:  Eyes:  No diplopia or blurred vision. ENT:  No earache, sore throat or runny nose.    CARDIOVASCULAR:  No pressure, squeezing, tightness, or heaviness about the chest; no palpitations.    RESPIRATORY:  Per HPI    GASTROINTESTINAL:  No abdominal pain, nausea, vomiting or diarrhea.    GENITOURINARY:  No dysuria, frequency or urgency.    NEUROLOGIC:  No paresthesias, fasciculations, seizures or weakness.    PSYCHIATRIC:  No disorder of thought or mood.    Vital Signs Last 24 Hrs  T(C): 36.3 (21 Aug 2021 02:56), Max: 36.3 (21 Aug 2021 02:56)  T(F): 97.4 (21 Aug 2021 02:56), Max: 97.4 (21 Aug 2021 02:56)  HR: 71 (21 Aug 2021 07:27) (61 - 71)  BP: 221/64 (21 Aug 2021 07:27) (206/73 - 221/64)  BP(mean): --  RR: 17 (21 Aug 2021 07:27) (16 - 17)  SpO2: 97% (21 Aug 2021 07:27) (97% - 100%)  I&O's Detail      PHYSICAL EXAMINATION:    GENERAL: The patient is a well-developed, well-nourished no apparent distress.     HEENT: Head is normocephalic and atraumatic. Extraocular muscles are intact. Mucous membranes are moist.     NECK: Supple.     LUNGS: rales     HEART: Regular rate and rhythm without murmur.    ABDOMEN: Soft, nontender, and nondistended.  No hepatosplenomegaly is noted.    EXTREMITIES: Without any cyanosis, clubbing, rash, lesions or edema.    NEUROLOGIC: Grossly intact.      LABS:                        11.9   9.27  )-----------( 130      ( 21 Aug 2021 04:17 )             36.1     08-21    138  |  99  |  65<H>  ----------------------------<  145<H>  5.6<H>   |  30  |  7.99<H>    Ca    9.5      21 Aug 2021 04:17  Phos  3.9     08-21  Mg     2.6     08-21    TPro  7.8  /  Alb  3.9  /  TBili  0.7  /  DBili  x   /  AST  10  /  ALT  15  /  AlkPhos  78  08-21    PT/INR - ( 21 Aug 2021 04:17 )   PT: 13.3 sec;   INR: 1.12 ratio         PTT - ( 21 Aug 2021 04:17 )  PTT:35.2 sec      CARDIAC MARKERS ( 21 Aug 2021 04:17 )  0.028 ng/mL / x     / x     / x     / x              Lactate, Blood: 1.5 mmol/L (08-21-21 @ 04:17)        MICROBIOLOGY:    RADIOLOGY & ADDITIONAL STUDIES:    CXR:  < from: Xray Chest 1 View-PORTABLE IMMEDIATE (Xray Chest 1 View-PORTABLE IMMEDIATE .) (08.21.21 @ 09:19) >  IMPRESSION: Increasing bilateral effusions. NG tube in place.    < end of copied text >    < from: Xray Chest 1 View-PORTABLE IMMEDIATE (Xray Chest 1 View-PORTABLE IMMEDIATE .) (08.21.21 @ 09:19) >  There is a moderate left effusion and increasing pseudotumor in the minor fissure compared to June 28 of this year.    < end of copied text >    Ct scan chest:    < from: CT Abdomen and Pelvis w/ IV Cont (08.21.21 @ 06:47) >  IMPRESSION:    Small bowel obstruction, with a transition point in the left upper quadrant, and a 2nd transition point in the right lower quadrant. Closed loop obstruction or internal hernia cannot be excluded.    < end of copied text >    < from: CT Abdomen and Pelvis w/ IV Cont (08.21.21 @ 06:47) >    LOWER CHEST:Partially visualized moderate to large left pleural effusion left lower lobe atelectasis/collapse. Streaky right basilar atelectasis with trace right pleural effusion.      < end of copied text >      ekg;    echo:

## 2021-08-21 NOTE — CONSULT NOTE ADULT - ASSESSMENT
86 yr old male with PMHX of PAF, ESRD on HD, DM, HTN, Lipid d/o who presents to ER with abdominal pain, nausea and vomiting, with SBO.  1.Tele monitoring-on IV lopressor.  2.NPO.  3.IVF.  4.Surgical eval.  5.ESRD-HD as per renal.  6.PAF-heparin drip,IV lopressor.  7.HTN-IV lopressor and hydralazine.  8.PPI.

## 2021-08-22 LAB
A1C WITH ESTIMATED AVERAGE GLUCOSE RESULT: 6.1 % — HIGH (ref 4–5.6)
ANION GAP SERPL CALC-SCNC: 7 MMOL/L — SIGNIFICANT CHANGE UP (ref 5–17)
ANION GAP SERPL CALC-SCNC: 9 MMOL/L — SIGNIFICANT CHANGE UP (ref 5–17)
APTT BLD: 84 SEC — HIGH (ref 27.5–35.5)
APTT BLD: 92.1 SEC — HIGH (ref 27.5–35.5)
BUN SERPL-MCNC: 42 MG/DL — HIGH (ref 7–18)
BUN SERPL-MCNC: 49 MG/DL — HIGH (ref 7–18)
CALCIUM SERPL-MCNC: 9 MG/DL — SIGNIFICANT CHANGE UP (ref 8.4–10.5)
CALCIUM SERPL-MCNC: 9.4 MG/DL — SIGNIFICANT CHANGE UP (ref 8.4–10.5)
CHLORIDE SERPL-SCNC: 99 MMOL/L — SIGNIFICANT CHANGE UP (ref 96–108)
CHLORIDE SERPL-SCNC: 99 MMOL/L — SIGNIFICANT CHANGE UP (ref 96–108)
CHOLEST SERPL-MCNC: 155 MG/DL — SIGNIFICANT CHANGE UP
CO2 SERPL-SCNC: 29 MMOL/L — SIGNIFICANT CHANGE UP (ref 22–31)
CO2 SERPL-SCNC: 32 MMOL/L — HIGH (ref 22–31)
CREAT SERPL-MCNC: 5.9 MG/DL — HIGH (ref 0.5–1.3)
CREAT SERPL-MCNC: 6.56 MG/DL — HIGH (ref 0.5–1.3)
ESTIMATED AVERAGE GLUCOSE: 128 MG/DL — HIGH (ref 68–114)
GLUCOSE BLDC GLUCOMTR-MCNC: 117 MG/DL — HIGH (ref 70–99)
GLUCOSE BLDC GLUCOMTR-MCNC: 131 MG/DL — HIGH (ref 70–99)
GLUCOSE BLDC GLUCOMTR-MCNC: 143 MG/DL — HIGH (ref 70–99)
GLUCOSE BLDC GLUCOMTR-MCNC: 148 MG/DL — HIGH (ref 70–99)
GLUCOSE BLDC GLUCOMTR-MCNC: 155 MG/DL — HIGH (ref 70–99)
GLUCOSE SERPL-MCNC: 140 MG/DL — HIGH (ref 70–99)
GLUCOSE SERPL-MCNC: 147 MG/DL — HIGH (ref 70–99)
HCT VFR BLD CALC: 30.6 % — LOW (ref 39–50)
HDLC SERPL-MCNC: 92 MG/DL — SIGNIFICANT CHANGE UP
HGB BLD-MCNC: 10.1 G/DL — LOW (ref 13–17)
LIPID PNL WITH DIRECT LDL SERPL: 54 MG/DL — SIGNIFICANT CHANGE UP
MAGNESIUM SERPL-MCNC: 2.3 MG/DL — SIGNIFICANT CHANGE UP (ref 1.6–2.6)
MAGNESIUM SERPL-MCNC: 2.4 MG/DL — SIGNIFICANT CHANGE UP (ref 1.6–2.6)
MCHC RBC-ENTMCNC: 31.3 PG — SIGNIFICANT CHANGE UP (ref 27–34)
MCHC RBC-ENTMCNC: 33 GM/DL — SIGNIFICANT CHANGE UP (ref 32–36)
MCV RBC AUTO: 94.7 FL — SIGNIFICANT CHANGE UP (ref 80–100)
NON HDL CHOLESTEROL: 63 MG/DL — SIGNIFICANT CHANGE UP
NRBC # BLD: 0 /100 WBCS — SIGNIFICANT CHANGE UP (ref 0–0)
PHOSPHATE SERPL-MCNC: 3.9 MG/DL — SIGNIFICANT CHANGE UP (ref 2.5–4.5)
PHOSPHATE SERPL-MCNC: 4.7 MG/DL — HIGH (ref 2.5–4.5)
PLATELET # BLD AUTO: 145 K/UL — LOW (ref 150–400)
POTASSIUM SERPL-MCNC: 5 MMOL/L — SIGNIFICANT CHANGE UP (ref 3.5–5.3)
POTASSIUM SERPL-MCNC: 5.2 MMOL/L — SIGNIFICANT CHANGE UP (ref 3.5–5.3)
POTASSIUM SERPL-SCNC: 5 MMOL/L — SIGNIFICANT CHANGE UP (ref 3.5–5.3)
POTASSIUM SERPL-SCNC: 5.2 MMOL/L — SIGNIFICANT CHANGE UP (ref 3.5–5.3)
RBC # BLD: 3.23 M/UL — LOW (ref 4.2–5.8)
RBC # FLD: 13.4 % — SIGNIFICANT CHANGE UP (ref 10.3–14.5)
SODIUM SERPL-SCNC: 137 MMOL/L — SIGNIFICANT CHANGE UP (ref 135–145)
SODIUM SERPL-SCNC: 138 MMOL/L — SIGNIFICANT CHANGE UP (ref 135–145)
TRIGL SERPL-MCNC: 47 MG/DL — SIGNIFICANT CHANGE UP
TSH SERPL-MCNC: 1.06 UU/ML — SIGNIFICANT CHANGE UP (ref 0.34–4.82)
WBC # BLD: 6.78 K/UL — SIGNIFICANT CHANGE UP (ref 3.8–10.5)
WBC # FLD AUTO: 6.78 K/UL — SIGNIFICANT CHANGE UP (ref 3.8–10.5)

## 2021-08-22 PROCEDURE — 93010 ELECTROCARDIOGRAM REPORT: CPT

## 2021-08-22 PROCEDURE — 74018 RADEX ABDOMEN 1 VIEW: CPT | Mod: 26

## 2021-08-22 PROCEDURE — 99233 SBSQ HOSP IP/OBS HIGH 50: CPT

## 2021-08-22 PROCEDURE — 71250 CT THORAX DX C-: CPT | Mod: 26

## 2021-08-22 RX ADMIN — Medication 5 MILLIGRAM(S): at 23:47

## 2021-08-22 RX ADMIN — Medication 5 MILLIGRAM(S): at 00:29

## 2021-08-22 RX ADMIN — Medication 10 MILLIGRAM(S): at 22:12

## 2021-08-22 RX ADMIN — Medication 10 MILLIGRAM(S): at 06:39

## 2021-08-22 RX ADMIN — HEPARIN SODIUM 900 UNIT(S)/HR: 5000 INJECTION INTRAVENOUS; SUBCUTANEOUS at 00:27

## 2021-08-22 RX ADMIN — SODIUM CHLORIDE 3 MILLILITER(S): 9 INJECTION INTRAMUSCULAR; INTRAVENOUS; SUBCUTANEOUS at 14:36

## 2021-08-22 RX ADMIN — Medication 5 MILLIGRAM(S): at 06:38

## 2021-08-22 RX ADMIN — Medication 5 MILLIGRAM(S): at 18:00

## 2021-08-22 RX ADMIN — SODIUM CHLORIDE 3 MILLILITER(S): 9 INJECTION INTRAMUSCULAR; INTRAVENOUS; SUBCUTANEOUS at 06:28

## 2021-08-22 RX ADMIN — PANTOPRAZOLE SODIUM 40 MILLIGRAM(S): 20 TABLET, DELAYED RELEASE ORAL at 12:49

## 2021-08-22 RX ADMIN — Medication 10 MILLIGRAM(S): at 14:44

## 2021-08-22 RX ADMIN — Medication 5 MILLIGRAM(S): at 12:49

## 2021-08-22 RX ADMIN — Medication 5 MILLIGRAM(S): at 01:42

## 2021-08-22 RX ADMIN — SODIUM CHLORIDE 3 MILLILITER(S): 9 INJECTION INTRAMUSCULAR; INTRAVENOUS; SUBCUTANEOUS at 22:01

## 2021-08-22 NOTE — PHYSICAL THERAPY INITIAL EVALUATION ADULT - PLANNED THERAPY INTERVENTIONS, PT EVAL
balance training/bed mobility training/gait training/neuromuscular re-education/ROM/strengthening/transfer training

## 2021-08-22 NOTE — PHYSICAL THERAPY INITIAL EVALUATION ADULT - GAIT DISTANCE, PT EVAL
1 foot. Ambulation only to better position patient on bed in attempt to increase comfort for more accurate BP reading. With 2nd elevated BP reading, ambulation is not progressed

## 2021-08-22 NOTE — PROGRESS NOTE ADULT - SUBJECTIVE AND OBJECTIVE BOX
Pt is awake, alert, lying in bed in NAD. NPO. NGT in place.     INTERVAL HPI/OVERNIGHT EVENTS:      VITAL SIGNS:  T(F): 98.7 (08-22-21 @ 11:39)  HR: 72 (08-22-21 @ 11:39)  BP: 151/53 (08-22-21 @ 11:39)  RR: 18 (08-22-21 @ 11:39)  SpO2: 98% (08-22-21 @ 11:39)  Wt(kg): --  I&O's Detail    21 Aug 2021 07:01  -  22 Aug 2021 07:00  --------------------------------------------------------  IN:    Heparin Infusion: 36 mL  Total IN: 36 mL    OUT:    Nasogastric/Oral tube (mL): 5 mL    Oral Fluid: 0 mL  Total OUT: 5 mL    Total NET: 31 mL              REVIEW OF SYSTEMS:    CONSTITUTIONAL:  No fevers, chills, sweats    HEENT:  Eyes:  No diplopia or blurred vision. ENT:  No earache, sore throat or runny nose.    CARDIOVASCULAR:  No pressure, squeezing, tightness, or heaviness about the chest; no palpitations.    RESPIRATORY:  Per HPI    GASTROINTESTINAL:  No abdominal pain, nausea, vomiting or diarrhea.    GENITOURINARY:  No dysuria, frequency or urgency.    NEUROLOGIC:  No paresthesias, fasciculations, seizures or weakness.    PSYCHIATRIC:  No disorder of thought or mood.      PHYSICAL EXAM:    Constitutional: Well developed and nourished  Eyes:Perrla  ENMT: normal  Neck:supple  Respiratory: good air entry  Cardiovascular: S1 S2 regular  Gastrointestinal: Soft, Non tender  Extremities: No edema  Vascular:normal  Neurological:Awake, alert,Ox3  Musculoskeletal:Normal      MEDICATIONS  (STANDING):  chlorhexidine 2% Cloths 1 Application(s) Topical daily  heparin  Infusion.  Unit(s)/Hr (9 mL/Hr) IV Continuous <Continuous>  hydrALAZINE Injectable 10 milliGRAM(s) IV Push every 8 hours  insulin lispro (ADMELOG) corrective regimen sliding scale   SubCutaneous every 6 hours  metoprolol tartrate Injectable 5 milliGRAM(s) IV Push every 6 hours  pantoprazole  Injectable 40 milliGRAM(s) IV Push daily  sodium chloride 0.9% lock flush 3 milliLiter(s) IV Push every 8 hours    MEDICATIONS  (PRN):      Allergies    No Known Allergies    Intolerances        LABS:                        10.1   6.78  )-----------( 145      ( 22 Aug 2021 07:31 )             30.6     08-22    137  |  99  |  49<H>  ----------------------------<  140<H>  5.2   |  29  |  6.56<H>    Ca    9.0      22 Aug 2021 07:31  Phos  4.7     08-22  Mg     2.4     08-22    TPro  7.8  /  Alb  3.9  /  TBili  0.7  /  DBili  x   /  AST  10  /  ALT  15  /  AlkPhos  78  08-21    PT/INR - ( 21 Aug 2021 04:17 )   PT: 13.3 sec;   INR: 1.12 ratio         PTT - ( 22 Aug 2021 09:17 )  PTT:92.1 sec      CARDIAC MARKERS ( 21 Aug 2021 04:17 )  0.028 ng/mL / x     / x     / x     / x          CAPILLARY BLOOD GLUCOSE      POCT Blood Glucose.: 148 mg/dL (22 Aug 2021 07:45)  POCT Blood Glucose.: 155 mg/dL (22 Aug 2021 04:54)  POCT Blood Glucose.: 143 mg/dL (22 Aug 2021 00:01)  POCT Blood Glucose.: 137 mg/dL (21 Aug 2021 22:27)  POCT Blood Glucose.: 136 mg/dL (21 Aug 2021 16:26)        RADIOLOGY & ADDITIONAL TESTS:    CXR:  IMPRESSION: Increasing bilateral effusions. NG tube in place.    Ct scan chest:    ekg;    echo:

## 2021-08-22 NOTE — PROGRESS NOTE ADULT - ASSESSMENT
86y.o. Male with improving SBO    -Awaiting flatus  -Keep NGT to LWS  -OOB ambulate  -Minimal pain control prn for abd pain

## 2021-08-22 NOTE — PROGRESS NOTE ADULT - ASSESSMENT
1. SBO   CT noted  NGT / NPO   Surgical F/U   Zofran PRN for nausea  Pain control.     2. Pleural Effusion  On HD; continue TTS   Repeat CXR  O2 Supp PRN   Check Echo     3. Atelectasis  Bronchodilators  O2 Supp  Incentive Spirometry    4. Abnormal CXR   XR notes increasing pseudotumor in minor fissure compared to 6/2021.  Obtain CT chest without contrast to further eval.     5. ESRD   Avoid nephrotoxic drugs  HD - TTS  Renal F/U   Monitor labs     6. Afib   A/C   Cardio consult     7. DM   Accuchecks ac/hs  Insulin coverage per HSS   A1C level

## 2021-08-22 NOTE — PROGRESS NOTE ADULT - ASSESSMENT
86y Male with history of ESRD on HD presents with abdominal pain. Nephrology consulted for ESRD status.    1) ESRD: Last HD on 8/21 terminated after 2.5 hours with 1.3L removed due to bradycardia. Plan for next maintenance HD on 8/24. Monitor electrolytes.    2) HTN with ESRD: Continue with IV hydralazine 10 mg IV Q6 hours however caution with metoprolol given bradycardia during HD. Increase UF with HD given pleural effusions on CT chest. Monitor BP.    3) Anemia of renal disease: Hb acceptable. Will start Epo with subsequent HD. Monitor Hb.    4) Hyperphosphatemia: Phosphorus acceptable. Hold phoslo 2 tabs with meals as patient NPO with NGT in place. Monitor serum calcium and phosphorus.    5) Hyperkalemia: Resolved with HD. Renal diet once diet started. Monitor serum K.      [facility] NEPHROLOGY  Nahum Grubbs M.D.  Francisco Javier Hicks D.O.  Yovana Quiroz M.D.  Makeda Duron, THOMAS, ANP-C    Telephone: [phone]  Facsimile: [phone]    [address] [id number]

## 2021-08-22 NOTE — PROGRESS NOTE ADULT - SUBJECTIVE AND OBJECTIVE BOX
CHIEF COMPLAINT:Patient is a 86y old  Male who presents with a chief complaint of ABDOMINAL PAIN .Pt feels better this am.    	  REVIEW OF SYSTEMS:  CONSTITUTIONAL: No fever, weight loss, or fatigue  EYES: No eye pain, visual disturbances, or discharge  ENT:  No difficulty hearing, tinnitus, vertigo; No sinus or throat pain  NECK: No pain or stiffness  RESPIRATORY: No cough, wheezing, chills or hemoptysis; No Shortness of Breath  CARDIOVASCULAR: No chest pain, palpitations, passing out, dizziness, or leg swelling  GASTROINTESTINAL: No abdominal or epigastric pain. No nausea, vomiting, or hematemesis; No diarrhea or constipation. No melena or hematochezia.  GENITOURINARY: No dysuria, frequency, hematuria, or incontinence  NEUROLOGICAL: No headaches, memory loss, loss of strength, numbness, or tremors  SKIN: No itching, burning, rashes, or lesions   LYMPH Nodes: No enlarged glands  ENDOCRINE: No heat or cold intolerance; No hair loss  MUSCULOSKELETAL: No joint pain or swelling; No muscle, back, or extremity pain  PSYCHIATRIC: No depression, anxiety, mood swings, or difficulty sleeping  HEME/LYMPH: No easy bruising, or bleeding gums  ALLERGY AND IMMUNOLOGIC: No hives or eczema	      PHYSICAL EXAM:  T(C): 37.4 (08-22-21 @ 08:00), Max: 37.4 (08-22-21 @ 08:00)  HR: 65 (08-22-21 @ 08:00) (64 - 69)  BP: 147/41 (08-22-21 @ 08:00) (136/47 - 194/44)  RR: 18 (08-22-21 @ 08:00) (18 - 21)  SpO2: 97% (08-22-21 @ 08:00) (97% - 100%)  Wt(kg): --  I&O's Summary    21 Aug 2021 07:01  -  22 Aug 2021 07:00  --------------------------------------------------------  IN: 36 mL / OUT: 5 mL / NET: 31 mL        Appearance: Normal	  HEENT:   Normal oral mucosa, PERRL, EOMI	  Lymphatic: No lymphadenopathy  Cardiovascular: Normal S1 S2, No JVD, No murmurs, No edema  Respiratory: Lungs clear to auscultation	  Psychiatry: A & O x 3, Mood & affect appropriate  Gastrointestinal:  Soft, Non-tender, + BS	  Skin: No rashes, No ecchymoses, No cyanosis	  Neurologic: Non-focal  Extremities: Normal range of motion, No clubbing, cyanosis or edema  Vascular: Peripheral pulses palpable 2+ bilaterally    MEDICATIONS  (STANDING):  chlorhexidine 2% Cloths 1 Application(s) Topical daily  heparin  Infusion.  Unit(s)/Hr (9 mL/Hr) IV Continuous <Continuous>  hydrALAZINE Injectable 10 milliGRAM(s) IV Push every 8 hours  insulin lispro (ADMELOG) corrective regimen sliding scale   SubCutaneous every 6 hours  metoprolol tartrate Injectable 5 milliGRAM(s) IV Push every 6 hours  pantoprazole  Injectable 40 milliGRAM(s) IV Push daily  sodium chloride 0.9% lock flush 3 milliLiter(s) IV Push every 8 hours      TELEMETRY: nsr	      LABS:	 	      CARDIAC MARKERS ( 21 Aug 2021 04:17 )  0.028 ng/mL / x     / x     / x     / x                                    10.1   6.78  )-----------( 145      ( 22 Aug 2021 07:31 )             30.6     08-22    137  |  99  |  49<H>  ----------------------------<  140<H>  5.2   |  29  |  6.56<H>    Ca    9.0      22 Aug 2021 07:31  Phos  4.7     08-22  Mg     2.4     08-22    TPro  7.8  /  Alb  3.9  /  TBili  0.7  /  DBili  x   /  AST  10  /  ALT  15  /  AlkPhos  78  08-21    proBNP:   Lipid Profile: Cholesterol 155  LDL --  HDL 92  TG 47    TSH: Thyroid Stimulating Hormone, Serum: 1.06 uU/mL (08-22 @ 07:31)    PT/INR - ( 21 Aug 2021 04:17 )   PT: 13.3 sec;   INR: 1.12 ratio         PTT - ( 22 Aug 2021 09:17 )  PTT:92.1 sec

## 2021-08-22 NOTE — PROGRESS NOTE ADULT - SUBJECTIVE AND OBJECTIVE BOX
Patient is a 86y old  Male who presents with a chief complaint of ABDOMINAL PAIN (21 Aug 2021 12:28)    pt seen in icu [  ], reg med floor [   ], bed [  ], chair at bedside [   ], a+o x3 [  ], lethargic [  ],  nad [  ]    rea [  ], ngt [  ], peg [  ], et tube [  ], cent line [  ], picc line [  ]        Allergies    No Known Allergies        Vitals    T(F): 98.3 (08-22-21 @ 04:47), Max: 98.8 (08-21-21 @ 23:16)  HR: 67 (08-22-21 @ 04:47) (64 - 71)  BP: 152/56 (08-22-21 @ 04:47) (136/47 - 221/64)  RR: 19 (08-22-21 @ 04:47) (17 - 21)  SpO2: 97% (08-22-21 @ 04:47) (97% - 100%)  Wt(kg): --  CAPILLARY BLOOD GLUCOSE      POCT Blood Glucose.: 155 mg/dL (22 Aug 2021 04:54)      Labs                          11.5   7.51  )-----------( 137      ( 21 Aug 2021 17:46 )             34.7       08-21    138  |  99  |  42<H>  ----------------------------<  147<H>  5.0   |  32<H>  |  5.90<H>    Ca    9.4      21 Aug 2021 23:58  Phos  3.9     08-21  Mg     2.3     08-21    TPro  7.8  /  Alb  3.9  /  TBili  0.7  /  DBili  x   /  AST  10  /  ALT  15  /  AlkPhos  78  08-21      CARDIAC MARKERS ( 21 Aug 2021 04:17 )  0.028 ng/mL / x     / x     / x     / x            .Body Fluid Pleural Fluid  06-28 @ 22:11   No growth at 5 days  --    polymorphonuclear leukocytes seen  No organisms seen  by cytocentrifuge      .Blood Blood  06-22 @ 18:22   No Growth Final  --  --          Radiology Results      Meds    MEDICATIONS  (STANDING):  ALBUTerol    0.083%.. 2.5 milliGRAM(s) Nebulizer every 20 minutes  chlorhexidine 2% Cloths 1 Application(s) Topical daily  heparin  Infusion.  Unit(s)/Hr (9 mL/Hr) IV Continuous <Continuous>  hydrALAZINE Injectable 5 milliGRAM(s) IV Push every 8 hours  hydrALAZINE Injectable 10 milliGRAM(s) IV Push every 8 hours  insulin lispro (ADMELOG) corrective regimen sliding scale   SubCutaneous every 6 hours  metoprolol tartrate Injectable 5 milliGRAM(s) IV Push every 6 hours  pantoprazole  Injectable 40 milliGRAM(s) IV Push daily  sodium chloride 0.9% lock flush 3 milliLiter(s) IV Push every 8 hours      MEDICATIONS  (PRN):      Physical Exam    Neuro :  no focal deficits  Respiratory: CTA B/L  CV: RRR, S1S2, no murmurs,   Abdominal: Soft, NT, ND +BS,  Extremities: No edema, + peripheral pulses    ASSESSMENT    Intestinal obstruction    Yes    No pertinent past medical history    ESRD (end stage renal disease)    Diabetes    Hypertension    Hyperlipidemia    Atrial fibrillation    No significant past surgical history    S/P hemodialysis catheter insertion    History of appendectomy        PLAN     Patient is a 86y old  Male who presents with a chief complaint of ABDOMINAL PAIN (21 Aug 2021 12:28)    pt seen in tele [ x ], reg med floor [   ], bed [x  ], chair at bedside [   ], awake and responsive [ x ], lethargic [  ],  nad [x  ]        Allergies    No Known Allergies        Vitals    T(F): 98.3 (08-22-21 @ 04:47), Max: 98.8 (08-21-21 @ 23:16)  HR: 67 (08-22-21 @ 04:47) (64 - 71)  BP: 152/56 (08-22-21 @ 04:47) (136/47 - 221/64)  RR: 19 (08-22-21 @ 04:47) (17 - 21)  SpO2: 97% (08-22-21 @ 04:47) (97% - 100%)  Wt(kg): --  CAPILLARY BLOOD GLUCOSE      POCT Blood Glucose.: 155 mg/dL (22 Aug 2021 04:54)      Labs                          11.5   7.51  )-----------( 137      ( 21 Aug 2021 17:46 )             34.7       08-21    138  |  99  |  42<H>  ----------------------------<  147<H>  5.0   |  32<H>  |  5.90<H>    Ca    9.4      21 Aug 2021 23:58  Phos  3.9     08-21  Mg     2.3     08-21    TPro  7.8  /  Alb  3.9  /  TBili  0.7  /  DBili  x   /  AST  10  /  ALT  15  /  AlkPhos  78  08-21      CARDIAC MARKERS ( 21 Aug 2021 04:17 )  0.028 ng/mL / x     / x     / x     / x          Radiology Results      Meds    MEDICATIONS  (STANDING):  ALBUTerol    0.083%.. 2.5 milliGRAM(s) Nebulizer every 20 minutes  chlorhexidine 2% Cloths 1 Application(s) Topical daily  heparin  Infusion.  Unit(s)/Hr (9 mL/Hr) IV Continuous <Continuous>  hydrALAZINE Injectable 5 milliGRAM(s) IV Push every 8 hours  hydrALAZINE Injectable 10 milliGRAM(s) IV Push every 8 hours  insulin lispro (ADMELOG) corrective regimen sliding scale   SubCutaneous every 6 hours  metoprolol tartrate Injectable 5 milliGRAM(s) IV Push every 6 hours  pantoprazole  Injectable 40 milliGRAM(s) IV Push daily  sodium chloride 0.9% lock flush 3 milliLiter(s) IV Push every 8 hours      MEDICATIONS  (PRN):      Physical Exam    Neuro :  no focal deficits  Respiratory: CTA B/L  CV: RRR, S1S2, no murmurs,   Abdominal: Soft, NT, ND +BS,   Extremities: No edema, + peripheral pulses      ASSESSMENT    Intestinal obstruction  sbo,  b/l pleural eff,   h/o ESRD on HD TTS,   DM type 2,   HTN,   Anemia of renal disease,   hyperphosphatemia,   Afib on A/C Eliquis  Hyperlipidemia        PLAN    cont npo,   cont ngt to lws,   surg f/u  f/u abd xray  hold eliquis,   cont  hep gtt as per hospital normogram,   cardio cons   cont lopressor and hydralazine iv   trop x1 noted above    renal f/u  pt on hd tu,th,sa  hd as per renal   s/p hd yesterday  potassium wnl  pulm cons.  cont current meds

## 2021-08-22 NOTE — PROGRESS NOTE ADULT - ASSESSMENT
86 yr old male with PMHX of PAF, ESRD on HD, DM, HTN, Lipid d/o who presents to ER with abdominal pain, nausea and vomiting, with SBO.  1.Tele monitoring-on IV lopressor.  2.NPO.  3.IVF.  4.Surgical f/u.  5.ESRD-HD as per renal.  6.PAF-heparin drip,IV lopressor.  7.HTN-IV lopressor and hydralazine.  8.PPI.

## 2021-08-22 NOTE — PROGRESS NOTE ADULT - SUBJECTIVE AND OBJECTIVE BOX
INTERVAL HPI/OVERNIGHT EVENTS:  Pt resting comfortably. No acute complaints.   NGT to LWS.  -flatus/BM.   Denies N/V, abd pain.    MEDICATIONS  (STANDING):  chlorhexidine 2% Cloths 1 Application(s) Topical daily  heparin  Infusion.  Unit(s)/Hr (9 mL/Hr) IV Continuous <Continuous>  hydrALAZINE Injectable 10 milliGRAM(s) IV Push every 8 hours  insulin lispro (ADMELOG) corrective regimen sliding scale   SubCutaneous every 6 hours  metoprolol tartrate Injectable 5 milliGRAM(s) IV Push every 6 hours  pantoprazole  Injectable 40 milliGRAM(s) IV Push daily  sodium chloride 0.9% lock flush 3 milliLiter(s) IV Push every 8 hours    Vital Signs Last 24 Hrs  T(C): 37.1 (22 Aug 2021 11:39), Max: 37.4 (22 Aug 2021 08:00)  T(F): 98.7 (22 Aug 2021 11:39), Max: 99.4 (22 Aug 2021 08:00)  HR: 72 (22 Aug 2021 11:39) (64 - 72)  BP: 151/53 (22 Aug 2021 11:39) (136/47 - 194/44)  BP(mean): --  RR: 18 (22 Aug 2021 11:39) (18 - 21)  SpO2: 98% (22 Aug 2021 11:39) (97% - 100%)    Physical:  General: A&Ox3. NAD.  Abdomen: Soft nondistended, nontender.    I&O's Detail    21 Aug 2021 07:01  -  22 Aug 2021 07:00  --------------------------------------------------------  IN:    Heparin Infusion: 36 mL  Total IN: 36 mL    OUT:    Nasogastric/Oral tube (mL): 5 mL    Oral Fluid: 0 mL  Total OUT: 5 mL    Total NET: 31 mL    LABS:                        10.1   6.78  )-----------( 145      ( 22 Aug 2021 07:31 )             30.6             08-22    137  |  99  |  49<H>  ----------------------------<  140<H>  5.2   |  29  |  6.56<H>    Ca    9.0      22 Aug 2021 07:31  Phos  4.7     08-22  Mg     2.4     08-22    TPro  7.8  /  Alb  3.9  /  TBili  0.7  /  DBili  x   /  AST  10  /  ALT  15  /  AlkPhos  78  08-21

## 2021-08-23 LAB
ANION GAP SERPL CALC-SCNC: 13 MMOL/L — SIGNIFICANT CHANGE UP (ref 5–17)
APTT BLD: 51.4 SEC — HIGH (ref 27.5–35.5)
BUN SERPL-MCNC: 68 MG/DL — HIGH (ref 7–18)
CALCIUM SERPL-MCNC: 8.7 MG/DL — SIGNIFICANT CHANGE UP (ref 8.4–10.5)
CHLORIDE SERPL-SCNC: 98 MMOL/L — SIGNIFICANT CHANGE UP (ref 96–108)
CO2 SERPL-SCNC: 24 MMOL/L — SIGNIFICANT CHANGE UP (ref 22–31)
CREAT SERPL-MCNC: 8.63 MG/DL — HIGH (ref 0.5–1.3)
CULTURE RESULTS: SIGNIFICANT CHANGE UP
CULTURE RESULTS: SIGNIFICANT CHANGE UP
GLUCOSE BLDC GLUCOMTR-MCNC: 129 MG/DL — HIGH (ref 70–99)
GLUCOSE BLDC GLUCOMTR-MCNC: 135 MG/DL — HIGH (ref 70–99)
GLUCOSE BLDC GLUCOMTR-MCNC: 136 MG/DL — HIGH (ref 70–99)
GLUCOSE BLDC GLUCOMTR-MCNC: 172 MG/DL — HIGH (ref 70–99)
GLUCOSE BLDC GLUCOMTR-MCNC: 194 MG/DL — HIGH (ref 70–99)
GLUCOSE SERPL-MCNC: 128 MG/DL — HIGH (ref 70–99)
HCT VFR BLD CALC: 30.1 % — LOW (ref 39–50)
HGB BLD-MCNC: 9.9 G/DL — LOW (ref 13–17)
MAGNESIUM SERPL-MCNC: 2.5 MG/DL — SIGNIFICANT CHANGE UP (ref 1.6–2.6)
MCHC RBC-ENTMCNC: 31 PG — SIGNIFICANT CHANGE UP (ref 27–34)
MCHC RBC-ENTMCNC: 32.9 GM/DL — SIGNIFICANT CHANGE UP (ref 32–36)
MCV RBC AUTO: 94.4 FL — SIGNIFICANT CHANGE UP (ref 80–100)
NRBC # BLD: 0 /100 WBCS — SIGNIFICANT CHANGE UP (ref 0–0)
PHOSPHATE SERPL-MCNC: 6.3 MG/DL — HIGH (ref 2.5–4.5)
PLATELET # BLD AUTO: 142 K/UL — LOW (ref 150–400)
POTASSIUM SERPL-MCNC: 5.5 MMOL/L — HIGH (ref 3.5–5.3)
POTASSIUM SERPL-SCNC: 5.5 MMOL/L — HIGH (ref 3.5–5.3)
RBC # BLD: 3.19 M/UL — LOW (ref 4.2–5.8)
RBC # FLD: 13.2 % — SIGNIFICANT CHANGE UP (ref 10.3–14.5)
SODIUM SERPL-SCNC: 135 MMOL/L — SIGNIFICANT CHANGE UP (ref 135–145)
SPECIMEN SOURCE: SIGNIFICANT CHANGE UP
WBC # BLD: 8.59 K/UL — SIGNIFICANT CHANGE UP (ref 3.8–10.5)
WBC # FLD AUTO: 8.59 K/UL — SIGNIFICANT CHANGE UP (ref 3.8–10.5)

## 2021-08-23 PROCEDURE — 74019 RADEX ABDOMEN 2 VIEWS: CPT | Mod: 26

## 2021-08-23 PROCEDURE — 99232 SBSQ HOSP IP/OBS MODERATE 35: CPT

## 2021-08-23 RX ORDER — HYDRALAZINE HCL 50 MG
5 TABLET ORAL ONCE
Refills: 0 | Status: DISCONTINUED | OUTPATIENT
Start: 2021-08-23 | End: 2021-08-23

## 2021-08-23 RX ORDER — PANTOPRAZOLE SODIUM 20 MG/1
40 TABLET, DELAYED RELEASE ORAL
Refills: 0 | Status: DISCONTINUED | OUTPATIENT
Start: 2021-08-23 | End: 2021-08-27

## 2021-08-23 RX ORDER — HYDRALAZINE HCL 50 MG
10 TABLET ORAL ONCE
Refills: 0 | Status: COMPLETED | OUTPATIENT
Start: 2021-08-23 | End: 2021-08-23

## 2021-08-23 RX ORDER — SODIUM ZIRCONIUM CYCLOSILICATE 10 G/10G
5 POWDER, FOR SUSPENSION ORAL ONCE
Refills: 0 | Status: COMPLETED | OUTPATIENT
Start: 2021-08-23 | End: 2021-08-23

## 2021-08-23 RX ORDER — METOPROLOL TARTRATE 50 MG
25 TABLET ORAL
Refills: 0 | Status: DISCONTINUED | OUTPATIENT
Start: 2021-08-23 | End: 2021-08-25

## 2021-08-23 RX ORDER — HYDRALAZINE HCL 50 MG
100 TABLET ORAL EVERY 8 HOURS
Refills: 0 | Status: DISCONTINUED | OUTPATIENT
Start: 2021-08-23 | End: 2021-08-27

## 2021-08-23 RX ORDER — APIXABAN 2.5 MG/1
2.5 TABLET, FILM COATED ORAL
Refills: 0 | Status: DISCONTINUED | OUTPATIENT
Start: 2021-08-23 | End: 2021-08-25

## 2021-08-23 RX ORDER — ERYTHROPOIETIN 10000 [IU]/ML
4000 INJECTION, SOLUTION INTRAVENOUS; SUBCUTANEOUS
Refills: 0 | Status: DISCONTINUED | OUTPATIENT
Start: 2021-08-23 | End: 2021-08-27

## 2021-08-23 RX ADMIN — APIXABAN 2.5 MILLIGRAM(S): 2.5 TABLET, FILM COATED ORAL at 13:09

## 2021-08-23 RX ADMIN — SODIUM CHLORIDE 3 MILLILITER(S): 9 INJECTION INTRAMUSCULAR; INTRAVENOUS; SUBCUTANEOUS at 12:15

## 2021-08-23 RX ADMIN — Medication 1: at 17:12

## 2021-08-23 RX ADMIN — SODIUM ZIRCONIUM CYCLOSILICATE 5 GRAM(S): 10 POWDER, FOR SUSPENSION ORAL at 15:06

## 2021-08-23 RX ADMIN — PANTOPRAZOLE SODIUM 40 MILLIGRAM(S): 20 TABLET, DELAYED RELEASE ORAL at 12:01

## 2021-08-23 RX ADMIN — HEPARIN SODIUM 1000 UNIT(S)/HR: 5000 INJECTION INTRAVENOUS; SUBCUTANEOUS at 07:24

## 2021-08-23 RX ADMIN — SODIUM CHLORIDE 3 MILLILITER(S): 9 INJECTION INTRAMUSCULAR; INTRAVENOUS; SUBCUTANEOUS at 21:25

## 2021-08-23 RX ADMIN — SODIUM CHLORIDE 3 MILLILITER(S): 9 INJECTION INTRAMUSCULAR; INTRAVENOUS; SUBCUTANEOUS at 05:03

## 2021-08-23 RX ADMIN — Medication 10 MILLIGRAM(S): at 05:03

## 2021-08-23 RX ADMIN — Medication 25 MILLIGRAM(S): at 17:12

## 2021-08-23 RX ADMIN — Medication 100 MILLIGRAM(S): at 15:07

## 2021-08-23 RX ADMIN — Medication 10 MILLIGRAM(S): at 08:02

## 2021-08-23 RX ADMIN — Medication 5 MILLIGRAM(S): at 05:01

## 2021-08-23 RX ADMIN — Medication 100 MILLIGRAM(S): at 21:27

## 2021-08-23 RX ADMIN — CHLORHEXIDINE GLUCONATE 1 APPLICATION(S): 213 SOLUTION TOPICAL at 12:02

## 2021-08-23 NOTE — PROGRESS NOTE ADULT - SUBJECTIVE AND OBJECTIVE BOX
CHIEF COMPLAINT:Patient is a 86y old  Male who presents with a chief complaint of ABDOMINAL PAIN.Pt appears comfortable.    	  REVIEW OF SYSTEMS:  CONSTITUTIONAL: No fever, weight loss, or fatigue  EYES: No eye pain, visual disturbances, or discharge  ENT:  No difficulty hearing, tinnitus, vertigo; No sinus or throat pain  NECK: No pain or stiffness  RESPIRATORY: No cough, wheezing, chills or hemoptysis; No Shortness of Breath  CARDIOVASCULAR: No chest pain, palpitations, passing out, dizziness, or leg swelling  GASTROINTESTINAL: No abdominal or epigastric pain. No nausea, vomiting, or hematemesis; No diarrhea or constipation. No melena or hematochezia.  GENITOURINARY: No dysuria, frequency, hematuria, or incontinence  NEUROLOGICAL: No headaches, memory loss, loss of strength, numbness, or tremors  SKIN: No itching, burning, rashes, or lesions   LYMPH Nodes: No enlarged glands  ENDOCRINE: No heat or cold intolerance; No hair loss  MUSCULOSKELETAL: No joint pain or swelling; No muscle, back, or extremity pain  PSYCHIATRIC: No depression, anxiety, mood swings, or difficulty sleeping  HEME/LYMPH: No easy bruising, or bleeding gums  ALLERGY AND IMMUNOLOGIC: No hives or eczema	      PHYSICAL EXAM:  T(C): 36.7 (08-23-21 @ 08:41), Max: 37.1 (08-22-21 @ 11:39)  HR: 72 (08-23-21 @ 08:41) (70 - 80)  BP: 166/41 (08-23-21 @ 08:41) (151/53 - 211/67)  RR: 18 (08-23-21 @ 08:41) (18 - 18)  SpO2: 98% (08-23-21 @ 08:41) (98% - 100%)  Wt(kg): --  I&O's Summary    22 Aug 2021 07:01  -  23 Aug 2021 07:00  --------------------------------------------------------  IN: 217 mL / OUT: 300 mL / NET: -83 mL        Appearance: Normal	  HEENT:   Normal oral mucosa, PERRL, EOMI	  Lymphatic: No lymphadenopathy  Cardiovascular: Normal S1 S2, No JVD, No murmurs, No edema  Respiratory: Lungs clear to auscultation	  Psychiatry: A & O x 3, Mood & affect appropriate  Gastrointestinal:  Soft, Non-tender, + BS	  Skin: No rashes, No ecchymoses, No cyanosis	  Neurologic: Non-focal  Extremities: Normal range of motion, No clubbing, cyanosis or edema  Vascular: Peripheral pulses palpable 2+ bilaterally    MEDICATIONS  (STANDING):  apixaban 2.5 milliGRAM(s) Oral two times a day  chlorhexidine 2% Cloths 1 Application(s) Topical daily  hydrALAZINE Injectable 10 milliGRAM(s) IV Push every 8 hours  hydrALAZINE Injectable 5 milliGRAM(s) IV Push once  insulin lispro (ADMELOG) corrective regimen sliding scale   SubCutaneous every 6 hours  metoprolol tartrate Injectable 5 milliGRAM(s) IV Push every 6 hours  pantoprazole  Injectable 40 milliGRAM(s) IV Push daily  sodium chloride 0.9% lock flush 3 milliLiter(s) IV Push every 8 hours        	  LABS:	 	                        9.9    8.59  )-----------( 142      ( 23 Aug 2021 06:47 )             30.1     08-23    135  |  98  |  68<H>  ----------------------------<  128<H>  5.5<H>   |  24  |  8.63<H>    Ca    8.7      23 Aug 2021 06:47  Phos  6.3     08-23  Mg     2.5     08-23        Lipid Profile: Cholesterol 155  LDL --  HDL 92  TG 47    HgA1c:   TSH: Thyroid Stimulating Hormone, Serum: 1.06 uU/mL (08-22 @ 07:31)

## 2021-08-23 NOTE — PROGRESS NOTE ADULT - ASSESSMENT
1. SBO   CT noted  Clear liquid diet; NGT removed  Surgical F/U   Zofran PRN for nausea  Pain control.     2. Pleural Effusion  On HD; continue TTS   CT chest noted - effusion large (left) and trace (right) as well as ascites.   For HD tomorrow - with nephro adjustments.   Repeat CXR after HD - may need to consider thoracentesis if no improvement (discussed with Dr. Aviles).   O2 Supp PRN     3. Atelectasis  Bronchodilators  O2 Supp  Incentive Spirometry    4. Abnormal CXR   XR notes increasing pseudotumor in minor fissure compared to 6/2021.  CT chest noted; as above.     5. ESRD   Avoid nephrotoxic drugs  Correct electrolyte imbalance.   Lokelma PRN   HD - TTS  Renal F/U   Monitor labs     6. Afib   A/C   Cardio consult     7. DM   Accuchecks ac/hs  Insulin coverage per HSS   A1C level

## 2021-08-23 NOTE — PROGRESS NOTE ADULT - SUBJECTIVE AND OBJECTIVE BOX
Pt is awake, alert, lying in bed in NAD. NGT d/linda - to start CLD. K+ 5.5 today - [facility] for hyperkalemia      INTERVAL HPI/OVERNIGHT EVENTS:      VITAL SIGNS:  T(F): 98.1 (08-23-21 @ 08:41)  HR: 72 (08-23-21 @ 08:41)  BP: 166/41 (08-23-21 @ 08:41)  RR: 18 (08-23-21 @ 08:41)  SpO2: 98% (08-23-21 @ 08:41)  Wt(kg): --  I&O's Detail    22 Aug 2021 07:01  -  23 Aug 2021 07:00  --------------------------------------------------------  IN:    Heparin Infusion: 217 mL  Total IN: 217 mL    OUT:    Oral Fluid: 0 mL    Voided (mL): 300 mL  Total OUT: 300 mL    Total NET: -83 mL              REVIEW OF SYSTEMS:    CONSTITUTIONAL:  No fevers, chills, sweats    HEENT:  Eyes:  No diplopia or blurred vision. ENT:  No earache, sore throat or runny nose.    CARDIOVASCULAR:  No pressure, squeezing, tightness, or heaviness about the chest; no palpitations.    RESPIRATORY:  Per HPI    GASTROINTESTINAL:  No abdominal pain, nausea, vomiting or diarrhea.    GENITOURINARY:  No dysuria, frequency or urgency.    NEUROLOGIC:  No paresthesias, fasciculations, seizures or weakness.    PSYCHIATRIC:  No disorder of thought or mood.      PHYSICAL EXAM:    Constitutional: Well developed and nourished  Eyes:Perrla  ENMT: normal  Neck:supple  Respiratory: rales  Cardiovascular: S1 S2 regular  Gastrointestinal: Soft, Non tender  Extremities: No edema  Vascular:normal  Neurological:Awake, alert   Musculoskeletal:Normal      MEDICATIONS  (STANDING):  apixaban 2.5 milliGRAM(s) Oral two times a day  chlorhexidine 2% Cloths 1 Application(s) Topical daily  hydrALAZINE 100 milliGRAM(s) Oral every 8 hours  insulin lispro (ADMELOG) corrective regimen sliding scale   SubCutaneous every 6 hours  metoprolol tartrate 25 milliGRAM(s) Oral two times a day  pantoprazole  Injectable 40 milliGRAM(s) IV Push daily  sodium chloride 0.9% lock flush 3 milliLiter(s) IV Push every 8 hours  sodium zirconium cyclosilicate 5 Gram(s) Oral once    MEDICATIONS  (PRN):      Allergies    No Known Allergies    Intolerances        LABS:                        9.9    8.59  )-----------( 142      ( 23 Aug 2021 06:47 )             30.1     08-23    135  |  98  |  68<H>  ----------------------------<  128<H>  5.5<H>   |  24  |  8.63<H>    Ca    8.7      23 Aug 2021 06:47  Phos  6.3     08-23  Mg     2.5     08-23      PTT - ( 23 Aug 2021 06:47 )  PTT:51.4 sec          CAPILLARY BLOOD GLUCOSE      POCT Blood Glucose.: 136 mg/dL (23 Aug 2021 07:45)  POCT Blood Glucose.: 135 mg/dL (23 Aug 2021 05:35)  POCT Blood Glucose.: 129 mg/dL (23 Aug 2021 00:01)  POCT Blood Glucose.: 131 mg/dL (22 Aug 2021 17:52)  POCT Blood Glucose.: 117 mg/dL (22 Aug 2021 11:55)        RADIOLOGY & ADDITIONAL TESTS:    CXR:    Ct scan chest:  Large left and trace right pleural effusions not significantly changed changed from 6/22/2021. Atelectasis of basilar left lower lobe, unchanged. Interlobular septal thickening and patchy groundglass opacities representing pulmonary edema.    ekg;    echo:

## 2021-08-23 NOTE — PROGRESS NOTE ADULT - SUBJECTIVE AND OBJECTIVE BOX
INTERVAL HPI/OVERNIGHT EVENTS:  Pt resting comfortably. No acute complaints.   NGT to LWS.  + flatus NO BM  Denies N/V, abd pain.    MEDICATIONS  (STANDING):  chlorhexidine 2% Cloths 1 Application(s) Topical daily  heparin  Infusion.  Unit(s)/Hr (9 mL/Hr) IV Continuous <Continuous>  hydrALAZINE Injectable 10 milliGRAM(s) IV Push every 8 hours  hydrALAZINE Injectable 5 milliGRAM(s) IV Push once  insulin lispro (ADMELOG) corrective regimen sliding scale   SubCutaneous every 6 hours  metoprolol tartrate Injectable 5 milliGRAM(s) IV Push every 6 hours  pantoprazole  Injectable 40 milliGRAM(s) IV Push daily  sodium chloride 0.9% lock flush 3 milliLiter(s) IV Push every 8 hours        ICU Vital Signs Last 24 Hrs  T(C): 36.7 (23 Aug 2021 08:41), Max: 37.1 (22 Aug 2021 11:39)  T(F): 98.1 (23 Aug 2021 08:41), Max: 98.7 (22 Aug 2021 11:39)  HR: 72 (23 Aug 2021 08:41) (70 - 80)  BP: 166/41 (23 Aug 2021 08:41) (151/53 - 211/67)  BP(mean): 92 (22 Aug 2021 14:15) (91 - 92)  ABP: --  ABP(mean): --  RR: 18 (23 Aug 2021 08:41) (18 - 18)  SpO2: 98% (23 Aug 2021 08:41) (98% - 100%)      Physical:  General: A&Ox3. NAD.  Abdomen: Soft, flat,  nondistended, nontender.    I&O's Detail    22 Aug 2021 07:01  -  23 Aug 2021 07:00  --------------------------------------------------------  IN:    Heparin Infusion: 217 mL  Total IN: 217 mL    OUT:    Oral Fluid: 0 mL    Voided (mL): 300 mL  Total OUT: 300 mL    Total NET: -83 mL        LABS:                          9.9    8.59  )-----------( 142      ( 23 Aug 2021 06:47 )             30.1                           10.1   6.78  )-----------( 145      ( 22 Aug 2021 07:31 )             30.6     08-23    135  |  98  |  68<H>  ----------------------------<  128<H>  5.5<H>   |  24  |  8.63<H>    Ca    8.7      23 Aug 2021 06:47  Phos  6.3     08-23  Mg     2.5     08-23 08-22    137  |  99  |  49<H>  ----------------------------<  140<H>  5.2   |  29  |  6.56<H>    Ca    9.0      22 Aug 2021 07:31  Phos  4.7     08-22  Mg     2.4     08-22    TPro  7.8  /  Alb  3.9  /  TBili  0.7  /  DBili  x   /  AST  10  /  ALT  15  /  AlkPhos  78  08-21

## 2021-08-23 NOTE — PROGRESS NOTE ADULT - ASSESSMENT
86y.o. Male with improving SBO.     -NGT d/c'd at bedside  - Ok to start clears  -OOB ambulate  -Minimal pain control prn for abd pain

## 2021-08-23 NOTE — PROGRESS NOTE ADULT - SUBJECTIVE AND OBJECTIVE BOX
Patient is a 86y old  Male who presents with a chief complaint of ABDOMINAL PAIN (22 Aug 2021 11:46)    pt seen in icu [  ], reg med floor [   ], bed [  ], chair at bedside [   ], a+o x3 [  ], lethargic [  ],  nad [  ]    rea [  ], ngt [  ], peg [  ], et tube [  ], cent line [  ], picc line [  ]        Allergies    No Known Allergies        Vitals    T(F): 97.3 (08-23-21 @ 04:55), Max: 99.4 (08-22-21 @ 08:00)  HR: 76 (08-23-21 @ 06:20) (65 - 80)  BP: 183/59 (08-23-21 @ 06:20) (147/41 - 211/67)  RR: 18 (08-23-21 @ 04:55) (18 - 18)  SpO2: 99% (08-23-21 @ 04:55) (97% - 100%)  Wt(kg): --  CAPILLARY BLOOD GLUCOSE      POCT Blood Glucose.: 135 mg/dL (23 Aug 2021 05:35)      Labs                          9.9    8.59  )-----------( 142      ( 23 Aug 2021 06:47 )             30.1       08-23    135  |  98  |  68<H>  ----------------------------<  128<H>  5.5<H>   |  24  |  8.63<H>    Ca    8.7      23 Aug 2021 06:47  Phos  6.3     08-23  Mg     2.5     08-23              .Nose Nose  08-21 @ 16:30   No Methicillin Resistant Staphylococcus aureus to date  No Methicillin Sensitive Staphylococcus aureus to date  --  --      .Body Fluid Pleural Fluid  06-28 @ 22:11   No growth at 5 days  --    polymorphonuclear leukocytes seen  No organisms seen  by cytocentrifuge      .Blood Blood  06-22 @ 18:22   No Growth Final  --  --          Radiology Results      Meds    MEDICATIONS  (STANDING):  chlorhexidine 2% Cloths 1 Application(s) Topical daily  heparin  Infusion.  Unit(s)/Hr (9 mL/Hr) IV Continuous <Continuous>  hydrALAZINE Injectable 10 milliGRAM(s) IV Push every 8 hours  hydrALAZINE Injectable 5 milliGRAM(s) IV Push once  insulin lispro (ADMELOG) corrective regimen sliding scale   SubCutaneous every 6 hours  metoprolol tartrate Injectable 5 milliGRAM(s) IV Push every 6 hours  pantoprazole  Injectable 40 milliGRAM(s) IV Push daily  sodium chloride 0.9% lock flush 3 milliLiter(s) IV Push every 8 hours      MEDICATIONS  (PRN):      Physical Exam    Neuro :  no focal deficits  Respiratory: CTA B/L  CV: RRR, S1S2, no murmurs,   Abdominal: Soft, NT, ND +BS,  Extremities: No edema, + peripheral pulses    ASSESSMENT    Intestinal obstruction    Yes    No pertinent past medical history    ESRD (end stage renal disease)    Diabetes    Hypertension    Hyperlipidemia    Atrial fibrillation    No significant past surgical history    S/P hemodialysis catheter insertion    History of appendectomy        PLAN     Patient is a 86y old  Male who presents with a chief complaint of ABDOMINAL PAIN (22 Aug 2021 11:46)    pt seen in tele [ x ], reg med floor [   ], bed [x  ], chair at bedside [   ], awake and responsive [ x ], lethargic [  ],  nad [x  ]      Allergies    No Known Allergies        Vitals    T(F): 97.3 (08-23-21 @ 04:55), Max: 99.4 (08-22-21 @ 08:00)  HR: 76 (08-23-21 @ 06:20) (65 - 80)  BP: 183/59 (08-23-21 @ 06:20) (147/41 - 211/67)  RR: 18 (08-23-21 @ 04:55) (18 - 18)  SpO2: 99% (08-23-21 @ 04:55) (97% - 100%)  Wt(kg): --  CAPILLARY BLOOD GLUCOSE      POCT Blood Glucose.: 135 mg/dL (23 Aug 2021 05:35)      Labs                          9.9    8.59  )-----------( 142      ( 23 Aug 2021 06:47 )             30.1       08-23    135  |  98  |  68<H>  ----------------------------<  128<H>  5.5<H>   |  24  |  8.63<H>    Ca    8.7      23 Aug 2021 06:47  Phos  6.3     08-23  Mg     2.5     08-23              .Nose Nose  08-21 @ 16:30   No Methicillin Resistant Staphylococcus aureus to date  No Methicillin Sensitive Staphylococcus aureus to date  --  --      .Body Fluid Pleural Fluid  06-28 @ 22:11   No growth at 5 days  --    polymorphonuclear leukocytes seen  No organisms seen  by cytocentrifuge      .Blood Blood  06-22 @ 18:22   No Growth Final  --  --          Radiology Results      Meds    MEDICATIONS  (STANDING):  chlorhexidine 2% Cloths 1 Application(s) Topical daily  heparin  Infusion.  Unit(s)/Hr (9 mL/Hr) IV Continuous <Continuous>  hydrALAZINE Injectable 10 milliGRAM(s) IV Push every 8 hours  hydrALAZINE Injectable 5 milliGRAM(s) IV Push once  insulin lispro (ADMELOG) corrective regimen sliding scale   SubCutaneous every 6 hours  metoprolol tartrate Injectable 5 milliGRAM(s) IV Push every 6 hours  pantoprazole  Injectable 40 milliGRAM(s) IV Push daily  sodium chloride 0.9% lock flush 3 milliLiter(s) IV Push every 8 hours      MEDICATIONS  (PRN):      Physical Exam    Neuro :  no focal deficits  Respiratory: CTA B/L  CV: RRR, S1S2, no murmurs,   Abdominal: Soft, NT, ND +BS,   Extremities: No edema, + peripheral pulses      ASSESSMENT    Intestinal obstruction  sbo,  b/l pleural eff,   h/o ESRD on HD TTS,   DM type 2,   HTN,   Anemia of renal disease,   hyperphosphatemia,   Afib on A/C Eliquis  Hyperlipidemia        PLAN    f/u abd xray   d/w surg  NGT for d/c at bedside  Ok to start clears  OOB ambulate  resume eliquis,   d/c  hep gtt    cardio f/u   change to lopressor and hydralazine po  trop x1 noted above    renal f/u  pt on hd tu,th,sa  hd as per renal   Increase UF with HD given pleural effusions  potassium elevated   give lokelma x1  pulm f/u   Bronchodilators  O2 Supp  Incentive Spirometry  cont current meds

## 2021-08-23 NOTE — PROGRESS NOTE ADULT - ASSESSMENT
87 yo Male with ESRD on HD TTS, DM type 2, HTN, Anemia of renal disease, hyperphosphatemia, Afib on A/C Eliquis Presented with diffuse abdominal pain x 1 day. Patient admitted for SBO management and abnormal CXR findings       Small bowel obstruction.  Plan: - Patient came to ED c/o diffuse abdominal pain x1 day, denies any nausea, vomiting, diarrhea  - CT abdomen showed Small bowel obstruction, with a transition point in the left upper quadrant, and a 2nd transition point in the right lower quadrant. Closed loop obstruction or internal hernia cannot be excluded.  - Try to avoid opioids as it may worsen pain due to obstruction   - Monitor for any signs of hemodynamic instability   - Surgery on board- recommends conservative management.  - d/c NGT and will resume on clear liquids as per Surgery recommendations      Problem/Plan - 2:  ·  Problem: Abnormal chest xray.  Plan: - CXR showed increased bilateral effusions and moderate left effusion and increasing pseudotumor in the minor fissure compared to June 28 of this year.  - CT abdomen non contrast showed moderate to large left pleural effusion left lower lobe atelectasis/collapse. Streaky right basilar atelectasis with trace right pleural effusion.  - F/u CT chest non contrast  - Pulm Dr Kaminski consulted.      Problem/Plan - 3:  ·  Problem: Electrolyte abnormality.  Plan: - Patient has hyperkalemia 5.5, Asymptomatic, 1 dose lokelma given   - Next HD Tuesday  - Monitor BMP, f/u repeat in the evening after HD  - Nephrology Dr Hicks consulted.      Problem/Plan - 4:  ·  Problem: Hypertension.  Plan: - Patient has history of Hypertension on amlodipine, metoprolol, losartan, hydralazine and Lasix at home   -Change Lopressor to Hydralazine PO   - Monitor BP and adjust meds as needed.      Problem/Plan - 5:  ·  Problem: ESRD on hemodialysis.  Plan: - ESRD Maintenance HD (T/T/S ) via AV fistula L forearm, Cr 7.9 on admission  - Last full dialysis Thursday, next HD today  - Avoid Nephrotoxic Meds/ Agents such as (NSAIDs, IV contrast, Aminoglycosides such as gentamicin, Gadolinium contrast, Phosphate containing enemas, etc..)  - F/u labs post dialysis   - Monitor BMP daily  - Nephrology Dr Hicks consulted.     Problem/Plan - 6:  Problem: Atrial fibrillation. Plan: - Patient has hx of atrial fibrillation on Eliquis and metoprolol at home   - EKG showed NSR, prolonged QTc 526, Troponin x1 0.028, CXR showed   - Started on Heparin drip and Lopressor 5 mg IVP as patient is NPO   - Cardio Dr Gonzalez consulted.     Problem/Plan - 7:  ·  Problem: Diabetes mellitus.  Plan: - Patient takes Januvia at home  - Holding home po medications  - C/w with HSS and FS q 6 hrs while NPO  - A1C: 6.1%   85 yo Male with ESRD on HD TTS, DM type 2, HTN, Anemia of renal disease, hyperphosphatemia, Afib on A/C Eliquis Presented with diffuse abdominal pain x 1 day. Patient admitted for SBO management and abnormal CXR findings       Small bowel obstruction.  Plan: - Patient came to ED c/o diffuse abdominal pain x1 day, denies any nausea, vomiting, diarrhea  - CT abdomen showed Small bowel obstruction, with a transition point in the left upper quadrant, and a 2nd transition point in the right lower quadrant. Closed loop obstruction or internal hernia cannot be excluded.  - Try to avoid opioids as it may worsen pain due to obstruction   - Monitor for any signs of hemodynamic instability   - Surgery on board- recommends conservative management.  - d/c NGT and will resume on clear liquids as per Surgery recommendations      Problem/Plan - 2:  ·  Problem: Abnormal chest xray.  Plan: - CXR showed increased bilateral effusions and moderate left effusion and increasing pseudotumor in the minor fissure compared to June 28 of this year.  - CT abdomen non contrast showed moderate to large left pleural effusion left lower lobe atelectasis/collapse. Streaky right basilar atelectasis with trace right pleural effusion.  - F/u CT chest non contrast  - Pulm Dr Kaminski consulted.      Problem/Plan - 3:  ·  Problem: Electrolyte abnormality.  Plan: - Patient has hyperkalemia 5.5, Asymptomatic, 1 dose lokelma given   - Next HD Tuesday  - Monitor BMP, f/u repeat in the evening after HD  - Nephrology Dr Hicks consulted.      Problem/Plan - 4:  ·  Problem: Hypertension.  Plan: - Patient has history of Hypertension on amlodipine, metoprolol, losartan, hydralazine and Lasix at home   -Change Lopressor to Hydralazine PO   - Monitor BP and adjust meds as needed.      Problem/Plan - 5:  ·  Problem: ESRD on hemodialysis.  Plan: - ESRD Maintenance HD (T/T/S ) via AV fistula L forearm, Cr 7.9 on admission  - Last full dialysis Thursday, next HD today  - Avoid Nephrotoxic Meds/ Agents such as (NSAIDs, IV contrast, Aminoglycosides such as gentamicin, Gadolinium contrast, Phosphate containing enemas, etc..)  - F/u labs post dialysis   - Monitor BMP daily  - Nephrology Dr Hicks consulted.     Problem/Plan - 6:  Problem: Atrial fibrillation. Plan: - Patient has hx of atrial fibrillation on Eliquis and metoprolol at home   - EKG showed NSR, prolonged QTc 526, Troponin x1 0.028, CXR showed   - will resume Eliquis   - Cardio Dr Gonzalez consulted.     Problem/Plan - 7:  ·  Problem: Diabetes mellitus.  Plan: - Patient takes Januvia at home  - Holding home po medications  - C/w with HSS and FS q 6 hrs while NPO  - A1C: 6.1%

## 2021-08-23 NOTE — PROGRESS NOTE ADULT - ASSESSMENT
86 yr old male with PMHX of PAF, ESRD on HD, DM, HTN, Lipid d/o who presents to ER with abdominal pain, nausea and vomiting, with SBO-now resolved..  1.D/C Tele monitoring.  2.Clear liquid diet.  3..Surgical f/u noted.  4.ESRD-HD as per renal.  5.PAF-d/c heparin drip,resume eliquis,, lopressor.  6.HTN-change to polopressor and hydralazine.  7.PPI.  86 yr old male with PMHX of PAF, ESRD on HD, DM, HTN, Lipid d/o who presents to ER with abdominal pain, nausea and vomiting, with SBO-now resolved..  1.D/C Tele monitoring.  2.Clear liquid diet.  3..Surgical f/u noted.  4.ESRD-HD as per renal.  5.PAF-d/c heparin drip,resume eliquis,, lopressor.  6.HTN-change to po lopressor and hydralazine.  7.PPI.

## 2021-08-23 NOTE — PROGRESS NOTE ADULT - ASSESSMENT
86y Male with history of ESRD on HD presents with abdominal pain. Nephrology consulted for ESRD status.    1) ESRD: Last HD on 8/21 terminated after 2.5 hours with 1.3L removed due to bradycardia. Plan for next maintenance HD on 8/24. Monitor electrolytes.    2) HTN with ESRD: BP elevated; please restart home medications including Losartan 100mg PO qd. Increase UF with HD given pleural effusions on CT chest. Monitor BP.    3) Anemia of renal disease: Hb borderline. Will give Epo 4k IV tiw with HD. Monitor Hb.    4) Hyperphosphatemia: Phosphorus elevated; will restart phoslo 2 tabs with meals once tolerating diet. Monitor serum calcium and phosphorus.    5) Hyperkalemia: Borderline. Recc Renal diet. Monitor serum K.      [facility] NEPHROLOGY  Nahum Grubbs M.D.  BINU EspinosaO.  Yovana Quiroz M.D.  Makeda Duron, MSN, ANP-C    Telephone: [phone]  Facsimile: [phone]    [address] [place] [address]

## 2021-08-23 NOTE — PROGRESS NOTE ADULT - SUBJECTIVE AND OBJECTIVE BOX
PGY1 Progress Note     PAGER #: [phone]] TILL 5:00 PM  PLEASE CONTACT ON CALL TEAM:  - On Call Team (Please refer to Yolanda) FROM 5:00 PM - 8:30PM  - Nightfloat Team FROM 8:30 -7:30 AM    CHIEF COMPLAINT & BRIEF HOSPITAL COURSE: 85YO male admitted for small bowel obstruction    INTERVAL HPI/OVERNIGHT EVENTS:    No acute events noted overnight.     REVIEW OF SYSTEMS:  CONSTITUTIONAL: No fever, weight loss, or fatigue  RESPIRATORY: No cough, wheezing, chills or hemoptysis; No shortness of breath  CARDIOVASCULAR: No chest pain, palpitations, dizziness, or leg swelling  GASTROINTESTINAL: No abdominal pain. No nausea, vomiting, or hematemesis; No diarrhea or constipation. No melena or hematochezia.  GENITOURINARY: No dysuria or hematuria, urinary frequency  NEUROLOGICAL: No headaches, memory loss, loss of strength, numbness, or tremors  SKIN: No itching, burning, rashes, or lesions     Vital Signs Last 24 Hrs  T(C): 36.9 (23 Aug 2021 11:08), Max: 37.1 (22 Aug 2021 11:39)  T(F): 98.4 (23 Aug 2021 11:08), Max: 98.7 (22 Aug 2021 11:39)  HR: 60 (23 Aug 2021 11:08) (60 - 80)  BP: 173/60 (23 Aug 2021 11:08) (151/53 - 211/67)  BP(mean): 92 (22 Aug 2021 14:15) (91 - 92)  RR: 18 (23 Aug 2021 11:08) (18 - 18)  SpO2: 98% (23 Aug 2021 11:08) (98% - 100%)    PHYSICAL EXAMINATION:  GENERAL: NAD, well built  HEAD:  Atraumatic, Normocephalic  EYES:  conjunctiva and sclera clear  NECK: Supple, No JVD, Normal thyroid  CHEST/LUNG: Clear to auscultation. No rales, rhonchi, wheezing, or rubs  HEART: Regular rate and rhythm; No murmurs, rubs, or gallops  ABDOMEN: Soft, Nontender, Nondistended; Bowel sounds present  NERVOUS SYSTEM:  Alert & Oriented X3,    EXTREMITIES:  2+ Peripheral Pulses, No clubbing, cyanosis, or edema  SKIN: warm dry                          9.9    8.59  )-----------( 142      ( 23 Aug 2021 06:47 )             30.1     08-23    135  |  98  |  68<H>  ----------------------------<  128<H>  5.5<H>   |  24  |  8.63<H>    Ca    8.7      23 Aug 2021 06:47  Phos  6.3     08-23  Mg     2.5     08-23            PTT - ( 23 Aug 2021 06:47 )  PTT:51.4 sec    CAPILLARY BLOOD GLUCOSE      RADIOLOGY & ADDITIONAL TESTS:

## 2021-08-24 LAB
ALBUMIN SERPL ELPH-MCNC: 2.9 G/DL — LOW (ref 3.5–5)
ALP SERPL-CCNC: 63 U/L — SIGNIFICANT CHANGE UP (ref 40–120)
ALT FLD-CCNC: 7 U/L DA — LOW (ref 10–60)
ANION GAP SERPL CALC-SCNC: 10 MMOL/L — SIGNIFICANT CHANGE UP (ref 5–17)
ANION GAP SERPL CALC-SCNC: 9 MMOL/L — SIGNIFICANT CHANGE UP (ref 5–17)
AST SERPL-CCNC: 5 U/L — LOW (ref 10–40)
BILIRUB SERPL-MCNC: 0.4 MG/DL — SIGNIFICANT CHANGE UP (ref 0.2–1.2)
BUN SERPL-MCNC: 36 MG/DL — HIGH (ref 7–18)
BUN SERPL-MCNC: 88 MG/DL — HIGH (ref 7–18)
CALCIUM SERPL-MCNC: 8.3 MG/DL — LOW (ref 8.4–10.5)
CALCIUM SERPL-MCNC: 8.5 MG/DL — SIGNIFICANT CHANGE UP (ref 8.4–10.5)
CHLORIDE SERPL-SCNC: 95 MMOL/L — LOW (ref 96–108)
CHLORIDE SERPL-SCNC: 97 MMOL/L — SIGNIFICANT CHANGE UP (ref 96–108)
CO2 SERPL-SCNC: 26 MMOL/L — SIGNIFICANT CHANGE UP (ref 22–31)
CO2 SERPL-SCNC: 28 MMOL/L — SIGNIFICANT CHANGE UP (ref 22–31)
CREAT SERPL-MCNC: 10.4 MG/DL — HIGH (ref 0.5–1.3)
CREAT SERPL-MCNC: 5.19 MG/DL — HIGH (ref 0.5–1.3)
GLUCOSE BLDC GLUCOMTR-MCNC: 137 MG/DL — HIGH (ref 70–99)
GLUCOSE BLDC GLUCOMTR-MCNC: 147 MG/DL — HIGH (ref 70–99)
GLUCOSE BLDC GLUCOMTR-MCNC: 182 MG/DL — HIGH (ref 70–99)
GLUCOSE BLDC GLUCOMTR-MCNC: 194 MG/DL — HIGH (ref 70–99)
GLUCOSE SERPL-MCNC: 147 MG/DL — HIGH (ref 70–99)
GLUCOSE SERPL-MCNC: 170 MG/DL — HIGH (ref 70–99)
HCT VFR BLD CALC: 27.6 % — LOW (ref 39–50)
HGB BLD-MCNC: 9.4 G/DL — LOW (ref 13–17)
MAGNESIUM SERPL-MCNC: 2.6 MG/DL — SIGNIFICANT CHANGE UP (ref 1.6–2.6)
MCHC RBC-ENTMCNC: 32 PG — SIGNIFICANT CHANGE UP (ref 27–34)
MCHC RBC-ENTMCNC: 34.1 GM/DL — SIGNIFICANT CHANGE UP (ref 32–36)
MCV RBC AUTO: 93.9 FL — SIGNIFICANT CHANGE UP (ref 80–100)
NRBC # BLD: 0 /100 WBCS — SIGNIFICANT CHANGE UP (ref 0–0)
PHOSPHATE SERPL-MCNC: 5.1 MG/DL — HIGH (ref 2.5–4.5)
PLATELET # BLD AUTO: 131 K/UL — LOW (ref 150–400)
POTASSIUM SERPL-MCNC: 4.5 MMOL/L — SIGNIFICANT CHANGE UP (ref 3.5–5.3)
POTASSIUM SERPL-MCNC: 6.8 MMOL/L — CRITICAL HIGH (ref 3.5–5.3)
POTASSIUM SERPL-SCNC: 4.5 MMOL/L — SIGNIFICANT CHANGE UP (ref 3.5–5.3)
POTASSIUM SERPL-SCNC: 6.8 MMOL/L — CRITICAL HIGH (ref 3.5–5.3)
PROT SERPL-MCNC: 6.4 G/DL — SIGNIFICANT CHANGE UP (ref 6–8.3)
RBC # BLD: 2.94 M/UL — LOW (ref 4.2–5.8)
RBC # FLD: 13.4 % — SIGNIFICANT CHANGE UP (ref 10.3–14.5)
SODIUM SERPL-SCNC: 131 MMOL/L — LOW (ref 135–145)
SODIUM SERPL-SCNC: 134 MMOL/L — LOW (ref 135–145)
TROPONIN I SERPL-MCNC: 0.11 NG/ML — HIGH (ref 0–0.04)
TROPONIN I SERPL-MCNC: 0.86 NG/ML — HIGH (ref 0–0.04)
WBC # BLD: 5.67 K/UL — SIGNIFICANT CHANGE UP (ref 3.8–10.5)
WBC # FLD AUTO: 5.67 K/UL — SIGNIFICANT CHANGE UP (ref 3.8–10.5)

## 2021-08-24 PROCEDURE — 99232 SBSQ HOSP IP/OBS MODERATE 35: CPT

## 2021-08-24 RX ORDER — AMLODIPINE BESYLATE 2.5 MG/1
10 TABLET ORAL DAILY
Refills: 0 | Status: DISCONTINUED | OUTPATIENT
Start: 2021-08-24 | End: 2021-08-24

## 2021-08-24 RX ORDER — CALCIUM ACETATE 667 MG
1334 TABLET ORAL
Refills: 0 | Status: DISCONTINUED | OUTPATIENT
Start: 2021-08-24 | End: 2021-08-27

## 2021-08-24 RX ORDER — METOPROLOL TARTRATE 50 MG
5 TABLET ORAL ONCE
Refills: 0 | Status: COMPLETED | OUTPATIENT
Start: 2021-08-24 | End: 2021-08-24

## 2021-08-24 RX ORDER — LOSARTAN POTASSIUM 100 MG/1
100 TABLET, FILM COATED ORAL DAILY
Refills: 0 | Status: DISCONTINUED | OUTPATIENT
Start: 2021-08-24 | End: 2021-08-25

## 2021-08-24 RX ORDER — INSULIN LISPRO 100/ML
VIAL (ML) SUBCUTANEOUS
Refills: 0 | Status: DISCONTINUED | OUTPATIENT
Start: 2021-08-24 | End: 2021-08-27

## 2021-08-24 RX ADMIN — SODIUM CHLORIDE 3 MILLILITER(S): 9 INJECTION INTRAMUSCULAR; INTRAVENOUS; SUBCUTANEOUS at 21:29

## 2021-08-24 RX ADMIN — Medication 100 MILLIGRAM(S): at 05:57

## 2021-08-24 RX ADMIN — Medication 5 MILLIGRAM(S): at 14:36

## 2021-08-24 RX ADMIN — Medication 1: at 17:13

## 2021-08-24 RX ADMIN — Medication 1334 MILLIGRAM(S): at 14:37

## 2021-08-24 RX ADMIN — Medication 1334 MILLIGRAM(S): at 17:14

## 2021-08-24 RX ADMIN — PANTOPRAZOLE SODIUM 40 MILLIGRAM(S): 20 TABLET, DELAYED RELEASE ORAL at 05:57

## 2021-08-24 RX ADMIN — Medication 100 MILLIGRAM(S): at 21:30

## 2021-08-24 RX ADMIN — APIXABAN 2.5 MILLIGRAM(S): 2.5 TABLET, FILM COATED ORAL at 17:14

## 2021-08-24 RX ADMIN — SODIUM CHLORIDE 3 MILLILITER(S): 9 INJECTION INTRAMUSCULAR; INTRAVENOUS; SUBCUTANEOUS at 14:40

## 2021-08-24 RX ADMIN — SODIUM CHLORIDE 3 MILLILITER(S): 9 INJECTION INTRAMUSCULAR; INTRAVENOUS; SUBCUTANEOUS at 05:55

## 2021-08-24 RX ADMIN — APIXABAN 2.5 MILLIGRAM(S): 2.5 TABLET, FILM COATED ORAL at 05:57

## 2021-08-24 RX ADMIN — CHLORHEXIDINE GLUCONATE 1 APPLICATION(S): 213 SOLUTION TOPICAL at 11:35

## 2021-08-24 RX ADMIN — Medication 25 MILLIGRAM(S): at 05:57

## 2021-08-24 RX ADMIN — Medication 25 MILLIGRAM(S): at 17:14

## 2021-08-24 NOTE — PROGRESS NOTE ADULT - ASSESSMENT
86 yr old male with PMHX of PAF, ESRD on HD, DM, HTN, Lipid d/o who presents to ER with abdominal pain, nausea and vomiting, with SBO-now resolved..  1.Po diet.  2.Surgical f/u noted.  3.ESRD-HD as per renal.  4.PAF- eliquis,, lopressor.  5.HTN-cont lopressor, hydralazine,resume cozaar.  6.PPI.

## 2021-08-24 NOTE — PROGRESS NOTE ADULT - SUBJECTIVE AND OBJECTIVE BOX
INTERVAL HPI/OVERNIGHT EVENTS:  Pt resting comfortably. No acute complaints.   + flatus, states had BM yesterday  Tolerating clear liquid diet  Denies N/V, abd pain.    MEDICATIONS  (STANDING):  apixaban 2.5 milliGRAM(s) Oral two times a day  calcium acetate 1334 milliGRAM(s) Oral three times a day with meals  chlorhexidine 2% Cloths 1 Application(s) Topical daily  epoetin zach-epbx (RETACRIT) Injectable 4000 Unit(s) IV Push <User Schedule>  hydrALAZINE 100 milliGRAM(s) Oral every 8 hours  insulin lispro (ADMELOG) corrective regimen sliding scale   SubCutaneous every 6 hours  losartan 100 milliGRAM(s) Oral daily  metoprolol tartrate 25 milliGRAM(s) Oral two times a day  pantoprazole    Tablet 40 milliGRAM(s) Oral before breakfast  sodium chloride 0.9% lock flush 3 milliLiter(s) IV Push every 8 hours      ICU Vital Signs Last 24 Hrs  T(C): 36.9 (24 Aug 2021 11:29), Max: 37.2 (23 Aug 2021 16:18)  T(F): 98.4 (24 Aug 2021 11:29), Max: 98.9 (23 Aug 2021 16:18)  HR: 130 (24 Aug 2021 14:31) (54 - 130)  BP: 122/39 (24 Aug 2021 14:31) (122/39 - 187/65)  BP(mean): --  ABP: --  ABP(mean): --  RR: 17 (24 Aug 2021 11:29) (17 - 18)  SpO2: 100% (24 Aug 2021 11:29) (96% - 100%)        Physical:  General: A&Ox3. NAD.  Abdomen: Soft, flat,  nondistended, nontender.          LABS:                          9.4    5.67  )-----------( 131      ( 24 Aug 2021 11:40 )             27.6       08-24    131<L>  |  95<L>  |  88<H>  ----------------------------<  170<H>  6.8<HH>   |  26  |  10.40<H>    Ca    8.3<L>      24 Aug 2021 11:40  Phos  5.1     08-24  Mg     2.6     08-24    TPro  6.4  /  Alb  2.9<L>  /  TBili  0.4  /  DBili  x   /  AST  5<L>  /  ALT  7<L>  /  AlkPhos  63  08-24

## 2021-08-24 NOTE — PROGRESS NOTE ADULT - ASSESSMENT
87 yo Male with ESRD on HD TTS, DM type 2, HTN, Anemia of renal disease, hyperphosphatemia, Afib on A/C Eliquis Presented with diffuse abdominal pain x 1 day. Patient found to have a Small Bowel Obstruction. Admitted for SBO and abnormal cxray which shows increased bilateral effusions.

## 2021-08-24 NOTE — PROGRESS NOTE ADULT - SUBJECTIVE AND OBJECTIVE BOX
PGY1 Progress Note discussed with attending     PAGER #: [[phone]] TILL 5:00 PM  PLEASE CONTACT ON CALL TEAM:  - On Call Team (Please refer to Yolanda) FROM 5:00 PM - 8:30PM  - Nightfloat Team FROM 8:30 -7:30 AM    CHIEF COMPLAINT & BRIEF HOSPITAL COURSE:    INTERVAL HPI/OVERNIGHT EVENTS: No acute events overnight. Patient had a bowel movement. Patient continues to tolerate diet.       REVIEW OF SYSTEMS:  CONSTITUTIONAL: No fever, weight loss, or fatigue  RESPIRATORY: No cough, wheezing, chills or hemoptysis; No shortness of breath  CARDIOVASCULAR: No chest pain, palpitations, dizziness, or leg swelling  GASTROINTESTINAL: No abdominal pain. No nausea, vomiting, or hematemesis; No diarrhea or constipation. No melena or hematochezia.  GENITOURINARY: No dysuria or hematuria, urinary frequency  NEUROLOGICAL: No headaches, memory loss, loss of strength, numbness, or tremors  SKIN: No itching, burning, rashes, or lesions     Vital Signs Last 24 Hrs  T(C): 36.7 (24 Aug 2021 11:00), Max: 37.2 (23 Aug 2021 16:18)  T(F): 98.1 (24 Aug 2021 11:00), Max: 98.9 (23 Aug 2021 16:18)  HR: 59 (24 Aug 2021 11:00) (54 - 66)  BP: 168/47 (24 Aug 2021 11:00) (144/51 - 174/60)  BP(mean): --  RR: 18 (24 Aug 2021 11:00) (17 - 18)  SpO2: 98% (24 Aug 2021 11:00) (96% - 98%)    PHYSICAL EXAMINATION:  GENERAL: NAD, well built  HEAD:  Atraumatic, Normocephalic  EYES:  conjunctiva and sclera clear  NECK: Supple, No JVD, Normal thyroid  CHEST/LUNG: Clear to auscultation. Clear to percussion bilaterally; No rales, rhonchi, wheezing, or rubs  HEART: Regular rate and rhythm; No murmurs, rubs, or gallops  ABDOMEN: Soft, Nontender, Nondistended; Bowel sounds present  NERVOUS SYSTEM:  Alert & Oriented X3,    EXTREMITIES:  2+ Peripheral Pulses, No clubbing, cyanosis, or edema  SKIN: warm dry                          9.9    8.59  )-----------( 142      ( 23 Aug 2021 06:47 )             30.1     08-23    135  |  98  |  68<H>  ----------------------------<  128<H>  5.5<H>   |  24  |  8.63<H>    Ca    8.7      23 Aug 2021 06:47  Phos  6.3     08-23  Mg     2.5     08-23            PTT - ( 23 Aug 2021 06:47 )  PTT:51.4 sec    CAPILLARY BLOOD GLUCOSE      RADIOLOGY & ADDITIONAL TESTS:

## 2021-08-24 NOTE — DIETITIAN INITIAL EVALUATION ADULT. - PERTINENT MEDS FT
MEDICATIONS  (STANDING):  apixaban 2.5 milliGRAM(s) Oral two times a day  chlorhexidine 2% Cloths 1 Application(s) Topical daily  epoetin zach-epbx (RETACRIT) Injectable 4000 Unit(s) IV Push <User Schedule>  hydrALAZINE 100 milliGRAM(s) Oral every 8 hours  insulin lispro (ADMELOG) corrective regimen sliding scale   SubCutaneous every 6 hours  losartan 100 milliGRAM(s) Oral daily  metoprolol tartrate 25 milliGRAM(s) Oral two times a day  pantoprazole    Tablet 40 milliGRAM(s) Oral before breakfast  sodium chloride 0.9% lock flush 3 milliLiter(s) IV Push every 8 hours

## 2021-08-24 NOTE — PROGRESS NOTE ADULT - ASSESSMENT
86y Male with history of ESRD on HD presents with abdominal pain. Nephrology consulted for ESRD status.    1) ESRD: Last HD on 8/21 terminated after 2.5 hours with 1.3L removed due to bradycardia (f/u Cards). Plan for next maintenance HD today.  Monitor electrolytes.    2) HTN with ESRD: BP elevated; Losartan 100mg PO qd restarted.  Increase UF with HD given pleural effusions on CT chest. Monitor BP.    3) Anemia of renal disease: Hb borderline. c/w Epo 4k IV tiw with HD. Monitor Hb.    4) Hyperphosphatemia: Phosphorus elevated; will restart phoslo 2 tabs with meals. Monitor serum calcium and phosphorus.    5) Hyperkalemia: Borderline. Recc Renal diet. Monitor serum K.      [facility] NEPHROLOGY  Nahum Grubbs M.D.  Francisco Javier Hicks D.O.  Yovana Quiroz M.D.  Makeda Duron, THOMAS, ANP-C    Telephone: [phone]  Facsimile: [phone]    [address] [place] [address]

## 2021-08-24 NOTE — CHART NOTE - NSCHARTNOTEFT_GEN_A_CORE
troponin noted to be uptrending. 0.111 > 0.864  EKG and morning trop ordered troponin noted to be uptrending. 0.111 > 0.864  EKG and morning trop ordered    Amendment: EKG shows no acute ST changes noted  Pt on eliquis and bblocker already troponin noted to be uptrending. 0.111 > 0.864  EKG and morning trop ordered    Amendment: EKG shows no acute ST changes noted  Pt on eliquis and kalialocker already    Amendment: Morning trop 1.91 troponin noted to be uptrending. 0.111 > 0.864  EKG and morning trop ordered    Amendment: EKG shows no acute ST changes noted  Pt on eliquis and kalialocker already    Amendment: Morning trop 1.91, repeat EKG and trop at 12

## 2021-08-24 NOTE — DIETITIAN INITIAL EVALUATION ADULT. - FACTORS AFF FOOD INTAKE
acute on chronic comorbidities/Islam/ethnic/cultural/personal food preferences acute on chronic comorbidities including ESRD on HD/Jainism/ethnic/cultural/personal food preferences

## 2021-08-24 NOTE — PROGRESS NOTE ADULT - SUBJECTIVE AND OBJECTIVE BOX
Pt is awake, alert, lying in bed in NAD. HD today.     INTERVAL HPI/OVERNIGHT EVENTS:      VITAL SIGNS:  T(F): 98 (08-24-21 @ 08:00)  HR: 54 (08-24-21 @ 08:00)  BP: 164/45 (08-24-21 @ 08:00)  RR: 18 (08-24-21 @ 08:00)  SpO2: 98% (08-24-21 @ 08:00)  Wt(kg): --  I&O's Detail    23 Aug 2021 07:01  -  24 Aug 2021 07:00  --------------------------------------------------------  IN:    Oral Fluid: 236 mL  Total IN: 236 mL    OUT:  Total OUT: 0 mL    Total NET: 236 mL              REVIEW OF SYSTEMS:    CONSTITUTIONAL:  No fevers, chills, sweats    HEENT:  Eyes:  No diplopia or blurred vision. ENT:  No earache, sore throat or runny nose.    CARDIOVASCULAR:  No pressure, squeezing, tightness, or heaviness about the chest; no palpitations.    RESPIRATORY:  Per HPI    GASTROINTESTINAL:  No abdominal pain, nausea, vomiting or diarrhea.    GENITOURINARY:  No dysuria, frequency or urgency.    NEUROLOGIC:  No paresthesias, fasciculations, seizures or weakness.    PSYCHIATRIC:  No disorder of thought or mood.      PHYSICAL EXAM:    Constitutional: Well developed and nourished  Eyes:Perrla  ENMT: normal  Neck:supple  Respiratory: good air entry  Cardiovascular: S1 S2 regular  Gastrointestinal: Soft, Non tender  Extremities: No edema  Vascular:normal  Neurological:Awake, alert   Musculoskeletal:Normal      MEDICATIONS  (STANDING):  apixaban 2.5 milliGRAM(s) Oral two times a day  chlorhexidine 2% Cloths 1 Application(s) Topical daily  epoetin zach-epbx (RETACRIT) Injectable 4000 Unit(s) IV Push <User Schedule>  hydrALAZINE 100 milliGRAM(s) Oral every 8 hours  insulin lispro (ADMELOG) corrective regimen sliding scale   SubCutaneous every 6 hours  losartan 100 milliGRAM(s) Oral daily  metoprolol tartrate 25 milliGRAM(s) Oral two times a day  pantoprazole    Tablet 40 milliGRAM(s) Oral before breakfast  sodium chloride 0.9% lock flush 3 milliLiter(s) IV Push every 8 hours    MEDICATIONS  (PRN):      Allergies    No Known Allergies    Intolerances        LABS:                        9.9    8.59  )-----------( 142      ( 23 Aug 2021 06:47 )             30.1     08-23    135  |  98  |  68<H>  ----------------------------<  128<H>  5.5<H>   |  24  |  8.63<H>    Ca    8.7      23 Aug 2021 06:47  Phos  6.3     08-23  Mg     2.5     08-23      PTT - ( 23 Aug 2021 06:47 )  PTT:51.4 sec          CAPILLARY BLOOD GLUCOSE      POCT Blood Glucose.: 137 mg/dL (24 Aug 2021 06:12)  POCT Blood Glucose.: 147 mg/dL (24 Aug 2021 00:30)  POCT Blood Glucose.: 172 mg/dL (23 Aug 2021 17:03)  POCT Blood Glucose.: 194 mg/dL (23 Aug 2021 11:40)        RADIOLOGY & ADDITIONAL TESTS:    CXR:  IMPRESSION:  Mild diffuse small bowel dilatation and minimal air in colon suggesting ileus      Ct scan chest:    ekg;    echo:

## 2021-08-24 NOTE — PROGRESS NOTE ADULT - ASSESSMENT
1. SBO   CT noted  Diet per surgery.   Abd XR noted above 8/23.   Surgical F/U   Zofran PRN for nausea  Pain control.     2. Pleural Effusion  On HD; continue TTS   CT chest noted - effusion large (left) and trace (right) as well as ascites.   For HD today   Repeat CXR after HD   O2 Supp PRN     3. Atelectasis  Bronchodilators  O2 Supp  Incentive Spirometry    4. Abnormal CXR   XR notes increasing pseudotumor in minor fissure compared to 6/2021.  CT chest noted; as above.     5. ESRD   Avoid nephrotoxic drugs  Correct electrolyte imbalance.   Lokelma PRN   HD - TTS  Renal F/U   Monitor labs     6. Afib   A/C   Cardio F/U     7. DM   Accuchecks ac/hs  Insulin coverage per HSS   A1C level

## 2021-08-24 NOTE — PROGRESS NOTE ADULT - SUBJECTIVE AND OBJECTIVE BOX
[facility] NEPHROLOGY- PROGRESS NOTE    86y Male with history of ESRD on HD presents with abdominal pain. Nephrology consulted for ESRD status.  8/23- NGT removed  and started on CLD    REVIEW OF SYSTEMS:  Gen: no changes in weight  Cards: no chest pain  Resp: no dyspnea  GI: no nausea or vomiting or diarrhea,  ab pain resolved +BM overnight  Vascular: no LE edema    No Known Allergies      Hospital Medications: Medications reviewed    VITALS:  T(F): 98 (08-24-21 @ 08:00), Max: 98.9 (08-23-21 @ 16:18)  HR: 54 (08-24-21 @ 08:00)  BP: 164/45 (08-24-21 @ 08:00)  RR: 18 (08-24-21 @ 08:00)  SpO2: 98% (08-24-21 @ 08:00)  Wt(kg): --    08-23 @ 07:01  -  08-24 @ 07:00  --------------------------------------------------------  IN: 236 mL / OUT: 0 mL / NET: 236 mL      PHYSICAL EXAM:    Gen: NAD, calm  Cards: RRR, +S1/S2, no M/G/R  Resp: CTA B/L  GI: soft, NT/ND, NABS +BS  Vascular: no LE edema B/L, LUE AVF + bruit/thrill with area of hypopigmentation      LABS:  08-23    135  |  98  |  68<H>  ----------------------------<  128<H>  5.5<H>   |  24  |  8.63<H>    Ca    8.7      23 Aug 2021 06:47  Phos  6.3     08-23  Mg     2.5     08-23      Creatinine Trend: 8.63 <--, 6.56 <--, 5.90 <--, 7.99 <--                        9.9    8.59  )-----------( 142      ( 23 Aug 2021 06:47 )             30.1     Urine Studies:

## 2021-08-24 NOTE — DIETITIAN INITIAL EVALUATION ADULT. - OTHER INFO
Pt lives home with family PTA, alert, oriented, well-communicated; recent admission with Moderate Malnutrition identified 6/24/21; appetite ok, but persistent wt loss from 115 lb 4m ago to 105 lb 2m ago to current 99.6 lb 8/23/21 ( see below), abdominal pain x 1d PTA, SBO, s/p NGT to LWS removed Pt lives home with family PTA, alert, oriented, well-communicated; recent admission with Moderate Malnutrition identified 6/24/21; appetite ok, but persistent wt loss from 115 lb 4m ago to 105 lb 2m ago to current 99.6 lb 8/23/21 ( see below), abdominal pain x 1d PTA, SBO, s/p NGT to LWS removed 8/23/21 per Surgery team, NPO/Clear Liquid diet x 3 to 4d in-house, diet just advanced today to solid; denied GI distress, chewing or swallowing problem at present, no specific food choices reported; ESRD on HD, followed by RD at out-pt HD center.

## 2021-08-24 NOTE — DIETITIAN INITIAL EVALUATION ADULT. - PHYSCIAL ASSESSMENT
very thin per MD/emaciated skin intact  Wt data in EMR reviewed, a bit fluctuated, may also due to fluid shift from HD and/or scale variance, HD Tx

## 2021-08-24 NOTE — PROGRESS NOTE ADULT - SUBJECTIVE AND OBJECTIVE BOX
CHIEF COMPLAINT:Patient is a 86y old  Male who presents with a chief complaint of ABDOMINAL PAIN.Pt appears comfortable.    	  REVIEW OF SYSTEMS:  CONSTITUTIONAL: No fever, weight loss, or fatigue  EYES: No eye pain, visual disturbances, or discharge  ENT:  No difficulty hearing, tinnitus, vertigo; No sinus or throat pain  NECK: No pain or stiffness  RESPIRATORY: No cough, wheezing, chills or hemoptysis; No Shortness of Breath  CARDIOVASCULAR: No chest pain, palpitations, passing out, dizziness, or leg swelling  GASTROINTESTINAL: No abdominal or epigastric pain. No nausea, vomiting, or hematemesis; No diarrhea or constipation. No melena or hematochezia.  GENITOURINARY: No dysuria, frequency, hematuria, or incontinence  NEUROLOGICAL: No headaches, memory loss, loss of strength, numbness, or tremors  SKIN: No itching, burning, rashes, or lesions   LYMPH Nodes: No enlarged glands  ENDOCRINE: No heat or cold intolerance; No hair loss  MUSCULOSKELETAL: No joint pain or swelling; No muscle, back, or extremity pain  PSYCHIATRIC: No depression, anxiety, mood swings, or difficulty sleeping  HEME/LYMPH: No easy bruising, or bleeding gums  ALLERGY AND IMMUNOLOGIC: No hives or eczema	        PHYSICAL EXAM:  T(C): 36.7 (08-24-21 @ 08:00), Max: 37.2 (08-23-21 @ 16:18)  HR: 54 (08-24-21 @ 08:00) (54 - 66)  BP: 164/45 (08-24-21 @ 08:00) (144/51 - 174/60)  RR: 18 (08-24-21 @ 08:00) (17 - 18)  SpO2: 98% (08-24-21 @ 08:00) (96% - 98%)  Wt(kg): --  I&O's Summary    23 Aug 2021 07:01  -  24 Aug 2021 07:00  --------------------------------------------------------  IN: 236 mL / OUT: 0 mL / NET: 236 mL        Appearance: Normal	  HEENT:   Normal oral mucosa, PERRL, EOMI	  Lymphatic: No lymphadenopathy  Cardiovascular: Normal S1 S2, No JVD, No murmurs, No edema  Respiratory: Lungs clear to auscultation	  Psychiatry: A & O x 3, Mood & affect appropriate  Gastrointestinal:  Soft, Non-tender, + BS	  Skin: No rashes, No ecchymoses, No cyanosis	  Neurologic: Non-focal  Extremities: Normal range of motion, No clubbing, cyanosis or edema  Vascular: Peripheral pulses palpable 2+ bilaterally    MEDICATIONS  (STANDING):  apixaban 2.5 milliGRAM(s) Oral two times a day  chlorhexidine 2% Cloths 1 Application(s) Topical daily  epoetin zach-epbx (RETACRIT) Injectable 4000 Unit(s) IV Push <User Schedule>  hydrALAZINE 100 milliGRAM(s) Oral every 8 hours  insulin lispro (ADMELOG) corrective regimen sliding scale   SubCutaneous every 6 hours  losartan 100 milliGRAM(s) Oral daily  metoprolol tartrate 25 milliGRAM(s) Oral two times a day  pantoprazole    Tablet 40 milliGRAM(s) Oral before breakfast  sodium chloride 0.9% lock flush 3 milliLiter(s) IV Push every 8 hours      	  	  LABS:	 	                     9.9    8.59  )-----------( 142      ( 23 Aug 2021 06:47 )             30.1     08-23    135  |  98  |  68<H>  ----------------------------<  128<H>  5.5<H>   |  24  |  8.63<H>    Ca    8.7      23 Aug 2021 06:47  Phos  6.3     08-23  Mg     2.5     08-23      proBNP:   Lipid Profile: Cholesterol 155  LDL --  HDL 92  TG 47      TSH: Thyroid Stimulating Hormone, Serum: 1.06 uU/mL (08-22 @ 07:31)

## 2021-08-24 NOTE — CHART NOTE - NSCHARTNOTEFT_GEN_A_CORE
Patient was undergoing HD, started to have A fib rate in 150s, with blood pressure 84/63. EKG was done shown Afib with RVR. Total of 600 cc bolus fluid given blood pressure improved to 120's sbp. Patient given 5mg Metoprolol IV at dialysis. Dialysis was stopped. Dr. Aviles made aware. When patient returned to floor HR was in 130s with stable BP, another 5mg Metoprolol was given. HR is in 120s. Please contact Dr. Lee for management of Afib, (patient currently on metorprolol and eliquis for hx of Afib). Patient complained of chest pain troponin was 0.111 repeat at 9pm. Potassium is 4.5

## 2021-08-24 NOTE — DIETITIAN INITIAL EVALUATION ADULT. - DIET TYPE
Nepro 1can daily as medically feasible ( 425 kcal, 19 g protein)/soft/consistent carbohydrate (evening snack)

## 2021-08-24 NOTE — PROGRESS NOTE ADULT - ASSESSMENT
86y.o. Male with resolved SBO    - Advance diet to reg  -OOB ambulate  - If patient tolerates diet, no further surgical intervention.

## 2021-08-24 NOTE — PROGRESS NOTE ADULT - SUBJECTIVE AND OBJECTIVE BOX
Patient is a 86y old  Male who presents with a chief complaint of ABDOMINAL PAIN (23 Aug 2021 15:55)    pt seen in icu [  ], reg med floor [   ], bed [  ], chair at bedside [   ], a+o x3 [  ], lethargic [  ],  nad [  ]    rea [  ], ngt [  ], peg [  ], et tube [  ], cent line [  ], picc line [  ]        Allergies    No Known Allergies        Vitals    T(F): 98.4 (08-24-21 @ 04:58), Max: 98.9 (08-23-21 @ 16:18)  HR: 59 (08-24-21 @ 04:58) (57 - 74)  BP: 174/60 (08-24-21 @ 04:58) (144/51 - 190/54)  RR: 17 (08-24-21 @ 04:58) (17 - 18)  SpO2: 98% (08-24-21 @ 04:58) (96% - 98%)  Wt(kg): --  CAPILLARY BLOOD GLUCOSE      POCT Blood Glucose.: 137 mg/dL (24 Aug 2021 06:12)      Labs                          9.9    8.59  )-----------( 142      ( 23 Aug 2021 06:47 )             30.1       08-23    135  |  98  |  68<H>  ----------------------------<  128<H>  5.5<H>   |  24  |  8.63<H>    Ca    8.7      23 Aug 2021 06:47  Phos  6.3     08-23  Mg     2.5     08-23              .Nose Nose  08-21 @ 16:30   No Methicillin Resistant Staphylococcus aureus  No Methicillin Sensitive Staphylococcus aureus  "This can represent normal nasal carriage. PCR is more  --  --      .Body Fluid Pleural Fluid  06-28 @ 22:11   No growth at 5 days  --    polymorphonuclear leukocytes seen  No organisms seen  by cytocentrifuge      .Blood Blood  06-22 @ 18:22   No Growth Final  --  --          Radiology Results      Meds    MEDICATIONS  (STANDING):  apixaban 2.5 milliGRAM(s) Oral two times a day  chlorhexidine 2% Cloths 1 Application(s) Topical daily  epoetin zach-epbx (RETACRIT) Injectable 4000 Unit(s) IV Push <User Schedule>  hydrALAZINE 100 milliGRAM(s) Oral every 8 hours  insulin lispro (ADMELOG) corrective regimen sliding scale   SubCutaneous every 6 hours  metoprolol tartrate 25 milliGRAM(s) Oral two times a day  pantoprazole    Tablet 40 milliGRAM(s) Oral before breakfast  sodium chloride 0.9% lock flush 3 milliLiter(s) IV Push every 8 hours      MEDICATIONS  (PRN):      Physical Exam    Neuro :  no focal deficits  Respiratory: CTA B/L  CV: RRR, S1S2, no murmurs,   Abdominal: Soft, NT, ND +BS,  Extremities: No edema, + peripheral pulses    ASSESSMENT    Intestinal obstruction    Yes    No pertinent past medical history    ESRD (end stage renal disease)    Diabetes    Hypertension    Hyperlipidemia    Atrial fibrillation    No significant past surgical history    S/P hemodialysis catheter insertion    History of appendectomy        PLAN     Patient is a 86y old  Male who presents with a chief complaint of ABDOMINAL PAIN (23 Aug 2021 15:55)      pt seen in tele [ x ], reg med floor [   ], bed [x  ], chair at bedside [   ], awake and responsive [ x ], lethargic [  ],  nad [x  ]    Allergies    No Known Allergies        Vitals    T(F): 98.4 (08-24-21 @ 04:58), Max: 98.9 (08-23-21 @ 16:18)  HR: 59 (08-24-21 @ 04:58) (57 - 74)  BP: 174/60 (08-24-21 @ 04:58) (144/51 - 190/54)  RR: 17 (08-24-21 @ 04:58) (17 - 18)  SpO2: 98% (08-24-21 @ 04:58) (96% - 98%)  Wt(kg): --  CAPILLARY BLOOD GLUCOSE      POCT Blood Glucose.: 137 mg/dL (24 Aug 2021 06:12)      Labs                          9.9    8.59  )-----------( 142      ( 23 Aug 2021 06:47 )             30.1       08-23    135  |  98  |  68<H>  ----------------------------<  128<H>  5.5<H>   |  24  |  8.63<H>    Ca    8.7      23 Aug 2021 06:47  Phos  6.3     08-23  Mg     2.5     08-23              .Nose Nose  08-21 @ 16:30   No Methicillin Resistant Staphylococcus aureus  No Methicillin Sensitive Staphylococcus aureus  "This can represent normal nasal carriage. PCR is more  --  --      .Body Fluid Pleural Fluid  06-28 @ 22:11   No growth at 5 days  --    polymorphonuclear leukocytes seen  No organisms seen  by cytocentrifuge      .Blood Blood  06-22 @ 18:22   No Growth Final  --  --          Radiology Results    < from: Xray Abdomen 2 View PORTABLE -Routine (Xray Abdomen 2 View PORTABLE -Routine .) (08.23.21 @ 12:38) >  IMPRESSION:  Mild diffuse small bowel dilatation and minimal air in colon suggesting ileus    < end of copied text >        Meds    MEDICATIONS  (STANDING):  apixaban 2.5 milliGRAM(s) Oral two times a day  chlorhexidine 2% Cloths 1 Application(s) Topical daily  epoetin zach-epbx (RETACRIT) Injectable 4000 Unit(s) IV Push <User Schedule>  hydrALAZINE 100 milliGRAM(s) Oral every 8 hours  insulin lispro (ADMELOG) corrective regimen sliding scale   SubCutaneous every 6 hours  metoprolol tartrate 25 milliGRAM(s) Oral two times a day  pantoprazole    Tablet 40 milliGRAM(s) Oral before breakfast  sodium chloride 0.9% lock flush 3 milliLiter(s) IV Push every 8 hours      MEDICATIONS  (PRN):      Physical Exam      Neuro :  no focal deficits  Respiratory: CTA B/L  CV: RRR, S1S2, no murmurs,   Abdominal: Soft, NT, ND +BS,   Extremities: No edema, + peripheral pulses      ASSESSMENT    Intestinal obstruction  sbo,  b/l pleural eff,   h/o ESRD on HD TTS,   DM type 2,   HTN,   Anemia of renal disease,   hyperphosphatemia,   Afib on A/C Eliquis  Hyperlipidemia        PLAN    d/c tele  abd xray with mild ileus noted above   NGT d/c'd  pt tolerating clears   adv to renal soft diet  OOB ambulate  cont eliquis,   cardio f/u   cont lopressor and hydralazine  resume losartan 100mg daily  trop x1 noted    renal f/u  pt on hd tu,th,sa   pt for hd today  pt to have Increase UF with HD given pleural effusions on CT chest   restart phoslo 2 tabs with meals once tolerating diet.  Monitor serum calcium and phosphorus.  pulm f/u   Bronchodilators  O2 Supp  Incentive Spirometry  cont current meds  d/c plan for am if stable

## 2021-08-24 NOTE — DIETITIAN INITIAL EVALUATION ADULT. - PERTINENT LABORATORY DATA
08-23 Na135 mmol/L Glu 128 mg/dL<H> K+ 5.5 mmol/L<H> Cr  8.63 mg/dL<H> BUN 68 mg/dL<H>   08-23 Phos 6.3 mg/dL<H>   08-21 Alb 3.9 g/dL       08-22 Chol 155 mg/dL LDL --    HDL 92 mg/dL Trig 47 mg/dL  08-22-21 @ 11:15 HgbA1C 6.1 [4.0 - 5.6]

## 2021-08-24 NOTE — DIETITIAN NUTRITION RISK NOTIFICATION - TREATMENT: THE FOLLOWING DIET HAS BEEN RECOMMENDED
Diet, Soft:   Consistent Carbohydrate {Evening Snacks}  For patients receiving Renal Replacement - No Protein Restr, No Conc K, No Conc Phos, Low Sodium (RENAL)  Supplement Feeding Modality:  Oral  Nepro Cans or Servings Per Day:  1       Frequency:  Daily (08-24-21 @ 10:21) [Pending Verification By Attending]

## 2021-08-25 DIAGNOSIS — R77.8 OTHER SPECIFIED ABNORMALITIES OF PLASMA PROTEINS: ICD-10-CM

## 2021-08-25 LAB
ALBUMIN SERPL ELPH-MCNC: 2.9 G/DL — LOW (ref 3.5–5)
ALP SERPL-CCNC: 65 U/L — SIGNIFICANT CHANGE UP (ref 40–120)
ALT FLD-CCNC: 8 U/L DA — LOW (ref 10–60)
ANION GAP SERPL CALC-SCNC: 10 MMOL/L — SIGNIFICANT CHANGE UP (ref 5–17)
APTT BLD: 32.6 SEC — SIGNIFICANT CHANGE UP (ref 27.5–35.5)
AST SERPL-CCNC: 11 U/L — SIGNIFICANT CHANGE UP (ref 10–40)
BILIRUB SERPL-MCNC: 0.3 MG/DL — SIGNIFICANT CHANGE UP (ref 0.2–1.2)
BUN SERPL-MCNC: 50 MG/DL — HIGH (ref 7–18)
CALCIUM SERPL-MCNC: 8.4 MG/DL — SIGNIFICANT CHANGE UP (ref 8.4–10.5)
CHLORIDE SERPL-SCNC: 97 MMOL/L — SIGNIFICANT CHANGE UP (ref 96–108)
CK MB BLD-MCNC: 5.2 % — HIGH (ref 0–3.5)
CK MB CFR SERPL CALC: 4.3 NG/ML — HIGH (ref 0–3.6)
CK SERPL-CCNC: 83 U/L — SIGNIFICANT CHANGE UP (ref 35–232)
CO2 SERPL-SCNC: 29 MMOL/L — SIGNIFICANT CHANGE UP (ref 22–31)
CREAT SERPL-MCNC: 7.07 MG/DL — HIGH (ref 0.5–1.3)
GLUCOSE BLDC GLUCOMTR-MCNC: 116 MG/DL — HIGH (ref 70–99)
GLUCOSE BLDC GLUCOMTR-MCNC: 137 MG/DL — HIGH (ref 70–99)
GLUCOSE BLDC GLUCOMTR-MCNC: 166 MG/DL — HIGH (ref 70–99)
GLUCOSE BLDC GLUCOMTR-MCNC: 170 MG/DL — HIGH (ref 70–99)
GLUCOSE SERPL-MCNC: 125 MG/DL — HIGH (ref 70–99)
HCT VFR BLD CALC: 28.4 % — LOW (ref 39–50)
HGB BLD-MCNC: 9.6 G/DL — LOW (ref 13–17)
MAGNESIUM SERPL-MCNC: 2.3 MG/DL — SIGNIFICANT CHANGE UP (ref 1.6–2.6)
MCHC RBC-ENTMCNC: 31.9 PG — SIGNIFICANT CHANGE UP (ref 27–34)
MCHC RBC-ENTMCNC: 33.8 GM/DL — SIGNIFICANT CHANGE UP (ref 32–36)
MCV RBC AUTO: 94.4 FL — SIGNIFICANT CHANGE UP (ref 80–100)
NRBC # BLD: 0 /100 WBCS — SIGNIFICANT CHANGE UP (ref 0–0)
PHOSPHATE SERPL-MCNC: 4.1 MG/DL — SIGNIFICANT CHANGE UP (ref 2.5–4.5)
PLATELET # BLD AUTO: 136 K/UL — LOW (ref 150–400)
POTASSIUM SERPL-MCNC: 5 MMOL/L — SIGNIFICANT CHANGE UP (ref 3.5–5.3)
POTASSIUM SERPL-SCNC: 5 MMOL/L — SIGNIFICANT CHANGE UP (ref 3.5–5.3)
PROT SERPL-MCNC: 6.2 G/DL — SIGNIFICANT CHANGE UP (ref 6–8.3)
RBC # BLD: 3.01 M/UL — LOW (ref 4.2–5.8)
RBC # FLD: 13.3 % — SIGNIFICANT CHANGE UP (ref 10.3–14.5)
SODIUM SERPL-SCNC: 136 MMOL/L — SIGNIFICANT CHANGE UP (ref 135–145)
TROPONIN I SERPL-MCNC: 1.19 NG/ML — HIGH (ref 0–0.04)
TROPONIN I SERPL-MCNC: 1.91 NG/ML — HIGH (ref 0–0.04)
WBC # BLD: 4.78 K/UL — SIGNIFICANT CHANGE UP (ref 3.8–10.5)
WBC # FLD AUTO: 4.78 K/UL — SIGNIFICANT CHANGE UP (ref 3.8–10.5)

## 2021-08-25 PROCEDURE — 74019 RADEX ABDOMEN 2 VIEWS: CPT | Mod: 26

## 2021-08-25 PROCEDURE — 99232 SBSQ HOSP IP/OBS MODERATE 35: CPT

## 2021-08-25 RX ORDER — HEPARIN SODIUM 5000 [USP'U]/ML
INJECTION INTRAVENOUS; SUBCUTANEOUS
Qty: 25000 | Refills: 0 | Status: DISCONTINUED | OUTPATIENT
Start: 2021-08-25 | End: 2021-08-26

## 2021-08-25 RX ORDER — DIGOXIN 250 MCG
125 TABLET ORAL EVERY OTHER DAY
Refills: 0 | Status: DISCONTINUED | OUTPATIENT
Start: 2021-08-25 | End: 2021-08-27

## 2021-08-25 RX ORDER — METOPROLOL TARTRATE 50 MG
25 TABLET ORAL EVERY 8 HOURS
Refills: 0 | Status: DISCONTINUED | OUTPATIENT
Start: 2021-08-25 | End: 2021-08-27

## 2021-08-25 RX ORDER — LOSARTAN POTASSIUM 100 MG/1
25 TABLET, FILM COATED ORAL DAILY
Refills: 0 | Status: DISCONTINUED | OUTPATIENT
Start: 2021-08-25 | End: 2021-08-27

## 2021-08-25 RX ADMIN — Medication 1334 MILLIGRAM(S): at 08:22

## 2021-08-25 RX ADMIN — CHLORHEXIDINE GLUCONATE 1 APPLICATION(S): 213 SOLUTION TOPICAL at 11:38

## 2021-08-25 RX ADMIN — SODIUM CHLORIDE 3 MILLILITER(S): 9 INJECTION INTRAMUSCULAR; INTRAVENOUS; SUBCUTANEOUS at 14:17

## 2021-08-25 RX ADMIN — SODIUM CHLORIDE 3 MILLILITER(S): 9 INJECTION INTRAMUSCULAR; INTRAVENOUS; SUBCUTANEOUS at 21:11

## 2021-08-25 RX ADMIN — HEPARIN SODIUM 900 UNIT(S)/HR: 5000 INJECTION INTRAVENOUS; SUBCUTANEOUS at 21:04

## 2021-08-25 RX ADMIN — Medication 100 MILLIGRAM(S): at 14:25

## 2021-08-25 RX ADMIN — Medication 1334 MILLIGRAM(S): at 17:08

## 2021-08-25 RX ADMIN — Medication 1: at 17:07

## 2021-08-25 RX ADMIN — Medication 25 MILLIGRAM(S): at 14:25

## 2021-08-25 RX ADMIN — Medication 1: at 11:37

## 2021-08-25 RX ADMIN — LOSARTAN POTASSIUM 100 MILLIGRAM(S): 100 TABLET, FILM COATED ORAL at 05:20

## 2021-08-25 RX ADMIN — APIXABAN 2.5 MILLIGRAM(S): 2.5 TABLET, FILM COATED ORAL at 05:20

## 2021-08-25 RX ADMIN — Medication 25 MILLIGRAM(S): at 05:21

## 2021-08-25 RX ADMIN — Medication 125 MICROGRAM(S): at 11:37

## 2021-08-25 RX ADMIN — Medication 1334 MILLIGRAM(S): at 11:37

## 2021-08-25 RX ADMIN — PANTOPRAZOLE SODIUM 40 MILLIGRAM(S): 20 TABLET, DELAYED RELEASE ORAL at 05:21

## 2021-08-25 RX ADMIN — Medication 25 MILLIGRAM(S): at 21:03

## 2021-08-25 RX ADMIN — SODIUM CHLORIDE 3 MILLILITER(S): 9 INJECTION INTRAMUSCULAR; INTRAVENOUS; SUBCUTANEOUS at 05:20

## 2021-08-25 RX ADMIN — Medication 100 MILLIGRAM(S): at 05:20

## 2021-08-25 RX ADMIN — Medication 100 MILLIGRAM(S): at 21:03

## 2021-08-25 NOTE — PROGRESS NOTE ADULT - SUBJECTIVE AND OBJECTIVE BOX
PGY1 Progress Note discussed with attending     PAGER #: [[phone]] TILL 5:00 PM  PLEASE CONTACT ON CALL TEAM:  - On Call Team (Please refer to Yolanda) FROM 5:00 PM - 8:30PM  - Nightfloat Team FROM 8:30 -7:30 AM    CHIEF COMPLAINT & BRIEF HOSPITAL COURSE:    INTERVAL HPI/OVERNIGHT EVENTS:       REVIEW OF SYSTEMS:  CONSTITUTIONAL: No fever, weight loss, or fatigue  RESPIRATORY: No cough, wheezing, chills or hemoptysis; No shortness of breath  CARDIOVASCULAR: No chest pain, palpitations, dizziness, or leg swelling  GASTROINTESTINAL: No abdominal pain. No nausea, vomiting, or hematemesis; No diarrhea or constipation. No melena or hematochezia.  GENITOURINARY: No dysuria or hematuria, urinary frequency  NEUROLOGICAL: No headaches, memory loss, loss of strength, numbness, or tremors  SKIN: No itching, burning, rashes, or lesions     Vital Signs Last 24 Hrs  T(C): 36.3 (25 Aug 2021 04:58), Max: 36.9 (24 Aug 2021 11:29)  T(F): 97.4 (25 Aug 2021 04:58), Max: 98.5 (24 Aug 2021 15:22)  HR: 86 (25 Aug 2021 04:58) (54 - 130)  BP: 152/71 (25 Aug 2021 04:58) (114/53 - 187/65)  BP(mean): --  RR: 18 (25 Aug 2021 04:58) (16 - 19)  SpO2: 100% (25 Aug 2021 04:58) (96% - 100%)    PHYSICAL EXAMINATION:  GENERAL: NAD, well built  HEAD:  Atraumatic, Normocephalic  EYES:  conjunctiva and sclera clear  NECK: Supple, No JVD, Normal thyroid  CHEST/LUNG: Clear to auscultation. Clear to percussion bilaterally; No rales, rhonchi, wheezing, or rubs  HEART: Regular rate and rhythm; No murmurs, rubs, or gallops  ABDOMEN: Soft, Nontender, Nondistended; Bowel sounds present  NERVOUS SYSTEM:  Alert & Oriented X3,    EXTREMITIES:  2+ Peripheral Pulses, No clubbing, cyanosis, or edema  SKIN: warm dry                          9.6    4.78  )-----------( 136      ( 25 Aug 2021 05:06 )             28.4     08-25    136  |  97  |  50<H>  ----------------------------<  125<H>  5.0   |  29  |  7.07<H>    Ca    8.4      25 Aug 2021 05:06  Phos  4.1     08-25  Mg     2.3     08-25    TPro  6.2  /  Alb  2.9<L>  /  TBili  0.3  /  DBili  x   /  AST  11  /  ALT  8<L>  /  AlkPhos  65  08-25    LIVER FUNCTIONS - ( 25 Aug 2021 05:06 )  Alb: 2.9 g/dL / Pro: 6.2 g/dL / ALK PHOS: 65 U/L / ALT: 8 U/L DA / AST: 11 U/L / GGT: x           CARDIAC MARKERS ( 25 Aug 2021 05:06 )  1.910 ng/mL / x     / 83 U/L / x     / 4.3 ng/mL  CARDIAC MARKERS ( 24 Aug 2021 21:41 )  0.864 ng/mL / x     / x     / x     / x      CARDIAC MARKERS ( 24 Aug 2021 15:05 )  0.111 ng/mL / x     / x     / x     / x              CAPILLARY BLOOD GLUCOSE      RADIOLOGY & ADDITIONAL TESTS:                   PGY1 Progress Note discussed with attending     PAGER #: [[phone]] TILL 5:00 PM  PLEASE CONTACT ON CALL TEAM:  - On Call Team (Please refer to Yolanda) FROM 5:00 PM - 8:30PM  - Nightfloat Team FROM 8:30 -7:30 AM    CHIEF COMPLAINT & BRIEF HOSPITAL COURSE:    INTERVAL HPI/OVERNIGHT EVENTS: Patient underwent HD yesterday, however did not complete HD session due to Afib with RVR in 140's and with BP of 80/40's. Patient complained of chest pain. Total 600cc of fluid given, bp improved to 120's SBP, 5mg Metoprolol IV given at dialysis and another 5mg Metoprolol 5mg given on floor. No acute events overnight. Patient says he is feeling better. Denies chest pain, sob and palpitations. Denies abdominal pain.       REVIEW OF SYSTEMS:  CONSTITUTIONAL: No fever, weight loss, or fatigue  RESPIRATORY: No cough, wheezing, chills or hemoptysis; No shortness of breath  CARDIOVASCULAR: No chest pain, palpitations, dizziness, or leg swelling  GASTROINTESTINAL: No abdominal pain. No nausea, vomiting, or hematemesis; No diarrhea or constipation. No melena or hematochezia.  GENITOURINARY: No dysuria or hematuria, urinary frequency  NEUROLOGICAL: No headaches, memory loss, loss of strength, numbness, or tremors  SKIN: No itching, burning, rashes, or lesions     Vital Signs Last 24 Hrs  T(C): 36.3 (25 Aug 2021 04:58), Max: 36.9 (24 Aug 2021 11:29)  T(F): 97.4 (25 Aug 2021 04:58), Max: 98.5 (24 Aug 2021 15:22)  HR: 86 (25 Aug 2021 04:58) (54 - 130)  BP: 152/71 (25 Aug 2021 04:58) (114/53 - 187/65)  BP(mean): --  RR: 18 (25 Aug 2021 04:58) (16 - 19)  SpO2: 100% (25 Aug 2021 04:58) (96% - 100%)    PHYSICAL EXAMINATION:  GENERAL: NAD, well built  HEAD:  Atraumatic, Normocephalic  EYES:  conjunctiva and sclera clear  NECK: Supple, No JVD, Normal thyroid  CHEST/LUNG: Clear to auscultation.  No rales, rhonchi, wheezing, or rubs  HEART: Regular rate and rhythm; No murmurs, rubs, or gallops  ABDOMEN: Soft, Nontender, Nondistended; Bowel sounds present  NERVOUS SYSTEM:  Alert & Oriented X3,    EXTREMITIES:  2+ Peripheral Pulses, No clubbing, cyanosis, or edema  SKIN: warm dry                          9.6    4.78  )-----------( 136      ( 25 Aug 2021 05:06 )             28.4     08-25    136  |  97  |  50<H>  ----------------------------<  125<H>  5.0   |  29  |  7.07<H>    Ca    8.4      25 Aug 2021 05:06  Phos  4.1     08-25  Mg     2.3     08-25    TPro  6.2  /  Alb  2.9<L>  /  TBili  0.3  /  DBili  x   /  AST  11  /  ALT  8<L>  /  AlkPhos  65  08-25    LIVER FUNCTIONS - ( 25 Aug 2021 05:06 )  Alb: 2.9 g/dL / Pro: 6.2 g/dL / ALK PHOS: 65 U/L / ALT: 8 U/L DA / AST: 11 U/L / GGT: x           CARDIAC MARKERS ( 25 Aug 2021 05:06 )  1.910 ng/mL / x     / 83 U/L / x     / 4.3 ng/mL  CARDIAC MARKERS ( 24 Aug 2021 21:41 )  0.864 ng/mL / x     / x     / x     / x      CARDIAC MARKERS ( 24 Aug 2021 15:05 )  0.111 ng/mL / x     / x     / x     / x              CAPILLARY BLOOD GLUCOSE      RADIOLOGY & ADDITIONAL TESTS:

## 2021-08-25 NOTE — PROGRESS NOTE ADULT - SUBJECTIVE AND OBJECTIVE BOX
Pt is awake, alert, lying in bed in NAD. Trop elevated. Cardio following. Hd yesterday.     INTERVAL HPI/OVERNIGHT EVENTS:      VITAL SIGNS:  T(F): 98.1 (08-25-21 @ 11:04)  HR: 99 (08-25-21 @ 11:04)  BP: 142/70 (08-25-21 @ 11:04)  RR: 18 (08-25-21 @ 11:04)  SpO2: 98% (08-25-21 @ 11:04)  Wt(kg): --  I&O's Detail    24 Aug 2021 07:01  -  25 Aug 2021 07:00  --------------------------------------------------------  IN:    Oral Fluid: 50 mL  Total IN: 50 mL    OUT:    Voided (mL): 200 mL  Total OUT: 200 mL    Total NET: -150 mL      25 Aug 2021 07:01  -  25 Aug 2021 12:01  --------------------------------------------------------  IN:    Oral Fluid: 450 mL  Total IN: 450 mL    OUT:  Total OUT: 0 mL    Total NET: 450 mL              REVIEW OF SYSTEMS:    CONSTITUTIONAL:  No fevers, chills, sweats    HEENT:  Eyes:  No diplopia or blurred vision. ENT:  No earache, sore throat or runny nose.    CARDIOVASCULAR:  No pressure, squeezing, tightness, or heaviness about the chest; no palpitations.    RESPIRATORY:  Per HPI    GASTROINTESTINAL:  No abdominal pain, nausea, vomiting or diarrhea.    GENITOURINARY:  No dysuria, frequency or urgency.    NEUROLOGIC:  No paresthesias, fasciculations, seizures or weakness.    PSYCHIATRIC:  No disorder of thought or mood.      PHYSICAL EXAM:    Constitutional: Well developed and nourished  Eyes:Perrla  ENMT: normal  Neck:supple  Respiratory: good air entry  Cardiovascular: S1 S2 regular  Gastrointestinal: Soft, Non tender  Extremities: No edema  Vascular:normal  Neurological:Awake, alert,Ox3  Musculoskeletal:Normal      MEDICATIONS  (STANDING):  apixaban 2.5 milliGRAM(s) Oral two times a day  calcium acetate 1334 milliGRAM(s) Oral three times a day with meals  chlorhexidine 2% Cloths 1 Application(s) Topical daily  digoxin     Tablet 125 MICROGram(s) Oral every other day  epoetin zach-epbx (RETACRIT) Injectable 4000 Unit(s) IV Push <User Schedule>  hydrALAZINE 100 milliGRAM(s) Oral every 8 hours  insulin lispro (ADMELOG) corrective regimen sliding scale   SubCutaneous three times a day before meals  losartan 25 milliGRAM(s) Oral daily  metoprolol tartrate 25 milliGRAM(s) Oral every 8 hours  pantoprazole    Tablet 40 milliGRAM(s) Oral before breakfast  sodium chloride 0.9% lock flush 3 milliLiter(s) IV Push every 8 hours    MEDICATIONS  (PRN):      Allergies    No Known Allergies    Intolerances        LABS:                        9.6    4.78  )-----------( 136      ( 25 Aug 2021 05:06 )             28.4     08-25    136  |  97  |  50<H>  ----------------------------<  125<H>  5.0   |  29  |  7.07<H>    Ca    8.4      25 Aug 2021 05:06  Phos  4.1     08-25  Mg     2.3     08-25    TPro  6.2  /  Alb  2.9<L>  /  TBili  0.3  /  DBili  x   /  AST  11  /  ALT  8<L>  /  AlkPhos  65  08-25          CARDIAC MARKERS ( 25 Aug 2021 05:06 )  1.910 ng/mL / x     / 83 U/L / x     / 4.3 ng/mL  CARDIAC MARKERS ( 24 Aug 2021 21:41 )  0.864 ng/mL / x     / x     / x     / x      CARDIAC MARKERS ( 24 Aug 2021 15:05 )  0.111 ng/mL / x     / x     / x     / x          CAPILLARY BLOOD GLUCOSE      POCT Blood Glucose.: 166 mg/dL (25 Aug 2021 11:18)  POCT Blood Glucose.: 137 mg/dL (25 Aug 2021 08:02)  POCT Blood Glucose.: 182 mg/dL (24 Aug 2021 17:05)    Troponin I, Serum (08.25.21 @ 05:06)   Troponin I, Serum: 1.910     RADIOLOGY & ADDITIONAL TESTS:    CXR:    Ct scan chest:    ekg;    echo:

## 2021-08-25 NOTE — PROGRESS NOTE ADULT - SUBJECTIVE AND OBJECTIVE BOX
CHIEF COMPLAINT:Patient is a 86y old  Male who presents with a chief complaint of ABDOMINAL PAIN .Pt s/p chest pain at dialysis,afib with rvr.    	  REVIEW OF SYSTEMS:  CONSTITUTIONAL: No fever, weight loss, or fatigue  EYES: No eye pain, visual disturbances, or discharge  ENT:  No difficulty hearing, tinnitus, vertigo; No sinus or throat pain  NECK: No pain or stiffness  RESPIRATORY: No cough, wheezing, chills or hemoptysis; No Shortness of Breath  CARDIOVASCULAR: No chest pain, palpitations, passing out, dizziness, or leg swelling  GASTROINTESTINAL: No abdominal or epigastric pain. No nausea, vomiting, or hematemesis; No diarrhea or constipation. No melena or hematochezia.  GENITOURINARY: No dysuria, frequency, hematuria, or incontinence  NEUROLOGICAL: No headaches, memory loss, loss of strength, numbness, or tremors  SKIN: No itching, burning, rashes, or lesions   LYMPH Nodes: No enlarged glands  ENDOCRINE: No heat or cold intolerance; No hair loss  MUSCULOSKELETAL: No joint pain or swelling; No muscle, back, or extremity pain  PSYCHIATRIC: No depression, anxiety, mood swings, or difficulty sleeping  HEME/LYMPH: No easy bruising, or bleeding gums  ALLERGY AND IMMUNOLOGIC: No hives or eczema	        PHYSICAL EXAM:  T(C): 36.6 (08-25-21 @ 07:53), Max: 36.9 (08-24-21 @ 11:29)  HR: 57 (08-25-21 @ 07:53) (55 - 130)  BP: 158/66 (08-25-21 @ 07:53) (114/53 - 187/65)  RR: 18 (08-25-21 @ 07:53) (16 - 19)  SpO2: 98% (08-25-21 @ 07:53) (96% - 100%)  Wt(kg): --  I&O's Summary    24 Aug 2021 07:01  -  25 Aug 2021 07:00  --------------------------------------------------------  IN: 50 mL / OUT: 200 mL / NET: -150 mL    25 Aug 2021 07:01  -  25 Aug 2021 09:44  --------------------------------------------------------  IN: 220 mL / OUT: 0 mL / NET: 220 mL        Appearance: Normal	  HEENT:   Normal oral mucosa, PERRL, EOMI	  Lymphatic: No lymphadenopathy  Cardiovascular: Normal S1 S2, No JVD, No murmurs, No edema  Respiratory: Lungs clear to auscultation	  Psychiatry: A & O x 3, Mood & affect appropriate  Gastrointestinal:  Soft, Non-tender, + BS	  Skin: No rashes, No ecchymoses, No cyanosis	  Neurologic: Non-focal  Extremities: Normal range of motion, No clubbing, cyanosis or edema  Vascular: Peripheral pulses palpable 2+ bilaterally    MEDICATIONS  (STANDING):  apixaban 2.5 milliGRAM(s) Oral two times a day  calcium acetate 1334 milliGRAM(s) Oral three times a day with meals  chlorhexidine 2% Cloths 1 Application(s) Topical daily  digoxin     Tablet 125 MICROGram(s) Oral every other day  epoetin zach-epbx (RETACRIT) Injectable 4000 Unit(s) IV Push <User Schedule>  hydrALAZINE 100 milliGRAM(s) Oral every 8 hours  insulin lispro (ADMELOG) corrective regimen sliding scale   SubCutaneous three times a day before meals  losartan 25 milliGRAM(s) Oral daily  metoprolol tartrate 25 milliGRAM(s) Oral every 8 hours  pantoprazole    Tablet 40 milliGRAM(s) Oral before breakfast  sodium chloride 0.9% lock flush 3 milliLiter(s) IV Push every 8 hours      TELEMETRY: 	afib upto 130's      LABS:	 	      CARDIAC MARKERS ( 25 Aug 2021 05:06 )  1.910 ng/mL / x     / 83 U/L / x     / 4.3 ng/mL  CARDIAC MARKERS ( 24 Aug 2021 21:41 )  0.864 ng/mL / x     / x     / x     / x      CARDIAC MARKERS ( 24 Aug 2021 15:05 )  0.111 ng/mL / x     / x     / x     / x                              9.6    4.78  )-----------( 136      ( 25 Aug 2021 05:06 )             28.4     08-25    136  |  97  |  50<H>  ----------------------------<  125<H>  5.0   |  29  |  7.07<H>    Ca    8.4      25 Aug 2021 05:06  Phos  4.1     08-25  Mg     2.3     08-25    TPro  6.2  /  Alb  2.9<L>  /  TBili  0.3  /  DBili  x   /  AST  11  /  ALT  8<L>  /  AlkPhos  65  08-25      Lipid Profile: Cholesterol 155  LDL --  HDL 92  TG 47      TSH: Thyroid Stimulating Hormone, Serum: 1.06 uU/mL (08-22 @ 07:31)      ech< from: Transthoracic Echocardiogram (05.02.21 @ 07:20) >  OBSERVATIONS:  Mitral Valve: Mitral annular calcification. Mild mitral  regurgitation.  Aortic Root: Aortic Root: 3.3 cm.    Aortic Valve: Calcified trileaflet aortic valve with normal  opening.  Left Atrium: Severely dilated left atrium.  LA volume index  = 67 cc/m2.  Left Ventricle: Normal Left Ventricular Systolic Function,  (EF = 55 to 60%) Normal left ventricular internal  dimensions and wall thicknesses. Normal diastolic function.  Right Heart: Normal right atrium. Normal right ventricular  size and systolic function (TAPSE  1.7cm). There is mild  tricuspid regurgitation. There is mild pulmonic  regurgitation.  Pericardium/PleuraNormal pericardium with no pericardial  effusion. Left pleural effusion.  Hemodynamic: RV systolic pressure is mildly increased at  46 mm Hg.       Nuclear Stress Test-Pharmacologic (05.03.21 @ 08:01) >  IMPRESSIONS:  * There is a small, fixed, mild intensity defect in the  mid inferolateral wall that thickens, consistent with  attenuation artifact.  * Post-stress resting myocardial perfusion gated SPECT  imaging was performed (LVEF > 70%;LVEDV = 94 ml.)  * Negative study for reversible ischemia    ------------------------------------------------------------------------      ------------------------------------------------------------------------    Confirmed on  5/3/2021 - 12:37:33 at [place] by  Alhaji Lay MD  ------------------------------------------------------------------------

## 2021-08-25 NOTE — ACUTE INTERFACILITY TRANSFER NOTE - HOSPITAL COURSE
87 yo Male with ESRD on HD TTS, DM type 2, HTN, Anemia of renal disease, hyperphosphatemia, Afib on A/C Eliquis Presented with diffuse abdominal pain x 1 day,  CT abdomen showed Small bowel obstruction, with a transition point in the left upper quadrant, and a 2nd transition point in the right lower quadrant. Surgery consulted and no surgical intervention. Started diet and tolerated well. Pt had Afib with RVR and was hypotensive on 8/24 during dialysis. s.p 600ml of IVF. Will transfer for cardiac cath on 8/26. 87 yo Male with Afib, ESRD on HD TTS, DM type 2, HTN, Anemia of renal disease, hyperphosphatemia, Afib on A/C Eliquis Presented with diffuse abdominal pain x 1 day on August 21. CT abdomen showed Small bowel obstruction, with a transition point in the left upper quadrant, and a 2nd transition point in the right lower quadrant. Surgery consulted and no surgical intervention. Management was with NGTl ow continuous suction, serial abdominal exams and x ray of abdomen   Patient was seen by nephrology for HD recoomendations, patient had a HD session on 8/21. Patient was found to have abnormal cxr. Findings were significant for  CXR showed increased bilateral effusions and moderate left effusion and increasing pseudotumor in the minor fissure compared to June 28 of this year.  -CT abdomen non contrast showed moderate to large left pleural effusion left lower lobe atelectasis/collapse. Streaky right basilar atelectasis with trace right pleural effusion. Pulmonolgy was consulted and reccomended CT chest. CT chest showed  a Large left and trace right pleural effusions not significantly changed changed from 6/22/2021. Atelectasis of basilar left lower lobe, unchanged. Interlobular septal thickening and patchy groundglass opacities representing pulmonary edema. Cardiology also sa the patient due to his his Afib and was continued on his home meds including metoprolol and eliquis. Patient remained stable on floor. On 8/23 NGT removed, patient had flatus and clear liquid diet wss resumed. Adominal radiographs showed improvement of SBO  Patient remained stable, however on 8/24 during dialysis patient found to be in Afib with RVR (rate in 150's) hypotenisve with 80'SBP/40's DBP.  Total of 600 cc bolus fluid given blood pressure improved to 120's sbp. Patient given 5mg Metoprolol IV at dialysis. Dialysis was stopped. When patient returned to floor HR was in 130s with stable BP, another 5mg Metoprolol was given. HR is in 120s.  . Patient complained of chest pain troponin was 0.111 repeat at 9pm. Repeat troponins were 0.8 and 1.9. Repeat EKG showed A fib with RVR. Patient HR was in 80's. On 8/25 cardiology recommended starting Digoxin for Afib. For the chest  pain increased troponin most likely due to demand ischemia. Cardiology recommended cardiac cath-r/o cad since recent neg stress test,possible false negative. Patient to be transferred for Cardiac cath on 8/26.

## 2021-08-25 NOTE — PROGRESS NOTE ADULT - ASSESSMENT
87 y/o male with a resolved SBO      1. Diet as tolerated  2. Out of bed  3. No further surgical intervention necessary, reconsult as needed  4. Discussed with Dr. Palencia

## 2021-08-25 NOTE — PROGRESS NOTE ADULT - SUBJECTIVE AND OBJECTIVE BOX
Patient is a 86y old  Male who presents with a chief complaint of ABDOMINAL PAIN (25 Aug 2021 07:36)    pt seen in icu [  ], reg med floor [   ], bed [  ], chair at bedside [   ], a+o x3 [  ], lethargic [  ],  nad [  ]    rea [  ], ngt [  ], peg [  ], et tube [  ], cent line [  ], picc line [  ]        Allergies    No Known Allergies        Vitals    T(F): 97.8 (08-25-21 @ 07:53), Max: 98.5 (08-24-21 @ 15:22)  HR: 57 (08-25-21 @ 07:53) (55 - 130)  BP: 158/66 (08-25-21 @ 07:53) (114/53 - 187/65)  RR: 18 (08-25-21 @ 07:53) (16 - 19)  SpO2: 98% (08-25-21 @ 07:53) (96% - 100%)  Wt(kg): --  CAPILLARY BLOOD GLUCOSE      POCT Blood Glucose.: 137 mg/dL (25 Aug 2021 08:02)      Labs                          9.6    4.78  )-----------( 136      ( 25 Aug 2021 05:06 )             28.4       08-25    136  |  97  |  50<H>  ----------------------------<  125<H>  5.0   |  29  |  7.07<H>    Ca    8.4      25 Aug 2021 05:06  Phos  4.1     08-25  Mg     2.3     08-25    TPro  6.2  /  Alb  2.9<L>  /  TBili  0.3  /  DBili  x   /  AST  11  /  ALT  8<L>  /  AlkPhos  65  08-25      CARDIAC MARKERS ( 25 Aug 2021 05:06 )  1.910 ng/mL / x     / 83 U/L / x     / 4.3 ng/mL  CARDIAC MARKERS ( 24 Aug 2021 21:41 )  0.864 ng/mL / x     / x     / x     / x      CARDIAC MARKERS ( 24 Aug 2021 15:05 )  0.111 ng/mL / x     / x     / x     / x            .Nose Nose  08-21 @ 16:30   No Methicillin Resistant Staphylococcus aureus  No Methicillin Sensitive Staphylococcus aureus  "This can represent normal nasal carriage. PCR is more  --  --      .Body Fluid Pleural Fluid  06-28 @ 22:11   No growth at 5 days  --    polymorphonuclear leukocytes seen  No organisms seen  by cytocentrifuge      .Blood Blood  06-22 @ 18:22   No Growth Final  --  --          Radiology Results      Meds    MEDICATIONS  (STANDING):  apixaban 2.5 milliGRAM(s) Oral two times a day  calcium acetate 1334 milliGRAM(s) Oral three times a day with meals  chlorhexidine 2% Cloths 1 Application(s) Topical daily  epoetin zach-epbx (RETACRIT) Injectable 4000 Unit(s) IV Push <User Schedule>  hydrALAZINE 100 milliGRAM(s) Oral every 8 hours  insulin lispro (ADMELOG) corrective regimen sliding scale   SubCutaneous three times a day before meals  losartan 100 milliGRAM(s) Oral daily  metoprolol tartrate 25 milliGRAM(s) Oral two times a day  pantoprazole    Tablet 40 milliGRAM(s) Oral before breakfast  sodium chloride 0.9% lock flush 3 milliLiter(s) IV Push every 8 hours      MEDICATIONS  (PRN):      Physical Exam    Neuro :  no focal deficits  Respiratory: CTA B/L  CV: RRR, S1S2, no murmurs,   Abdominal: Soft, NT, ND +BS,  Extremities: No edema, + peripheral pulses    ASSESSMENT    Intestinal obstruction    Yes    No pertinent past medical history    ESRD (end stage renal disease)    Diabetes    Hypertension    Hyperlipidemia    Atrial fibrillation    No significant past surgical history    S/P hemodialysis catheter insertion    History of appendectomy        PLAN     Patient is a 86y old  Male who presents with a chief complaint of ABDOMINAL PAIN (25 Aug 2021 07:36)      pt seen in tele [ x ], reg med floor [   ], bed [x  ], chair at bedside [   ], awake and responsive [ x ], lethargic [  ],  nad [x  ]      pt s/p episode of afib w/ rvr in hd yesterday    Allergies    No Known Allergies        Vitals    T(F): 97.8 (08-25-21 @ 07:53), Max: 98.5 (08-24-21 @ 15:22)  HR: 57 (08-25-21 @ 07:53) (55 - 130)  BP: 158/66 (08-25-21 @ 07:53) (114/53 - 187/65)  RR: 18 (08-25-21 @ 07:53) (16 - 19)  SpO2: 98% (08-25-21 @ 07:53) (96% - 100%)  Wt(kg): --  CAPILLARY BLOOD GLUCOSE      POCT Blood Glucose.: 137 mg/dL (25 Aug 2021 08:02)      Labs                          9.6    4.78  )-----------( 136      ( 25 Aug 2021 05:06 )             28.4       08-25    136  |  97  |  50<H>  ----------------------------<  125<H>  5.0   |  29  |  7.07<H>    Ca    8.4      25 Aug 2021 05:06  Phos  4.1     08-25  Mg     2.3     08-25    TPro  6.2  /  Alb  2.9<L>  /  TBili  0.3  /  DBili  x   /  AST  11  /  ALT  8<L>  /  AlkPhos  65  08-25      CARDIAC MARKERS ( 25 Aug 2021 05:06 )  1.910 ng/mL / x     / 83 U/L / x     / 4.3 ng/mL  CARDIAC MARKERS ( 24 Aug 2021 21:41 )  0.864 ng/mL / x     / x     / x     / x      CARDIAC MARKERS ( 24 Aug 2021 15:05 )  0.111 ng/mL / x     / x     / x     / x            .Nose Nose  08-21 @ 16:30   No Methicillin Resistant Staphylococcus aureus  No Methicillin Sensitive Staphylococcus aureus  "This can represent normal nasal carriage. PCR is more  --  --      .Body Fluid Pleural Fluid  06-28 @ 22:11   No growth at 5 days  --    polymorphonuclear leukocytes seen  No organisms seen  by cytocentrifuge      .Blood Blood  06-22 @ 18:22   No Growth Final  --  --          Radiology Results      Meds    MEDICATIONS  (STANDING):  apixaban 2.5 milliGRAM(s) Oral two times a day  calcium acetate 1334 milliGRAM(s) Oral three times a day with meals  chlorhexidine 2% Cloths 1 Application(s) Topical daily  epoetin zach-epbx (RETACRIT) Injectable 4000 Unit(s) IV Push <User Schedule>  hydrALAZINE 100 milliGRAM(s) Oral every 8 hours  insulin lispro (ADMELOG) corrective regimen sliding scale   SubCutaneous three times a day before meals  losartan 100 milliGRAM(s) Oral daily  metoprolol tartrate 25 milliGRAM(s) Oral two times a day  pantoprazole    Tablet 40 milliGRAM(s) Oral before breakfast  sodium chloride 0.9% lock flush 3 milliLiter(s) IV Push every 8 hours      MEDICATIONS  (PRN):      Physical Exam    Neuro :  no focal deficits  Respiratory: CTA B/L  CV: RRR, S1S2, no murmurs,   Abdominal: Soft, NT, ND +BS,   Extremities: No edema, + peripheral pulses      ASSESSMENT    Intestinal obstruction  sbo,  b/l pleural eff,   h/o ESRD on HD TTS,   DM type 2,   HTN,   Anemia of renal disease,   hyperphosphatemia,   Afib on A/C Eliquis  Hyperlipidemia        PLAN    d/c tele  abd xray with mild ileus noted above   NGT d/c'd  pt tolerating clears   adv to renal soft diet  OOB ambulate  cont eliquis,   cardio f/u   cont lopressor and hydralazine  cont losartan 100mg daily  trop x1 noted    renal f/u  pt on hd tu,th,sa   hd as per renal  pt to have Increase UF with HD given pleural effusions on CT chest   restart phoslo 2 tabs with meals once tolerating diet.  Monitor serum calcium and phosphorus.  pulm f/u   Bronchodilators  O2 Supp  Incentive Spirometry  cont current meds

## 2021-08-25 NOTE — PROGRESS NOTE ADULT - NSPROGADDITIONALINFOA_GEN_ALL_CORE
advancing diet to reg  stable with soft abdomen
feeling better  abdomen soft  clears
pt clinically stable  abdomen soft

## 2021-08-25 NOTE — PROGRESS NOTE ADULT - SUBJECTIVE AND OBJECTIVE BOX
[facility] NEPHROLOGY- PROGRESS NOTE    86y Male with history of ESRD on HD presents with abdominal pain. Nephrology consulted for ESRD status.  8/23- NGT removed  and started on CLD    REVIEW OF SYSTEMS:  Gen: no changes in weight  Cards: no chest pain  Resp: no dyspnea  GI: no nausea or vomiting or diarrhea,  ab pain resolved   Vascular: no LE edema    No Known Allergies      Hospital Medications: Medications reviewed    VITALS:  T(F): 98.1 (08-25-21 @ 11:04), Max: 98.5 (08-24-21 @ 15:22)  HR: 99 (08-25-21 @ 11:04)  BP: 142/70 (08-25-21 @ 11:04)  RR: 18 (08-25-21 @ 11:04)  SpO2: 98% (08-25-21 @ 11:04)  Wt(kg): --    08-24 @ 07:01  -  08-25 @ 07:00  --------------------------------------------------------  IN: 50 mL / OUT: 200 mL / NET: -150 mL    08-25 @ 07:01  -  08-25 @ 15:04  --------------------------------------------------------  IN: 450 mL / OUT: 0 mL / NET: 450 mL      PHYSICAL EXAM:    Gen: NAD, calm  Cards: RRR, +S1/S2, no M/G/R  Resp: CTA B/L  GI: soft, NT/ND, NABS +BS  Vascular: no LE edema B/L, LUE AVF + bruit/thrill with area of hypopigmentation      LABS:  08-25    136  |  97  |  50<H>  ----------------------------<  125<H>  5.0   |  29  |  7.07<H>    Ca    8.4      25 Aug 2021 05:06  Phos  4.1     08-25  Mg     2.3     08-25    TPro  6.2  /  Alb  2.9<L>  /  TBili  0.3  /  DBili      /  AST  11  /  ALT  8<L>  /  AlkPhos  65  08-25    Creatinine Trend: 7.07 <--, 5.19 <--, 10.40 <--, 8.63 <--, 6.56 <--, 5.90 <--, 7.99 <--                        9.6    4.78  )-----------( 136      ( 25 Aug 2021 05:06 )             28.4     Urine Studies:                 36.7

## 2021-08-25 NOTE — PROGRESS NOTE ADULT - ASSESSMENT
1. SBO   CT noted  Diet per surgery.   Abd XR noted above 8/23.   Surgical F/U   Zofran PRN for nausea  Pain control.     2. Pleural Effusion  On HD; continue TTS   CT chest noted - effusion large (left) and trace (right) as well as ascites.   HD yesterday   Repeat CXR   O2 Supp PRN     3. Atelectasis  Bronchodilators  O2 Supp  Incentive Spirometry    4. Abnormal CXR   XR notes increasing pseudotumor in minor fissure compared to 6/2021.  CT chest noted; as above.     5. ESRD   Avoid nephrotoxic drugs  Correct electrolyte imbalance.   Lokelma PRN   HD - TTS  Renal F/U   Monitor labs     6. Afib   A/C   Cardio F/U     7. DM   Accuchecks ac/hs  Insulin coverage per HSS   A1C level     8. Elevated Troponin  Likely 2nd to demand ischemia   May need cardiac cath - given recent negative ST per cardio.   Cardio F/U

## 2021-08-25 NOTE — PROGRESS NOTE ADULT - ASSESSMENT
86 yr old male with PMHX of PAF, ESRD on HD, DM, HTN, Lipid d/o who presents to ER with abdominal pain, nausea and vomiting, with SBO-now resolved,PAF with RVR,CP and now Type II MI-due to demand ischemia...  1.Tele monitoring  2.Surgical f/u noted.  3.ESRD-HD as per renal.  4.PAF- eliquis,, inc lopressor 25mg qd and add dig .125mg qod.  5.HTN-cont lopressor, hydralazine,dec cozaar 25mg qd.  6.CP, +troponin most likely due to demand ischemia,rec cardiac cath-r/o cad since recent neg stress test,possible false negative.Pt request I call Dr.Kevin Kulkarni-PCP/Cardiologist,left him message at [phone].  7.PPI.

## 2021-08-25 NOTE — ACUTE INTERFACILITY TRANSFER NOTE - PLAN OF CARE
- Troponin uptrending to 1.9  - Cardiac cath (transfer on 8/26) Patient complaining of chest pain, troponin trending upwards to 1.91. Patient had negative stress test, can be false negative. Transfer for cardiac cath on 8/26. -TTS Schedule  - Unable to complete session on Tuesday, electrolytes wnl  - Patient will need HD session today -CT abdomen showed Small bowel obstruction, with a transition point in the left upper quadrant, and a 2nd transition point in the right lower quadrant. Surgery consulted and no surgical intervention.  - Patient tolerating clear liquid diet. Can resume diet as tolerated per surgery. - Currently K of 5.0.

## 2021-08-25 NOTE — PROGRESS NOTE ADULT - ASSESSMENT
86y Male with history of ESRD on HD presents with abdominal pain. Nephrology consulted for ESRD status.    1) ESRD: Last HD on 8/24 terminated 40 mins early due to hypotension (post lopressor for tachycardia of 140s during HD), with net 1.2L removed. Plan for next maintenance HD 8/26.  Monitor electrolytes.    2) HTN with ESRD: BP acceptable. Metoprolol increased to 25mg PO tid, Losartan reduced to 25mg PO qd and started on Digoxin 125mcg PO every other day by Cardiology. Pt now with type 2 MI; demand ischemia for possible transfer for cardiac cath. Monitor BP.    3) Anemia of renal disease: Hb borderline. c/w Epo 4k IV tiw with HD. Monitor Hb.    4) Hyperphosphatemia: Phosphorus improved; c/w  phoslo 2 tabs with meals. Monitor serum calcium and phosphorus.    5) Hyperkalemia: resolved. c/w Renal diet. Monitor serum K.      [facility] NEPHROLOGY  Nahum Grubbs M.D.  Francisco Javier Hicks D.O.  Yovana Quiroz M.D.  Makeda Duron, MSN, ANP-C    Telephone: [phone]  Facsimile: [phone]    [address] [place] [address]

## 2021-08-25 NOTE — PROGRESS NOTE ADULT - SUBJECTIVE AND OBJECTIVE BOX
PGY1 Progress Note discussed with attending     PAGER #: [[phone]] TILL 5:00 PM  PLEASE CONTACT ON CALL TEAM:  - On Call Team (Please refer to Yolanda) FROM 5:00 PM - 8:30PM  - Nightfloat Team FROM 8:30 -7:30 AM    CHIEF COMPLAINT & BRIEF HOSPITAL COURSE:    INTERVAL HPI/OVERNIGHT EVENTS:       REVIEW OF SYSTEMS:  CONSTITUTIONAL: No fever, weight loss, or fatigue  RESPIRATORY: No cough, wheezing, chills or hemoptysis; No shortness of breath  CARDIOVASCULAR: No chest pain, palpitations, dizziness, or leg swelling  GASTROINTESTINAL: No abdominal pain. No nausea, vomiting, or hematemesis; No diarrhea or constipation. No melena or hematochezia.  GENITOURINARY: No dysuria or hematuria, urinary frequency  NEUROLOGICAL: No headaches, memory loss, loss of strength, numbness, or tremors  SKIN: No itching, burning, rashes, or lesions     Vital Signs Last 24 Hrs  T(C): 36.3 (25 Aug 2021 04:58), Max: 36.9 (24 Aug 2021 11:29)  T(F): 97.4 (25 Aug 2021 04:58), Max: 98.5 (24 Aug 2021 15:22)  HR: 86 (25 Aug 2021 04:58) (54 - 130)  BP: 152/71 (25 Aug 2021 04:58) (114/53 - 187/65)  BP(mean): --  RR: 18 (25 Aug 2021 04:58) (16 - 19)  SpO2: 100% (25 Aug 2021 04:58) (96% - 100%)    PHYSICAL EXAMINATION:  GENERAL: NAD, well built  HEAD:  Atraumatic, Normocephalic  EYES:  conjunctiva and sclera clear  NECK: Supple, No JVD, Normal thyroid  CHEST/LUNG: Clear to auscultation. Clear to percussion bilaterally; No rales, rhonchi, wheezing, or rubs  HEART: Regular rate and rhythm; No murmurs, rubs, or gallops  ABDOMEN: Soft, Nontender, Nondistended; Bowel sounds present  NERVOUS SYSTEM:  Alert & Oriented X3,    EXTREMITIES:  2+ Peripheral Pulses, No clubbing, cyanosis, or edema  SKIN: warm dry                          9.6    4.78  )-----------( 136      ( 25 Aug 2021 05:06 )             28.4     08-25    136  |  97  |  50<H>  ----------------------------<  125<H>  5.0   |  29  |  7.07<H>    Ca    8.4      25 Aug 2021 05:06  Phos  4.1     08-25  Mg     2.3     08-25    TPro  6.2  /  Alb  2.9<L>  /  TBili  0.3  /  DBili  x   /  AST  11  /  ALT  8<L>  /  AlkPhos  65  08-25    LIVER FUNCTIONS - ( 25 Aug 2021 05:06 )  Alb: 2.9 g/dL / Pro: 6.2 g/dL / ALK PHOS: 65 U/L / ALT: 8 U/L DA / AST: 11 U/L / GGT: x           CARDIAC MARKERS ( 25 Aug 2021 05:06 )  1.910 ng/mL / x     / 83 U/L / x     / 4.3 ng/mL  CARDIAC MARKERS ( 24 Aug 2021 21:41 )  0.864 ng/mL / x     / x     / x     / x      CARDIAC MARKERS ( 24 Aug 2021 15:05 )  0.111 ng/mL / x     / x     / x     / x              CAPILLARY BLOOD GLUCOSE      RADIOLOGY & ADDITIONAL TESTS:

## 2021-08-25 NOTE — PROGRESS NOTE ADULT - SUBJECTIVE AND OBJECTIVE BOX
GENERAL SURGERY PROGRESS NOTE      SUBJECTIVE:  Patient examined at bedside, no complaints.   No nausea, no vomiting  Tolerating diet  Passing flatus & having BMs      OBJECTIVE:    VITALS:  T(F): 98.1 (08-25-21 @ 11:04), Max: 98.1 (08-25-21 @ 11:04)  HR: 99 (08-25-21 @ 11:04) (57 - 120)  BP: 142/70 (08-25-21 @ 11:04) (124/69 - 158/66)  RR: 18 (08-25-21 @ 11:04) (18 - 19)  SpO2: 98% (08-25-21 @ 11:04) (96% - 100%)      08-24 @ 07:01  -  08-25 @ 07:00  --------------------------------------------------------  IN:    Oral Fluid: 50 mL  Total IN: 50 mL    OUT:    Voided (mL): 200 mL  Total OUT: 200 mL    Total NET: -150 mL      08-25 @ 07:01  -  08-25 @ 15:41  --------------------------------------------------------  IN:    Oral Fluid: 450 mL  Total IN: 450 mL    OUT:  Total OUT: 0 mL    Total NET: 450 mL        PHYSICAL EXAM:   General: Alert, awake, responsive, No acute distress  Skin: No jaundice, no icterus  Abdomen: soft, nontender, nondistended, no rebound tenderness, no guarding, no palpable masses  Extremities: non edematous, no calf pain bilaterally

## 2021-08-26 LAB
ALBUMIN SERPL ELPH-MCNC: 2.7 G/DL — LOW (ref 3.5–5)
ALP SERPL-CCNC: 64 U/L — SIGNIFICANT CHANGE UP (ref 40–120)
ALT FLD-CCNC: 9 U/L DA — LOW (ref 10–60)
ANION GAP SERPL CALC-SCNC: 14 MMOL/L — SIGNIFICANT CHANGE UP (ref 5–17)
APTT BLD: 50.7 SEC — HIGH (ref 27.5–35.5)
APTT BLD: 68.1 SEC — HIGH (ref 27.5–35.5)
AST SERPL-CCNC: 7 U/L — LOW (ref 10–40)
BILIRUB SERPL-MCNC: 0.4 MG/DL — SIGNIFICANT CHANGE UP (ref 0.2–1.2)
BUN SERPL-MCNC: 68 MG/DL — HIGH (ref 7–18)
CALCIUM SERPL-MCNC: 8.3 MG/DL — LOW (ref 8.4–10.5)
CHLORIDE SERPL-SCNC: 95 MMOL/L — LOW (ref 96–108)
CO2 SERPL-SCNC: 24 MMOL/L — SIGNIFICANT CHANGE UP (ref 22–31)
COVID-19 SPIKE DOMAIN AB INTERP: POSITIVE
COVID-19 SPIKE DOMAIN ANTIBODY RESULT: 68.3 U/ML — HIGH
CREAT SERPL-MCNC: 8.85 MG/DL — HIGH (ref 0.5–1.3)
GLUCOSE BLDC GLUCOMTR-MCNC: 113 MG/DL — HIGH (ref 70–99)
GLUCOSE BLDC GLUCOMTR-MCNC: 152 MG/DL — HIGH (ref 70–99)
GLUCOSE BLDC GLUCOMTR-MCNC: 155 MG/DL — HIGH (ref 70–99)
GLUCOSE BLDC GLUCOMTR-MCNC: 182 MG/DL — HIGH (ref 70–99)
GLUCOSE SERPL-MCNC: 135 MG/DL — HIGH (ref 70–99)
HCT VFR BLD CALC: 27.9 % — LOW (ref 39–50)
HCT VFR BLD CALC: 28.3 % — LOW (ref 39–50)
HCT VFR BLD CALC: 29.7 % — LOW (ref 39–50)
HGB BLD-MCNC: 9.4 G/DL — LOW (ref 13–17)
HGB BLD-MCNC: 9.4 G/DL — LOW (ref 13–17)
HGB BLD-MCNC: 9.9 G/DL — LOW (ref 13–17)
MAGNESIUM SERPL-MCNC: 2.4 MG/DL — SIGNIFICANT CHANGE UP (ref 1.6–2.6)
MCHC RBC-ENTMCNC: 31.1 PG — SIGNIFICANT CHANGE UP (ref 27–34)
MCHC RBC-ENTMCNC: 31.6 PG — SIGNIFICANT CHANGE UP (ref 27–34)
MCHC RBC-ENTMCNC: 31.7 PG — SIGNIFICANT CHANGE UP (ref 27–34)
MCHC RBC-ENTMCNC: 33.2 GM/DL — SIGNIFICANT CHANGE UP (ref 32–36)
MCHC RBC-ENTMCNC: 33.3 GM/DL — SIGNIFICANT CHANGE UP (ref 32–36)
MCHC RBC-ENTMCNC: 33.7 GM/DL — SIGNIFICANT CHANGE UP (ref 32–36)
MCV RBC AUTO: 93.7 FL — SIGNIFICANT CHANGE UP (ref 80–100)
MCV RBC AUTO: 93.9 FL — SIGNIFICANT CHANGE UP (ref 80–100)
MCV RBC AUTO: 95.2 FL — SIGNIFICANT CHANGE UP (ref 80–100)
NRBC # BLD: 0 /100 WBCS — SIGNIFICANT CHANGE UP (ref 0–0)
PHOSPHATE SERPL-MCNC: 4.5 MG/DL — SIGNIFICANT CHANGE UP (ref 2.5–4.5)
PLATELET # BLD AUTO: 148 K/UL — LOW (ref 150–400)
PLATELET # BLD AUTO: 150 K/UL — SIGNIFICANT CHANGE UP (ref 150–400)
PLATELET # BLD AUTO: 156 K/UL — SIGNIFICANT CHANGE UP (ref 150–400)
POTASSIUM SERPL-MCNC: 5.1 MMOL/L — SIGNIFICANT CHANGE UP (ref 3.5–5.3)
POTASSIUM SERPL-SCNC: 5.1 MMOL/L — SIGNIFICANT CHANGE UP (ref 3.5–5.3)
PROT SERPL-MCNC: 6.3 G/DL — SIGNIFICANT CHANGE UP (ref 6–8.3)
RBC # BLD: 2.97 M/UL — LOW (ref 4.2–5.8)
RBC # BLD: 3.02 M/UL — LOW (ref 4.2–5.8)
RBC # BLD: 3.12 M/UL — LOW (ref 4.2–5.8)
RBC # FLD: 13 % — SIGNIFICANT CHANGE UP (ref 10.3–14.5)
RBC # FLD: 13.1 % — SIGNIFICANT CHANGE UP (ref 10.3–14.5)
RBC # FLD: 13.1 % — SIGNIFICANT CHANGE UP (ref 10.3–14.5)
SARS-COV-2 IGG+IGM SERPL QL IA: 68.3 U/ML — HIGH
SARS-COV-2 IGG+IGM SERPL QL IA: POSITIVE
SODIUM SERPL-SCNC: 133 MMOL/L — LOW (ref 135–145)
WBC # BLD: 4.86 K/UL — SIGNIFICANT CHANGE UP (ref 3.8–10.5)
WBC # BLD: 5.14 K/UL — SIGNIFICANT CHANGE UP (ref 3.8–10.5)
WBC # BLD: 5.52 K/UL — SIGNIFICANT CHANGE UP (ref 3.8–10.5)
WBC # FLD AUTO: 4.86 K/UL — SIGNIFICANT CHANGE UP (ref 3.8–10.5)
WBC # FLD AUTO: 5.14 K/UL — SIGNIFICANT CHANGE UP (ref 3.8–10.5)
WBC # FLD AUTO: 5.52 K/UL — SIGNIFICANT CHANGE UP (ref 3.8–10.5)

## 2021-08-26 RX ORDER — HEPARIN SODIUM 5000 [USP'U]/ML
1000 INJECTION INTRAVENOUS; SUBCUTANEOUS
Qty: 25000 | Refills: 0 | Status: DISCONTINUED | OUTPATIENT
Start: 2021-08-26 | End: 2021-08-27

## 2021-08-26 RX ADMIN — Medication 1334 MILLIGRAM(S): at 08:23

## 2021-08-26 RX ADMIN — HEPARIN SODIUM 1000 UNIT(S)/HR: 5000 INJECTION INTRAVENOUS; SUBCUTANEOUS at 09:22

## 2021-08-26 RX ADMIN — Medication 100 MILLIGRAM(S): at 21:18

## 2021-08-26 RX ADMIN — ERYTHROPOIETIN 4000 UNIT(S): 10000 INJECTION, SOLUTION INTRAVENOUS; SUBCUTANEOUS at 15:13

## 2021-08-26 RX ADMIN — Medication 1334 MILLIGRAM(S): at 11:54

## 2021-08-26 RX ADMIN — SODIUM CHLORIDE 3 MILLILITER(S): 9 INJECTION INTRAMUSCULAR; INTRAVENOUS; SUBCUTANEOUS at 21:16

## 2021-08-26 RX ADMIN — PANTOPRAZOLE SODIUM 40 MILLIGRAM(S): 20 TABLET, DELAYED RELEASE ORAL at 06:02

## 2021-08-26 RX ADMIN — HEPARIN SODIUM 1000 UNIT(S)/HR: 5000 INJECTION INTRAVENOUS; SUBCUTANEOUS at 04:09

## 2021-08-26 RX ADMIN — HEPARIN SODIUM 1000 UNIT(S)/HR: 5000 INJECTION INTRAVENOUS; SUBCUTANEOUS at 18:19

## 2021-08-26 RX ADMIN — Medication 1334 MILLIGRAM(S): at 18:21

## 2021-08-26 RX ADMIN — SODIUM CHLORIDE 3 MILLILITER(S): 9 INJECTION INTRAMUSCULAR; INTRAVENOUS; SUBCUTANEOUS at 14:00

## 2021-08-26 RX ADMIN — Medication 100 MILLIGRAM(S): at 06:02

## 2021-08-26 RX ADMIN — Medication 25 MILLIGRAM(S): at 21:18

## 2021-08-26 RX ADMIN — CHLORHEXIDINE GLUCONATE 1 APPLICATION(S): 213 SOLUTION TOPICAL at 11:54

## 2021-08-26 RX ADMIN — LOSARTAN POTASSIUM 25 MILLIGRAM(S): 100 TABLET, FILM COATED ORAL at 06:02

## 2021-08-26 RX ADMIN — Medication 25 MILLIGRAM(S): at 06:02

## 2021-08-26 RX ADMIN — SODIUM CHLORIDE 3 MILLILITER(S): 9 INJECTION INTRAMUSCULAR; INTRAVENOUS; SUBCUTANEOUS at 08:00

## 2021-08-26 RX ADMIN — Medication 1: at 08:23

## 2021-08-26 RX ADMIN — Medication 100 MILLIGRAM(S): at 18:20

## 2021-08-26 RX ADMIN — Medication 1: at 11:53

## 2021-08-26 RX ADMIN — Medication 25 MILLIGRAM(S): at 18:26

## 2021-08-26 NOTE — PROGRESS NOTE ADULT - PROBLEM SELECTOR PROBLEM 5
Please let pt know that colpo biopsy showed JUVENCIO 1. Since pt's last Pap showed ASC-H, I advise pt to come back in 6 months for another Pap.   
ESRD on hemodialysis
Electrolyte abnormality
Electrolyte abnormality

## 2021-08-26 NOTE — PROGRESS NOTE ADULT - PROBLEM SELECTOR PLAN 3
Patient has hx of atrial fibrillation on Eliquis and metoprolol at home   - yesterday pt did not complete HD session due to Afib with RVR in 140's and with BP of 80/40's. Patient complained of chest pain. Total 600cc of fluid given, bp improved to 120's SBP, 5mg Metoprolol IV given at dialysis and another 5mg Metoprolol 5mg given on floor.  - repeat EKG's did not show any acute ST segment changes  - Cardio Dr Gonzalez consulted, increase metoprolol to 25mg QD, and adding digoxin .125mg qd
Problem: Abnormal chest xray.  Plan: - CXR showed increased bilateral effusions and moderate left effusion and increasing pseudotumor in the minor fissure compared to June 28 of this year.  - CT abdomen non contrast showed moderate to large left pleural effusion left lower lobe atelectasis/collapse. Streaky right basilar atelectasis with trace right pleural effusion.  -  CT chest non contrast Large left and trace right pleural effusions not significantly  changed from 6/22/2021. Atelectasis of basilar left lower lobe, unchanged. Interlobular septal thickening and patchy groundglass opacities representing pulmonary edema.  - Pulm Dr Kaminski consulted.
Patient has hx of atrial fibrillation on Eliquis and metoprolol at home   - yesterday pt did not complete HD session due to Afib with RVR in 140's and with BP of 80/40's. Patient complained of chest pain. Total 600cc of fluid given, bp improved to 120's SBP, 5mg Metoprolol IV given at dialysis and another 5mg Metoprolol 5mg given on floor.  - repeat EKG's did not show any acute ST segment changes  - Cardio Dr Gonzalez consulted, increase metoprolol to 25mg QD, and adding digoxin .125mg qd

## 2021-08-26 NOTE — PROGRESS NOTE ADULT - SUBJECTIVE AND OBJECTIVE BOX
CHIEF COMPLAINT:Patient is a 86y old  Male who presents with a chief complaint of ABDOMINAL PAIN (26 Aug 2021 07:28)    	  REVIEW OF SYSTEMS:  CONSTITUTIONAL: No fever, weight loss, or fatigue  EYES: No eye pain, visual disturbances, or discharge  ENT:  No difficulty hearing, tinnitus, vertigo; No sinus or throat pain  NECK: No pain or stiffness  RESPIRATORY: No cough, wheezing, chills or hemoptysis; No Shortness of Breath  CARDIOVASCULAR: No chest pain, palpitations, passing out, dizziness, or leg swelling  GASTROINTESTINAL: No abdominal or epigastric pain. No nausea, vomiting, or hematemesis; No diarrhea or constipation. No melena or hematochezia.  GENITOURINARY: No dysuria, frequency, hematuria, or incontinence  NEUROLOGICAL: No headaches, memory loss, loss of strength, numbness, or tremors  SKIN: No itching, burning, rashes, or lesions   LYMPH Nodes: No enlarged glands  ENDOCRINE: No heat or cold intolerance; No hair loss  MUSCULOSKELETAL: No joint pain or swelling; No muscle, back, or extremity pain  PSYCHIATRIC: No depression, anxiety, mood swings, or difficulty sleeping  HEME/LYMPH: No easy bruising, or bleeding gums  ALLERGY AND IMMUNOLOGIC: No hives or eczema	    [ ] All others negative	  [ ] Unable to obtain    PHYSICAL EXAM:  T(C): 36.4 (08-26-21 @ 07:49), Max: 36.7 (08-25-21 @ 11:04)  HR: 84 (08-26-21 @ 07:49) (65 - 99)  BP: 147/76 (08-26-21 @ 07:49) (142/70 - 153/73)  RR: 18 (08-26-21 @ 07:49) (18 - 18)  SpO2: 98% (08-26-21 @ 07:49) (98% - 99%)  Wt(kg): --  I&O's Summary    25 Aug 2021 07:01  -  26 Aug 2021 07:00  --------------------------------------------------------  IN: 533 mL / OUT: 0 mL / NET: 533 mL    26 Aug 2021 07:01  -  26 Aug 2021 09:05  --------------------------------------------------------  IN: 220 mL / OUT: 0 mL / NET: 220 mL        Appearance: Normal	  HEENT:   Normal oral mucosa, PERRL, EOMI	  Lymphatic: No lymphadenopathy  Cardiovascular: Normal S1 S2, No JVD, No murmurs, No edema  Respiratory: Lungs clear to auscultation	  Psychiatry: A & O x 3, Mood & affect appropriate  Gastrointestinal:  Soft, Non-tender, + BS	  Skin: No rashes, No ecchymoses, No cyanosis	  Neurologic: Non-focal  Extremities: Normal range of motion, No clubbing, cyanosis or edema  Vascular: Peripheral pulses palpable 2+ bilaterally    MEDICATIONS  (STANDING):  calcium acetate 1334 milliGRAM(s) Oral three times a day with meals  chlorhexidine 2% Cloths 1 Application(s) Topical daily  digoxin     Tablet 125 MICROGram(s) Oral every other day  epoetin zach-epbx (RETACRIT) Injectable 4000 Unit(s) IV Push <User Schedule>  heparin  Infusion. 1000 Unit(s)/Hr (10 mL/Hr) IV Continuous <Continuous>  hydrALAZINE 100 milliGRAM(s) Oral every 8 hours  insulin lispro (ADMELOG) corrective regimen sliding scale   SubCutaneous three times a day before meals  losartan 25 milliGRAM(s) Oral daily  metoprolol tartrate 25 milliGRAM(s) Oral every 8 hours  pantoprazole    Tablet 40 milliGRAM(s) Oral before breakfast  sodium chloride 0.9% lock flush 3 milliLiter(s) IV Push every 8 hours      TELEMETRY: afib /'s	    	  	  LABS:	 	    CARDIAC MARKERS ( 25 Aug 2021 17:09 )  1.190 ng/mL / x     / x     / x     / x      CARDIAC MARKERS ( 25 Aug 2021 05:06 )  1.910 ng/mL / x     / 83 U/L / x     / 4.3 ng/mL  CARDIAC MARKERS ( 24 Aug 2021 21:41 )  0.864 ng/mL / x     / x     / x     / x      CARDIAC MARKERS ( 24 Aug 2021 15:05 )  0.111 ng/mL / x     / x     / x     / x                                    9.9    5.14  )-----------( 150      ( 26 Aug 2021 07:14 )             29.7     08-26    133<L>  |  95<L>  |  68<H>  ----------------------------<  135<H>  5.1   |  24  |  8.85<H>    Ca    8.3<L>      26 Aug 2021 07:14  Phos  4.5     08-26  Mg     2.4     08-26    TPro  6.3  /  Alb  2.7<L>  /  TBili  0.4  /  DBili  x   /  AST  7<L>  /  ALT  9<L>  /  AlkPhos  64  08-26    proBNP:   Lipid Profile: Cholesterol 155  LDL --  HDL 92  TG 47      TSH: Thyroid Stimulating Hormone, Serum: 1.06 uU/mL (08-22 @ 07:31)      	  PTT - ( 26 Aug 2021 03:38 )  PTT:50.7 sec

## 2021-08-26 NOTE — PROGRESS NOTE ADULT - SUBJECTIVE AND OBJECTIVE BOX
Patient is a 86y old  Male who presents with a chief complaint of ABDOMINAL PAIN (26 Aug 2021 09:04)  Awake, alert, comfortable in bed in NAD. No sob or chest pain    INTERVAL HPI/OVERNIGHT EVENTS:      VITAL SIGNS:  T(F): 97.5 (08-26-21 @ 07:49)  HR: 84 (08-26-21 @ 07:49)  BP: 147/76 (08-26-21 @ 07:49)  RR: 18 (08-26-21 @ 07:49)  SpO2: 98% (08-26-21 @ 07:49)  Wt(kg): --  I&O's Detail    25 Aug 2021 07:01  -  26 Aug 2021 07:00  --------------------------------------------------------  IN:    Heparin Infusion: 83 mL    Oral Fluid: 450 mL  Total IN: 533 mL    OUT:    Voided (mL): 0 mL  Total OUT: 0 mL    Total NET: 533 mL      26 Aug 2021 07:01  -  26 Aug 2021 10:41  --------------------------------------------------------  IN:    Oral Fluid: 220 mL  Total IN: 220 mL    OUT:  Total OUT: 0 mL    Total NET: 220 mL              REVIEW OF SYSTEMS:    CONSTITUTIONAL:  No fevers, chills, sweats    HEENT:  Eyes:  No diplopia or blurred vision. ENT:  No earache, sore throat or runny nose.    CARDIOVASCULAR:  No pressure, squeezing, tightness, or heaviness about the chest; no palpitations.    RESPIRATORY:  Per HPI    GASTROINTESTINAL:  No abdominal pain, nausea, vomiting or diarrhea.    GENITOURINARY:  No dysuria, frequency or urgency.    NEUROLOGIC:  No paresthesias, fasciculations, seizures or weakness.    PSYCHIATRIC:  No disorder of thought or mood.      PHYSICAL EXAM:    Constitutional: Well developed and nourished  Eyes:Perrla  ENMT: normal  Neck:supple  Respiratory: good air entry  Cardiovascular: S1 S2 regular  Gastrointestinal: Soft, Non tender  Extremities: No edema  Vascular:normal  Neurological:Awake, alert,Ox3  Musculoskeletal:Normal      MEDICATIONS  (STANDING):  calcium acetate 1334 milliGRAM(s) Oral three times a day with meals  chlorhexidine 2% Cloths 1 Application(s) Topical daily  digoxin     Tablet 125 MICROGram(s) Oral every other day  epoetin zach-epbx (RETACRIT) Injectable 4000 Unit(s) IV Push <User Schedule>  heparin  Infusion. 1000 Unit(s)/Hr (10 mL/Hr) IV Continuous <Continuous>  hydrALAZINE 100 milliGRAM(s) Oral every 8 hours  insulin lispro (ADMELOG) corrective regimen sliding scale   SubCutaneous three times a day before meals  losartan 25 milliGRAM(s) Oral daily  metoprolol tartrate 25 milliGRAM(s) Oral every 8 hours  pantoprazole    Tablet 40 milliGRAM(s) Oral before breakfast  sodium chloride 0.9% lock flush 3 milliLiter(s) IV Push every 8 hours    MEDICATIONS  (PRN):      Allergies    No Known Allergies    Intolerances        LABS:                        9.9    5.14  )-----------( 150      ( 26 Aug 2021 07:14 )             29.7     08-26    133<L>  |  95<L>  |  68<H>  ----------------------------<  135<H>  5.1   |  24  |  8.85<H>    Ca    8.3<L>      26 Aug 2021 07:14  Phos  4.5     08-26  Mg     2.4     08-26    TPro  6.3  /  Alb  2.7<L>  /  TBili  0.4  /  DBili  x   /  AST  7<L>  /  ALT  9<L>  /  AlkPhos  64  08-26    PTT - ( 26 Aug 2021 03:38 )  PTT:50.7 sec      CARDIAC MARKERS ( 25 Aug 2021 17:09 )  1.190 ng/mL / x     / x     / x     / x      CARDIAC MARKERS ( 25 Aug 2021 05:06 )  1.910 ng/mL / x     / 83 U/L / x     / 4.3 ng/mL  CARDIAC MARKERS ( 24 Aug 2021 21:41 )  0.864 ng/mL / x     / x     / x     / x      CARDIAC MARKERS ( 24 Aug 2021 15:05 )  0.111 ng/mL / x     / x     / x     / x          CAPILLARY BLOOD GLUCOSE      POCT Blood Glucose.: 152 mg/dL (26 Aug 2021 08:10)  POCT Blood Glucose.: 116 mg/dL (25 Aug 2021 21:00)  POCT Blood Glucose.: 170 mg/dL (25 Aug 2021 16:24)  POCT Blood Glucose.: 166 mg/dL (25 Aug 2021 11:18)        RADIOLOGY & ADDITIONAL TESTS:  < from: CT Chest No Cont (08.22.21 @ 13:28) >  IMPRESSION:    Large left and trace right pleural effusions not significantly changed changed from 6/22/2021. Atelectasis of basilar left lower lobe, unchanged. Interlobular septal thickening and patchy groundglass opacities representing pulmonary edema.    < end of copied text >    CXR:    < from: Xray Chest 1 View-PORTABLE IMMEDIATE (Xray Chest 1 View-PORTABLE IMMEDIATE .) (08.21.21 @ 09:19) >  IMPRESSION: Increasing bilateral effusions. NG tube in place.    < end of copied text >  Ct scan chest:    ekg;    echo:

## 2021-08-26 NOTE — PROGRESS NOTE ADULT - PROBLEM SELECTOR PLAN 2
- Next HD today at 11AM / Potassium 5.5, patient asymptomatic   - Monitor BMP, f/u repeat in the evening after HD  - Nephrology Dr Hicks consulted.
- patient complained of chest pain yesterday   - troponin was sent and was 0.111, repeat troponin were 0.864 and 1.9  - today chest pain has resolved  - elevated troponin most likely due to demand ischemia   - Cardiology Dr. Gonzalez reccomends cardiac cath, to r/o CAD, patient's cardiologist contacted.
- patient complained of chest pain yesterday   - troponin was sent and was 0.111, repeat troponin were 0.864 and 1.9  - today chest pain has resolved  - continue with tele   - elevated troponin most likely due to demand ischemia   - Cardiology Dr. Gonzalez reccomends cardiac cath, to r/o CAD, patient's cardiologist contacted.  - patient for cardiac cath tomorrow in AM

## 2021-08-26 NOTE — PROGRESS NOTE ADULT - PROBLEM SELECTOR PLAN 7
Patient noted to be normotensive overnight   - Hydralazine 100mg Q 8  - Losartan 25mg QD
Patient noted to be normotensive overnight   - Hydralazine 100mg Q 8  - Losartan 25mg QD
IMPROVE VTE score: 3  Will manage with:     [ ] Previous VTE                                    3  [ ] Thrombophilia                                  2  [ x] Lower limb paralysis                        2  (unable to hold up >15 seconds)    [ ] Current Cancer (within 6 months)        2   [x] Immobilization > 24 hrs                    1  [ ] ICU/CCU stay > 24 hrs                      1  [x] Age > 60                                         1

## 2021-08-26 NOTE — PROGRESS NOTE ADULT - PROBLEM SELECTOR PLAN 1
- CT abdomen showed Small bowel obstruction, with a transition point in the left upper quadrant, and a 2nd transition point in the right lower quadrant. Closed loop obstruction or internal hernia cannot be excluded.  - Try to avoid opioids as it may worsen pain due to obstruction   - Monitor for any signs of hemodynamic instability, currently pt is afebrile and Hemodynamically stable   - Surgery on board- recommends conservative management.  - patient currently on soft diet, tolerating and had bowel movement
- CT abdomen showed Small bowel obstruction, with a transition point in the left upper quadrant, and a 2nd transition point in the right lower quadrant. Closed loop obstruction or internal hernia cannot be excluded.  - Try to avoid opioids as it may worsen pain due to obstruction   - Monitor for any signs of hemodynamic instability   - Surgery on board- recommends conservative management.  - patient currently on soft diet, tolerating and had bowel movement last night
- CT abdomen showed Small bowel obstruction, with a transition point in the left upper quadrant, and a 2nd transition point in the right lower quadrant. Closed loop obstruction or internal hernia cannot be excluded.  - Try to avoid opioids as it may worsen pain due to obstruction   - Monitor for any signs of hemodynamic instability, currently pt is afebrile and Hemodynamically stable   - Surgery on board- recommends conservative management.  - patient currently on soft diet, tolerating and had bowel movement

## 2021-08-26 NOTE — PROGRESS NOTE ADULT - PROBLEM SELECTOR PLAN 6
Problem: Abnormal chest xray.  Plan: - CXR showed increased bilateral effusions and moderate left effusion and increasing pseudotumor in the minor fissure compared to June 28 of this year.  - CT abdomen non contrast showed moderate to large left pleural effusion left lower lobe atelectasis/collapse. Streaky right basilar atelectasis with trace right pleural effusion.  -  CT chest non contrast Large left and trace right pleural effusions not significantly  changed from 6/22/2021. Atelectasis of basilar left lower lobe, unchanged. Interlobular septal thickening and patchy groundglass opacities representing pulmonary edema.  - Pulm Dr Kaminski consulted.
Patient has hx of atrial fibrillation on Eliquis and metoprolol at home   - EKG showed NSR, prolonged QTc 526, Troponin x1 0.028, CXR showed   - will resume Eliquis   - Cardio Dr Gonzalez consulted.
Problem: Abnormal chest xray.  Plan: - CXR showed increased bilateral effusions and moderate left effusion and increasing pseudotumor in the minor fissure compared to June 28 of this year.  - CT abdomen non contrast showed moderate to large left pleural effusion left lower lobe atelectasis/collapse. Streaky right basilar atelectasis with trace right pleural effusion.  -  CT chest non contrast Large left and trace right pleural effusions not significantly  changed from 6/22/2021. Atelectasis of basilar left lower lobe, unchanged. Interlobular septal thickening and patchy groundglass opacities representing pulmonary edema.  - Pulm Dr Kaminski consulted.

## 2021-08-26 NOTE — PROGRESS NOTE ADULT - PROBLEM SELECTOR PLAN 8
IMPROVE VTE score: 3  Will manage with:     [ ] Previous VTE                                    3  [ ] Thrombophilia                                  2  [ x] Lower limb paralysis                        2  (unable to hold up >15 seconds)    [ ] Current Cancer (within 6 months)        2   [x] Immobilization > 24 hrs                    1  [ ] ICU/CCU stay > 24 hrs                      1  [x] Age > 60                                         1
IMPROVE VTE score: 3  Will manage with:     [ ] Previous VTE                                    3  [ ] Thrombophilia                                  2  [ x] Lower limb paralysis                        2  (unable to hold up >15 seconds)    [ ] Current Cancer (within 6 months)        2   [x] Immobilization > 24 hrs                    1  [ ] ICU/CCU stay > 24 hrs                      1  [x] Age > 60                                         1

## 2021-08-26 NOTE — PROGRESS NOTE ADULT - ASSESSMENT
86 yr old male with PMHX of PAF, ESRD on HD, DM, HTN, Lipid d/o who presents to ER with abdominal pain, nausea and vomiting, with SBO-now resolved,PAF with RVR,CP and now Type II MI-due to demand ischemia...  1.Tele monitoring  2.ESRD-HD as per renal.  3.PAF- heparin drip, lopressor 25mg qd and  dig .125mg qod.  5.HTN-cont lopressor, hydralazine, cozaar 25mg qd.  6.CP, +troponin-agree for cardiac cath in am.  7.PPI.

## 2021-08-26 NOTE — PROGRESS NOTE ADULT - PROBLEM SELECTOR PLAN 5
- ESRD Maintenance HD (T/T/S ) via AV fistula L forearm, Cr 7.9 on admission  - Last full dialysis Thursday, next HD today  - Avoid Nephrotoxic Meds/ Agents such as (NSAIDs, IV contrast, Aminoglycosides such as gentamicin, Gadolinium contrast, Phosphate containing enemas, etc..)  - F/u labs post dialysis   - Monitor BMP daily  - Nephrology Dr Hicks consulted.
- Next HD Thursday at Potassium 5.0, Magnesium 2.3 Phosp 4.1   - Monitor BMP,  - unable to complete HD session yesterday, Dr. Quiroz contacted no need for HD today
- Next HD today at Potassium 5.0, Magnesium 2.3 Phosp 4.1   - Monitor BMP,  - unable to complete HD session yesterday, Dr. Quiroz contacted no need for HD today

## 2021-08-26 NOTE — PROGRESS NOTE ADULT - SUBJECTIVE AND OBJECTIVE BOX
[facility] NEPHROLOGY- PROGRESS NOTE    86y Male with history of ESRD on HD presents with abdominal pain. Nephrology consulted for ESRD status.  8/23- NGT removed    SBO resolved.   +troponin plan for cardiac cath in am    REVIEW OF SYSTEMS:  Gen: no changes in weight  Cards: no chest pain  Resp: no dyspnea  GI: no nausea or vomiting or diarrhea,  ab pain resolved  +BM  Vascular: no LE edema    No Known Allergies      Hospital Medications: Medications reviewed    VITALS:  T(F): 97.9 (08-26-21 @ 11:02), Max: 98.1 (08-25-21 @ 19:54)  HR: 70 (08-26-21 @ 11:02)  BP: 140/59 (08-26-21 @ 11:02)  RR: 18 (08-26-21 @ 11:02)  SpO2: 98% (08-26-21 @ 11:02)  Wt(kg): --    08-25 @ 07:01  -  08-26 @ 07:00  --------------------------------------------------------  IN: 533 mL / OUT: 0 mL / NET: 533 mL    08-26 @ 07:01  -  08-26 @ 14:23  --------------------------------------------------------  IN: 450 mL / OUT: 0 mL / NET: 450 mL      PHYSICAL EXAM:    Gen: NAD, calm  Cards: RRR, +S1/S2, no M/G/R  Resp: CTA B/L  GI: soft, NT/ND, NABS +BS  Vascular: no LE edema B/L, LUE AVF + bruit/thrill with area of hypopigmentation      LABS:  08-26    133<L>  |  95<L>  |  68<H>  ----------------------------<  135<H>  5.1   |  24  |  8.85<H>    Ca    8.3<L>      26 Aug 2021 07:14  Phos  4.5     08-26  Mg     2.4     08-26    TPro  6.3  /  Alb  2.7<L>  /  TBili  0.4  /  DBili      /  AST  7<L>  /  ALT  9<L>  /  AlkPhos  64  08-26    Creatinine Trend: 8.85 <--, 7.07 <--, 5.19 <--, 10.40 <--, 8.63 <--, 6.56 <--, 5.90 <--, 7.99 <--                        9.9    5.14  )-----------( 150      ( 26 Aug 2021 07:14 )             29.7     Urine Studies:

## 2021-08-26 NOTE — PROGRESS NOTE ADULT - PROBLEM SELECTOR PLAN 4
Patient noted to be hypertensive overnight 160's SBP.  - Hydralazine 100mg Q 8  - Losartan 100mg QD
- ESRD Maintenance HD (T/T/S ) via AV fistula L forearm, Cr 7.9 on admission  -Partial HD yesterday, not fluid overloaded, electrolytes are wnl, next HD session on Thursday   - Avoid Nephrotoxic Meds/ Agents such as (NSAIDs, IV contrast, Aminoglycosides such as gentamicin, Gadolinium contrast, Phosphate containing enemas, etc..)  - F/u labs post dialysis   - Monitor BMP daily  - Nephrology Dr Quiroz
- ESRD Maintenance HD (T/T/S ) via AV fistula L forearm, Cr 7.9 on admission  -Scheduled for HD today   - Avoid Nephrotoxic Meds/ Agents such as (NSAIDs, IV contrast, Aminoglycosides such as gentamicin, Gadolinium contrast, Phosphate containing enemas, etc..)  - F/u labs post dialysis   - Monitor BMP daily  - Nephrology Dr Quiroz

## 2021-08-26 NOTE — PROGRESS NOTE ADULT - ASSESSMENT
1. SBO   CT noted  Diet per surgery.   Abd XR noted above 8/23.   Surgical F/U   Zofran PRN for nausea  Pain control.     2. Pleural Effusion  On HD; continue TTS   CT chest noted - effusion large (left) and trace (right) as well as ascites.   HD yesterday   Repeat CXR   O2 Supp PRN     3. Atelectasis  Bronchodilators  O2 Supp  Incentive Spirometry  chest pt    4. Abnormal CXR   XR notes increasing pseudotumor in minor fissure compared to 6/2021.  CT chest noted; as above.     5. ESRD   Avoid nephrotoxic drugs  Correct electrolyte imbalance.   Lokelma PRN   HD - TTS  Renal F/U   Monitor labs     6. Afib   A/C   Cardio F/U     7. DM   Accuchecks ac/hs  Insulin coverage per HSS   A1C level     8. Elevated Troponin  Likely 2nd to demand ischemia   May need cardiac cath - given recent negative ST per cardio.   Cardio F/U

## 2021-08-26 NOTE — PROGRESS NOTE ADULT - SUBJECTIVE AND OBJECTIVE BOX
PGY1 Progress Note discussed with attending     PAGER #: [[phone]] TILL 5:00 PM  PLEASE CONTACT ON CALL TEAM:  - On Call Team (Please refer to Yolanda) FROM 5:00 PM - 8:30PM  - Nightfloat Team FROM 8:30 -7:30 AM    CHIEF COMPLAINT & BRIEF HOSPITAL COURSE:    INTERVAL HPI/OVERNIGHT EVENTS: No acute events overnight. Patient denies chest pain, sob or palpitations. Patient denies abdominal pain.       REVIEW OF SYSTEMS:  CONSTITUTIONAL: No fever, weight loss, or fatigue  RESPIRATORY: No cough, wheezing, chills or hemoptysis; No shortness of breath  CARDIOVASCULAR: No chest pain, palpitations, dizziness, or leg swelling  GASTROINTESTINAL: No abdominal pain. No nausea, vomiting, or hematemesis; No diarrhea or constipation. No melena or hematochezia.  GENITOURINARY: No dysuria or hematuria, urinary frequency  NEUROLOGICAL: No headaches, memory loss, loss of strength, numbness, or tremors  SKIN: No itching, burning, rashes, or lesions     Vital Signs Last 24 Hrs  T(C): 36.3 (26 Aug 2021 14:37), Max: 36.7 (25 Aug 2021 19:54)  T(F): 97.3 (26 Aug 2021 14:37), Max: 98.1 (25 Aug 2021 19:54)  HR: 80 (26 Aug 2021 14:37) (65 - 94)  BP: 121/71 (26 Aug 2021 14:37) (121/71 - 153/73)  BP(mean): --  RR: 18 (26 Aug 2021 14:37) (18 - 18)  SpO2: 98% (26 Aug 2021 14:37) (98% - 99%)    PHYSICAL EXAMINATION:  GENERAL: NAD, well built  HEAD:  Atraumatic, Normocephalic  EYES:  conjunctiva and sclera clear  NECK: Supple, No JVD, Normal thyroid  CHEST/LUNG: Clear to auscultation. Clear to percussion bilaterally; No rales, rhonchi, wheezing, or rubs  HEART: Regular rate and rhythm; No murmurs, rubs, or gallops  ABDOMEN: Soft, Nontender, Nondistended; Bowel sounds present  NERVOUS SYSTEM:  Alert & Oriented X3,    EXTREMITIES:  2+ Peripheral Pulses, No clubbing, cyanosis, or edema  SKIN: warm dry                          9.4    4.86  )-----------( 156      ( 26 Aug 2021 15:02 )             27.9     08-26    133<L>  |  95<L>  |  68<H>  ----------------------------<  135<H>  5.1   |  24  |  8.85<H>    Ca    8.3<L>      26 Aug 2021 07:14  Phos  4.5     08-26  Mg     2.4     08-26    TPro  6.3  /  Alb  2.7<L>  /  TBili  0.4  /  DBili  x   /  AST  7<L>  /  ALT  9<L>  /  AlkPhos  64  08-26    LIVER FUNCTIONS - ( 26 Aug 2021 07:14 )  Alb: 2.7 g/dL / Pro: 6.3 g/dL / ALK PHOS: 64 U/L / ALT: 9 U/L DA / AST: 7 U/L / GGT: x           CARDIAC MARKERS ( 25 Aug 2021 17:09 )  1.190 ng/mL / x     / x     / x     / x      CARDIAC MARKERS ( 25 Aug 2021 05:06 )  1.910 ng/mL / x     / 83 U/L / x     / 4.3 ng/mL  CARDIAC MARKERS ( 24 Aug 2021 21:41 )  0.864 ng/mL / x     / x     / x     / x          PTT - ( 26 Aug 2021 15:02 )  PTT:68.1 sec    CAPILLARY BLOOD GLUCOSE      RADIOLOGY & ADDITIONAL TESTS:                   PGY1 Progress Note discussed with attending     PAGER #: [[phone]] TILL 5:00 PM  PLEASE CONTACT ON CALL TEAM:  - On Call Team (Please refer to Yolanda) FROM 5:00 PM - 8:30PM  - Nightfloat Team FROM 8:30 -7:30 AM    CHIEF COMPLAINT & BRIEF HOSPITAL COURSE:    INTERVAL HPI/OVERNIGHT EVENTS: No acute events overnight. Patient denies chest pain, sob or palpitations. Patient denies abdominal pain. Patient for transfer for cardiac cath tomorrow in AM        REVIEW OF SYSTEMS:  CONSTITUTIONAL: No fever, weight loss, or fatigue  RESPIRATORY: No cough, wheezing, chills or hemoptysis; No shortness of breath  CARDIOVASCULAR: No chest pain, palpitations, dizziness, or leg swelling  GASTROINTESTINAL: No abdominal pain. No nausea, vomiting, or hematemesis; No diarrhea or constipation. No melena or hematochezia.  GENITOURINARY: No dysuria or hematuria, urinary frequency  NEUROLOGICAL: No headaches, memory loss, loss of strength, numbness, or tremors  SKIN: No itching, burning, rashes, or lesions     Vital Signs Last 24 Hrs  T(C): 36.3 (26 Aug 2021 14:37), Max: 36.7 (25 Aug 2021 19:54)  T(F): 97.3 (26 Aug 2021 14:37), Max: 98.1 (25 Aug 2021 19:54)  HR: 80 (26 Aug 2021 14:37) (65 - 94)  BP: 121/71 (26 Aug 2021 14:37) (121/71 - 153/73)  BP(mean): --  RR: 18 (26 Aug 2021 14:37) (18 - 18)  SpO2: 98% (26 Aug 2021 14:37) (98% - 99%)    PHYSICAL EXAMINATION:  GENERAL: NAD, well built  HEAD:  Atraumatic, Normocephalic  EYES:  conjunctiva and sclera clear  NECK: Supple, No JVD, Normal thyroid  CHEST/LUNG: Clear to auscultation. Clear to percussion bilaterally; No rales, rhonchi, wheezing, or rubs  HEART: Regular rate and rhythm; No murmurs, rubs, or gallops  ABDOMEN: Soft, Nontender, Nondistended; Bowel sounds present  NERVOUS SYSTEM:  Alert & Oriented X3,    EXTREMITIES:  2+ Peripheral Pulses, No clubbing, cyanosis, or edema  SKIN: warm dry                          9.4    4.86  )-----------( 156      ( 26 Aug 2021 15:02 )             27.9     08-26    133<L>  |  95<L>  |  68<H>  ----------------------------<  135<H>  5.1   |  24  |  8.85<H>    Ca    8.3<L>      26 Aug 2021 07:14  Phos  4.5     08-26  Mg     2.4     08-26    TPro  6.3  /  Alb  2.7<L>  /  TBili  0.4  /  DBili  x   /  AST  7<L>  /  ALT  9<L>  /  AlkPhos  64  08-26    LIVER FUNCTIONS - ( 26 Aug 2021 07:14 )  Alb: 2.7 g/dL / Pro: 6.3 g/dL / ALK PHOS: 64 U/L / ALT: 9 U/L DA / AST: 7 U/L / GGT: x           CARDIAC MARKERS ( 25 Aug 2021 17:09 )  1.190 ng/mL / x     / x     / x     / x      CARDIAC MARKERS ( 25 Aug 2021 05:06 )  1.910 ng/mL / x     / 83 U/L / x     / 4.3 ng/mL  CARDIAC MARKERS ( 24 Aug 2021 21:41 )  0.864 ng/mL / x     / x     / x     / x          PTT - ( 26 Aug 2021 15:02 )  PTT:68.1 sec    CAPILLARY BLOOD GLUCOSE      RADIOLOGY & ADDITIONAL TESTS:

## 2021-08-26 NOTE — PROGRESS NOTE ADULT - ASSESSMENT
86y Male with history of ESRD on HD presents with abdominal pain. Nephrology consulted for ESRD status.    1) ESRD: Last HD on 8/24 terminated 40 mins early due to hypotension (post lopressor for tachycardia of 140s during HD), with net 1.2L removed. Plan for next maintenance HD today; 8/26.  Monitor electrolytes.    2) HTN with ESRD: BP acceptable. Metoprolol increased to 25mg PO tid, Losartan reduced to 25mg PO qd and started on Digoxin 125mcg PO every other day by Cardiology. Pt now with type 2 MI, +trops; demand ischemia. Pt for transfer to [facility] for cardiac cath in am. Monitor BP.    3) Anemia of renal disease: Hb borderline. c/w Epo 4k IV tiw with HD. Monitor Hb.    4) Hyperphosphatemia: Phosphorus acceptable; c/w  phoslo 2 tabs with meals. Monitor serum calcium and phosphorus.    5) Hyperkalemia: resolved. c/w Renal diet. Monitor serum K.      [facility]  Nahum Grubbs M.D.  Francisco Javier Hicks D.O.  Yovana Quiroz M.D.  Makeda Duron, THOMAS, ANP-C    Telephone: [phone]  Facsimile: [phone]    [address] [place] [address]

## 2021-08-27 ENCOUNTER — INPATIENT (INPATIENT)
Facility: HOSPITAL | Age: 86
LOS: 2 days | Discharge: HOME | End: 2021-08-30
Attending: INTERNAL MEDICINE | Admitting: INTERNAL MEDICINE
Payer: MEDICARE

## 2021-08-27 VITALS
RESPIRATION RATE: 16 BRPM | TEMPERATURE: 98 F | DIASTOLIC BLOOD PRESSURE: 75 MMHG | SYSTOLIC BLOOD PRESSURE: 153 MMHG | HEART RATE: 95 BPM | OXYGEN SATURATION: 100 %

## 2021-08-27 VITALS
SYSTOLIC BLOOD PRESSURE: 143 MMHG | DIASTOLIC BLOOD PRESSURE: 54 MMHG | OXYGEN SATURATION: 98 % | RESPIRATION RATE: 18 BRPM | HEART RATE: 75 BPM | TEMPERATURE: 98 F

## 2021-08-27 DIAGNOSIS — J90 PLEURAL EFFUSION, NOT ELSEWHERE CLASSIFIED: ICD-10-CM

## 2021-08-27 DIAGNOSIS — I21.4 NON-ST ELEVATION (NSTEMI) MYOCARDIAL INFARCTION: ICD-10-CM

## 2021-08-27 DIAGNOSIS — Z90.49 ACQUIRED ABSENCE OF OTHER SPECIFIED PARTS OF DIGESTIVE TRACT: Chronic | ICD-10-CM

## 2021-08-27 DIAGNOSIS — I48.91 UNSPECIFIED ATRIAL FIBRILLATION: ICD-10-CM
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
## 2021-08-27 DIAGNOSIS — K56.609 UNSPECIFIED INTESTINAL OBSTRUCTION, UNSPECIFIED AS TO PARTIAL VERSUS COMPLETE OBSTRUCTION: ICD-10-CM

## 2021-08-27 DIAGNOSIS — N18.6 END STAGE RENAL DISEASE: ICD-10-CM

## 2021-08-27 LAB
ALBUMIN SERPL ELPH-MCNC: 3.2 G/DL — LOW (ref 3.5–5)
ALP SERPL-CCNC: 64 U/L — SIGNIFICANT CHANGE UP (ref 40–120)
ALT FLD-CCNC: 9 U/L DA — LOW (ref 10–60)
ANION GAP SERPL CALC-SCNC: 11 MMOL/L — SIGNIFICANT CHANGE UP (ref 5–17)
APTT BLD: 67.7 SEC — HIGH (ref 27.5–35.5)
AST SERPL-CCNC: 9 U/L — LOW (ref 10–40)
BILIRUB SERPL-MCNC: 0.4 MG/DL — SIGNIFICANT CHANGE UP (ref 0.2–1.2)
BUN SERPL-MCNC: 43 MG/DL — HIGH (ref 7–18)
CALCIUM SERPL-MCNC: 8.7 MG/DL — SIGNIFICANT CHANGE UP (ref 8.4–10.5)
CHLORIDE SERPL-SCNC: 96 MMOL/L — SIGNIFICANT CHANGE UP (ref 96–108)
CO2 SERPL-SCNC: 28 MMOL/L — SIGNIFICANT CHANGE UP (ref 22–31)
CREAT SERPL-MCNC: 6.1 MG/DL — HIGH (ref 0.5–1.3)
DIALYSIS INSTRUMENT RESULT - HEPATITIS B SURFACE ANTIGEN: NEGATIVE — SIGNIFICANT CHANGE UP
GLUCOSE BLDC GLUCOMTR-MCNC: 113 MG/DL — HIGH (ref 70–99)
GLUCOSE BLDC GLUCOMTR-MCNC: 133 MG/DL — HIGH (ref 70–99)
GLUCOSE BLDC GLUCOMTR-MCNC: 218 MG/DL — HIGH (ref 70–99)
GLUCOSE SERPL-MCNC: 126 MG/DL — HIGH (ref 70–99)
HCT VFR BLD CALC: 30.4 % — LOW (ref 39–50)
HGB BLD-MCNC: 10.2 G/DL — LOW (ref 13–17)
MAGNESIUM SERPL-MCNC: 2.4 MG/DL — SIGNIFICANT CHANGE UP (ref 1.6–2.6)
MCHC RBC-ENTMCNC: 31.5 PG — SIGNIFICANT CHANGE UP (ref 27–34)
MCHC RBC-ENTMCNC: 33.6 GM/DL — SIGNIFICANT CHANGE UP (ref 32–36)
MCV RBC AUTO: 93.8 FL — SIGNIFICANT CHANGE UP (ref 80–100)
NRBC # BLD: 0 /100 WBCS — SIGNIFICANT CHANGE UP (ref 0–0)
PHOSPHATE SERPL-MCNC: 3.3 MG/DL — SIGNIFICANT CHANGE UP (ref 2.5–4.5)
PLATELET # BLD AUTO: 168 K/UL — SIGNIFICANT CHANGE UP (ref 150–400)
POTASSIUM SERPL-MCNC: 4.8 MMOL/L — SIGNIFICANT CHANGE UP (ref 3.5–5.3)
POTASSIUM SERPL-SCNC: 4.8 MMOL/L — SIGNIFICANT CHANGE UP (ref 3.5–5.3)
PROT SERPL-MCNC: 6.5 G/DL — SIGNIFICANT CHANGE UP (ref 6–8.3)
RBC # BLD: 3.24 M/UL — LOW (ref 4.2–5.8)
RBC # FLD: 12.8 % — SIGNIFICANT CHANGE UP (ref 10.3–14.5)
SARS-COV-2 RNA SPEC QL NAA+PROBE: SIGNIFICANT CHANGE UP
SODIUM SERPL-SCNC: 135 MMOL/L — SIGNIFICANT CHANGE UP (ref 135–145)
WBC # BLD: 4.93 K/UL — SIGNIFICANT CHANGE UP (ref 3.8–10.5)
WBC # FLD AUTO: 4.93 K/UL — SIGNIFICANT CHANGE UP (ref 3.8–10.5)

## 2021-08-27 PROCEDURE — 97162 PT EVAL MOD COMPLEX 30 MIN: CPT

## 2021-08-27 PROCEDURE — 83036 HEMOGLOBIN GLYCOSYLATED A1C: CPT

## 2021-08-27 PROCEDURE — 93458 L HRT ARTERY/VENTRICLE ANGIO: CPT | Mod: 26

## 2021-08-27 PROCEDURE — 87077 CULTURE AEROBIC IDENTIFY: CPT

## 2021-08-27 PROCEDURE — 93005 ELECTROCARDIOGRAM TRACING: CPT

## 2021-08-27 PROCEDURE — 99261: CPT

## 2021-08-27 PROCEDURE — 74177 CT ABD & PELVIS W/CONTRAST: CPT | Mod: MA

## 2021-08-27 PROCEDURE — 71250 CT THORAX DX C-: CPT

## 2021-08-27 PROCEDURE — 74018 RADEX ABDOMEN 1 VIEW: CPT

## 2021-08-27 PROCEDURE — 84100 ASSAY OF PHOSPHORUS: CPT

## 2021-08-27 PROCEDURE — 85027 COMPLETE CBC AUTOMATED: CPT

## 2021-08-27 PROCEDURE — 80074 ACUTE HEPATITIS PANEL: CPT

## 2021-08-27 PROCEDURE — 99285 EMERGENCY DEPT VISIT HI MDM: CPT

## 2021-08-27 PROCEDURE — 84443 ASSAY THYROID STIM HORMONE: CPT

## 2021-08-27 PROCEDURE — 82550 ASSAY OF CK (CPK): CPT

## 2021-08-27 PROCEDURE — 82553 CREATINE MB FRACTION: CPT

## 2021-08-27 PROCEDURE — 82962 GLUCOSE BLOOD TEST: CPT

## 2021-08-27 PROCEDURE — 80048 BASIC METABOLIC PNL TOTAL CA: CPT

## 2021-08-27 PROCEDURE — 97116 GAIT TRAINING THERAPY: CPT

## 2021-08-27 PROCEDURE — 85730 THROMBOPLASTIN TIME PARTIAL: CPT

## 2021-08-27 PROCEDURE — 93010 ELECTROCARDIOGRAM REPORT: CPT

## 2021-08-27 PROCEDURE — 74019 RADEX ABDOMEN 2 VIEWS: CPT

## 2021-08-27 PROCEDURE — 87070 CULTURE OTHR SPECIMN AEROBIC: CPT

## 2021-08-27 PROCEDURE — 83735 ASSAY OF MAGNESIUM: CPT

## 2021-08-27 PROCEDURE — 86769 SARS-COV-2 COVID-19 ANTIBODY: CPT

## 2021-08-27 PROCEDURE — 87635 SARS-COV-2 COVID-19 AMP PRB: CPT

## 2021-08-27 PROCEDURE — 94640 AIRWAY INHALATION TREATMENT: CPT

## 2021-08-27 PROCEDURE — 71045 X-RAY EXAM CHEST 1 VIEW: CPT

## 2021-08-27 PROCEDURE — 85025 COMPLETE CBC W/AUTO DIFF WBC: CPT

## 2021-08-27 PROCEDURE — 83605 ASSAY OF LACTIC ACID: CPT

## 2021-08-27 PROCEDURE — 85610 PROTHROMBIN TIME: CPT

## 2021-08-27 PROCEDURE — 36415 COLL VENOUS BLD VENIPUNCTURE: CPT

## 2021-08-27 PROCEDURE — 80061 LIPID PANEL: CPT

## 2021-08-27 PROCEDURE — 80053 COMPREHEN METABOLIC PANEL: CPT

## 2021-08-27 PROCEDURE — 84484 ASSAY OF TROPONIN QUANT: CPT

## 2021-08-27 RX ORDER — DEXTROSE 50 % IN WATER 50 %
25 SYRINGE (ML) INTRAVENOUS ONCE
Refills: 0 | Status: DISCONTINUED | OUTPATIENT
Start: 2021-08-27 | End: 2021-08-30

## 2021-08-27 RX ORDER — DEXTROSE 50 % IN WATER 50 %
12.5 SYRINGE (ML) INTRAVENOUS ONCE
Refills: 0 | Status: DISCONTINUED | OUTPATIENT
Start: 2021-08-27 | End: 2021-08-30

## 2021-08-27 RX ORDER — CALCIUM ACETATE 667 MG
1334 TABLET ORAL
Refills: 0 | Status: DISCONTINUED | OUTPATIENT
Start: 2021-08-27 | End: 2021-08-30

## 2021-08-27 RX ORDER — ATORVASTATIN CALCIUM 80 MG/1
10 TABLET, FILM COATED ORAL AT BEDTIME
Refills: 0 | Status: DISCONTINUED | OUTPATIENT
Start: 2021-08-27 | End: 2021-08-27

## 2021-08-27 RX ORDER — HYDRALAZINE HCL 50 MG
100 TABLET ORAL EVERY 8 HOURS
Refills: 0 | Status: DISCONTINUED | OUTPATIENT
Start: 2021-08-27 | End: 2021-08-30

## 2021-08-27 RX ORDER — INSULIN LISPRO 100/ML
VIAL (ML) SUBCUTANEOUS AT BEDTIME
Refills: 0 | Status: DISCONTINUED | OUTPATIENT
Start: 2021-08-27 | End: 2021-08-30

## 2021-08-27 RX ORDER — PANTOPRAZOLE SODIUM 20 MG/1
40 TABLET, DELAYED RELEASE ORAL
Refills: 0 | Status: DISCONTINUED | OUTPATIENT
Start: 2021-08-27 | End: 2021-08-30

## 2021-08-27 RX ORDER — SODIUM CHLORIDE 9 MG/ML
3 INJECTION INTRAMUSCULAR; INTRAVENOUS; SUBCUTANEOUS EVERY 8 HOURS
Refills: 0 | Status: DISCONTINUED | OUTPATIENT
Start: 2021-08-27 | End: 2021-08-30

## 2021-08-27 RX ORDER — METOPROLOL TARTRATE 50 MG
25 TABLET ORAL EVERY 8 HOURS
Refills: 0 | Status: DISCONTINUED | OUTPATIENT
Start: 2021-08-27 | End: 2021-08-29

## 2021-08-27 RX ORDER — GLUCAGON INJECTION, SOLUTION 0.5 MG/.1ML
1 INJECTION, SOLUTION SUBCUTANEOUS ONCE
Refills: 0 | Status: DISCONTINUED | OUTPATIENT
Start: 2021-08-27 | End: 2021-08-30

## 2021-08-27 RX ORDER — DEXTROSE 50 % IN WATER 50 %
15 SYRINGE (ML) INTRAVENOUS ONCE
Refills: 0 | Status: DISCONTINUED | OUTPATIENT
Start: 2021-08-27 | End: 2021-08-30

## 2021-08-27 RX ORDER — SODIUM CHLORIDE 9 MG/ML
1000 INJECTION, SOLUTION INTRAVENOUS
Refills: 0 | Status: DISCONTINUED | OUTPATIENT
Start: 2021-08-27 | End: 2021-08-30

## 2021-08-27 RX ORDER — INSULIN LISPRO 100/ML
VIAL (ML) SUBCUTANEOUS
Refills: 0 | Status: DISCONTINUED | OUTPATIENT
Start: 2021-08-27 | End: 2021-08-30

## 2021-08-27 RX ORDER — DIGOXIN 250 MCG
125 TABLET ORAL EVERY OTHER DAY
Refills: 0 | Status: DISCONTINUED | OUTPATIENT
Start: 2021-08-27 | End: 2021-08-30

## 2021-08-27 RX ORDER — ATORVASTATIN CALCIUM 80 MG/1
40 TABLET, FILM COATED ORAL AT BEDTIME
Refills: 0 | Status: DISCONTINUED | OUTPATIENT
Start: 2021-08-27 | End: 2021-08-30

## 2021-08-27 RX ORDER — FINASTERIDE 5 MG/1
5 TABLET, FILM COATED ORAL DAILY
Refills: 0 | Status: DISCONTINUED | OUTPATIENT
Start: 2021-08-27 | End: 2021-08-30

## 2021-08-27 RX ORDER — LOSARTAN POTASSIUM 100 MG/1
25 TABLET, FILM COATED ORAL DAILY
Refills: 0 | Status: DISCONTINUED | OUTPATIENT
Start: 2021-08-27 | End: 2021-08-29

## 2021-08-27 RX ORDER — ERYTHROPOIETIN 10000 [IU]/ML
4000 INJECTION, SOLUTION INTRAVENOUS; SUBCUTANEOUS
Refills: 0 | Status: DISCONTINUED | OUTPATIENT
Start: 2021-08-27 | End: 2021-08-30

## 2021-08-27 RX ADMIN — Medication 100 MILLIGRAM(S): at 22:26

## 2021-08-27 RX ADMIN — Medication 25 MILLIGRAM(S): at 05:15

## 2021-08-27 RX ADMIN — HEPARIN SODIUM 1000 UNIT(S)/HR: 5000 INJECTION INTRAVENOUS; SUBCUTANEOUS at 01:07

## 2021-08-27 RX ADMIN — Medication 100 MILLIGRAM(S): at 05:15

## 2021-08-27 RX ADMIN — SODIUM CHLORIDE 3 MILLILITER(S): 9 INJECTION INTRAMUSCULAR; INTRAVENOUS; SUBCUTANEOUS at 18:52

## 2021-08-27 RX ADMIN — PANTOPRAZOLE SODIUM 40 MILLIGRAM(S): 20 TABLET, DELAYED RELEASE ORAL at 05:15

## 2021-08-27 RX ADMIN — ATORVASTATIN CALCIUM 40 MILLIGRAM(S): 80 TABLET, FILM COATED ORAL at 22:26

## 2021-08-27 RX ADMIN — SODIUM CHLORIDE 3 MILLILITER(S): 9 INJECTION INTRAMUSCULAR; INTRAVENOUS; SUBCUTANEOUS at 05:16

## 2021-08-27 RX ADMIN — Medication 25 MILLIGRAM(S): at 22:27

## 2021-08-27 RX ADMIN — SODIUM CHLORIDE 3 MILLILITER(S): 9 INJECTION INTRAMUSCULAR; INTRAVENOUS; SUBCUTANEOUS at 22:13

## 2021-08-27 RX ADMIN — Medication 1334 MILLIGRAM(S): at 09:04

## 2021-08-27 RX ADMIN — LOSARTAN POTASSIUM 25 MILLIGRAM(S): 100 TABLET, FILM COATED ORAL at 05:15

## 2021-08-27 NOTE — CONSULT NOTE ADULT - ASSESSMENT
87 yo Male with ESRD on HD TTS, DM type 2, HTN, Anemia of renal disease, hyperphosphatemia, Afib on A/C Eliquis Presented with diffuse abdominal pain x 1 day. Patient found to have a Small Bowel Obstruction. Admitted for SBO and abnormal cxray which shows increased bilateral effusions.         Problem/Plan - 1:  ·  Problem: Small bowel obstruction.   ·  Plan: - CT abdomen showed Small bowel obstruction, with a transition point in the left upper quadrant, and a 2nd transition point in the right lower quadrant. Closed loop obstruction or internal hernia cannot be excluded.  - Try to avoid opioids as it may worsen pain due to obstruction     Problem/Plan - 2:  ·  Problem: Elevated troponin.   ·  Plan: - patient complained of chest pain yesterday   - troponin was sent and was 0.111, repeat troponin were 0.864 and 1.9  - today chest pain has resolved  - continue with tele   - elevated troponin most likely due to demand ischemia   Transferred to [facility] for cardiac cath    Problem/Plan - 3:  ·  Problem: Atrial fibrillation.   ·  Plan: Patient has hx of atrial fibrillation on Eliquis and metoprolol at home   - yesterday pt did not complete HD session due to Afib with RVR in 140's and with BP of 80/40's. Patient complained of chest pain. Total 600cc of fluid given, bp improved to 120's SBP, 5mg Metoprolol IV given at dialysis and another 5mg Metoprolol 5mg given on floor.  - repeat EKG's did not show any acute ST segment changes      Problem/Plan - 4:  ·  Problem: ESRD on hemodialysis.   ·  Plan: - ESRD Maintenance HD (T/T/S ) via AV fistula L forearm, Cr 7.9 on admission  -Scheduled for HD today       Problem/Plan - 5:  ·  Problem: Electrolyte abnormality.   ·  Plan: - Next HD today at Potassium 5.0, Magnesium 2.3 Phosp 4.1   - Monitor BMP,  - unable to complete HD session yesterday, Dr. Quiroz contacted no need for HD today.    Problem/Plan - 6:  ·  Problem: Abnormal chest xray.   ·  Plan: Problem: Abnormal chest xray.  Plan: - CXR showed increased bilateral effusions and moderate left effusion and increasing pseudotumor in the minor fissure compared to June 28 of this year.  - CT abdomen non contrast showed moderate to large left pleural effusion left lower lobe atelectasis/collapse. Streaky right basilar atelectasis with trace right pleural effusion.  -  CT chest non contrast Large left and trace right pleural effusions not significantly  changed from 6/22/2021. Atelectasis of basilar left lower lobe, unchanged. Interlobular septal thickening and patchy groundglass opacities representing pulmonary edema.Problem/Plan - 7:  ·  Problem: Hypertension.   ·  Plan: Patient noted to be normotensive overnight   - Hydralazine 100mg Q 8  - Losartan 25mg QD.    Problem/Plan - 8:  ·  Problem: Prophylactic measure.   ·  Plan: IMPROVE VTE score: 3  Will manage with:     [ ] Previous VTE                                    3  [ ] Thrombophilia                                  2  [ x] Lower limb paralysis                        2  (unable to hold up >15 seconds)  [ ] Current Cancer (within 6 months)        2   [x] Immobilization > 24 hrs                    1  [ ] ICU/CCU stay > 24 hrs                      1  [x] Age > 60                                         1.

## 2021-08-27 NOTE — PROGRESS NOTE ADULT - SUBJECTIVE AND OBJECTIVE BOX
Patient is a 86y old  Male who presents with a chief complaint of ABDOMINAL PAIN (27 Aug 2021 08:54)  Awake, alert, comfortable in bed in NAD. Awaiting transfer for cath today    INTERVAL HPI/OVERNIGHT EVENTS:      VITAL SIGNS:  T(F): 97.7 (08-27-21 @ 07:47)  HR: 79 (08-27-21 @ 07:47)  BP: 151/61 (08-27-21 @ 07:47)  RR: 18 (08-27-21 @ 07:47)  SpO2: 98% (08-27-21 @ 07:47)  Wt(kg): --  I&O's Detail    26 Aug 2021 07:01  -  27 Aug 2021 07:00  --------------------------------------------------------  IN:    Oral Fluid: 450 mL  Total IN: 450 mL    OUT:  Total OUT: 0 mL    Total NET: 450 mL              REVIEW OF SYSTEMS:    CONSTITUTIONAL:  No fevers, chills, sweats    HEENT:  Eyes:  No diplopia or blurred vision. ENT:  No earache, sore throat or runny nose.    CARDIOVASCULAR:  No pressure, squeezing, tightness, or heaviness about the chest; no palpitations.    RESPIRATORY:  Per HPI    GASTROINTESTINAL:  No abdominal pain, nausea, vomiting or diarrhea.    GENITOURINARY:  No dysuria, frequency or urgency.    NEUROLOGIC:  No paresthesias, fasciculations, seizures or weakness.    PSYCHIATRIC:  No disorder of thought or mood.      PHYSICAL EXAM:    Constitutional: Well developed and nourished  Eyes:Perrla  ENMT: normal  Neck:supple  Respiratory: good air entry  Cardiovascular: S1 S2 regular  Gastrointestinal: Soft, Non tender  Extremities: No edema  Vascular:normal  Neurological:Awake, alert,Ox3  Musculoskeletal:Normal      MEDICATIONS  (STANDING):  calcium acetate 1334 milliGRAM(s) Oral three times a day with meals  chlorhexidine 2% Cloths 1 Application(s) Topical daily  digoxin     Tablet 125 MICROGram(s) Oral every other day  epoetin zach-epbx (RETACRIT) Injectable 4000 Unit(s) IV Push <User Schedule>  heparin  Infusion. 1000 Unit(s)/Hr (10 mL/Hr) IV Continuous <Continuous>  hydrALAZINE 100 milliGRAM(s) Oral every 8 hours  insulin lispro (ADMELOG) corrective regimen sliding scale   SubCutaneous three times a day before meals  losartan 25 milliGRAM(s) Oral daily  metoprolol tartrate 25 milliGRAM(s) Oral every 8 hours  pantoprazole    Tablet 40 milliGRAM(s) Oral before breakfast  sodium chloride 0.9% lock flush 3 milliLiter(s) IV Push every 8 hours    MEDICATIONS  (PRN):      Allergies    No Known Allergies    Intolerances        LABS:                        10.2   4.93  )-----------( 168      ( 27 Aug 2021 07:37 )             30.4     08-27    135  |  96  |  43<H>  ----------------------------<  126<H>  4.8   |  28  |  6.10<H>    Ca    8.7      27 Aug 2021 07:37  Phos  3.3     08-27  Mg     2.4     08-27    TPro  6.5  /  Alb  3.2<L>  /  TBili  0.4  /  DBili  x   /  AST  9<L>  /  ALT  9<L>  /  AlkPhos  64  08-27    PTT - ( 27 Aug 2021 00:43 )  PTT:67.7 sec      CARDIAC MARKERS ( 25 Aug 2021 17:09 )  1.190 ng/mL / x     / x     / x     / x          CAPILLARY BLOOD GLUCOSE      POCT Blood Glucose.: 133 mg/dL (27 Aug 2021 08:06)  POCT Blood Glucose.: 182 mg/dL (26 Aug 2021 21:04)  POCT Blood Glucose.: 113 mg/dL (26 Aug 2021 16:49)  POCT Blood Glucose.: 155 mg/dL (26 Aug 2021 11:13)        RADIOLOGY & ADDITIONAL TESTS:    CXR:    Ct scan chest:    ekg;    echo:

## 2021-08-27 NOTE — PROGRESS NOTE ADULT - PROVIDER SPECIALTY LIST ADULT
Internal Medicine
Internal Medicine
Nephrology
Nephrology
Cardiology
Cardiology
Internal Medicine
Pulmonology
Pulmonology
Surgery
Cardiology
Internal Medicine
Nephrology
Pulmonology
Surgery
Cardiology
Internal Medicine
Internal Medicine
Nephrology
Nephrology
Pulmonology
Surgery
Surgery
Internal Medicine

## 2021-08-27 NOTE — TRANSFER ACCEPTANCE NOTE - ASSESSMENT
87 y/o M with Afib on Eliquis, ESRD on HD TTS, DM type 2, HTN, Anemia of renal disease and hyperphosphatemia presented to [facility] with diffuse abdominal pain    +SBO  +NSTEMI  +Afib w/ RvR  +Fluid overload 87 y/o M with Afib on Eliquis, ESRD on HD TTS, DM type 2, HTN, Anemia of renal disease and hyperphosphatemia presented to [facility] with diffuse abdominal pain    +NSTEMI  +Afib w/ RvR  +SBO  +Left Pleural effusion

## 2021-08-27 NOTE — PROGRESS NOTE ADULT - ASSESSMENT
86 yr old male with PMHX of PAF, ESRD on HD, DM, HTN, Lipid d/o who presents to ER with abdominal pain, nausea and vomiting, with SBO-now resolved,PAF with RVR,CP and now Type II MI-due to demand ischemia...  1.Tele monitoring  2.ESRD-HD as per renal.  3.PAF- heparin drip, lopressor 25mg qd and  dig .125mg qod.  5.HTN-cont lopressor, hydralazine, cozaar 25mg qd.  6.CP, +troponin-agree for cardiac cath this am.  7.PPI.

## 2021-08-27 NOTE — PROGRESS NOTE ADULT - SUBJECTIVE AND OBJECTIVE BOX
CHIEF COMPLAINT:Patient is a 86y old  Male who presents with a chief complaint of ABDOMINAL PAIN.Pt appears comfortable.    	  REVIEW OF SYSTEMS:  CONSTITUTIONAL: No fever, weight loss, or fatigue  EYES: No eye pain, visual disturbances, or discharge  ENT:  No difficulty hearing, tinnitus, vertigo; No sinus or throat pain  NECK: No pain or stiffness  RESPIRATORY: No cough, wheezing, chills or hemoptysis; No Shortness of Breath  CARDIOVASCULAR: No chest pain, palpitations, passing out, dizziness, or leg swelling  GASTROINTESTINAL: No abdominal or epigastric pain. No nausea, vomiting, or hematemesis; No diarrhea or constipation. No melena or hematochezia.  GENITOURINARY: No dysuria, frequency, hematuria, or incontinence  NEUROLOGICAL: No headaches, memory loss, loss of strength, numbness, or tremors  SKIN: No itching, burning, rashes, or lesions   LYMPH Nodes: No enlarged glands  ENDOCRINE: No heat or cold intolerance; No hair loss  MUSCULOSKELETAL: No joint pain or swelling; No muscle, back, or extremity pain  PSYCHIATRIC: No depression, anxiety, mood swings, or difficulty sleeping  HEME/LYMPH: No easy bruising, or bleeding gums  ALLERGY AND IMMUNOLOGIC: No hives or eczema	      PHYSICAL EXAM:  T(C): 36.5 (08-27-21 @ 07:47), Max: 36.9 (08-27-21 @ 05:12)  HR: 79 (08-27-21 @ 07:47) (70 - 105)  BP: 151/61 (08-27-21 @ 07:47) (121/71 - 154/77)  RR: 18 (08-27-21 @ 07:47) (17 - 18)  SpO2: 98% (08-27-21 @ 07:47) (98% - 100%)  Wt(kg): --  I&O's Summary    26 Aug 2021 07:01  -  27 Aug 2021 07:00  --------------------------------------------------------  IN: 450 mL / OUT: 0 mL / NET: 450 mL        Appearance: Normal	  HEENT:   Normal oral mucosa, PERRL, EOMI	  Lymphatic: No lymphadenopathy  Cardiovascular: Normal S1 S2, No JVD, No murmurs, No edema  Respiratory: Lungs clear to auscultation	  Psychiatry: A & O x 3, Mood & affect appropriate  Gastrointestinal:  Soft, Non-tender, + BS	  Skin: No rashes, No ecchymoses, No cyanosis	  Neurologic: Non-focal  Extremities: Normal range of motion, No clubbing, cyanosis or edema  Vascular: Peripheral pulses palpable 2+ bilaterally    MEDICATIONS  (STANDING):  calcium acetate 1334 milliGRAM(s) Oral three times a day with meals  chlorhexidine 2% Cloths 1 Application(s) Topical daily  digoxin     Tablet 125 MICROGram(s) Oral every other day  epoetin zach-epbx (RETACRIT) Injectable 4000 Unit(s) IV Push <User Schedule>  heparin  Infusion. 1000 Unit(s)/Hr (10 mL/Hr) IV Continuous <Continuous>  hydrALAZINE 100 milliGRAM(s) Oral every 8 hours  insulin lispro (ADMELOG) corrective regimen sliding scale   SubCutaneous three times a day before meals  losartan 25 milliGRAM(s) Oral daily  metoprolol tartrate 25 milliGRAM(s) Oral every 8 hours  pantoprazole    Tablet 40 milliGRAM(s) Oral before breakfast  sodium chloride 0.9% lock flush 3 milliLiter(s) IV Push every 8 hours      LABS:	 	    CARDIAC MARKERS ( 25 Aug 2021 17:09 )  1.190 ng/mL / x     / x     / x     / x                           10.2   4.93  )-----------( 168      ( 27 Aug 2021 07:37 )             30.4     08-27    135  |  96  |  43<H>  ----------------------------<  126<H>  4.8   |  28  |  6.10<H>    Ca    8.7      27 Aug 2021 07:37  Phos  3.3     08-27  Mg     2.4     08-27    TPro  6.5  /  Alb  3.2<L>  /  TBili  0.4  /  DBili  x   /  AST  9<L>  /  ALT  9<L>  /  AlkPhos  64  08-27      Lipid Profile: Cholesterol 155  HDL 92  TG 47      TSH: Thyroid Stimulating Hormone, Serum: 1.06 uU/mL (08-22 @ 07:31)      	    PTT - ( 27 Aug 2021 00:43 )  PTT:67.7 sec

## 2021-08-27 NOTE — PROGRESS NOTE ADULT - REASON FOR ADMISSION
ABDOMINAL PAIN
ABDOMINAL PAIN, SBO
ABDOMINAL PAIN

## 2021-08-27 NOTE — PROGRESS NOTE ADULT - NUTRITIONAL ASSESSMENT
This patient has been assessed with a concern for Malnutrition and has been determined to have a diagnosis/diagnoses of Severe protein-calorie malnutrition and Underweight (BMI < 19).    This patient is being managed with:   Diet Renal Restrictions-  For patients receiving Renal Replacement - No Protein Restr No Conc K No Conc Phos Low Sodium  Entered: Aug 25 2021 11:11AM    
This patient has been assessed with a concern for Malnutrition and has been determined to have a diagnosis/diagnoses of Severe protein-calorie malnutrition and Underweight (BMI < 19).    This patient is being managed with:   Diet Soft-  Consistent Carbohydrate {Evening Snacks}  For patients receiving Renal Replacement - No Protein Restr No Conc K No Conc Phos Low Sodium (RENAL)  Supplement Feeding Modality:  Oral  Nepro Cans or Servings Per Day:  1       Frequency:  Daily  Entered: Aug 24 2021 10:20AM    
This patient has been assessed with a concern for Malnutrition and has been determined to have a diagnosis/diagnoses of Severe protein-calorie malnutrition and Underweight (BMI < 19).    This patient is being managed with:   Diet Renal Restrictions-  For patients receiving Renal Replacement - No Protein Restr No Conc K No Conc Phos Low Sodium  Entered: Aug 25 2021 11:11AM    
This patient has been assessed with a concern for Malnutrition and has been determined to have a diagnosis/diagnoses of Severe protein-calorie malnutrition and Underweight (BMI < 19).    This patient is being managed with:   Diet Renal Restrictions-  For patients receiving Renal Replacement - No Protein Restr No Conc K No Conc Phos Low Sodium  Entered: Aug 25 2021 11:11AM    
This patient has been assessed with a concern for Malnutrition and has been determined to have a diagnosis/diagnoses of Severe protein-calorie malnutrition and Underweight (BMI < 19).    This patient is being managed with:   Diet Soft-  Consistent Carbohydrate {Evening Snacks}  For patients receiving Renal Replacement - No Protein Restr No Conc K No Conc Phos Low Sodium (RENAL)  Supplement Feeding Modality:  Oral  Nepro Cans or Servings Per Day:  1       Frequency:  Daily  Entered: Aug 24 2021 10:20AM    
This patient has been assessed with a concern for Malnutrition and has been determined to have a diagnosis/diagnoses of Severe protein-calorie malnutrition and Underweight (BMI < 19).    This patient is being managed with:   Diet Soft-  Consistent Carbohydrate {Evening Snacks}  For patients receiving Renal Replacement - No Protein Restr No Conc K No Conc Phos Low Sodium (RENAL)  Supplement Feeding Modality:  Oral  Nepro Cans or Servings Per Day:  1       Frequency:  Daily  Entered: Aug 24 2021 10:20AM    
This patient has been assessed with a concern for Malnutrition and has been determined to have a diagnosis/diagnoses of Severe protein-calorie malnutrition and Underweight (BMI < 19).    This patient is being managed with:   Diet Renal Restrictions-  For patients receiving Renal Replacement - No Protein Restr No Conc K No Conc Phos Low Sodium  Entered: Aug 25 2021 11:11AM    
This patient has been assessed with a concern for Malnutrition and has been determined to have a diagnosis/diagnoses of Severe protein-calorie malnutrition and Underweight (BMI < 19).    This patient is being managed with:   Diet Soft-  Consistent Carbohydrate {Evening Snacks}  For patients receiving Renal Replacement - No Protein Restr No Conc K No Conc Phos Low Sodium (RENAL)  Supplement Feeding Modality:  Oral  Nepro Cans or Servings Per Day:  1       Frequency:  Daily  Entered: Aug 24 2021 10:20AM

## 2021-08-27 NOTE — TRANSFER ACCEPTANCE NOTE - HISTORY OF PRESENT ILLNESS
87 y/o M with Afib on Eliquis, ESRD on HD TTS, DM type 2, HTN, Anemia of renal disease and hyperphosphatemia presented to [facility] with diffuse abdominal pain x 1 day on August 21. CT abdomen showed Small bowel obstruction, with a transition point in the left upper quadrant, and a 2nd transition point in the right lower quadrant. Surgery consulted and no surgical intervention. Management was with NGT on continuous suction, serial abdominal exams and abdominal xrays. Patient was found to have abnormal cxr. Findings were significant for showing increased bilateral effusions and moderate left effusion and increasing pseudotumor in the minor fissure compared to June 28 of this year. CT chest showed a large left and trace right pleural effusions not significantly changed from 6/22/2021. Atelectasis of basilar left lower lobe, unchanged. Interlobular septal thickening and patchy groundglass opacities representing pulmonary edema. On 8/23 NGT removed, patient had flatus and clear liquid diet was resumed. Abdominal radiographs showed improvement of SBO. Patient remained stable, however on 8/24 during dialysis patient found to be in Afib with RVR (rate in 150's) hypotensive with 80'SBP/40's DBP. Total of 600 cc bolus fluid given blood pressure improved to 120's sbp. Patient given 5mg Metoprolol IV at dialysis. Dialysis was stopped. When patient returned to floor HR was in 130s with stable BP, another 5mg Metoprolol was given. HR is in 120s. Patient complained of chest pain troponin was 0.111 repeat at 9pm. Repeat troponins were 0.8 and 1.9. On 8/25 cardiology recommended starting Digoxin for Afib. For the chest  pain increased troponin most likely due to demand ischemia. Cardiology recommended cardiac cath to r/o CAD since recent neg stress test, possible false negative. Pt is now transferred to [facility] for cardiac catheretization. 87 y/o M with Afib on Eliquis, h/p GI bleed requiring transfusion, ESRD on HD TTS, DM type 2, HTN, Anemia of renal disease and hyperphosphatemia presented to [facility] with diffuse abdominal pain x 1 day on August 21. CT abdomen showed Small bowel obstruction, with a transition point in the left upper quadrant, and a 2nd transition point in the right lower quadrant. Surgery consulted and no surgical intervention. Management was with NGT on continuous suction, serial abdominal exams and abdominal xrays. Patient was found to have abnormal cxr. Findings were significant for showing increased bilateral effusions and moderate left effusion and increasing pseudotumor in the minor fissure compared to June 28 of this year. CT chest showed a large left and trace right pleural effusions not significantly changed from 6/22/2021. Atelectasis of basilar left lower lobe, unchanged. Interlobular septal thickening and patchy groundglass opacities representing pulmonary edema. On 8/23 NGT removed, patient had flatus and clear liquid diet was resumed. Abdominal radiographs showed improvement of SBO. Patient remained stable, however on 8/24 during dialysis patient found to be in Afib with RVR (rate in 150's) hypotensive with 80'SBP/40's DBP. Total of 600 cc bolus fluid given blood pressure improved to 120's sbp. Patient given 5mg Metoprolol IV at dialysis. Dialysis was stopped. When patient returned to floor HR was in 130s with stable BP, another 5mg Metoprolol was given. HR is in 120s. Patient complained of chest pain troponin was 0.111 repeat at 9pm. Repeat troponins were 0.8 and 1.9. On 8/25 cardiology recommended starting Digoxin for Afib. For the chest  pain increased troponin most likely due to demand ischemia. Cardiology recommended cardiac cath to r/o CAD since recent neg stress test, possible false negative. Pt is now transferred to [facility] for cardiac catheretization.    PCP/Cards: Dr. Benito Kulkarni  Nephrology: Dr. Quiroz  HD center: [facility]

## 2021-08-27 NOTE — PROGRESS NOTE ADULT - SUBJECTIVE AND OBJECTIVE BOX
Patient is a 86y old  Male who presents with a chief complaint of ABDOMINAL PAIN (26 Aug 2021 15:40)    pt seen in icu [  ], reg med floor [   ], bed [  ], chair at bedside [   ], a+o x3 [  ], lethargic [  ],  nad [  ]    rea [  ], ngt [  ], peg [  ], et tube [  ], cent line [  ], picc line [  ]        Allergies    No Known Allergies        Vitals    T(F): 98.4 (08-27-21 @ 05:12), Max: 98.4 (08-27-21 @ 05:12)  HR: 72 (08-27-21 @ 05:12) (70 - 105)  BP: 154/77 (08-27-21 @ 05:12) (121/71 - 154/77)  RR: 18 (08-27-21 @ 05:12) (17 - 18)  SpO2: 100% (08-27-21 @ 05:12) (98% - 100%)  Wt(kg): --  CAPILLARY BLOOD GLUCOSE      POCT Blood Glucose.: 182 mg/dL (26 Aug 2021 21:04)      Labs                          9.4    4.86  )-----------( 156      ( 26 Aug 2021 15:02 )             27.9       08-26    133<L>  |  95<L>  |  68<H>  ----------------------------<  135<H>  5.1   |  24  |  8.85<H>    Ca    8.3<L>      26 Aug 2021 07:14  Phos  4.5     08-26  Mg     2.4     08-26    TPro  6.3  /  Alb  2.7<L>  /  TBili  0.4  /  DBili  x   /  AST  7<L>  /  ALT  9<L>  /  AlkPhos  64  08-26      CARDIAC MARKERS ( 25 Aug 2021 17:09 )  1.190 ng/mL / x     / x     / x     / x            .Nose Nose  08-21 @ 16:30   No Methicillin Resistant Staphylococcus aureus  No Methicillin Sensitive Staphylococcus aureus  "This can represent normal nasal carriage. PCR is more  --  --      .Body Fluid Pleural Fluid  06-28 @ 22:11   No growth at 5 days  --    polymorphonuclear leukocytes seen  No organisms seen  by cytocentrifuge      .Blood Blood  06-22 @ 18:22   No Growth Final  --  --          Radiology Results      Meds    MEDICATIONS  (STANDING):  calcium acetate 1334 milliGRAM(s) Oral three times a day with meals  chlorhexidine 2% Cloths 1 Application(s) Topical daily  digoxin     Tablet 125 MICROGram(s) Oral every other day  epoetin zach-epbx (RETACRIT) Injectable 4000 Unit(s) IV Push <User Schedule>  heparin  Infusion. 1000 Unit(s)/Hr (10 mL/Hr) IV Continuous <Continuous>  hydrALAZINE 100 milliGRAM(s) Oral every 8 hours  insulin lispro (ADMELOG) corrective regimen sliding scale   SubCutaneous three times a day before meals  losartan 25 milliGRAM(s) Oral daily  metoprolol tartrate 25 milliGRAM(s) Oral every 8 hours  pantoprazole    Tablet 40 milliGRAM(s) Oral before breakfast  sodium chloride 0.9% lock flush 3 milliLiter(s) IV Push every 8 hours      MEDICATIONS  (PRN):      Physical Exam    Neuro :  no focal deficits  Respiratory: CTA B/L  CV: RRR, S1S2, no murmurs,   Abdominal: Soft, NT, ND +BS,  Extremities: No edema, + peripheral pulses    ASSESSMENT    Intestinal obstruction    Yes    No pertinent past medical history    ESRD (end stage renal disease)    Diabetes    Hypertension    Hyperlipidemia    Atrial fibrillation    No significant past surgical history    S/P hemodialysis catheter insertion    History of appendectomy        PLAN     Patient is a 86y old  Male who presents with a chief complaint of ABDOMINAL PAIN (26 Aug 2021 15:40)    pt seen in tele [ x ], reg med floor [   ], bed [x  ], chair at bedside [   ], awake and responsive [ x ], lethargic [  ],  nad [x  ]      Allergies    No Known Allergies        Vitals    T(F): 98.4 (08-27-21 @ 05:12), Max: 98.4 (08-27-21 @ 05:12)  HR: 72 (08-27-21 @ 05:12) (70 - 105)  BP: 154/77 (08-27-21 @ 05:12) (121/71 - 154/77)  RR: 18 (08-27-21 @ 05:12) (17 - 18)  SpO2: 100% (08-27-21 @ 05:12) (98% - 100%)  Wt(kg): --  CAPILLARY BLOOD GLUCOSE      POCT Blood Glucose.: 182 mg/dL (26 Aug 2021 21:04)      Labs                          9.4    4.86  )-----------( 156      ( 26 Aug 2021 15:02 )             27.9       08-26    133<L>  |  95<L>  |  68<H>  ----------------------------<  135<H>  5.1   |  24  |  8.85<H>    Ca    8.3<L>      26 Aug 2021 07:14  Phos  4.5     08-26  Mg     2.4     08-26    TPro  6.3  /  Alb  2.7<L>  /  TBili  0.4  /  DBili  x   /  AST  7<L>  /  ALT  9<L>  /  AlkPhos  64  08-26      CARDIAC MARKERS ( 25 Aug 2021 17:09 )  1.190 ng/mL / x     / x     / x     / x            .Nose Nose  08-21 @ 16:30   No Methicillin Resistant Staphylococcus aureus  No Methicillin Sensitive Staphylococcus aureus  "This can represent normal nasal carriage. PCR is more  --  --      .Body Fluid Pleural Fluid  06-28 @ 22:11   No growth at 5 days  --    polymorphonuclear leukocytes seen  No organisms seen  by cytocentrifuge      .Blood Blood  06-22 @ 18:22   No Growth Final  --  --          Radiology Results      Meds    MEDICATIONS  (STANDING):  calcium acetate 1334 milliGRAM(s) Oral three times a day with meals  chlorhexidine 2% Cloths 1 Application(s) Topical daily  digoxin     Tablet 125 MICROGram(s) Oral every other day  epoetin zach-epbx (RETACRIT) Injectable 4000 Unit(s) IV Push <User Schedule>  heparin  Infusion. 1000 Unit(s)/Hr (10 mL/Hr) IV Continuous <Continuous>  hydrALAZINE 100 milliGRAM(s) Oral every 8 hours  insulin lispro (ADMELOG) corrective regimen sliding scale   SubCutaneous three times a day before meals  losartan 25 milliGRAM(s) Oral daily  metoprolol tartrate 25 milliGRAM(s) Oral every 8 hours  pantoprazole    Tablet 40 milliGRAM(s) Oral before breakfast  sodium chloride 0.9% lock flush 3 milliLiter(s) IV Push every 8 hours      MEDICATIONS  (PRN):      Physical Exam    Neuro :  no focal deficits  Respiratory: CTA B/L  CV: RRR, S1S2, no murmurs,   Abdominal: Soft, NT, ND +BS,   Extremities: No edema, + peripheral pulses      ASSESSMENT    Intestinal obstruction  sbo,  b/l pleural eff,   h/o ESRD on HD TTS,   DM type 2,   HTN,   Anemia of renal disease,   hyperphosphatemia,   Afib on A/C Eliquis  Hyperlipidemia        PLAN    d/c tele  abd xray with mild ileus noted above   NGT d/c'd  pt tolerating clears   adv to renal soft diet  OOB ambulate  cont eliquis,   cardio f/u   cont lopressor and hydralazine  cont losartan 25 mg daily  trop x4 noted     cont heparin drip for paf,   dig .125mg qod added.  pt agree for cardiac cath   pt for transfer to [facility] for card cath  renal f/u  pt on hd tu,th,sa   hd as per renal  pt to have Increase UF with HD given pleural effusions on CT chest   restart phoslo 2 tabs with meals once tolerating diet.  Monitor serum calcium and phosphorus.  pulm f/u   Bronchodilators  O2 Supp prn  Incentive Spirometry  cont current meds   transfer for card cath

## 2021-08-27 NOTE — TRANSFER ACCEPTANCE NOTE - MUSCULOSKELETAL
negative detailed exam ROM intact/no joint swelling/no joint erythema/no joint warmth/no calf tenderness/normal strength

## 2021-08-27 NOTE — CONSULT NOTE ADULT - TIME BILLING
- Review of records, telemetry, vital signs and daily labs.   - General and cardiovascular physical examination.  - Generation of cardiovascular treatment plan.  - Coordination of care.      Patient was seen and examined by me on 08/27/2021,interim events noted,labs and radiology studies reviewed.  Foster Diehl MD,FACC.  [address] [place], [place] [id number].  [phone]

## 2021-08-27 NOTE — TRANSFER ACCEPTANCE NOTE - PROBLEM SELECTOR PLAN 2
Pt maintained on Heparin gtt prior to cath  Will transition back to Eliquis upon discharge  Continue BB

## 2021-08-28 LAB
ANION GAP SERPL CALC-SCNC: 16 MMOL/L — HIGH (ref 7–14)
BUN SERPL-MCNC: 60 MG/DL — HIGH (ref 7–23)
CALCIUM SERPL-MCNC: 8.5 MG/DL — SIGNIFICANT CHANGE UP (ref 8.4–10.5)
CHLORIDE SERPL-SCNC: 93 MMOL/L — LOW (ref 98–107)
CO2 SERPL-SCNC: 22 MMOL/L — SIGNIFICANT CHANGE UP (ref 22–31)
CREAT SERPL-MCNC: 8.43 MG/DL — HIGH (ref 0.5–1.3)
GLUCOSE BLDC GLUCOMTR-MCNC: 119 MG/DL — HIGH (ref 70–99)
GLUCOSE BLDC GLUCOMTR-MCNC: 130 MG/DL — HIGH (ref 70–99)
GLUCOSE BLDC GLUCOMTR-MCNC: 142 MG/DL — HIGH (ref 70–99)
GLUCOSE BLDC GLUCOMTR-MCNC: 183 MG/DL — HIGH (ref 70–99)
GLUCOSE BLDC GLUCOMTR-MCNC: 260 MG/DL — HIGH (ref 70–99)
GLUCOSE SERPL-MCNC: 215 MG/DL — HIGH (ref 70–99)
HCT VFR BLD CALC: 27.9 % — LOW (ref 39–50)
HGB BLD-MCNC: 9.4 G/DL — LOW (ref 13–17)
MAGNESIUM SERPL-MCNC: 2 MG/DL — SIGNIFICANT CHANGE UP (ref 1.6–2.6)
MCHC RBC-ENTMCNC: 31.4 PG — SIGNIFICANT CHANGE UP (ref 27–34)
MCHC RBC-ENTMCNC: 33.7 GM/DL — SIGNIFICANT CHANGE UP (ref 32–36)
MCV RBC AUTO: 93.3 FL — SIGNIFICANT CHANGE UP (ref 80–100)
NRBC # BLD: 0 /100 WBCS — SIGNIFICANT CHANGE UP
NRBC # FLD: 0 K/UL — SIGNIFICANT CHANGE UP
PLATELET # BLD AUTO: 185 K/UL — SIGNIFICANT CHANGE UP (ref 150–400)
POTASSIUM SERPL-MCNC: 5.2 MMOL/L — SIGNIFICANT CHANGE UP (ref 3.5–5.3)
POTASSIUM SERPL-SCNC: 5.2 MMOL/L — SIGNIFICANT CHANGE UP (ref 3.5–5.3)
RBC # BLD: 2.99 M/UL — LOW (ref 4.2–5.8)
RBC # FLD: 13 % — SIGNIFICANT CHANGE UP (ref 10.3–14.5)
SODIUM SERPL-SCNC: 131 MMOL/L — LOW (ref 135–145)
WBC # BLD: 4.94 K/UL — SIGNIFICANT CHANGE UP (ref 3.8–10.5)
WBC # FLD AUTO: 4.94 K/UL — SIGNIFICANT CHANGE UP (ref 3.8–10.5)

## 2021-08-28 PROCEDURE — 71046 X-RAY EXAM CHEST 2 VIEWS: CPT | Mod: 26

## 2021-08-28 RX ORDER — APIXABAN 2.5 MG/1
2.5 TABLET, FILM COATED ORAL EVERY 12 HOURS
Refills: 0 | Status: DISCONTINUED | OUTPATIENT
Start: 2021-08-28 | End: 2021-08-30

## 2021-08-28 RX ORDER — CHLORHEXIDINE GLUCONATE 213 G/1000ML
1 SOLUTION TOPICAL
Refills: 0 | Status: DISCONTINUED | OUTPATIENT
Start: 2021-08-28 | End: 2021-08-30

## 2021-08-28 RX ADMIN — Medication 1334 MILLIGRAM(S): at 09:37

## 2021-08-28 RX ADMIN — Medication 25 MILLIGRAM(S): at 19:47

## 2021-08-28 RX ADMIN — ATORVASTATIN CALCIUM 40 MILLIGRAM(S): 80 TABLET, FILM COATED ORAL at 22:29

## 2021-08-28 RX ADMIN — LOSARTAN POTASSIUM 25 MILLIGRAM(S): 100 TABLET, FILM COATED ORAL at 13:19

## 2021-08-28 RX ADMIN — SODIUM CHLORIDE 3 MILLILITER(S): 9 INJECTION INTRAMUSCULAR; INTRAVENOUS; SUBCUTANEOUS at 22:32

## 2021-08-28 RX ADMIN — SODIUM CHLORIDE 3 MILLILITER(S): 9 INJECTION INTRAMUSCULAR; INTRAVENOUS; SUBCUTANEOUS at 05:39

## 2021-08-28 RX ADMIN — Medication 100 MILLIGRAM(S): at 22:29

## 2021-08-28 RX ADMIN — Medication 1: at 13:14

## 2021-08-28 RX ADMIN — Medication 125 MICROGRAM(S): at 13:19

## 2021-08-28 RX ADMIN — ERYTHROPOIETIN 4000 UNIT(S): 10000 INJECTION, SOLUTION INTRAVENOUS; SUBCUTANEOUS at 17:33

## 2021-08-28 RX ADMIN — PANTOPRAZOLE SODIUM 40 MILLIGRAM(S): 20 TABLET, DELAYED RELEASE ORAL at 05:43

## 2021-08-28 RX ADMIN — APIXABAN 2.5 MILLIGRAM(S): 2.5 TABLET, FILM COATED ORAL at 22:29

## 2021-08-28 RX ADMIN — APIXABAN 2.5 MILLIGRAM(S): 2.5 TABLET, FILM COATED ORAL at 05:44

## 2021-08-28 RX ADMIN — SODIUM CHLORIDE 3 MILLILITER(S): 9 INJECTION INTRAMUSCULAR; INTRAVENOUS; SUBCUTANEOUS at 13:18

## 2021-08-28 RX ADMIN — FINASTERIDE 5 MILLIGRAM(S): 5 TABLET, FILM COATED ORAL at 13:19

## 2021-08-28 RX ADMIN — Medication 1334 MILLIGRAM(S): at 13:19

## 2021-08-28 RX ADMIN — Medication 1: at 22:30

## 2021-08-28 RX ADMIN — Medication 25 MILLIGRAM(S): at 22:29

## 2021-08-28 NOTE — CONSULT NOTE ADULT - SUBJECTIVE AND OBJECTIVE BOX
PATIENT SEEN AND EXAMINED ON :-08/27/2021  DATE OF SERVICE:     08/27/2021        Interim events noted,Labs ,Radiological studies and Cardiology tests reviewed .          History of Present Illness	  87 y/o M with Afib on Eliquis, h/p GI bleed requiring transfusion, ESRD on HD TTS, DM type 2, HTN, Anemia of renal disease and hyperphosphatemia presented to [facility] with diffuse abdominal pain x 1 day on August 21. CT abdomen showed Small bowel obstruction, with a transition point in the left upper quadrant, and a 2nd transition point in the right lower quadrant. Surgery consulted and no surgical intervention. Management was with NGT on continuous suction, serial abdominal exams and abdominal xrays. Patient was found to have abnormal cxr. Findings were significant for showing increased bilateral effusions and moderate left effusion and increasing pseudotumor in the minor fissure compared to June 28 of this year. CT chest showed a large left and trace right pleural effusions not significantly changed from 6/22/2021. Atelectasis of basilar left lower lobe, unchanged. Interlobular septal thickening and patchy groundglass opacities representing pulmonary edema. On 8/23 NGT removed, patient had flatus and clear liquid diet was resumed. Abdominal radiographs showed improvement of SBO. Patient remained stable, however on 8/24 during dialysis patient found to be in Afib with RVR (rate in 150's) hypotensive with 80'SBP/40's DBP. Total of 600 cc bolus fluid given blood pressure improved to 120's sbp. Patient given 5mg Metoprolol IV at dialysis. Dialysis was stopped. When patient returned to floor HR was in 130s with stable BP, another 5mg Metoprolol was given. HR is in 120s. Patient complained of chest pain troponin was 0.111 repeat at 9pm. Repeat troponins were 0.8 and 1.9. On 8/25 cardiology recommended starting Digoxin for Afib. For the chest  pain increased troponin most likely due to demand ischemia. Cardiology recommended cardiac cath to r/o CAD since recent neg stress test, possible false negative. Pt is now transferred to [facility] for cardiac catheretization.    Allergies/Medications:   Allergies:        Allergies:  	No Known Allergies:     Home Medications:   * Patient Currently Takes Medications as of 27-Aug-2021 13:54 documented in Structured Notes  · 	Januvia 25 mg oral tablet: Last Dose Taken:  , 1 tab(s) orally once a day   · 	finasteride 5 mg oral tablet: Last Dose Taken:  , 1 tab(s) orally once a day  · 	losartan 100 mg oral tablet: Last Dose Taken:  , 1 tab(s) orally once a day  · 	atorvastatin 10 mg oral tablet: Last Dose Taken:  , 1 tab(s) orally once a day (at bedtime)  · 	hydrALAZINE 100 mg oral tablet: Last Dose Taken:  , 1 tab(s) orally 3 times a day  · 	furosemide 80 mg oral tablet: Last Dose Taken:  , 1 tab(s) orally once a day (in the morning) on Non-Dialysis Days  · 	calcium acetate 667 mg oral tablet: Last Dose Taken:  , 1334 milligram(s) orally 3 times a day (with meals)   · 	Eliquis 2.5 mg oral tablet: Last Dose Taken:  , 1 tab(s) orally 2 times a day  · 	Toprol-XL 50 mg oral tablet, extended release: Last Dose Taken:  , 1 tab(s) orally once a day  · 	Nifedical XL 60 mg oral tablet, extended release: Last Dose Taken:  , 1 tab(s) orally once a day    PMH/PSH/FH:   Past Medical, Past Surgical, and Family History:  PAST MEDICAL HISTORY:Atrial fibrillation     Diabetes     ESRD (end stage renal disease)     Hyperlipidemia     Hypertension.     PAST SURGICAL HISTORY:  History of appendectomy. Review of Systems:  · Negative General Symptoms	no fever; no chills; no fatigue  · Skin/Breast	negative  · Ophthalmologic	negative  · ENMT	negative  · Respiratory and Thorax	negative  · Cardiovascular	negative  · Negative Gastrointestinal Symptoms	no nausea; no vomiting; no diarrhea; no constipation; no change in bowel habits; no flatulence; no melena; no hematochezia; no steatorrhea; no jaundice; no hiccoughs  · Gastrointestinal Symptoms	abdominal pain  · Genitourinary	negative  · Musculoskeletal	negative  · Neurological	negative  · Psychiatric	negative  · Hematology/Lymphatics	negative  · Endocrine	negative  · Allergic/Immunologic	negative      VITAL SIGNS:  T(F): 97.7 (08-27-21 @ 07:47)  HR: 79 (08-27-21 @ 07:47)  BP: 151/61 (08-27-21 @ 07:47)  RR: 18 (08-27-21 @ 07:47)  SpO2: 98% (08-27-21 @ 07:47)  Wt(kg): --  I&O's Detail    26 Aug 2021 07:01  -  27 Aug 2021 07:00  --------------------------------------------------------  IN:    Oral Fluid: 450 mL  Total IN: 450 mL    OUT:  Total OUT: 0 mL    Total NET: 450 mL    Physical Exam:  · Constitutional	detailed exam  · Constitutional Details	well-groomed; no distress; cachectic  · Eyes	detailed exam  · Eyes Details	PERRL; EOMI; conjunctiva clear  · ENMT	detailed exam  · ENMT Details	mouth  · Mouth	moist  · Neck	detailed exam   · Neck Details	supple; no JVD  · Back	detailed exam   · Back Details	normal shape; ROM intact; strength intact  · Respiratory	detailed exam  · Respiratory Details	airway patent; breath sounds equal; good air movement; respirations non-labored; clear to auscultation bilaterally  · Cardiovascular	detailed exam  · Cardiovascular Details	irregular rate and rhythm; positive S1; positive S2  · Gastrointestinal	detailed exam  · GI Normal	soft; nontender; no distention; no masses palpable; bowel sounds normal  · Extremities	detailed exam   · Extremities Details	no clubbing; no cyanosis; no pedal edema  · Vascular	detailed exam   · Carotid Pulse	right normal; left normal  · Radial Pulse	right normal; left normal  · DP Pulse	right normal; left normal  · Neurological	detailed exam  · Neurological Details	alert and oriented x 3; sensation intact; normal strength  · Skin	detailed exam  · Skin Details	warm and dry; color normal  · Lymph Nodes	No lymphadedenopathy  · Musculoskeletal	detailed exam  · Musculoskeletal Details	ROM intact; no joint swelling; no joint erythema; no joint warmth; no calf tenderness; normal strength  · Psychiatric	Affect and characteristics of appearance, verbalizations, behaviors are appropriate      LABS:                        10.2   4.93  )-----------( 168      ( 27 Aug 2021 07:37 )             30.4     08-27    135  |  96  |  43<H>  ----------------------------<  126<H>  4.8   |  28  |  6.10<H>    Ca    8.7      27 Aug 2021 07:37  Phos  3.3     08-27  Mg     2.4     08-27    TPro  6.5  /  Alb  3.2<L>  /  TBili  0.4  /  DBili  x   /  AST  9<L>  /  ALT  9<L>  /  AlkPhos  64  08-27      CARDIAC MARKERS ( 25 Aug 2021 17:09 )  1.190 ng/mL / x     / x     / x     / x    
[facility] NEPHROLOGY- CONSULTATION NOTE    86y Male with history of below transferred from [facility] for cardiac cath. Nephrology consulted for ESRD status.    Patient known to our practice for h/o ESRD on HD TTS at [facility] where he follows with me as an outpatient. Pt recently admitted to [facility] for abd pain. Pt found to have SBO s/p NGT. SBO resolved. Pt had BMs and tolerating diet. Pt with elevated troponins and transferred to [facility] for cardia cath. s/p cath 8/27 with no significant obstructive CAD; no stents needed; medical management      Last HD on 8/26/21 via LUE AVF @ [facility]. Pt denies any SOB or chest pain.       REVIEW OF SYSTEMS:  Gen: no changes in weight  HEENT: no rhinorrhea  Neck: no sore throat  Cards: no chest pain  Resp: no dyspnea  GI: no nausea or vomiting or diarrhea or ab pain  : no dysuria or hematuria  Vascular: no LE edema  Derm: no rashes  Neuro: no numbness/tingling    No Known Allergies      Home Medications Reviewed  Hospital Medications: Reviewed      PAST MEDICAL & SURGICAL HISTORY:  ESRD (end stage renal disease)    Diabetes    Hypertension    Hyperlipidemia    Atrial fibrillation    S/P hemodialysis catheter insertion  7/17    History of appendectomy        FAMILY HISTORY:  Family history of diabetes mellitus in sister (Sibling)        SOCIAL HISTORY:  Denies toxic substance use     VITALS:  T(F): 98.1 (08-28-21 @ 05:30), Max: 98.3 (08-27-21 @ 22:20)  HR: 93 (08-28-21 @ 05:30)  BP: 162/81 (08-28-21 @ 05:30)  RR: 16 (08-28-21 @ 05:30)  SpO2: 100% (08-28-21 @ 05:30)  Wt(kg): --      PHYSICAL EXAM:  Gen: NAD, calm  HEENT: MMM  Neck: no JVD  Cards: RRR, +S1/S2, no M/G/R  Resp: CTA B/L  GI: soft, NT/ND, NABS  : no CVA tenderness  Vascular: no LE edema B/L, LUE AVF + bruit/thrill  Derm: no rashes  Neuro: non-focal    LABS:  08-27    135  |  96  |  43<H>  ----------------------------<  126<H>  4.8   |  28  |  6.10<H>    Ca    8.7      27 Aug 2021 07:37  Phos  3.3     08-27  Mg     2.4     08-27    TPro  6.5  /  Alb  3.2<L>  /  TBili  0.4  /  DBili      /  AST  9<L>  /  ALT  9<L>  /  AlkPhos  64  08-27    Creatinine Trend: 6.10 <--, 8.85 <--, 7.07 <--, 5.19 <--, 10.40 <--, 8.63 <--, 6.56 <--, 5.90 <--                        10.2   4.93  )-----------( 168      ( 27 Aug 2021 07:37 )             30.4     Urine Studies:

## 2021-08-28 NOTE — CONSULT NOTE ADULT - ASSESSMENT
86y Male with history of ESRD on HD transferred from [facility] for cardiac cath. s/p cath 8/27 with no significant CAD. Nephrology consulted for ESRD status.    1) ESRD: Plan for next maintenance HD today; 8/28.  Monitor electrolytes.    2) HTN with ESRD: BP acceptable. Continue with current anti-hypertensive medications. Monitor BP.    3) Anemia of renal disease: Hb acceptable. c/w Epo 4k IV tiw with HD. Monitor Hb.    4) Hyperphosphatemia: Phosphorus acceptable; c/w  phoslo 2 tabs with meals. Monitor serum calcium and phosphorus.        [facility] NEPHROLOGY  Nahum Grubbs M.D.  Francisco Javier Hicks D.O.  Yovana Quiroz M.D.  Makeda Duron, MSN, ANP-C    Telephone: [phone]  Facsimile: [phone]    [address] [place] [address]   86y Male with history of ESRD on HD transferred from [facility] for cardiac cath. s/p cath 8/27. Nephrology consulted for ESRD status.    1) ESRD: Plan for next maintenance HD today; 8/28.  Monitor electrolytes.    2) HTN with ESRD: BP acceptable. Continue with current anti-hypertensive medications. Monitor BP.    3) Anemia of renal disease: Hb acceptable. c/w Epo 4k IV tiw with HD. Monitor Hb.    4) Hyperphosphatemia: Phosphorus acceptable; c/w  phoslo 2 tabs with meals. Monitor serum calcium and phosphorus.        [facility] NEPHROLOGY  Nahum Grubbs M.D.  Francisco Javier Hicks D.O.  Yovana Quiroz M.D.  Makeda Duron, MSN, ANP-C    Telephone: [phone]  Facsimile: [phone]    [address] [place] [address]

## 2021-08-28 NOTE — CHART NOTE - NSCHARTNOTEFT_GEN_A_CORE
86y Male s/p cardiac cath x yesterday for right groin check. Pt currently denies any complaints.    Right groin site: Dressing in place c/d/i. No hematoma present. No edema/swelling/discoloration/coldness of extremity. Pulses and sensation intact.     Eliquis ordered be resumed this AM  Will cont to monitor
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
## 2021-08-29 ENCOUNTER — TRANSCRIPTION ENCOUNTER (OUTPATIENT)
Age: 86
End: 2021-08-29

## 2021-08-29 LAB
ANION GAP SERPL CALC-SCNC: 15 MMOL/L — HIGH (ref 7–14)
BUN SERPL-MCNC: 36 MG/DL — HIGH (ref 7–23)
CALCIUM SERPL-MCNC: 8.9 MG/DL — SIGNIFICANT CHANGE UP (ref 8.4–10.5)
CHLORIDE SERPL-SCNC: 99 MMOL/L — SIGNIFICANT CHANGE UP (ref 98–107)
CO2 SERPL-SCNC: 25 MMOL/L — SIGNIFICANT CHANGE UP (ref 22–31)
CREAT SERPL-MCNC: 5.63 MG/DL — HIGH (ref 0.5–1.3)
GLUCOSE BLDC GLUCOMTR-MCNC: 134 MG/DL — HIGH (ref 70–99)
GLUCOSE BLDC GLUCOMTR-MCNC: 156 MG/DL — HIGH (ref 70–99)
GLUCOSE BLDC GLUCOMTR-MCNC: 196 MG/DL — HIGH (ref 70–99)
GLUCOSE BLDC GLUCOMTR-MCNC: 231 MG/DL — HIGH (ref 70–99)
GLUCOSE SERPL-MCNC: 108 MG/DL — HIGH (ref 70–99)
HBV CORE IGM SER-ACNC: SIGNIFICANT CHANGE UP
HBV SURFACE AB SER-ACNC: 322.7 MIU/ML — SIGNIFICANT CHANGE UP
HBV SURFACE AG SER-ACNC: SIGNIFICANT CHANGE UP
HCT VFR BLD CALC: 29.7 % — LOW (ref 39–50)
HCV AB S/CO SERPL IA: 0.1 S/CO — SIGNIFICANT CHANGE UP (ref 0–0.99)
HCV AB SERPL-IMP: SIGNIFICANT CHANGE UP
HGB BLD-MCNC: 9.7 G/DL — LOW (ref 13–17)
MAGNESIUM SERPL-MCNC: 2 MG/DL — SIGNIFICANT CHANGE UP (ref 1.6–2.6)
MCHC RBC-ENTMCNC: 31 PG — SIGNIFICANT CHANGE UP (ref 27–34)
MCHC RBC-ENTMCNC: 32.7 GM/DL — SIGNIFICANT CHANGE UP (ref 32–36)
MCV RBC AUTO: 94.9 FL — SIGNIFICANT CHANGE UP (ref 80–100)
MRSA PCR RESULT.: SIGNIFICANT CHANGE UP
NRBC # BLD: 0 /100 WBCS — SIGNIFICANT CHANGE UP
NRBC # FLD: 0 K/UL — SIGNIFICANT CHANGE UP
PHOSPHATE SERPL-MCNC: 3.3 MG/DL — SIGNIFICANT CHANGE UP (ref 2.5–4.5)
PLATELET # BLD AUTO: 175 K/UL — SIGNIFICANT CHANGE UP (ref 150–400)
POTASSIUM SERPL-MCNC: 4.2 MMOL/L — SIGNIFICANT CHANGE UP (ref 3.5–5.3)
POTASSIUM SERPL-SCNC: 4.2 MMOL/L — SIGNIFICANT CHANGE UP (ref 3.5–5.3)
RBC # BLD: 3.13 M/UL — LOW (ref 4.2–5.8)
RBC # FLD: 13.2 % — SIGNIFICANT CHANGE UP (ref 10.3–14.5)
S AUREUS DNA NOSE QL NAA+PROBE: DETECTED
SODIUM SERPL-SCNC: 139 MMOL/L — SIGNIFICANT CHANGE UP (ref 135–145)
WBC # BLD: 4.58 K/UL — SIGNIFICANT CHANGE UP (ref 3.8–10.5)
WBC # FLD AUTO: 4.58 K/UL — SIGNIFICANT CHANGE UP (ref 3.8–10.5)

## 2021-08-29 RX ORDER — METOPROLOL TARTRATE 50 MG
50 TABLET ORAL DAILY
Refills: 0 | Status: DISCONTINUED | OUTPATIENT
Start: 2021-08-29 | End: 2021-08-30

## 2021-08-29 RX ORDER — MUPIROCIN 20 MG/G
1 OINTMENT TOPICAL
Refills: 0 | Status: DISCONTINUED | OUTPATIENT
Start: 2021-08-29 | End: 2021-08-30

## 2021-08-29 RX ORDER — MUPIROCIN 20 MG/G
1 OINTMENT TOPICAL
Refills: 0 | Status: DISCONTINUED | OUTPATIENT
Start: 2021-08-29 | End: 2021-08-29

## 2021-08-29 RX ORDER — LOSARTAN POTASSIUM 100 MG/1
100 TABLET, FILM COATED ORAL DAILY
Refills: 0 | Status: DISCONTINUED | OUTPATIENT
Start: 2021-08-29 | End: 2021-08-30

## 2021-08-29 RX ADMIN — Medication 1334 MILLIGRAM(S): at 09:27

## 2021-08-29 RX ADMIN — CHLORHEXIDINE GLUCONATE 1 APPLICATION(S): 213 SOLUTION TOPICAL at 10:20

## 2021-08-29 RX ADMIN — Medication 2: at 18:38

## 2021-08-29 RX ADMIN — Medication 100 MILLIGRAM(S): at 13:06

## 2021-08-29 RX ADMIN — SODIUM CHLORIDE 3 MILLILITER(S): 9 INJECTION INTRAMUSCULAR; INTRAVENOUS; SUBCUTANEOUS at 22:49

## 2021-08-29 RX ADMIN — Medication 100 MILLIGRAM(S): at 06:07

## 2021-08-29 RX ADMIN — MUPIROCIN 1 APPLICATION(S): 20 OINTMENT TOPICAL at 22:15

## 2021-08-29 RX ADMIN — Medication 100 MILLIGRAM(S): at 22:14

## 2021-08-29 RX ADMIN — SODIUM CHLORIDE 3 MILLILITER(S): 9 INJECTION INTRAMUSCULAR; INTRAVENOUS; SUBCUTANEOUS at 17:10

## 2021-08-29 RX ADMIN — PANTOPRAZOLE SODIUM 40 MILLIGRAM(S): 20 TABLET, DELAYED RELEASE ORAL at 06:07

## 2021-08-29 RX ADMIN — LOSARTAN POTASSIUM 100 MILLIGRAM(S): 100 TABLET, FILM COATED ORAL at 13:15

## 2021-08-29 RX ADMIN — Medication 1: at 13:05

## 2021-08-29 RX ADMIN — ATORVASTATIN CALCIUM 40 MILLIGRAM(S): 80 TABLET, FILM COATED ORAL at 22:14

## 2021-08-29 RX ADMIN — Medication 1334 MILLIGRAM(S): at 17:15

## 2021-08-29 RX ADMIN — APIXABAN 2.5 MILLIGRAM(S): 2.5 TABLET, FILM COATED ORAL at 09:27

## 2021-08-29 RX ADMIN — FINASTERIDE 5 MILLIGRAM(S): 5 TABLET, FILM COATED ORAL at 13:05

## 2021-08-29 RX ADMIN — Medication 25 MILLIGRAM(S): at 06:07

## 2021-08-29 RX ADMIN — Medication 50 MILLIGRAM(S): at 13:09

## 2021-08-29 RX ADMIN — Medication 1334 MILLIGRAM(S): at 13:06

## 2021-08-29 RX ADMIN — SODIUM CHLORIDE 3 MILLILITER(S): 9 INJECTION INTRAMUSCULAR; INTRAVENOUS; SUBCUTANEOUS at 06:08

## 2021-08-29 RX ADMIN — APIXABAN 2.5 MILLIGRAM(S): 2.5 TABLET, FILM COATED ORAL at 22:14

## 2021-08-29 NOTE — DISCHARGE NOTE PROVIDER - CARE PROVIDER_API CALL
Foster Diehl)  Cardiology  [address] [place] [address]  Phone: [phone]  Fax: [phone]  Follow Up Time:    Foster Diehl)  Cardiology  [address] [place] [address]  Phone: [phone]  Fax: [phone]  Follow Up Time: 1 week

## 2021-08-29 NOTE — DISCHARGE NOTE PROVIDER - NSDCCPCAREPLAN_GEN_ALL_CORE_FT
PRINCIPAL DISCHARGE DIAGNOSIS  Diagnosis: NSTEMI (non-ST elevation myocardial infarction)  Assessment and Plan of Treatment: You were seen by cardiology  - Left Heart Catheterization: LCircumflex 60% stenosis  - Medical management per cardiology team  - follow up with caridologist as outpatient.   Please follow up with your primary care provider within 1 week of discharge from the hospital. If you do not have a primary care provider please follow up with the [facility], call [phone].      SECONDARY DISCHARGE DIAGNOSES  Diagnosis: Atrial fibrillation with RVR  Assessment and Plan of Treatment: Please continue your medications as directed and follow-up with your primary provider/cardiologist to further manage your care. Monitor for signs/symptoms of uncontrolled atrial fibrillation, such as, increased heart rate, palpitations, chest pain, dizziness, or shortness of breath - Return to emergency room if these signs/symptoms are present.    Diagnosis: ESRD on dialysis  Assessment and Plan of Treatment: Please continue to follow your dialysis schedule and refer to your primary provider/nephrologist for further care/recommendations. Continue your medications and supplementation as directed.

## 2021-08-29 NOTE — DISCHARGE NOTE PROVIDER - NSDCMRMEDTOKEN_GEN_ALL_CORE_FT
atorvastatin 10 mg oral tablet: 1 tab(s) orally once a day (at bedtime)  calcium acetate 667 mg oral tablet: 1334 milligram(s) orally 3 times a day (with meals)   Eliquis 2.5 mg oral tablet: 1 tab(s) orally 2 times a day  finasteride 5 mg oral tablet: 1 tab(s) orally once a day  furosemide 80 mg oral tablet: 1 tab(s) orally once a day (in the morning) on Non-Dialysis Days  hydrALAZINE 100 mg oral tablet: 1 tab(s) orally 3 times a day  Januvia 25 mg oral tablet: 1 tab(s) orally once a day   losartan 100 mg oral tablet: 1 tab(s) orally once a day  Nifedical XL 60 mg oral tablet, extended release: 1 tab(s) orally once a day  Toprol-XL 50 mg oral tablet, extended release: 1 tab(s) orally once a day   atorvastatin 40 mg oral tablet: 1 tab(s) orally once a day (at bedtime)  calcium acetate 667 mg oral tablet: 1334 milligram(s) orally 3 times a day (with meals)   digoxin 125 mcg (0.125 mg) oral tablet: 1 tab(s) orally every other day  Eliquis 2.5 mg oral tablet: 1 tab(s) orally 2 times a day  finasteride 5 mg oral tablet: 1 tab(s) orally once a day  hydrALAZINE 100 mg oral tablet: 1 tab(s) orally 3 times a day  Januvia 25 mg oral tablet: 1 tab(s) orally once a day   losartan 100 mg oral tablet: 1 tab(s) orally once a day  mupirocin 2% topical ointment: 1 application in each nostril 2 times a day   pantoprazole 40 mg oral delayed release tablet: 1 tab(s) orally once a day (before a meal)  polyethylene glycol 3350 oral powder for reconstitution: 17 gram(s) orally 2 times a day  Toprol-XL 50 mg oral tablet, extended release: 1 tab(s) orally once a day

## 2021-08-29 NOTE — DISCHARGE NOTE PROVIDER - HOSPITAL COURSE
85 y/o M with Afib on Eliquis, ESRD on HD TTS, DM type 2, HTN, Anemia of renal disease and hyperphosphatemia presented to [facility] with diffuse abdominal pain    NSTEMI  - LHC: pLCx 60%, RFA accessed  - Restarted Eliquis    Atrial fibrillation with RVR.   - Pt maintained on Heparin gtt prior to cath  - LHC: pLCx 60%, RFA accessed  - Restarted Eliquis    Small bowel obstruction.   - NGT removed at [place]  - Pt tolerating solid food at this time  - No surgical intervention at this time.    Pleural effusion, left.   - Currently respiratory wise stable     ESRD on dialysis.   - Continue routine HD on T/Th/Sat  - Renal consulted      Discussed case with  _____________________ on ________________________, patient medically stable for discharge to ___________________.      87 y/o M with Afib on Eliquis, ESRD on HD TTS, DM type 2, HTN, Anemia of renal disease and hyperphosphatemia [facility]    NSTEMI  - LHC: pLCx 60%, RFA accessed  - Restarted Eliquis    Atrial fibrillation with RVR.   - Pt maintained on Heparin gtt prior to cath  - LHC: pLCx 60%, RFA accessed  - Restarted Eliquis    Small bowel obstruction.   - NGT removed at [place]  - Pt tolerating solid food at this time  - No surgical intervention at this time.    Pleural effusion, left.   - Currently respiratory wise stable     ESRD on dialysis.   - Continue routine HD on T/Th/Sat  - Renal consulted      Discussed case with Dr. Diehl on  8/30/21, patient medically stable for discharge to .

## 2021-08-30 ENCOUNTER — TRANSCRIPTION ENCOUNTER (OUTPATIENT)
Age: 86
End: 2021-08-30

## 2021-08-30 VITALS
OXYGEN SATURATION: 100 % | HEART RATE: 134 BPM | RESPIRATION RATE: 16 BRPM | SYSTOLIC BLOOD PRESSURE: 177 MMHG | TEMPERATURE: 97 F | DIASTOLIC BLOOD PRESSURE: 62 MMHG

## 2021-08-30 LAB
ANION GAP SERPL CALC-SCNC: 17 MMOL/L — HIGH (ref 7–14)
BUN SERPL-MCNC: 51 MG/DL — HIGH (ref 7–23)
CALCIUM SERPL-MCNC: 8.5 MG/DL — SIGNIFICANT CHANGE UP (ref 8.4–10.5)
CHLORIDE SERPL-SCNC: 98 MMOL/L — SIGNIFICANT CHANGE UP (ref 98–107)
CO2 SERPL-SCNC: 23 MMOL/L — SIGNIFICANT CHANGE UP (ref 22–31)
CREAT SERPL-MCNC: 7.21 MG/DL — HIGH (ref 0.5–1.3)
GLUCOSE BLDC GLUCOMTR-MCNC: 128 MG/DL — HIGH (ref 70–99)
GLUCOSE BLDC GLUCOMTR-MCNC: 170 MG/DL — HIGH (ref 70–99)
GLUCOSE SERPL-MCNC: 101 MG/DL — HIGH (ref 70–99)
HCT VFR BLD CALC: 28.4 % — LOW (ref 39–50)
HGB BLD-MCNC: 9.2 G/DL — LOW (ref 13–17)
MAGNESIUM SERPL-MCNC: 1.9 MG/DL — SIGNIFICANT CHANGE UP (ref 1.6–2.6)
MCHC RBC-ENTMCNC: 31 PG — SIGNIFICANT CHANGE UP (ref 27–34)
MCHC RBC-ENTMCNC: 32.4 GM/DL — SIGNIFICANT CHANGE UP (ref 32–36)
MCV RBC AUTO: 95.6 FL — SIGNIFICANT CHANGE UP (ref 80–100)
NRBC # BLD: 0 /100 WBCS — SIGNIFICANT CHANGE UP
NRBC # FLD: 0 K/UL — SIGNIFICANT CHANGE UP
PHOSPHATE SERPL-MCNC: 3.4 MG/DL — SIGNIFICANT CHANGE UP (ref 2.5–4.5)
PLATELET # BLD AUTO: 190 K/UL — SIGNIFICANT CHANGE UP (ref 150–400)
POTASSIUM SERPL-MCNC: 4.6 MMOL/L — SIGNIFICANT CHANGE UP (ref 3.5–5.3)
POTASSIUM SERPL-SCNC: 4.6 MMOL/L — SIGNIFICANT CHANGE UP (ref 3.5–5.3)
RBC # BLD: 2.97 M/UL — LOW (ref 4.2–5.8)
RBC # FLD: 13.2 % — SIGNIFICANT CHANGE UP (ref 10.3–14.5)
SODIUM SERPL-SCNC: 138 MMOL/L — SIGNIFICANT CHANGE UP (ref 135–145)
WBC # BLD: 5.55 K/UL — SIGNIFICANT CHANGE UP (ref 3.8–10.5)
WBC # FLD AUTO: 5.55 K/UL — SIGNIFICANT CHANGE UP (ref 3.8–10.5)

## 2021-08-30 RX ORDER — ATORVASTATIN CALCIUM 80 MG/1
1 TABLET, FILM COATED ORAL
Qty: 30 | Refills: 0
Start: 2021-08-30 | End: 2021-09-28

## 2021-08-30 RX ORDER — PANTOPRAZOLE SODIUM 20 MG/1
1 TABLET, DELAYED RELEASE ORAL
Qty: 30 | Refills: 0
Start: 2021-08-30 | End: 2021-09-28

## 2021-08-30 RX ORDER — DIGOXIN 250 MCG
1 TABLET ORAL
Qty: 15 | Refills: 0
Start: 2021-08-30 | End: 2021-09-28

## 2021-08-30 RX ORDER — MUPIROCIN 20 MG/G
1 OINTMENT TOPICAL
Qty: 1 | Refills: 0
Start: 2021-08-30 | End: 2021-09-02

## 2021-08-30 RX ORDER — POLYETHYLENE GLYCOL 3350 17 G/17G
17 POWDER, FOR SOLUTION ORAL
Qty: 0 | Refills: 0 | DISCHARGE
Start: 2021-08-30

## 2021-08-30 RX ORDER — POLYETHYLENE GLYCOL 3350 17 G/17G
17 POWDER, FOR SOLUTION ORAL
Refills: 0 | Status: DISCONTINUED | OUTPATIENT
Start: 2021-08-30 | End: 2021-08-30

## 2021-08-30 RX ORDER — NIFEDIPINE 30 MG
1 TABLET, EXTENDED RELEASE 24 HR ORAL
Qty: 0 | Refills: 0 | DISCHARGE

## 2021-08-30 RX ADMIN — Medication 1334 MILLIGRAM(S): at 08:30

## 2021-08-30 RX ADMIN — LOSARTAN POTASSIUM 100 MILLIGRAM(S): 100 TABLET, FILM COATED ORAL at 12:36

## 2021-08-30 RX ADMIN — Medication 125 MICROGRAM(S): at 12:36

## 2021-08-30 RX ADMIN — POLYETHYLENE GLYCOL 3350 17 GRAM(S): 17 POWDER, FOR SOLUTION ORAL at 12:53

## 2021-08-30 RX ADMIN — Medication 1: at 12:33

## 2021-08-30 RX ADMIN — SODIUM CHLORIDE 3 MILLILITER(S): 9 INJECTION INTRAMUSCULAR; INTRAVENOUS; SUBCUTANEOUS at 06:07

## 2021-08-30 RX ADMIN — SODIUM CHLORIDE 3 MILLILITER(S): 9 INJECTION INTRAMUSCULAR; INTRAVENOUS; SUBCUTANEOUS at 14:56

## 2021-08-30 RX ADMIN — Medication 50 MILLIGRAM(S): at 12:36

## 2021-08-30 RX ADMIN — Medication 100 MILLIGRAM(S): at 06:13

## 2021-08-30 RX ADMIN — FINASTERIDE 5 MILLIGRAM(S): 5 TABLET, FILM COATED ORAL at 12:34

## 2021-08-30 RX ADMIN — Medication 1334 MILLIGRAM(S): at 12:35

## 2021-08-30 RX ADMIN — APIXABAN 2.5 MILLIGRAM(S): 2.5 TABLET, FILM COATED ORAL at 12:32

## 2021-08-30 RX ADMIN — PANTOPRAZOLE SODIUM 40 MILLIGRAM(S): 20 TABLET, DELAYED RELEASE ORAL at 06:14

## 2021-08-30 NOTE — PROGRESS NOTE ADULT - PROBLEM SELECTOR PLAN 8
IMPROVE VTE score: 3  Will manage with:     [ ] Previous VTE                                    3  [ ] Thrombophilia                                  2  [ x] Lower limb paralysis                        2  (unable to hold up >15 seconds)    [ ] Current Cancer (within 6 months)        2   [x] Immobilization > 24 hrs                    1  [ ] ICU/CCU stay > 24 hrs                      1  [x] Age > 60                                         1

## 2021-08-30 NOTE — PROGRESS NOTE ADULT - PROBLEM SELECTOR PLAN 3
Patient has hx of atrial fibrillation on Eliquis and metoprolol at home

## 2021-08-30 NOTE — PROGRESS NOTE ADULT - NUTRITIONAL ASSESSMENT
This patient has been assessed with a concern for Malnutrition and has been determined to have a diagnosis/diagnoses of Severe protein-calorie malnutrition and Underweight (BMI < 19).    This patient is being managed with:   Diet Renal Restrictions-  For patients receiving Renal Replacement - No Protein Restr No Conc K No Conc Phos Low Sodium  Entered: Aug 25 2021 11:11AM    

## 2021-08-30 NOTE — DISCHARGE NOTE NURSING/CASE MANAGEMENT/SOCIAL WORK - PATIENT PORTAL LINK FT
You can access the FollowMyHealth Patient Portal offered by [facility] by registering at the following website: [email or web address]. By joining [facility]’s FollowMyHealth portal, you will also be able to view your health information using other applications (apps) compatible with our system.

## 2021-08-30 NOTE — PROGRESS NOTE ADULT - PROBLEM SELECTOR PLAN 1
- CT abdomen showed Small bowel obstruction, with a transition point in the left upper quadrant, and a 2nd transition point in the right lower quadrant. Closed loop obstruction or internal hernia cannot be excluded.  - Try to avoid opioids as it may worsen pain due to obstruction   - Monitor for any signs of hemodynamic instability, currently pt is afebrile and Hemodynamically stable   - Surgery on board- recommends conservative management.  - patient currently on soft diet, tolerating and had bowel movement

## 2021-08-30 NOTE — PROGRESS NOTE ADULT - SUBJECTIVE AND OBJECTIVE BOX
[facility]- PROGRESS NOTE    86y Male with history of ESRD on HD transferred from [facility] for cardiac cath. s/p cath 8/27. Nephrology consulted for ESRD status.    Hospital Medications: Medications reviewed.    REVIEW OF SYSTEMS:  CONSTITUTIONAL: No fevers or chills  CARDIOVASCULAR: No chest pain  RESPIRATORY: No shortness of breath  GASTROINTESTINAL: No nausea, vomiting, diarrhea or abdominal pain  VASCULAR: No bilateral lower extremity edema      VITALS:  T(F): 98.4 (08-30-21 @ 06:08), Max: 98.7 (08-29-21 @ 12:58)  HR: 65 (08-30-21 @ 06:08)  BP: 155/52 (08-30-21 @ 06:08)  RR: 16 (08-30-21 @ 06:08)  SpO2: 97% (08-30-21 @ 06:08)  Wt(kg): --      PHYSICAL EXAM:  Gen: NAD, calm  Cards: RRR, +S1/S2, no M/G/R  Resp: CTA B/L  GI: soft, NT/ND, NABS  : no CVA tenderness  Vascular: no LE edema B/L, LUE AVF + bruit/thrill        LABS:  08-30    138  |  98  |  51<H>  ----------------------------<  101<H>  4.6   |  23  |  7.21<H>    Ca    8.5      30 Aug 2021 08:21  Phos  3.4     08-30  Mg     1.90     08-30      Creatinine Trend: 7.21 <--, 5.63 <--, 8.43 <--, 6.10 <--, 8.85 <--, 7.07 <--, 5.19 <--, 10.40 <--                        9.2    5.55  )-----------( 190      ( 30 Aug 2021 08:21 )             28.4     Urine Studies:      
DATE OF SERVICE:  08/28/2021  Patient was seen and examined on08/28/2021     .Interim events noted.Consultant notes ,Labs,Telemetry reviewed by me    PRESENTING CC:Chest pain    HPI and HOSPITAL COURSE: HPI:  87 y/o M with Afib on Eliquis, h/p GI bleed requiring transfusion, ESRD on HD TTS, DM type 2, HTN, Anemia of renal disease and hyperphosphatemia presented to [facility] with diffuse abdominal pain x 1 day on August 21. CT abdomen showed Small bowel obstruction, with a transition point in the left upper quadrant, and a 2nd transition point in the right lower quadrant. Surgery consulted and no surgical intervention. Management was with NGT on continuous suction, serial abdominal exams and abdominal xrays. Patient was found to have abnormal cxr. Findings were significant for showing increased bilateral effusions and moderate left effusion and increasing pseudotumor in the minor fissure compared to June 28 of this year. CT chest showed a large left and trace right pleural effusions not significantly changed from 6/22/2021. Atelectasis of basilar left lower lobe, unchanged. Interlobular septal thickening and patchy groundglass opacities representing pulmonary edema. On 8/23 NGT removed, patient had flatus and clear liquid diet was resumed. Abdominal radiographs showed improvement of SBO. Patient remained stable, however on 8/24 during dialysis patient found to be in Afib with RVR (rate in 150's) hypotensive with 80'SBP/40's DBP. Total of 600 cc bolus fluid given blood pressure improved to 120's sbp. Patient given 5mg Metoprolol IV at dialysis. Dialysis was stopped. When patient returned to floor HR was in 130s with stable BP, another 5mg Metoprolol was given. HR is in 120s. Patient complained of chest pain troponin was 0.111 repeat at 9pm. Repeat troponins were 0.8 and 1.9. On 8/25 cardiology recommended starting Digoxin for Afib. For the chest  pain increased troponin most likely due to demand ischemia. Cardiology recommended cardiac cath to r/o CAD since recent neg stress test, possible false negative. Pt was  transferred to [facility] for cardiac catheretization.        INTERIM EVENTS:Awake alert had cardiac cath non obstructive CAD no chest pain       PMH -reviewed admission note, no change since admission  Heart Failure: Acute [ ] Chronic [ ] Acute on Chronic [ ] Diastolic [ ] Systolic [ ] Combined Systolic and Diastolic[ ]  RIGOBERTO[ ]  ATN[ ]  CKD I [ ] CKDII [ ] CKD III [ ] CKD IV [ ] CKD V [ ] ESRD[x ]  HTN[ ] CVA[ ] DM[ ] COPD[ ] COVID[ ] AF[x ]  PPM[ ] ICD[ ]    MEDICATIONS  (STANDING):  apixaban 2.5 milliGRAM(s) Oral every 12 hours  atorvastatin 40 milliGRAM(s) Oral at bedtime  calcium acetate 1334 milliGRAM(s) Oral three times a day with meals  chlorhexidine 2% Cloths 1 Application(s) Topical <User Schedule>  digoxin     Tablet 125 MICROGram(s) Oral every other day  epoetin zach-epbx (RETACRIT) Injectable 4000 Unit(s) IV Push <User Schedule>  finasteride 5 milliGRAM(s) Oral daily  glucagon  Injectable 1 milliGRAM(s) IntraMuscular once  hydrALAZINE 100 milliGRAM(s) Oral every 8 hours  insulin lispro (ADMELOG) corrective regimen sliding scale   SubCutaneous three times a day before meals  insulin lispro (ADMELOG) corrective regimen sliding scale   SubCutaneous at bedtime  losartan 25 milliGRAM(s) Oral daily  metoprolol tartrate 25 milliGRAM(s) Oral every 8 hours  pantoprazole    Tablet 40 milliGRAM(s) Oral before breakfast  sodium chloride 0.9% lock flush 3 milliLiter(s) IV Push every 8 hours  :            REVIEW OF SYSTEMS:  Constitutional: [ ] fever, [ ]weight loss,  [ ]fatigue  Eyes: [ ] visual changes  Respiratory: [ ]shortness of breath;  [ ] cough, [ ]wheezing, [ ]chills, [ ]hemoptysis  Cardiovascular: [ ] chest pain, [ ]palpitations, [ ]dizziness,  [ ]leg swelling[ ]orthopnea[ ]PND  Gastrointestinal: [ ] abdominal pain, [ ]nausea, [ ]vomiting,  [ ]diarrhea [ ]Constipation [ ]Melena  Genitourinary: [ ] dysuria, [ ] hematuria [ ]Rose  Neurologic: [ ] headaches [ ] tremors[ ]weakness [ ]Paralysis Right[ ] Left[ ]  Skin: [ ] itching, [ ]burning, [ ] rashes  Endocrine: [ ] heat or cold intolerance  Musculoskeletal: [ ] joint pain or swelling; [ ] muscle, back, or extremity pain  Psychiatric: [ ] depression, [ ]anxiety, [ ]mood swings, or [ ]difficulty sleeping  Hematologic: [ ] easy bruising, [ ] bleeding gums    [x] All remaining systems negative except as per above.   [ ]Unable to obtain.    Vital Signs Last 24 Hrs    T(C): 36.6 (28 Aug 2021 22:22), Max: 37 (28 Aug 2021 13:15)  T(F): 97.9 (28 Aug 2021 22:22), Max: 98.6 (28 Aug 2021 13:15)  HR: 96 (28 Aug 2021 22:22) (76 - 108)  BP: 139/61 (28 Aug 2021 22:22) (133/71 - 190/78)  RR: 17 (28 Aug 2021 22:22) (16 - 18)  SpO2: 100% (28 Aug 2021 22:22) (100% - 100%)    PHYSICAL EXAM:  General: No acute distress BMI-29  HEENT: EOMI, PERRL  Neck: Supple, [ ] JVD  Lungs: Equal air entry bilaterally; [ ] rales [ ] wheezing [ ] rhonchi  Heart: Irregular rate and rhythm; [x ] murmur  2 /6 [x ] systolic [ ] diastolic [ ] radiation[ ] rubs [ ]  gallops  Abdomen: Nontender, bowel sounds present  Extremities: No clubbing, cyanosis, [ ] edema [ ]Pulses  equal and intact  Nervous system:  Alert & Oriented X3, no focal deficits  Psychiatric: Normal affect  Skin: No rashes or lesions    LABS:  08-28    131<L>  |  93<L>  |  60<H>  ----------------------------<  215<H>  5.2   |  22  |  8.43<H>    Ca    8.5      28 Aug 2021 16:56  Mg     2.00     08-28      Creatinine Trend: 8.43<--, 6.10<--, 8.85<--, 7.07<--, 5.19<--, 10.40<--                        9.4    4.94  )-----------( 185      ( 28 Aug 2021 16:56 )             27.9       ECHO:Study Date: 5/2/2021  CONCLUSIONS:  1. Mitral annular calcification. Mild mitral regurgitation.  2. Calcified trileaflet aortic valve with normal opening.  3. Aortic Root: 3.3 cm.  4. Severely dilated left atrium.  LA volume index = 67 cc/m2.  5. Normal left ventricular internal dimensions and wall thicknesses.  6. Normal Left Ventricular Systolic Function,  (EF = 55 to 60%)  7. Normal diastolic function.  8. Normal right atrium.  9. Normal right ventricular size and systolic function  (TAPSE  1.7cm).  10. RV systolic pressure is mildly increased at  46 mm Hg.  11. There is mild tricuspidregurgitation.  12. There is mild pulmonic regurgitation.  13. Normal pericardium with no pericardial effusion.  14. Left pleural effusion.    CATH:Study date: 08/27/2021  CORONARY VESSELS: The coronary circulation is right dominant.  LM:   --  LM: Angiography showed mild atherosclerosis with no flow limiting lesions.  LAD:   --  LAD: Angiography showed mild atherosclerosis with no flow limiting lesions.  --  D1: Angiography showed mild atherosclerosis with no flow limiting lesions.  CX:   --  Proximal circumflex: There was a tubular 60 % stenosis at a site with no prior intervention. There was SHERIF grade 3 flow through the vessel(brisk flow).  RCA:   --  RCA: Angiography showed mild atherosclerosis with no flow limiting lesions.  COMPLICATIONS: No complications occurred during the cath lab visit.  DIAGNOSTIC IMPRESSIONS: Moderate stenosis proximal LCx, with normal TIMI3 flow  No significant obstructive CAD    IMPRESSION AND PLAN:  85 yo Male with ESRD on HD TTS, DM type 2, HTN, Anemia of renal disease, hyperphosphatemia, Afib on A/C Eliquis Presented with diffuse abdominal pain x 1 day. Patient found to have a Small Bowel Obstruction. Admitted for SBO and abnormal cxray which shows increased bilateral effusions.         Problem/Plan - 1:  ·  Problem: Small bowel obstruction.   ·  Plan: - CT abdomen showed Small bowel obstruction, with a transition point in the left upper quadrant, and a 2nd transition point in the right lower quadrant. Closed loop obstruction or internal hernia cannot be excluded.  - Try to avoid opioids as it may worsen pain due to obstruction     Problem/Plan - 2:  ·  Problem: Elevated troponin.   ·  Plan: - patient complained of chest pain yesterday   - troponin was sent and was 0.111, repeat troponin were 0.864 and 1.9  - today chest pain has resolved  - continue with tele   - elevated troponin most likely due to demand ischemia   Transferred to [facility] for cardiac cath-non obstructive CAD  _Medivcal therapy    Problem/Plan - 3:  ·  Problem: Atrial fibrillation.   ·  Plan: Patient has hx of atrial fibrillation on Eliquis and metoprolol at home   - yesterday pt did not complete HD session due to Afib with RVR in 140's and with BP of 80/40's. Patient complained of chest pain. Total 600cc of fluid given, bp improved to 120's SBP, 5mg Metoprolol IV given at dialysis and another 5mg Metoprolol 5mg given on floor.  - repeat EKG's did not show any acute ST segment changes  -Rate controlled resume ELiquis      Problem/Plan - 4:  ·  Problem: ESRD on hemodialysis.   ·  Plan: - ESRD Maintenance HD (T/T/S ) via AV fistula L forearm, Cr 7.9 on admission  -Scheduled for HD today  Discharge planning       Problem/Plan - 5:  ·  Problem: Electrolyte abnormality.   ·  Plan: - Next HD today at Potassium 5.0, Magnesium 2.3 Phosp 4.1   - Monitor BMP,    Problem/Plan - 6:  ·  Problem: Abnormal chest xray.   ·  Plan: Problem: Abnormal chest xray.  Plan: - CXR showed increased bilateral effusions and moderate left effusion and increasing pseudotumor in the minor fissure compared to June 28 of this year.  - CT abdomen non contrast showed moderate to large left pleural effusion left lower lobe atelectasis/collapse. Streaky right basilar atelectasis with trace right pleural effusion.  -  CT chest non contrast Large left and trace right pleural effusions not significantly  changed from 6/22/2021. Atelectasis of basilar left lower lobe, unchanged. Interlobular septal thickening and patchy groundglass opacities representing pulmonary edema.      Problem/Plan - 7:  ·  Problem: Hypertension.   ·  Plan: Patient noted to be normotensive overnight   - Hydralazine 100mg Q 8  - Losartan 25mg QD.    Problem/Plan - 8:  ·  Problem: Prophylactic measure.   ·  Plan: IMPROVE VTE score: 3  Will manage with:     [ ] Previous VTE                                    3  [ ] Thrombophilia                                  2  [ x] Lower limb paralysis                        2  (unable to hold up >15 seconds)  [ ] Current Cancer (within 6 months)        2   [x] Immobilization > 24 hrs                    1  [ ] ICU/CCU stay > 24 hrs                      1  [x] Age > 60                                         1.        
PATIENT SEEN AND EXAMINED ON :-08/30/2021  DATE OF SERVICE:   08/30/2021          Interim events noted,Labs ,Radiological studies and Cardiology tests reviewed .     HPI:87 y/o M with Afib on Eliquis, ESRD on HD TTS, DM type 2, HTN, Anemia of renal disease and hyperphosphatemia presented to [facility] with diffuse abdominal pain    MEDICATIONS  (STANDING):  apixaban 2.5 milliGRAM(s) Oral every 12 hours  atorvastatin 40 milliGRAM(s) Oral at bedtime  calcium acetate 1334 milliGRAM(s) Oral three times a day with meals  chlorhexidine 2% Cloths 1 Application(s) Topical <User Schedule>  dextrose 40% Gel 15 Gram(s) Oral once  dextrose 5%. 1000 milliLiter(s) (100 mL/Hr) IV Continuous <Continuous>  dextrose 5%. 1000 milliLiter(s) (50 mL/Hr) IV Continuous <Continuous>  dextrose 50% Injectable 25 Gram(s) IV Push once  dextrose 50% Injectable 12.5 Gram(s) IV Push once  dextrose 50% Injectable 25 Gram(s) IV Push once  digoxin     Tablet 125 MICROGram(s) Oral every other day  epoetin zach-epbx (RETACRIT) Injectable 4000 Unit(s) IV Push <User Schedule>  finasteride 5 milliGRAM(s) Oral daily  glucagon  Injectable 1 milliGRAM(s) IntraMuscular once  hydrALAZINE 100 milliGRAM(s) Oral every 8 hours  insulin lispro (ADMELOG) corrective regimen sliding scale   SubCutaneous three times a day before meals  insulin lispro (ADMELOG) corrective regimen sliding scale   SubCutaneous at bedtime  losartan 100 milliGRAM(s) Oral daily  metoprolol succinate ER 50 milliGRAM(s) Oral daily  mupirocin 2% Ointment 1 Application(s) Both Nostrils two times a daypantoprazole    Tablet 40 milliGRAM(s) Oral before breakfast  polyethylene glycol 3350 17 Gram(s) Oral two times a day  sodium chloride 0.9% lock flush 3 milliLiter(s) IV Push every 8 hours      Vital Signs Last 24 Hrs  T(C): 36.3 (30 Aug 2021 12:30), Max: 36.9 (30 Aug 2021 06:08)  T(F): 97.4 (30 Aug 2021 12:30), Max: 98.4 (30 Aug 2021 06:08)  HR: 134 (30 Aug 2021 12:30) (65 - 134)  BP: 177/62 (30 Aug 2021 12:30) (155/52 - 177/62)  BP(mean): --  RR: 16 (30 Aug 2021 12:30) (16 - 16)  SpO2: 100% (30 Aug 2021 12:30) (97% - 100%)      PHYSICAL EXAMINATION:  GENERAL: NAD, well built  HEAD:  Atraumatic, Normocephalic  EYES:  conjunctiva and sclera clear  NECK: Supple, No JVD, Normal thyroid  CHEST/LUNG: Clear to auscultation. Clear to percussion bilaterally; No rales, rhonchi, wheezing, or rubs  HEART: Regular rate and rhythm; No murmurs, rubs, or gallopsABDOMEN: Soft, Nontender, Nondistended; Bowel sounds present  NERVOUS SYSTEM:  Alert & Oriented X3,    EXTREMITIES:  2+ Peripheral Pulses, No clubbing, cyanosis, or edema  SKIN: warm dry        LABS:  08-30    138  |  98  |  51<H>  ----------------------------<  101<H>  4.6   |  23  |  7.21<H>    Ca    8.5      30 Aug 2021 08:21  Phos  3.4     08-30  Mg     1.90     08-30      Creatinine Trend: 7.21 <--, 5.63 <--, 8.43 <--, 6.10 <--, 8.85 <--, 7.07 <--, 5.19 <--, 10.40 <--                        9.2    5.55  )-----------( 190      ( 30 Aug 2021 08:21 )             28.4     
[facility] NEPHROLOGY- PROGRESS NOTE    86y Male with history of ESRD on HD transferred from [facility] for cardiac cath. s/p cath 8/27. Nephrology consulted for ESRD status.    Hospital Medications: Medications reviewed.  REVIEW OF SYSTEMS:  CONSTITUTIONAL: No fevers or chills  RESPIRATORY: No shortness of breath  CARDIOVASCULAR: No chest pain.  GASTROINTESTINAL: No nausea, vomiting, diarrhea or abdominal pain.   VASCULAR: No bilateral lower extremity edema.     VITALS:  T(F): 98 (08-29-21 @ 06:02), Max: 98.6 (08-28-21 @ 13:15)  HR: 89 (08-29-21 @ 06:02)  BP: 175/82 (08-29-21 @ 06:02)  RR: 17 (08-29-21 @ 06:02)  SpO2: 99% (08-29-21 @ 06:02)  Wt(kg): --  Height (cm): 167.6 (08-21 @ 02:56)  Weight (kg): 49.9 (08-21 @ 02:56)  BMI (kg/m2): 17.8 (08-21 @ 02:56)  BSA (m2): 1.55 (08-21 @ 02:56)    08-28 @ 07:01  -  08-29 @ 07:00  --------------------------------------------------------  IN: 400 mL / OUT: 2400 mL / NET: -2000 mL      PHYSICAL EXAM:  Gen: NAD, calm  HEENT: MMM  Neck: no JVD  Cards: RRR, +S1/S2, no M/G/R  Resp: CTA B/L  GI: soft, NT/ND, NABS  : no CVA tenderness  Vascular: no LE edema B/L, LUE AVF + bruit/thrill      LABS:  08-29    139  |  99  |  36<H>  ----------------------------<  108<H>  4.2   |  25  |  5.63<H>    Ca    8.9      29 Aug 2021 08:00  Phos  3.3     08-29  Mg     2.00     08-29      Creatinine Trend: 5.63 <--, 8.43 <--, 6.10 <--, 8.85 <--, 7.07 <--, 5.19 <--, 10.40 <--, 8.63 <--                        9.7    4.58  )-----------( 175      ( 29 Aug 2021 08:00 )             29.7     Urine Studies:      RADIOLOGY & ADDITIONAL STUDIES:  
DATE OF SERVICE:  08/29/2021  Patient was seen and examined on 08/29/2021     .Interim events noted.Consultant notes ,Labs,Telemetry reviewed by me    PRESENTING CC:Elevated troponins    HPI and HOSPITAL COURSE: HPI:  87 y/o M with Afib on Eliquis, h/p GI bleed requiring transfusion, ESRD on HD TTS, DM type 2, HTN, Anemia of renal disease and hyperphosphatemia presented to [facility] with diffuse abdominal pain x 1 day on August 21. CT abdomen showed Small bowel obstruction, with a transition point in the left upper quadrant, and a 2nd transition point in the right lower quadrant. Surgery consulted and no surgical intervention. Management was with NGT on continuous suction, serial abdominal exams and abdominal xrays. Patient was found to have abnormal cxr. Findings were significant for showing increased bilateral effusions and moderate left effusion and increasing pseudotumor in the minor fissure compared to June 28 of this year. CT chest showed a large left and trace right pleural effusions not significantly changed from 6/22/2021. Atelectasis of basilar left lower lobe, unchanged. Interlobular septal thickening and patchy groundglass opacities representing pulmonary edema. On 8/23 NGT removed, patient had flatus and clear liquid diet was resumed. Abdominal radiographs showed improvement of SBO. Patient remained stable, however on 8/24 during dialysis patient found to be in Afib with RVR (rate in 150's) hypotensive with 80'SBP/40's DBP. Total of 600 cc bolus fluid given blood pressure improved to 120's sbp. Patient given 5mg Metoprolol IV at dialysis. Dialysis was stopped. When patient returned to floor HR was in 130s with stable BP, another 5mg Metoprolol was given. HR is in 120s. Patient complained of chest pain troponin was 0.111 repeat at 9pm. Repeat troponins were 0.8 and 1.9. On 8/25 cardiology recommended starting Digoxin for Afib. For the chest  pain increased troponin most likely due to demand ischemia. Cardiology recommended cardiac cath to r/o CAD since recent neg stress test, possible false negative.    INTERIM EVENTS:Awake alert no chest pain for HD today      PMH -reviewed admission note, no change since admission  Heart Failure: Acute [ ] Chronic [ ] Acute on Chronic [ ] Diastolic [ ] Systolic [ ] Combined Systolic and Diastolic[ ]  RIGOBERTO[ ]  ATN[ ]  CKD I [ ] CKDII [ ] CKD III [ ] CKD IV [ ] CKD V [ ] ESRD[x ]  HTN[ ] CVA[ ] DM[ ] COPD[ ] COVID[ ] AF[x ]  PPM[ ] ICD[ ]    MEDICATIONS  (STANDING):            REVIEW OF SYSTEMS:  Constitutional: [ ] fever, [ ]weight loss,  [x ]fatigue  Eyes: [ ] visual changes  Respiratory: [ ]shortness of breath;  [ ] cough, [ ]wheezing, [ ]chills, [ ]hemoptysis  Cardiovascular: [ ] chest pain, [ ]palpitations, [ ]dizziness,  [ ]leg swelling[ ]orthopnea[ ]PND  Gastrointestinal: [ ] abdominal pain, [ ]nausea, [ ]vomiting,  [ ]diarrhea [ ]Constipation [ ]Melena  Genitourinary: [ ] dysuria, [ ] hematuria [ ]Rose  Neurologic: [ ] headaches [ ] tremors[ ]weakness [ ]Paralysis Right[ ] Left[ ]  Skin: [ ] itching, [ ]burning, [ ] rashes  Endocrine: [ ] heat or cold intolerance  Musculoskeletal: [ ] joint pain or swelling; [ ] muscle, back, or extremity pain  Psychiatric: [ ] depression, [ ]anxiety, [ ]mood swings, or [ ]difficulty sleeping  Hematologic: [ ] easy bruising, [ ] bleeding gums    [x] All remaining systems negative except as per above.   [ ]Unable to obtain.    Vital Signs Last 24 Hrs  T(C): 36.8 (29 Aug 2021 22:08), Max: 37.1 (29 Aug 2021 12:58)  T(F): 98.2 (29 Aug 2021 22:08), Max: 98.7 (29 Aug 2021 12:58)  HR: 76 (29 Aug 2021 22:08) (76 - 89)  BP: 148/48 (29 Aug 2021 22:08) (142/60 - 175/82)  RR: 17 (29 Aug 2021 22:08) (17 - 18)  SpO2: 100% (29 Aug 2021 22:08) (99% - 100%)        PHYSICAL EXAM:  General: No acute distress BMI-29  HEENT: EOMI, PERRL  Neck: Supple, [ ] JVD  Lungs: Equal air entry bilaterally; [ ] rales [ ] wheezing [ ] rhonchi  Heart: Regular rate and rhythm; [x ] murmur   2/6 [x ] systolic [ ] diastolic [ ] radiation[ ] rubs [ ]  gallops  Abdomen: Nontender, bowel sounds present  Extremities: No clubbing, cyanosis, [ ] edema [ ]Pulses  equal and intact  Nervous system:  Alert & Oriented X3, no focal deficits  Psychiatric: Normal affect  Skin: No rashes or lesions    LABS:  08-29    139  |  99  |  36<H>  ----------------------------<  108<H>  4.2   |  25  |  5.63<H>    Ca    8.9      29 Aug 2021 08:00  Phos  3.3     08-29  Mg     2.00     08-29      Creatinine Trend: 5.63 <--, 8.43 <--, 6.10 <--, 8.85 <--, 7.07 <--, 5.19 <--, 10.40 <--, 8.63 <--                        9.7    4.58  )-----------( 175      ( 29 Aug 2021 08:00 )                 29.7     ECHO:Study Date: 5/2/2021  CONCLUSIONS:  1. Mitral annular calcification. Mild mitral regurgitation.  2. Calcified trileaflet aortic valve with normal opening.  3. Aortic Root: 3.3 cm.  4. Severely dilated left atrium.  LA volume index = 67 cc/m2.  5. Normal left ventricular internal dimensions and wall thicknesses.  6. Normal Left Ventricular Systolic Function,  (EF = 55 to 60%)  7. Normal diastolic function.  8. Normal right atrium.  9. Normal right ventricular size and systolic function  (TAPSE  1.7cm).  10. RV systolic pressure is mildly increased at  46 mm Hg.  11. There is mild tricuspidregurgitation.  12. There is mild pulmonic regurgitation.  13. Normal pericardium with no pericardial effusion.  14. Left pleural effusion.    CATH:Study date: 08/27/2021  CORONARY VESSELS: The coronary circulation is right dominant.  LM:   --  LM: Angiography showed mild atherosclerosis with no flow limiting lesions.  LAD:   --  LAD: Angiography showed mild atherosclerosis with no flow limiting lesions.  --  D1: Angiography showed mild atherosclerosis with no flow limiting lesions.  CX:   --  Proximal circumflex: There was a tubular 60 % stenosis at a site with no prior intervention. There was SHERIF grade 3 flow through the vessel(brisk flow).  RCA:   --  RCA: Angiography showed mild atherosclerosis with no flow limiting lesions.  COMPLICATIONS: No complications occurred during the cath lab visit.  DIAGNOSTIC IMPRESSIONS: Moderate stenosis proximal LCx, with normal TIMI3 flow  No significant obstructive CAD      IMPRESSION AND PLAN:    85 yo Male with ESRD on HD TTS, DM type 2, HTN, Anemia of renal disease, hyperphosphatemia, Afib on A/C Eliquis Presented with diffuse abdominal pain x 1 day. Patient found to have a Small Bowel Obstruction. Admitted for SBO and abnormal cxray which shows increased bilateral effusions.         Problem/Plan - 1:  ·  Problem: Small bowel obstruction.   ·  Plan: - CT abdomen showed Small bowel obstruction, with a transition point in the left upper quadrant, and a 2nd transition point in the right lower quadrant. Closed loop obstruction or internal hernia cannot be excluded.  - Try to avoid opioids as it may worsen pain due to obstruction     Problem/Plan - 2:  ·  Problem: Elevated troponin.   ·  Plan: - patient complained of chest pain yesterday   - troponin was sent and was 0.111, repeat troponin were 0.864 and 1.9  - today chest pain has resolved  - continue with tele   - elevated troponin most likely due to demand ischemia   -Cath non obstructive  Medical management          Problem/Plan - 3:  ·  Problem: Atrial fibrillation.   ·  Plan: Patient has hx of atrial fibrillation on Eliquis and metoprolol at home   - yesterday pt did not complete HD session due to Afib with RVR in 140's and with BP of 80/40's. Patient complained of chest pain. Total 600cc of fluid given, bp improved to 120's SBP, 5mg Metoprolol IV given at dialysis and another 5mg Metoprolol 5mg given on floor.  - repeat EKG's did not show any acute ST segment changes  -Rate controlled resume [facility]      Problem/Plan - 4:  ·  Problem: ESRD on hemodialysis.   ·  Plan: - ESRD Maintenance HD (T/T/S ) via AV fistula L forearm, Cr 7.9 on admission  -Scheduled for HD today  Discharge planning       Problem/Plan - 5:  ·  Problem: Electrolyte abnormality.   ·  Plan: - Next HD today at Potassium 5.0, Magnesium 2.3 Phosp 4.1   - Monitor BMP,    Problem/Plan - 6:  ·  Problem: Abnormal chest xray.   ·  Plan: Problem: Abnormal chest xray.  Plan: - CXR showed increased bilateral effusions and moderate left effusion and increasing pseudotumor in the minor fissure compared to June 28 of this year.  - CT abdomen non contrast showed moderate to large left pleural effusion left lower lobe atelectasis/collapse. Streaky right basilar atelectasis with trace right pleural effusion.  -  CT chest non contrast Large left and trace right pleural effusions not significantly  changed from 6/22/2021. Atelectasis of basilar left lower lobe, unchanged. Interlobular septal thickening and patchy groundglass opacities representing pulmonary edema.      Problem/Plan - 7:  ·  Problem: Hypertension.   ·  Plan: Patient noted to be normotensive overnight   - Hydralazine 100mg Q 8  - Losartan 25mg QD.      Problem/Plan - 8:  ·  Problem: Prophylactic measure.   ·  Plan: IMPROVE VTE score: 3  Will manage with:     [ ] Previous VTE                                    3  [ ] Thrombophilia                                  2  [ x] Lower limb paralysis                        2  (unable to hold up >15 seconds)  [ ] Current Cancer (within 6 months)        2   [x] Immobilization > 24 hrs                    1  [ ] ICU/CCU stay > 24 hrs                      1  [x] Age > 60                                         1.        
pt seen and examined    REVIEW OF SYSTEMS:  CONSTITUTIONAL: No fever, weight loss, or fatigue  RESPIRATORY: No cough, wheezing, chills or hemoptysis; No shortness of breath  CARDIOVASCULAR: No chest pain, palpitations, dizziness, or leg swelling  GASTROINTESTINAL: No abdominal pain. No nausea, vomiting, or hematemesis; No diarrhea or constipation. No melena or hematochezia.  GENITOURINARY: No dysuria or hematuria, urinary frequency  NEUROLOGICAL: No headaches, memory loss, loss of strength, numbness, or tremors  SKIN: No itching, burning, rashes, or lesions     Vital Signs Last 24 Hrs  T(C): 36.8 (29 Aug 2021 22:08), Max: 37.1 (29 Aug 2021 12:58)  T(F): 98.2 (29 Aug 2021 22:08), Max: 98.7 (29 Aug 2021 12:58)  HR: 76 (29 Aug 2021 22:08) (76 - 89)  BP: 148/48 (29 Aug 2021 22:08) (142/60 - 175/82)  BP(mean): --  RR: 17 (29 Aug 2021 22:08) (17 - 18)  SpO2: 100% (29 Aug 2021 22:08) (99% - 100%)  PHYSICAL EXAMINATION:  GENERAL: NAD, well built  HEAD:  Atraumatic, Normocephalic  EYES:  conjunctiva and sclera clear  NECK: Supple, No JVD, Normal thyroid  CHEST/LUNG: Clear to auscultation. Clear to percussion bilaterally; No rales, rhonchi, wheezing, or rubs  HEART: Regular rate and rhythm; No murmurs, rubs, or gallops  ABDOMEN: Soft, Nontender, Nondistended; Bowel sounds present  NERVOUS SYSTEM:  Alert & Oriented X3,    EXTREMITIES:  2+ Peripheral Pulses, No clubbing, cyanosis, or edema  SKIN: warm dry               LABS:  08-29    139  |  99  |  36<H>  ----------------------------<  108<H>  4.2   |  25  |  5.63<H>    Ca    8.9      29 Aug 2021 08:00  Phos  3.3     08-29  Mg     2.00     08-29      Creatinine Trend: 5.63 <--, 8.43 <--, 6.10 <--, 8.85 <--, 7.07 <--, 5.19 <--, 10.40 <--, 8.63 <--                        9.7    4.58  )-----------( 175      ( 29 Aug 2021 08:00 )             29.7     Urine Studies:                                
pt seen and examined    REVIEW OF SYSTEMS:  CONSTITUTIONAL: No fever, weight loss, or fatigue  RESPIRATORY: No cough, wheezing, chills or hemoptysis; No shortness of breath  CARDIOVASCULAR: No chest pain, palpitations, dizziness, or leg swelling  GASTROINTESTINAL: No abdominal pain. No nausea, vomiting, or hematemesis; No diarrhea or constipation. No melena or hematochezia.  GENITOURINARY: No dysuria or hematuria, urinary frequency  NEUROLOGICAL: No headaches, memory loss, loss of strength, numbness, or tremors  SKIN: No itching, burning, rashes, or lesions     Vital Signs Last 24 Hrs  T(C): 36.6 (28 Aug 2021 22:22), Max: 37 (28 Aug 2021 13:15)  T(F): 97.9 (28 Aug 2021 22:22), Max: 98.6 (28 Aug 2021 13:15)  HR: 96 (28 Aug 2021 22:22) (76 - 108)  BP: 139/61 (28 Aug 2021 22:22) (133/71 - 190/78)  BP(mean): --  RR: 17 (28 Aug 2021 22:22) (16 - 18)  SpO2: 100% (28 Aug 2021 22:22) (100% - 100%)    PHYSICAL EXAMINATION:  GENERAL: NAD, well built  HEAD:  Atraumatic, Normocephalic  EYES:  conjunctiva and sclera clear  NECK: Supple, No JVD, Normal thyroid  CHEST/LUNG: Clear to auscultation. Clear to percussion bilaterally; No rales, rhonchi, wheezing, or rubs  HEART: Regular rate and rhythm; No murmurs, rubs, or gallops  ABDOMEN: Soft, Nontender, Nondistended; Bowel sounds present  NERVOUS SYSTEM:  Alert & Oriented X3,    EXTREMITIES:  2+ Peripheral Pulses, No clubbing, cyanosis, or edema  SKIN: warm dry               LABS:  08-28    131<L>  |  93<L>  |  60<H>  ----------------------------<  215<H>  5.2   |  22  |  8.43<H>    Ca    8.5      28 Aug 2021 16:56  Phos  3.3     08-27  Mg     2.00     08-28    TPro  6.5  /  Alb  3.2<L>  /  TBili  0.4  /  DBili      /  AST  9<L>  /  ALT  9<L>  /  AlkPhos  64  08-27    Creatinine Trend: 8.43 <--, 6.10 <--, 8.85 <--, 7.07 <--, 5.19 <--, 10.40 <--, 8.63 <--, 6.56 <--, 5.90 <--                        9.4    4.94  )-----------( 185      ( 28 Aug 2021 16:56 )             27.9     Urine Studies:            LIVER FUNCTIONS - ( 27 Aug 2021 07:37 )  Alb: 3.2 g/dL / Pro: 6.5 g/dL / ALK PHOS: 64 U/L / ALT: 9 U/L DA / AST: 9 U/L / GGT: x           PTT - ( 27 Aug 2021 00:43 )  PTT:67.7 sec                  
pt seen and examined    REVIEW OF SYSTEMS:  CONSTITUTIONAL: No fever, weight loss, or fatigue  RESPIRATORY: No cough, wheezing, chills or hemoptysis; No shortness of breath  CARDIOVASCULAR: No chest pain, palpitations, dizziness, or leg swelling  GASTROINTESTINAL: No abdominal pain. No nausea, vomiting, or hematemesis; No diarrhea or constipation. No melena or hematochezia.  GENITOURINARY: No dysuria or hematuria, urinary frequency  NEUROLOGICAL: No headaches, memory loss, loss of strength, numbness, or tremors  SKIN: No itching, burning, rashes, or lesions     MEDICATIONS  (STANDING):  apixaban 2.5 milliGRAM(s) Oral every 12 hours  atorvastatin 40 milliGRAM(s) Oral at bedtime  calcium acetate 1334 milliGRAM(s) Oral three times a day with meals  chlorhexidine 2% Cloths 1 Application(s) Topical <User Schedule>  dextrose 40% Gel 15 Gram(s) Oral once  dextrose 5%. 1000 milliLiter(s) (100 mL/Hr) IV Continuous <Continuous>  dextrose 5%. 1000 milliLiter(s) (50 mL/Hr) IV Continuous <Continuous>  dextrose 50% Injectable 25 Gram(s) IV Push once  dextrose 50% Injectable 12.5 Gram(s) IV Push once  dextrose 50% Injectable 25 Gram(s) IV Push once  digoxin     Tablet 125 MICROGram(s) Oral every other day  epoetin zach-epbx (RETACRIT) Injectable 4000 Unit(s) IV Push <User Schedule>  finasteride 5 milliGRAM(s) Oral daily  glucagon  Injectable 1 milliGRAM(s) IntraMuscular once  hydrALAZINE 100 milliGRAM(s) Oral every 8 hours  insulin lispro (ADMELOG) corrective regimen sliding scale   SubCutaneous three times a day before meals  insulin lispro (ADMELOG) corrective regimen sliding scale   SubCutaneous at bedtime  losartan 100 milliGRAM(s) Oral daily  metoprolol succinate ER 50 milliGRAM(s) Oral daily  mupirocin 2% Ointment 1 Application(s) Both Nostrils two times a day  pantoprazole    Tablet 40 milliGRAM(s) Oral before breakfast  polyethylene glycol 3350 17 Gram(s) Oral two times a day  sodium chloride 0.9% lock flush 3 milliLiter(s) IV Push every 8 hours    MEDICATIONS  (PRN):    Vital Signs Last 24 Hrs  T(C): 36.3 (30 Aug 2021 12:30), Max: 36.9 (30 Aug 2021 06:08)  T(F): 97.4 (30 Aug 2021 12:30), Max: 98.4 (30 Aug 2021 06:08)  HR: 134 (30 Aug 2021 12:30) (65 - 134)  BP: 177/62 (30 Aug 2021 12:30) (155/52 - 177/62)  BP(mean): --  RR: 16 (30 Aug 2021 12:30) (16 - 16)  SpO2: 100% (30 Aug 2021 12:30) (97% - 100%)    PHYSICAL EXAMINATION:  GENERAL: NAD, well built  HEAD:  Atraumatic, Normocephalic  EYES:  conjunctiva and sclera clear  NECK: Supple, No JVD, Normal thyroid  CHEST/LUNG: Clear to auscultation. Clear to percussion bilaterally; No rales, rhonchi, wheezing, or rubs  HEART: Regular rate and rhythm; No murmurs, rubs, or gallops  ABDOMEN: Soft, Nontender, Nondistended; Bowel sounds present  NERVOUS SYSTEM:  Alert & Oriented X3,    EXTREMITIES:  2+ Peripheral Pulses, No clubbing, cyanosis, or edema  SKIN: warm dry               LABS:  08-30    138  |  98  |  51<H>  ----------------------------<  101<H>  4.6   |  23  |  7.21<H>    Ca    8.5      30 Aug 2021 08:21  Phos  3.4     08-30  Mg     1.90     08-30      Creatinine Trend: 7.21 <--, 5.63 <--, 8.43 <--, 6.10 <--, 8.85 <--, 7.07 <--, 5.19 <--, 10.40 <--                        9.2    5.55  )-----------( 190      ( 30 Aug 2021 08:21 )             28.4     Urine Studies:

## 2021-08-30 NOTE — PROGRESS NOTE ADULT - TIME BILLING
- Review of records, telemetry, vital signs and daily labs.   - General and cardiovascular physical examination.  - Generation of cardiovascular treatment plan.  - Coordination of care.      Patient was seen and examined by me on 08/28/2021,interim events noted,labs and radiology studies reviewed.  Foster Diehl MD,FACC.  [address] [place], [place] [id number].  [phone]
- Review of records, telemetry, vital signs and daily labs.   - General and cardiovascular physical examination.  - Generation of cardiovascular treatment plan.  - Coordination of care.      Patient was seen and examined by me on 08/30/2021,interim events noted,labs and radiology studies reviewed.  Foster Diehl MD,FACC.  [address] [place], [place] [id number].  [phone]
- Review of records, telemetry, vital signs and daily labs.   - General and cardiovascular physical examination.  - Generation of cardiovascular treatment plan.  - Coordination of care.      Patient was seen and examined by me on 08/29/2021,interim events noted,labs and radiology studies reviewed.  Foster Diehl MD,FACC.  [address] [place], [place] [id number].  [phone]

## 2021-08-30 NOTE — PROGRESS NOTE ADULT - PROBLEM SELECTOR PLAN 2
chest pain free at present  s/p cath

## 2021-08-30 NOTE — PROGRESS NOTE ADULT - PROVIDER SPECIALTY LIST ADULT
Nephrology
Nephrology
Cardiology
Cardiology
Internal Medicine
Cardiology
Internal Medicine
Internal Medicine

## 2021-08-30 NOTE — PROGRESS NOTE ADULT - REASON FOR ADMISSION
ABDOMINAL PAIN
Chest pain
Chest pain elevated troponins
Elevated troponins
ABDOMINAL PAIN
ABDOMINAL PAIN
24-Oct-2017

## 2021-08-30 NOTE — PROGRESS NOTE ADULT - ASSESSMENT
86y Male with history of ESRD on HD transferred from [facility] for cardiac cath. s/p cath 8/27. Nephrology consulted for ESRD status.    1) ESRD: Last HD 8/28, tolerated well with net 2L removed. Plan for next maintenance HD on 8/31.  Monitor electrolytes.    2) HTN with ESRD: BP elevated for which Losartan was increased to 100mg PO qd. c/w low salt diet. Monitor BP.    3) Anemia of renal disease: Hb low but improving. c/w Epo 4k IV tiw with HD. Monitor Hb.    4) Hyperphosphatemia: Phosphorus acceptable; c/w  phoslo 2 tabs with meals. Monitor serum calcium and phosphorus.        [facility] NEPHROLOGY  Nahum Grbubs M.D.  Francisco Javier Hicks D.O.  Yovana Quiroz M.D.  Makeda Duron, MSN, ANP-C    Telephone: [phone]  Facsimile: [phone]    [address] [place] [address]  
86y Male with history of ESRD on HD transferred from [facility] for cardiac cath. s/p cath 8/27. Nephrology consulted for ESRD status.    1) ESRD: Last HD 8/28 tolerated well with 2L removed. Plan for next maintenance HD on 8/31 as an outpatient given plans for discharge today.  Monitor electrolytes.    2) HTN with ESRD: BP borderline with wide pulse pressures. Continue with current medications. Monitor BP.    3) Anemia of renal disease: Hb stable. Continue with Epo 4K with HD. Monitor Hb.    4) Hyperphosphatemia: Phosphorus acceptable. Continue with phoslo 2 tabs with meals. Monitor serum calcium and phosphorus.      [facility] NEPHROLOGY  Nahum Grubbs M.D.  Francisco Javier Hicks D.O.  Yovana Quiroz M.D.  Makeda Duron, MSN, ANP-C    Telephone: [phone]  Facsimile: [phone]    [address] [place] [address]  
87 yo Male with ESRD on HD TTS, DM type 2, HTN, Anemia of renal disease, hyperphosphatemia, Afib on A/C Eliquis Presented with diffuse abdominal pain x 1 day. Patient found to have a Small Bowel Obstruction. Admitted for SBO and abnormal cxray which shows increased bilateral effusions.  
NSTEMI  - LHC: pLCx 60%, RFA accessed  - Restarted Eliquis    Atrial fibrillation with RVR.   - Pt maintained on Heparin gtt prior to cath  - LHC: pLCx 60%, RFA accessed  - Restarted Eliquis    Small bowel obstruction.   - NGT removed at [facility]  - Pt tolerating solid food at this time  - No surgical intervention at this time.    Pleural effusion, left.   - Currently respiratory wise stable     ESRD on dialysis.   - Continue routine HD on T/Th/Sat  - Renal consulted
85 yo Male with ESRD on HD TTS, DM type 2, HTN, Anemia of renal disease, hyperphosphatemia, Afib on A/C Eliquis Presented with diffuse abdominal pain x 1 day. Patient found to have a Small Bowel Obstruction. Admitted for SBO and abnormal cxray which shows increased bilateral effusions.  
87 yo Male with ESRD on HD TTS, DM type 2, HTN, Anemia of renal disease, hyperphosphatemia, Afib on A/C Eliquis Presented with diffuse abdominal pain x 1 day. Patient found to have a Small Bowel Obstruction. Admitted for SBO and abnormal cxray which shows increased bilateral effusions.

## 2021-08-30 NOTE — PROGRESS NOTE ADULT - PROBLEM SELECTOR PLAN 6
Problem: Abnormal chest xray.  Plan: - CXR showed increased bilateral effusions and moderate left effusion and increasing pseudotumor in the minor fissure compared to June 28 of this year.  - pulm eval.

## 2021-08-30 NOTE — PROGRESS NOTE ADULT - PROBLEM SELECTOR PLAN 7
Patient noted to be normotensive overnight   - Hydralazine 100mg Q 8  - Losartan 25mg QD

## 2021-08-30 NOTE — PHYSICAL THERAPY INITIAL EVALUATION ADULT - PERTINENT HX OF CURRENT PROBLEM, REHAB EVAL
patient is a 86 year old male admitted for SBO and chest pain. SBO is being managed conservatively. patient is s/p cardiac cath for chest pain

## 2021-09-01 ENCOUNTER — INPATIENT (INPATIENT)
Facility: HOSPITAL | Age: 86
LOS: 0 days | Discharge: ANOTHER HOSPITAL | DRG: 305 | End: 2021-09-02
Attending: INTERNAL MEDICINE | Admitting: INTERNAL MEDICINE
Payer: MEDICARE

## 2021-09-01 VITALS
WEIGHT: 100.09 LBS | OXYGEN SATURATION: 97 % | HEART RATE: 56 BPM | HEIGHT: 66 IN | TEMPERATURE: 98 F | SYSTOLIC BLOOD PRESSURE: 178 MMHG | DIASTOLIC BLOOD PRESSURE: 66 MMHG | RESPIRATION RATE: 17 BRPM

## 2021-09-01 DIAGNOSIS — I48.91 UNSPECIFIED ATRIAL FIBRILLATION: ICD-10-CM

## 2021-09-01 DIAGNOSIS — R55 SYNCOPE AND COLLAPSE: ICD-10-CM

## 2021-09-01 DIAGNOSIS — E78.5 HYPERLIPIDEMIA, UNSPECIFIED: ICD-10-CM

## 2021-09-01 DIAGNOSIS — E11.9 TYPE 2 DIABETES MELLITUS WITHOUT COMPLICATIONS: ICD-10-CM

## 2021-09-01 DIAGNOSIS — I10 ESSENTIAL (PRIMARY) HYPERTENSION: ICD-10-CM

## 2021-09-01 DIAGNOSIS — Z29.9 ENCOUNTER FOR PROPHYLACTIC MEASURES, UNSPECIFIED: ICD-10-CM

## 2021-09-01 DIAGNOSIS — Z90.49 ACQUIRED ABSENCE OF OTHER SPECIFIED PARTS OF DIGESTIVE TRACT: Chronic | ICD-10-CM

## 2021-09-01 DIAGNOSIS — N18.6 END STAGE RENAL DISEASE: ICD-10-CM

## 2021-09-01 LAB
ALBUMIN SERPL ELPH-MCNC: 2.9 G/DL — LOW (ref 3.5–5)
ALP SERPL-CCNC: 62 U/L — SIGNIFICANT CHANGE UP (ref 40–120)
ALT FLD-CCNC: 11 U/L DA — SIGNIFICANT CHANGE UP (ref 10–60)
ANION GAP SERPL CALC-SCNC: 13 MMOL/L — SIGNIFICANT CHANGE UP (ref 5–17)
AST SERPL-CCNC: 4 U/L — LOW (ref 10–40)
BASOPHILS # BLD AUTO: 0.03 K/UL — SIGNIFICANT CHANGE UP (ref 0–0.2)
BASOPHILS NFR BLD AUTO: 0.3 % — SIGNIFICANT CHANGE UP (ref 0–2)
BILIRUB SERPL-MCNC: 0.4 MG/DL — SIGNIFICANT CHANGE UP (ref 0.2–1.2)
BUN SERPL-MCNC: 82 MG/DL — HIGH (ref 7–18)
CALCIUM SERPL-MCNC: 8.7 MG/DL — SIGNIFICANT CHANGE UP (ref 8.4–10.5)
CHLORIDE SERPL-SCNC: 94 MMOL/L — LOW (ref 96–108)
CO2 SERPL-SCNC: 24 MMOL/L — SIGNIFICANT CHANGE UP (ref 22–31)
CREAT SERPL-MCNC: 11 MG/DL — HIGH (ref 0.5–1.3)
EOSINOPHIL # BLD AUTO: 0.25 K/UL — SIGNIFICANT CHANGE UP (ref 0–0.5)
EOSINOPHIL NFR BLD AUTO: 2.9 % — SIGNIFICANT CHANGE UP (ref 0–6)
GLUCOSE BLDC GLUCOMTR-MCNC: 116 MG/DL — HIGH (ref 70–99)
GLUCOSE BLDC GLUCOMTR-MCNC: 222 MG/DL — HIGH (ref 70–99)
GLUCOSE SERPL-MCNC: 239 MG/DL — HIGH (ref 70–99)
HCT VFR BLD CALC: 27.4 % — LOW (ref 39–50)
HGB BLD-MCNC: 8.9 G/DL — LOW (ref 13–17)
IMM GRANULOCYTES NFR BLD AUTO: 0.6 % — SIGNIFICANT CHANGE UP (ref 0–1.5)
LIDOCAIN IGE QN: 358 U/L — SIGNIFICANT CHANGE UP (ref 73–393)
LYMPHOCYTES # BLD AUTO: 1.06 K/UL — SIGNIFICANT CHANGE UP (ref 1–3.3)
LYMPHOCYTES # BLD AUTO: 12.3 % — LOW (ref 13–44)
MAGNESIUM SERPL-MCNC: 2.1 MG/DL — SIGNIFICANT CHANGE UP (ref 1.6–2.6)
MCHC RBC-ENTMCNC: 31.2 PG — SIGNIFICANT CHANGE UP (ref 27–34)
MCHC RBC-ENTMCNC: 32.5 GM/DL — SIGNIFICANT CHANGE UP (ref 32–36)
MCV RBC AUTO: 96.1 FL — SIGNIFICANT CHANGE UP (ref 80–100)
MONOCYTES # BLD AUTO: 0.47 K/UL — SIGNIFICANT CHANGE UP (ref 0–0.9)
MONOCYTES NFR BLD AUTO: 5.5 % — SIGNIFICANT CHANGE UP (ref 2–14)
NEUTROPHILS # BLD AUTO: 6.75 K/UL — SIGNIFICANT CHANGE UP (ref 1.8–7.4)
NEUTROPHILS NFR BLD AUTO: 78.4 % — HIGH (ref 43–77)
NRBC # BLD: 0 /100 WBCS — SIGNIFICANT CHANGE UP (ref 0–0)
NT-PROBNP SERPL-SCNC: HIGH PG/ML (ref 0–450)
PLATELET # BLD AUTO: 215 K/UL — SIGNIFICANT CHANGE UP (ref 150–400)
POTASSIUM SERPL-MCNC: 5.1 MMOL/L — SIGNIFICANT CHANGE UP (ref 3.5–5.3)
POTASSIUM SERPL-SCNC: 5.1 MMOL/L — SIGNIFICANT CHANGE UP (ref 3.5–5.3)
PROT SERPL-MCNC: 6.5 G/DL — SIGNIFICANT CHANGE UP (ref 6–8.3)
RBC # BLD: 2.85 M/UL — LOW (ref 4.2–5.8)
RBC # FLD: 13.3 % — SIGNIFICANT CHANGE UP (ref 10.3–14.5)
SARS-COV-2 RNA SPEC QL NAA+PROBE: SIGNIFICANT CHANGE UP
SODIUM SERPL-SCNC: 131 MMOL/L — LOW (ref 135–145)
TROPONIN I SERPL-MCNC: 0.08 NG/ML — HIGH (ref 0–0.04)
WBC # BLD: 8.61 K/UL — SIGNIFICANT CHANGE UP (ref 3.8–10.5)
WBC # FLD AUTO: 8.61 K/UL — SIGNIFICANT CHANGE UP (ref 3.8–10.5)

## 2021-09-01 PROCEDURE — 70450 CT HEAD/BRAIN W/O DYE: CPT | Mod: 26,MA

## 2021-09-01 PROCEDURE — 70486 CT MAXILLOFACIAL W/O DYE: CPT | Mod: 26,MA

## 2021-09-01 PROCEDURE — 99285 EMERGENCY DEPT VISIT HI MDM: CPT

## 2021-09-01 RX ORDER — CALCIUM ACETATE 667 MG
1334 TABLET ORAL
Refills: 0 | Status: DISCONTINUED | OUTPATIENT
Start: 2021-09-01 | End: 2021-09-02

## 2021-09-01 RX ORDER — POLYETHYLENE GLYCOL 3350 17 G/17G
17 POWDER, FOR SOLUTION ORAL
Refills: 0 | Status: DISCONTINUED | OUTPATIENT
Start: 2021-09-01 | End: 2021-09-02

## 2021-09-01 RX ORDER — INSULIN LISPRO 100/ML
VIAL (ML) SUBCUTANEOUS AT BEDTIME
Refills: 0 | Status: DISCONTINUED | OUTPATIENT
Start: 2021-09-01 | End: 2021-09-02

## 2021-09-01 RX ORDER — ATORVASTATIN CALCIUM 80 MG/1
40 TABLET, FILM COATED ORAL AT BEDTIME
Refills: 0 | Status: DISCONTINUED | OUTPATIENT
Start: 2021-09-01 | End: 2021-09-02

## 2021-09-01 RX ORDER — APIXABAN 2.5 MG/1
2.5 TABLET, FILM COATED ORAL
Refills: 0 | Status: DISCONTINUED | OUTPATIENT
Start: 2021-09-01 | End: 2021-09-01

## 2021-09-01 RX ORDER — TETANUS TOXOID, REDUCED DIPHTHERIA TOXOID AND ACELLULAR PERTUSSIS VACCINE, ADSORBED 5; 2.5; 8; 8; 2.5 [IU]/.5ML; [IU]/.5ML; UG/.5ML; UG/.5ML; UG/.5ML
0.5 SUSPENSION INTRAMUSCULAR ONCE
Refills: 0 | Status: COMPLETED | OUTPATIENT
Start: 2021-09-01 | End: 2021-09-01

## 2021-09-01 RX ORDER — ERYTHROPOIETIN 10000 [IU]/ML
4000 INJECTION, SOLUTION INTRAVENOUS; SUBCUTANEOUS
Refills: 0 | Status: DISCONTINUED | OUTPATIENT
Start: 2021-09-01 | End: 2021-09-02

## 2021-09-01 RX ORDER — HYDRALAZINE HCL 50 MG
100 TABLET ORAL THREE TIMES A DAY
Refills: 0 | Status: DISCONTINUED | OUTPATIENT
Start: 2021-09-01 | End: 2021-09-02

## 2021-09-01 RX ORDER — PANTOPRAZOLE SODIUM 20 MG/1
40 TABLET, DELAYED RELEASE ORAL
Refills: 0 | Status: DISCONTINUED | OUTPATIENT
Start: 2021-09-01 | End: 2021-09-02

## 2021-09-01 RX ORDER — LOSARTAN POTASSIUM 100 MG/1
100 TABLET, FILM COATED ORAL DAILY
Refills: 0 | Status: DISCONTINUED | OUTPATIENT
Start: 2021-09-01 | End: 2021-09-02

## 2021-09-01 RX ORDER — INSULIN LISPRO 100/ML
VIAL (ML) SUBCUTANEOUS
Refills: 0 | Status: DISCONTINUED | OUTPATIENT
Start: 2021-09-01 | End: 2021-09-02

## 2021-09-01 RX ORDER — FINASTERIDE 5 MG/1
5 TABLET, FILM COATED ORAL DAILY
Refills: 0 | Status: DISCONTINUED | OUTPATIENT
Start: 2021-09-01 | End: 2021-09-02

## 2021-09-01 RX ADMIN — Medication 2: at 16:57

## 2021-09-01 RX ADMIN — Medication 1334 MILLIGRAM(S): at 23:06

## 2021-09-01 RX ADMIN — ERYTHROPOIETIN 4000 UNIT(S): 10000 INJECTION, SOLUTION INTRAVENOUS; SUBCUTANEOUS at 17:51

## 2021-09-01 RX ADMIN — Medication 100 MILLIGRAM(S): at 23:06

## 2021-09-01 RX ADMIN — ATORVASTATIN CALCIUM 40 MILLIGRAM(S): 80 TABLET, FILM COATED ORAL at 23:05

## 2021-09-01 RX ADMIN — TETANUS TOXOID, REDUCED DIPHTHERIA TOXOID AND ACELLULAR PERTUSSIS VACCINE, ADSORBED 0.5 MILLILITER(S): 5; 2.5; 8; 8; 2.5 SUSPENSION INTRAMUSCULAR at 14:42

## 2021-09-01 NOTE — H&P ADULT - PROBLEM SELECTOR PLAN 7
IMPROVE VTE Individual Risk Assessment  RISK                                                                Points  [  ] Previous VTE                                                  3  [  ] Thrombophilia                                               2  [  ] Lower limb paralysis                                      2        (unable to hold up >15 seconds)    [  ] Current Cancer                                              2         (within 6 months)  [  ] Immobilization > 24 hrs                                1  [  ] ICU/CCU stay > 24 hours                              1  [  ] Age > 60                                                      1  IMPROVE VTE Score _________    PPI for GI prophylaxis  Patient is on Eliquis full AC IMPROVE VTE Individual Risk Assessment  RISK                                                                Points  [  ] Previous VTE                                                  3  [  ] Thrombophilia                                               2  [  ] Lower limb paralysis                                      2        (unable to hold up >15 seconds)    [  ] Current Cancer                                              2         (within 6 months)  [  ] Immobilization > 24 hrs                                1  [  ] ICU/CCU stay > 24 hours                              1  [  ] Age > 60                                                      1  IMPROVE VTE Score ____2_____    PPI for GI prophylaxis  SCD for DVT prophylaxis

## 2021-09-01 NOTE — ED PROVIDER NOTE - OBJECTIVE STATEMENT
86M, pmh of Afib on Eliquis, ESRD on HD TTS, DM type 2, HTN, Anemia of renal disease and hyperphosphatemia, presenting after syncopal episode. history obtained from patient and his wife. patient walked to kitchen and was reading the newspaper when he lost consciousness and fell onto the floor. patient denies any chest pain, shortness of breath or headache. did not have dialysis yesterday.

## 2021-09-01 NOTE — CONSULT NOTE ADULT - ASSESSMENT
86y Male with history of ESRD on HD presents s/p syncope. Nephrology consulted for ESRD status.    1) ESRD: Last HD 8/28 tolerated well with 2L removed @ [facility]. Pt missed HD on 8/31. Plan for HD today. Monitor electrolytes.    2) HTN with ESRD: BP elevated with bradycardia for which AV jorge blockers held. Pt now on Losartan and Hydralazine. Monitor BP/ HR. f/u Cards    3) Anemia of renal disease: Hb low. Continue with Epo 4K with HD. Check iron studies, including ferritin in am. Monitor Hb.    4) Hyperphosphatemia: Check serum phosphorus  Continue with phoslo 2 tabs with meals. Monitor serum calcium and phosphorus.    5) s/p Syncope- recent cardiac w/u  s/p cath 8/27 with no significant obstructive CAD; no stents needed;   Pt with bradycardia- may need PPM. f/u Cardiology          [facility] NEPHROLOGY  Nahum Grubbs M.D.  Francisco Javier Hicks D.O.  Yovana Quiroz M.D.  Makeda Duron, MSN, ANP-C    Telephone: [phone]  Facsimile: [phone]    [address] [place] [address]

## 2021-09-01 NOTE — H&P ADULT - PROBLEM SELECTOR PLAN 2
- ESRD Maintenance HD (T/TH/S ) via L AV fistula, missed HD   - Cr on admission 11  - No crackles, pedal edema or fluid overload  - Avoid Nephrotoxic Meds/ Agents such as (NSAIDs, IV contrast, Aminoglycosides such as gentamicin, Gadolinium contrast, Phosphate containing enemas, etc..)  - Renal diet  - F/u SW (pt will need reinstatement of dialysis upon discharge)  - Nephrology Dr navarro consulted

## 2021-09-01 NOTE — H&P ADULT - PROBLEM SELECTOR PLAN 1
- P/w multiple falls ,  feeling dizzy and off balance  - Most likely from symptomatic bradycardia, unlikely ischemia as he had recent cardiac cath which showed LHC pLcx 60%, no stents.  - EKG: bradycardia, no AV blocks   - CT head chronic ischemic changes neg for acute intracranial process   - Admitted to Tele to monitor for any arrhythmias  - Of note patient presents tachycardia episodes during HD however bradycardic as well, concern for tachy bardy- may need PPM placement and transfer to [facility]   - F/u Orthostatics   - Fall precautions  - PT consult

## 2021-09-01 NOTE — H&P ADULT - ASSESSMENT
87 yo Male with ESRD on HD TTS, DM type 2, HTN, Anemia of renal disease, hyperphosphatemia, Afib on A/C CHELSI Escalante came to ED complaining of fall and LOC +. Patient admitted for syncope

## 2021-09-01 NOTE — H&P ADULT - PROBLEM SELECTOR PLAN 5
- Patient has history of Hypertension hydralazine and Losartan at home   - C/w home meds with parameters  - DASH/Renal/Carb consistent diet  - Monitor BP and adjust meds as needed

## 2021-09-01 NOTE — H&P ADULT - HISTORY OF PRESENT ILLNESS
87 yo Male from home with past medical hx of ESRD on HD TTS, DM type 2, HTN, Anemia of renal disease, hyperphosphatemia, Afib on A/C Eliquis, SBO Presented with episode of fall while reading his newspaper, LOC+ and head trauma +. Patient denies any premonitory symptoms like dizziness, chest pain, sob, headache, palpitations. Of note, patient was recently admitted for SBO and transferred to [facility] for NSTEMI needing a cardiac cath which showed LHC pLcx 60%, no stents.    GOC DNR DNI as per previous admission  85 yo Male from home with past medical hx of ESRD on HD TTS, DM type 2, HTN, Anemia of renal disease, hyperphosphatemia, Afib on A/C Eliquis, SBO Presented with episode of fall while reading his newspaper, LOC+ and head trauma +. Patient denies any premonitory symptoms like dizziness, chest pain, sob, headache, palpitations. Of note, patient was recently admitted for SBO and transferred to [facility] for NSTEMI needing a cardiac cath which showed LHC pLcx 60%, no stents.    GOC DNR DNI as per previous admission HOWEVER patient refers he would like to be FULL CODE now.

## 2021-09-01 NOTE — H&P ADULT - PROBLEM SELECTOR PLAN 3
- Patient has hx of atrial fibrillation on Eliquis, Toprol and Digoxin at home   - EKG: bradycardia, no AV blocks   - Hold digoxin and Toprol for now   - C/w Eliquis - Patient has hx of atrial fibrillation on Eliquis, Toprol and Digoxin at home   - EKG: bradycardia, no AV blocks   - Hold digoxin and Toprol due to bradycardia  - Hold Eliquis for possible PPM placement

## 2021-09-01 NOTE — CONSULT NOTE ADULT - SUBJECTIVE AND OBJECTIVE BOX
[facility] NEPHROLOGY- CONSULTATION NOTE    86y Male with history of below presents s/p syncope. Nephrology consulted for ESRD status.    Patient known to our practice for h/o ESRD on HD TTS at [facility] where he follows with me as an outpatient. Pt recently admitted to [facility] for abd pain found to have SBO s/p NGT; which resolved. Pt with elevated trops transferred to [facility] s/p cath 8/27 with no significant obstructive CAD; no stents needed; medical management. Pt was d/c from [facility] on 8/30.     Last HD 8/28. Pt missed outpt HD 8/31 due to lethargy. Pt was sitting at the table reading a newspaper and then the next thing he remembers was being on the floor. Pt hit his head and has a rt above the eye laceration.   pt denied any dizziness or chest pain prior to falling.     Pt denies any current SOB, chest pain, n/v/d, abd pain or LE edema.     REVIEW OF SYSTEMS:  Gen: no changes in weight  HEENT: no rhinorrhea  Neck: no sore throat  Cards: no chest pain  Resp: no dyspnea  GI: no nausea or vomiting or diarrhea or ab pain  : no dysuria or hematuria  Vascular: no LE edema  Derm: above rt eye injury  Neuro: no numbness/tingling    No Known Allergies      Home Medications Reviewed  Hospital Medications: Reviewed      PAST MEDICAL & SURGICAL HISTORY:  ESRD (end stage renal disease)    Diabetes    Hypertension    Hyperlipidemia    Atrial fibrillation    S/P hemodialysis catheter insertion  7/17    History of appendectomy        FAMILY HISTORY:  Family history of diabetes mellitus in sister (Sibling)        SOCIAL HISTORY:  Denies toxic substance use     VITALS:  T(F): 97.6 (09-01-21 @ 10:46), Max: 97.6 (09-01-21 @ 10:46)  HR: 56 (09-01-21 @ 10:46)  BP: 178/66 (09-01-21 @ 10:46)  RR: 17 (09-01-21 @ 10:46)  SpO2: 97% (09-01-21 @ 10:46)  Wt(kg): --    Height (cm): 167.6 (09-01 @ 10:46)  Weight (kg): 45.4 (09-01 @ 10:46)  BMI (kg/m2): 16.2 (09-01 @ 10:46)  BSA (m2): 1.49 (09-01 @ 10:46)    PHYSICAL EXAM:  Gen: NAD, calm  HEENT: erythema on nose  Laceration above Rt eyebrow  Neck: no JVD  Cards: RRR, +S1/S2, no M/G/R  Resp: decreased BS at bases  GI: soft, NT/ND, NABS  : no CVA tenderness  Vascular: no LE edema B/L, LUE AVF + bruit/thrill  Derm: see above  Neuro: non-focal    LABS:  09-01    131<L>  |  94<L>  |  82<H>  ----------------------------<  239<H>  5.1   |  24  |  11.00<H>    Ca    8.7      01 Sep 2021 11:21  Mg     2.1     09-01    TPro  6.5  /  Alb  2.9<L>  /  TBili  0.4  /  DBili      /  AST  4<L>  /  ALT  11  /  AlkPhos  62  09-01    Creatinine Trend: 11.00 <--, 7.21 <--, 5.63 <--, 8.43 <--, 6.10 <--, 8.85 <--                        8.9    8.61  )-----------( 215      ( 01 Sep 2021 11:21 )             27.4     Urine Studies:      < from: CT Head No Cont (09.01.21 @ 12:27) >    EXAM:  CT MAXILLOFACIAL                          EXAM:  CT BRAIN                            PROCEDURE DATE:  09/01/2021          < end of copied text >  < from: CT Head No Cont (09.01.21 @ 12:27) >  IMPRESSION:    CT brain: Minimal periventricular white matter ischemia. Mild scalp swelling in the forehead.    CT facial bones:  Facial bones intact without fracture.    Mild mucosal thickening in the BILATERAL ethmoid and LEFT maxillary sinuses.   LEFT nasal septal deviation with osteophyte.    --- End of Report ---    < end of copied text >

## 2021-09-01 NOTE — ED PROVIDER NOTE - PHYSICAL EXAMINATION
General: well appearing male, no acute distress   HEENT: right frontal hematoma, PERRL, EOMI  Respiratory: normal work of breathing, lungs clear to auscultation bilaterally   Cardiac: regular rate and rhythm   Abdomen: soft, non-tender, no guarding or rebound   MSK: no swelling or tenderness of lower extremities, moving all extremities spontaneously   Skin: warm, dry   Neuro: A&Ox3, cranial nerves II-xII intact   Psych: appropriate affect

## 2021-09-01 NOTE — H&P ADULT - NSHPPHYSICALEXAM_GEN_ALL_CORE
ICU Vital Signs Last 24 Hrs  T(C): 36.4 (01 Sep 2021 10:46), Max: 36.4 (01 Sep 2021 10:46)  T(F): 97.6 (01 Sep 2021 10:46), Max: 97.6 (01 Sep 2021 10:46)  HR: 56 (01 Sep 2021 10:46) (56 - 56)  BP: 178/66 (01 Sep 2021 10:46) (178/66 - 178/66)  RR: 17 (01 Sep 2021 10:46) (17 - 17)  SpO2: 97% (01 Sep 2021 10:46) (97% - 97%)    GENERAL: NAD, very thin, sat well on RA  HEAD: Normocephalic, hematoma on upper side of right eyebrow, ecchymoses on nose   EYES:  conjunctiva and sclera clear  NECK: Supple, No JVD, Normal thyroid  CHEST/LUNG: Clear to auscultation. Clear to percussion bilaterally; No rales, rhonchi, wheezing, or rubs  HEART: Regular rate and rhythm; No murmurs, rubs, or gallops  ABDOMEN: Soft, Nontender, Nondistended; Bowel sounds present, no pain or masses on palpation   NERVOUS SYSTEM:  Alert & Oriented X3  EXTREMITIES:  2+ Peripheral Pulses, No clubbing, cyanosis, or edema, Left arm AV fistula   SKIN: warm, dry

## 2021-09-01 NOTE — ED PROVIDER NOTE - CLINICAL SUMMARY MEDICAL DECISION MAKING FREE TEXT BOX
86M presenting after syncopal episode. no chest pain, nausea or vomiting. recently admitted for NSTEMI with negative cath. did not have dialysis yesterday. ekg changes but no signs of hyperkalemia. ekg seen by Dr. Diehl. will admit.

## 2021-09-01 NOTE — H&P ADULT - PROBLEM SELECTOR PLAN 4
- Patient takes Januvia at home  - Holding home po medications  - C/w with HSS  - Fingerstick before meals and at bedtime  - DASH diet with consistent carb  - [employer] -180

## 2021-09-01 NOTE — ED PROVIDER NOTE - NS ED MD DISPO ISOLATION TYPES
None
DR. FUETNES, ATTENDING MD-  I performed a face to face bedside interview with the patient regarding history of present illness, review of symptoms and past medical history. I completed an independent physical exam.  I have discussed the patient's plan of care with the resident.   Documentation as above in the note.    MDM written by myself.

## 2021-09-01 NOTE — ED ADULT NURSE NOTE - OBJECTIVE STATEMENT
As per wife, she heard a noise in the kitchen and found her  laying on the floor. pt has a unwitnessed fall in the kitchen with a laceration on the right eyebrow. Pt is on HD TuThSat via his left AV fistula and missed his dialysis session yesterday d/t recent dc from hospital on Monday

## 2021-09-01 NOTE — H&P ADULT - CONVERSATION DETAILS
JOVI discussed w/ patient to confirm if he still wants to be DNR DNI. Patient referred he wants to be FULL CODE, wife at bedside witnessed changed. JOVI voided.

## 2021-09-02 ENCOUNTER — INPATIENT (INPATIENT)
Facility: HOSPITAL | Age: 86
LOS: 0 days | Discharge: HOME | End: 2021-09-03
Attending: INTERNAL MEDICINE | Admitting: INTERNAL MEDICINE
Payer: MEDICARE

## 2021-09-02 VITALS
TEMPERATURE: 98 F | HEART RATE: 56 BPM | SYSTOLIC BLOOD PRESSURE: 155 MMHG | RESPIRATION RATE: 18 BRPM | OXYGEN SATURATION: 100 % | DIASTOLIC BLOOD PRESSURE: 55 MMHG

## 2021-09-02 VITALS — TEMPERATURE: 98 F

## 2021-09-02 DIAGNOSIS — E78.5 HYPERLIPIDEMIA, UNSPECIFIED: ICD-10-CM

## 2021-09-02 DIAGNOSIS — R55 SYNCOPE AND COLLAPSE: ICD-10-CM

## 2021-09-02 DIAGNOSIS — I48.91 UNSPECIFIED ATRIAL FIBRILLATION: ICD-10-CM

## 2021-09-02 DIAGNOSIS — E11.9 TYPE 2 DIABETES MELLITUS WITHOUT COMPLICATIONS: ICD-10-CM

## 2021-09-02 DIAGNOSIS — N18.6 END STAGE RENAL DISEASE: ICD-10-CM

## 2021-09-02 DIAGNOSIS — Z90.49 ACQUIRED ABSENCE OF OTHER SPECIFIED PARTS OF DIGESTIVE TRACT: Chronic | ICD-10-CM

## 2021-09-02 DIAGNOSIS — I10 ESSENTIAL (PRIMARY) HYPERTENSION: ICD-10-CM

## 2021-09-02 DIAGNOSIS — R00.1 BRADYCARDIA, UNSPECIFIED: ICD-10-CM

## 2021-09-02 LAB
ANION GAP SERPL CALC-SCNC: 10 MMOL/L — SIGNIFICANT CHANGE UP (ref 5–17)
ANION GAP SERPL CALC-SCNC: 15 MMOL/L — HIGH (ref 7–14)
APTT BLD: 31.8 SEC — SIGNIFICANT CHANGE UP (ref 27.5–35.5)
BLD GP AB SCN SERPL QL: NEGATIVE — SIGNIFICANT CHANGE UP
BLD GP AB SCN SERPL QL: SIGNIFICANT CHANGE UP
BUN SERPL-MCNC: 43 MG/DL — HIGH (ref 7–18)
BUN SERPL-MCNC: 47 MG/DL — HIGH (ref 7–23)
CALCIUM SERPL-MCNC: 8.7 MG/DL — SIGNIFICANT CHANGE UP (ref 8.4–10.5)
CALCIUM SERPL-MCNC: 8.9 MG/DL — SIGNIFICANT CHANGE UP (ref 8.4–10.5)
CHLORIDE SERPL-SCNC: 91 MMOL/L — LOW (ref 98–107)
CHLORIDE SERPL-SCNC: 98 MMOL/L — SIGNIFICANT CHANGE UP (ref 96–108)
CO2 SERPL-SCNC: 26 MMOL/L — SIGNIFICANT CHANGE UP (ref 22–31)
CO2 SERPL-SCNC: 28 MMOL/L — SIGNIFICANT CHANGE UP (ref 22–31)
COVID-19 SPIKE DOMAIN AB INTERP: POSITIVE
COVID-19 SPIKE DOMAIN ANTIBODY RESULT: 71.7 U/ML — HIGH
CREAT SERPL-MCNC: 6.94 MG/DL — HIGH (ref 0.5–1.3)
CREAT SERPL-MCNC: 7.04 MG/DL — HIGH (ref 0.5–1.3)
GLUCOSE BLDC GLUCOMTR-MCNC: 144 MG/DL — HIGH (ref 70–99)
GLUCOSE SERPL-MCNC: 143 MG/DL — HIGH (ref 70–99)
GLUCOSE SERPL-MCNC: 85 MG/DL — SIGNIFICANT CHANGE UP (ref 70–99)
HCT VFR BLD CALC: 26.8 % — LOW (ref 39–50)
HGB BLD-MCNC: 9.1 G/DL — LOW (ref 13–17)
INR BLD: 1.2 RATIO — HIGH (ref 0.88–1.16)
MAGNESIUM SERPL-MCNC: 2.2 MG/DL — SIGNIFICANT CHANGE UP (ref 1.6–2.6)
MCHC RBC-ENTMCNC: 32 PG — SIGNIFICANT CHANGE UP (ref 27–34)
MCHC RBC-ENTMCNC: 34 GM/DL — SIGNIFICANT CHANGE UP (ref 32–36)
MCV RBC AUTO: 94.4 FL — SIGNIFICANT CHANGE UP (ref 80–100)
NRBC # BLD: 0 /100 WBCS — SIGNIFICANT CHANGE UP (ref 0–0)
PHOSPHATE SERPL-MCNC: 3.3 MG/DL — SIGNIFICANT CHANGE UP (ref 2.5–4.5)
PLATELET # BLD AUTO: 198 K/UL — SIGNIFICANT CHANGE UP (ref 150–400)
POTASSIUM SERPL-MCNC: 4.3 MMOL/L — SIGNIFICANT CHANGE UP (ref 3.5–5.3)
POTASSIUM SERPL-MCNC: 4.9 MMOL/L — SIGNIFICANT CHANGE UP (ref 3.5–5.3)
POTASSIUM SERPL-SCNC: 4.3 MMOL/L — SIGNIFICANT CHANGE UP (ref 3.5–5.3)
POTASSIUM SERPL-SCNC: 4.9 MMOL/L — SIGNIFICANT CHANGE UP (ref 3.5–5.3)
PROTHROM AB SERPL-ACNC: 14.2 SEC — HIGH (ref 10.6–13.6)
RBC # BLD: 2.84 M/UL — LOW (ref 4.2–5.8)
RBC # FLD: 13.6 % — SIGNIFICANT CHANGE UP (ref 10.3–14.5)
RH IG SCN BLD-IMP: POSITIVE — SIGNIFICANT CHANGE UP
RH IG SCN BLD-IMP: POSITIVE — SIGNIFICANT CHANGE UP
SARS-COV-2 IGG+IGM SERPL QL IA: 71.7 U/ML — HIGH
SARS-COV-2 IGG+IGM SERPL QL IA: POSITIVE
SODIUM SERPL-SCNC: 132 MMOL/L — LOW (ref 135–145)
SODIUM SERPL-SCNC: 136 MMOL/L — SIGNIFICANT CHANGE UP (ref 135–145)
WBC # BLD: 5.11 K/UL — SIGNIFICANT CHANGE UP (ref 3.8–10.5)
WBC # FLD AUTO: 5.11 K/UL — SIGNIFICANT CHANGE UP (ref 3.8–10.5)

## 2021-09-02 PROCEDURE — 85610 PROTHROMBIN TIME: CPT

## 2021-09-02 PROCEDURE — 86901 BLOOD TYPING SEROLOGIC RH(D): CPT

## 2021-09-02 PROCEDURE — 36415 COLL VENOUS BLD VENIPUNCTURE: CPT

## 2021-09-02 PROCEDURE — 80048 BASIC METABOLIC PNL TOTAL CA: CPT

## 2021-09-02 PROCEDURE — 99261: CPT

## 2021-09-02 PROCEDURE — 70450 CT HEAD/BRAIN W/O DYE: CPT | Mod: MA

## 2021-09-02 PROCEDURE — 70486 CT MAXILLOFACIAL W/O DYE: CPT | Mod: MA

## 2021-09-02 PROCEDURE — 82962 GLUCOSE BLOOD TEST: CPT

## 2021-09-02 PROCEDURE — 99233 SBSQ HOSP IP/OBS HIGH 50: CPT

## 2021-09-02 PROCEDURE — 86769 SARS-COV-2 COVID-19 ANTIBODY: CPT

## 2021-09-02 PROCEDURE — 84484 ASSAY OF TROPONIN QUANT: CPT

## 2021-09-02 PROCEDURE — 80053 COMPREHEN METABOLIC PANEL: CPT

## 2021-09-02 PROCEDURE — 83735 ASSAY OF MAGNESIUM: CPT

## 2021-09-02 PROCEDURE — 99285 EMERGENCY DEPT VISIT HI MDM: CPT

## 2021-09-02 PROCEDURE — 84100 ASSAY OF PHOSPHORUS: CPT

## 2021-09-02 PROCEDURE — 86850 RBC ANTIBODY SCREEN: CPT

## 2021-09-02 PROCEDURE — 85730 THROMBOPLASTIN TIME PARTIAL: CPT

## 2021-09-02 PROCEDURE — 87635 SARS-COV-2 COVID-19 AMP PRB: CPT

## 2021-09-02 PROCEDURE — 93005 ELECTROCARDIOGRAM TRACING: CPT

## 2021-09-02 PROCEDURE — 90715 TDAP VACCINE 7 YRS/> IM: CPT

## 2021-09-02 PROCEDURE — 93010 ELECTROCARDIOGRAM REPORT: CPT

## 2021-09-02 PROCEDURE — 33274 TCAT INSJ/RPL PERM LDLS PM: CPT | Mod: Q0

## 2021-09-02 PROCEDURE — 83690 ASSAY OF LIPASE: CPT

## 2021-09-02 PROCEDURE — 85027 COMPLETE CBC AUTOMATED: CPT

## 2021-09-02 PROCEDURE — 85025 COMPLETE CBC W/AUTO DIFF WBC: CPT

## 2021-09-02 PROCEDURE — 86900 BLOOD TYPING SEROLOGIC ABO: CPT

## 2021-09-02 PROCEDURE — 83880 ASSAY OF NATRIURETIC PEPTIDE: CPT

## 2021-09-02 RX ORDER — DEXTROSE 50 % IN WATER 50 %
25 SYRINGE (ML) INTRAVENOUS ONCE
Refills: 0 | Status: DISCONTINUED | OUTPATIENT
Start: 2021-09-02 | End: 2021-09-03

## 2021-09-02 RX ORDER — DEXTROSE 50 % IN WATER 50 %
12.5 SYRINGE (ML) INTRAVENOUS ONCE
Refills: 0 | Status: DISCONTINUED | OUTPATIENT
Start: 2021-09-02 | End: 2021-09-03

## 2021-09-02 RX ORDER — ATORVASTATIN CALCIUM 80 MG/1
40 TABLET, FILM COATED ORAL AT BEDTIME
Refills: 0 | Status: DISCONTINUED | OUTPATIENT
Start: 2021-09-02 | End: 2021-09-03

## 2021-09-02 RX ORDER — SODIUM CHLORIDE 9 MG/ML
3 INJECTION INTRAMUSCULAR; INTRAVENOUS; SUBCUTANEOUS EVERY 8 HOURS
Refills: 0 | Status: DISCONTINUED | OUTPATIENT
Start: 2021-09-02 | End: 2021-09-03

## 2021-09-02 RX ORDER — POLYETHYLENE GLYCOL 3350 17 G/17G
17 POWDER, FOR SOLUTION ORAL
Refills: 0 | Status: DISCONTINUED | OUTPATIENT
Start: 2021-09-02 | End: 2021-09-03

## 2021-09-02 RX ORDER — SODIUM CHLORIDE 9 MG/ML
1000 INJECTION, SOLUTION INTRAVENOUS
Refills: 0 | Status: DISCONTINUED | OUTPATIENT
Start: 2021-09-02 | End: 2021-09-03

## 2021-09-02 RX ORDER — HYDRALAZINE HCL 50 MG
100 TABLET ORAL THREE TIMES A DAY
Refills: 0 | Status: DISCONTINUED | OUTPATIENT
Start: 2021-09-02 | End: 2021-09-03

## 2021-09-02 RX ORDER — LOSARTAN POTASSIUM 100 MG/1
100 TABLET, FILM COATED ORAL DAILY
Refills: 0 | Status: DISCONTINUED | OUTPATIENT
Start: 2021-09-02 | End: 2021-09-03

## 2021-09-02 RX ORDER — HEPARIN SODIUM 5000 [USP'U]/ML
5000 INJECTION INTRAVENOUS; SUBCUTANEOUS EVERY 12 HOURS
Refills: 0 | Status: DISCONTINUED | OUTPATIENT
Start: 2021-09-02 | End: 2021-09-02

## 2021-09-02 RX ORDER — METOPROLOL TARTRATE 50 MG
1 TABLET ORAL
Qty: 0 | Refills: 0 | DISCHARGE

## 2021-09-02 RX ORDER — DEXTROSE 50 % IN WATER 50 %
15 SYRINGE (ML) INTRAVENOUS ONCE
Refills: 0 | Status: DISCONTINUED | OUTPATIENT
Start: 2021-09-02 | End: 2021-09-03

## 2021-09-02 RX ORDER — ENOXAPARIN SODIUM 100 MG/ML
40 INJECTION SUBCUTANEOUS DAILY
Refills: 0 | Status: DISCONTINUED | OUTPATIENT
Start: 2021-09-02 | End: 2021-09-02

## 2021-09-02 RX ORDER — GLUCAGON INJECTION, SOLUTION 0.5 MG/.1ML
1 INJECTION, SOLUTION SUBCUTANEOUS ONCE
Refills: 0 | Status: DISCONTINUED | OUTPATIENT
Start: 2021-09-02 | End: 2021-09-03

## 2021-09-02 RX ORDER — INSULIN LISPRO 100/ML
VIAL (ML) SUBCUTANEOUS
Refills: 0 | Status: DISCONTINUED | OUTPATIENT
Start: 2021-09-02 | End: 2021-09-03

## 2021-09-02 RX ORDER — CALCIUM ACETATE 667 MG
1334 TABLET ORAL
Refills: 0 | Status: DISCONTINUED | OUTPATIENT
Start: 2021-09-02 | End: 2021-09-03

## 2021-09-02 RX ORDER — HYDRALAZINE HCL 50 MG
10 TABLET ORAL ONCE
Refills: 0 | Status: COMPLETED | OUTPATIENT
Start: 2021-09-02 | End: 2021-09-02

## 2021-09-02 RX ORDER — PROTHROMBIN COMPLEX CONCENTRATE (HUMAN) 25.5; 16.5; 24; 22; 22; 26 [IU]/ML; [IU]/ML; [IU]/ML; [IU]/ML; [IU]/ML; [IU]/ML
2000 POWDER, FOR SOLUTION INTRAVENOUS ONCE
Refills: 0 | Status: DISCONTINUED | OUTPATIENT
Start: 2021-09-02 | End: 2021-09-02

## 2021-09-02 RX ORDER — FINASTERIDE 5 MG/1
5 TABLET, FILM COATED ORAL DAILY
Refills: 0 | Status: DISCONTINUED | OUTPATIENT
Start: 2021-09-02 | End: 2021-09-03

## 2021-09-02 RX ORDER — PANTOPRAZOLE SODIUM 20 MG/1
40 TABLET, DELAYED RELEASE ORAL
Refills: 0 | Status: DISCONTINUED | OUTPATIENT
Start: 2021-09-02 | End: 2021-09-03

## 2021-09-02 RX ORDER — CHLORHEXIDINE GLUCONATE 213 G/1000ML
1 SOLUTION TOPICAL EVERY 12 HOURS
Refills: 0 | Status: DISCONTINUED | OUTPATIENT
Start: 2021-09-02 | End: 2021-09-02

## 2021-09-02 RX ORDER — INSULIN LISPRO 100/ML
VIAL (ML) SUBCUTANEOUS AT BEDTIME
Refills: 0 | Status: DISCONTINUED | OUTPATIENT
Start: 2021-09-02 | End: 2021-09-03

## 2021-09-02 RX ADMIN — PANTOPRAZOLE SODIUM 40 MILLIGRAM(S): 20 TABLET, DELAYED RELEASE ORAL at 05:22

## 2021-09-02 RX ADMIN — SODIUM CHLORIDE 3 MILLILITER(S): 9 INJECTION INTRAMUSCULAR; INTRAVENOUS; SUBCUTANEOUS at 22:13

## 2021-09-02 RX ADMIN — Medication 1334 MILLIGRAM(S): at 08:18

## 2021-09-02 RX ADMIN — Medication 10 MILLIGRAM(S): at 19:38

## 2021-09-02 RX ADMIN — SODIUM CHLORIDE 3 MILLILITER(S): 9 INJECTION INTRAMUSCULAR; INTRAVENOUS; SUBCUTANEOUS at 19:46

## 2021-09-02 RX ADMIN — POLYETHYLENE GLYCOL 3350 17 GRAM(S): 17 POWDER, FOR SOLUTION ORAL at 22:14

## 2021-09-02 RX ADMIN — LOSARTAN POTASSIUM 100 MILLIGRAM(S): 100 TABLET, FILM COATED ORAL at 05:23

## 2021-09-02 RX ADMIN — Medication 1334 MILLIGRAM(S): at 22:15

## 2021-09-02 RX ADMIN — ATORVASTATIN CALCIUM 40 MILLIGRAM(S): 80 TABLET, FILM COATED ORAL at 22:15

## 2021-09-02 RX ADMIN — POLYETHYLENE GLYCOL 3350 17 GRAM(S): 17 POWDER, FOR SOLUTION ORAL at 05:23

## 2021-09-02 RX ADMIN — Medication 100 MILLIGRAM(S): at 22:15

## 2021-09-02 RX ADMIN — Medication 100 MILLIGRAM(S): at 05:22

## 2021-09-02 NOTE — TRANSFER ACCEPTANCE NOTE - PROBLEM SELECTOR PLAN 1
tele  NPO for MICRA PPM  fall risk  hold AV jorge blocking agents  patients wishes confirmed and DNR/DNI reversed as previously noted in chart at [facility] - consent signed

## 2021-09-02 NOTE — TRANSFER ACCEPTANCE NOTE - ATTENDING COMMENTS
86 year old Male with ESRD on HD T/Th/S, DM type 2, HTN, Anemia of renal disease, hyperphosphatemia, Afib on Eliquis who presented to [facility] ED 9/1/21 with episode of syncope, Tele noted bardycardia transferred to [facility] 9/2 for micra PPM.

## 2021-09-02 NOTE — PROGRESS NOTE ADULT - SUBJECTIVE AND OBJECTIVE BOX
[facility] NEPHROLOGY- PROGRESS NOTE    86y Male with history of ESRD on HD presents s/p syncope. Nephrology consulted for ESRD status.    Hospital Medications: Medications reviewed.  REVIEW OF SYSTEMS:  CONSTITUTIONAL: No fevers or chills  RESPIRATORY: No shortness of breath  CARDIOVASCULAR: No chest pain.  GASTROINTESTINAL: No nausea, vomiting, diarrhea or abdominal pain.   VASCULAR: No bilateral lower extremity edema.     VITALS:  T(F): 97.6 (09-02-21 @ 08:32), Max: 98.2 (09-01-21 @ 20:30)  HR: 57 (09-02-21 @ 04:41)  BP: 128/48 (09-02-21 @ 04:41)  RR: 18 (09-02-21 @ 04:41)  SpO2: 100% (09-02-21 @ 04:41)  Wt(kg): --  Height (cm): 167.6 (09-01 @ 10:46), 167.6 (08-21 @ 02:56)  Weight (kg): 45.4 (09-01 @ 10:46), 49.9 (08-21 @ 02:56)  BMI (kg/m2): 16.2 (09-01 @ 10:46), 17.8 (08-21 @ 02:56)  BSA (m2): 1.49 (09-01 @ 10:46), 1.55 (08-21 @ 02:56)    PHYSICAL EXAM:  Gen: NAD, calm  HEENT: erythema on nose  Laceration above Rt eyebrow  Neck: no JVD  Cards: RRR, +S1/S2, no M/G/R  Resp: decreased BS at bases  GI: soft, NT/ND, NABS  : no CVA tenderness  Vascular: no LE edema B/L, LUE AVF + bruit/thrill      LABS:  09-02    136  |  98  |  43<H>  ----------------------------<  85  4.3   |  28  |  6.94<H>    Ca    8.7      02 Sep 2021 08:29  Phos  3.3     09-02  Mg     2.2     09-02    TPro  6.5  /  Alb  2.9<L>  /  TBili  0.4  /  DBili      /  AST  4<L>  /  ALT  11  /  AlkPhos  62  09-01    Creatinine Trend: 6.94 <--, 11.00 <--, 7.21 <--, 5.63 <--, 8.43 <--, 6.10 <--                        9.1    5.11  )-----------( 198      ( 02 Sep 2021 08:29 )             26.8     Urine Studies:      RADIOLOGY & ADDITIONAL STUDIES:

## 2021-09-02 NOTE — CHART NOTE - NSCHARTNOTEFT_GEN_A_CORE
Spoke with Dr. Ahn and he recommended to restart patient's home dose of Eliquis tomorrow 9/3 in the evening if right groin is stable. This was relayed to primary team.

## 2021-09-02 NOTE — PROGRESS NOTE ADULT - ASSESSMENT
86y Male with history of ESRD on HD presents s/p syncope. Nephrology consulted for ESRD status.    1) ESRD: Last HD 9/2 tolerated well with net 1.9L removed. Plan for next HD 9/3. Will eventually switch back to TTS schedule. Monitor electrolytes.    2) HTN with ESRD: BP acceptable with bradycardia for which AV jorge blockers held. Pt now on Losartan and Hydralazine. Pt for transfer to [facility] for PPM placement. Monitor BP.  f/u Cards    3) Anemia of renal disease: Hb low but improving. Continue with Epo 4K with HD. Check iron studies, including ferritin in am. Monitor Hb.    4) Hyperphosphatemia: serum phosphorus acceptable.  Continue with phoslo 2 tabs with meals. Monitor serum calcium and phosphorus.    5) s/p Syncope- recent cardiac w/u  s/p cath 8/27 with no significant obstructive CAD; no stents needed;   Pt with bradycardia- Pt for transfer to [facility] for PPM placement. . Plan as per Cardiology          [facility] NEPHROLOGY  Nahum Grubbs M.D.  Francisco Javier Hicks D.O.  Yovana Quiroz M.D.  Makeda Duron, THOMAS, ANP-C    Telephone: [phone]  Facsimile: [phone]    [address] [place] [address]

## 2021-09-02 NOTE — ACUTE INTERFACILITY TRANSFER NOTE - HOSPITAL COURSE
Patient is an 87 yo Male with ESRD on HD TTS, DM type 2, HTN, Anemia of renal disease, hyperphosphatemia, Afib on A/C CHELSI Escalante came to ED complaining of fall and LOC +. Further workup in CT head was negative for any acute intracranial process and CT Maxillofacial negative for facial fracture.  Patient admitted for syncope 2/2 to symptomatic bradycardia. Further workup including EKG, was bradycardia and no AV blocks. Recently patient underwent recent cardiac cath with the findings of which showed LHC pLcx 60%, no stents placed.     Throughout hospitalization patient noted to be bradycardic with ranges from 35-67 bpm, otherwise hemodynamically stable. Patient on telemetry showed PVCs. Patient remained asymptomatic denies dizziness or syncopal episodes. Patient is to be transferred to [facility] for Permanent Pacemaker placement.

## 2021-09-02 NOTE — TRANSFER ACCEPTANCE NOTE - NSICDXPASTMEDICALHX_GEN_ALL_CORE_FT
PAST MEDICAL HISTORY:  Atrial fibrillation     Bradycardia     Diabetes     ESRD (end stage renal disease)     Hyperlipidemia     Hypertension

## 2021-09-02 NOTE — TRANSFER ACCEPTANCE NOTE - SOURCE OF INFORMATION, PROFILE
- reliable and [facility] medical records/patient/chart(s) - reliable via  and [facility] medical records/patient/chart(s)

## 2021-09-02 NOTE — TRANSFER ACCEPTANCE NOTE - ASSESSMENT
86 year old Male with ESRD on HD T/Th/S, DM type 2, HTN, Anemia of renal disease, hyperphosphatemia, Afib on Eliquis who presented to [facility] ED 9/1/21 with episode of syncope, Tele noted bardycardia transferred to [facility] 9/2 for micra PPM.

## 2021-09-02 NOTE — CHART NOTE - NSCHARTNOTEFT_GEN_A_CORE
Type of Procedure: Micra PPM implant  Licensed independent practitioner: Lurdes Ahn MD  Assistant: None  Description of procedure: Written informed consent was obtained from the patient after a full explanation of the risks and benefits of  the procedure. The risks of bleeding / infection / cardiac perforation / pneumothorax / vascular injury / stroke  and death were among those discussed. Alternatives were reviewed, including the option of no therapy. The  patient was brought to the lab in the fasting state. Continuous electrocardiographic and hemodynamic  monitoring was initiated. The right groin and implantation site were meticulously prepared with surgical  scrub and allowed to dry with no pooling. Sterile draping was applied to cover the operation site. The image  intensifier was draped with a sterile bag and positioned over the patient's chest. Local anesthesia was  infiltrated subcutaneously at the access site. General anesthesia was used during the case.  Cefazolin 3 gm IV was administered prophylactically. All sheaths were flushed with saline.  Local anesthetic was delivered to the right groin, and the right femoral vein was cannulated using vascular  US to verify wire position in the femoral vein. An Amplatz Extra stiff guide wire was advanced to the SVC  under fluoroscopy guidance. Next a figure of eight venous hemostasis loose suture was placed at the right  femoral venous access site. The femoral venous access site was gradually dilated using dilator sheaths upto  27 Fr successively. Finally, over the Amplatz wire the MICRA 27Fr introducer sheath was advance to the  IVC-RA junction under fluoroscopic guidance.  Once the sheath was in place, the dilator was removed and the sheath was flushed again. Next the MICRA  PPM and its delivery system was advanced through the sheath and into the RV apical area under fluoroscopy  guidance. Contrast was injected through the sheath tp verify position in the RV apex. The MICRA PPM was   then deployed in the RV apical area. On STONE and Angolan fluoroscopic examination- it appeared that two of the  four splines was firmly attached to the RV trabecula. Ventricular sensing and pacing thresholds were checked  and were good. The device retrieval cord was cut and pulled out. The MICRA PPM Delivery system was  retrieved out of the MICRA introducer sheath, leaving the MICRA PPM in place at the RV apical septum.  After removal of the MICRA delivery sheath, the figure of eight venous hemostasis suture in place was  tightened and manual pressure was applied until hemostasis achieved.  During the procedure, a MDT rep was present to manage a complex .    Findings of procedure: As above  Estimated blood loss: N/A  Specimen removed: N/A  Preoperative Dx: Tachy rosendo  Postoperative Dx: Tachy rosendo  Complications: None  Anesthesia type: Conscious.

## 2021-09-02 NOTE — CHART NOTE - NSCHARTNOTEFT_GEN_A_CORE
I confirmed with patient at bedside using  # [id number] along with wife and daughter at bedside who speak English; patient confirms his wish for FULL CODE now and in future. He wishes to cancel the DNR/DNI he signed during previous hospitalization

## 2021-09-02 NOTE — PHARMACOTHERAPY INTERVENTION NOTE - COMMENTS
Enoxaparin 40 mg subcutaneous daily changed to Heparin 5000 units subcutaneous-CREAT=11 AND CRCL=3.1 Enoxaparin 40 mg subcutaneous daily changed to Heparin 5000 units subcutaneous every 12 hours-CREAT=11 AND CRCL=3.1    WEIGHT=45.4 KG

## 2021-09-02 NOTE — ACUTE INTERFACILITY TRANSFER NOTE - PLAN OF CARE
- Most likely secondary to symptomatic bradycardia  -Of note patient presents tachycardia episodes during HD however bradycardic as well, concern for tachy bardy- may need PPM placement and transfer to [facility]   - Hold Eliquis patient to be transferred today to [facility]

## 2021-09-02 NOTE — TRANSFER ACCEPTANCE NOTE - HISTORY OF PRESENT ILLNESS
86 year old Male with ESRD on HD T/Th/S, DM type 2, HTN, Anemia of renal disease, hyperphosphatemia, Afib on Eliquis, recent     admission 8/21-8/30/21 for SBO (tolerating solids and no surgical intervention needed) and NSTEMI with non-obstructive LCx 60     (medical therapy) and discharged 8/30/21.  Patient subsequently presented to [facility] ED 9/1/21 with episode of fall while reading     his newspaper, with loss of consciousness and head trauma with right above the eye laceration with stable CT head with no     bleed. Denies any aura of symptoms like dizziness, chest pain, sob, headache, palpitations. Tele noted bardycardia with HR from 35-67 bpm, otherwise hemodynamically stable, recommended for PPM. AV jorge agents, DIgoxin and Toprol XL held.  Underwent HD session 9/1 at [facility].   In light of patient symptoms the patient is now transferred to [facility] 9/2 for a Micra PPM implant.   **OF NOTE: per transfer chart patient JOVI discussed w/ patient with wife at bedside to confirm his DNR DNI. Patient     referred he wants to be FULL CODE, wife at bedside witnessed changed. JOVI voided 86 year old Croatian speaking Male with ESRD on HD T/Th/S, DM type 2, HTN, Anemia of renal disease, hyperphosphatemia, Afib on Eliquis, recent   admission 8/21-8/30/21 for SBO (tolerating solids and no surgical intervention needed) and NSTEMI with non-obstructive LCx 60     (medical therapy) and discharged 8/30/21.  Patient subsequently presented to [facility] ED 9/1/21 with episode of fall while reading     his newspaper, with loss of consciousness and head trauma with right above the eye laceration with stable CT head with no     bleed. Denies any aura of symptoms like dizziness, chest pain, sob, headache, palpitations. Tele noted bardycardia with HR from 35-67 bpm, otherwise hemodynamically stable, recommended for PPM. AV jorge agents, DIgoxin and Toprol XL held.  Underwent HD session 9/1 at [facility].   In light of patient symptoms the patient is now transferred to [facility] 9/2 for a Micra PPM implant.   **OF NOTE: per transfer chart patient JOVI discussed w/ patient with wife at bedside to confirm his DNR DNI. Patient     referred he wants to be FULL CODE, wife at bedside witnessed changed. JOVI voided

## 2021-09-02 NOTE — PHARMACOTHERAPY INTERVENTION NOTE - PROVIDER CONTACTED
PRN Robaxin/Trazodone

Patient complained of 6/10 generalized body aches, pt also requesting for medication to help 
him sleep. PRN Robaixn and Trazodone administered as ordered. Will continue to monitor 
patient. Cintia Cortez

## 2021-09-03 ENCOUNTER — TRANSCRIPTION ENCOUNTER (OUTPATIENT)
Age: 86
End: 2021-09-03

## 2021-09-03 VITALS
TEMPERATURE: 97 F | HEART RATE: 67 BPM | DIASTOLIC BLOOD PRESSURE: 57 MMHG | SYSTOLIC BLOOD PRESSURE: 150 MMHG | RESPIRATION RATE: 17 BRPM | WEIGHT: 93.7 LBS

## 2021-09-03 LAB
ALBUMIN SERPL ELPH-MCNC: 3 G/DL — LOW (ref 3.3–5)
ALP SERPL-CCNC: 61 U/L — SIGNIFICANT CHANGE UP (ref 40–120)
ALT FLD-CCNC: 13 U/L — SIGNIFICANT CHANGE UP (ref 4–41)
ANION GAP SERPL CALC-SCNC: 14 MMOL/L — SIGNIFICANT CHANGE UP (ref 7–14)
AST SERPL-CCNC: 10 U/L — SIGNIFICANT CHANGE UP (ref 4–40)
BILIRUB SERPL-MCNC: 0.2 MG/DL — SIGNIFICANT CHANGE UP (ref 0.2–1.2)
BUN SERPL-MCNC: 53 MG/DL — HIGH (ref 7–23)
CALCIUM SERPL-MCNC: 8.1 MG/DL — LOW (ref 8.4–10.5)
CHLORIDE SERPL-SCNC: 95 MMOL/L — LOW (ref 98–107)
CO2 SERPL-SCNC: 24 MMOL/L — SIGNIFICANT CHANGE UP (ref 22–31)
COVID-19 SPIKE DOMAIN AB INTERP: POSITIVE
COVID-19 SPIKE DOMAIN ANTIBODY RESULT: 67.2 U/ML — HIGH
CREAT SERPL-MCNC: 8.65 MG/DL — HIGH (ref 0.5–1.3)
GLUCOSE BLDC GLUCOMTR-MCNC: 121 MG/DL — HIGH (ref 70–99)
GLUCOSE SERPL-MCNC: 151 MG/DL — HIGH (ref 70–99)
HCT VFR BLD CALC: 23.3 % — LOW (ref 39–50)
HGB BLD-MCNC: 7.9 G/DL — LOW (ref 13–17)
MCHC RBC-ENTMCNC: 32 PG — SIGNIFICANT CHANGE UP (ref 27–34)
MCHC RBC-ENTMCNC: 33.9 GM/DL — SIGNIFICANT CHANGE UP (ref 32–36)
MCV RBC AUTO: 94.3 FL — SIGNIFICANT CHANGE UP (ref 80–100)
NRBC # BLD: 0 /100 WBCS — SIGNIFICANT CHANGE UP
NRBC # FLD: 0 K/UL — SIGNIFICANT CHANGE UP
PLATELET # BLD AUTO: 174 K/UL — SIGNIFICANT CHANGE UP (ref 150–400)
POTASSIUM SERPL-MCNC: 4.9 MMOL/L — SIGNIFICANT CHANGE UP (ref 3.5–5.3)
POTASSIUM SERPL-SCNC: 4.9 MMOL/L — SIGNIFICANT CHANGE UP (ref 3.5–5.3)
PROT SERPL-MCNC: 4.8 G/DL — LOW (ref 6–8.3)
RBC # BLD: 2.47 M/UL — LOW (ref 4.2–5.8)
RBC # FLD: 13.3 % — SIGNIFICANT CHANGE UP (ref 10.3–14.5)
SARS-COV-2 IGG+IGM SERPL QL IA: 67.2 U/ML — HIGH
SARS-COV-2 IGG+IGM SERPL QL IA: POSITIVE
SODIUM SERPL-SCNC: 133 MMOL/L — LOW (ref 135–145)
WBC # BLD: 6.04 K/UL — SIGNIFICANT CHANGE UP (ref 3.8–10.5)
WBC # FLD AUTO: 6.04 K/UL — SIGNIFICANT CHANGE UP (ref 3.8–10.5)

## 2021-09-03 PROCEDURE — 99232 SBSQ HOSP IP/OBS MODERATE 35: CPT

## 2021-09-03 PROCEDURE — 71045 X-RAY EXAM CHEST 1 VIEW: CPT | Mod: 26

## 2021-09-03 RX ORDER — CHLORHEXIDINE GLUCONATE 213 G/1000ML
1 SOLUTION TOPICAL DAILY
Refills: 0 | Status: DISCONTINUED | OUTPATIENT
Start: 2021-09-03 | End: 2021-09-03

## 2021-09-03 RX ORDER — APIXABAN 2.5 MG/1
1 TABLET, FILM COATED ORAL
Qty: 60 | Refills: 0
Start: 2021-09-03 | End: 2021-10-02

## 2021-09-03 RX ORDER — APIXABAN 2.5 MG/1
1 TABLET, FILM COATED ORAL
Qty: 0 | Refills: 0 | DISCHARGE

## 2021-09-03 RX ORDER — ERYTHROPOIETIN 10000 [IU]/ML
4000 INJECTION, SOLUTION INTRAVENOUS; SUBCUTANEOUS
Refills: 0 | Status: DISCONTINUED | OUTPATIENT
Start: 2021-09-03 | End: 2021-09-03

## 2021-09-03 RX ADMIN — Medication 1334 MILLIGRAM(S): at 12:58

## 2021-09-03 RX ADMIN — SODIUM CHLORIDE 3 MILLILITER(S): 9 INJECTION INTRAMUSCULAR; INTRAVENOUS; SUBCUTANEOUS at 13:37

## 2021-09-03 RX ADMIN — FINASTERIDE 5 MILLIGRAM(S): 5 TABLET, FILM COATED ORAL at 12:58

## 2021-09-03 RX ADMIN — PANTOPRAZOLE SODIUM 40 MILLIGRAM(S): 20 TABLET, DELAYED RELEASE ORAL at 05:53

## 2021-09-03 RX ADMIN — Medication 100 MILLIGRAM(S): at 05:53

## 2021-09-03 RX ADMIN — SODIUM CHLORIDE 3 MILLILITER(S): 9 INJECTION INTRAMUSCULAR; INTRAVENOUS; SUBCUTANEOUS at 06:38

## 2021-09-03 RX ADMIN — Medication 1334 MILLIGRAM(S): at 09:30

## 2021-09-03 RX ADMIN — LOSARTAN POTASSIUM 100 MILLIGRAM(S): 100 TABLET, FILM COATED ORAL at 05:54

## 2021-09-03 RX ADMIN — ERYTHROPOIETIN 4000 UNIT(S): 10000 INJECTION, SOLUTION INTRAVENOUS; SUBCUTANEOUS at 17:52

## 2021-09-03 RX ADMIN — POLYETHYLENE GLYCOL 3350 17 GRAM(S): 17 POWDER, FOR SOLUTION ORAL at 05:54

## 2021-09-03 NOTE — DISCHARGE NOTE NURSING/CASE MANAGEMENT/SOCIAL WORK - NSDCVIVACCINE_GEN_ALL_CORE_FT
Tdap; 01-Sep-2021 14:42; Tanner Monaco (RN); [employer]; [id number] (Exp. Date: 18-Nov-2022); IntraMuscular; Deltoid Left.; 0.5 milliLiter(s); VIS (VIS Published: 09-May-2013, VIS Presented: 01-Sep-2021);

## 2021-09-03 NOTE — PROGRESS NOTE ADULT - ATTENDING COMMENTS
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  s/p Micra PPM on 9/2.  Will fu as outpt.

## 2021-09-03 NOTE — PROGRESS NOTE ADULT - SUBJECTIVE AND OBJECTIVE BOX
ANESTHESIA POSTOP CHECK    86y Male POSTOP DAY 1 S/P micra insertion    Vital Signs Last 24 Hrs  T(C): 36.7 (03 Sep 2021 05:54), Max: 36.7 (03 Sep 2021 05:54)  T(F): 98.1 (03 Sep 2021 05:54), Max: 98.1 (03 Sep 2021 05:54)  HR: 57 (03 Sep 2021 05:54) (56 - 57)  BP: 157/60 (03 Sep 2021 05:54) (155/55 - 157/60)  BP(mean): --  RR: 18 (03 Sep 2021 05:54) (18 - 18)  SpO2: 100% (03 Sep 2021 05:54) (100% - 100%)  I&O's Summary      [X ] NO APPARENT ANESTHESIA COMPLICATIONS  
PATIENT SEEN AND EXAMINED ON :-09/02/2021  DATE OF SERVICE:      09/02/2021       Interim events noted,Labs ,Radiological studies and Cardiology tests reviewed .      History of Present Illness:   87 yo Male from home with past medical hx of ESRD on HD TTS, DM type 2, HTN, Anemia of renal disease, hyperphosphatemia, Afib on A/C Eliquis, SBO Presented with episode of fall while reading his newspaper, LOC+ and head trauma +. Patient denies any premonitory symptoms like dizziness, chest pain, sob, headache, palpitations. Of note, patient was recently admitted for SBO and transferred to [facility] for NSTEMI needing a cardiac cath which showed LHC pLcx 60%, no stents.      INTERIM EVENTS:Patient noted to be bradycardic with ranges from 35-67 bpm, otherwise hemodynamically stable. Patient on telemetry showed PVCs. Patient remained asymptomatic denies dizziness or syncopal episodes. Patient is to be transferred to [facility] for Permanent Pacemaker placement. Discussed in detail with wife and daughter    REVIEW OF SYSTEMS:  Gen: no changes in weight  HEENT: no rhinorrhea  Neck: no sore throat  Cards: no chest pain  Resp: no dyspnea  GI: no nausea or vomiting or diarrhea or ab pain  : no dysuria or hematuria  Vascular: no LE edema  Derm: above rt eye injury  Neuro: no numbness/tingling    VITALS:  T(F): 97.6 (09-02-21 @ 08:32), Max: 98.2 (09-01-21 @ 20:30)  HR: 57 (09-02-21 @ 04:41)  BP: 128/48 (09-02-21 @ 04:41)  RR: 18 (09-02-21 @ 04:41)  SpO2: 100% (09-02-21 @ 04:41)    Height (cm): 167.6 (09-01 @ 10:46), 167.6 (08-21 @ 02:56)  Weight (kg): 45.4 (09-01 @ 10:46), 49.9 (08-21 @ 02:56)  BMI (kg/m2): 16.2 (09-01 @ 10:46), 17.8 (08-21 @ 02:56)          GENERAL: NAD, very thin, sat well on RA  HEAD: Normocephalic, hematoma on upper side of right eyebrow, ecchymoses on nose   EYES:  conjunctiva and sclera clear  NECK: Supple, No JVD, Normal thyroid  CHEST/LUNG: Clear to auscultation. Clear to percussion bilaterally; No rales, rhonchi, wheezing, or rubs  HEART: Regular rate and rhythm; No murmurs, rubs, or gallops  ABDOMEN: Soft, Nontender, Nondistended; Bowel sounds present, no pain or masses on palpation   NERVOUS SYSTEM:  Alert & Oriented X3      LABS:  09-02    136  |  98  |  43<H>  ----------------------------<  85  4.3   |  28  |  6.94<H>    Ca    8.7      02 Sep 2021 08:29  Phos  3.3     09-02  Mg     2.2     09-02    TPro  6.5  /  Alb  2.9<L>  /  TBili  0.4  /  DBili      /  AST  4<L>  /  ALT  11  /  AlkPhos  62  09-01  Creatinine Trend: 6.94 <--, 11.00 <--, 7.21 <--, 5.63 <--, 8.43 <--, 6.10 <--                        9.1    5.11  )-----------( 198      ( 02 Sep 2021 08:29 )             26.8   EXTREMITIES:  2+ Peripheral Pulses, No clubbing, cyanosis, or edema, Left arm AV fistula         
DATE OF SERVICE:  09/03/2021  Patient was seen and examined on  09/03/2021   .Interim events noted.Consultant notes ,Labs,Telemetry reviewed by me    PRESENTING CC:Syncope    HPI and HOSPITAL COURSE: HPI:  86 year old Slovak speaking Male with ESRD on HD T/Th/S, DM type 2, HTN, Anemia of renal disease, hyperphosphatemia, Afib on Eliquis, recent   admission 8/21-8/30/21 for SBO (tolerating solids and no surgical intervention needed) and NSTEMI with non-obstructive LCx 60 %    (medical therapy) and discharged 8/30/21.  Patient subsequently presented to [facility] ED 9/1/21 with episode of fall while reading his newspaper, with loss of consciousness and head trauma with right above the eye laceration with stable CT head with no bleed. Denies any aura of symptoms like dizziness, chest pain, sob, headache, palpitations. Tele noted bardycardia with HR from 35-67 bpm, otherwise hemodynamically stable, recommended for PPM. AV jorge agents, DIgoxin and Toprol XL held.  Underwent HD session 9/1 at [facility].   In light of patient symptoms the patient is now transferred to [facility] 9/2 for a Micra PPM implant.     INTERIM EVENTS:Awake alert had Micra PPM placed remains in Atrial Fibrillation ~~60-70's      PMH -reviewed admission note, no change since admission  Heart Failure: Acute [x ] Chronic [ ] Acute on Chronic [x ] Diastolic [ ] Systolic [ ] Combined Systolic and Diastolic[ ]  RIGOBERTO[ ]  ATN[ ]  CKD I [ ] CKDII [ ] CKD III [ ] CKD IV [ ] CKD V [ ] ESRD[x ]  HTN[ ] CVA[ ] DM[ ] COPD[ ] COVID[ ] AF[x ]  PPM[ ] ICD[ ]    MEDICATIONS  (STANDING):  atorvastatin 40 milliGRAM(s) Oral at bedtime  calcium acetate 1334 milliGRAM(s) Oral three times a day with meals  chlorhexidine 2% Cloths 1 Application(s) Topical daily  epoetin zach-epbx (RETACRIT) Injectable 4000 Unit(s) IV Push <User Schedule>  finasteride 5 milliGRAM(s) Oral daily  glucagon  Injectable 1 milliGRAM(s) IntraMuscular once  hydrALAZINE 100 milliGRAM(s) Oral three times a day  insulin lispro (ADMELOG) corrective regimen sliding scale   SubCutaneous three times a day before meals  insulin lispro (ADMELOG) corrective regimen sliding scale   SubCutaneous at bedtime  losartan 100 milliGRAM(s) Oral daily  pantoprazole    Tablet 40 milliGRAM(s) Oral before breakfast  polyethylene glycol 3350 17 Gram(s) Oral two times a day  sodium chloride 0.9% lock flush 3 milliLiter(s) IV Push every 8 hours            REVIEW OF SYSTEMS:  Constitutional: [ ] fever, [ ]weight loss,  [x ]fatigue  Eyes: [ ] visual changes  Respiratory: [ ]shortness of breath;  [ ] cough, [ ]wheezing, [ ]chills, [ ]hemoptysis  Cardiovascular: [ ] chest pain, [ ]palpitations, [ ]dizziness,  [ ]leg swelling[ ]orthopnea[ ]PND  Gastrointestinal: [ ] abdominal pain, [ ]nausea, [ ]vomiting,  [ ]diarrhea [ ]Constipation [ ]Melena  Genitourinary: [ ] dysuria, [ ] hematuria [ ]Rose  Neurologic: [ ] headaches [ ] tremors[ ]weakness [ ]Paralysis Right[ ] Left[ ]  Skin: [ ] itching, [ ]burning, [ ] rashes  Endocrine: [ ] heat or cold intolerance  Musculoskeletal: [ ] joint pain or swelling; [ ] muscle, back, or extremity pain  Psychiatric: [ ] depression, [ ]anxiety, [ ]mood swings, or [ ]difficulty sleeping  Hematologic: [ ] easy bruising, [ ] bleeding gums    [x] All remaining systems negative except as per above.   [ ]Unable to obtain.    Vital Signs Last 24 Hrs  T(C): 36.3 (03 Sep 2021 19:30), Max: 36.7 (03 Sep 2021 16:20)  T(F): 97.4 (03 Sep 2021 19:30), Max: 98.1 (03 Sep 2021 16:20)  HR: 67 (03 Sep 2021 19:30) (57 - 67)  BP: 150/57 (03 Sep 2021 19:30) (150/57 - 181/69)-  RR: 17 (03 Sep 2021 19:30) (17 - 18)  SpO2: 100% (03 Sep 2021 11:06) (100% - 100%)  I&O's Summary    03 Sep 2021 07:01  -  04 Sep 2021 07:00  --------------------------------------------------------  IN: 400 mL / OUT: 2900 mL / NET: -2500 mL        PHYSICAL EXAM:  General: No acute distress BMI-29  HEENT: EOMI, PERRL  Neck: Supple, [ ] JVD  Lungs: Equal air entry bilaterally; [ ] rales [ ] wheezing [ ] rhonchi  Heart: Irregular rate and rhythm; [x ] murmur   2/6 [x ] systolic [ ] diastolic [ ] radiation[ ] rubs [ ]  gallops  Abdomen: Nontender, bowel sounds present  Extremities: No clubbing, cyanosis, [ ] edema [ ]Pulses  equal and intact  Nervous system:  Alert & Oriented X3, no focal deficits  Psychiatric: Normal affect  Skin: No rashes or lesions    LABS:  09-03    133<L>  |  95<L>  |  53<H>  ----------------------------<  151<H>  4.9   |  24  |  8.65<H>    Ca    8.1<L>      03 Sep 2021 17:23    TPro  4.8<L>  /  Alb  3.0<L>  /  TBili  0.2  /  DBili  x   /  AST  10  /  ALT  13  /  AlkPhos  61  09-03    Creatinine Trend: 8.65<--, 7.04<--, 6.94<--, 11.00<--, 7.21<--, 5.63<--                        7.9    6.04  )-----------( 174      ( 03 Sep 2021 17:23 )             23.3         TELEMETRY:Reviewed monitor tracings-Atrial Fibrillation    IMPRESSION AND PLAN:  85 yo Male with PMH of ESRD on HD TTS, DM type 2, HTN, Anemia of renal disease, hyperphosphatemia, PAfib on A/C Eliquis, SBO and recently diagnosed CAD (moderate stenosis) and traumatic syncope who was transferred from [facility] to [facility] for PPM 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  s/p Micra PPM on 9/2.          
Patient is a 86y old  Male who presents with a chief complaint of syncope with bradycardia (02 Sep 2021 13:12)  Patient denies CP, SOB or palpitations.  Denies R groin site pain.  s/p Micra AV PPM.    PAST MEDICAL & SURGICAL HISTORY:  No pertinent past medical history    ESRD (end stage renal disease)    Diabetes    Hypertension    Hyperlipidemia    Atrial fibrillation    Bradycardia    Syncope    No significant past surgical history    S/P hemodialysis catheter insertion  7/17    History of appendectomy        MEDICATIONS  (STANDING):  atorvastatin 40 milliGRAM(s) Oral at bedtime  calcium acetate 1334 milliGRAM(s) Oral three times a day with meals  dextrose 40% Gel 15 Gram(s) Oral once  dextrose 5%. 1000 milliLiter(s) (50 mL/Hr) IV Continuous <Continuous>  dextrose 5%. 1000 milliLiter(s) (100 mL/Hr) IV Continuous <Continuous>  dextrose 50% Injectable 25 Gram(s) IV Push once  dextrose 50% Injectable 12.5 Gram(s) IV Push once  dextrose 50% Injectable 25 Gram(s) IV Push once  finasteride 5 milliGRAM(s) Oral daily  glucagon  Injectable 1 milliGRAM(s) IntraMuscular once  hydrALAZINE 100 milliGRAM(s) Oral three times a day  insulin lispro (ADMELOG) corrective regimen sliding scale   SubCutaneous three times a day before meals  insulin lispro (ADMELOG) corrective regimen sliding scale   SubCutaneous at bedtime  losartan 100 milliGRAM(s) Oral daily  pantoprazole    Tablet 40 milliGRAM(s) Oral before breakfast  polyethylene glycol 3350 17 Gram(s) Oral two times a day  sodium chloride 0.9% lock flush 3 milliLiter(s) IV Push every 8 hours    MEDICATIONS  (PRN):            Vital Signs Last 24 Hrs  T(C): 36.7 (03 Sep 2021 05:54), Max: 36.7 (03 Sep 2021 05:54)  T(F): 98.1 (03 Sep 2021 05:54), Max: 98.1 (03 Sep 2021 05:54)  HR: 57 (03 Sep 2021 05:54) (56 - 57)  BP: 157/60 (03 Sep 2021 05:54) (155/55 - 157/60)  BP(mean): --  RR: 18 (03 Sep 2021 05:54) (18 - 18)  SpO2: 100% (03 Sep 2021 05:54) (100% - 100%)            INTERPRETATION OF TELEMETRY:  SR with occasional PVC/APC and V pacing    ECG:        LABS:                        9.1    5.11  )-----------( 198      ( 02 Sep 2021 08:29 )             26.8     09-02    132<L>  |  91<L>  |  47<H>  ----------------------------<  143<H>  4.9   |  26  |  7.04<H>    Ca    8.9      02 Sep 2021 13:16  Phos  3.3     09-02  Mg     2.2     09-02    TPro  6.5  /  Alb  2.9<L>  /  TBili  0.4  /  DBili  x   /  AST  4<L>  /  ALT  11  /  AlkPhos  62  09-01    CARDIAC MARKERS ( 01 Sep 2021 11:21 )  0.083 ng/mL / x     / x     / x     / x          PT/INR - ( 02 Sep 2021 08:29 )   PT: 14.2 sec;   INR: 1.20 ratio         PTT - ( 02 Sep 2021 08:29 )  PTT:31.8 sec      BNP  RADIOLOGY & ADDITIONAL STUDIES:      PHYSICAL EXAM:    GENERAL: In no apparent distress, well nourished, and hydrated.  NECK: Supple and normal thyroid.  No JVD or carotid bruit.  Carotid pulse is 2+ bilaterally.  HEART: Regular rate and rhythm; 3/6 systolic murmurs, no rubs, or gallops.  L AVF  PULMONARY: Clear to auscultation and perfusion.  No rales, wheezing, or rhonchi bilaterally.  ABDOMEN: Soft, Nontender, Nondistended; Bowel sounds present  EXTREMITIES:  2+ Peripheral Pulses, No clubbing, cyanosis, or edema; R groin suture removed, dressing saturated with blood, no hematoma seen.  NEUROLOGICAL: Grossly nonfocal

## 2021-09-03 NOTE — DISCHARGE NOTE NURSING/CASE MANAGEMENT/SOCIAL WORK - NSDCCRNAME_GEN_ALL_CORE_FT
Resume regular schedule with outpatient dialysis center,: [facility] on Saturday, September 4th. [phone]

## 2021-09-03 NOTE — PROVIDER CONTACT NOTE (OTHER) - RECOMMENDATIONS
ACP will come assess cath site and will cont to monitor
ACP and EP will come and assess site and change the dressing.
Will cont to monitor

## 2021-09-03 NOTE — PROVIDER CONTACT NOTE (OTHER) - SITUATION
Pt cath site dressing on right groin is draining red blood.
Pt cath site dressing on right groin is draining red blood.
Pt refusing morning labs

## 2021-09-03 NOTE — DIETITIAN INITIAL EVALUATION ADULT. - OTHER INFO
86 year old male with ESRD on HD T/Th/Saturday, DM-II, HTN, anemia of renal disease, hyperphosphatemia, Afib on eliquis that presented at [facility] [place] with episode of syncope, tele noted with bradycardia and transferred to [facility] (9/2) now s/p PPM. Patient reports having improved appetite now. Reports no GI distress or chewing/swallowing difficulties. Has no food allergies. Food preferences obtained, patient would like extra protein portions. Reports UBW is 130 lbs. x 3 months. Per previous RD notes, noted with weights 99.6 lbs. (8/23) and 113.5 lbs. (6/24). CBW is 108 lbs. (9/2) showing a -22 lbs. (-17%) wt. loss x 3 months, which patient reports is due to decreased appetite/PO. Patient was amenable to consume Nepro 1x daily (425 kcal, 19 g pro). Blood glucose levels noted to be adequate and within normal limits A1c per chart. No edema or pressure injuries noted per RN flow sheet.    Spoke with patients wife to follow up on diet education. Patients wife reports having a dialysis dietitian that provided her education in the past and has also receiving nutrition education from dietitian at [facility] [place]. Patients wife deferred further nutrition education/handouts, but RD told wife RD remains available should anything comes up needed for clarification. 86 year old male with ESRD on HD T/Th/Saturday, DM-II, HTN, anemia of renal disease, hyperphosphatemia, Afib on eliquis that presented at [facility] with episode of syncope, tele noted with bradycardia and transferred to [facility] (9/2) now s/p PPM. Patient reports having improved appetite now. Reports no GI distress or chewing/swallowing difficulties. Has no food allergies. Food preferences obtained, patient would like extra protein portions. Reports UBW is 130 lbs. x 3 months. Per previous RD notes, noted with weights 99.6 lbs. (8/24) and 113.5 lbs. (6/24). CBW is 108 lbs. (9/2) showing a -22 lbs. (-17%) wt. loss x 3 months, which patient reports is due to decreased appetite/PO. Patient was amenable to consume Nepro 1x daily (425 kcal, 19 g pro). Blood glucose levels noted to be adequate and within normal limits A1c per chart. No edema or pressure injuries noted per RN flow sheet.    Spoke with patients wife to follow up on diet education. Patients wife reports having a dialysis dietitian that provided her education in the past and has also receiving nutrition education from dietitian at [facility]. Patients wife deferred further nutrition education/handouts, but RD told wife RD remains available should anything comes up needed for clarification.

## 2021-09-03 NOTE — DISCHARGE NOTE PROVIDER - HOSPITAL COURSE
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  s/p Micra PPM on 9/2. Right groin dressing saturated with blood on 9/3. EP NP removed groin suture, no active bleeding, likely oozed overnight, instructed to remove pressure dressing tomorrow. Ok to resume AC Eliquis 2.5 mg BID tonight per Dr. Ahn. Per Dr. Diehl, hold digoxin and AVNB as HR stable. Patient to go for HD today as his last dialysis was on Wednesday night. Discussed with Nephrology, pt to continue his routine HD schedule T/Th/S, SW made aware and reinstated dialysis for tomorrow 9/4.  Follow up with EP on 9/22 at 1:40pm.  Case discussed with EP and Dr. Diehl on 9/3/21 and patient is medically stable for discharge home. Per pt, no need for refills of home medications. 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  s/p Micra PPM on 9/2. Right groin dressing saturated with blood on 9/3. EP NP removed groin suture, no active bleeding, likely oozed overnight, instructed to remove pressure dressing tomorrow. Groin site checked again prior to discharge, no further bleeding. Ok to resume AC Eliquis 2.5 mg BID tonight per Dr. Ahn. Per Dr. Diehl, hold digoxin and AVNB as HR stable. Patient to go for HD today as his last dialysis was on Wednesday night. Discussed with Nephrology, pt to continue his routine HD schedule T/Th/S, SW made aware and reinstated dialysis for tomorrow 9/4.  Follow up with EP on 9/22 at 1:40pm.  Case discussed with EP and Dr. Diehl on 9/3/21 and patient is medically stable for discharge home. Per pt, no need for refills of home medications.

## 2021-09-03 NOTE — PROVIDER CONTACT NOTE (OTHER) - REASON
Cath site dressing draining red blood
Pt refusing morning labs
Cath site dressing draining red blood

## 2021-09-03 NOTE — DISCHARGE NOTE PROVIDER - PROVIDER TOKENS
PROVIDER:[TOKEN:[[id number]],SCHEDULEDAPPT:[09/22/2021],SCHEDULEDAPPTTIME:[01:40 PM]],PROVIDER:[TOKEN:[[id number]]],PROVIDER:[TOKEN:[[id number]]]

## 2021-09-03 NOTE — CONSULT NOTE ADULT - ASSESSMENT
86y Male with history of ESRD on HD transferred from [facility] for [facility]. Nephrology consulted for ESRD status.    1) ESRD: Last HD on 9/1 at [facility]. Plan for next maintenance HD today prior to discharge. Patient instructed to follow up at [facility] on 9/4 for continuation of outpatient HD. Monitor electrolytes.    2) HTN with ESRD: BP uncontrolled. Increase UF with HD today. Monitor BP.    3) Anemia of renal disease: Hb low. Resume Epo 4K with HD. Monitor Hb.    4) Hyperphosphatemia: Phosphorus acceptable. Continue with phoslo 2 tabs with meals and renal diet. Monitor serum calcium and phosphorus.        [facility] NEPHROLOGY  Nahum Grubbs M.D.  Francisco Javier Hicks D.O.  Yovana Quiroz M.D.  Makeda Duron, MSN, ANP-C    Telephone: [phone]  Facsimile: [phone]    [id number] [place] [address]

## 2021-09-03 NOTE — PROGRESS NOTE ADULT - ASSESSMENT
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  s/p Micra PPM on 9/2.    -Post implant teaching with instructions provided, instructed to remove pressure dressing tomorrow  -Resume AC Eliquis 2.5 mg BID tonight per Dr. Ahn  -Continue home meds  -Awaiting for Micra PPM interrogation today by [employer] Rep  -HD today as his last dialysis was on Wednesday night   Follow up on 9/22 at 1:40pm given  -Possible discharge home later today if stable
Problem/Plan - 1:  ·  Problem: Syncope.   ·  Plan: - P/w multiple falls ,  feeling dizzy and off balance  - Most likely from symptomatic bradycardia, unlikely ischemia as he had recent cardiac cath which showed LHC pLcx 60%, no stents.  - EKG: bradycardia, no AV blocks   - CT head chronic ischemic changes neg for acute intracranial process   -s/p Micra PPM  Stable to discharge home        Problem/Plan - 2:  ·  Problem: ESRD (end stage renal disease).   ·  Plan: - ESRD Maintenance HD (T/TH/S ) via L AV fistula, missed  Cr on admission 11        Problem/Plan - 3:  ·  Problem: Atrial fibrillation.   ·  Plan: - Patient has hx of atrial fibrillation on Eliquis, Toprol and Digoxin at home   AF rate controlled off meds will hold digoxin and toprol at present  -Resume Eliquis      Problem/Plan - 4:  ·  Problem: Diabetes mellitus. ·  Plan: - Patient takes Januvia at home      Problem/Plan - 5:  ·  Problem: Hypertension.   ·  Plan: - Patient has history of Hypertension hydralazine and Losartan at home       Problem/Plan - 6:  ·  Problem: HLD (hyperlipidemia).   ·  Plan: - Patient has hx of hyperlipidemia on Statin at home   - C/w Statin.
87 yo Male with ESRD on HD TTS, DM type 2, HTN, Anemia of renal disease, hyperphosphatemia, Afib on A/C Eliquis, JASONO came to ED complaining of fall and LOC +. Patient admitted for syncope     Problem/Plan - 1:  ·  Problem: Syncope.   ·  Plan: - P/w multiple falls ,  feeling dizzy and off balance  - Most likely from symptomatic bradycardia, unlikely ischemia as he had recent cardiac cath which showed LHC pLcx 60%, no stents.  - EKG: bradycardia, no AV blocks   - CT head chronic ischemic changes neg for acute intracranial process   - Admitted to Tele to monitor for any arrhythmias  - Of note patient presents tachycardia episodes during HD however bradycardic as well, concern for tachy bardy-   -For transfer to [facility] for PPM likely Micra      Problem/Plan - 2:  ·  Problem: ESRD (end stage renal disease).   ·  Plan: - ESRD Maintenance HD (T/TH/S ) via L AV fistula, missed  Cr on admission 11  - No crackles, pedal edema or fluid overload  - Avoid Nephrotoxic Meds/ Agents such as (NSAIDs, IV contrast, Aminoglycosides such as gentamicin, Gadolinium contrast, Phosphate containing enemas, etc..)  - Renal diet  - F/u SW (pt will need reinstatement of dialysis upon discharge)  - Nephrology Dr navarro consulted.    Problem/Plan - 3:  ·  Problem: Atrial fibrillation.   ·  Plan: - Patient has hx of atrial fibrillation on Eliquis, Toprol and Digoxin at home   - EKG: bradycardia, no AV blocks   - Hold digoxin and Toprol due to bradycardia  - Hold Eliquis for possible PPM placement.    Problem/Plan - 4:  ·  Problem: Diabetes mellitus. ·  Plan: - Patient takes Januvia at home  - Holding home po medications  - C/w with HSS  - Fingerstick before meals and at bedtime  - DASH diet with consistent carb  - Target -180.    Problem/Plan - 5:  ·  Problem: Hypertension.   ·  Plan: - Patient has history of Hypertension hydralazine and Losartan at home   - C/w home meds with parameters  - DASH/Renal/Carb consistent diet  - Monitor BP and adjust meds as needed.    Problem/Plan - 6:  ·  Problem: HLD (hyperlipidemia).   ·  Plan: - Patient has hx of hyperlipidemia on Statin at home   - C/w Statin.    Problem/Plan - 7:  ·  Problem: Prophylactic measure. ·  Plan: IMPROVE VTE Individual Risk Assessment  RISK                                                                Points  [  ] Previous VTE                                                  3  [  ] Thrombophilia                                               2  [  ] Lower limb paralysis                                      2        (unable to hold up >15 seconds)  [  ] Current Cancer                                              2         (within 6 months)  [  ] Immobilization > 24 hrs                                1  [  ] ICU/CCU stay > 24 hours                              1  [  ] Age > 60                                                      1  IMPROVE VTE Score ____2_____    PPI for GI prophylaxis  SCD for DVT prophylaxis.

## 2021-09-03 NOTE — DIETITIAN INITIAL EVALUATION ADULT. - PERTINENT MEDS FT
MEDICATIONS  (STANDING):  atorvastatin 40 milliGRAM(s) Oral at bedtime  calcium acetate 1334 milliGRAM(s) Oral three times a day with meals  dextrose 40% Gel 15 Gram(s) Oral once  dextrose 5%. 1000 milliLiter(s) (50 mL/Hr) IV Continuous <Continuous>  dextrose 5%. 1000 milliLiter(s) (100 mL/Hr) IV Continuous <Continuous>  dextrose 50% Injectable 25 Gram(s) IV Push once  dextrose 50% Injectable 12.5 Gram(s) IV Push once  dextrose 50% Injectable 25 Gram(s) IV Push once  finasteride 5 milliGRAM(s) Oral daily  glucagon  Injectable 1 milliGRAM(s) IntraMuscular once  hydrALAZINE 100 milliGRAM(s) Oral three times a day  insulin lispro (ADMELOG) corrective regimen sliding scale   SubCutaneous three times a day before meals  insulin lispro (ADMELOG) corrective regimen sliding scale   SubCutaneous at bedtime  losartan 100 milliGRAM(s) Oral daily  pantoprazole    Tablet 40 milliGRAM(s) Oral before breakfast  polyethylene glycol 3350 17 Gram(s) Oral two times a day  sodium chloride 0.9% lock flush 3 milliLiter(s) IV Push every 8 hours

## 2021-09-03 NOTE — DIETITIAN INITIAL EVALUATION ADULT. - ORAL INTAKE PTA/DIET HISTORY
Patient reports decreased appetite/PO intake PTA 2/2 frequent hospitalizations in setting of chronic illness. Patient reports wife helps out with diet at home.

## 2021-09-03 NOTE — DISCHARGE NOTE PROVIDER - NSDCFUADDINST_GEN_ALL_CORE_FT
1000mL fluid restriction daily    No driving x 24 hours post procedure. You may shower, but no baths or swimming x 1 week. No heavy lifting x 1 week. No strenuous activity x 3 weeks.

## 2021-09-03 NOTE — DIETITIAN INITIAL EVALUATION ADULT. - PERTINENT LABORATORY DATA
09-02 Na 132 mmol/L<L> Glu 143 mg/dL<H> K+ 4.9 mmol/L Cr 7.04 mg/dL<H> BUN 47 mg/dL<H> Phos n/a    09-02 @ 22:19 POCT 144 mg/dL A1C with Estimated Average Glucose Result: 6.1 % (08-22-21 @ 11:15)  A1C with Estimated Average Glucose Result: 7.2 % (06-23-21 @ 09:40)  CAPILLARY BLOOD GLUCOSE  97 (03 Sep 2021 09:00)      POCT Blood Glucose.: 144 mg/dL (02 Sep 2021 22:19)

## 2021-09-03 NOTE — DISCHARGE NOTE PROVIDER - CARE PROVIDERS DIRECT ADDRESSES
,[email or web address],[email or web address],DirectAddress_Unknown

## 2021-09-03 NOTE — PROGRESS NOTE ADULT - TIME BILLING
- Review of records, telemetry, vital signs and daily labs.   - General and cardiovascular physical examination.  - Generation of cardiovascular treatment plan.  - Coordination of care.      Patient was seen and examined by me on 09/03/2021,interim events noted,labs and radiology studies reviewed.  Foster Diehl MD,FACC.  [address] [place], [place] [id number].  [phone]
- Review of records, telemetry, vital signs and daily labs.   - General and cardiovascular physical examination.  - Generation of cardiovascular treatment plan.  - Coordination of care.      Patient was seen and examined by me on 09/02/2021,interim events noted,labs and radiology studies reviewed.  Foster Diehl MD,FACC.  [address] [place], [place] [id number].  [phone]

## 2021-09-03 NOTE — PROVIDER CONTACT NOTE (OTHER) - ASSESSMENT
Pt resting in bed, cath site is draining red blood onto bandage, drainage is more than originally noted. Circled drainage amount.
Pt resting in bed, refusing blood and pulling hands away when we try to draw blood
Pt resting in bed, cath site is draining red blood onto bandage.

## 2021-09-03 NOTE — CHART NOTE - NSCHARTNOTEFT_GEN_A_CORE
R groin wound site reassessed-no active bleeding or hematoma.  OK to resume Eliquis 2.5 mg tonight.  Patient is for d/c home tonight after HD session.

## 2021-09-03 NOTE — DISCHARGE NOTE PROVIDER - NSDCMRMEDTOKEN_GEN_ALL_CORE_FT
atorvastatin 40 mg oral tablet: 1 tab(s) orally once a day (at bedtime)  calcium acetate 667 mg oral tablet: 1334 milligram(s) orally 3 times a day (with meals)   Eliquis 2.5 mg oral tablet: 1 tab(s) orally 2 times a day  finasteride 5 mg oral tablet: 1 tab(s) orally once a day  hydrALAZINE 100 mg oral tablet: 1 tab(s) orally 3 times a day  Januvia 25 mg oral tablet: 1 tab(s) orally once a day   losartan 100 mg oral tablet: 1 tab(s) orally once a day  pantoprazole 40 mg oral delayed release tablet: 1 tab(s) orally once a day (before a meal)  polyethylene glycol 3350 oral powder for reconstitution: 17 gram(s) orally 2 times a day

## 2021-09-03 NOTE — DISCHARGE NOTE PROVIDER - DETAILS OF MALNUTRITION DIAGNOSIS/DIAGNOSES
This patient has been assessed with a concern for Malnutrition and was treated during this hospitalization for the following Nutrition diagnosis/diagnoses:     -  09/03/2021: Severe protein-calorie malnutrition   -  09/03/2021: Underweight (BMI < 19)

## 2021-09-03 NOTE — DIETITIAN INITIAL EVALUATION ADULT. - DIET TYPE
consistent carbohydrate (no snacks)/1000ml/renal replacement pts:no protein restr,no conc K & phos, low sodium

## 2021-09-03 NOTE — DISCHARGE NOTE PROVIDER - NSDCCPCAREPLAN_GEN_ALL_CORE_FT
PRINCIPAL DISCHARGE DIAGNOSIS  Diagnosis: S/P cardiac pacemaker procedure  Assessment and Plan of Treatment: You were transferred to [facility] for pacemaker placement for slow heart rate. A Micra Pacemaker was placed on 9/2/21. Per Cardiologist Dr. Diehl, hold digoxin. Ok to continue Eliquis 2.5mg twice a day. Follow up with EP Dr. Anh on 9/22/21 at 1:40pm. You may remove pressure dressing on right groin tomorrow (9/4/21)      SECONDARY DISCHARGE DIAGNOSES  Diagnosis: ESRD (end stage renal disease)  Assessment and Plan of Treatment: Continue your scheduled hemodialysis on Tuesday, Thursday, Saturday (to start again outpatient tomorrow 9/4/21). Follow up with your Nephrologist on discharge.     PRINCIPAL DISCHARGE DIAGNOSIS  Diagnosis: S/P cardiac pacemaker procedure  Assessment and Plan of Treatment: You were transferred to [facility] for pacemaker placement for slow heart rate. A Micra Pacemaker was placed on 9/2/21. Per Cardiologist Dr. Diehl, hold digoxin as your heart rate is stable. Ok to continue Eliquis 2.5mg twice a day. Follow up with EP Dr. Ahn on 9/22/21 at 1:40pm. You may remove pressure dressing on right groin tomorrow (9/4/21).      SECONDARY DISCHARGE DIAGNOSES  Diagnosis: ESRD (end stage renal disease)  Assessment and Plan of Treatment: Continue your scheduled hemodialysis on Tuesday, Thursday, Saturday (to start again outpatient tomorrow 9/4/21). Follow up with your Nephrologist on discharge.

## 2021-09-03 NOTE — DISCHARGE NOTE PROVIDER - CARE PROVIDER_API CALL
Lurdes Ahn)  Cardiac Electrophysiology; Cardiology; Internal Medicine  [address] [place] [address]  Phone: [phone]  Fax: [phone]  Scheduled Appointment: 09/22/2021 01:40 PM    Foster Diehl)  Cardiology  [address] [place] [address]  Phone: [phone]  Fax: [phone]  Follow Up Time:     Francisco Javier Hicks (DO)  Internal Medicine  [address] [place] [address]  Phone: [phone]  Fax: [phone]  Follow Up Time:

## 2021-09-03 NOTE — DIETITIAN NUTRITION RISK NOTIFICATION - TREATMENT: THE FOLLOWING DIET HAS BEEN RECOMMENDED
Diet, Regular:   Consistent Carbohydrate {No Snacks} (CSTCHO)  DASH/TLC {Sodium & Cholesterol Restricted} (DASH)  1000mL Fluid Restriction ([id number])  For patients receiving Renal Replacement - No Protein Restr, No Conc K, No Conc Phos, Low Sodium (RENAL)  No Concentrated Potassium  Low Sodium  No Caffeine  No Carbonated Beverages  No Chocolate (09-02-21 @ 19:34) [Active]

## 2021-09-03 NOTE — CONSULT NOTE ADULT - SUBJECTIVE AND OBJECTIVE BOX
[facility] NEPHROLOGY- CONSULTATION NOTE    86y Male with history of below transferred from [facility] for PPM. Nephrology consulted for ESRD status.    Patient known to our practice for h/o ESRD on HD TTS at [facility] where he follows with Dr. Quiroz as an outpatient. Last HD on 9/1 at [facility]. Patient planned for discharge later today.    REVIEW OF SYSTEMS:  Gen: no changes in weight  HEENT: no rhinorrhea  Neck: no sore throat  Cards: no chest pain  Resp: no dyspnea  GI: no nausea or vomiting or diarrhea or ab pain  : no dysuria or hematuria  Vascular: no LE edema  Derm: no rashes  Neuro: no numbness/tingling    No Known Allergies      Home Medications Reviewed  Hospital Medications: Reviewed      PAST MEDICAL & SURGICAL HISTORY:  ESRD (end stage renal disease)    Diabetes    Hypertension    Hyperlipidemia    Atrial fibrillation    S/P hemodialysis catheter insertion  7/17    History of appendectomy        FAMILY HISTORY:  Family history of diabetes mellitus in sister (Sibling)        SOCIAL HISTORY:  Denies toxic substance use       VITALS:  T(F): 97.6 (09-03-21 @ 11:06), Max: 98.1 (09-03-21 @ 05:54)  HR: 57 (09-03-21 @ 11:06)  BP: 168/45 (09-03-21 @ 11:06)  RR: 18 (09-03-21 @ 11:06)  SpO2: 100% (09-03-21 @ 11:06)  Wt(kg): --    Height (cm): 167.6 (09-02 @ 16:17)  Weight (kg): 48.988 (09-02 @ 16:17)  BMI (kg/m2): 17.4 (09-02 @ 16:17)  BSA (m2): 1.54 (09-02 @ 16:17)      PHYSICAL EXAM:  Gen: NAD, calm  HEENT: MMM  Neck: no JVD  Cards: RRR, +S1/S2, no M/G/R  Resp: CTA B/L  GI: soft, NT/ND, NABS  : no CVA tenderness  Vascular: no LE edema B/L, LUE AVF + bruit/thrill  Derm: no rashes  Neuro: non-focal      LABS:  09-02    132<L>  |  91<L>  |  47<H>  ----------------------------<  143<H>  4.9   |  26  |  7.04<H>    Ca    8.9      02 Sep 2021 13:16  Phos  3.3     09-02  Mg     2.2     09-02      Creatinine Trend: 7.04 <--, 6.94 <--, 11.00 <--, 7.21 <--, 5.63 <--, 8.43 <--                        9.1    5.11  )-----------( 198      ( 02 Sep 2021 08:29 )             26.8     Urine Studies:

## 2021-09-03 NOTE — DISCHARGE NOTE NURSING/CASE MANAGEMENT/SOCIAL WORK - PATIENT PORTAL LINK FT
You can access the FollowMyHealth Patient Portal offered by [facility] by registering at the following website: [email or web address]. By joining [facility]’s FollowMyHealth portal, you will also be able to view your health information using other applications (apps) compatible with our system.

## 2021-09-03 NOTE — PROVIDER CONTACT NOTE (OTHER) - ACTION/TREATMENT ORDERED:
Will cont to monitor
ACP and EP will come and assess site and change the dressing.
ACP will come assess cath site and will cont to monitor

## 2021-09-04 LAB
MRSA PCR RESULT.: SIGNIFICANT CHANGE UP
S AUREUS DNA NOSE QL NAA+PROBE: DETECTED

## 2021-09-09 LAB — GLUCOSE BLDC GLUCOMTR-MCNC: 89 MG/DL — SIGNIFICANT CHANGE UP (ref 70–99)

## 2021-09-21 NOTE — PROVIDER CONTACT NOTE (CRITICAL VALUE NOTIFICATION) - TEST AND RESULT REPORTED:
Beka is a 32 year old  here for early pregnancy visit.  She found out she was pregnant abotu 1-2 weeks ago.  Then she started having reddish/brown discharge and then no further spotting.      Ultrasound today:  Irregular gestational sac.  Yolk sac seems to be collapsing.  Fetal pole seen with FCA at 67bpm.    P.E.  Visit Vitals  BP 98/62 (BP Location: RUE - Right upper extremity, Patient Position: Sitting, Cuff Size: Regular)   Temp 98.3 °F (36.8 °C) (Temporal)   Ht 5' 3\" (1.6 m)   Wt 51.7 kg   LMP 2021   BMI 20.19 kg/m²     Exam deferred    MDM:  1. Less than 8 weeks gestation of pregnancy  2. Threatened   Discussed that there are some concerns that this is not a normal pregnancy.  We should repeat another ultrasound in 1 week.  She may miscarry prior to that.  Reviewed precautions.  We will discuss further at her next visit.  - US OB LESS THAN 14 WEEKS FIRST TRIMESTER SINGLE GESTATION  - US OB TRANSVAGINAL; Future  - BETA HCG QUANTITATIVE PREGNANCY  - ABO/RH GROUP AND TYPE (D)  - CBC WITH DIFFERENTIAL     Trop 0.054

## 2021-09-22 ENCOUNTER — APPOINTMENT (OUTPATIENT)
Dept: ELECTROPHYSIOLOGY | Facility: CLINIC | Age: 86
End: 2021-09-22
Payer: MEDICARE

## 2021-09-22 VITALS — HEART RATE: 56 BPM | DIASTOLIC BLOOD PRESSURE: 46 MMHG | RESPIRATION RATE: 14 BRPM | SYSTOLIC BLOOD PRESSURE: 97 MMHG

## 2021-09-22 DIAGNOSIS — I25.10 ATHEROSCLEROTIC HEART DISEASE OF NATIVE CORONARY ARTERY W/OUT ANGINA PECTORIS: ICD-10-CM

## 2021-09-22 DIAGNOSIS — I10 ESSENTIAL (PRIMARY) HYPERTENSION: ICD-10-CM

## 2021-09-22 DIAGNOSIS — E11.9 TYPE 2 DIABETES MELLITUS W/OUT COMPLICATIONS: ICD-10-CM

## 2021-09-22 DIAGNOSIS — N18.6 END STAGE RENAL DISEASE: ICD-10-CM

## 2021-09-22 DIAGNOSIS — I48.0 PAROXYSMAL ATRIAL FIBRILLATION: ICD-10-CM

## 2021-09-22 DIAGNOSIS — D64.9 ANEMIA, UNSPECIFIED: ICD-10-CM

## 2021-09-22 PROBLEM — R00.1 BRADYCARDIA, UNSPECIFIED: Chronic | Status: ACTIVE | Noted: 2021-09-02

## 2021-09-22 PROBLEM — R55 SYNCOPE AND COLLAPSE: Chronic | Status: ACTIVE | Noted: 2021-09-02

## 2021-09-22 PROCEDURE — 99024 POSTOP FOLLOW-UP VISIT: CPT

## 2021-09-22 RX ORDER — APIXABAN 2.5 MG/1
2.5 TABLET, FILM COATED ORAL TWICE DAILY
Refills: 0 | Status: ACTIVE | COMMUNITY

## 2021-09-22 RX ORDER — CALCIUM ACETATE 667 MG
667 TABLET ORAL 3 TIMES DAILY
Refills: 0 | Status: ACTIVE | COMMUNITY

## 2021-09-22 RX ORDER — ATORVASTATIN CALCIUM 40 MG/1
40 TABLET, FILM COATED ORAL
Refills: 0 | Status: DISCONTINUED | COMMUNITY
End: 2021-09-22

## 2021-09-30 PROBLEM — N18.6 ESRD (END STAGE RENAL DISEASE): Status: ACTIVE | Noted: 2021-09-22

## 2021-09-30 PROBLEM — I25.10 CAD (CORONARY ARTERY DISEASE): Status: ACTIVE | Noted: 2021-09-22

## 2021-10-24 NOTE — ED PROVIDER NOTE - CCCP TRG CHIEF CMPLNT
Alert and oriented to person, place and time. Normal mood and affect, no apparent risk to self or others
hypoglycemia

## 2021-11-22 ENCOUNTER — APPOINTMENT (OUTPATIENT)
Dept: ELECTROPHYSIOLOGY | Facility: CLINIC | Age: 86
End: 2021-11-22
Payer: MEDICARE

## 2021-11-22 PROCEDURE — 93279 PRGRMG DEV EVAL PM/LDLS PM: CPT

## 2021-11-22 RX ORDER — POLYETHYLENE GLYCOL 3350 17 G/17G
17 POWDER, FOR SOLUTION ORAL TWICE DAILY
Refills: 0 | Status: DISCONTINUED | COMMUNITY
End: 2021-11-22

## 2021-11-22 RX ORDER — FINASTERIDE 5 MG/1
5 TABLET, FILM COATED ORAL
Refills: 0 | Status: DISCONTINUED | COMMUNITY
End: 2021-11-22

## 2021-11-22 RX ORDER — PANTOPRAZOLE 40 MG/1
40 TABLET, DELAYED RELEASE ORAL
Refills: 0 | Status: DISCONTINUED | COMMUNITY
End: 2021-11-22

## 2021-12-20 ENCOUNTER — APPOINTMENT (OUTPATIENT)
Dept: OPHTHALMOLOGY | Facility: CLINIC | Age: 86
End: 2021-12-20
Payer: MEDICARE

## 2021-12-20 ENCOUNTER — NON-APPOINTMENT (OUTPATIENT)
Age: 86
End: 2021-12-20

## 2021-12-20 PROCEDURE — 92202 OPSCPY EXTND ON/MAC DRAW: CPT

## 2021-12-20 PROCEDURE — 92134 CPTRZ OPH DX IMG PST SGM RTA: CPT

## 2021-12-20 PROCEDURE — 92014 COMPRE OPH EXAM EST PT 1/>: CPT

## 2021-12-20 PROCEDURE — 92020 GONIOSCOPY: CPT

## 2021-12-28 NOTE — ED ADULT NURSE NOTE - CHIEF COMPLAINT QUOTE
as per ems report pt was confused and hypoglycemic when ems arrived in the house, blood glucose level was 56 , pt  was given dextrose, pt takes tablets for DM, on dialysis ( TTS ) Home

## 2022-01-01 ENCOUNTER — NON-APPOINTMENT (OUTPATIENT)
Age: 87
End: 2022-01-01

## 2022-01-01 ENCOUNTER — APPOINTMENT (OUTPATIENT)
Dept: ELECTROPHYSIOLOGY | Facility: CLINIC | Age: 87
End: 2022-01-01

## 2022-01-01 ENCOUNTER — APPOINTMENT (OUTPATIENT)
Dept: ELECTROPHYSIOLOGY | Facility: CLINIC | Age: 87
End: 2022-01-01
Payer: MEDICARE

## 2022-01-01 ENCOUNTER — APPOINTMENT (OUTPATIENT)
Dept: OPHTHALMOLOGY | Facility: CLINIC | Age: 87
End: 2022-01-01

## 2022-01-01 ENCOUNTER — FORM ENCOUNTER (OUTPATIENT)
Age: 87
End: 2022-01-01

## 2022-01-01 VITALS — DIASTOLIC BLOOD PRESSURE: 70 MMHG | SYSTOLIC BLOOD PRESSURE: 141 MMHG | HEART RATE: 90 BPM | RESPIRATION RATE: 14 BRPM

## 2022-01-01 DIAGNOSIS — I49.5 SICK SINUS SYNDROME: ICD-10-CM

## 2022-01-01 PROCEDURE — 92012 INTRM OPH EXAM EST PATIENT: CPT

## 2022-01-01 PROCEDURE — 93279 PRGRMG DEV EVAL PM/LDLS PM: CPT

## 2022-01-01 PROCEDURE — 93296 REM INTERROG EVL PM/IDS: CPT

## 2022-01-01 PROCEDURE — 93294 REM INTERROG EVL PM/LDLS PM: CPT

## 2022-01-01 PROCEDURE — 92134 CPTRZ OPH DX IMG PST SGM RTA: CPT

## 2022-01-01 RX ORDER — NIFEDIPINE 60 MG/1
60 TABLET, EXTENDED RELEASE ORAL DAILY
Refills: 0 | Status: DISCONTINUED | COMMUNITY
End: 2022-01-01

## 2022-01-01 RX ORDER — LOSARTAN POTASSIUM 100 MG/1
100 TABLET, FILM COATED ORAL DAILY
Refills: 0 | Status: DISCONTINUED | COMMUNITY
End: 2022-01-01

## 2022-01-01 RX ORDER — ATORVASTATIN CALCIUM 40 MG/1
40 TABLET, FILM COATED ORAL DAILY
Refills: 0 | Status: ACTIVE | COMMUNITY

## 2022-01-01 RX ORDER — HYDRALAZINE HYDROCHLORIDE 100 MG/1
100 TABLET ORAL DAILY
Refills: 0 | Status: ACTIVE | COMMUNITY

## 2022-01-01 RX ORDER — METOPROLOL SUCCINATE 100 MG/1
100 TABLET, EXTENDED RELEASE ORAL DAILY
Refills: 3 | Status: ACTIVE | COMMUNITY

## 2022-01-01 RX ORDER — AMLODIPINE BESYLATE 10 MG/1
10 TABLET ORAL DAILY
Refills: 0 | Status: DISCONTINUED | COMMUNITY
End: 2022-01-01

## 2022-01-01 RX ORDER — SITAGLIPTIN 25 MG/1
25 TABLET, FILM COATED ORAL DAILY
Refills: 0 | Status: DISCONTINUED | COMMUNITY
End: 2022-01-01

## 2022-02-28 ENCOUNTER — NON-APPOINTMENT (OUTPATIENT)
Age: 87
End: 2022-02-28

## 2022-02-28 ENCOUNTER — APPOINTMENT (OUTPATIENT)
Dept: ELECTROPHYSIOLOGY | Facility: CLINIC | Age: 87
End: 2022-02-28
Payer: MEDICARE

## 2022-02-28 PROCEDURE — 93296 REM INTERROG EVL PM/IDS: CPT

## 2022-02-28 PROCEDURE — 93294 REM INTERROG EVL PM/LDLS PM: CPT

## 2022-05-31 ENCOUNTER — APPOINTMENT (OUTPATIENT)
Dept: ELECTROPHYSIOLOGY | Facility: CLINIC | Age: 87
End: 2022-05-31
Payer: MEDICARE

## 2022-05-31 ENCOUNTER — NON-APPOINTMENT (OUTPATIENT)
Age: 87
End: 2022-05-31

## 2022-05-31 PROCEDURE — 93296 REM INTERROG EVL PM/IDS: CPT

## 2022-05-31 PROCEDURE — 93294 REM INTERROG EVL PM/LDLS PM: CPT

## 2022-06-20 ENCOUNTER — NON-APPOINTMENT (OUTPATIENT)
Age: 87
End: 2022-06-20

## 2022-06-20 ENCOUNTER — APPOINTMENT (OUTPATIENT)
Dept: OPHTHALMOLOGY | Facility: CLINIC | Age: 87
End: 2022-06-20
Payer: MEDICARE

## 2022-06-20 PROCEDURE — 92134 CPTRZ OPH DX IMG PST SGM RTA: CPT

## 2022-06-20 PROCEDURE — 92014 COMPRE OPH EXAM EST PT 1/>: CPT

## 2022-06-20 PROCEDURE — 92132 CPTRZD OPH DX IMG ANT SGM: CPT

## 2022-07-27 ENCOUNTER — APPOINTMENT (OUTPATIENT)
Dept: GERIATRICS | Facility: CLINIC | Age: 87
End: 2022-07-27

## 2022-08-22 PROBLEM — I49.5 TACHYCARDIA-BRADYCARDIA SYNDROME: Status: ACTIVE | Noted: 2021-09-22

## 2022-09-07 NOTE — TRANSFER ACCEPTANCE NOTE - CONSTITUTIONAL
previous_biopsy_has_been_previously_biopsied Body Location Override (Optional): Left calf detailed exam

## 2022-09-15 NOTE — PROGRESS NOTE ADULT - SUBJECTIVE AND OBJECTIVE BOX
[facility] NEPHROLOGY- PROGRESS NOTE    86y Male with history of ESRD on HD presents with abdominal pain. Nephrology consulted for ESRD status.  8/23- NGT removed  and started on CLD    REVIEW OF SYSTEMS:  Gen: no changes in weight  Cards: no chest pain  Resp: no dyspnea  GI: no nausea or vomiting or diarrhea,  ab pain resolved  Vascular: no LE edema    No Known Allergies      Hospital Medications: Medications reviewed    VITALS:  T(F): 98.4 (08-23-21 @ 11:08), Max: 98.4 (08-23-21 @ 11:08)  HR: 60 (08-23-21 @ 11:08)  BP: 173/60 (08-23-21 @ 11:08)  RR: 18 (08-23-21 @ 11:08)  SpO2: 98% (08-23-21 @ 11:08)  Wt(kg): --    08-22 @ 07:01  -  08-23 @ 07:00  --------------------------------------------------------  IN: 217 mL / OUT: 300 mL / NET: -83 mL    08-23 @ 07:01  -  08-23 @ 15:55  --------------------------------------------------------  IN: 236 mL / OUT: 0 mL / NET: 236 mL        PHYSICAL EXAM:    Gen: NAD, calm  Cards: RRR, +S1/S2, no M/G/R  Resp: CTA B/L  GI: soft, NT/ND, NABS +BS  Vascular: no LE edema B/L, LUE AVF + bruit/thrill with area of hypopigmentation      LABS:  08-23    135  |  98  |  68<H>  ----------------------------<  128<H>  5.5<H>   |  24  |  8.63<H>    Ca    8.7      23 Aug 2021 06:47  Phos  6.3     08-23  Mg     2.5     08-23      Creatinine Trend: 8.63 <--, 6.56 <--, 5.90 <--, 7.99 <--                        9.9    8.59  )-----------( 142      ( 23 Aug 2021 06:47 )             30.1     Urine Studies:       no

## 2022-10-14 NOTE — PROGRESS NOTE ADULT - SUBJECTIVE AND OBJECTIVE BOX
Patient is a 86y old  Male who presents with a chief complaint of ABDOMINAL PAIN, Small bowel obstruction (25 Aug 2021 17:42)    pt seen in icu [  ], reg med floor [   ], bed [  ], chair at bedside [   ], a+o x3 [  ], lethargic [  ],  nad [  ]    rea [  ], ngt [  ], peg [  ], et tube [  ], cent line [  ], picc line [  ]        Allergies    No Known Allergies        Vitals    T(F): 97.5 (08-26-21 @ 04:40), Max: 98.1 (08-25-21 @ 11:04)  HR: 86 (08-26-21 @ 04:40) (57 - 99)  BP: 153/73 (08-26-21 @ 04:40) (142/70 - 158/66)  RR: 18 (08-26-21 @ 04:40) (18 - 18)  SpO2: 99% (08-26-21 @ 04:40) (98% - 99%)  Wt(kg): --  CAPILLARY BLOOD GLUCOSE      POCT Blood Glucose.: 116 mg/dL (25 Aug 2021 21:00)      Labs                          9.9    5.14  )-----------( 150      ( 26 Aug 2021 07:14 )             29.7       08-25    136  |  97  |  50<H>  ----------------------------<  125<H>  5.0   |  29  |  7.07<H>    Ca    8.4      25 Aug 2021 05:06  Phos  4.1     08-25  Mg     2.3     08-25    TPro  6.2  /  Alb  2.9<L>  /  TBili  0.3  /  DBili  x   /  AST  11  /  ALT  8<L>  /  AlkPhos  65  08-25      CARDIAC MARKERS ( 25 Aug 2021 17:09 )  1.190 ng/mL / x     / x     / x     / x      CARDIAC MARKERS ( 25 Aug 2021 05:06 )  1.910 ng/mL / x     / 83 U/L / x     / 4.3 ng/mL  CARDIAC MARKERS ( 24 Aug 2021 21:41 )  0.864 ng/mL / x     / x     / x     / x      CARDIAC MARKERS ( 24 Aug 2021 15:05 )  0.111 ng/mL / x     / x     / x     / x            .Nose Nose  08-21 @ 16:30   No Methicillin Resistant Staphylococcus aureus  No Methicillin Sensitive Staphylococcus aureus  "This can represent normal nasal carriage. PCR is more  --  --      .Body Fluid Pleural Fluid  06-28 @ 22:11   No growth at 5 days  --    polymorphonuclear leukocytes seen  No organisms seen  by cytocentrifuge      .Blood Blood  06-22 @ 18:22   No Growth Final  --  --          Radiology Results      Meds    MEDICATIONS  (STANDING):  calcium acetate 1334 milliGRAM(s) Oral three times a day with meals  chlorhexidine 2% Cloths 1 Application(s) Topical daily  digoxin     Tablet 125 MICROGram(s) Oral every other day  epoetin zach-epbx (RETACRIT) Injectable 4000 Unit(s) IV Push <User Schedule>  heparin  Infusion.  Unit(s)/Hr (9 mL/Hr) IV Continuous <Continuous>  hydrALAZINE 100 milliGRAM(s) Oral every 8 hours  insulin lispro (ADMELOG) corrective regimen sliding scale   SubCutaneous three times a day before meals  losartan 25 milliGRAM(s) Oral daily  metoprolol tartrate 25 milliGRAM(s) Oral every 8 hours  pantoprazole    Tablet 40 milliGRAM(s) Oral before breakfast  sodium chloride 0.9% lock flush 3 milliLiter(s) IV Push every 8 hours      MEDICATIONS  (PRN):      Physical Exam    Neuro :  no focal deficits  Respiratory: CTA B/L  CV: RRR, S1S2, no murmurs,   Abdominal: Soft, NT, ND +BS,  Extremities: No edema, + peripheral pulses    ASSESSMENT    Intestinal obstruction    Yes    No pertinent past medical history    ESRD (end stage renal disease)    Diabetes    Hypertension    Hyperlipidemia    Atrial fibrillation    No significant past surgical history    S/P hemodialysis catheter insertion    History of appendectomy        PLAN     Patient is a 86y old  Male who presents with a chief complaint of ABDOMINAL PAIN, Small bowel obstruction (25 Aug 2021 17:42)      pt seen in tele [ x ], reg med floor [   ], bed [x  ], chair at bedside [   ], awake and responsive [ x ], lethargic [  ],  nad [x  ]      Allergies    No Known Allergies        Vitals    T(F): 97.5 (08-26-21 @ 04:40), Max: 98.1 (08-25-21 @ 11:04)  HR: 86 (08-26-21 @ 04:40) (57 - 99)  BP: 153/73 (08-26-21 @ 04:40) (142/70 - 158/66)  RR: 18 (08-26-21 @ 04:40) (18 - 18)  SpO2: 99% (08-26-21 @ 04:40) (98% - 99%)  Wt(kg): --  CAPILLARY BLOOD GLUCOSE      POCT Blood Glucose.: 116 mg/dL (25 Aug 2021 21:00)      Labs                          9.9    5.14  )-----------( 150      ( 26 Aug 2021 07:14 )             29.7       08-25    136  |  97  |  50<H>  ----------------------------<  125<H>  5.0   |  29  |  7.07<H>    Ca    8.4      25 Aug 2021 05:06  Phos  4.1     08-25  Mg     2.3     08-25    TPro  6.2  /  Alb  2.9<L>  /  TBili  0.3  /  DBili  x   /  AST  11  /  ALT  8<L>  /  AlkPhos  65  08-25      CARDIAC MARKERS ( 25 Aug 2021 17:09 )  1.190 ng/mL / x     / x     / x     / x      CARDIAC MARKERS ( 25 Aug 2021 05:06 )  1.910 ng/mL / x     / 83 U/L / x     / 4.3 ng/mL  CARDIAC MARKERS ( 24 Aug 2021 21:41 )  0.864 ng/mL / x     / x     / x     / x      CARDIAC MARKERS ( 24 Aug 2021 15:05 )  0.111 ng/mL / x     / x     / x     / x            .Nose Nose  08-21 @ 16:30   No Methicillin Resistant Staphylococcus aureus  No Methicillin Sensitive Staphylococcus aureus  "This can represent normal nasal carriage. PCR is more  --  --      .Body Fluid Pleural Fluid  06-28 @ 22:11   No growth at 5 days  --    polymorphonuclear leukocytes seen  No organisms seen  by cytocentrifuge      .Blood Blood  06-22 @ 18:22   No Growth Final  --  --          Radiology Results      Meds    MEDICATIONS  (STANDING):  calcium acetate 1334 milliGRAM(s) Oral three times a day with meals  chlorhexidine 2% Cloths 1 Application(s) Topical daily  digoxin     Tablet 125 MICROGram(s) Oral every other day  epoetin azch-epbx (RETACRIT) Injectable 4000 Unit(s) IV Push <User Schedule>  heparin  Infusion.  Unit(s)/Hr (9 mL/Hr) IV Continuous <Continuous>  hydrALAZINE 100 milliGRAM(s) Oral every 8 hours  insulin lispro (ADMELOG) corrective regimen sliding scale   SubCutaneous three times a day before meals  losartan 25 milliGRAM(s) Oral daily  metoprolol tartrate 25 milliGRAM(s) Oral every 8 hours  pantoprazole    Tablet 40 milliGRAM(s) Oral before breakfast  sodium chloride 0.9% lock flush 3 milliLiter(s) IV Push every 8 hours      MEDICATIONS  (PRN):      Physical Exam    Neuro :  no focal deficits  Respiratory: CTA B/L  CV: RRR, S1S2, no murmurs,   Abdominal: Soft, NT, ND +BS,   Extremities: No edema, + peripheral pulses      ASSESSMENT    Intestinal obstruction  sbo,  b/l pleural eff,   h/o ESRD on HD TTS,   DM type 2,   HTN,   Anemia of renal disease,   hyperphosphatemia,   Afib on A/C Eliquis  Hyperlipidemia        PLAN    d/c tele  abd xray with mild ileus noted above   NGT d/c'd  pt tolerating clears   adv to renal soft diet  OOB ambulate  cont eliquis,   cardio f/u   cont lopressor and hydralazine  cont losartan 25 mg daily  trop x4 noted  above   pt started onheparin drip for paf,   dig .125mg qod added.  pt agree for cardiac cath   pt for transfer to [facility] for card cath  renal f/u  pt on hd tu,th,sa   hd as per renal  pt to have Increase UF with HD given pleural effusions on CT chest   restart phoslo 2 tabs with meals once tolerating diet.  Monitor serum calcium and phosphorus.  pulm f/u   Bronchodilators  O2 Supp  Incentive Spirometry  cont current meds   transfer for card cath      Negative

## 2022-11-03 NOTE — DISCHARGE NOTE ADULT - NSTOBACCONEVERSMOKERY/N_GEN_A
AUTHORIZATION FOR RELEASE OF   CONFIDENTIAL INFORMATION    Dear Dr May,    We are seeing Kt Guzman, date of birth 1963, in the clinic at [facility]. MU Yung is the patient's PCP. Kt Guzman has an outstanding lab/procedure at the time we reviewed his chart. In order to help keep his health information updated, he has authorized us to request the following medical record(s):        (  )  MAMMOGRAM                                      (  )  COLONOSCOPY      (  )  PAP SMEAR                                          (  )  OUTSIDE LAB RESULTS     (  )  DEXA SCAN                                          (  )  EYE EXAM            (  )  FOOT EXAM                                          ( X )  ECHO     (  )  OUTSIDE IMMUNIZATIONS                 ( x )  OFFICE VISIT NOTE         Please fax records to [facility], Kathryn F Duplantis, FNP, [phone]     If you have any questions, please contact [phone]          Patient Name: Kt Guzman  : 1963  Patient Phone #: [phone]     
       AUTHORIZATION FOR RELEASE OF   CONFIDENTIAL INFORMATION    Dear Dr. Crook,    We are seeing Kt Guzman, date of birth 1963, in the clinic at [facility]. MU Yung is the patient's PCP. Kt Guzman has an outstanding lab/procedure at the time we reviewed his chart. In order to help keep his health information updated, he has authorized us to request the following medical record(s):        (  )  MAMMOGRAM                                      (  )  COLONOSCOPY      (  )  PAP SMEAR                                          ( x )  OUTSIDE LAB RESULTS     (  )  DEXA SCAN                                          (  )  EYE EXAM            (  )  FOOT EXAM                                          (  )  ENTIRE RECORD     (  )  OUTSIDE IMMUNIZATIONS                 ( x )  office note     Please fax records to [facility], Kathryn F Duplantis, FNP, [phone]     If you have any questions, please contact [phone]           Patient Name: Kt Guzman  : 1963  Patient Phone #: [phone]     
No

## 2022-11-09 NOTE — H&P ADULT - PROBLEM SELECTOR PROBLEM 5
Need for prophylactic measure Cantharidin Pregnancy And Lactation Text: The use of this medication during pregnancy or lactation is not recommended as there is insufficient data.

## 2023-01-01 ENCOUNTER — APPOINTMENT (OUTPATIENT)
Dept: ELECTROPHYSIOLOGY | Facility: CLINIC | Age: 88
End: 2023-01-01

## 2023-01-01 ENCOUNTER — TRANSCRIPTION ENCOUNTER (OUTPATIENT)
Age: 88
End: 2023-01-01

## 2023-01-01 ENCOUNTER — APPOINTMENT (OUTPATIENT)
Dept: OPHTHALMOLOGY | Facility: CLINIC | Age: 88
End: 2023-01-01

## 2023-01-01 ENCOUNTER — INPATIENT (INPATIENT)
Facility: HOSPITAL | Age: 88
LOS: 16 days | Discharge: NURSING, REHAB OR LONG-TERM CARE | DRG: 177 | End: 2023-05-08
Attending: HOSPITALIST | Admitting: STUDENT IN AN ORGANIZED HEALTH CARE EDUCATION/TRAINING PROGRAM
Payer: MEDICARE

## 2023-01-01 ENCOUNTER — FORM ENCOUNTER (OUTPATIENT)
Age: 88
End: 2023-01-01

## 2023-01-01 ENCOUNTER — APPOINTMENT (OUTPATIENT)
Dept: ELECTROPHYSIOLOGY | Facility: CLINIC | Age: 88
End: 2023-01-01
Payer: MEDICARE

## 2023-01-01 ENCOUNTER — NON-APPOINTMENT (OUTPATIENT)
Age: 88
End: 2023-01-01

## 2023-01-01 VITALS
OXYGEN SATURATION: 98 % | TEMPERATURE: 97 F | DIASTOLIC BLOOD PRESSURE: 68 MMHG | RESPIRATION RATE: 18 BRPM | SYSTOLIC BLOOD PRESSURE: 111 MMHG | HEART RATE: 75 BPM

## 2023-01-01 VITALS
HEART RATE: 110 BPM | OXYGEN SATURATION: 99 % | TEMPERATURE: 98 F | SYSTOLIC BLOOD PRESSURE: 170 MMHG | DIASTOLIC BLOOD PRESSURE: 88 MMHG

## 2023-01-01 DIAGNOSIS — J18.9 PNEUMONIA, UNSPECIFIED ORGANISM: ICD-10-CM

## 2023-01-01 DIAGNOSIS — I48.20 CHRONIC ATRIAL FIBRILLATION, UNSPECIFIED: ICD-10-CM

## 2023-01-01 DIAGNOSIS — E78.5 HYPERLIPIDEMIA, UNSPECIFIED: ICD-10-CM

## 2023-01-01 DIAGNOSIS — R93.1 ABNORMAL FINDINGS ON DIAGNOSTIC IMAGING OF HEART AND CORONARY CIRCULATION: ICD-10-CM

## 2023-01-01 DIAGNOSIS — J90 PLEURAL EFFUSION, NOT ELSEWHERE CLASSIFIED: ICD-10-CM

## 2023-01-01 DIAGNOSIS — R19.5 OTHER FECAL ABNORMALITIES: ICD-10-CM

## 2023-01-01 DIAGNOSIS — Z90.49 ACQUIRED ABSENCE OF OTHER SPECIFIED PARTS OF DIGESTIVE TRACT: Chronic | ICD-10-CM

## 2023-01-01 DIAGNOSIS — N18.6 END STAGE RENAL DISEASE: ICD-10-CM

## 2023-01-01 DIAGNOSIS — J96.01 ACUTE RESPIRATORY FAILURE WITH HYPOXIA: ICD-10-CM

## 2023-01-01 DIAGNOSIS — Z29.9 ENCOUNTER FOR PROPHYLACTIC MEASURES, UNSPECIFIED: ICD-10-CM

## 2023-01-01 DIAGNOSIS — E83.39 OTHER DISORDERS OF PHOSPHORUS METABOLISM: ICD-10-CM

## 2023-01-01 DIAGNOSIS — E11.9 TYPE 2 DIABETES MELLITUS WITHOUT COMPLICATIONS: ICD-10-CM

## 2023-01-01 LAB
A1C WITH ESTIMATED AVERAGE GLUCOSE RESULT: 9.1 % — HIGH (ref 4–5.6)
ALBUMIN FLD-MCNC: 1.1 G/DL — SIGNIFICANT CHANGE UP
ALBUMIN SERPL ELPH-MCNC: 3.4 G/DL — SIGNIFICANT CHANGE UP (ref 3.3–5)
ALBUMIN SERPL ELPH-MCNC: 3.5 G/DL — SIGNIFICANT CHANGE UP (ref 3.3–5)
ALP SERPL-CCNC: 88 U/L — SIGNIFICANT CHANGE UP (ref 40–120)
ALP SERPL-CCNC: 94 U/L — SIGNIFICANT CHANGE UP (ref 40–120)
ALT FLD-CCNC: 14 U/L — SIGNIFICANT CHANGE UP (ref 10–45)
ALT FLD-CCNC: 19 U/L — SIGNIFICANT CHANGE UP (ref 10–45)
ANION GAP SERPL CALC-SCNC: 11 MMOL/L — SIGNIFICANT CHANGE UP (ref 5–17)
ANION GAP SERPL CALC-SCNC: 12 MMOL/L — SIGNIFICANT CHANGE UP (ref 5–17)
ANION GAP SERPL CALC-SCNC: 12 MMOL/L — SIGNIFICANT CHANGE UP (ref 5–17)
ANION GAP SERPL CALC-SCNC: 13 MMOL/L — SIGNIFICANT CHANGE UP (ref 5–17)
ANION GAP SERPL CALC-SCNC: 13 MMOL/L — SIGNIFICANT CHANGE UP (ref 5–17)
ANION GAP SERPL CALC-SCNC: 14 MMOL/L — SIGNIFICANT CHANGE UP (ref 5–17)
ANION GAP SERPL CALC-SCNC: 15 MMOL/L — SIGNIFICANT CHANGE UP (ref 5–17)
ANION GAP SERPL CALC-SCNC: 16 MMOL/L — SIGNIFICANT CHANGE UP (ref 5–17)
ANION GAP SERPL CALC-SCNC: 17 MMOL/L — SIGNIFICANT CHANGE UP (ref 5–17)
ANION GAP SERPL CALC-SCNC: 18 MMOL/L — HIGH (ref 5–17)
ANION GAP SERPL CALC-SCNC: 9 MMOL/L — SIGNIFICANT CHANGE UP (ref 5–17)
ANISOCYTOSIS BLD QL: SLIGHT — SIGNIFICANT CHANGE UP
APTT BLD: 28.2 SEC — SIGNIFICANT CHANGE UP (ref 27.5–35.5)
AST SERPL-CCNC: 21 U/L — SIGNIFICANT CHANGE UP (ref 10–40)
AST SERPL-CCNC: 66 U/L — HIGH (ref 10–40)
B PERT IGG+IGM PNL SER: ABNORMAL
BASE EXCESS BLDV CALC-SCNC: 4.5 MMOL/L — HIGH (ref -2–3)
BASOPHILS # BLD AUTO: 0 K/UL — SIGNIFICANT CHANGE UP (ref 0–0.2)
BASOPHILS # BLD AUTO: 0.02 K/UL — SIGNIFICANT CHANGE UP (ref 0–0.2)
BASOPHILS # BLD AUTO: 0.03 K/UL — SIGNIFICANT CHANGE UP (ref 0–0.2)
BASOPHILS # BLD AUTO: 0.03 K/UL — SIGNIFICANT CHANGE UP (ref 0–0.2)
BASOPHILS NFR BLD AUTO: 0 % — SIGNIFICANT CHANGE UP (ref 0–2)
BASOPHILS NFR BLD AUTO: 0.2 % — SIGNIFICANT CHANGE UP (ref 0–2)
BASOPHILS NFR BLD AUTO: 0.5 % — SIGNIFICANT CHANGE UP (ref 0–2)
BASOPHILS NFR BLD AUTO: 0.5 % — SIGNIFICANT CHANGE UP (ref 0–2)
BILIRUB SERPL-MCNC: 0.8 MG/DL — SIGNIFICANT CHANGE UP (ref 0.2–1.2)
BILIRUB SERPL-MCNC: 0.8 MG/DL — SIGNIFICANT CHANGE UP (ref 0.2–1.2)
BUN SERPL-MCNC: 21 MG/DL — SIGNIFICANT CHANGE UP (ref 7–23)
BUN SERPL-MCNC: 33 MG/DL — HIGH (ref 7–23)
BUN SERPL-MCNC: 34 MG/DL — HIGH (ref 7–23)
BUN SERPL-MCNC: 34 MG/DL — HIGH (ref 7–23)
BUN SERPL-MCNC: 37 MG/DL — HIGH (ref 7–23)
BUN SERPL-MCNC: 39 MG/DL — HIGH (ref 7–23)
BUN SERPL-MCNC: 41 MG/DL — HIGH (ref 7–23)
BUN SERPL-MCNC: 42 MG/DL — HIGH (ref 7–23)
BUN SERPL-MCNC: 45 MG/DL — HIGH (ref 7–23)
BUN SERPL-MCNC: 46 MG/DL — HIGH (ref 7–23)
BUN SERPL-MCNC: 47 MG/DL — HIGH (ref 7–23)
BUN SERPL-MCNC: 50 MG/DL — HIGH (ref 7–23)
BUN SERPL-MCNC: 55 MG/DL — HIGH (ref 7–23)
BUN SERPL-MCNC: 63 MG/DL — HIGH (ref 7–23)
CA-I SERPL-SCNC: 1.2 MMOL/L — SIGNIFICANT CHANGE UP (ref 1.15–1.33)
CALCIUM SERPL-MCNC: 8.4 MG/DL — SIGNIFICANT CHANGE UP (ref 8.4–10.5)
CALCIUM SERPL-MCNC: 8.5 MG/DL — SIGNIFICANT CHANGE UP (ref 8.4–10.5)
CALCIUM SERPL-MCNC: 8.6 MG/DL — SIGNIFICANT CHANGE UP (ref 8.4–10.5)
CALCIUM SERPL-MCNC: 8.7 MG/DL — SIGNIFICANT CHANGE UP (ref 8.4–10.5)
CALCIUM SERPL-MCNC: 8.8 MG/DL — SIGNIFICANT CHANGE UP (ref 8.4–10.5)
CALCIUM SERPL-MCNC: 8.9 MG/DL — SIGNIFICANT CHANGE UP (ref 8.4–10.5)
CALCIUM SERPL-MCNC: 8.9 MG/DL — SIGNIFICANT CHANGE UP (ref 8.4–10.5)
CALCIUM SERPL-MCNC: 9.3 MG/DL — SIGNIFICANT CHANGE UP (ref 8.4–10.5)
CALCIUM SERPL-MCNC: 9.4 MG/DL — SIGNIFICANT CHANGE UP (ref 8.4–10.5)
CALCIUM SERPL-MCNC: 9.5 MG/DL — SIGNIFICANT CHANGE UP (ref 8.4–10.5)
CHLORIDE BLDV-SCNC: 98 MMOL/L — SIGNIFICANT CHANGE UP (ref 96–108)
CHLORIDE SERPL-SCNC: 91 MMOL/L — LOW (ref 96–108)
CHLORIDE SERPL-SCNC: 92 MMOL/L — LOW (ref 96–108)
CHLORIDE SERPL-SCNC: 94 MMOL/L — LOW (ref 96–108)
CHLORIDE SERPL-SCNC: 94 MMOL/L — LOW (ref 96–108)
CHLORIDE SERPL-SCNC: 95 MMOL/L — LOW (ref 96–108)
CHLORIDE SERPL-SCNC: 96 MMOL/L — SIGNIFICANT CHANGE UP (ref 96–108)
CHLORIDE SERPL-SCNC: 97 MMOL/L — SIGNIFICANT CHANGE UP (ref 96–108)
CHOLEST SERPL-MCNC: 164 MG/DL — SIGNIFICANT CHANGE UP
CO2 BLDV-SCNC: 32 MMOL/L — HIGH (ref 22–26)
CO2 SERPL-SCNC: 21 MMOL/L — LOW (ref 22–31)
CO2 SERPL-SCNC: 24 MMOL/L — SIGNIFICANT CHANGE UP (ref 22–31)
CO2 SERPL-SCNC: 24 MMOL/L — SIGNIFICANT CHANGE UP (ref 22–31)
CO2 SERPL-SCNC: 25 MMOL/L — SIGNIFICANT CHANGE UP (ref 22–31)
CO2 SERPL-SCNC: 26 MMOL/L — SIGNIFICANT CHANGE UP (ref 22–31)
CO2 SERPL-SCNC: 26 MMOL/L — SIGNIFICANT CHANGE UP (ref 22–31)
CO2 SERPL-SCNC: 27 MMOL/L — SIGNIFICANT CHANGE UP (ref 22–31)
CO2 SERPL-SCNC: 27 MMOL/L — SIGNIFICANT CHANGE UP (ref 22–31)
CO2 SERPL-SCNC: 28 MMOL/L — SIGNIFICANT CHANGE UP (ref 22–31)
CO2 SERPL-SCNC: 29 MMOL/L — SIGNIFICANT CHANGE UP (ref 22–31)
CO2 SERPL-SCNC: 30 MMOL/L — SIGNIFICANT CHANGE UP (ref 22–31)
COLOR FLD: SIGNIFICANT CHANGE UP
CREAT SERPL-MCNC: 3.1 MG/DL — HIGH (ref 0.5–1.3)
CREAT SERPL-MCNC: 3.91 MG/DL — HIGH (ref 0.5–1.3)
CREAT SERPL-MCNC: 4.08 MG/DL — HIGH (ref 0.5–1.3)
CREAT SERPL-MCNC: 4.19 MG/DL — HIGH (ref 0.5–1.3)
CREAT SERPL-MCNC: 4.34 MG/DL — HIGH (ref 0.5–1.3)
CREAT SERPL-MCNC: 4.42 MG/DL — HIGH (ref 0.5–1.3)
CREAT SERPL-MCNC: 4.55 MG/DL — HIGH (ref 0.5–1.3)
CREAT SERPL-MCNC: 4.85 MG/DL — HIGH (ref 0.5–1.3)
CREAT SERPL-MCNC: 4.91 MG/DL — HIGH (ref 0.5–1.3)
CREAT SERPL-MCNC: 4.94 MG/DL — HIGH (ref 0.5–1.3)
CREAT SERPL-MCNC: 5.21 MG/DL — HIGH (ref 0.5–1.3)
CREAT SERPL-MCNC: 5.36 MG/DL — HIGH (ref 0.5–1.3)
CREAT SERPL-MCNC: 5.84 MG/DL — HIGH (ref 0.5–1.3)
CREAT SERPL-MCNC: 6.17 MG/DL — HIGH (ref 0.5–1.3)
CULTURE RESULTS: SIGNIFICANT CHANGE UP
EGFR: 10 ML/MIN/1.73M2 — LOW
EGFR: 10 ML/MIN/1.73M2 — LOW
EGFR: 11 ML/MIN/1.73M2 — LOW
EGFR: 12 ML/MIN/1.73M2 — LOW
EGFR: 12 ML/MIN/1.73M2 — LOW
EGFR: 13 ML/MIN/1.73M2 — LOW
EGFR: 13 ML/MIN/1.73M2 — LOW
EGFR: 14 ML/MIN/1.73M2 — LOW
EGFR: 14 ML/MIN/1.73M2 — LOW
EGFR: 19 ML/MIN/1.73M2 — LOW
EGFR: 8 ML/MIN/1.73M2 — LOW
EGFR: 9 ML/MIN/1.73M2 — LOW
EOSINOPHIL # BLD AUTO: 0 K/UL — SIGNIFICANT CHANGE UP (ref 0–0.5)
EOSINOPHIL # BLD AUTO: 0.07 K/UL — SIGNIFICANT CHANGE UP (ref 0–0.5)
EOSINOPHIL # BLD AUTO: 0.33 K/UL — SIGNIFICANT CHANGE UP (ref 0–0.5)
EOSINOPHIL # BLD AUTO: 0.33 K/UL — SIGNIFICANT CHANGE UP (ref 0–0.5)
EOSINOPHIL NFR BLD AUTO: 0 % — SIGNIFICANT CHANGE UP (ref 0–6)
EOSINOPHIL NFR BLD AUTO: 0.8 % — SIGNIFICANT CHANGE UP (ref 0–6)
EOSINOPHIL NFR BLD AUTO: 5.1 % — SIGNIFICANT CHANGE UP (ref 0–6)
EOSINOPHIL NFR BLD AUTO: 5.6 % — SIGNIFICANT CHANGE UP (ref 0–6)
ESTIMATED AVERAGE GLUCOSE: 214 MG/DL — HIGH (ref 68–114)
FERRITIN SERPL-MCNC: 2482 NG/ML — HIGH (ref 30–400)
FLUID INTAKE SUBSTANCE CLASS: SIGNIFICANT CHANGE UP
FOLATE SERPL-MCNC: >20 NG/ML — SIGNIFICANT CHANGE UP
GAS PNL BLDV: 134 MMOL/L — LOW (ref 136–145)
GAS PNL BLDV: SIGNIFICANT CHANGE UP
GLUCOSE BLDC GLUCOMTR-MCNC: 103 MG/DL — HIGH (ref 70–99)
GLUCOSE BLDC GLUCOMTR-MCNC: 106 MG/DL — HIGH (ref 70–99)
GLUCOSE BLDC GLUCOMTR-MCNC: 114 MG/DL — HIGH (ref 70–99)
GLUCOSE BLDC GLUCOMTR-MCNC: 122 MG/DL — HIGH (ref 70–99)
GLUCOSE BLDC GLUCOMTR-MCNC: 126 MG/DL — HIGH (ref 70–99)
GLUCOSE BLDC GLUCOMTR-MCNC: 128 MG/DL — HIGH (ref 70–99)
GLUCOSE BLDC GLUCOMTR-MCNC: 130 MG/DL — HIGH (ref 70–99)
GLUCOSE BLDC GLUCOMTR-MCNC: 131 MG/DL — HIGH (ref 70–99)
GLUCOSE BLDC GLUCOMTR-MCNC: 132 MG/DL — HIGH (ref 70–99)
GLUCOSE BLDC GLUCOMTR-MCNC: 133 MG/DL — HIGH (ref 70–99)
GLUCOSE BLDC GLUCOMTR-MCNC: 136 MG/DL — HIGH (ref 70–99)
GLUCOSE BLDC GLUCOMTR-MCNC: 137 MG/DL — HIGH (ref 70–99)
GLUCOSE BLDC GLUCOMTR-MCNC: 141 MG/DL — HIGH (ref 70–99)
GLUCOSE BLDC GLUCOMTR-MCNC: 142 MG/DL — HIGH (ref 70–99)
GLUCOSE BLDC GLUCOMTR-MCNC: 142 MG/DL — HIGH (ref 70–99)
GLUCOSE BLDC GLUCOMTR-MCNC: 143 MG/DL — HIGH (ref 70–99)
GLUCOSE BLDC GLUCOMTR-MCNC: 145 MG/DL — HIGH (ref 70–99)
GLUCOSE BLDC GLUCOMTR-MCNC: 146 MG/DL — HIGH (ref 70–99)
GLUCOSE BLDC GLUCOMTR-MCNC: 146 MG/DL — HIGH (ref 70–99)
GLUCOSE BLDC GLUCOMTR-MCNC: 147 MG/DL — HIGH (ref 70–99)
GLUCOSE BLDC GLUCOMTR-MCNC: 148 MG/DL — HIGH (ref 70–99)
GLUCOSE BLDC GLUCOMTR-MCNC: 149 MG/DL — HIGH (ref 70–99)
GLUCOSE BLDC GLUCOMTR-MCNC: 152 MG/DL — HIGH (ref 70–99)
GLUCOSE BLDC GLUCOMTR-MCNC: 158 MG/DL — HIGH (ref 70–99)
GLUCOSE BLDC GLUCOMTR-MCNC: 161 MG/DL — HIGH (ref 70–99)
GLUCOSE BLDC GLUCOMTR-MCNC: 162 MG/DL — HIGH (ref 70–99)
GLUCOSE BLDC GLUCOMTR-MCNC: 163 MG/DL — HIGH (ref 70–99)
GLUCOSE BLDC GLUCOMTR-MCNC: 168 MG/DL — HIGH (ref 70–99)
GLUCOSE BLDC GLUCOMTR-MCNC: 171 MG/DL — HIGH (ref 70–99)
GLUCOSE BLDC GLUCOMTR-MCNC: 180 MG/DL — HIGH (ref 70–99)
GLUCOSE BLDC GLUCOMTR-MCNC: 185 MG/DL — HIGH (ref 70–99)
GLUCOSE BLDC GLUCOMTR-MCNC: 186 MG/DL — HIGH (ref 70–99)
GLUCOSE BLDC GLUCOMTR-MCNC: 189 MG/DL — HIGH (ref 70–99)
GLUCOSE BLDC GLUCOMTR-MCNC: 191 MG/DL — HIGH (ref 70–99)
GLUCOSE BLDC GLUCOMTR-MCNC: 193 MG/DL — HIGH (ref 70–99)
GLUCOSE BLDC GLUCOMTR-MCNC: 197 MG/DL — HIGH (ref 70–99)
GLUCOSE BLDC GLUCOMTR-MCNC: 199 MG/DL — HIGH (ref 70–99)
GLUCOSE BLDC GLUCOMTR-MCNC: 200 MG/DL — HIGH (ref 70–99)
GLUCOSE BLDC GLUCOMTR-MCNC: 202 MG/DL — HIGH (ref 70–99)
GLUCOSE BLDC GLUCOMTR-MCNC: 203 MG/DL — HIGH (ref 70–99)
GLUCOSE BLDC GLUCOMTR-MCNC: 203 MG/DL — HIGH (ref 70–99)
GLUCOSE BLDC GLUCOMTR-MCNC: 204 MG/DL — HIGH (ref 70–99)
GLUCOSE BLDC GLUCOMTR-MCNC: 204 MG/DL — HIGH (ref 70–99)
GLUCOSE BLDC GLUCOMTR-MCNC: 210 MG/DL — HIGH (ref 70–99)
GLUCOSE BLDC GLUCOMTR-MCNC: 210 MG/DL — HIGH (ref 70–99)
GLUCOSE BLDC GLUCOMTR-MCNC: 222 MG/DL — HIGH (ref 70–99)
GLUCOSE BLDC GLUCOMTR-MCNC: 222 MG/DL — HIGH (ref 70–99)
GLUCOSE BLDC GLUCOMTR-MCNC: 225 MG/DL — HIGH (ref 70–99)
GLUCOSE BLDC GLUCOMTR-MCNC: 229 MG/DL — HIGH (ref 70–99)
GLUCOSE BLDC GLUCOMTR-MCNC: 230 MG/DL — HIGH (ref 70–99)
GLUCOSE BLDC GLUCOMTR-MCNC: 232 MG/DL — HIGH (ref 70–99)
GLUCOSE BLDC GLUCOMTR-MCNC: 235 MG/DL — HIGH (ref 70–99)
GLUCOSE BLDC GLUCOMTR-MCNC: 237 MG/DL — HIGH (ref 70–99)
GLUCOSE BLDC GLUCOMTR-MCNC: 241 MG/DL — HIGH (ref 70–99)
GLUCOSE BLDC GLUCOMTR-MCNC: 243 MG/DL — HIGH (ref 70–99)
GLUCOSE BLDC GLUCOMTR-MCNC: 248 MG/DL — HIGH (ref 70–99)
GLUCOSE BLDC GLUCOMTR-MCNC: 252 MG/DL — HIGH (ref 70–99)
GLUCOSE BLDC GLUCOMTR-MCNC: 92 MG/DL — SIGNIFICANT CHANGE UP (ref 70–99)
GLUCOSE BLDC GLUCOMTR-MCNC: 98 MG/DL — SIGNIFICANT CHANGE UP (ref 70–99)
GLUCOSE BLDV-MCNC: 246 MG/DL — HIGH (ref 70–99)
GLUCOSE FLD-MCNC: 137 MG/DL — SIGNIFICANT CHANGE UP
GLUCOSE SERPL-MCNC: 104 MG/DL — HIGH (ref 70–99)
GLUCOSE SERPL-MCNC: 106 MG/DL — HIGH (ref 70–99)
GLUCOSE SERPL-MCNC: 110 MG/DL — HIGH (ref 70–99)
GLUCOSE SERPL-MCNC: 129 MG/DL — HIGH (ref 70–99)
GLUCOSE SERPL-MCNC: 131 MG/DL — HIGH (ref 70–99)
GLUCOSE SERPL-MCNC: 135 MG/DL — HIGH (ref 70–99)
GLUCOSE SERPL-MCNC: 138 MG/DL — HIGH (ref 70–99)
GLUCOSE SERPL-MCNC: 144 MG/DL — HIGH (ref 70–99)
GLUCOSE SERPL-MCNC: 146 MG/DL — HIGH (ref 70–99)
GLUCOSE SERPL-MCNC: 150 MG/DL — HIGH (ref 70–99)
GLUCOSE SERPL-MCNC: 166 MG/DL — HIGH (ref 70–99)
GLUCOSE SERPL-MCNC: 226 MG/DL — HIGH (ref 70–99)
GLUCOSE SERPL-MCNC: 234 MG/DL — HIGH (ref 70–99)
GLUCOSE SERPL-MCNC: 274 MG/DL — HIGH (ref 70–99)
GRAM STN FLD: SIGNIFICANT CHANGE UP
HAV IGM SER-ACNC: SIGNIFICANT CHANGE UP
HBV CORE IGM SER-ACNC: SIGNIFICANT CHANGE UP
HBV SURFACE AG SER-ACNC: SIGNIFICANT CHANGE UP
HCO3 BLDV-SCNC: 30 MMOL/L — HIGH (ref 22–29)
HCT VFR BLD CALC: 38.6 % — LOW (ref 39–50)
HCT VFR BLD CALC: 39 % — SIGNIFICANT CHANGE UP (ref 39–50)
HCT VFR BLD CALC: 39.7 % — SIGNIFICANT CHANGE UP (ref 39–50)
HCT VFR BLD CALC: 39.7 % — SIGNIFICANT CHANGE UP (ref 39–50)
HCT VFR BLD CALC: 39.8 % — SIGNIFICANT CHANGE UP (ref 39–50)
HCT VFR BLD CALC: 39.8 % — SIGNIFICANT CHANGE UP (ref 39–50)
HCT VFR BLD CALC: 40.6 % — SIGNIFICANT CHANGE UP (ref 39–50)
HCT VFR BLD CALC: 41 % — SIGNIFICANT CHANGE UP (ref 39–50)
HCT VFR BLD CALC: 41.2 % — SIGNIFICANT CHANGE UP (ref 39–50)
HCT VFR BLD CALC: 41.9 % — SIGNIFICANT CHANGE UP (ref 39–50)
HCT VFR BLD CALC: 42.2 % — SIGNIFICANT CHANGE UP (ref 39–50)
HCT VFR BLD CALC: 42.6 % — SIGNIFICANT CHANGE UP (ref 39–50)
HCT VFR BLDA CALC: 42 % — SIGNIFICANT CHANGE UP (ref 39–51)
HCV AB S/CO SERPL IA: 0.09 S/CO — SIGNIFICANT CHANGE UP (ref 0–0.99)
HCV AB SERPL-IMP: SIGNIFICANT CHANGE UP
HDLC SERPL-MCNC: 59 MG/DL — SIGNIFICANT CHANGE UP
HGB BLD CALC-MCNC: 13.9 G/DL — SIGNIFICANT CHANGE UP (ref 12.6–17.4)
HGB BLD-MCNC: 12.4 G/DL — LOW (ref 13–17)
HGB BLD-MCNC: 12.7 G/DL — LOW (ref 13–17)
HGB BLD-MCNC: 12.7 G/DL — LOW (ref 13–17)
HGB BLD-MCNC: 12.9 G/DL — LOW (ref 13–17)
HGB BLD-MCNC: 13 G/DL — SIGNIFICANT CHANGE UP (ref 13–17)
HGB BLD-MCNC: 13.1 G/DL — SIGNIFICANT CHANGE UP (ref 13–17)
HGB BLD-MCNC: 13.2 G/DL — SIGNIFICANT CHANGE UP (ref 13–17)
HGB BLD-MCNC: 13.2 G/DL — SIGNIFICANT CHANGE UP (ref 13–17)
HGB BLD-MCNC: 13.5 G/DL — SIGNIFICANT CHANGE UP (ref 13–17)
HGB BLD-MCNC: 13.5 G/DL — SIGNIFICANT CHANGE UP (ref 13–17)
HGB BLD-MCNC: 13.6 G/DL — SIGNIFICANT CHANGE UP (ref 13–17)
HGB BLD-MCNC: 13.7 G/DL — SIGNIFICANT CHANGE UP (ref 13–17)
IMM GRANULOCYTES NFR BLD AUTO: 0.5 % — SIGNIFICANT CHANGE UP (ref 0–0.9)
IMM GRANULOCYTES NFR BLD AUTO: 0.6 % — SIGNIFICANT CHANGE UP (ref 0–0.9)
IMM GRANULOCYTES NFR BLD AUTO: 0.6 % — SIGNIFICANT CHANGE UP (ref 0–0.9)
INR BLD: 1.22 RATIO — HIGH (ref 0.88–1.16)
INTERPRETATION SERPL IFE-IMP: SIGNIFICANT CHANGE UP
IRON SATN MFR SERPL: 40 % — SIGNIFICANT CHANGE UP (ref 16–55)
IRON SATN MFR SERPL: 58 UG/DL — SIGNIFICANT CHANGE UP (ref 45–165)
KAPPA LC SER QL IFE: 11.14 MG/DL — HIGH (ref 0.33–1.94)
KAPPA/LAMBDA FREE LIGHT CHAIN RATIO, SERUM: 1.05 RATIO — SIGNIFICANT CHANGE UP (ref 0.26–1.65)
LACTATE BLDV-MCNC: 1.7 MMOL/L — SIGNIFICANT CHANGE UP (ref 0.5–2)
LAMBDA LC SER QL IFE: 10.66 MG/DL — HIGH (ref 0.57–2.63)
LDH SERPL L TO P-CCNC: 326 U/L — HIGH (ref 50–242)
LDH SERPL L TO P-CCNC: 94 U/L — SIGNIFICANT CHANGE UP
LIPID PNL WITH DIRECT LDL SERPL: 82 MG/DL — SIGNIFICANT CHANGE UP
LYMPHOCYTES # BLD AUTO: 0.2 K/UL — LOW (ref 1–3.3)
LYMPHOCYTES # BLD AUTO: 0.28 K/UL — LOW (ref 1–3.3)
LYMPHOCYTES # BLD AUTO: 0.36 K/UL — LOW (ref 1–3.3)
LYMPHOCYTES # BLD AUTO: 0.43 K/UL — LOW (ref 1–3.3)
LYMPHOCYTES # BLD AUTO: 2.6 % — LOW (ref 13–44)
LYMPHOCYTES # BLD AUTO: 3.3 % — LOW (ref 13–44)
LYMPHOCYTES # BLD AUTO: 6.1 % — LOW (ref 13–44)
LYMPHOCYTES # BLD AUTO: 6.6 % — LOW (ref 13–44)
LYMPHOCYTES # FLD: 91 % — SIGNIFICANT CHANGE UP
MACROCYTES BLD QL: SLIGHT — SIGNIFICANT CHANGE UP
MAGNESIUM SERPL-MCNC: 2 MG/DL — SIGNIFICANT CHANGE UP (ref 1.6–2.6)
MAGNESIUM SERPL-MCNC: 2 MG/DL — SIGNIFICANT CHANGE UP (ref 1.6–2.6)
MAGNESIUM SERPL-MCNC: 2.2 MG/DL — SIGNIFICANT CHANGE UP (ref 1.6–2.6)
MAGNESIUM SERPL-MCNC: 2.2 MG/DL — SIGNIFICANT CHANGE UP (ref 1.6–2.6)
MAGNESIUM SERPL-MCNC: 2.3 MG/DL — SIGNIFICANT CHANGE UP (ref 1.6–2.6)
MANUAL SMEAR VERIFICATION: SIGNIFICANT CHANGE UP
MCHC RBC-ENTMCNC: 31.7 GM/DL — LOW (ref 32–36)
MCHC RBC-ENTMCNC: 31.9 GM/DL — LOW (ref 32–36)
MCHC RBC-ENTMCNC: 32 GM/DL — SIGNIFICANT CHANGE UP (ref 32–36)
MCHC RBC-ENTMCNC: 32 GM/DL — SIGNIFICANT CHANGE UP (ref 32–36)
MCHC RBC-ENTMCNC: 32.1 GM/DL — SIGNIFICANT CHANGE UP (ref 32–36)
MCHC RBC-ENTMCNC: 32.3 PG — SIGNIFICANT CHANGE UP (ref 27–34)
MCHC RBC-ENTMCNC: 32.4 GM/DL — SIGNIFICANT CHANGE UP (ref 32–36)
MCHC RBC-ENTMCNC: 32.4 PG — SIGNIFICANT CHANGE UP (ref 27–34)
MCHC RBC-ENTMCNC: 32.4 PG — SIGNIFICANT CHANGE UP (ref 27–34)
MCHC RBC-ENTMCNC: 32.5 GM/DL — SIGNIFICANT CHANGE UP (ref 32–36)
MCHC RBC-ENTMCNC: 32.5 PG — SIGNIFICANT CHANGE UP (ref 27–34)
MCHC RBC-ENTMCNC: 32.5 PG — SIGNIFICANT CHANGE UP (ref 27–34)
MCHC RBC-ENTMCNC: 32.7 PG — SIGNIFICANT CHANGE UP (ref 27–34)
MCHC RBC-ENTMCNC: 32.7 PG — SIGNIFICANT CHANGE UP (ref 27–34)
MCHC RBC-ENTMCNC: 32.8 PG — SIGNIFICANT CHANGE UP (ref 27–34)
MCHC RBC-ENTMCNC: 32.9 GM/DL — SIGNIFICANT CHANGE UP (ref 32–36)
MCHC RBC-ENTMCNC: 32.9 PG — SIGNIFICANT CHANGE UP (ref 27–34)
MCHC RBC-ENTMCNC: 32.9 PG — SIGNIFICANT CHANGE UP (ref 27–34)
MCHC RBC-ENTMCNC: 33 GM/DL — SIGNIFICANT CHANGE UP (ref 32–36)
MCHC RBC-ENTMCNC: 33 PG — SIGNIFICANT CHANGE UP (ref 27–34)
MCHC RBC-ENTMCNC: 33 PG — SIGNIFICANT CHANGE UP (ref 27–34)
MCHC RBC-ENTMCNC: 33.3 GM/DL — SIGNIFICANT CHANGE UP (ref 32–36)
MCV RBC AUTO: 101 FL — HIGH (ref 80–100)
MCV RBC AUTO: 101 FL — HIGH (ref 80–100)
MCV RBC AUTO: 101.2 FL — HIGH (ref 80–100)
MCV RBC AUTO: 101.2 FL — HIGH (ref 80–100)
MCV RBC AUTO: 102.7 FL — HIGH (ref 80–100)
MCV RBC AUTO: 103.1 FL — HIGH (ref 80–100)
MCV RBC AUTO: 103.1 FL — HIGH (ref 80–100)
MCV RBC AUTO: 99 FL — SIGNIFICANT CHANGE UP (ref 80–100)
MCV RBC AUTO: 99.3 FL — SIGNIFICANT CHANGE UP (ref 80–100)
MCV RBC AUTO: 99.3 FL — SIGNIFICANT CHANGE UP (ref 80–100)
MCV RBC AUTO: 99.5 FL — SIGNIFICANT CHANGE UP (ref 80–100)
MCV RBC AUTO: 99.5 FL — SIGNIFICANT CHANGE UP (ref 80–100)
MESOTHL CELL # FLD: 1 % — SIGNIFICANT CHANGE UP
MONOCYTES # BLD AUTO: 0.64 K/UL — SIGNIFICANT CHANGE UP (ref 0–0.9)
MONOCYTES # BLD AUTO: 0.69 K/UL — SIGNIFICANT CHANGE UP (ref 0–0.9)
MONOCYTES # BLD AUTO: 0.75 K/UL — SIGNIFICANT CHANGE UP (ref 0–0.9)
MONOCYTES # BLD AUTO: 0.83 K/UL — SIGNIFICANT CHANGE UP (ref 0–0.9)
MONOCYTES NFR BLD AUTO: 11.7 % — SIGNIFICANT CHANGE UP (ref 2–14)
MONOCYTES NFR BLD AUTO: 9.5 % — SIGNIFICANT CHANGE UP (ref 2–14)
MONOCYTES NFR BLD AUTO: 9.8 % — SIGNIFICANT CHANGE UP (ref 2–14)
MONOCYTES NFR BLD AUTO: 9.8 % — SIGNIFICANT CHANGE UP (ref 2–14)
MONOS+MACROS # FLD: 6 % — SIGNIFICANT CHANGE UP
MRSA PCR RESULT.: SIGNIFICANT CHANGE UP
MRSA PCR RESULT.: SIGNIFICANT CHANGE UP
NEUTROPHILS # BLD AUTO: 4.47 K/UL — SIGNIFICANT CHANGE UP (ref 1.8–7.4)
NEUTROPHILS # BLD AUTO: 5.05 K/UL — SIGNIFICANT CHANGE UP (ref 1.8–7.4)
NEUTROPHILS # BLD AUTO: 6.93 K/UL — SIGNIFICANT CHANGE UP (ref 1.8–7.4)
NEUTROPHILS # BLD AUTO: 7.24 K/UL — SIGNIFICANT CHANGE UP (ref 1.8–7.4)
NEUTROPHILS NFR BLD AUTO: 75.6 % — SIGNIFICANT CHANGE UP (ref 43–77)
NEUTROPHILS NFR BLD AUTO: 77.4 % — HIGH (ref 43–77)
NEUTROPHILS NFR BLD AUTO: 85.3 % — HIGH (ref 43–77)
NEUTROPHILS NFR BLD AUTO: 87.1 % — HIGH (ref 43–77)
NEUTROPHILS-BODY FLUID: 2 % — SIGNIFICANT CHANGE UP
NEUTS BAND # BLD: 0.8 % — SIGNIFICANT CHANGE UP (ref 0–8)
NON HDL CHOLESTEROL: 105 MG/DL — SIGNIFICANT CHANGE UP
NON-GYNECOLOGICAL CYTOLOGY STUDY: SIGNIFICANT CHANGE UP
NRBC # BLD: 0 /100 WBCS — SIGNIFICANT CHANGE UP (ref 0–0)
NT-PROBNP SERPL-SCNC: HIGH PG/ML (ref 0–300)
NT-PROBNP SERPL-SCNC: HIGH PG/ML (ref 0–300)
OB PNL STL: NEGATIVE — SIGNIFICANT CHANGE UP
PCO2 BLDV: 49 MMHG — SIGNIFICANT CHANGE UP (ref 42–55)
PH BLDV: 7.4 — SIGNIFICANT CHANGE UP (ref 7.32–7.43)
PH FLD: 7.49 — SIGNIFICANT CHANGE UP
PHOSPHATE SERPL-MCNC: 3.6 MG/DL — SIGNIFICANT CHANGE UP (ref 2.5–4.5)
PHOSPHATE SERPL-MCNC: 4.3 MG/DL — SIGNIFICANT CHANGE UP (ref 2.5–4.5)
PHOSPHATE SERPL-MCNC: 4.3 MG/DL — SIGNIFICANT CHANGE UP (ref 2.5–4.5)
PHOSPHATE SERPL-MCNC: 4.7 MG/DL — HIGH (ref 2.5–4.5)
PHOSPHATE SERPL-MCNC: 4.8 MG/DL — HIGH (ref 2.5–4.5)
PHOSPHATE SERPL-MCNC: 5.7 MG/DL — HIGH (ref 2.5–4.5)
PHOSPHATE SERPL-MCNC: 5.7 MG/DL — HIGH (ref 2.5–4.5)
PHOSPHATE SERPL-MCNC: 5.9 MG/DL — HIGH (ref 2.5–4.5)
PLAT MORPH BLD: NORMAL — SIGNIFICANT CHANGE UP
PLATELET # BLD AUTO: 101 K/UL — LOW (ref 150–400)
PLATELET # BLD AUTO: 106 K/UL — LOW (ref 150–400)
PLATELET # BLD AUTO: 108 K/UL — LOW (ref 150–400)
PLATELET # BLD AUTO: 111 K/UL — LOW (ref 150–400)
PLATELET # BLD AUTO: 112 K/UL — LOW (ref 150–400)
PLATELET # BLD AUTO: 113 K/UL — LOW (ref 150–400)
PLATELET # BLD AUTO: 114 K/UL — LOW (ref 150–400)
PLATELET # BLD AUTO: 78 K/UL — LOW (ref 150–400)
PLATELET # BLD AUTO: 84 K/UL — LOW (ref 150–400)
PLATELET # BLD AUTO: 87 K/UL — LOW (ref 150–400)
PLATELET # BLD AUTO: 90 K/UL — LOW (ref 150–400)
PLATELET # BLD AUTO: 95 K/UL — LOW (ref 150–400)
PO2 BLDV: 57 MMHG — HIGH (ref 25–45)
POIKILOCYTOSIS BLD QL AUTO: SLIGHT — SIGNIFICANT CHANGE UP
POLYCHROMASIA BLD QL SMEAR: SLIGHT — SIGNIFICANT CHANGE UP
POTASSIUM BLDV-SCNC: 6.1 MMOL/L — HIGH (ref 3.5–5.1)
POTASSIUM SERPL-MCNC: 3.6 MMOL/L — SIGNIFICANT CHANGE UP (ref 3.5–5.3)
POTASSIUM SERPL-MCNC: 3.6 MMOL/L — SIGNIFICANT CHANGE UP (ref 3.5–5.3)
POTASSIUM SERPL-MCNC: 3.7 MMOL/L — SIGNIFICANT CHANGE UP (ref 3.5–5.3)
POTASSIUM SERPL-MCNC: 3.8 MMOL/L — SIGNIFICANT CHANGE UP (ref 3.5–5.3)
POTASSIUM SERPL-MCNC: 3.9 MMOL/L — SIGNIFICANT CHANGE UP (ref 3.5–5.3)
POTASSIUM SERPL-MCNC: 4 MMOL/L — SIGNIFICANT CHANGE UP (ref 3.5–5.3)
POTASSIUM SERPL-MCNC: 4.1 MMOL/L — SIGNIFICANT CHANGE UP (ref 3.5–5.3)
POTASSIUM SERPL-MCNC: 4.3 MMOL/L — SIGNIFICANT CHANGE UP (ref 3.5–5.3)
POTASSIUM SERPL-MCNC: 4.7 MMOL/L — SIGNIFICANT CHANGE UP (ref 3.5–5.3)
POTASSIUM SERPL-MCNC: 4.9 MMOL/L — SIGNIFICANT CHANGE UP (ref 3.5–5.3)
POTASSIUM SERPL-MCNC: 4.9 MMOL/L — SIGNIFICANT CHANGE UP (ref 3.5–5.3)
POTASSIUM SERPL-MCNC: 5 MMOL/L — SIGNIFICANT CHANGE UP (ref 3.5–5.3)
POTASSIUM SERPL-MCNC: 5 MMOL/L — SIGNIFICANT CHANGE UP (ref 3.5–5.3)
POTASSIUM SERPL-MCNC: 7.2 MMOL/L — CRITICAL HIGH (ref 3.5–5.3)
POTASSIUM SERPL-SCNC: 3.6 MMOL/L — SIGNIFICANT CHANGE UP (ref 3.5–5.3)
POTASSIUM SERPL-SCNC: 3.6 MMOL/L — SIGNIFICANT CHANGE UP (ref 3.5–5.3)
POTASSIUM SERPL-SCNC: 3.7 MMOL/L — SIGNIFICANT CHANGE UP (ref 3.5–5.3)
POTASSIUM SERPL-SCNC: 3.8 MMOL/L — SIGNIFICANT CHANGE UP (ref 3.5–5.3)
POTASSIUM SERPL-SCNC: 3.9 MMOL/L — SIGNIFICANT CHANGE UP (ref 3.5–5.3)
POTASSIUM SERPL-SCNC: 4 MMOL/L — SIGNIFICANT CHANGE UP (ref 3.5–5.3)
POTASSIUM SERPL-SCNC: 4.1 MMOL/L — SIGNIFICANT CHANGE UP (ref 3.5–5.3)
POTASSIUM SERPL-SCNC: 4.3 MMOL/L — SIGNIFICANT CHANGE UP (ref 3.5–5.3)
POTASSIUM SERPL-SCNC: 4.7 MMOL/L — SIGNIFICANT CHANGE UP (ref 3.5–5.3)
POTASSIUM SERPL-SCNC: 4.9 MMOL/L — SIGNIFICANT CHANGE UP (ref 3.5–5.3)
POTASSIUM SERPL-SCNC: 4.9 MMOL/L — SIGNIFICANT CHANGE UP (ref 3.5–5.3)
POTASSIUM SERPL-SCNC: 5 MMOL/L — SIGNIFICANT CHANGE UP (ref 3.5–5.3)
POTASSIUM SERPL-SCNC: 5 MMOL/L — SIGNIFICANT CHANGE UP (ref 3.5–5.3)
POTASSIUM SERPL-SCNC: 7.2 MMOL/L — CRITICAL HIGH (ref 3.5–5.3)
PROT FLD-MCNC: 1.9 G/DL — SIGNIFICANT CHANGE UP
PROT SERPL-MCNC: 5.8 G/DL — LOW (ref 6–8.3)
PROT SERPL-MCNC: 6.2 G/DL — SIGNIFICANT CHANGE UP (ref 6–8.3)
PROTHROM AB SERPL-ACNC: 14.2 SEC — HIGH (ref 10.5–13.4)
RAPID RVP RESULT: SIGNIFICANT CHANGE UP
RBC # BLD: 3.82 M/UL — LOW (ref 4.2–5.8)
RBC # BLD: 3.85 M/UL — LOW (ref 4.2–5.8)
RBC # BLD: 3.86 M/UL — LOW (ref 4.2–5.8)
RBC # BLD: 3.94 M/UL — LOW (ref 4.2–5.8)
RBC # BLD: 3.94 M/UL — LOW (ref 4.2–5.8)
RBC # BLD: 4 M/UL — LOW (ref 4.2–5.8)
RBC # BLD: 4.07 M/UL — LOW (ref 4.2–5.8)
RBC # BLD: 4.08 M/UL — LOW (ref 4.2–5.8)
RBC # BLD: 4.13 M/UL — LOW (ref 4.2–5.8)
RBC # BLD: 4.14 M/UL — LOW (ref 4.2–5.8)
RBC # BLD: 4.15 M/UL — LOW (ref 4.2–5.8)
RBC # BLD: 4.24 M/UL — SIGNIFICANT CHANGE UP (ref 4.2–5.8)
RBC # FLD: 15.1 % — HIGH (ref 10.3–14.5)
RBC # FLD: 15.4 % — HIGH (ref 10.3–14.5)
RBC # FLD: 15.5 % — HIGH (ref 10.3–14.5)
RBC # FLD: 15.5 % — HIGH (ref 10.3–14.5)
RBC # FLD: 15.6 % — HIGH (ref 10.3–14.5)
RBC # FLD: 15.6 % — HIGH (ref 10.3–14.5)
RBC # FLD: 15.7 % — HIGH (ref 10.3–14.5)
RBC # FLD: 15.7 % — HIGH (ref 10.3–14.5)
RBC # FLD: 15.9 % — HIGH (ref 10.3–14.5)
RBC # FLD: 16 % — HIGH (ref 10.3–14.5)
RBC # FLD: 16.2 % — HIGH (ref 10.3–14.5)
RBC # FLD: 16.3 % — HIGH (ref 10.3–14.5)
RBC BLD AUTO: SIGNIFICANT CHANGE UP
RCV VOL RI: 8000 /UL — HIGH (ref 0–0)
S AUREUS DNA NOSE QL NAA+PROBE: DETECTED
S AUREUS DNA NOSE QL NAA+PROBE: DETECTED
SAO2 % BLDV: 86.8 % — SIGNIFICANT CHANGE UP (ref 67–88)
SARS-COV-2 RNA SPEC QL NAA+PROBE: SIGNIFICANT CHANGE UP
SCHISTOCYTES BLD QL AUTO: SLIGHT — SIGNIFICANT CHANGE UP
SODIUM SERPL-SCNC: 130 MMOL/L — LOW (ref 135–145)
SODIUM SERPL-SCNC: 130 MMOL/L — LOW (ref 135–145)
SODIUM SERPL-SCNC: 131 MMOL/L — LOW (ref 135–145)
SODIUM SERPL-SCNC: 132 MMOL/L — LOW (ref 135–145)
SODIUM SERPL-SCNC: 132 MMOL/L — LOW (ref 135–145)
SODIUM SERPL-SCNC: 133 MMOL/L — LOW (ref 135–145)
SODIUM SERPL-SCNC: 134 MMOL/L — LOW (ref 135–145)
SODIUM SERPL-SCNC: 134 MMOL/L — LOW (ref 135–145)
SODIUM SERPL-SCNC: 135 MMOL/L — SIGNIFICANT CHANGE UP (ref 135–145)
SODIUM SERPL-SCNC: 135 MMOL/L — SIGNIFICANT CHANGE UP (ref 135–145)
SODIUM SERPL-SCNC: 136 MMOL/L — SIGNIFICANT CHANGE UP (ref 135–145)
SODIUM SERPL-SCNC: 140 MMOL/L — SIGNIFICANT CHANGE UP (ref 135–145)
SPECIMEN SOURCE: SIGNIFICANT CHANGE UP
TIBC SERPL-MCNC: 147 UG/DL — LOW (ref 220–430)
TM INTERPRETATION: SIGNIFICANT CHANGE UP
TOTAL NUCLEATED CELL COUNT, BODY FLUID: 137 /UL — SIGNIFICANT CHANGE UP
TRANSFERRIN SERPL-MCNC: 115 MG/DL — LOW (ref 200–360)
TRIGL SERPL-MCNC: 115 MG/DL — SIGNIFICANT CHANGE UP
TSH SERPL-MCNC: 10.9 UIU/ML — HIGH (ref 0.27–4.2)
TUBE TYPE: SIGNIFICANT CHANGE UP
UIBC SERPL-MCNC: 88 UG/DL — LOW (ref 110–370)
VIT B12 SERPL-MCNC: >2000 PG/ML — HIGH (ref 232–1245)
WBC # BLD: 4.48 K/UL — SIGNIFICANT CHANGE UP (ref 3.8–10.5)
WBC # BLD: 4.55 K/UL — SIGNIFICANT CHANGE UP (ref 3.8–10.5)
WBC # BLD: 4.59 K/UL — SIGNIFICANT CHANGE UP (ref 3.8–10.5)
WBC # BLD: 4.66 K/UL — SIGNIFICANT CHANGE UP (ref 3.8–10.5)
WBC # BLD: 4.73 K/UL — SIGNIFICANT CHANGE UP (ref 3.8–10.5)
WBC # BLD: 4.86 K/UL — SIGNIFICANT CHANGE UP (ref 3.8–10.5)
WBC # BLD: 4.88 K/UL — SIGNIFICANT CHANGE UP (ref 3.8–10.5)
WBC # BLD: 5.9 K/UL — SIGNIFICANT CHANGE UP (ref 3.8–10.5)
WBC # BLD: 5.91 K/UL — SIGNIFICANT CHANGE UP (ref 3.8–10.5)
WBC # BLD: 6.52 K/UL — SIGNIFICANT CHANGE UP (ref 3.8–10.5)
WBC # BLD: 7.88 K/UL — SIGNIFICANT CHANGE UP (ref 3.8–10.5)
WBC # BLD: 8.49 K/UL — SIGNIFICANT CHANGE UP (ref 3.8–10.5)
WBC # FLD AUTO: 4.48 K/UL — SIGNIFICANT CHANGE UP (ref 3.8–10.5)
WBC # FLD AUTO: 4.55 K/UL — SIGNIFICANT CHANGE UP (ref 3.8–10.5)
WBC # FLD AUTO: 4.59 K/UL — SIGNIFICANT CHANGE UP (ref 3.8–10.5)
WBC # FLD AUTO: 4.66 K/UL — SIGNIFICANT CHANGE UP (ref 3.8–10.5)
WBC # FLD AUTO: 4.73 K/UL — SIGNIFICANT CHANGE UP (ref 3.8–10.5)
WBC # FLD AUTO: 4.86 K/UL — SIGNIFICANT CHANGE UP (ref 3.8–10.5)
WBC # FLD AUTO: 4.88 K/UL — SIGNIFICANT CHANGE UP (ref 3.8–10.5)
WBC # FLD AUTO: 5.9 K/UL — SIGNIFICANT CHANGE UP (ref 3.8–10.5)
WBC # FLD AUTO: 5.91 K/UL — SIGNIFICANT CHANGE UP (ref 3.8–10.5)
WBC # FLD AUTO: 6.52 K/UL — SIGNIFICANT CHANGE UP (ref 3.8–10.5)
WBC # FLD AUTO: 7.88 K/UL — SIGNIFICANT CHANGE UP (ref 3.8–10.5)
WBC # FLD AUTO: 8.49 K/UL — SIGNIFICANT CHANGE UP (ref 3.8–10.5)

## 2023-01-01 PROCEDURE — 93294 REM INTERROG EVL PM/LDLS PM: CPT

## 2023-01-01 PROCEDURE — 93296 REM INTERROG EVL PM/IDS: CPT

## 2023-01-01 RX ORDER — PANTOPRAZOLE SODIUM 20 MG/1
40 TABLET, DELAYED RELEASE ORAL
Refills: 0 | Status: DISCONTINUED | OUTPATIENT
Start: 2023-01-01 | End: 2023-01-01

## 2023-01-01 RX ORDER — AZITHROMYCIN 500 MG/1
500 TABLET, FILM COATED ORAL ONCE
Refills: 0 | Status: COMPLETED | OUTPATIENT
Start: 2023-01-01 | End: 2023-01-01

## 2023-01-01 RX ORDER — METOPROLOL TARTRATE 50 MG
50 TABLET ORAL EVERY 12 HOURS
Refills: 0 | Status: DISCONTINUED | OUTPATIENT
Start: 2023-01-01 | End: 2023-01-01

## 2023-01-01 RX ORDER — MIDODRINE HYDROCHLORIDE 2.5 MG/1
10 TABLET ORAL
Refills: 0 | Status: DISCONTINUED | OUTPATIENT
Start: 2023-01-01 | End: 2023-01-01

## 2023-01-01 RX ORDER — NYSTATIN 500MM UNIT
500000 POWDER (EA) MISCELLANEOUS
Refills: 0 | Status: DISCONTINUED | OUTPATIENT
Start: 2023-01-01 | End: 2023-01-01

## 2023-01-01 RX ORDER — PANTOPRAZOLE SODIUM 20 MG/1
40 TABLET, DELAYED RELEASE ORAL DAILY
Refills: 0 | Status: DISCONTINUED | OUTPATIENT
Start: 2023-01-01 | End: 2023-01-01

## 2023-01-01 RX ORDER — METOPROLOL TARTRATE 50 MG
100 TABLET ORAL DAILY
Refills: 0 | Status: DISCONTINUED | OUTPATIENT
Start: 2023-01-01 | End: 2023-01-01

## 2023-01-01 RX ORDER — PIPERACILLIN AND TAZOBACTAM 4; .5 G/20ML; G/20ML
3.38 INJECTION, POWDER, LYOPHILIZED, FOR SOLUTION INTRAVENOUS EVERY 12 HOURS
Refills: 0 | Status: DISCONTINUED | OUTPATIENT
Start: 2023-01-01 | End: 2023-01-01

## 2023-01-01 RX ORDER — CEFAZOLIN SODIUM 1 G
VIAL (EA) INJECTION
Refills: 0 | Status: COMPLETED | OUTPATIENT
Start: 2023-01-01 | End: 2023-01-01

## 2023-01-01 RX ORDER — PIPERACILLIN AND TAZOBACTAM 4; .5 G/20ML; G/20ML
3.38 INJECTION, POWDER, LYOPHILIZED, FOR SOLUTION INTRAVENOUS ONCE
Refills: 0 | Status: COMPLETED | OUTPATIENT
Start: 2023-01-01 | End: 2023-01-01

## 2023-01-01 RX ORDER — POLYETHYLENE GLYCOL 3350 17 G/17G
17 POWDER, FOR SOLUTION ORAL DAILY
Refills: 0 | Status: DISCONTINUED | OUTPATIENT
Start: 2023-01-01 | End: 2023-01-01

## 2023-01-01 RX ORDER — AZITHROMYCIN 500 MG/1
500 TABLET, FILM COATED ORAL EVERY 24 HOURS
Refills: 0 | Status: COMPLETED | OUTPATIENT
Start: 2023-01-01 | End: 2023-01-01

## 2023-01-01 RX ORDER — INSULIN LISPRO 100/ML
VIAL (ML) SUBCUTANEOUS
Refills: 0 | Status: DISCONTINUED | OUTPATIENT
Start: 2023-01-01 | End: 2023-01-01

## 2023-01-01 RX ORDER — CEFAZOLIN SODIUM 1 G
500 VIAL (EA) INJECTION EVERY 24 HOURS
Refills: 0 | Status: COMPLETED | OUTPATIENT
Start: 2023-01-01 | End: 2023-01-01

## 2023-01-01 RX ORDER — MIDODRINE HYDROCHLORIDE 2.5 MG/1
10 TABLET ORAL ONCE
Refills: 0 | Status: COMPLETED | OUTPATIENT
Start: 2023-01-01 | End: 2023-01-01

## 2023-01-01 RX ORDER — GLUCAGON INJECTION, SOLUTION 0.5 MG/.1ML
1 INJECTION, SOLUTION SUBCUTANEOUS ONCE
Refills: 0 | Status: DISCONTINUED | OUTPATIENT
Start: 2023-01-01 | End: 2023-01-01

## 2023-01-01 RX ORDER — METOPROLOL TARTRATE 50 MG
1 TABLET ORAL
Refills: 0 | DISCHARGE

## 2023-01-01 RX ORDER — CALCIUM ACETATE 667 MG
1 TABLET ORAL
Refills: 0 | DISCHARGE

## 2023-01-01 RX ORDER — DEXTROSE 50 % IN WATER 50 %
15 SYRINGE (ML) INTRAVENOUS ONCE
Refills: 0 | Status: DISCONTINUED | OUTPATIENT
Start: 2023-01-01 | End: 2023-01-01

## 2023-01-01 RX ORDER — SODIUM CHLORIDE 9 MG/ML
1000 INJECTION, SOLUTION INTRAVENOUS
Refills: 0 | Status: DISCONTINUED | OUTPATIENT
Start: 2023-01-01 | End: 2023-01-01

## 2023-01-01 RX ORDER — MIDODRINE HYDROCHLORIDE 2.5 MG/1
1 TABLET ORAL
Qty: 0 | Refills: 0 | DISCHARGE
Start: 2023-01-01

## 2023-01-01 RX ORDER — CEFAZOLIN SODIUM 1 G
500 VIAL (EA) INJECTION ONCE
Refills: 0 | Status: COMPLETED | OUTPATIENT
Start: 2023-01-01 | End: 2023-01-01

## 2023-01-01 RX ORDER — CHLORHEXIDINE GLUCONATE 213 G/1000ML
1 SOLUTION TOPICAL DAILY
Refills: 0 | Status: DISCONTINUED | OUTPATIENT
Start: 2023-01-01 | End: 2023-01-01

## 2023-01-01 RX ORDER — FINASTERIDE 5 MG/1
1 TABLET, FILM COATED ORAL
Refills: 0 | DISCHARGE

## 2023-01-01 RX ORDER — APIXABAN 2.5 MG/1
1 TABLET, FILM COATED ORAL
Refills: 0 | DISCHARGE

## 2023-01-01 RX ORDER — METOPROLOL TARTRATE 50 MG
5 TABLET ORAL ONCE
Refills: 0 | Status: COMPLETED | OUTPATIENT
Start: 2023-01-01 | End: 2023-01-01

## 2023-01-01 RX ORDER — METOPROLOL TARTRATE 50 MG
1 TABLET ORAL
Qty: 0 | Refills: 0 | DISCHARGE
Start: 2023-01-01

## 2023-01-01 RX ORDER — DEXTROSE 50 % IN WATER 50 %
25 SYRINGE (ML) INTRAVENOUS ONCE
Refills: 0 | Status: DISCONTINUED | OUTPATIENT
Start: 2023-01-01 | End: 2023-01-01

## 2023-01-01 RX ORDER — HYDRALAZINE HCL 50 MG
1 TABLET ORAL
Refills: 0 | DISCHARGE

## 2023-01-01 RX ORDER — ATORVASTATIN CALCIUM 80 MG/1
40 TABLET, FILM COATED ORAL AT BEDTIME
Refills: 0 | Status: DISCONTINUED | OUTPATIENT
Start: 2023-01-01 | End: 2023-01-01

## 2023-01-01 RX ORDER — LINAGLIPTIN 5 MG/1
1 TABLET, FILM COATED ORAL
Refills: 0 | DISCHARGE

## 2023-01-01 RX ORDER — NYSTATIN 500MM UNIT
5 POWDER (EA) MISCELLANEOUS
Qty: 0 | Refills: 0 | DISCHARGE
Start: 2023-01-01

## 2023-01-01 RX ORDER — APIXABAN 2.5 MG/1
2.5 TABLET, FILM COATED ORAL EVERY 12 HOURS
Refills: 0 | Status: DISCONTINUED | OUTPATIENT
Start: 2023-01-01 | End: 2023-01-01

## 2023-01-01 RX ORDER — SODIUM CHLORIDE 9 MG/ML
500 INJECTION INTRAMUSCULAR; INTRAVENOUS; SUBCUTANEOUS ONCE
Refills: 0 | Status: DISCONTINUED | OUTPATIENT
Start: 2023-01-01 | End: 2023-01-01

## 2023-01-01 RX ORDER — FINASTERIDE 5 MG/1
5 TABLET, FILM COATED ORAL DAILY
Refills: 0 | Status: DISCONTINUED | OUTPATIENT
Start: 2023-01-01 | End: 2023-01-01

## 2023-01-01 RX ORDER — VANCOMYCIN HCL 1 G
1000 VIAL (EA) INTRAVENOUS ONCE
Refills: 0 | Status: COMPLETED | OUTPATIENT
Start: 2023-01-01 | End: 2023-01-01

## 2023-01-01 RX ORDER — PANTOPRAZOLE SODIUM 20 MG/1
0 TABLET, DELAYED RELEASE ORAL
Qty: 0 | Refills: 0 | DISCHARGE
Start: 2023-01-01

## 2023-01-01 RX ORDER — CALCIUM ACETATE 667 MG
667 TABLET ORAL
Refills: 0 | Status: DISCONTINUED | OUTPATIENT
Start: 2023-01-01 | End: 2023-01-01

## 2023-01-01 RX ORDER — ATORVASTATIN CALCIUM 80 MG/1
1 TABLET, FILM COATED ORAL
Refills: 0 | DISCHARGE

## 2023-01-01 RX ADMIN — Medication 1 TABLET(S): at 13:17

## 2023-01-01 RX ADMIN — Medication 2: at 17:09

## 2023-01-01 RX ADMIN — Medication 500000 UNIT(S): at 00:02

## 2023-01-01 RX ADMIN — PANTOPRAZOLE SODIUM 40 MILLIGRAM(S): 20 TABLET, DELAYED RELEASE ORAL at 17:47

## 2023-01-01 RX ADMIN — FINASTERIDE 5 MILLIGRAM(S): 5 TABLET, FILM COATED ORAL at 13:17

## 2023-01-01 RX ADMIN — MIDODRINE HYDROCHLORIDE 10 MILLIGRAM(S): 2.5 TABLET ORAL at 14:40

## 2023-01-01 RX ADMIN — Medication 500000 UNIT(S): at 05:10

## 2023-01-01 RX ADMIN — Medication 667 MILLIGRAM(S): at 08:31

## 2023-01-01 RX ADMIN — MIDODRINE HYDROCHLORIDE 10 MILLIGRAM(S): 2.5 TABLET ORAL at 08:41

## 2023-01-01 RX ADMIN — PANTOPRAZOLE SODIUM 40 MILLIGRAM(S): 20 TABLET, DELAYED RELEASE ORAL at 13:33

## 2023-01-01 RX ADMIN — MIDODRINE HYDROCHLORIDE 10 MILLIGRAM(S): 2.5 TABLET ORAL at 08:18

## 2023-01-01 RX ADMIN — PANTOPRAZOLE SODIUM 40 MILLIGRAM(S): 20 TABLET, DELAYED RELEASE ORAL at 17:06

## 2023-01-01 RX ADMIN — Medication 667 MILLIGRAM(S): at 08:34

## 2023-01-01 RX ADMIN — Medication 50 MILLIGRAM(S): at 18:51

## 2023-01-01 RX ADMIN — Medication 500000 UNIT(S): at 23:13

## 2023-01-01 RX ADMIN — POLYETHYLENE GLYCOL 3350 17 GRAM(S): 17 POWDER, FOR SOLUTION ORAL at 12:13

## 2023-01-01 RX ADMIN — ATORVASTATIN CALCIUM 40 MILLIGRAM(S): 80 TABLET, FILM COATED ORAL at 21:29

## 2023-01-01 RX ADMIN — Medication 667 MILLIGRAM(S): at 17:06

## 2023-01-01 RX ADMIN — Medication 1 TABLET(S): at 13:38

## 2023-01-01 RX ADMIN — Medication 667 MILLIGRAM(S): at 08:38

## 2023-01-01 RX ADMIN — Medication 500000 UNIT(S): at 23:30

## 2023-01-01 RX ADMIN — CHLORHEXIDINE GLUCONATE 1 APPLICATION(S): 213 SOLUTION TOPICAL at 12:12

## 2023-01-01 RX ADMIN — Medication 667 MILLIGRAM(S): at 12:09

## 2023-01-01 RX ADMIN — APIXABAN 2.5 MILLIGRAM(S): 2.5 TABLET, FILM COATED ORAL at 17:58

## 2023-01-01 RX ADMIN — ATORVASTATIN CALCIUM 40 MILLIGRAM(S): 80 TABLET, FILM COATED ORAL at 21:52

## 2023-01-01 RX ADMIN — FINASTERIDE 5 MILLIGRAM(S): 5 TABLET, FILM COATED ORAL at 12:12

## 2023-01-01 RX ADMIN — Medication 667 MILLIGRAM(S): at 12:52

## 2023-01-01 RX ADMIN — CHLORHEXIDINE GLUCONATE 1 APPLICATION(S): 213 SOLUTION TOPICAL at 12:14

## 2023-01-01 RX ADMIN — APIXABAN 2.5 MILLIGRAM(S): 2.5 TABLET, FILM COATED ORAL at 17:18

## 2023-01-01 RX ADMIN — ATORVASTATIN CALCIUM 40 MILLIGRAM(S): 80 TABLET, FILM COATED ORAL at 22:16

## 2023-01-01 RX ADMIN — FINASTERIDE 5 MILLIGRAM(S): 5 TABLET, FILM COATED ORAL at 12:10

## 2023-01-01 RX ADMIN — APIXABAN 2.5 MILLIGRAM(S): 2.5 TABLET, FILM COATED ORAL at 17:14

## 2023-01-01 RX ADMIN — PANTOPRAZOLE SODIUM 40 MILLIGRAM(S): 20 TABLET, DELAYED RELEASE ORAL at 05:53

## 2023-01-01 RX ADMIN — Medication 1 TABLET(S): at 12:53

## 2023-01-01 RX ADMIN — PIPERACILLIN AND TAZOBACTAM 200 GRAM(S): 4; .5 INJECTION, POWDER, LYOPHILIZED, FOR SOLUTION INTRAVENOUS at 17:46

## 2023-01-01 RX ADMIN — Medication 50 MILLIGRAM(S): at 17:58

## 2023-01-01 RX ADMIN — PANTOPRAZOLE SODIUM 40 MILLIGRAM(S): 20 TABLET, DELAYED RELEASE ORAL at 12:09

## 2023-01-01 RX ADMIN — FINASTERIDE 5 MILLIGRAM(S): 5 TABLET, FILM COATED ORAL at 13:32

## 2023-01-01 RX ADMIN — Medication 1: at 16:50

## 2023-01-01 RX ADMIN — Medication 500000 UNIT(S): at 17:27

## 2023-01-01 RX ADMIN — Medication 500000 UNIT(S): at 12:09

## 2023-01-01 RX ADMIN — CHLORHEXIDINE GLUCONATE 1 APPLICATION(S): 213 SOLUTION TOPICAL at 07:00

## 2023-01-01 RX ADMIN — FINASTERIDE 5 MILLIGRAM(S): 5 TABLET, FILM COATED ORAL at 12:53

## 2023-01-01 RX ADMIN — Medication 1: at 13:04

## 2023-01-01 RX ADMIN — PANTOPRAZOLE SODIUM 40 MILLIGRAM(S): 20 TABLET, DELAYED RELEASE ORAL at 05:26

## 2023-01-01 RX ADMIN — Medication 667 MILLIGRAM(S): at 12:12

## 2023-01-01 RX ADMIN — PANTOPRAZOLE SODIUM 40 MILLIGRAM(S): 20 TABLET, DELAYED RELEASE ORAL at 13:02

## 2023-01-01 RX ADMIN — Medication 2: at 16:44

## 2023-01-01 RX ADMIN — Medication 2: at 17:03

## 2023-01-01 RX ADMIN — Medication 50 MILLIGRAM(S): at 17:36

## 2023-01-01 RX ADMIN — Medication 2: at 17:29

## 2023-01-01 RX ADMIN — AZITHROMYCIN 255 MILLIGRAM(S): 500 TABLET, FILM COATED ORAL at 07:05

## 2023-01-01 RX ADMIN — Medication 50 MILLIGRAM(S): at 17:27

## 2023-01-01 RX ADMIN — FINASTERIDE 5 MILLIGRAM(S): 5 TABLET, FILM COATED ORAL at 11:36

## 2023-01-01 RX ADMIN — Medication 500000 UNIT(S): at 17:30

## 2023-01-01 RX ADMIN — Medication 2: at 12:36

## 2023-01-01 RX ADMIN — Medication 667 MILLIGRAM(S): at 13:13

## 2023-01-01 RX ADMIN — Medication 1 TABLET(S): at 12:10

## 2023-01-01 RX ADMIN — APIXABAN 2.5 MILLIGRAM(S): 2.5 TABLET, FILM COATED ORAL at 17:38

## 2023-01-01 RX ADMIN — Medication 2: at 17:14

## 2023-01-01 RX ADMIN — Medication 1 TABLET(S): at 13:32

## 2023-01-01 RX ADMIN — Medication 1 TABLET(S): at 14:03

## 2023-01-01 RX ADMIN — CHLORHEXIDINE GLUCONATE 1 APPLICATION(S): 213 SOLUTION TOPICAL at 13:13

## 2023-01-01 RX ADMIN — Medication 667 MILLIGRAM(S): at 09:39

## 2023-01-01 RX ADMIN — Medication 100 MILLIGRAM(S): at 14:19

## 2023-01-01 RX ADMIN — Medication 1 TABLET(S): at 12:50

## 2023-01-01 RX ADMIN — PANTOPRAZOLE SODIUM 40 MILLIGRAM(S): 20 TABLET, DELAYED RELEASE ORAL at 13:16

## 2023-01-01 RX ADMIN — APIXABAN 2.5 MILLIGRAM(S): 2.5 TABLET, FILM COATED ORAL at 17:09

## 2023-01-01 RX ADMIN — Medication 667 MILLIGRAM(S): at 17:58

## 2023-01-01 RX ADMIN — POLYETHYLENE GLYCOL 3350 17 GRAM(S): 17 POWDER, FOR SOLUTION ORAL at 13:16

## 2023-01-01 RX ADMIN — Medication 667 MILLIGRAM(S): at 13:10

## 2023-01-01 RX ADMIN — Medication 1: at 17:16

## 2023-01-01 RX ADMIN — PANTOPRAZOLE SODIUM 40 MILLIGRAM(S): 20 TABLET, DELAYED RELEASE ORAL at 13:54

## 2023-01-01 RX ADMIN — Medication 2: at 17:25

## 2023-01-01 RX ADMIN — POLYETHYLENE GLYCOL 3350 17 GRAM(S): 17 POWDER, FOR SOLUTION ORAL at 14:03

## 2023-01-01 RX ADMIN — Medication 500000 UNIT(S): at 23:43

## 2023-01-01 RX ADMIN — PANTOPRAZOLE SODIUM 40 MILLIGRAM(S): 20 TABLET, DELAYED RELEASE ORAL at 13:11

## 2023-01-01 RX ADMIN — Medication 1 TABLET(S): at 12:12

## 2023-01-01 RX ADMIN — APIXABAN 2.5 MILLIGRAM(S): 2.5 TABLET, FILM COATED ORAL at 06:13

## 2023-01-01 RX ADMIN — AZITHROMYCIN 255 MILLIGRAM(S): 500 TABLET, FILM COATED ORAL at 06:17

## 2023-01-01 RX ADMIN — Medication 667 MILLIGRAM(S): at 16:45

## 2023-01-01 RX ADMIN — APIXABAN 2.5 MILLIGRAM(S): 2.5 TABLET, FILM COATED ORAL at 17:29

## 2023-01-01 RX ADMIN — Medication 667 MILLIGRAM(S): at 12:50

## 2023-01-01 RX ADMIN — ATORVASTATIN CALCIUM 40 MILLIGRAM(S): 80 TABLET, FILM COATED ORAL at 23:04

## 2023-01-01 RX ADMIN — Medication 667 MILLIGRAM(S): at 17:38

## 2023-01-01 RX ADMIN — Medication 1 TABLET(S): at 12:07

## 2023-01-01 RX ADMIN — Medication 100 MILLIGRAM(S): at 13:39

## 2023-01-01 RX ADMIN — Medication 667 MILLIGRAM(S): at 09:15

## 2023-01-01 RX ADMIN — POLYETHYLENE GLYCOL 3350 17 GRAM(S): 17 POWDER, FOR SOLUTION ORAL at 11:36

## 2023-01-01 RX ADMIN — CHLORHEXIDINE GLUCONATE 1 APPLICATION(S): 213 SOLUTION TOPICAL at 14:02

## 2023-01-01 RX ADMIN — ATORVASTATIN CALCIUM 40 MILLIGRAM(S): 80 TABLET, FILM COATED ORAL at 21:54

## 2023-01-01 RX ADMIN — PIPERACILLIN AND TAZOBACTAM 25 GRAM(S): 4; .5 INJECTION, POWDER, LYOPHILIZED, FOR SOLUTION INTRAVENOUS at 02:37

## 2023-01-01 RX ADMIN — Medication 667 MILLIGRAM(S): at 12:07

## 2023-01-01 RX ADMIN — Medication 667 MILLIGRAM(S): at 07:48

## 2023-01-01 RX ADMIN — Medication 3: at 12:48

## 2023-01-01 RX ADMIN — Medication 2: at 16:41

## 2023-01-01 RX ADMIN — APIXABAN 2.5 MILLIGRAM(S): 2.5 TABLET, FILM COATED ORAL at 05:25

## 2023-01-01 RX ADMIN — CHLORHEXIDINE GLUCONATE 1 APPLICATION(S): 213 SOLUTION TOPICAL at 11:35

## 2023-01-01 RX ADMIN — CHLORHEXIDINE GLUCONATE 1 APPLICATION(S): 213 SOLUTION TOPICAL at 12:50

## 2023-01-01 RX ADMIN — Medication 500000 UNIT(S): at 05:35

## 2023-01-01 RX ADMIN — Medication 667 MILLIGRAM(S): at 08:36

## 2023-01-01 RX ADMIN — Medication 667 MILLIGRAM(S): at 14:13

## 2023-01-01 RX ADMIN — Medication 2: at 16:35

## 2023-01-01 RX ADMIN — Medication 667 MILLIGRAM(S): at 17:14

## 2023-01-01 RX ADMIN — PANTOPRAZOLE SODIUM 40 MILLIGRAM(S): 20 TABLET, DELAYED RELEASE ORAL at 06:18

## 2023-01-01 RX ADMIN — PANTOPRAZOLE SODIUM 40 MILLIGRAM(S): 20 TABLET, DELAYED RELEASE ORAL at 11:30

## 2023-01-01 RX ADMIN — Medication 500000 UNIT(S): at 01:14

## 2023-01-01 RX ADMIN — Medication 500000 UNIT(S): at 17:39

## 2023-01-01 RX ADMIN — CHLORHEXIDINE GLUCONATE 1 APPLICATION(S): 213 SOLUTION TOPICAL at 13:01

## 2023-01-01 RX ADMIN — Medication 667 MILLIGRAM(S): at 09:27

## 2023-01-01 RX ADMIN — FINASTERIDE 5 MILLIGRAM(S): 5 TABLET, FILM COATED ORAL at 13:10

## 2023-01-01 RX ADMIN — Medication 50 MILLIGRAM(S): at 17:15

## 2023-01-01 RX ADMIN — Medication 500000 UNIT(S): at 12:13

## 2023-01-01 RX ADMIN — Medication 50 MILLIGRAM(S): at 05:37

## 2023-01-01 RX ADMIN — PANTOPRAZOLE SODIUM 40 MILLIGRAM(S): 20 TABLET, DELAYED RELEASE ORAL at 17:14

## 2023-01-01 RX ADMIN — Medication 500000 UNIT(S): at 11:30

## 2023-01-01 RX ADMIN — Medication 1 TABLET(S): at 13:11

## 2023-01-01 RX ADMIN — FINASTERIDE 5 MILLIGRAM(S): 5 TABLET, FILM COATED ORAL at 11:30

## 2023-01-01 RX ADMIN — POLYETHYLENE GLYCOL 3350 17 GRAM(S): 17 POWDER, FOR SOLUTION ORAL at 11:30

## 2023-01-01 RX ADMIN — APIXABAN 2.5 MILLIGRAM(S): 2.5 TABLET, FILM COATED ORAL at 17:26

## 2023-01-01 RX ADMIN — Medication 50 MILLIGRAM(S): at 06:14

## 2023-01-01 RX ADMIN — APIXABAN 2.5 MILLIGRAM(S): 2.5 TABLET, FILM COATED ORAL at 05:35

## 2023-01-01 RX ADMIN — Medication 50 MILLIGRAM(S): at 17:20

## 2023-01-01 RX ADMIN — APIXABAN 2.5 MILLIGRAM(S): 2.5 TABLET, FILM COATED ORAL at 06:24

## 2023-01-01 RX ADMIN — ATORVASTATIN CALCIUM 40 MILLIGRAM(S): 80 TABLET, FILM COATED ORAL at 21:49

## 2023-01-01 RX ADMIN — ATORVASTATIN CALCIUM 40 MILLIGRAM(S): 80 TABLET, FILM COATED ORAL at 23:08

## 2023-01-01 RX ADMIN — PANTOPRAZOLE SODIUM 40 MILLIGRAM(S): 20 TABLET, DELAYED RELEASE ORAL at 11:37

## 2023-01-01 RX ADMIN — Medication 50 MILLIGRAM(S): at 06:08

## 2023-01-01 RX ADMIN — Medication 500000 UNIT(S): at 05:17

## 2023-01-01 RX ADMIN — APIXABAN 2.5 MILLIGRAM(S): 2.5 TABLET, FILM COATED ORAL at 18:07

## 2023-01-01 RX ADMIN — APIXABAN 2.5 MILLIGRAM(S): 2.5 TABLET, FILM COATED ORAL at 17:24

## 2023-01-01 RX ADMIN — PANTOPRAZOLE SODIUM 40 MILLIGRAM(S): 20 TABLET, DELAYED RELEASE ORAL at 12:13

## 2023-01-01 RX ADMIN — FINASTERIDE 5 MILLIGRAM(S): 5 TABLET, FILM COATED ORAL at 11:29

## 2023-01-01 RX ADMIN — PANTOPRAZOLE SODIUM 40 MILLIGRAM(S): 20 TABLET, DELAYED RELEASE ORAL at 12:50

## 2023-01-01 RX ADMIN — Medication 500000 UNIT(S): at 17:24

## 2023-01-01 RX ADMIN — Medication 500000 UNIT(S): at 13:11

## 2023-01-01 RX ADMIN — CHLORHEXIDINE GLUCONATE 1 APPLICATION(S): 213 SOLUTION TOPICAL at 13:00

## 2023-01-01 RX ADMIN — APIXABAN 2.5 MILLIGRAM(S): 2.5 TABLET, FILM COATED ORAL at 17:21

## 2023-01-01 RX ADMIN — Medication 500000 UNIT(S): at 06:12

## 2023-01-01 RX ADMIN — Medication 500000 UNIT(S): at 13:01

## 2023-01-01 RX ADMIN — Medication 50 MILLIGRAM(S): at 05:17

## 2023-01-01 RX ADMIN — Medication 50 MILLIGRAM(S): at 17:38

## 2023-01-01 RX ADMIN — Medication 5 MILLIGRAM(S): at 23:22

## 2023-01-01 RX ADMIN — PANTOPRAZOLE SODIUM 40 MILLIGRAM(S): 20 TABLET, DELAYED RELEASE ORAL at 11:26

## 2023-01-01 RX ADMIN — Medication 500000 UNIT(S): at 05:26

## 2023-01-01 RX ADMIN — Medication 667 MILLIGRAM(S): at 17:10

## 2023-01-01 RX ADMIN — ATORVASTATIN CALCIUM 40 MILLIGRAM(S): 80 TABLET, FILM COATED ORAL at 21:21

## 2023-01-01 RX ADMIN — AZITHROMYCIN 255 MILLIGRAM(S): 500 TABLET, FILM COATED ORAL at 05:25

## 2023-01-01 RX ADMIN — Medication 500000 UNIT(S): at 11:37

## 2023-01-01 RX ADMIN — Medication 2: at 17:17

## 2023-01-01 RX ADMIN — MIDODRINE HYDROCHLORIDE 10 MILLIGRAM(S): 2.5 TABLET ORAL at 08:34

## 2023-01-01 RX ADMIN — Medication 500000 UNIT(S): at 05:23

## 2023-01-01 RX ADMIN — ATORVASTATIN CALCIUM 40 MILLIGRAM(S): 80 TABLET, FILM COATED ORAL at 21:36

## 2023-01-01 RX ADMIN — APIXABAN 2.5 MILLIGRAM(S): 2.5 TABLET, FILM COATED ORAL at 06:10

## 2023-01-01 RX ADMIN — Medication 500000 UNIT(S): at 17:17

## 2023-01-01 RX ADMIN — Medication 50 MILLIGRAM(S): at 06:24

## 2023-01-01 RX ADMIN — Medication 100 MILLIGRAM(S): at 09:39

## 2023-01-01 RX ADMIN — APIXABAN 2.5 MILLIGRAM(S): 2.5 TABLET, FILM COATED ORAL at 05:17

## 2023-01-01 RX ADMIN — Medication 100 MILLIGRAM(S): at 08:02

## 2023-01-01 RX ADMIN — Medication 1: at 12:11

## 2023-01-01 RX ADMIN — Medication 50 MILLIGRAM(S): at 06:09

## 2023-01-01 RX ADMIN — APIXABAN 2.5 MILLIGRAM(S): 2.5 TABLET, FILM COATED ORAL at 08:34

## 2023-01-01 RX ADMIN — POLYETHYLENE GLYCOL 3350 17 GRAM(S): 17 POWDER, FOR SOLUTION ORAL at 12:08

## 2023-01-01 RX ADMIN — ATORVASTATIN CALCIUM 40 MILLIGRAM(S): 80 TABLET, FILM COATED ORAL at 21:14

## 2023-01-01 RX ADMIN — Medication 250 MILLIGRAM(S): at 03:30

## 2023-01-01 RX ADMIN — MIDODRINE HYDROCHLORIDE 10 MILLIGRAM(S): 2.5 TABLET ORAL at 07:48

## 2023-01-01 RX ADMIN — Medication 1: at 08:01

## 2023-01-01 RX ADMIN — Medication 50 MILLIGRAM(S): at 05:26

## 2023-01-01 RX ADMIN — AZITHROMYCIN 255 MILLIGRAM(S): 500 TABLET, FILM COATED ORAL at 06:21

## 2023-01-01 RX ADMIN — Medication 500000 UNIT(S): at 23:04

## 2023-01-01 RX ADMIN — POLYETHYLENE GLYCOL 3350 17 GRAM(S): 17 POWDER, FOR SOLUTION ORAL at 17:07

## 2023-01-01 RX ADMIN — Medication 667 MILLIGRAM(S): at 12:36

## 2023-01-01 RX ADMIN — Medication 50 MILLIGRAM(S): at 05:54

## 2023-01-01 RX ADMIN — Medication 667 MILLIGRAM(S): at 17:17

## 2023-01-01 RX ADMIN — Medication 50 MILLIGRAM(S): at 05:23

## 2023-01-01 RX ADMIN — Medication 1 TABLET(S): at 11:26

## 2023-01-01 RX ADMIN — APIXABAN 2.5 MILLIGRAM(S): 2.5 TABLET, FILM COATED ORAL at 05:26

## 2023-01-01 RX ADMIN — ATORVASTATIN CALCIUM 40 MILLIGRAM(S): 80 TABLET, FILM COATED ORAL at 22:09

## 2023-01-01 RX ADMIN — Medication 500000 UNIT(S): at 18:08

## 2023-01-01 RX ADMIN — PIPERACILLIN AND TAZOBACTAM 200 GRAM(S): 4; .5 INJECTION, POWDER, LYOPHILIZED, FOR SOLUTION INTRAVENOUS at 02:55

## 2023-01-01 RX ADMIN — ATORVASTATIN CALCIUM 40 MILLIGRAM(S): 80 TABLET, FILM COATED ORAL at 21:18

## 2023-01-01 RX ADMIN — Medication 1 TABLET(S): at 11:36

## 2023-01-01 RX ADMIN — FINASTERIDE 5 MILLIGRAM(S): 5 TABLET, FILM COATED ORAL at 12:07

## 2023-01-01 RX ADMIN — PANTOPRAZOLE SODIUM 40 MILLIGRAM(S): 20 TABLET, DELAYED RELEASE ORAL at 12:53

## 2023-01-01 RX ADMIN — Medication 667 MILLIGRAM(S): at 17:11

## 2023-01-01 RX ADMIN — CHLORHEXIDINE GLUCONATE 1 APPLICATION(S): 213 SOLUTION TOPICAL at 11:29

## 2023-01-01 RX ADMIN — Medication 5 MILLIGRAM(S): at 03:30

## 2023-01-01 RX ADMIN — CHLORHEXIDINE GLUCONATE 1 APPLICATION(S): 213 SOLUTION TOPICAL at 14:00

## 2023-01-01 RX ADMIN — CHLORHEXIDINE GLUCONATE 1 APPLICATION(S): 213 SOLUTION TOPICAL at 11:27

## 2023-01-01 RX ADMIN — ATORVASTATIN CALCIUM 40 MILLIGRAM(S): 80 TABLET, FILM COATED ORAL at 21:19

## 2023-01-01 RX ADMIN — Medication 50 MILLIGRAM(S): at 17:17

## 2023-01-01 RX ADMIN — Medication 1 TABLET(S): at 11:30

## 2023-01-01 RX ADMIN — FINASTERIDE 5 MILLIGRAM(S): 5 TABLET, FILM COATED ORAL at 13:02

## 2023-01-01 RX ADMIN — Medication 667 MILLIGRAM(S): at 13:23

## 2023-01-01 RX ADMIN — MIDODRINE HYDROCHLORIDE 10 MILLIGRAM(S): 2.5 TABLET ORAL at 08:12

## 2023-01-01 RX ADMIN — Medication 500000 UNIT(S): at 02:16

## 2023-01-01 RX ADMIN — Medication 50 MILLIGRAM(S): at 05:35

## 2023-01-01 RX ADMIN — Medication 667 MILLIGRAM(S): at 13:01

## 2023-01-01 RX ADMIN — Medication 667 MILLIGRAM(S): at 08:04

## 2023-01-01 RX ADMIN — Medication 667 MILLIGRAM(S): at 14:04

## 2023-01-01 RX ADMIN — Medication 667 MILLIGRAM(S): at 17:29

## 2023-01-01 RX ADMIN — Medication 100 MILLIGRAM(S): at 10:28

## 2023-01-01 RX ADMIN — CHLORHEXIDINE GLUCONATE 1 APPLICATION(S): 213 SOLUTION TOPICAL at 13:09

## 2023-01-01 RX ADMIN — Medication 1: at 12:08

## 2023-01-01 RX ADMIN — Medication 50 MILLIGRAM(S): at 22:16

## 2023-01-01 RX ADMIN — CHLORHEXIDINE GLUCONATE 1 APPLICATION(S): 213 SOLUTION TOPICAL at 12:09

## 2023-01-01 RX ADMIN — MIDODRINE HYDROCHLORIDE 10 MILLIGRAM(S): 2.5 TABLET ORAL at 07:50

## 2023-01-01 RX ADMIN — FINASTERIDE 5 MILLIGRAM(S): 5 TABLET, FILM COATED ORAL at 11:26

## 2023-01-01 RX ADMIN — Medication 667 MILLIGRAM(S): at 16:35

## 2023-01-01 RX ADMIN — Medication 667 MILLIGRAM(S): at 17:26

## 2023-01-01 RX ADMIN — Medication 1 TABLET(S): at 13:02

## 2023-01-01 RX ADMIN — Medication 667 MILLIGRAM(S): at 08:12

## 2023-01-01 RX ADMIN — Medication 50 MILLIGRAM(S): at 17:10

## 2023-01-01 RX ADMIN — Medication 2: at 12:16

## 2023-01-01 RX ADMIN — FINASTERIDE 5 MILLIGRAM(S): 5 TABLET, FILM COATED ORAL at 14:03

## 2023-01-01 RX ADMIN — APIXABAN 2.5 MILLIGRAM(S): 2.5 TABLET, FILM COATED ORAL at 05:34

## 2023-01-01 RX ADMIN — Medication 100 MILLIGRAM(S): at 05:25

## 2023-01-01 RX ADMIN — PANTOPRAZOLE SODIUM 40 MILLIGRAM(S): 20 TABLET, DELAYED RELEASE ORAL at 12:08

## 2023-01-01 RX ADMIN — APIXABAN 2.5 MILLIGRAM(S): 2.5 TABLET, FILM COATED ORAL at 05:55

## 2023-01-01 RX ADMIN — FINASTERIDE 5 MILLIGRAM(S): 5 TABLET, FILM COATED ORAL at 13:39

## 2023-01-01 RX ADMIN — Medication 2: at 11:37

## 2023-01-01 RX ADMIN — APIXABAN 2.5 MILLIGRAM(S): 2.5 TABLET, FILM COATED ORAL at 05:23

## 2023-01-01 RX ADMIN — Medication 50 MILLIGRAM(S): at 17:07

## 2023-01-01 RX ADMIN — Medication 667 MILLIGRAM(S): at 09:10

## 2023-01-01 RX ADMIN — Medication 1: at 17:24

## 2023-01-01 RX ADMIN — Medication 667 MILLIGRAM(S): at 17:24

## 2023-01-01 RX ADMIN — Medication 667 MILLIGRAM(S): at 13:16

## 2023-01-01 RX ADMIN — ATORVASTATIN CALCIUM 40 MILLIGRAM(S): 80 TABLET, FILM COATED ORAL at 21:37

## 2023-01-01 RX ADMIN — AZITHROMYCIN 255 MILLIGRAM(S): 500 TABLET, FILM COATED ORAL at 05:55

## 2023-01-01 RX ADMIN — Medication 667 MILLIGRAM(S): at 08:02

## 2023-01-01 RX ADMIN — CHLORHEXIDINE GLUCONATE 1 APPLICATION(S): 213 SOLUTION TOPICAL at 11:45

## 2023-01-01 RX ADMIN — APIXABAN 2.5 MILLIGRAM(S): 2.5 TABLET, FILM COATED ORAL at 17:17

## 2023-01-01 RX ADMIN — Medication 667 MILLIGRAM(S): at 18:07

## 2023-01-01 RX ADMIN — FINASTERIDE 5 MILLIGRAM(S): 5 TABLET, FILM COATED ORAL at 12:50

## 2023-01-01 RX ADMIN — Medication 500000 UNIT(S): at 13:16

## 2023-01-01 RX ADMIN — Medication 667 MILLIGRAM(S): at 08:43

## 2023-01-01 RX ADMIN — APIXABAN 2.5 MILLIGRAM(S): 2.5 TABLET, FILM COATED ORAL at 05:10

## 2023-01-01 RX ADMIN — PANTOPRAZOLE SODIUM 40 MILLIGRAM(S): 20 TABLET, DELAYED RELEASE ORAL at 14:03

## 2023-01-01 RX ADMIN — Medication 1: at 13:01

## 2023-01-01 RX ADMIN — Medication 500000 UNIT(S): at 06:09

## 2023-01-01 RX ADMIN — APIXABAN 2.5 MILLIGRAM(S): 2.5 TABLET, FILM COATED ORAL at 17:07

## 2023-01-01 RX ADMIN — Medication 50 MILLIGRAM(S): at 18:08

## 2023-01-01 RX ADMIN — Medication 1: at 08:05

## 2023-01-01 RX ADMIN — Medication 667 MILLIGRAM(S): at 07:50

## 2023-01-01 RX ADMIN — POLYETHYLENE GLYCOL 3350 17 GRAM(S): 17 POWDER, FOR SOLUTION ORAL at 11:29

## 2023-01-15 NOTE — PATIENT PROFILE ADULT - FALL HARM RISK TYPE OF ASSESSMENT
Pt to ED brought by Mom for c/o N/V 3x since 0455 this AM. Pt's Mom states Pt woke up diaphoretic. admission

## 2023-01-25 NOTE — H&P ADULT - PROBLEM SELECTOR PROBLEM 2
For information on Fall & Injury Prevention, visit: [email or web address] OR  [email or web address] OR  https://www.cdc.gov/steadi/patient.html Pleural effusion

## 2023-04-21 NOTE — ED PROVIDER NOTE - PHYSICAL EXAMINATION
GENERAL: no acute distress, non-toxic appearing  HEAD: normocephalic, atraumatic  HEENT: normal conjunctiva, oral mucosa moist, neck supple  CARDIAC: regular rate and rhythm, normal S1 and S2,  no appreciable murmurs  PULM: +crackles bilateral  GI: +diffuse tenderness, abdomen nondistended, soft, no guarding or rebound tenderness  NEURO: alert and oriented x 3, normal speech, moving all extremities   MSK: no visible deformities, no peripheral edema, calf tenderness/redness/swelling  SKIN: no visible rashes, dry, well-perfused  PSYCH: appropriate mood and affect

## 2023-04-21 NOTE — ED PROVIDER NOTE - OBJECTIVE STATEMENT
86 yo M PMHx of ESRD on dialysis Tu/Th/Sa, A fib on Eliquis, hx of appendectomy, hx of pleural effusion (L), presents with SOB since this morning. Patient has also been complaining of left sided abdominal pain. No fevers, chills, nausea, vomiting however patient is increasingly weak and has loss of appetite. No dysuria, no LE swelling. Patient has had dark stools as per wife.

## 2023-04-21 NOTE — ED ADULT NURSE NOTE - NSIMPLEMENTINTERV_GEN_ALL_ED
Implemented All Fall with Harm Risk Interventions:  [place] to call system. Call bell, personal items and telephone within reach. Instruct patient to call for assistance. Room bathroom lighting operational. Non-slip footwear when patient is off stretcher. Physically safe environment: no spills, clutter or unnecessary equipment. Stretcher in lowest position, wheels locked, appropriate side rails in place. Provide visual cue, wrist band, yellow gown, etc. Monitor gait and stability. Monitor for mental status changes and reorient to person, place, and time. Review medications for side effects contributing to fall risk. Reinforce activity limits and safety measures with patient and family. Provide visual clues: red socks.

## 2023-04-21 NOTE — ED ADULT NURSE NOTE - OBJECTIVE STATEMENT
88yo M pmh DM, A-fib on Eliquis, pacemaker, ESRD on HD T/Th/Sa, last got HD yesterday afternoon, presents to ED via EMS from home with daughter and wife present at the bedside. per EMS report pt presents for "failure to thrive" for the last week, states wife called because the pt has been weak and not walking. pt normally ambulates with walker but has not been able to walk as of lately d/t the weakness. per family pt has also not been eating, and has been complaining of abdominal pain and shortness of breath. pt arrives to ED with 20g in R hand placed by EMS PTA and arrives on 2L NC placed by EMS for reported desaturations en route. in ED pt c/o diffuse abdominal pain and states he can not eat. pt appears lethargic and is slow to respond, but is easily arousable and responsive, speaking in full sentences, oriented x4. pt is afebrile rectally, tachy to 110s, sating 100% on 2L, breathing even and unlabored, abdomen soft nondistended + ttp to lower abdomen. pt DOTSON with noted weakness.  pt seen and eval by ED MD. placed on CCM, EKG completed at bedside. Patient undressed and placed into gown, call bell in reach and side rails up for safety. pending CT scan.

## 2023-04-21 NOTE — ED PROVIDER NOTE - ATTENDING CONTRIBUTION TO CARE
pt is a 88 y/o male pt is a 86 y/o male On hemodialysis Tuesday Thursday Saturday last time was yesterday history of appendectomy GI bleed protocol.  Reviewed A-fib pacemaker on Eliquis presenting with shortness of breath that started today for possibly fluid overloaded chest x-ray was ordered.  Prior pleural effusion requiring drainage.  Patient also with diffuse abdominal pain worse in the left lower quadrant history of appendectomy was ordered pending work-up unclear etiology, ct a/p, labs, s/o pending work up. afebrile rectally. afib with rvr rate control.

## 2023-04-22 NOTE — H&P ADULT - PROBLEM SELECTOR PLAN 6
- holding home Tradjenta and continuing w/ ISS and serial FS monitoring  - aspiration precautions, S+S evaluation  - continue home statin

## 2023-04-22 NOTE — H&P ADULT - TIME BILLING
conducting history and physical examination, reviewing diagnostic workup as ordered by the ED, reviewing data from prior hospitalizations in the HIE, discussing with consultants, determining acute diagnoses / plan for continued monitoring and management, and communication with patient and caregivers.

## 2023-04-22 NOTE — H&P ADULT - HISTORY OF PRESENT ILLNESS
ED Presenting Vitals: 98.4F, 110bpm, 170/88, 99% on 2L-NC  ED Course: Metoprolol 5mg IVP x2, IV Vancomycin 1g x1, IV Zosyn 3.375g x1, IV Azithromycin 500mg x1 87yoM w/ PMHx of chronic Afib (on Eliquis, off ASA since hx of PUD/GIB in the past), s/p Micra PPM, non-obstructive CAD, ESRD on HD (Tu/Th/Sa; nephrologist = Dr. Julius Eason and HD center in [place]), DM (on Tradjenta), HLD, who presents from home where he lives with his wife (at baseline able to walk w/ cane and assistance though progressively requiring more and more assistance w/ ADLs), with complaints of SOB over the past day along w/ generalized weakness and lethargy for the past week with loss of appetite/decreased PO intake, and his wife noticing his having dark/black stools yesterday iso diffuse abdominal pain worse in the LLQ (pt is s/p appendectomy in the past). Of note, patient has a prior history of large pleural effusions requiring drainage during hospitalizations in 2021 at [facility] and [facility]. Patient and his wife on his behalf deny fever/chills, sick contacts, cough, rhinorrhea, myalgia, CP, palpitations, n/v/d, hematochezia, hematuria, though he does have noted peripheral edema and has difficulty urinating (with UOP declining over the years).          ED Presenting Vitals: 98.4F, 110bpm, 170/88, 99% on 2L-NC  ED Course: Metoprolol 5mg IVP x2, IV Vancomycin 1g x1, IV Zosyn 3.375g x1, IV Azithromycin 500mg x1

## 2023-04-22 NOTE — CONSULT NOTE ADULT - PROBLEM SELECTOR RECOMMENDATION 9
Pt. with ESRD on HD three times a week (TTS) presented to [facility] for failure to thrive and lethargy. Last HD was on 4/20 viaLUE AVF. Pt. clinically stable. No CP, SOB or palpitations during evaluation. HD consent obtained from pt, placed in chart. Labs reviewed. Will arrange for HD today.     Anemia: HgB at goal  BMD: check phos and calcium daily. C/w Phoslo TID w/ meals.     If you have any questions, please feel free to contact me  Marlo Lee  Nephrology Fellow  [phone]; Prefer [employer] TEAMS  (After 5pm or on weekends please page the on-call fellow) Pt. with ESRD on HD three times a week (TTS) presented to [facility] for failure to thrive and lethargy. Last HD was on 4/20 viaLUE AVF. Pt. clinically stable although on NC. HD consent obtained from pt, placed in chart. Labs reviewed. Will arrange for HD today.     Anemia: HgB at goal  BMD: check phos and calcium daily. C/w Phoslo TID w/ meals.     If you have any questions, please feel free to contact me  Marlo Lee  Nephrology Fellow  [phone]; Prefer [employer] TEAMS  (After 5pm or on weekends please page the on-call fellow)

## 2023-04-22 NOTE — H&P ADULT - PROBLEM SELECTOR PLAN 5
- continue home beta-blocker w/ close monitoring of hemodynamics as above  - holding home AC for now as above

## 2023-04-22 NOTE — ED ADULT NURSE REASSESSMENT NOTE - NS ED NURSE REASSESS COMMENT FT1
Patient admitted to Telemetry. Admission band applied to patient utilizing two patient identifiers. ED MD at bedside currently to discuss plan of care with patient and family members. pending bed assignment.
Pt straight cathed using sterile technique.  2nd RN Shavon present to confirm sterility. 0mL urine noted in drainage bag on attempt to obtain urine via st cath procedure. pt wife at bedside reports pt has not drank water in 2 days. ED MD Raymundo made aware.

## 2023-04-22 NOTE — H&P ADULT - PROBLEM SELECTOR PLAN 4
- nephrology consulted (patient follows w/ Dr. Julius Eason in [place] as O/P) for HD as patient on Tu/Th/Sa schedule  - monitor electrolytes and fluid status

## 2023-04-22 NOTE — H&P ADULT - NSHPSOCIALHISTORY_GEN_ALL_CORE
Patient lives with wife who assists with most ADLs though at baseline patient able to walk with cane around the house.

## 2023-04-22 NOTE — H&P ADULT - NSHPPHYSICALEXAM_GEN_ALL_CORE
Vital Signs Last 24 Hrs  T(F): 97.6 (22 Apr 2023 12:11), Max: 99.3 (21 Apr 2023 22:15)  HR: 98 (22 Apr 2023 12:11) (98 - 124)  BP: 123/78 (22 Apr 2023 12:11) (123/78 - 170/88)  RR: 20 (22 Apr 2023 12:11) (16 - 20)  SpO2: 100% (22 Apr 2023 12:11) (92% - 100%)    Parameters below as of 22 Apr 2023 12:11  Patient On (Oxygen Delivery Method): nasal cannula  O2 Flow (L/min): 2    GEN: elderly man, sleeping in stretcher in NAD  PSYCH: A&Ox3, mood and affect appear appropriate   SKIN: intact, no e/o rash  NEURO: no focal neurologic deficits appreciated  EYES: PERRL, anicteric  HEAD: NC, AT  NECK: supple, no e/o elevated JVP  RESPI: no accessory muscle use, decreased BS B/L, on O2 supp via NC-2L   CARDIO: irregular rate/rhythm, +peripheral edema  ABD: soft, NT, ND  EXT: patient able to move all extremities spontaneously  VASC: peripheral pulses palpated

## 2023-04-22 NOTE — H&P ADULT - NSHPLABSRESULTS_GEN_ALL_CORE
Labs and imaging data obtained by the ED personally reviewed.                        13.5   8.49  )-----------( 113      ( 22 Apr 2023 11:53 )             42.6     04-22  136  |  96  |  45<H>  ----------------------------<  274<H>  4.9   |  29  |  4.85<H>    Ca    9.3      22 Apr 2023 11:53  Phos  5.9     04-22  Mg     2.3     04-22    TPro  5.8<L>  /  Alb  3.4  /  TBili  0.8  /  DBili  x   /  AST  21  /  ALT  14  /  AlkPhos  94  04-22    PT/INR - ( 21 Apr 2023 22:27 )   PT: 14.2 sec;   INR: 1.22 ratio    PTT - ( 21 Apr 2023 22:27 )  PTT:28.2 sec    procalcitonin = 0.55    pBNP = [id number]    VBG lactate = 1.7    RVP negative including for SARS-CoV2    CXR as reported: Large left pleural effusion with left lower lobe atelectasis. Bilateral patchy opacities. Trace right pleural effusion.    CT Chest w/ IV contrast as reported: Large left pleural effusion with complete left lower and partial left upper lobe atelectasis. Multifocal patchy opacities throughout the right lung, likely on an infectious basis. Pulmonary edema may also have this appearance. Clinical correlation is recommended.    CT A/P w/ IV contrast as reported: No bowel obstruction. Colonic wall thickening versus underdistention. Please correlate clinically for colitis. Small volume ascites. Heterogeneous liver likely due to passive congestion from chronic   congestive heart failure.

## 2023-04-22 NOTE — PATIENT PROFILE ADULT - FALL HARM RISK - HARM RISK INTERVENTIONS

## 2023-04-22 NOTE — H&P ADULT - NSHPSOURCEINFORD_GEN_ALL_CORE
Detail Level: Detailed Quality 110: Preventive Care And Screening: Influenza Immunization: Influenza Immunization not Administered because Patient Refused. Chart(s)/Patient/Spouse/Significant Other/Child

## 2023-04-22 NOTE — H&P ADULT - PROBLEM SELECTOR PLAN 3
- hemodynamics and H&H remain stable - continue to closely monitor  - monitor stool count and quality, FOBT  - will hold home AC for now  - given history of PUD in the past, initiated Protonix 40mg IVP BID and GI consult emailed  - f/u iron studies / B12 / folate levels sent

## 2023-04-22 NOTE — H&P ADULT - NSICDXPASTMEDICALHX_GEN_ALL_CORE_FT
PAST MEDICAL HISTORY:  Chronic atrial fibrillation     Diabetes     ESRD on dialysis     Large pleural effusion     Nonobstructive cardiomyopathy     PUD (peptic ulcer disease)     SBO (small bowel obstruction)

## 2023-04-22 NOTE — H&P ADULT - ASSESSMENT
87yoM w/ PMHx of chronic Afib (on Eliquis, off ASA since hx of PUD/GIB in the past), s/p Micra PPM, non-obstructive CAD, ESRD on HD (Tu/Th/Sa; nephrologist = Dr. Julius Eason and HD center in [place]), DM (on Tradjenta), HLD, and prior history of large pleural effusions (requiring drainage during hospitalizations in 2021 at [facility] and [facility]), who presents from home where he lives with his wife (at baseline able to walk w/ cane and assistance though progressively requiring more and more assistance w/ ADLs), with complaints of SOB over the past day along w/ generalized weakness and lethargy for the past week with loss of appetite/decreased PO intake, and his wife noticing his having dark/black stools yesterday iso diffuse abdominal pain worse in the LLQ (pt is s/p appendectomy in the past). Patient requires O2 supplementation via NC for hypoxia, and imaging reveals a large left pleural effusion with complete left lower and partial left upper lobe atelectasis and multifocal patchy opacities throughout the right lung.

## 2023-04-22 NOTE — H&P ADULT - PROBLEM SELECTOR PLAN 1
- maintain Spo2 > 94% and wean O2 supplementation as tolerated  - etiology likely related to below pleural effusion +/- acute bacterial PNA / fluid overload  - f/u sputum cx / legionella ag / strep pneumo ag / MRSA PCR  - for now will continue patient on IV Azithro and IV Zosyn pending above results along w/ BCx, and dose Vancomycin per HD

## 2023-04-22 NOTE — PATIENT PROFILE ADULT - NSPRESCRUSEDDRG_GEN_A_NUR
Patient found down by EMS  CK on arrival 1394  CK today 4134  Patient was initially receiving fluids, but stopped because he started to eat  Will repeat bolus patient      Plan:  · 1 L bolus of isolyte given today  · Continue maintenance fluid at 125 cc/hour  · Recheck CK tonight No

## 2023-04-22 NOTE — H&P ADULT - PROBLEM SELECTOR PLAN 2
- pBNP = [id number]  - f/u TTE as ordered  - will appreciate pulmonology / IR evaluation for pleural effusion drainage and fluid studies to be sent to determine etiology (understanding Abx initiated in ED and to be continued as above)  - incentive spirometry as tolerated  - aspiration precautions

## 2023-04-23 NOTE — PROGRESS NOTE ADULT - PROBLEM SELECTOR PLAN 1
Pt. with ESRD on HD three times a week (TTS) presented to [facility] for failure to thrive and lethargy. Last HD was on 4/20 viaLUE AVF. Pt. clinically stable although on NC. HD consent obtained from pt, placed in chart. Labs reviewed. No plan for HD today. Next HD 4/25. Dose all medications for HD.     Anemia: HgB at goal  BMD: check phos and calcium daily. C/w Phoslo TID w/ meals.     If you have any questions, please feel free to contact me  Marlo Lee  Nephrology Fellow  [phone]; Prefer [employer] TEAMS  (After 5pm or on weekends please page the on-call fellow).

## 2023-04-23 NOTE — PROGRESS NOTE ADULT - PROBLEM SELECTOR PLAN 3
- hemodynamics and H&H remain stable - continue to closely monitor  - monitor stool count and quality, FOBT  - will hold home AC for now  - given history of PUD in the past, initiated Protonix 40mg IVP BID and GI consult emailed - their evaluation and recommendations are pending  - f/u iron studies (B12 / folate levels WNL/not low)

## 2023-04-23 NOTE — PHYSICAL THERAPY INITIAL EVALUATION ADULT - ADDITIONAL COMMENTS
Pt lives in an apt with 3 steps to enter and b/l handrails, +elevator access. Pt owns straight point cane.

## 2023-04-23 NOTE — PROGRESS NOTE ADULT - ASSESSMENT
87yoM w/ PMHx of chronic Afib (on Eliquis, off ASA since hx of PUD/GIB in the past), s/p Micra PPM, non-obstructive CAD, ESRD on HD (Tu/Th/Sa), DM, HLD, and prior history of large pleural effusions (requiring drainage during hospitalizations in 2021 at [facility] and [facility]), who presents from home w/ c/o of SOB + generalized weakness and lethargy with loss of appetite/decreased PO intake, and his wife noticing his having dark/black stools iso diffuse abdominal pain worse in the LLQ (pt is s/p appendectomy in the past). Pt placed on O2 supplementation via NC for hypoxia, and imaging reveals a large left pleural effusion with complete left lower and partial left upper lobe atelectasis and multifocal patchy opacities throughout the right lung.

## 2023-04-23 NOTE — PROGRESS NOTE ADULT - PROBLEM SELECTOR PLAN 2
- pBNP = [id number]  - f/u TTE as ordered  - will appreciate pulmonology / IR evaluation for pleural effusion drainage and fluid studies to be sent to determine etiology (understanding Abx initiated in ED and to be continued as above) -- recommendations from pulm are pending  - incentive spirometry as tolerated  - aspiration precautions

## 2023-04-23 NOTE — PHYSICAL THERAPY INITIAL EVALUATION ADULT - PERTINENT HX OF CURRENT PROBLEM, REHAB EVAL
87yoM w/ PMHx of chronic Afib (on Eliquis, off ASA since hx of PUD/GIB in the past), s/p Micra PPM, non-obstructive CAD, ESRD on HD (Tu/Th/Sa; nephrologist = Dr. Julius Eason and HD center in [place]), DM (on Tradjenta), HLD, who presents from home where he lives with his wife (at baseline able to walk w/ cane and assistance though progressively requiring more and more assistance w/ ADLs), with complaints of SOB over the past day along w/ generalized weakness and lethargy for the past week with loss of appetite/decreased PO intake, and his wife noticing his having dark/black stools yesterday iso diffuse abdominal pain worse in the LLQ (pt is s/p appendectomy in the past). Of note, patient has a prior history of large pleural effusions requiring drainage during hospitalizations in 2021 at [facility] and [facility]. Patient and his wife on his behalf deny fever/chills, sick contacts, cough, rhinorrhea, myalgia, CP, palpitations, n/v/d, hematochezia, hematuria, though he does have noted peripheral edema and has difficulty urinating (with UOP declining over the years). Hosp course: 4/21: CT chest: Chest CT: Large left pleural effusion with complete left lower and partial left upper lobe atelectasis. Multifocal patchy opacities throughout the right lung, likely on an infectious basis. Pulmonary edemamay also have this appearance. Clinical correlation is recommended. CT abdomen/pelvis: No bowel obstruction. Colonic wall thickening versus underdistention. Please correlate clinically for colitis. Small volume ascites. Heterogeneous liver likely due to passive congestion from chronic congestive heart failure. CXR: Large left pleural effusion with left lower lobe atelectasis. Bilateral patchy opacities. Trace right pleural effusion.

## 2023-04-23 NOTE — PHYSICAL THERAPY INITIAL EVALUATION ADULT - PLANNED THERAPY INTERVENTIONS, PT EVAL
GOAL: Pt will negotiate up/down 3 steps with b/l handrails independently in 2 weeks/balance training/bed mobility training/gait training/strengthening/transfer training

## 2023-04-23 NOTE — CONSULT NOTE ADULT - ATTENDING COMMENTS
88 yo M pmh afib on a/c, cad s/p ppm, esrd on hd presenting with FTT.  Found to have ? dark stools that have since resolved.  Hemodynamically stable with no active sign of GIB at this point.  Risks outweigh benefits for any endoscopic assessment unless overt GIB develops or transfusions required.  Okay with adding on low-dose daily PPI for ppx (i.e. 20 mg omeprazole).  Okay with daily IV PPI inpatient if preferred, but would send home on low dose daily ppi after.
86 yo M w/ PMHx of chronic Afib (on Eliquis, off ASA since hx of PUD/GIB in the past), s/p Micra PPM, non-obstructive CAD, ESRD on HD (Tu/Th/Sa), DM, HLD, and prior history of large pleural effusions (requiring drainage during hospitalizations in 2021 at [facility] and [facility]), who presents from home with SOB and FTT. Pulmonary team consulted for large left pleural effusion. Had prior thoracentesis in 06/2021 by IR with 800 cc's drained, cytology was negative though unclear source of effusion. This effusion is likely chronic and given no leukocytosis, fever, nontoxic appearance do not believe the effusion is related to infection but may be 2/2 CKD vs malignancy. CT chest also notable for right sided multilobar patchy opacities.     The patient's dyspnea and hypoxemic respiratory failure is likely multifactorial 2/2 pneumonia, pleural effusions, and volume overload from ESRD given elevated proBNP. The patient improved clinically after receiving HD. Currently 88% on RA, asked RN to place back on NC.    Recommendations:  - Diagnostic/therapeutic Thoracentesis 4/24   - Monitor Oxygen saturations, goal sat >90% though avoid hyperoxemia.   - Complete 7 day course of abx  - Will need repeat CT Chest in 4-6 weeks  Should arrange for outpatient follow-up with Pulmonary upon discharge.    Plan discussed in detail with wife and daughter at bedside.    Sheyla Blum MD
Patient was seen and examined on 4/22/23 at 4.30pm.  # ESRD - TTS.  HD on 4/22/23 as planned.      # Medication toxicity monitoring: medication dose reviewed. Please dose medications to CrCl<15    The rest of the recommendations as per fellow's note.    Rossana James MD  Attending Nephrologist  [phone] or via [facility]

## 2023-04-23 NOTE — PROGRESS NOTE ADULT - ASSESSMENT
Pt. is an 87 y.o. M w/ PMHx of Afib (on Eliquis), s/p Micra PPM, non-obstructive CAD, ESRD on HD (Tu/Th/Sa; nephrologist = Dr. Julius Eason and HD center in [place]), DM (on Tradjenta), HLD, who presents from home with generalized weakness and lethargy for the past week with loss of appetite/decreased PO intake. Nephrology consulted for ESRD management

## 2023-04-23 NOTE — PROGRESS NOTE ADULT - PROBLEM SELECTOR PLAN 4
- HD per nephrology (patient follows w/ Dr. Julisu Eason in [place] as O/P on Tu/Th/Sa schedule)  - monitor electrolytes and fluid status  - Phoslo TID w/ meals initiated

## 2023-04-23 NOTE — PROGRESS NOTE ADULT - SUBJECTIVE AND OBJECTIVE BOX
INCOMPLETE NOTE    Alexander Smith M.D.  Division of Hospital Medicine  Available on TEAMS    SUBJECTIVE / OVERNIGHT EVENTS: Patient seen and examined. No acute overnight events, no subjective complaints.    OBJECTIVE:  Vital Signs Last 24 Hrs  T(F): 97.6 (23 Apr 2023 04:24), Max: 98 (22 Apr 2023 17:30)  HR: 92 (23 Apr 2023 07:26) (90 - 98)  BP: 105/57 (23 Apr 2023 07:26) (89/52 - 124/72)  RR: 18 (23 Apr 2023 04:24) (17 - 20)  SpO2: 100% (23 Apr 2023 04:24) (99% - 100%)  Parameters below as of 23 Apr 2023 04:24  Patient On (Oxygen Delivery Method): nasal cannula  O2 Flow (L/min): 2    Physical Examination:  GEN: elderly man, sitting up in bed in NAD  PSYCH: A&Ox3, mood and affect appear appropriate   NECK: supple, no e/o elevated JVP  RESPI: no accessory muscle use, decreased BS B/L, on O2 supp via NC-2L   CARDIO: irregular rate/rhythm, +peripheral edema  ABD: soft, NT, ND  EXT: patient able to move all extremities spontaneously  VASC: peripheral pulses palpated    Labs:  CAPILLARY BLOOD GLUCOSE  POCT Blood Glucose.: 106 mg/dL (23 Apr 2023 07:41)  POCT Blood Glucose.: 161 mg/dL (22 Apr 2023 18:28)  POCT Blood Glucose.: 235 mg/dL (22 Apr 2023 11:26)                        13.5   8.49  )-----------( 113      ( 22 Apr 2023 11:53 )             42.6     04-23  135  |  96  |  21  ----------------------------<  146<H>  3.9   |  30  |  3.10<H>    Ca    8.5      23 Apr 2023 08:53  Phos  3.6     04-23  Mg     2.0     04-23    TPro  5.8<L>  /  Alb  3.4  /  TBili  0.8  /  DBili  x   /  AST  21  /  ALT  14  /  AlkPhos  94  04-22    PT/INR - ( 21 Apr 2023 22:27 )   PT: 14.2 sec;   INR: 1.22 ratio    PTT - ( 21 Apr 2023 22:27 )  PTT:28.2 sec    Culture - Blood (collected 21 Apr 2023 22:00)  Source: .Blood Blood-Peripheral  Preliminary Report (23 Apr 2023 02:02):    No growth to date.    Culture - Blood (collected 21 Apr 2023 21:45)  Source: .Blood Blood-Peripheral  Preliminary Report (23 Apr 2023 02:01):    No growth to date.    Consultant(s) Notes Reviewed: Nephrology  Care Discussed with Consultants/Other Providers: Pulm, GI, ACP Letinate Denis    MEDICATIONS  (STANDING):  atorvastatin 40 milliGRAM(s) Oral at bedtime  azithromycin  IVPB 500 milliGRAM(s) IV Intermittent every 24 hours  calcium acetate 667 milliGRAM(s) Oral three times a day with meals  ceFAZolin   IVPB      dextrose 5%. 1000 milliLiter(s) (50 mL/Hr) IV Continuous <Continuous>  dextrose 50% Injectable 25 Gram(s) IV Push once  finasteride 5 milliGRAM(s) Oral daily  glucagon  Injectable 1 milliGRAM(s) IntraMuscular once  insulin lispro (ADMELOG) corrective regimen sliding scale   SubCutaneous three times a day before meals  metoprolol succinate  milliGRAM(s) Oral daily  pantoprazole  Injectable 40 milliGRAM(s) IV Push two times a day    MEDICATIONS  (PRN):  dextrose Oral Gel 15 Gram(s) Oral once PRN Blood Glucose LESS THAN 70 milliGRAM(s)/deciliter Alexander Smith M.D.  Division of Hospital Medicine  Available on TEAMS    SUBJECTIVE / OVERNIGHT EVENTS: Patient seen and examined. No acute overnight events, no subjective complaints. He is much more alert and oriented today and less drowsy. Tolerated HD session per nephrology yesterday evening. Patient remains on O2 supp now down to 1.5L via NC -- awaiting pulmonology and GI's evaluation and recommendations.     OBJECTIVE:  Vital Signs Last 24 Hrs  T(F): 97.6 (23 Apr 2023 04:24), Max: 98 (22 Apr 2023 17:30)  HR: 92 (23 Apr 2023 07:26) (90 - 98)  BP: 105/57 (23 Apr 2023 07:26) (89/52 - 124/72)  RR: 18 (23 Apr 2023 04:24) (17 - 20)  SpO2: 100% (23 Apr 2023 04:24) (99% - 100%)  Parameters below as of 23 Apr 2023 04:24  Patient On (Oxygen Delivery Method): nasal cannula  O2 Flow (L/min): 2    Physical Examination:  GEN: elderly man, sitting up in bed in NAD  PSYCH: A&Ox3, mood and affect appear appropriate   NECK: supple, no e/o elevated JVP  RESPI: no accessory muscle use, decreased BS B/L, on O2 supp via NC-2L   CARDIO: irregular rate/rhythm, +peripheral edema  ABD: soft, NT, ND  EXT: patient able to move all extremities spontaneously  VASC: peripheral pulses palpated    Labs:  CAPILLARY BLOOD GLUCOSE  POCT Blood Glucose.: 106 mg/dL (23 Apr 2023 07:41)  POCT Blood Glucose.: 161 mg/dL (22 Apr 2023 18:28)  POCT Blood Glucose.: 235 mg/dL (22 Apr 2023 11:26)                        13.5   8.49  )-----------( 113      ( 22 Apr 2023 11:53 )             42.6     04-23  135  |  96  |  21  ----------------------------<  146<H>  3.9   |  30  |  3.10<H>    Ca    8.5      23 Apr 2023 08:53  Phos  3.6     04-23  Mg     2.0     04-23    TPro  5.8<L>  /  Alb  3.4  /  TBili  0.8  /  DBili  x   /  AST  21  /  ALT  14  /  AlkPhos  94  04-22    PT/INR - ( 21 Apr 2023 22:27 )   PT: 14.2 sec;   INR: 1.22 ratio    PTT - ( 21 Apr 2023 22:27 )  PTT:28.2 sec    Culture - Blood (collected 21 Apr 2023 22:00)  Source: .Blood Blood-Peripheral  Preliminary Report (23 Apr 2023 02:02):    No growth to date.    Culture - Blood (collected 21 Apr 2023 21:45)  Source: .Blood Blood-Peripheral  Preliminary Report (23 Apr 2023 02:01):    No growth to date.    Consultant(s) Notes Reviewed: Nephrology  Care Discussed with Consultants/Other Providers: Pulm, GI, ACP Leti Denis    MEDICATIONS  (STANDING):  atorvastatin 40 milliGRAM(s) Oral at bedtime  azithromycin  IVPB 500 milliGRAM(s) IV Intermittent every 24 hours  calcium acetate 667 milliGRAM(s) Oral three times a day with meals  ceFAZolin   IVPB      dextrose 5%. 1000 milliLiter(s) (50 mL/Hr) IV Continuous <Continuous>  dextrose 50% Injectable 25 Gram(s) IV Push once  finasteride 5 milliGRAM(s) Oral daily  glucagon  Injectable 1 milliGRAM(s) IntraMuscular once  insulin lispro (ADMELOG) corrective regimen sliding scale   SubCutaneous three times a day before meals  metoprolol succinate  milliGRAM(s) Oral daily  pantoprazole  Injectable 40 milliGRAM(s) IV Push two times a day    MEDICATIONS  (PRN):  dextrose Oral Gel 15 Gram(s) Oral once PRN Blood Glucose LESS THAN 70 milliGRAM(s)/deciliter

## 2023-04-23 NOTE — CONSULT NOTE ADULT - ASSESSMENT
87yoM w/ PMHx of chronic Afib (on Eliquis, off ASA since hx of PUD/GIB in the past), s/p Micra PPM, non-obstructive CAD, ESRD on HD (Tu/Th/Sa; nephrologist = Dr. Julius Eason and HD center in [place]), DM (on Tradjenta), HLD, and prior history of large pleural effusions (requiring drainage during hospitalizations in 2021 at [facility]- and [facility]), who presents from home where he lives with his wife (at baseline able to walk w/ cane and assistance though progressively requiring more and more assistance w/ ADLs), with complaints of SOB over the past day along w/ generalized weakness and lethargy for the past week with loss of appetite/decreased PO intake.     #dark stools- unlikely to be from GIB    - pt declined to do rectal exam given lethargy and desire to rest   - last colonoscopy and endoscopy more than 10 years ago     #left pleural effusions   CXR Large left pleural effusion with left lower lobe atelectasis.  CTAP Multifocal patchy opacities throughout the right lung, likely on an infectious basis.     #heterogeneous liver on CT    likely due to passive congestion from volume overload    Recommendations:   - continue to monitor Hgb   - will need medical optimization prior to endoscopy/colonoscopy   - miralax daily for 1 soft BM per day (per family, patient has not had BM in hospital)   - IV PPI BID   - monitor stool movements   - rest of care per primary team     All recommendations are tentative until note is attested by attending.     Kamille Reece, PGY-4   Gastroenterology/Hepatology Fellow  Available on [employer] Teams  [phone] ([facility] Short Range Pager)  [phone] ([facility] Long Range Pager)    After 5pm, please contact the on-call GI fellow. [phone]
86yo M w/ PMHx of chronic Afib (on Eliquis, off ASA since hx of PUD/GIB in the past), s/p Micra PPM, non-obstructive CAD, ESRD on HD (Tu/Th/Sa), DM, HLD, and prior history of large pleural effusions (requiring drainage during hospitalizations in 2021 at [facility] and [facility]), who presents from home with SOB and generalized weakness. Pulmonary team consulted for large left pleural effusion.     After chart review, the patient has had a thoracentesis in 2021 with 800 cc's drained, cytology was negative though unclear source of effusion. This effusion is likely chronic and given no leukocytosis, fever, nontoxic appearance do not believe the effusion is related to infection but may be 2/2 CKD vs malignancy.    CT chest also notable for right sided patchy opacities.     The patient's dyspnea and hypoxemic respiratory failure is likely multifactorial 2/2 pneumonia, pleural effusions, and volume overload from ESRD given elevated proBNP. The patient improved clinically after receiving HD    #Hypoxemic Respiratory Failure  #Multifocal consolidation  #Pleural effusion    Recommendations:  - Give unknown timing of last eliquis (but hospitalized since evening of 4/21) will tentatively plan for thoracentesis on 4/24 if safe pocket for drainage. Please hold all anticoagulation as you are doing. The family was updated on this plan and recommendation.   - Monitor Oxygen saturations, goal sat >90% though avoid hyperoxemia.   - Would treat with antibiotics for pneumonia, was previously on ceftriaxone and azithromycin, though switched to ancef/azithromycin for staph aureus PCR+ result by primary team. Patient has clinically improved on this regimen.   - Check urine legionella  - Continue with HD sessions for volume overload. Monitor daily weights.   - Will need repeat CT Chest in 6 weeks to monitor size and improvement of the opacities seen on Ct Chest - though likely related to infection would monitor as c/f neoplasm given weight loss and functional decline. This recommendation was relayed to the family and they understood and will plan on follow up after discharge. He had followed a pulmonologist in the past but had not in several years.   
Pt. is an 87 y.o. M w/ PMHx of Afib (on Eliquis), s/p Micra PPM, non-obstructive CAD, ESRD on HD (Tu/Th/Sa; nephrologist = Dr. Julius Eason and HD center in [place]), DM (on Tradjenta), HLD, who presents from home with generalized weakness and lethargy for the past week with loss of appetite/decreased PO intake. Nephrology consulted for ESRD management

## 2023-04-23 NOTE — PROGRESS NOTE ADULT - PROBLEM SELECTOR PLAN 1
- maintain Spo2 > 94% and wean O2 supplementation as tolerated  - etiology likely related to below pleural effusion +/- acute bacterial PNA / fluid overload  - f/u sputum cx / legionella ag / strep pneumo ag  - MRSA PCR + for MSSA  - for now will continue patient on IV Azithro and IV Cefazolin (on HD days dose after session)

## 2023-04-23 NOTE — PHYSICAL THERAPY INITIAL EVALUATION ADULT - GAIT DEVIATIONS NOTED, PT EVAL
decreased andreia/increased time in double stance/decreased velocity of limb motion/decreased step length/decreased stride length

## 2023-04-23 NOTE — PROGRESS NOTE ADULT - SUBJECTIVE AND OBJECTIVE BOX
[facility] DIVISION OF KIDNEY DISEASES AND HYPERTENSION -- FOLLOW UP NOTE  --------------------------------------------------------------------------------  HPI:  Pt. is an 87 y.o. M w/ PMHx of Afib (on Eliquis), s/p Micra PPM, non-obstructive CAD, ESRD on HD (Tu/Th/Sa; nephrologist = Dr. Julius Eason and HD center in [place]), DM (on Tradjenta), HLD, who presents from home with generalized weakness and lethargy for the past week with loss of appetite/decreased PO intake. Nephrology consulted for ESRD management Pt. last had HD on 4/20 on a later shift because he has too weak to make his usual AM shift. Wife states that Pt. has become progressively more depressed as his ability to complete iADLS and ADLs has been decreasing. He has not touched food in 2 days prior to arrival but did have some puree food in ED today. Pt. has been on Dialysis since 2017 and was started on Tradjenta fairly recently.     Pt. seen this AM. More awake more alert, does not remember events of yesterday. Tolerated HD yesterday without issues. Quirino on NC. States breathing is ok right now. No pain.      PAST HISTORY  --------------------------------------------------------------------------------  No significant changes to PMH, PSH, FHx, SHx, unless otherwise noted    ALLERGIES & MEDICATIONS  --------------------------------------------------------------------------------  Allergies    No Known Allergies    Intolerances      Standing Inpatient Medications  atorvastatin 40 milliGRAM(s) Oral at bedtime  azithromycin  IVPB 500 milliGRAM(s) IV Intermittent every 24 hours  calcium acetate 667 milliGRAM(s) Oral three times a day with meals  ceFAZolin   IVPB      chlorhexidine 2% Cloths 1 Application(s) Topical daily  dextrose 5%. 1000 milliLiter(s) IV Continuous <Continuous>  dextrose 50% Injectable 25 Gram(s) IV Push once  finasteride 5 milliGRAM(s) Oral daily  glucagon  Injectable 1 milliGRAM(s) IntraMuscular once  insulin lispro (ADMELOG) corrective regimen sliding scale   SubCutaneous three times a day before meals  metoprolol succinate  milliGRAM(s) Oral daily  pantoprazole  Injectable 40 milliGRAM(s) IV Push two times a day    PRN Inpatient Medications  dextrose Oral Gel 15 Gram(s) Oral once PRN      REVIEW OF SYSTEMS  --------------------------------------------------------------------------------  As per HPI    VITALS/PHYSICAL EXAM  --------------------------------------------------------------------------------  T(C): 36.6 (04-23-23 @ 11:16), Max: 36.7 (04-22-23 @ 17:30)  HR: 95 (04-23-23 @ 11:16) (90 - 96)  BP: 98/61 (04-23-23 @ 11:15) (89/52 - 124/72)  RR: 18 (04-23-23 @ 04:24) (17 - 20)  SpO2: 96% (04-23-23 @ 11:15) (96% - 100%)  Wt(kg): --    Weight (kg): 48.1 (04-23-23 @ 07:15)      04-23-23 @ 07:01  -  04-23-23 @ 13:57  --------------------------------------------------------  IN: 120 mL / OUT: 0 mL / NET: 120 mL        Physical Exam:  	Gen: elderly, ill appearing   	HEENT: MMM on NC  	Pulm: No crackles anteriorly   	CV: S1S2  	Abd: Soft, +BS   	Ext: No LE edema B/L  	Neuro: Awake  	Skin: Warm and dry  	Vascular access: LUE AVF thrills+    LABS/STUDIES  --------------------------------------------------------------------------------              12.7   6.52  >-----------<  78       [04-23-23 @ 08:53]              39.7     135  |  96  |  21  ----------------------------<  146      [04-23-23 @ 08:53]  3.9   |  30  |  3.10        Ca     8.5     [04-23-23 @ 08:53]      Mg     2.0     [04-23-23 @ 08:53]      Phos  3.6     [04-23-23 @ 08:53]    TPro  5.8  /  Alb  3.4  /  TBili  0.8  /  DBili  x   /  AST  21  /  ALT  14  /  AlkPhos  94  [04-22-23 @ 00:44]    PT/INR: PT 14.2 , INR 1.22       [04-21-23 @ 22:27]  PTT: 28.2       [04-21-23 @ 22:27]      Creatinine Trend:  SCr 3.10 [04-23 @ 08:53]  SCr 4.85 [04-22 @ 11:53]  SCr 4.34 [04-22 @ 00:44]  SCr 4.19 [04-21 @ 22:27]        Ferritin 2482      [04-22-23 @ 11:53]  TSH 10.90      [04-22-23 @ 11:53]  Lipid: chol 164, , HDL 59, LDL --      [04-22-23 @ 11:53]

## 2023-04-24 NOTE — SWALLOW BEDSIDE ASSESSMENT ADULT - ADDITIONAL RECOMMENDATIONS
Monitor for s/s aspiration/laryngeal penetration. If noted:  D/C p.o. intake, provide non-oral nutrition/hydration/meds, and contact this service @ [phone]  Maintain good oral hygiene  LTG: Pt will tolerate the least restrictive diet without overt signs or symptoms of penetration or aspiration

## 2023-04-24 NOTE — SWALLOW BEDSIDE ASSESSMENT ADULT - SWALLOW EVAL: DIAGNOSIS
87 yoM w/ PMHx of chronic Afib, s/p Micra PPM, non-obstructive CAD, ESRD on HD, DM, HLD, who presents from home where he lives with his wife, with complaints of SOB over the past day along w/ generalized weakness and lethargy. Pt now presents with clinical signs of an oropharyngeal dysphagia. Swallow sequence is marked by munch/chew pattern with purees, prolonged mastication of minced/moist and soft bite sized solids. Delayed swallow initiation across consistencies. Delayed throat clears post soft & bite sized solids and mildly thick liquids. No overt signs or symptoms of penetration or aspiration on purees and moderately thick liquids.

## 2023-04-24 NOTE — DIETITIAN INITIAL EVALUATION ADULT - PERSON TAUGHT/METHOD
- Encouraged adequate PO intake for meeting nutritional needs, improving nutritional status and for preventing wt loss   - Provided education on renal diet, education included importance of monitoring intake of foods high in potassium/phosphorous/sodium, review of foods high in these micronutrients as well as alternatives, monitoring fluid intake, and importance of adequate protein intake due to increased demand on HD.   - Provided education on Carbohydrate Consistent diet including sources of carbohydrates, portion sizes, pairing protein with carbohydrates, limiting sugar sweetened beverages in diet and the importance of consistent eating pattern to help optimize glycemic control.   - Discussed the importance of including adequate calories and proteins throughout the day; reviewed protein calorie dense food sources/ meal and snacks ideas./verbal instruction/patient instructed/spouse instructed/daughter instructed

## 2023-04-24 NOTE — PROGRESS NOTE ADULT - SUBJECTIVE AND OBJECTIVE BOX
CHIEF COMPLAINT:    Interval Events:    REVIEW OF SYSTEMS:  Constitutional: [ ] negative [ ] fevers [ ] chills [ ] weight loss [ ] weight gain  HEENT: [ ] negative [ ] dry eyes [ ] eye irritation [ ] postnasal drip [ ] nasal congestion  CV: [ ] negative  [ ] chest pain [ ] orthopnea [ ] palpitations [ ] murmur  Resp: [ ] negative [ ] cough [ ] shortness of breath [ ] dyspnea [ ] wheezing [ ] sputum [ ] hemoptysis  GI: [ ] negative [ ] nausea [ ] vomiting [ ] diarrhea [ ] constipation [ ] abd pain [ ] dysphagia   : [ ] negative [ ] dysuria [ ] nocturia [ ] hematuria [ ] increased urinary frequency  Musculoskeletal: [ ] negative [ ] back pain [ ] myalgias [ ] arthralgias [ ] fracture  Skin: [ ] negative [ ] rash [ ] itch  Neurological: [ ] negative [ ] headache [ ] dizziness [ ] syncope [ ] weakness [ ] numbness  Psychiatric: [ ] negative [ ] anxiety [ ] depression  Endocrine: [ ] negative [ ] diabetes [ ] thyroid problem  Hematologic/Lymphatic: [ ] negative [ ] anemia [ ] bleeding problem  Allergic/Immunologic: [ ] negative [ ] itchy eyes [ ] nasal discharge [ ] hives [ ] angioedema  [ ] All other systems negative  [ ] Unable to assess ROS because ________    OBJECTIVE:  ICU Vital Signs Last 24 Hrs  T(C): 36.4 (24 Apr 2023 04:28), Max: 37 (23 Apr 2023 20:16)  T(F): 97.6 (24 Apr 2023 04:28), Max: 98.6 (23 Apr 2023 20:16)  HR: 103 (24 Apr 2023 04:28) (95 - 103)  BP: 113/71 (24 Apr 2023 04:28) (98/61 - 113/71)  BP(mean): --  ABP: --  ABP(mean): --  RR: 18 (24 Apr 2023 04:28) (18 - 18)  SpO2: 100% (24 Apr 2023 04:28) (96% - 100%)    O2 Parameters below as of 24 Apr 2023 04:28  Patient On (Oxygen Delivery Method): nasal cannula  O2 Flow (L/min): 1.5            04-23 @ 07:01  -  04-24 @ 07:00  --------------------------------------------------------  IN: 260 mL / OUT: 0 mL / NET: 260 mL      CAPILLARY BLOOD GLUCOSE      POCT Blood Glucose.: 147 mg/dL (23 Apr 2023 21:47)      PHYSICAL EXAM:  General:   HEENT:   Lymph Nodes:  Neck:   Respiratory:   Cardiovascular:   Abdomen:   Extremities:   Skin:   Neurological:  Psychiatry:    HOSPITAL MEDICATIONS:  MEDICATIONS  (STANDING):  atorvastatin 40 milliGRAM(s) Oral at bedtime  azithromycin  IVPB 500 milliGRAM(s) IV Intermittent every 24 hours  calcium acetate 667 milliGRAM(s) Oral three times a day with meals  ceFAZolin   IVPB      ceFAZolin   IVPB 500 milliGRAM(s) IV Intermittent every 24 hours  chlorhexidine 2% Cloths 1 Application(s) Topical daily  dextrose 5%. 1000 milliLiter(s) (50 mL/Hr) IV Continuous <Continuous>  dextrose 50% Injectable 25 Gram(s) IV Push once  finasteride 5 milliGRAM(s) Oral daily  glucagon  Injectable 1 milliGRAM(s) IntraMuscular once  insulin lispro (ADMELOG) corrective regimen sliding scale   SubCutaneous three times a day before meals  metoprolol succinate  milliGRAM(s) Oral daily  pantoprazole  Injectable 40 milliGRAM(s) IV Push two times a day  polyethylene glycol 3350 17 Gram(s) Oral daily    MEDICATIONS  (PRN):  dextrose Oral Gel 15 Gram(s) Oral once PRN Blood Glucose LESS THAN 70 milliGRAM(s)/deciliter      LABS:                        12.7   5.91  )-----------( 84       ( 24 Apr 2023 05:28 )             39.8     Hgb Trend: 12.7<--, 12.7<--, 13.5<--, 13.6<--  04-24    134<L>  |  95<L>  |  33<H>  ----------------------------<  106<H>  4.7   |  26  |  4.42<H>    Ca    8.9      24 Apr 2023 05:27  Phos  5.7     04-24  Mg     2.2     04-24      Creatinine Trend: 4.42<--, 3.10<--, 4.85<--, 4.34<--, 4.19<--            MICROBIOLOGY:     Culture - Blood (collected 21 Apr 2023 22:00)  Source: .Blood Blood-Peripheral  Preliminary Report (23 Apr 2023 02:02):    No growth to date.    Culture - Blood (collected 21 Apr 2023 21:45)  Source: .Blood Blood-Peripheral  Preliminary Report (23 Apr 2023 02:01):    No growth to date.        RADIOLOGY:  [ ] Reviewed and interpreted by me    PULMONARY FUNCTION TESTS:    EKG: CHIEF COMPLAINT: SOB    Interval Events: Pt has been on 1.5L NC    REVIEW OF SYSTEMS:  [x ] Unable to assess ROS because AMS    OBJECTIVE:  ICU Vital Signs Last 24 Hrs  T(C): 36.4 (24 Apr 2023 04:28), Max: 37 (23 Apr 2023 20:16)  T(F): 97.6 (24 Apr 2023 04:28), Max: 98.6 (23 Apr 2023 20:16)  HR: 103 (24 Apr 2023 04:28) (95 - 103)  BP: 113/71 (24 Apr 2023 04:28) (98/61 - 113/71)  BP(mean): --  ABP: --  ABP(mean): --  RR: 18 (24 Apr 2023 04:28) (18 - 18)  SpO2: 100% (24 Apr 2023 04:28) (96% - 100%)    O2 Parameters below as of 24 Apr 2023 04:28  Patient On (Oxygen Delivery Method): nasal cannula  O2 Flow (L/min): 1.5            04-23 @ 07:01  -  04-24 @ 07:00  --------------------------------------------------------  IN: 260 mL / OUT: 0 mL / NET: 260 mL      CAPILLARY BLOOD GLUCOSE      POCT Blood Glucose.: 147 mg/dL (23 Apr 2023 21:47)      PHYSICAL EXAM:  General: Frail male laying in bed in no acute distress  Respiratory: Diminished breath sounds on the left and right base  Cardiovascular: Regular rate and rhythm   Abdomen: Nontender nondistended  Extremities: Edematous on exam, palpable thrill over fistula    HOSPITAL MEDICATIONS:  MEDICATIONS  (STANDING):  atorvastatin 40 milliGRAM(s) Oral at bedtime  azithromycin  IVPB 500 milliGRAM(s) IV Intermittent every 24 hours  calcium acetate 667 milliGRAM(s) Oral three times a day with meals  ceFAZolin   IVPB      ceFAZolin   IVPB 500 milliGRAM(s) IV Intermittent every 24 hours  chlorhexidine 2% Cloths 1 Application(s) Topical daily  dextrose 5%. 1000 milliLiter(s) (50 mL/Hr) IV Continuous <Continuous>  dextrose 50% Injectable 25 Gram(s) IV Push once  finasteride 5 milliGRAM(s) Oral daily  glucagon  Injectable 1 milliGRAM(s) IntraMuscular once  insulin lispro (ADMELOG) corrective regimen sliding scale   SubCutaneous three times a day before meals  metoprolol succinate  milliGRAM(s) Oral daily  pantoprazole  Injectable 40 milliGRAM(s) IV Push two times a day  polyethylene glycol 3350 17 Gram(s) Oral daily    MEDICATIONS  (PRN):  dextrose Oral Gel 15 Gram(s) Oral once PRN Blood Glucose LESS THAN 70 milliGRAM(s)/deciliter      LABS:                        12.7   5.91  )-----------( 84       ( 24 Apr 2023 05:28 )             39.8     Hgb Trend: 12.7<--, 12.7<--, 13.5<--, 13.6<--  04-24    134<L>  |  95<L>  |  33<H>  ----------------------------<  106<H>  4.7   |  26  |  4.42<H>    Ca    8.9      24 Apr 2023 05:27  Phos  5.7     04-24  Mg     2.2     04-24      Creatinine Trend: 4.42<--, 3.10<--, 4.85<--, 4.34<--, 4.19<--            MICROBIOLOGY:     Culture - Blood (collected 21 Apr 2023 22:00)  Source: .Blood Blood-Peripheral  Preliminary Report (23 Apr 2023 02:02):    No growth to date.    Culture - Blood (collected 21 Apr 2023 21:45)  Source: .Blood Blood-Peripheral  Preliminary Report (23 Apr 2023 02:01):    No growth to date.        RADIOLOGY:  [ ] Reviewed and interpreted by me    PULMONARY FUNCTION TESTS:    EKG:

## 2023-04-24 NOTE — PROGRESS NOTE ADULT - ASSESSMENT
86yo Male PMH chronic Afib (on Eliquis, off ASA since hx of PUD/GIB in the past), s/p Micra PPM, non-obstructive CAD, ESRD on HD (Tu/Th/Sa), DM, HLD, and prior history of large pleural effusions (exudative w/o malignant cells requiring drainage during hospitalizations in 2021 at [facility] who was admitted 4/21 from home with SOB and generalized weakness. Pulmonary team consulted for large left pleural effusion.     After chart review, the patient has had a thoracentesis in 2021 with 800 cc's drained revealing and exudative effusion (protein), cytology was negative though unclear source of effusion. This effusion is likely chronic and given no leukocytosis, fever, nontoxic appearance do not believe the effusion is related to infection but may be 2/2 CKD vs malignancy.    CT chest also notable for right sided patchy opacities.     The patient's dyspnea and hypoxemic respiratory failure is likely multifactorial 2/2 pneumonia, pleural effusions, and volume overload from ESRD given elevated proBNP. The patient improved clinically after receiving HD    #Hypoxemic Respiratory Failure  #Multifocal consolidation  #Pleural effusion    Recommendations:  - Give unknown timing of last eliquis (but hospitalized since evening of 4/21)   - Will eval effusion with Ultrasound this AM- continue to hold AC  - Would treat with antibiotics for pneumonia, was previously on ceftriaxone and azithromycin, though switched to ancef/azithromycin for staph aureus PCR+ result by primary team. Patient has clinically improved on this regimen.   - Check urine legionella  - F/u Nephrology regarding HD  - Daily weights  - Repeat CT chest in 6-8 weeks   88yo Male PMH chronic Afib (on Eliquis, off ASA since hx of PUD/GIB in the past), s/p Micra PPM, non-obstructive CAD, ESRD on HD (Tu/Th/Sa), DM, HLD, and prior history of large pleural effusions (exudative w/o malignant cells requiring drainage during hospitalizations in 2021 at [facility] who was admitted 4/21 from home with SOB and generalized weakness. Pulmonary team consulted for large left pleural effusion.     After chart review, the patient has had a thoracentesis in 2021 with 800 cc's drained revealing and exudative effusion (protein), cytology was negative though unclear source of effusion. This effusion is likely chronic and given no leukocytosis, fever, nontoxic appearance do not believe the effusion is related to infection but may be 2/2 CKD vs malignancy.    CT chest also notable for right sided patchy opacities.     The patient's dyspnea and hypoxemic respiratory failure is likely multifactorial 2/2 pneumonia, pleural effusions, and volume overload from ESRD given elevated proBNP. The patient improved clinically after receiving HD    #Hypoxemic Respiratory Failure  #Multifocal consolidation  #Pleural effusion    Recommendations:  - Give unknown timing of last eliquis (but hospitalized since evening of 4/21)   - Will eval effusion with Ultrasound this AM- continue to hold AC  - Please add on serum LDH to this AM labs  - Would treat with antibiotics for pneumonia, was previously on ceftriaxone and azithromycin, though switched to ancef/azithromycin for staph aureus PCR+ result by primary team. Patient has clinically improved on this regimen.   - Check urine legionella  - F/u Nephrology regarding HD  - Daily weights  - Repeat CT chest in 6-8 weeks   86yo Male PMH chronic Afib (on Eliquis, off ASA since hx of PUD/GIB in the past), s/p Micra PPM, non-obstructive CAD, ESRD on HD (Tu/Th/Sa), DM, HLD, and prior history of large pleural effusions (exudative w/o malignant cells requiring drainage during [facility]izations in 2021 [facility] who was admitted 4/21 from home with SOB and generalized weakness. Pulmonary team consulted for large left pleural effusion.     After chart review, the patient has had a thoracentesis in 2021 with 800 cc's drained revealing and exudative effusion (protein), cytology was negative though unclear source of effusion. This effusion is likely chronic and given no leukocytosis, fever, nontoxic appearance do not believe the effusion is related to infection but may be 2/2 CKD vs malignancy.    CT chest also notable for right sided patchy opacities.     The patient's dyspnea and hypoxemic respiratory failure is likely multifactorial 2/2 pneumonia, pleural effusions, and volume overload from ESRD given elevated proBNP. The patient improved clinically after receiving HD    #Hypoxemic Respiratory Failure  #Multifocal consolidation  #Pleural effusion  - Give unknown timing of last eliquis (but hospitalized since evening of 4/21)   - Plan for Thoracentesis at bedside today  - F/u Fluid studies  - Please add on serum LDH to this AM labs  - Pt appears grossly edematous on exam  - Recurrent effusion likely from overload state  - Please ask Nephrology to ultrafiltrate pt more on dialysis days. If pt needs BP support for adequate fluid removal can add Midodrine  - Would treat with antibiotics for pneumonia, was previously on ceftriaxone and azithromycin, though switched to ancef/azithromycin for staph aureus PCR+ result by primary team. Patient has clinically improved on this regimen. Low threshold to escalate if pt shows stigmata of infection  - Suspect Right sided opacities from aspiration  - F/u Speech and Swallow  - Check urine legionella  - Daily weights  - Repeat CT chest in 6-8 weeks    Case discussed with Dr. Fierro

## 2023-04-24 NOTE — DIETITIAN INITIAL EVALUATION ADULT - PERTINENT LABORATORY DATA
04-24    134<L>  |  95<L>  |  33<H>  ----------------------------<  106<H>  4.7   |  26  |  4.42<H>    Ca    8.9      24 Apr 2023 05:27  Phos  5.7     04-24  Mg     2.2     04-24 04-24 @ 05:27: Na 134<L>, BUN 33<H>, Cr 4.42<H>, <H>, K+ 4.7, Phos 5.7<H>, Mg 2.2, Alk Phos --, ALT/SGPT --, AST/SGOT --, HbA1c --    POCT Blood Glucose.: 122 mg/dL (04-24-23 @ 12:12)  POCT Blood Glucose.: 132 mg/dL (04-24-23 @ 07:57)  POCT Blood Glucose.: 147 mg/dL (04-23-23 @ 21:47)  POCT Blood Glucose.: 131 mg/dL (04-23-23 @ 16:22)  POCT Blood Glucose.: 122 mg/dL (04-24-23 @ 12:12)    A1C with Estimated Average Glucose Result: 9.1 % (04-22-23 @ 11:53)   05-Mar-2019

## 2023-04-24 NOTE — PROGRESS NOTE ADULT - PROBLEM SELECTOR PLAN 6
- c/w home metoprolol XL 100mg daily  - continue to hold home eliquis pending thora   - monitor on tele takes metoprolol XL 100mg daily  - will change Toprol XL to metoprolol tartrate 50mg q12h with hold parameters        * may need to hold AM dose prior to HD on HD days if he does not tolerate volume removal with HD despite pre-HD midodrine  - continue to hold home eliquis pending thora   - monitor on tele

## 2023-04-24 NOTE — DIETITIAN INITIAL EVALUATION ADULT - PROBLEM SELECTOR PLAN 2
- pBNP = [id number]  - f/u TTE as ordered  - will appreciate pulmonology / IR evaluation for pleural effusion drainage and fluid studies to be sent to determine etiology (understanding Abx initiated in ED and to be continued as above)  - incentive spirometry as tolerated  - aspiration precautions

## 2023-04-24 NOTE — PROGRESS NOTE ADULT - PROBLEM SELECTOR PLAN 2
- proBNP = [id number]  - TTE with hyperdynamic LVSF with EF 75%, dital segments and apex with elevated regional strain with basal + mid segments with decreased regional strain. suggestive of amyloid cardiomyopathy, RV not well visualized but with enlarged RV cavity size, normal wall thickness and reduced systolic fxn, mild MV stenosis, trace AR, trace pericardial effusion  - pulmonary consult appreciated  - plan for bedside thoracentesis today   - f/u fluid studies from thora  - volume removal with HD  - incentive spirometry as tolerated  - strict I/Os, daily weights - proBNP = [id number]  - TTE with hyperdynamic LVSF with EF 75%, dital segments and apex with elevated regional strain with basal + mid segments with decreased regional strain. suggestive of amyloid cardiomyopathy, RV not well visualized but with enlarged RV cavity size, normal wall thickness and reduced systolic fxn, mild MV stenosis, trace AR, trace pericardial effusion  - pulmonary consult appreciated  - plan for bedside thoracentesis today   - f/u fluid studies from thora  - volume removal with HD - discussed with Nephrology for additional UF. will plan for midodrine 10mg prior to HD to allow for volume removal  - incentive spirometry as tolerated  - strict I/Os, daily weights

## 2023-04-24 NOTE — SWALLOW BEDSIDE ASSESSMENT ADULT - COMMENTS
GI following: Found to have ?dark stools that have since resolved.  Hemodynamically stable with no active sign of GIB at this point.  Risks outweigh benefits for any endoscopic assessment unless overt GIB develops or transfusions required.  Okay with adding on low-dose daily PPI for ppx (i.e. 20 mg omeprazole).  Okay with daily IV PPI inpatient if preferred, but would send home on low dose daily ppi after.     Pulm following: The patient's dyspnea and hypoxemic respiratory failure is likely multifactorial 2/2 pneumonia, pleural effusions, and volume overload from ESRD given elevated proBNP. The patient improved clinically after receiving HD. Currently 88% on RA, asked RN to place back on NC. Rec: Diagnostic/therapeutic Thoracentesis 4/24. plan for abx for PNA. GI following: Found to have ?dark stools that have since resolved.  Hemodynamically stable with no active sign of GIB at this point.  Risks outweigh benefits for any endoscopic assessment unless overt GIB develops or transfusions required.  Okay with adding on low-dose daily PPI for ppx (i.e. 20 mg omeprazole).  Okay with daily IV PPI inpatient if preferred, but would send home on low dose daily ppi after.     Pulm following: The patient's dyspnea and hypoxemic respiratory failure is likely multifactorial 2/2 pneumonia, pleural effusions, and volume overload from ESRD given elevated proBNP. The patient improved clinically after receiving HD. Currently 88% on RA, asked RN to place back on NC. Rec: Diagnostic/therapeutic Thoracentesis 4/24. plan for abx for PNA.    4/21 CXR: Large left pleural effusion with left lower lobe atelectasis. Bilateral patchy opacities. Trace right pleural effusion.  4/21 CT A/P- Chest CT: Large left pleural effusion with complete left lower and partial left upper lobe atelectasis. Multifocal patchy opacities throughout the right lung, likely on an infectious basis. Pulmonary edema may also have this appearance. Clinical correlation is recommended.  CT abdomen/pelvis: No bowel obstruction. Colonic wall thickening versus underdistention. Please correlate clinically for colitis. Small volume   ascites.

## 2023-04-24 NOTE — DIETITIAN INITIAL EVALUATION ADULT - ORAL INTAKE PTA/DIET HISTORY
Pt endorses reduced appetite and PO intake PTA with wt loss, per wife. reports he was consuming softer foods   confirms history diabetes, was not checking glu at home; noted A1C with Estimated Average Glucose Result: 9.1 % (04-22-23 @ 11:53)   confirmed no known food allergies/ food intolerances

## 2023-04-24 NOTE — DIETITIAN INITIAL EVALUATION ADULT - PERTINENT MEDS FT
MEDICATIONS  (STANDING):  atorvastatin 40 milliGRAM(s) Oral at bedtime  azithromycin  IVPB 500 milliGRAM(s) IV Intermittent every 24 hours  calcium acetate 667 milliGRAM(s) Oral three times a day with meals  ceFAZolin   IVPB      ceFAZolin   IVPB 500 milliGRAM(s) IV Intermittent every 24 hours  chlorhexidine 2% Cloths 1 Application(s) Topical daily  dextrose 5%. 1000 milliLiter(s) (50 mL/Hr) IV Continuous <Continuous>  dextrose 50% Injectable 25 Gram(s) IV Push once  finasteride 5 milliGRAM(s) Oral daily  glucagon  Injectable 1 milliGRAM(s) IntraMuscular once  insulin lispro (ADMELOG) corrective regimen sliding scale   SubCutaneous three times a day before meals  metoprolol succinate  milliGRAM(s) Oral daily  pantoprazole  Injectable 40 milliGRAM(s) IV Push two times a day  polyethylene glycol 3350 17 Gram(s) Oral daily    MEDICATIONS  (PRN):  dextrose Oral Gel 15 Gram(s) Oral once PRN Blood Glucose LESS THAN 70 milliGRAM(s)/deciliter

## 2023-04-24 NOTE — DIETITIAN INITIAL EVALUATION ADULT - ADD RECOMMEND
- Malnutrition sticker placed, RD to follow-up as per protocol   - Will continue to monitor PO intake, weight, labs, skin, GI status, diet.   - Nutrition care plan to remain consistent with pt goals of care  - RD remains available to review diet education and adjust diet recommendations as needed.

## 2023-04-24 NOTE — PROGRESS NOTE ADULT - NSPROGADDITIONALINFOA_GEN_ALL_CORE
.  Claudia Denis MD  Division of Hospital Medicine  [facility]   Available on [employer] Teams - messages preferred prior to calls.    Plan discussed with patient and wife extensively at bedside, GI Attending Dr. Gonzales, and medicine NP Leti. .  Claudia Denis MD  Division of Hospital Medicine  [facility]   Available on [employer] Teams - messages preferred prior to calls.    Plan discussed with patient and wife extensively at bedside, GI Attending Dr. Gonzales, Nephro fellow Wes, and medicine NP Leti.

## 2023-04-24 NOTE — DIETITIAN INITIAL EVALUATION ADULT - NSFNSPHYEXAMSKINFT_GEN_A_CORE
Pt's wife states pt's UBW was ~122 pounds, lost wt recent hospitalizations.. reports now he is< 100 pounds but not sure how much; noted dosing wt 106 pounds ?13% wt loss (?time frame)  82% IBW ( pounds)  Skin: suspected deep tissue injury sacrum as per flowsheets

## 2023-04-24 NOTE — ADVANCED PRACTICE NURSE CONSULT - REASON FOR CONSULT
Requested by staff RN to evaluate Sacral Deep Tissue Injury which was present on admission. PMH is noted:  86 yo male PMH of chronic Afib (on Eliquis, off ASA since hx of PUD/GIB in the past), s/p Micra PPM, non-obstructive CAD, ESRD on HD (Tu/Th/Sa), DM, HLD, and prior history of large pleural effusions (requiring drainage during hospitalizations in 2021 at [facility] and [facility]), who presents from home w/ c/o of SOB + generalized weakness and lethargy with loss of appetite/decreased PO intake, and his wife noticing his having dark/black stools in setting of diffuse abdominal pain worse in the LLQ (pt is s/p appendectomy in the past). Admitted for hypoxia due to pneumonia and large L pleural effusion.

## 2023-04-24 NOTE — PROGRESS NOTE ADULT - PROBLEM SELECTOR PLAN 3
- CT chest with patchy opacities in R lung  - MRSA/MSSA PCR screen +MSSA  - c/w cefazolin given MSSA+ screen and azithro for atypical coverage  - unable to obtain urine legionella and strep Ag as patient is anuric  - SLP eval appreciated, recommended for pureed and moderately thick liquids  - will need FEEST 4/25

## 2023-04-24 NOTE — PROGRESS NOTE ADULT - PROBLEM SELECTOR PLAN 5
- HD per nephrology (patient follows w/ Dr. Julius Eason in [place] as O/P on Tu/Th/Sa schedule)  - will ask nephrology for additional UF with HD given volume overload on exam  - c/w phoslo 667mg TID with meals

## 2023-04-24 NOTE — DIETITIAN INITIAL EVALUATION ADULT - REASON INDICATOR FOR ASSESSMENT
Consult received for pressure injury stage 2/>, heart failure   Information obtained from pt, pt's daughter and pt wife at bedside, electronic medical record

## 2023-04-24 NOTE — SWALLOW BEDSIDE ASSESSMENT ADULT - SLP PERTINENT HISTORY OF CURRENT PROBLEM
87 yoM w/ PMHx of chronic Afib (on Eliquis, off ASA since hx of PUD/GIB in the past), s/p Micra PPM, non-obstructive CAD, ESRD on HD, DM (on Tradjenta), HLD, who presents from home where he lives with his wife, with complaints of SOB over the past day along w/ generalized weakness and lethargy for the past week with loss of appetite/decreased PO intake, and his wife noticing his having dark/black stools yesterday iso diffuse abdominal pain worse in the LLQ (pt is s/p appendectomy in the past). Of note, patient has a prior history of large pleural effusions requiring drainage during hospitalizations in 2021 at [facility] and [facility]. Pt has difficulty urinating (with UOP declining over the years).

## 2023-04-24 NOTE — DIETITIAN INITIAL EVALUATION ADULT - CONTINUE CURRENT NUTRITION CARE PLAN
- Defer diet/fluid consistencies to medical team/SLP recommendations.  - recommend consistent CHO renal diet/yes

## 2023-04-24 NOTE — PROGRESS NOTE ADULT - PROBLEM SELECTOR PLAN 1
due to multifocal pneumonia, large L pleural effusion, hypervolemia  - maintain Spo2 > 94% and wean O2 supplementation as tolerated  - management of PNA, pleural effusion and hypervolemia as below

## 2023-04-24 NOTE — DIETITIAN INITIAL EVALUATION ADULT - OTHER INFO
Home Medications:  atorvastatin 40 mg oral tablet: 1 tab(s) orally once a day (22 Apr 2023 10:18)  calcium acetate 667 mg oral capsule: 1 cap(s) orally 3 times a day (22 Apr 2023 10:27)  Eliquis 2.5 mg oral tablet: 1 tab(s) orally 2 times a day (22 Apr 2023 10:27)  finasteride 5 mg oral tablet: 1 tab(s) orally once a day (22 Apr 2023 10:18)  hydrALAZINE 50 mg oral tablet: 1 tab(s) orally once a day (22 Apr 2023 10:27)  metoprolol succinate 100 mg oral capsule, extended release: 1 tab(s) orally once a day (22 Apr 2023 10:27)  Ara-Sara oral tablet: 1 tab(s) orally once a day (22 Apr 2023 10:27)  Tradjenta 5 mg oral tablet: 1 tab(s) orally once a day (22 Apr 2023 10:18)

## 2023-04-24 NOTE — CHART NOTE - NSCHARTNOTEFT_GEN_A_CORE
: Vadim Park  INDICATION: Pleural effusion  PROCEDURE:    [x ] LIMITED CHEST    FINDINGS:  Large Left sided simple appearing pleural effusion with atelectatic lung associated    INTERPRETATION:  Large Left sided effusion amenable to thoracentesis    Images stored on [facility] : Vadim Park  INDICATION: Pleural effusion  PROCEDURE:    [x ] LIMITED CHEST    FINDINGS:  Large Left sided simple appearing pleural effusion with atelectatic lung associated    INTERPRETATION:  Large Left sided effusion amenable to thoracentesis    Images stored on [facility]    Pulmonary Attending  I was present throughout procedure and agree with findings and interpretation as outlined above.

## 2023-04-24 NOTE — PROGRESS NOTE ADULT - PROBLEM SELECTOR PLAN 4
does have hx PUD/GI bleed in the past while on ASA in the past  - hemodynamics and H&H remain stable  - doubtful active bleed at this time as no BMs since admission  - f/u FOBT  - GI consult appreciated, recommending conservative management at this time   - c/w pantoprazole 40mg IV BID  - f/u iron studies, FOBT  - b12/folate wnl  - will hold home AC for now

## 2023-04-24 NOTE — DIETITIAN INITIAL EVALUATION ADULT - PROBLEM SELECTOR PLAN 4
- nephrology consulted (patient follows w/ Dr. Julius Eason in [place] as O/P) for HD as patient on Tu/Th/Sa schedule  - monitor electrolytes and fluid status

## 2023-04-24 NOTE — SWALLOW BEDSIDE ASSESSMENT ADULT - ORAL PHASE
Delayed oral transit time Decreased anterior-posterior movement of the bolus/Delayed oral transit time/Stasis in anterior sulcus

## 2023-04-24 NOTE — DIETITIAN INITIAL EVALUATION ADULT - NSFNSGIIOFT_GEN_A_CORE
- Pt denies nausea, vomiting, diarrhea, or constipation at this time   - Last BM:4/24 as per flowsheets; currently ordered for bowel regimen (miralax)

## 2023-04-24 NOTE — ADVANCED PRACTICE NURSE CONSULT - ASSESSMENT
Pt sleeping, woke up somewhat & opened eyes for short time. Pt is on a low air loss & pressure redistribution surface & is being turned & positioned as per nursing documentation. pt has nasal cannula for oxygen, incontinent of bladder & bowels at this time. He has poor skin turgor & scattered ecchymotic areas on bilateral arms. The following areas are noted;  All areas are Deep Tissue Injuries (DTI) w/ deep purple discoloration  Sacral which measures 4cm(l) x 4cm(w) x 0cm(d)   Bilateral Ischials-left measures 7cm(l) x 5cm(w) x 0cm(d), right 5cm(l) x 3cm(w) x0cm(d)  Bilateral trochanters- each  measures 3cm(l) x 3cm(w) x 0cm(d)

## 2023-04-24 NOTE — PROGRESS NOTE ADULT - ASSESSMENT
88 yo male PMH of chronic Afib (on Eliquis, off ASA since hx of PUD/GIB in the past), s/p Micra PPM, non-obstructive CAD, ESRD on HD (Tu/Th/Sa), DM, HLD, and prior history of large pleural effusions (requiring drainage during hospitalizations in 2021 at [facility] and [facility]), who presents from home w/ c/o of SOB + generalized weakness and lethargy with loss of appetite/decreased PO intake, and his wife noticing his having dark/black stools in setting of diffuse abdominal pain worse in the LLQ (pt is s/p appendectomy in the past). Admitted for hypoxia due to pneumonia and large L pleural effusion.

## 2023-04-24 NOTE — SWALLOW BEDSIDE ASSESSMENT ADULT - SLP GENERAL OBSERVATIONS
Pt encountered bedside, asleep, able to be roused given verbal/tactile cues. AA&Ox2-3 (person, place, month/year). Pt able to follow 1-step directives given repetition and increased vocal volume provided by SLP. Pt denies dysphagia or odynophagia. Per daughter/wife, pt has had a decreased appetite over the past several weeks.

## 2023-04-24 NOTE — ADVANCED PRACTICE NURSE CONSULT - RECOMMEDATIONS
Recommendations  1) Sacral, ischials & trochanter DTIs- cleanse w/ No Rinse Cleanser, apply Rj Moisture Barrier Cream 2x/d & w/ each incontinence episode as needed Rj will seal & protect skin from moisture  2) Complete Cair boots to off load heels & maintain proper positioning of feet off of the footboard  3) Sween 24 to dry intact skin 2x/d to hydrate  4) Continue to turn & position, use of positioners  5) When pt OOB to chair use of a waffle seat cushion to off load & limit sitting to 1 1/2-2 hour intervals  6) Nutrition consult  Discussed w/ Staff KATHLEEN Schrader  Remain available as requested by staff

## 2023-04-24 NOTE — SWALLOW BEDSIDE ASSESSMENT ADULT - PHARYNGEAL PHASE
Delayed pharyngeal swallow Delayed pharyngeal swallow/Throat clear post oral intake throat clear post soft and bite sized only/Delayed pharyngeal swallow

## 2023-04-24 NOTE — DIETITIAN INITIAL EVALUATION ADULT - ORAL NUTRITION SUPPLEMENTS
- Vane ewing renal support shake BID  - If not medically contraindicated, recommend Nephro-chandrika daily

## 2023-04-24 NOTE — PROGRESS NOTE ADULT - SUBJECTIVE AND OBJECTIVE BOX
Claudia Denis MD  Division of Hospital Medicine  [facility]   Available on [employer] Teams (Mon-Fri 8am-5pm)    * messages preferred prior to calls  Other Times:  [phone]      Patient is a 87y old  Male who presents with a chief complaint of SOB, generalized weakness, dark stools (24 Apr 2023 07:39)      SUBJECTIVE / OVERNIGHT EVENTS: no acute events overnight. no fever, chills, chest pain. dyspnea unchanged but overall feels better. fatigue and lack of energy is biggest concern. no further episodes of melena.   ADDITIONAL REVIEW OF SYSTEMS:    Tele reviewed: afib HR 70-100s    MEDICATIONS  (STANDING):  atorvastatin 40 milliGRAM(s) Oral at bedtime  azithromycin  IVPB 500 milliGRAM(s) IV Intermittent every 24 hours  calcium acetate 667 milliGRAM(s) Oral three times a day with meals  ceFAZolin   IVPB      ceFAZolin   IVPB 500 milliGRAM(s) IV Intermittent every 24 hours  chlorhexidine 2% Cloths 1 Application(s) Topical daily  dextrose 5%. 1000 milliLiter(s) (50 mL/Hr) IV Continuous <Continuous>  dextrose 50% Injectable 25 Gram(s) IV Push once  finasteride 5 milliGRAM(s) Oral daily  glucagon  Injectable 1 milliGRAM(s) IntraMuscular once  insulin lispro (ADMELOG) corrective regimen sliding scale   SubCutaneous three times a day before meals  metoprolol succinate  milliGRAM(s) Oral daily  pantoprazole  Injectable 40 milliGRAM(s) IV Push two times a day  polyethylene glycol 3350 17 Gram(s) Oral daily    MEDICATIONS  (PRN):  dextrose Oral Gel 15 Gram(s) Oral once PRN Blood Glucose LESS THAN 70 milliGRAM(s)/deciliter      CAPILLARY BLOOD GLUCOSE    POCT Blood Glucose.: 122 mg/dL (24 Apr 2023 12:12)  POCT Blood Glucose.: 132 mg/dL (24 Apr 2023 07:57)  POCT Blood Glucose.: 147 mg/dL (23 Apr 2023 21:47)  POCT Blood Glucose.: 131 mg/dL (23 Apr 2023 16:22)    I&O's Summary    23 Apr 2023 07:01  -  24 Apr 2023 07:00  --------------------------------------------------------  IN: 260 mL / OUT: 0 mL / NET: 260 mL    24 Apr 2023 07:01  -  24 Apr 2023 15:07  --------------------------------------------------------  IN: 240 mL / OUT: 0 mL / NET: 240 mL      PHYSICAL EXAM:  Vital Signs Last 24 Hrs  T(C): 36.4 (24 Apr 2023 11:07), Max: 37 (23 Apr 2023 20:16)  T(F): 97.5 (24 Apr 2023 11:07), Max: 98.6 (23 Apr 2023 20:16)  HR: 95 (24 Apr 2023 11:07) (95 - 103)  BP: 104/66 (24 Apr 2023 11:07) (102/64 - 113/71)  BP(mean): --  RR: 18 (24 Apr 2023 11:07) (18 - 18)  SpO2: 100% (24 Apr 2023 11:07) (100% - 100%)    Parameters below as of 24 Apr 2023 11:07  Patient On (Oxygen Delivery Method): nasal cannula  O2 Flow (L/min): 1.5    CONSTITUTIONAL: NAD, well-developed, well-groomed  EYES: PERRLA; conjunctiva and sclera clear  ENMT: Moist oral mucosa, no pharyngeal injection or exudates; normal dentition  NECK: Supple, no palpable masses; no thyromegaly  RESPIRATORY: Normal respiratory effort; diminished at bases b/l L>R  CARDIOVASCULAR: irregularly irregular, normal S1 and S2, no murmur/rub/gallop; +lower extremity edema  ABDOMEN: Soft, Nondistended, Nontender to palpation, normoactive bowel sounds  MUSCULOSKELETAL: No clubbing or cyanosis of digits; no joint swelling or tenderness to palpation  PSYCH: A+O to person, place, and time; affect appropriate  NEUROLOGY: CN 2-12 are intact and symmetric; no gross sensory deficits   SKIN: No rashes; no palpable lesions, +LUE AVF with palpable thrill    LABS:                        12.7   5.91  )-----------( 84       ( 24 Apr 2023 05:28 )             39.8     04-24    134<L>  |  95<L>  |  33<H>  ----------------------------<  106<H>  4.7   |  26  |  4.42<H>    Ca    8.9      24 Apr 2023 05:27  Phos  5.7     04-24  Mg     2.2     04-24      Culture - Blood (collected 21 Apr 2023 22:00)  Source: .Blood Blood-Peripheral  Preliminary Report (23 Apr 2023 02:02):    No growth to date.    Culture - Blood (collected 21 Apr 2023 21:45)  Source: .Blood Blood-Peripheral  Preliminary Report (23 Apr 2023 02:01):    No growth to date.      RADIOLOGY & ADDITIONAL TESTS:  Results Reviewed: no leukocytosis, H/H stable  Imaging Personally Reviewed:  4/24/23 TTE:  CONCLUSIONS:      1. The left ventricular systolic function is hyperdynamic with an ejection fraction visually estimated at 75 %.   2. Global longitudinal strain is 13.2 % (abnormal > -16%). GLS was assessed on [employer]-IO echo machine with a heart rate of 90 bpm and a blood pressure of 113/71 mmHg. The distal segments and apex have elevated regional strain. The basal and mid segments have decreased regional strain. 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 Consider additional imaging for the presence of cardiac amyloid if clinically indicated.   3. Right ventricle was the right ventricle is not well visualized.   4. The right ventricle is not well visualized. enlarged right ventricular cavity size, normal wall thickness and reduced systolic function. The tricuspid annular plane systolic excursion (TAPSE) is 0.8 cm (normal >=1.7 cm).   5. Aortic valve was not well visualized.   6. Abdominal ascites is incidentally noted.   7. Left pleural effusion noted.   8. There is mild mitral valve stenosis.   9. Trace aortic regurgitation.  10. Trace pericardial effusion.  11. No prior echocardiogram is available for comparison.    Electrocardiogram Personally Reviewed:    COORDINATION OF CARE:  Care Discussed with Consultants/Other Providers [Y]: GI attending Dr. Gonzales, medicine NP Leti  Prior or Outpatient Records Reviewed [Y/N]:

## 2023-04-25 NOTE — PROGRESS NOTE ADULT - PROBLEM SELECTOR PLAN 1
Pt. with ESRD on HD three times a week (TTS) presented to [facility] for failure to thrive and lethargy. Last inpatient HD treatment was on 4/22 via LUE AVF. Pt. clinically stable although on NC. Labs reviewed. Hgb (13.2) is above goal for ESRD. Plan for maintenance HD treatment today. Monitor labs and vitals. Avoid nephrotoxins. Dose medications to ESRD.

## 2023-04-25 NOTE — PROGRESS NOTE ADULT - PROBLEM SELECTOR PLAN 5
does have hx PUD/GI bleed in the past while on ASA in the past  - hemodynamics and H&H remain stable  - doubtful active bleed at this time as no BMs since admission  - GI consult appreciated, recommending conservative management at this time   - FOBT negative  - change pantoprazole 40mg IV to daily  - doesn't appear iron deficient based on ferritin but iron and TIBC still pending - reordered for AM  - b12/folate wnl  - will resume eliquis pending amyloid scan results

## 2023-04-25 NOTE — PROGRESS NOTE ADULT - PROBLEM SELECTOR PLAN 4
- TTE as above, suggestive of amyloid cardiomyopathy  - check PYP/amyloid scan  - Cardiology consulted, will f/u recs

## 2023-04-25 NOTE — CONSULT NOTE ADULT - REASON FOR ADMISSION
SOB, generalized weakness, dark stools

## 2023-04-25 NOTE — PROGRESS NOTE ADULT - NSPROGADDITIONALINFOA_GEN_ALL_CORE
.  Claudia Denis MD  Division of Hospital Medicine  [facility]   Available on Microsoft Teams - messages preferred prior to calls.    Plan discussed with patient and wife extensively at bedside, Cardiology Attending Dr. Claudio, and medicine PATRICK Callahan.

## 2023-04-25 NOTE — PROGRESS NOTE ADULT - PROBLEM SELECTOR PLAN 7
takes metoprolol XL 100mg daily  - changed Toprol XL to metoprolol tartrate 50mg q12h with hold parameters        * may need to hold AM dose prior to HD on HD days if he does not tolerate volume removal with HD despite pre-HD midodrine, but appears he tolerated session well  - clear from pulmonary standpoint to resume eliquis. will resume pending amyloid scan results  - monitor on tele

## 2023-04-25 NOTE — PROGRESS NOTE ADULT - SUBJECTIVE AND OBJECTIVE BOX
[facility] Division of Kidney Diseases & Hypertension  FOLLOW UP NOTE  [phone]--------------------------------------------------------------------------------    HPI: 87 y.o. M w/ PMHx of Afib (on Eliquis), s/p Micra PPM, non-obstructive CAD, ESRD on HD (Tu/Th/Sa; nephrologist = Dr. Julius Eason and HD center in [place]), DM (on Tradjenta), HLD, who presents from home with generalized weakness and lethargy for the past week with loss of appetite/decreased PO intake. Pt. being seen for ESRD management. Last outpatient HD treatment was on 4/20. Last inpatient HD treatment was on 4/22.      24 hour events/subjective: Pt. was seen and evaluated at bedside this morning. Pt. minimally conversive, unable to provide history or ROS.      PAST HISTORY  --------------------------------------------------------------------------------  No significant changes to PMH, PSH, FHx, SHx, unless otherwise noted    ALLERGIES & MEDICATIONS  --------------------------------------------------------------------------------  Allergies    No Known Allergies    Intolerances      Standing Inpatient Medications  atorvastatin 40 milliGRAM(s) Oral at bedtime  azithromycin  IVPB 500 milliGRAM(s) IV Intermittent every 24 hours  calcium acetate 667 milliGRAM(s) Oral three times a day with meals  ceFAZolin   IVPB      ceFAZolin   IVPB 500 milliGRAM(s) IV Intermittent every 24 hours  chlorhexidine 2% Cloths 1 Application(s) Topical daily  dextrose 5%. 1000 milliLiter(s) IV Continuous <Continuous>  dextrose 50% Injectable 25 Gram(s) IV Push once  finasteride 5 milliGRAM(s) Oral daily  glucagon  Injectable 1 milliGRAM(s) IntraMuscular once  insulin lispro (ADMELOG) corrective regimen sliding scale   SubCutaneous three times a day before meals  metoprolol tartrate 50 milliGRAM(s) Oral every 12 hours  midodrine. 10 milliGRAM(s) Oral once  Nephro-chandrika 1 Tablet(s) Oral daily  pantoprazole  Injectable 40 milliGRAM(s) IV Push two times a day  polyethylene glycol 3350 17 Gram(s) Oral daily    PRN Inpatient Medications  dextrose Oral Gel 15 Gram(s) Oral once PRN      REVIEW OF SYSTEMS  --------------------------------------------------------------------------------  See HPI    VITALS/PHYSICAL EXAM  --------------------------------------------------------------------------------  T(C): 36.4 (04-25-23 @ 04:51), Max: 36.7 (04-24-23 @ 20:51)  HR: 88 (04-25-23 @ 04:51) (83 - 99)  BP: 124/65 (04-25-23 @ 04:51) (104/66 - 124/65)  RR: 18 (04-25-23 @ 04:51) (18 - 18)  SpO2: 92% (04-25-23 @ 04:51) (92% - 100%)  Wt(kg): --      04-24-23 @ 07:01  -  04-25-23 @ 07:00  --------------------------------------------------------  IN: 360 mL / OUT: 0 mL / NET: 360 mL      Physical Exam:  Gen: elderly, ill appearing   HEENT: MMM on NC  Pulm: No crackles anteriorly   CV: S1S2  Abd: Soft, +BS   Ext: No LE edema B/L  Neuro: Unable to assess  Skin: Warm and dry  Vascular access: LUE AVF with +thrill    LABS/STUDIES  --------------------------------------------------------------------------------              13.2   4.55  >-----------<  87       [04-25-23 @ 06:05]              41.2     132  |  91  |  47  ----------------------------<  110      [04-25-23 @ 06:05]  5.0   |  27  |  5.36        Ca     8.8     [04-25-23 @ 06:05]      Mg     2.2     [04-25-23 @ 06:05]      Phos  5.7     [04-25-23 @ 06:05]          [04-24-23 @ 05:27]    Creatinine Trend:  SCr 5.36 [04-25 @ 06:05]  SCr 4.42 [04-24 @ 05:27]  SCr 3.10 [04-23 @ 08:53]  SCr 4.85 [04-22 @ 11:53]  SCr 4.34 [04-22 @ 00:44]      Ferritin 2482      [04-22-23 @ 11:53]  TSH 10.90      [04-22-23 @ 11:53]  Lipid: chol 164, , HDL 59, LDL --      [04-22-23 @ 11:53]

## 2023-04-25 NOTE — SWALLOW FEES ASSESSMENT ADULT - SLP PERTINENT HISTORY OF CURRENT PROBLEM
87 yoM w/ PMHx of chronic Afib (on Eliquis, off ASA since hx of PUD/GIB in the past), s/p Micra PPM, non-obstructive CAD, ESRD on HD, DM (on Tradjenta), HLD, who presents from home where he lives with his wife, with complaints of SOB over the past day along w/ generalized weakness and lethargy for the past week with loss of appetite/decreased PO intake, and his wife noticing his having dark/black stools yesterday iso diffuse abdominal pain worse in the LLQ (pt is s/p appendectomy in the past). Of note, patient has a prior history of large pleural effusions requiring drainage during hospitalizations in 2021 at [facility] and [facility]. Pt has difficulty urinating (with UOP declining over the years).

## 2023-04-25 NOTE — SWALLOW FEES ASSESSMENT ADULT - COMMENTS
GI following: Found to have ?dark stools that have since resolved.  Hemodynamically stable with no active sign of GIB at this point.  Risks outweigh benefits for any endoscopic assessment unless overt GIB develops or transfusions required.  Okay with adding on low-dose daily PPI for ppx (i.e. 20 mg omeprazole).  Okay with daily IV PPI inpatient if preferred, but would send home on low dose daily ppi after.     Pulm following: The patient's dyspnea and hypoxemic respiratory failure is likely multifactorial 2/2 pneumonia, pleural effusions, and volume overload from ESRD given elevated proBNP. The patient improved clinically after receiving HD. Currently 88% on RA, asked RN to place back on NC. Rec: Diagnostic/therapeutic Thoracentesis 4/24. plan for abx for PNA.    4/21 CXR: Large left pleural effusion with left lower lobe atelectasis. Bilateral patchy opacities. Trace right pleural effusion.  4/21 CT A/P- Chest CT: Large left pleural effusion with complete left lower and partial left upper lobe atelectasis. Multifocal patchy opacities throughout the right lung, likely on an infectious basis. Pulmonary edema may also have this appearance. Clinical correlation is recommended.  CT abdomen/pelvis: No bowel obstruction. Colonic wall thickening versus underdistention. Please correlate clinically for colitis. Small volume   ascites. +glottic gap  +mod-max BOT, vallecuale, b/l lateral channel, and pyriform residue with shallow laryngeal penetration of pudding thick drink wife was feeding pt prior to FEES exam

## 2023-04-25 NOTE — PROGRESS NOTE ADULT - ASSESSMENT
86 yo male PMH of chronic Afib (on Eliquis, off ASA since hx of PUD/GIB in the past), s/p Micra PPM, non-obstructive CAD, ESRD on HD (Tu/Th/Sa), DM, HLD, and prior history of large pleural effusions (requiring drainage during hospitalizations in 2021 at [facility] and [facility]), who presents from home w/ c/o of SOB + generalized weakness and lethargy with loss of appetite/decreased PO intake, and his wife noticing his having dark/black stools in setting of diffuse abdominal pain worse in the LLQ (pt is s/p appendectomy in the past). Admitted for hypoxia due to pneumonia and large L pleural effusion.

## 2023-04-25 NOTE — PROGRESS NOTE ADULT - PROBLEM SELECTOR PLAN 3
- CT chest with patchy opacities in R lung. suspect 2/2 to aspiration  - MRSA/MSSA PCR screen +MSSA  - c/w cefazolin given MSSA+ screen and azithro for atypical coverage. plan for 5 day course.       * azithro to end 4/26. cefazolin to end 4/27.  - unable to obtain urine legionella and strep Ag as patient is anuric  - SLP eval appreciated, recommended for pureed and moderately thick liquids  - s/p FEEST on 4/25. continue current diet. plan for [facility] Friday

## 2023-04-25 NOTE — PROGRESS NOTE ADULT - SUBJECTIVE AND OBJECTIVE BOX
Claudia Denis MD  Division of Hospital Medicine  [facility]   Available on Microsoft Teams (Mon-Fri 8am-5pm)    * messages preferred prior to calls  Other Times:  [phone]      Patient is a 87y old  Male who presents with a chief complaint of SOB, generalized weakness, dark stools (25 Apr 2023 08:28)      SUBJECTIVE / OVERNIGHT EVENTS: no acute events overnight. no fever, chills, chest pain nor dyspnea. tired as returned from dialysis later than usual and unable to have lunch until later in afternoon but otherwise feels okay.  ADDITIONAL REVIEW OF SYSTEMS:    Tele reviewed: afib HR 60-100s    MEDICATIONS  (STANDING):  atorvastatin 40 milliGRAM(s) Oral at bedtime  azithromycin  IVPB 500 milliGRAM(s) IV Intermittent every 24 hours  calcium acetate 667 milliGRAM(s) Oral three times a day with meals  ceFAZolin   IVPB      ceFAZolin   IVPB 500 milliGRAM(s) IV Intermittent every 24 hours  chlorhexidine 2% Cloths 1 Application(s) Topical daily  dextrose 5%. 1000 milliLiter(s) (50 mL/Hr) IV Continuous <Continuous>  dextrose 50% Injectable 25 Gram(s) IV Push once  finasteride 5 milliGRAM(s) Oral daily  glucagon  Injectable 1 milliGRAM(s) IntraMuscular once  insulin lispro (ADMELOG) corrective regimen sliding scale   SubCutaneous three times a day before meals  metoprolol tartrate 50 milliGRAM(s) Oral every 12 hours  midodrine. 10 milliGRAM(s) Oral <User Schedule>  midodrine. 10 milliGRAM(s) Oral once  Nephro-chandrika 1 Tablet(s) Oral daily  pantoprazole  Injectable 40 milliGRAM(s) IV Push two times a day  polyethylene glycol 3350 17 Gram(s) Oral daily    MEDICATIONS  (PRN):  dextrose Oral Gel 15 Gram(s) Oral once PRN Blood Glucose LESS THAN 70 milliGRAM(s)/deciliter      CAPILLARY BLOOD GLUCOSE      POCT Blood Glucose.: 152 mg/dL (25 Apr 2023 07:55)  POCT Blood Glucose.: 171 mg/dL (24 Apr 2023 21:35)  POCT Blood Glucose.: 202 mg/dL (24 Apr 2023 16:59)    I&O's Summary    24 Apr 2023 07:01  -  25 Apr 2023 07:00  --------------------------------------------------------  IN: 360 mL / OUT: 0 mL / NET: 360 mL    25 Apr 2023 07:01  -  25 Apr 2023 15:25  --------------------------------------------------------  IN: 900 mL / OUT: 2900 mL / NET: -2000 mL        PHYSICAL EXAM:  Vital Signs Last 24 Hrs  T(C): 36.2 (25 Apr 2023 13:00), Max: 36.7 (24 Apr 2023 20:51)  T(F): 97.2 (25 Apr 2023 13:00), Max: 98.1 (24 Apr 2023 20:51)  HR: 86 (25 Apr 2023 13:00) (66 - 99)  BP: 122/78 (25 Apr 2023 13:00) (105/61 - 124/65)  BP(mean): --  RR: 18 (25 Apr 2023 13:00) (18 - 18)  SpO2: 95% (25 Apr 2023 13:00) (92% - 96%)    Parameters below as of 25 Apr 2023 13:00  Patient On (Oxygen Delivery Method): room air        CONSTITUTIONAL: NAD, well-developed, well-groomed  EYES: PERRLA; conjunctiva and sclera clear  ENMT: Moist oral mucosa, no pharyngeal injection or exudates; normal dentition  NECK: Supple, no palpable masses; no thyromegaly  RESPIRATORY: Normal respiratory effort; lungs are clear to auscultation bilaterally  CARDIOVASCULAR: Regular rate and rhythm, normal S1 and S2, no murmur/rub/gallop; No lower extremity edema; Peripheral pulses are 2+ bilaterally  ABDOMEN: Soft, Nondistended, Nontender to palpation, normoactive bowel sounds  MUSCULOSKELETAL: No clubbing or cyanosis of digits; no joint swelling or tenderness to palpation  PSYCH: A+O to person, place, and time; affect appropriate  NEUROLOGY: CN 2-12 are intact and symmetric; no gross sensory deficits   SKIN: No rashes; no palpable lesions    LABS:                        13.2   4.55  )-----------( 87       ( 25 Apr 2023 06:05 )             41.2     04-25    132<L>  |  91<L>  |  47<H>  ----------------------------<  110<H>  5.0   |  27  |  5.36<H>    Ca    8.8      25 Apr 2023 06:05  Phos  5.7     04-25  Mg     2.2     04-25                Culture - Fungal, Body Fluid (collected 24 Apr 2023 18:32)  Source: Pleural Fl Pleural Fluid  Preliminary Report (25 Apr 2023 07:26):    Testing in progress        RADIOLOGY & ADDITIONAL TESTS:  Results Reviewed:   Imaging Personally Reviewed:  Electrocardiogram Personally Reviewed:    COORDINATION OF CARE:  Care Discussed with Consultants/Other Providers [Y/N]:  Prior or Outpatient Records Reviewed [Y/N]:   Claudia Dneis MD  Division of Hospital Medicine  [facility]   Available on Microsoft Teams (Mon-Fri 8am-5pm)    * messages preferred prior to calls  Other Times:  [phone]      Patient is a 87y old  Male who presents with a chief complaint of SOB, generalized weakness, dark stools (25 Apr 2023 08:28)      SUBJECTIVE / OVERNIGHT EVENTS: no acute events overnight. no fever, chills, chest pain nor dyspnea. tired as returned from dialysis later than usual and unable to have lunch until later in afternoon but otherwise feels okay.  ADDITIONAL REVIEW OF SYSTEMS:    Tele reviewed: afib HR 60-100s    MEDICATIONS  (STANDING):  atorvastatin 40 milliGRAM(s) Oral at bedtime  azithromycin  IVPB 500 milliGRAM(s) IV Intermittent every 24 hours  calcium acetate 667 milliGRAM(s) Oral three times a day with meals  ceFAZolin   IVPB      ceFAZolin   IVPB 500 milliGRAM(s) IV Intermittent every 24 hours  chlorhexidine 2% Cloths 1 Application(s) Topical daily  dextrose 5%. 1000 milliLiter(s) (50 mL/Hr) IV Continuous <Continuous>  dextrose 50% Injectable 25 Gram(s) IV Push once  finasteride 5 milliGRAM(s) Oral daily  glucagon  Injectable 1 milliGRAM(s) IntraMuscular once  insulin lispro (ADMELOG) corrective regimen sliding scale   SubCutaneous three times a day before meals  metoprolol tartrate 50 milliGRAM(s) Oral every 12 hours  midodrine. 10 milliGRAM(s) Oral <User Schedule>  midodrine. 10 milliGRAM(s) Oral once  Nephro-chandrika 1 Tablet(s) Oral daily  pantoprazole  Injectable 40 milliGRAM(s) IV Push two times a day  polyethylene glycol 3350 17 Gram(s) Oral daily    MEDICATIONS  (PRN):  dextrose Oral Gel 15 Gram(s) Oral once PRN Blood Glucose LESS THAN 70 milliGRAM(s)/deciliter      CAPILLARY BLOOD GLUCOSE      POCT Blood Glucose.: 152 mg/dL (25 Apr 2023 07:55)  POCT Blood Glucose.: 171 mg/dL (24 Apr 2023 21:35)  POCT Blood Glucose.: 202 mg/dL (24 Apr 2023 16:59)    I&O's Summary    24 Apr 2023 07:01  -  25 Apr 2023 07:00  --------------------------------------------------------  IN: 360 mL / OUT: 0 mL / NET: 360 mL    25 Apr 2023 07:01  -  25 Apr 2023 15:25  --------------------------------------------------------  IN: 900 mL / OUT: 2900 mL / NET: -2000 mL        PHYSICAL EXAM:  Vital Signs Last 24 Hrs  T(C): 36.2 (25 Apr 2023 13:00), Max: 36.7 (24 Apr 2023 20:51)  T(F): 97.2 (25 Apr 2023 13:00), Max: 98.1 (24 Apr 2023 20:51)  HR: 86 (25 Apr 2023 13:00) (66 - 99)  BP: 122/78 (25 Apr 2023 13:00) (105/61 - 124/65)  BP(mean): --  RR: 18 (25 Apr 2023 13:00) (18 - 18)  SpO2: 95% (25 Apr 2023 13:00) (92% - 96%)    Parameters below as of 25 Apr 2023 13:00  Patient On (Oxygen Delivery Method): room air    CONSTITUTIONAL: NAD, well-developed, well-groomed  EYES: PERRLA; conjunctiva and sclera clear  ENMT: Moist oral mucosa, no pharyngeal injection or exudates; normal dentition  NECK: Supple, no palpable masses; no thyromegaly  RESPIRATORY: Normal respiratory effort; lungs are clear to auscultation bilaterally  CARDIOVASCULAR: Regular rate and rhythm, normal S1 and S2, no murmur/rub/gallop; No lower extremity edema; Peripheral pulses are 2+ bilaterally  ABDOMEN: Soft, Nondistended, Nontender to palpation, normoactive bowel sounds  MUSCULOSKELETAL: No clubbing or cyanosis of digits; no joint swelling or tenderness to palpation  PSYCH: A+O to person, place, and time; affect appropriate  NEUROLOGY: CN 2-12 are intact and symmetric; no gross sensory deficits   SKIN: No rashes; no palpable lesions      LABS:                        13.2   4.55  )-----------( 87       ( 25 Apr 2023 06:05 )             41.2     04-25    132<L>  |  91<L>  |  47<H>  ----------------------------<  110<H>  5.0   |  27  |  5.36<H>    Ca    8.8      25 Apr 2023 06:05  Phos  5.7     04-25  Mg     2.2     04-25      Culture - Fungal, Body Fluid (collected 24 Apr 2023 18:32)  Source: Pleural Fl Pleural Fluid  Preliminary Report (25 Apr 2023 07:26):    Testing in progress      RADIOLOGY & ADDITIONAL TESTS:  Results Reviewed:   Imaging Personally Reviewed:  Electrocardiogram Personally Reviewed:    COORDINATION OF CARE:  Care Discussed with Consultants/Other Providers [Y]: Cardiology Attending Dr. Claudio, medicine PATRICK Callahan  Prior or Outpatient Records Reviewed [Y/N]:   Claudia Denis MD  Division of Hospital Medicine  [facility]   Available on Microsoft Teams (Mon-Fri 8am-5pm)    * messages preferred prior to calls  Other Times:  [phone]      Patient is a 87y old  Male who presents with a chief complaint of SOB, generalized weakness, dark stools (25 Apr 2023 08:28)      SUBJECTIVE / OVERNIGHT EVENTS: no acute events overnight. no fever, chills, chest pain nor dyspnea. tired as returned from dialysis later than usual and unable to have lunch until later in afternoon but otherwise feels okay.  ADDITIONAL REVIEW OF SYSTEMS:    Tele reviewed: afib HR 60-100s    MEDICATIONS  (STANDING):  atorvastatin 40 milliGRAM(s) Oral at bedtime  azithromycin  IVPB 500 milliGRAM(s) IV Intermittent every 24 hours  calcium acetate 667 milliGRAM(s) Oral three times a day with meals  ceFAZolin   IVPB      ceFAZolin   IVPB 500 milliGRAM(s) IV Intermittent every 24 hours  chlorhexidine 2% Cloths 1 Application(s) Topical daily  dextrose 5%. 1000 milliLiter(s) (50 mL/Hr) IV Continuous <Continuous>  dextrose 50% Injectable 25 Gram(s) IV Push once  finasteride 5 milliGRAM(s) Oral daily  glucagon  Injectable 1 milliGRAM(s) IntraMuscular once  insulin lispro (ADMELOG) corrective regimen sliding scale   SubCutaneous three times a day before meals  metoprolol tartrate 50 milliGRAM(s) Oral every 12 hours  midodrine. 10 milliGRAM(s) Oral <User Schedule>  midodrine. 10 milliGRAM(s) Oral once  Nephro-chandrika 1 Tablet(s) Oral daily  pantoprazole  Injectable 40 milliGRAM(s) IV Push two times a day  polyethylene glycol 3350 17 Gram(s) Oral daily    MEDICATIONS  (PRN):  dextrose Oral Gel 15 Gram(s) Oral once PRN Blood Glucose LESS THAN 70 milliGRAM(s)/deciliter      CAPILLARY BLOOD GLUCOSE      POCT Blood Glucose.: 152 mg/dL (25 Apr 2023 07:55)  POCT Blood Glucose.: 171 mg/dL (24 Apr 2023 21:35)  POCT Blood Glucose.: 202 mg/dL (24 Apr 2023 16:59)    I&O's Summary    24 Apr 2023 07:01  -  25 Apr 2023 07:00  --------------------------------------------------------  IN: 360 mL / OUT: 0 mL / NET: 360 mL    25 Apr 2023 07:01  -  25 Apr 2023 15:25  --------------------------------------------------------  IN: 900 mL / OUT: 2900 mL / NET: -2000 mL        PHYSICAL EXAM:  Vital Signs Last 24 Hrs  T(C): 36.2 (25 Apr 2023 13:00), Max: 36.7 (24 Apr 2023 20:51)  T(F): 97.2 (25 Apr 2023 13:00), Max: 98.1 (24 Apr 2023 20:51)  HR: 86 (25 Apr 2023 13:00) (66 - 99)  BP: 122/78 (25 Apr 2023 13:00) (105/61 - 124/65)  BP(mean): --  RR: 18 (25 Apr 2023 13:00) (18 - 18)  SpO2: 95% (25 Apr 2023 13:00) (92% - 96%)    Parameters below as of 25 Apr 2023 13:00  Patient On (Oxygen Delivery Method): room air    CONSTITUTIONAL: NAD, well-developed, well-groomed  EYES: PERRLA; conjunctiva and sclera clear  ENMT: Moist oral mucosa, no pharyngeal injection or exudates; normal dentition  NECK: Supple, no palpable masses; no thyromegaly  RESPIRATORY: Normal respiratory effort; diminished at bases b/l L>R  CARDIOVASCULAR: irregularly irregular, normal S1 and S2, no murmur/rub/gallop; +lower extremity edema  ABDOMEN: Soft, Nondistended, Nontender to palpation, normoactive bowel sounds  MUSCULOSKELETAL: No clubbing or cyanosis of digits; no joint swelling or tenderness to palpation  PSYCH: A+O to person, place, and time; affect appropriate  NEUROLOGY: CN 2-12 are intact and symmetric; no gross sensory deficits   SKIN: No rashes; no palpable lesions, +LUE AVF with palpable thrill      LABS:                        13.2   4.55  )-----------( 87       ( 25 Apr 2023 06:05 )             41.2     04-25    132<L>  |  91<L>  |  47<H>  ----------------------------<  110<H>  5.0   |  27  |  5.36<H>    Ca    8.8      25 Apr 2023 06:05  Phos  5.7     04-25  Mg     2.2     04-25      Culture - Fungal, Body Fluid (collected 24 Apr 2023 18:32)  Source: Pleural Fl Pleural Fluid  Preliminary Report (25 Apr 2023 07:26):    Testing in progress      RADIOLOGY & ADDITIONAL TESTS:  Results Reviewed:   Imaging Personally Reviewed:  Electrocardiogram Personally Reviewed:    COORDINATION OF CARE:  Care Discussed with Consultants/Other Providers [Y]: Cardiology Attending Dr. Claudio, medicine PATRICK Callahan  Prior or Outpatient Records Reviewed [Y/N]:

## 2023-04-25 NOTE — PROGRESS NOTE ADULT - PROBLEM SELECTOR PLAN 6
- HD per nephrology (patient follows w/ Dr. Julius Eason in [place] as O/P on Tu/Th/Sa schedule)  - discussed with nephrology for additional UF with HD given volume overload on exam  - c/w midodrine 10mg pre-HD on HD days to allow for volume removal  - c/w phoslo 667mg TID with meals

## 2023-04-25 NOTE — SWALLOW FEES ASSESSMENT ADULT - ADDITIONAL RECOMMENDATIONS
Monitor for s/s aspiration/laryngeal penetration. If noted:  D/C p.o. intake, provide non-oral nutrition/hydration/meds, and contact this service @ [phone]  MBS scheduled for Friday, 4/28

## 2023-04-25 NOTE — PROGRESS NOTE ADULT - PROBLEM SELECTOR PLAN 2
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 Serum phosphorus (5.7) is slightly above goal for ESRD. Recommend to continue with phosphorus binder. Monitor serum phosphorus, goal: 3.5-5.5.    If you have any questions, please feel free to contact me  Wes Savage  Nephrology Fellow  [phone] / [employer] Teams(Preferred)  (After 5pm or on weekends please page the on-call fellow)

## 2023-04-25 NOTE — SWALLOW FEES ASSESSMENT ADULT - DIAGNOSTIC IMPRESSIONS
87 yoM w/ PMHx of chronic Afib, s/p Micra PPM, non-obstructive CAD, ESRD on HD, DM, HLD, who presents from home where he lives with his wife, with complaints of SOB over the past day along w/ generalized weakness and lethargy. Pt presents with an oropharyngeal dysphagia Prior to FEES exam, pt consumed moderately thick shake/pudding thick shake. Preliminary findings included baseline mod-max BOT, vallecuale, b/l lateral channel, and pyriform residue with shallow laryngeal penetration. Swallow sequence is marked by premature loss of bolus to the oropharynx and hypopharynx. There is moderate pharyngeal stasis with purees and moderately thick liquids due to reduced pharyngeal constriction. Laryngeal penetration observed with purees, moderately thick and mildly thick liquids, however, remains shallow within the laryngeal vestibule and does not progress to the level of the vocal cords. Given alternating food and liquids, pharyngeal stasis is mildly reduced and intermittently clears from the laryngeal vestibule. No aspiration visualized throughout exam. Given above, education provided to pt and family re: risk of aspiration and importance of small bites/sips, alternating food and liquids, and allow rest breaks throughout meal. Would suggest MBS later this week to re-assess pharyngeal stage of swallow and place pt on least restrictive diet.  Disorders: reduced lingual strength/ROM/Rate of motion, reduced BOT to posterior pharyngeal wall contact, delay in trigger of the swallow reflex, reduced hyo-laryngeal excursion, reduced laryngeal closure, reduced pharyngeal contractility, reduced supraglottic sensation,

## 2023-04-25 NOTE — CONSULT NOTE ADULT - SUBJECTIVE AND OBJECTIVE BOX
DATE OF SERVICE: 04-25-23 @ 22:47    CHIEF COMPLAINT:Patient is a 87y old  Male who presents with a chief complaint of SOB, generalized weakness, dark stools (25 Apr 2023 15:24)      HISTORY OF PRESENT ILLNESS:HPI:  87yoM w/ PMHx of chronic Afib (on Eliquis, off ASA since hx of PUD/GIB in the past), s/p Micra PPM, non-obstructive CAD, ESRD on HD (Tu/Th/Sa; nephrologist = Dr. Julius Eason and HD center in [place]), DM (on Tradjenta), HLD, who presents from home where he lives with his wife (at baseline able to walk w/ cane and assistance though progressively requiring more and more assistance w/ ADLs), with complaints of SOB over the past day along w/ generalized weakness and lethargy for the past week with loss of appetite/decreased PO intake, and his wife noticing his having dark/black stools yesterday iso diffuse abdominal pain worse in the LLQ (pt is s/p appendectomy in the past). Of note, patient has a prior history of large pleural effusions requiring drainage during hospitalizations in 2021 at [facility] and [facility]. Patient and his wife on his behalf deny fever/chills, sick contacts, cough, rhinorrhea, myalgia, CP, palpitations, n/v/d, hematochezia, hematuria, though he does have noted peripheral edema and has difficulty urinating (with UOP declining over the years).          ED Presenting Vitals: 98.4F, 110bpm, 170/88, 99% on 2L-NC  ED Course: Metoprolol 5mg IVP x2, IV Vancomycin 1g x1, IV Zosyn 3.375g x1, IV Azithromycin 500mg x1 (22 Apr 2023 12:46)      PAST MEDICAL & SURGICAL HISTORY:  ESRD on dialysis      Chronic atrial fibrillation      Large pleural effusion      SBO (small bowel obstruction)      Diabetes      Nonobstructive cardiomyopathy      PUD (peptic ulcer disease)      S/P appendectomy              MEDICATIONS:  metoprolol tartrate 50 milliGRAM(s) Oral every 12 hours  midodrine. 10 milliGRAM(s) Oral <User Schedule>  midodrine. 10 milliGRAM(s) Oral once    azithromycin  IVPB 500 milliGRAM(s) IV Intermittent every 24 hours  ceFAZolin   IVPB      ceFAZolin   IVPB 500 milliGRAM(s) IV Intermittent every 24 hours        polyethylene glycol 3350 17 Gram(s) Oral daily    atorvastatin 40 milliGRAM(s) Oral at bedtime  dextrose 50% Injectable 25 Gram(s) IV Push once  dextrose Oral Gel 15 Gram(s) Oral once PRN  finasteride 5 milliGRAM(s) Oral daily  glucagon  Injectable 1 milliGRAM(s) IntraMuscular once  insulin lispro (ADMELOG) corrective regimen sliding scale   SubCutaneous three times a day before meals    calcium acetate 667 milliGRAM(s) Oral three times a day with meals  chlorhexidine 2% Cloths 1 Application(s) Topical daily  dextrose 5%. 1000 milliLiter(s) IV Continuous <Continuous>  Nephro-chandrika 1 Tablet(s) Oral daily      FAMILY HISTORY:      Non-contributory    SOCIAL HISTORY:    [ ] not a smoker  Allergies    No Known Allergies    Intolerances    	    REVIEW OF SYSTEMS:  CONSTITUTIONAL: No fever  EYES: No eye pain, visual disturbances, or discharge  ENMT:  No difficulty hearing, tinnitus  NECK: No pain or stiffness  RESPIRATORY: No cough, wheezing, +SOB  CARDIOVASCULAR: No chest pain, palpitations, passing out, dizziness, or leg swelling  GASTROINTESTINAL:  No nausea, vomiting, diarrhea or constipation. No melena.  GENITOURINARY: No dysuria, hematuria  NEUROLOGICAL: No stroke like symptoms  SKIN: No burning or lesions   ENDOCRINE: No heat or cold intolerance  MUSCULOSKELETAL: No joint pain or swelling  PSYCHIATRIC: No  anxiety, mood swings  HEME/LYMPH: No bleeding gums  ALLERGY AND IMMUNOLOGIC: No hives or eczema	    All other ROS negative    PHYSICAL EXAM:  T(C): 36.7 (04-25-23 @ 21:16), Max: 36.7 (04-25-23 @ 00:04)  HR: 93 (04-25-23 @ 21:16) (66 - 93)  BP: 112/64 (04-25-23 @ 21:16) (112/64 - 124/65)  RR: 18 (04-25-23 @ 21:16) (18 - 18)  SpO2: 96% (04-25-23 @ 21:16) (92% - 96%)  Wt(kg): --  I&O's Summary    24 Apr 2023 07:01  -  25 Apr 2023 07:00  --------------------------------------------------------  IN: 360 mL / OUT: 0 mL / NET: 360 mL    25 Apr 2023 07:01  -  25 Apr 2023 22:47  --------------------------------------------------------  IN: 900 mL / OUT: 2900 mL / NET: -2000 mL        Appearance: Normal	  HEENT:   Normal oral mucosa, EOMI	  Cardiovascular:  S1 S2, No JVD,    Respiratory: Lungs clear to auscultation	  Psychiatry: Alert  Gastrointestinal:  Soft, Non-tender, + BS	  Skin: No rashes   Neurologic: Non-focal  Extremities:  No edema  Vascular: Peripheral pulses palpable    	    	  	  CARDIAC MARKERS:  Labs personally reviewed by me                                  13.2   4.55  )-----------( 87       ( 25 Apr 2023 06:05 )             41.2     04-25    132<L>  |  91<L>  |  47<H>  ----------------------------<  110<H>  5.0   |  27  |  5.36<H>    Ca    8.8      25 Apr 2023 06:05  Phos  5.7     04-25  Mg     2.2     04-25            EKG: Personally reviewed by me - AF RVR with inferior wall TWI  Radiology: Personally reviewed by me -   < from: Xray Chest 1 View- PORTABLE-Urgent (Xray Chest 1 View- PORTABLE-Urgent .) (04.24.23 @ 18:29) >  Impression:  Moderate left pleural effusion and associated atelectasis, slightly   decreased from prior.         < from: TTE W or WO Ultrasound Enhancing Agent (04.24.23 @ 09:36) >  CONCLUSIONS:      1. Theleft ventricular systolic function is hyperdynamic with an ejection fraction visually estimated at 75 %.   2. Global longitudinal strain is 13.2 % (abnormal > -16%). GLS was assessed on [employer] echo machine with a heart rate of 90 bpm and a blood pressure of 113/71 mmHg. The distal segments and apex have elevated regional strain. The basal and mid segments have decreased regional strain. 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 Consider additional imaging for the presence of cardiac amyloid if clinically indicated.   3. Right ventricle was the right ventricle is not well visualized.   4. The right ventricle is not well visualized. enlarged right ventricular cavity size, normal wall thickness and reduced systolic function. The tricuspid annular plane systolic excursion (TAPSE) is 0.8 cm (normal >=1.7 cm).   5. Aortic valve was not well visualized.   6. Abdominal ascites is incidentally noted.   7. Left pleural effusion noted.   8. There is mild mitral valve stenosis.   9. Trace aortic regurgitation.  10. Trace pericardial effusion.  11. No prior echocardiogram is available for comparison.    < end of copied text >            Assessment and Plan:   · Assessment	  86 yo male PMH of chronic Afib (on Eliquis, off ASA since hx of PUD/GIB in the past), s/p Micra PPM, non-obstructive CAD, ESRD on HD (Tu/Th/Sa), DM, HLD, and prior history of large pleural effusions (requiring drainage during hospitalizations in 2021 at [facility] and [facility]), who presents from home w/ c/o of SOB + generalized weakness and lethargy with loss of appetite/decreased PO intake, and his wife noticing his having dark/black stools in setting of diffuse abdominal pain worse in the LLQ (pt is s/p appendectomy in the past). Admitted for hypoxia due to pneumonia and large L pleural effusion.      INCOMPLETE    Problem/Plan - 1:  ·  Problem: Diastolic HF.   ·  Plan: - TTE suggestive of amyloid, EKG with low voltage, hx of AF  -  Nuclear pyrophosphate scan done report pending  - will serum immunoglobulin free light chains, serum immunofixation, urine immunofixation to complete work up (to be done as outpt so results can be followed as outpt)    Problem/Plan - 2:  ·  Problem: Chronic atrial fibrillation.   ·  Plan: Home metoprolol XL 100mg daily   - if scan positive for Amyloid would rec decreasing Metoprolol dose (try to avoid significant bb in cardiac amyloid)  - Eliquis for AC    Problem/Plan - 3:  ·  Problem: Hyperlipidemia.   ·  Plan: - c/w atorvastatin 40mg qHS.    Problem/Plan -4:  ·  Problem: Prophylactic measure.   ·  Plan; DVT ppx: SCDs. eliquis to be resumed pending amyloid scan results            Differential diagnosis and plan of care discussed with patient after the evaluation. Counseling on diet, nutritional counseling, weight management, exercise and medication compliance was done.   Advanced care planning/advanced directives discussed with patient/family. DNR status including forceful chest compressions to attempt to restart the heart, ventilator support/artificial breathing, electric shock, artificial nutrition, health care proxy, Molst form all discussed with pt. Pt wishes to consider. More than fifteen minutes spent on discussing advanced directives.           Shalom Claudio DO [facility]  Cardiovascular Medicine  [address], [address]  Office [phone]  Available via call/text on [employer] Teams
  HPI: 87yoM w/ PMHx of chronic Afib (on Eliquis, off ASA since hx of PUD/GIB in the past), s/p Micra PPM, non-obstructive CAD, ESRD on HD (//; nephrologist = Dr. Julius Eason and HD center in [place]), DM (on Tradjenta), HLD, who presents from home where he lives with his wife (at baseline able to walk w/ cane and assistance though progressively requiring more and more assistance w/ ADLs), with complaints of SOB over the past day along w/ generalized weakness and lethargy for the past week with loss of appetite/decreased PO intake. Wife reports that patient had one day of dark brown stool.     Patient is very lethargic. Per family at bedside, patient has been lethargic and sleeping for the past weeks especially after HD. Wife reports that patient has been having yellow stool and had dark brown stool on day prior to admission. In the past, patient had dark stool and was found to have low Hgb due to ulcer. Patient has been consistently taking eliquis as outpt for Afib. No NSAIDs. Last colonoscopy and endoscopy more than 10 years with no significant findings.     Deferred rectal given patient's lethargy.     Allergies:  No Known Allergies      Home Medications:    Hospital Medications:  atorvastatin 40 milliGRAM(s) Oral at bedtime  azithromycin  IVPB 500 milliGRAM(s) IV Intermittent every 24 hours  calcium acetate 667 milliGRAM(s) Oral three times a day with meals  ceFAZolin   IVPB      chlorhexidine 2% Cloths 1 Application(s) Topical daily  dextrose 5%. 1000 milliLiter(s) IV Continuous <Continuous>  dextrose 50% Injectable 25 Gram(s) IV Push once  dextrose Oral Gel 15 Gram(s) Oral once PRN  finasteride 5 milliGRAM(s) Oral daily  glucagon  Injectable 1 milliGRAM(s) IntraMuscular once  insulin lispro (ADMELOG) corrective regimen sliding scale   SubCutaneous three times a day before meals  metoprolol succinate  milliGRAM(s) Oral daily  pantoprazole  Injectable 40 milliGRAM(s) IV Push two times a day      PMHX/PSHX:  ESRD on dialysis    Chronic atrial fibrillation    Large pleural effusion    SBO (small bowel obstruction)    Diabetes    Nonobstructive cardiomyopathy    PUD (peptic ulcer disease)    S/P appendectomy        Family history:      Denies family history of colon cancer/polyps, stomach cancer/polyps, pancreatic cancer/masses, liver cancer/disease, ovarian cancer and endometrial cancer.    Social History:   Tob: Denies  EtOH: Denies  Illicit Drugs: Denies    ROS:     General:  No wt loss, fevers, chills, night sweats, fatigue  Eyes:  Good vision, no reported pain  ENT:  No sore throat, pain, runny nose, dysphagia  CV:  No pain, palpitations, hypo/hypertension  Pulm:  No dyspnea, cough, tachypnea, wheezing  GI:  see HPI  :  No pain, bleeding, incontinence, nocturia  Muscle:  No pain, weakness  Neuro:  No weakness, tingling, memory problems  Psych:  No fatigue, insomnia, mood problems, depression  Endocrine:  No polyuria, polydipsia, cold/heat intolerance  Heme:  No petechiae, ecchymosis, easy bruisability  Skin:  No rash, tattoos, scars, edema    PHYSICAL EXAM:     GENERAL:  No acute distress, patient is lethargic and falls asleep between questions (similar to b/l recently per family, which led to his hospitalization)   HEENT:  NCAT, no scleral icterus   CHEST:  on 3L NC   HEART:  Regular rate and rhythm  ABDOMEN:  Soft, non-tender, non-distended, normoactive bowel sounds,  no masses  EXTREMITIES: No edema  SKIN:  No rash/erythema/ecchymoses/petechiae/wounds/abscess/warm/dry  NEURO:  Alert and oriented x 3     Vital Signs:  Vital Signs Last 24 Hrs  T(C): 36.6 (2023 11:16), Max: 36.7 (2023 17:30)  T(F): 97.8 (2023 11:16), Max: 98 (2023 17:30)  HR: 95 (2023 11:16) (90 - 96)  BP: 98/61 (2023 11:15) (89/52 - 124/72)  BP(mean): 77 (2023 01:30) (77 - 77)  RR: 18 (2023 04:24) (17 - 20)  SpO2: 96% (2023 11:15) (96% - 100%)    Parameters below as of 2023 11:15  Patient On (Oxygen Delivery Method): nasal cannula  O2 Flow (L/min): 1.5    Daily     Daily Weight in k.1 (2023 07:15)    LABS:                        12.7   6.52  )-----------( 78       ( 2023 08:53 )             39.7     Mean Cell Volume: 103.1 fl (- @ 08:53)        135  |  96  |  21  ----------------------------<  146<H>  3.9   |  30  |  3.10<H>    Ca    8.5      2023 08:53  Phos  3.6       Mg     2.0         TPro  5.8<L>  /  Alb  3.4  /  TBili  0.8  /  DBili  x   /  AST  21  /  ALT  14  /  AlkPhos  94      LIVER FUNCTIONS - ( 2023 00:44 )  Alb: 3.4 g/dL / Pro: 5.8 g/dL / ALK PHOS: 94 U/L / ALT: 14 U/L / AST: 21 U/L / GGT: x           PT/INR - ( 2023 22:27 )   PT: 14.2 sec;   INR: 1.22 ratio         PTT - ( 2023 22:27 )  PTT:28.2 sec                            12.7   6.52  )-----------( 78       ( 2023 08:53 )             39.7                         13.5   8.49  )-----------( 113      ( 2023 11:53 )             42.6                         13.6   7.88  )-----------( 111      ( 2023 22:27 )             41.9       Imaging:          
HPI:    87yoM w/ PMHx of chronic Afib (on Eliquis, off ASA since hx of PUD/GIB in the past), s/p Micra PPM, non-obstructive CAD, ESRD on HD (Tu/Th/Sa; nephrologist = Dr. Julius Eason and HD center in [place]), DM (on Tradjenta), HLD, who presents from home where he lives with his wife (at baseline able to walk w/ cane and assistance though progressively requiring more and more assistance w/ ADLs), with complaints of SOB over the past day along w/ generalized weakness and lethargy for the past week with loss of appetite/decreased PO intake, and his wife noticing his having dark/black stools yesterday iso diffuse abdominal pain worse in the LLQ (pt is s/p appendectomy in the past). Of note, patient has a prior history of large pleural effusions requiring drainage during hospitalizations in 2021 at [facility] and [facility]. Patient and his wife on his behalf deny fever/chills, sick contacts, cough, rhinorrhea, myalgia, CP, palpitations, n/v/d, hematochezia, hematuria, though he does have noted peripheral edema and has difficulty urinating (with UOP declining over the years).          ED Presenting Vitals: 98.4F, 110bpm, 170/88, 99% on 2L-NC  ED Course: Metoprolol 5mg IVP x2, IV Vancomycin 1g x1, IV Zosyn 3.375g x1, IV Azithromycin 500mg x1    Pulmonary team consulted for pleural effusion as CT Chest    Patient seen and examined at bedside. He was easily arousable and pleasant though mainly asleep for the entirety of the interview, which the family notes he has been quite lethargic lately. They note a steady decline since his string of hospitalizations in 2021 and notice decreasing weight and energy and are worried about him. They are unsure when he last took his eliquis. He has been having decrease in appetite lately but the wife says that he typically manages his own medications and thus she is unsure of his scheduling or timing.           PAST MEDICAL & SURGICAL HISTORY:  ESRD on dialysis      Chronic atrial fibrillation      Large pleural effusion      SBO (small bowel obstruction)      Diabetes      Nonobstructive cardiomyopathy      PUD (peptic ulcer disease)      S/P appendectomy          FAMILY HISTORY:      SOCIAL HISTORY:  Smoking: unknown   EtOH Use:  Marital Status:  Occupation:  Exposures:  Recent Travel:    Allergies    No Known Allergies    Intolerances        HOME MEDICATIONS:    REVIEW OF SYSTEMS:  Unable to fully assess     [ ] All other systems negative  [ ] Unable to assess ROS because ________    OBJECTIVE:  ICU Vital Signs Last 24 Hrs  T(C): 36.6 (23 Apr 2023 11:16), Max: 36.6 (22 Apr 2023 21:20)  T(F): 97.8 (23 Apr 2023 11:16), Max: 97.9 (22 Apr 2023 21:20)  HR: 95 (23 Apr 2023 11:16) (92 - 96)  BP: 98/61 (23 Apr 2023 11:15) (89/52 - 124/72)  BP(mean): 77 (23 Apr 2023 01:30) (77 - 77)  ABP: --  ABP(mean): --  RR: 18 (23 Apr 2023 04:24) (17 - 18)  SpO2: 96% (23 Apr 2023 11:15) (96% - 100%)    O2 Parameters below as of 23 Apr 2023 11:15  Patient On (Oxygen Delivery Method): nasal cannula  O2 Flow (L/min): 1.5            04-23 @ 07:01  -  04-23 @ 19:20  --------------------------------------------------------  IN: 260 mL / OUT: 0 mL / NET: 260 mL      CAPILLARY BLOOD GLUCOSE      POCT Blood Glucose.: 131 mg/dL (23 Apr 2023 16:22)      PHYSICAL EXAM:  General: Awake, alert, oriented X 3, lethargic and cachectic   HEENT: Atraumatic, normocephalic.                  No tonsillar or pharyngeal exudates.  Lymph Nodes: No palpable lymphadenopathy  Neck: No JVD. No carotid bruit.   Respiratory:: Decreased breath sounds on the left, some scattered rhonchi on the right.   Cardiovascular: S1 S2 normal. No murmurs, rubs or gallops.   Abdomen: Soft, non-tender, non-distended. No organomegaly.  Extremities: Warm to touch. Peripheral pulse palpable. No pedal edema.       HOSPITAL MEDICATIONS:  MEDICATIONS  (STANDING):  atorvastatin 40 milliGRAM(s) Oral at bedtime  azithromycin  IVPB 500 milliGRAM(s) IV Intermittent every 24 hours  calcium acetate 667 milliGRAM(s) Oral three times a day with meals  ceFAZolin   IVPB      chlorhexidine 2% Cloths 1 Application(s) Topical daily  dextrose 5%. 1000 milliLiter(s) (50 mL/Hr) IV Continuous <Continuous>  dextrose 50% Injectable 25 Gram(s) IV Push once  finasteride 5 milliGRAM(s) Oral daily  glucagon  Injectable 1 milliGRAM(s) IntraMuscular once  insulin lispro (ADMELOG) corrective regimen sliding scale   SubCutaneous three times a day before meals  metoprolol succinate  milliGRAM(s) Oral daily  pantoprazole  Injectable 40 milliGRAM(s) IV Push two times a day  polyethylene glycol 3350 17 Gram(s) Oral daily    MEDICATIONS  (PRN):  dextrose Oral Gel 15 Gram(s) Oral once PRN Blood Glucose LESS THAN 70 milliGRAM(s)/deciliter      LABS:                        12.7   6.52  )-----------( 78       ( 23 Apr 2023 08:53 )             39.7     04-23    135  |  96  |  21  ----------------------------<  146<H>  3.9   |  30  |  3.10<H>    Ca    8.5      23 Apr 2023 08:53  Phos  3.6     04-23  Mg     2.0     04-23    TPro  5.8<L>  /  Alb  3.4  /  TBili  0.8  /  DBili  x   /  AST  21  /  ALT  14  /  AlkPhos  94  04-22    PT/INR - ( 21 Apr 2023 22:27 )   PT: 14.2 sec;   INR: 1.22 ratio         PTT - ( 21 Apr 2023 22:27 )  PTT:28.2 sec      Venous Blood Gas:  04-21 @ 22:13  7.40/49/57/30/86.8  VBG Lactate: 1.7      MICROBIOLOGY:     RADIOLOGY:  [ ] Reviewed and interpreted by me    Point of Care Ultrasound Findings;    PFT:    EKG:
[facility] DIVISION OF KIDNEY DISEASES AND HYPERTENSION -- [phone]  -- INITIAL CONSULT NOTE  --------------------------------------------------------------------------------  HPI:  Siva. is an 87 y.o. M w/ PMHx of Afib (on Eliquis), s/p Micra PPM, non-obstructive CAD, ESRD on HD (Tu/Th/Sa; nephrologist = Dr. Julius Eason and HD center in [place]), DM (on Tradjenta), HLD, who presents from home with generalized weakness and lethargy for the past week with loss of appetite/decreased PO intake. Nephrology consulted for ESRD management Pt. last had HD on 4/20 on a later shift because he has too weak to make his usual AM shift. Wife states that Pt. has become progressively more depressed as his ability to complete iADLS and ADLs has been decreasing. He has not touched food in 2 days prior to arrival but did have some puree food in ED today. Pt. has been on Dialysis since 2017 and was started on Tradjenta fairly recently.         PAST HISTORY  --------------------------------------------------------------------------------  PAST MEDICAL & SURGICAL HISTORY:  ESRD on dialysis      Chronic atrial fibrillation      Large pleural effusion      SBO (small bowel obstruction)      Diabetes      Nonobstructive cardiomyopathy      PUD (peptic ulcer disease)      S/P appendectomy        FAMILY HISTORY:    PAST SOCIAL HISTORY:  No toxic habits disclosed.   Lives with wife who assists in iADLS.     ALLERGIES & MEDICATIONS  --------------------------------------------------------------------------------  Allergies    No Known Allergies    Intolerances      Standing Inpatient Medications  atorvastatin 40 milliGRAM(s) Oral at bedtime  azithromycin  IVPB 500 milliGRAM(s) IV Intermittent every 24 hours  dextrose 5%. 1000 milliLiter(s) IV Continuous <Continuous>  dextrose 50% Injectable 25 Gram(s) IV Push once  finasteride 5 milliGRAM(s) Oral daily  glucagon  Injectable 1 milliGRAM(s) IntraMuscular once  insulin lispro (ADMELOG) corrective regimen sliding scale   SubCutaneous three times a day before meals  metoprolol succinate  milliGRAM(s) Oral daily  pantoprazole  Injectable 40 milliGRAM(s) IV Push two times a day  piperacillin/tazobactam IVPB. 3.375 Gram(s) IV Intermittent once    PRN Inpatient Medications  dextrose Oral Gel 15 Gram(s) Oral once PRN      REVIEW OF SYSTEMS  --------------------------------------------------------------------------------  Unable to obtain    VITALS/PHYSICAL EXAM  --------------------------------------------------------------------------------  T(C): 36.6 (04-22-23 @ 16:39), Max: 37.4 (04-21-23 @ 22:15)  HR: 91 (04-22-23 @ 16:39) (91 - 124)  BP: 105/67 (04-22-23 @ 16:39) (105/67 - 170/88)  RR: 20 (04-22-23 @ 16:39) (16 - 20)  SpO2: 100% (04-22-23 @ 16:39) (92% - 100%)  Wt(kg): --        Physical Exam:  	Gen: elderly, ill appearing   	HEENT: MMM on NC  	Pulm: anteriorly faint crackles   	CV: S1S2  	Abd: Soft, +BS   	Ext: Trace LE edema B/L  	Neuro: Somnolent   	Skin: Warm and dry  	Vascular access: LUE AVF thrills+    LABS/STUDIES  --------------------------------------------------------------------------------              13.5   8.49  >-----------<  113      [04-22-23 @ 11:53]              42.6     136  |  96  |  45  ----------------------------<  274      [04-22-23 @ 11:53]  4.9   |  29  |  4.85        Ca     9.3     [04-22-23 @ 11:53]      Mg     2.3     [04-22-23 @ 11:53]      Phos  5.9     [04-22-23 @ 11:53]    TPro  5.8  /  Alb  3.4  /  TBili  0.8  /  DBili  x   /  AST  21  /  ALT  14  /  AlkPhos  94  [04-22-23 @ 00:44]    PT/INR: PT 14.2 , INR 1.22       [04-21-23 @ 22:27]  PTT: 28.2       [04-21-23 @ 22:27]      Creatinine Trend:  SCr 4.85 [04-22 @ 11:53]  SCr 4.34 [04-22 @ 00:44]  SCr 4.19 [04-21 @ 22:27]

## 2023-04-25 NOTE — SWALLOW FEES ASSESSMENT ADULT - RECOMMENDED CONSISTENCY
1) puree with moderately thick liquids via tsp only  2) Strict 1:1 feeding for all PO  3) Alternate food and liquids

## 2023-04-25 NOTE — SWALLOW FEES ASSESSMENT ADULT - SLP GENERAL OBSERVATIONS
Pt encountered OOB in chair, AA&Ox2 (person, place) with wife present, feeding pt lunch. Pt's wife present for translating in English, as pt and wife refused  phone.

## 2023-04-25 NOTE — PROGRESS NOTE ADULT - ASSESSMENT
88yo Male PMH chronic Afib (on Eliquis, off ASA since hx of PUD/GIB in the past), s/p Micra PPM, non-obstructive CAD, ESRD on HD (Tu/Th/Sa), DM, HLD, and prior history of large pleural effusions (exudative w/o malignant cells requiring drainage during hospitalizations in 2021 at [facility]), who was admitted 4/21 from home with SOB and generalized weakness. Pulmonary team consulted for large left pleural effusion.     After chart review, the patient has had a thoracentesis in 2021 with 800 cc's drained revealing and exudative effusion (protein), cytology was negative though unclear source of effusion. This effusion is likely chronic and given no leukocytosis, fever, nontoxic appearance do not believe the effusion is related to infection but may be 2/2 CKD vs malignancy.    CT chest also notable for right sided patchy opacities.     The patient's dyspnea and hypoxemic respiratory failure is likely multifactorial 2/2 pneumonia, pleural effusions, and volume overload from ESRD given elevated proBNP. The patient improved clinically after receiving HD    #Hypoxemic Respiratory Failure  #Multifocal consolidation  #Pleural effusion  - Thoracentesis performed 4/24 draining 700cc seroscanginous fluid- procedure terminated as pt reported anterior chest pain  - Fluid is chronic and developed over 2 years which may have resulted in some degree of trapped lung  - Fluid studies revealing transudative effusion compatible with volume overload state  - F/u Fluid culture  - Pt appears grossly edematous on exam  - Please ask Nephrology to ultrafiltrate pt more on dialysis days. If pt needs BP support for adequate fluid removal can add Midodrine  - Would treat with antibiotics for pneumonia, was previously on ceftriaxone and azithromycin, though switched to ancef/azithromycin for staph aureus PCR+ result by primary team. Patient has clinically improved on this regimen. Low threshold to escalate if pt shows stigmata of infection  - Suspect Right sided opacities from aspiration  - F/u Speech and Swallow  - Check urine legionella  - Daily weights  - Repeat CT chest in 6-8 weeks       86yo Male PMH chronic Afib (on Eliquis, off ASA since hx of PUD/GIB in the past), s/p Micra PPM, non-obstructive CAD, ESRD on HD (//), DM, HLD, and prior history of large pleural effusions (exudative w/o malignant cells requiring drainage during hospitalizations in  at [facility]- and [facility]), who was admitted  from home with SOB and generalized weakness. Pulmonary team consulted for large left pleural effusion.     After chart review, the patient has had a thoracentesis in  with 800 cc's drained revealing and exudative effusion (protein), cytology was negative though unclear source of effusion. This effusion is likely chronic and given no leukocytosis, fever, nontoxic appearance do not believe the effusion is related to infection but may be 2/2 CKD vs malignancy.    CT chest also notable for right sided patchy opacities.     The patient's dyspnea and hypoxemic respiratory failure is likely multifactorial 2/2 pneumonia, pleural effusions, and volume overload from ESRD given elevated proBNP. The patient improved clinically after receiving HD    #Hypoxemic Respiratory Failure  #Multifocal consolidation  #Pleural effusion  - Thoracentesis performed  draining 700cc seroscanginous fluid- procedure terminated as pt reported anterior chest pain  - Fluid is chronic and developed over 2 years which may have resulted in some degree of trapped lung  - Fluid studies revealing transudative effusion compatible with volume overload state  - F/u Fluid culture  - F/u Cytopathology and Flow Cytometry   - Pt appears grossly edematous on exam  - Please ask Nephrology to ultrafiltrate pt more on dialysis days. If pt needs BP support for adequate fluid removal can add Midodrine  - Would treat with antibiotics for pneumonia, was previously on ceftriaxone and azithromycin, though switched to ancef/azithromycin for staph aureus PCR+ result by primary team. Patient has clinically improved on this regimen. Low threshold to escalate if pt shows stigmata of infection  - Suspect Right sided opacities from aspiration  - F/u Speech and Swallow  - Check urine legionella  - Daily weights  - Repeat CT chest in 6-8 weeks  - Please email: [email or web address] to setup an appointment prior to discharge with Dr. Sullivan. The appointment should be within 1-2 weeks of discharge from the hospital. Include the patient's name, , MRN and contact information in the email.      Pulmonary/Sleep Clinic  [address] [place] [address]  [phone]    Please discuss the appointment details with the patient and include the appointment details in the patients discharge summary.    Pulmonary to sign off. Please call with questions.     Case discussed with Dr. Fierro 86yo Male PMH chronic Afib (on Eliquis, off ASA since hx of PUD/GIB in the past), s/p Micra PPM, non-obstructive CAD, ESRD on HD (//), DM, HLD, and prior history of large pleural effusions (exudative w/o malignant cells requiring drainage during hospitalizations in  at [facility]- and [facility]), who was admitted  from home with SOB and generalized weakness. Pulmonary team consulted for large left pleural effusion.     After chart review, the patient has had a thoracentesis in  with 800 cc's drained revealing and exudative effusion (protein), cytology was negative though unclear source of effusion. This effusion is likely chronic and given no leukocytosis, fever, nontoxic appearance do not believe the effusion is related to infection but may be 2/2 CKD vs malignancy.    CT chest also notable for right sided patchy opacities.     The patient's dyspnea and hypoxemic respiratory failure is likely multifactorial 2/2 pneumonia, pleural effusions, and volume overload from ESRD given elevated proBNP. The patient improved clinically after receiving HD    #Hypoxemic Respiratory Failure  #Multifocal consolidation  #Pleural effusion  - Thoracentesis performed  draining 700cc seroscanginous fluid- procedure terminated as pt reported anterior chest pain  - Fluid is chronic and developed over 2 years which may have resulted in some degree of trapped lung  - Fluid studies revealing transudative effusion compatible with volume overload state  - F/u Fluid culture  - F/u Cytopathology and Flow Cytometry   - Pt appears grossly edematous on exam  - Please ask Nephrology to ultrafiltrate pt more on dialysis days. If pt needs BP support for adequate fluid removal can add Midodrine  - Would treat with antibiotics for pneumonia, was previously on ceftriaxone and azithromycin, though switched to ancef/azithromycin for staph aureus PCR+ result by primary team. Patient has clinically improved on this regimen. Low threshold to escalate if pt shows stigmata of infection  - Suspect Right sided opacities from aspiration  - F/u Speech and Swallow  - Check urine legionella  - Daily weights  - Repeat CT chest in 6-8 weeks  - Please work with case management to arrange for home care as pt's wife will not be able to care for him by herself   - Please email: [email or web address] to setup an appointment prior to discharge with Dr. Sullivan. The appointment should be within 1-2 weeks of discharge from the hospital. Include the patient's name, , MRN and contact information in the email.      Pulmonary/Sleep Clinic  [address] [place] [address]  [phone]    Please discuss the appointment details with the patient and include the appointment details in the patients discharge summary.    Pulmonary to sign off. Please call with questions.     Case discussed with Dr. Fierro

## 2023-04-25 NOTE — PROGRESS NOTE ADULT - PROBLEM SELECTOR PLAN 2
- proBNP = [id number]  - TTE with hyperdynamic LVSF with EF 75%, distal segments and apex with elevated regional strain with basal + mid segments with decreased regional strain. suggestive of amyloid cardiomyopathy, RV not well visualized but with enlarged RV cavity size, normal wall thickness and reduced systolic fxn, mild MV stenosis, trace AR, trace pericardial effusion  - Pulmonary consult appreciated  - s/p thoracentesis with Pulmonary on 4/24 with 700cc removed  - pleural fluid studies appears transudative, f/u cytology - pending  - volume removal with HD - discussed with Nephrology for additional UF with HD        * will plan for midodrine 10mg prior to HD on HD days to allow for volume removal  - incentive spirometry as tolerated  - strict I/Os, daily weights

## 2023-04-25 NOTE — PROGRESS NOTE ADULT - SUBJECTIVE AND OBJECTIVE BOX
CHIEF COMPLAINT: SOB    Interval Events: s/p thoracentesis yesterday    REVIEW OF SYSTEMS:  Constitutional: [ ] negative [ ] fevers [ ] chills [ ] weight loss [ ] weight gain  HEENT: [ ] negative [ ] dry eyes [ ] eye irritation [ ] postnasal drip [ ] nasal congestion  CV: [ ] negative  [ ] chest pain [ ] orthopnea [ ] palpitations [ ] murmur  Resp: [ ] negative [ ] cough [ ] shortness of breath [ ] dyspnea [ ] wheezing [ ] sputum [ ] hemoptysis  GI: [ ] negative [ ] nausea [ ] vomiting [ ] diarrhea [ ] constipation [ ] abd pain [ ] dysphagia   : [ ] negative [ ] dysuria [ ] nocturia [ ] hematuria [ ] increased urinary frequency  Musculoskeletal: [ ] negative [ ] back pain [ ] myalgias [ ] arthralgias [ ] fracture  Skin: [ ] negative [ ] rash [ ] itch  Neurological: [ ] negative [ ] headache [ ] dizziness [ ] syncope [ ] weakness [ ] numbness  Psychiatric: [ ] negative [ ] anxiety [ ] depression  Endocrine: [ ] negative [ ] diabetes [ ] thyroid problem  Hematologic/Lymphatic: [ ] negative [ ] anemia [ ] bleeding problem  Allergic/Immunologic: [ ] negative [ ] itchy eyes [ ] nasal discharge [ ] hives [ ] angioedema  [ ] All other systems negative  [ ] Unable to assess ROS because ________    OBJECTIVE:  ICU Vital Signs Last 24 Hrs  T(C): 36.4 (25 Apr 2023 04:51), Max: 36.7 (24 Apr 2023 20:51)  T(F): 97.5 (25 Apr 2023 04:51), Max: 98.1 (24 Apr 2023 20:51)  HR: 88 (25 Apr 2023 04:51) (83 - 99)  BP: 124/65 (25 Apr 2023 04:51) (104/66 - 124/65)  BP(mean): --  ABP: --  ABP(mean): --  RR: 18 (25 Apr 2023 04:51) (18 - 18)  SpO2: 92% (25 Apr 2023 04:51) (92% - 100%)    O2 Parameters below as of 25 Apr 2023 04:51  Patient On (Oxygen Delivery Method): nasal cannula  O2 Flow (L/min): 2            04-24 @ 07:01 - 04-25 @ 07:00  --------------------------------------------------------  IN: 360 mL / OUT: 0 mL / NET: 360 mL      CAPILLARY BLOOD GLUCOSE      POCT Blood Glucose.: 152 mg/dL (25 Apr 2023 07:55)      PHYSICAL EXAM:  General:   HEENT:   Lymph Nodes:  Neck:   Respiratory:   Cardiovascular:   Abdomen:   Extremities:   Skin:   Neurological:  Psychiatry:    HOSPITAL MEDICATIONS:  MEDICATIONS  (STANDING):  atorvastatin 40 milliGRAM(s) Oral at bedtime  azithromycin  IVPB 500 milliGRAM(s) IV Intermittent every 24 hours  calcium acetate 667 milliGRAM(s) Oral three times a day with meals  ceFAZolin   IVPB      ceFAZolin   IVPB 500 milliGRAM(s) IV Intermittent every 24 hours  chlorhexidine 2% Cloths 1 Application(s) Topical daily  dextrose 5%. 1000 milliLiter(s) (50 mL/Hr) IV Continuous <Continuous>  dextrose 50% Injectable 25 Gram(s) IV Push once  finasteride 5 milliGRAM(s) Oral daily  glucagon  Injectable 1 milliGRAM(s) IntraMuscular once  insulin lispro (ADMELOG) corrective regimen sliding scale   SubCutaneous three times a day before meals  metoprolol tartrate 50 milliGRAM(s) Oral every 12 hours  midodrine. 10 milliGRAM(s) Oral once  Nephro-chandrika 1 Tablet(s) Oral daily  pantoprazole  Injectable 40 milliGRAM(s) IV Push two times a day  polyethylene glycol 3350 17 Gram(s) Oral daily    MEDICATIONS  (PRN):  dextrose Oral Gel 15 Gram(s) Oral once PRN Blood Glucose LESS THAN 70 milliGRAM(s)/deciliter      LABS:                        13.2   4.55  )-----------( 87       ( 25 Apr 2023 06:05 )             41.2     Hgb Trend: 13.2<--, 12.7<--, 12.7<--, 13.5<--, 13.6<--  04-25    132<L>  |  91<L>  |  47<H>  ----------------------------<  110<H>  5.0   |  27  |  5.36<H>    Ca    8.8      25 Apr 2023 06:05  Phos  5.7     04-25  Mg     2.2     04-25      Creatinine Trend: 5.36<--, 4.42<--, 3.10<--, 4.85<--, 4.34<--, 4.19<--            MICROBIOLOGY:     Culture - Fungal, Body Fluid (collected 24 Apr 2023 18:32)  Source: Pleural Fl Pleural Fluid  Preliminary Report (25 Apr 2023 07:26):    Testing in progress        RADIOLOGY:  [ ] Reviewed and interpreted by me    PULMONARY FUNCTION TESTS:    EKG: CHIEF COMPLAINT: SOB    Interval Events: s/p thoracentesis yesterday. Pt reported improvement today    REVIEW OF SYSTEMS:  Constitutional: [ ] negative [ ] fevers [ ] chills [ ] weight loss [ ] weight gain  HEENT: [ ] negative [ ] dry eyes [ ] eye irritation [ ] postnasal drip [ ] nasal congestion  CV: [ ] negative  [ ] chest pain [ ] orthopnea [ ] palpitations [ ] murmur  Resp: [ ] negative [ ] cough [ improving] shortness of breath [ ] dyspnea [ ] wheezing [ ] sputum [ ] hemoptysis  GI: [ ] negative [ ] nausea [ ] vomiting [ ] diarrhea [ ] constipation [ ] abd pain [ ] dysphagia   : [ ] negative [ ] dysuria [ ] nocturia [ ] hematuria [ ] increased urinary frequency  Musculoskeletal: [ ] negative [ ] back pain [ ] myalgias [ ] arthralgias [ ] fracture  Skin: [ ] negative [ ] rash [ ] itch  Neurological: [ ] negative [ ] headache [ ] dizziness [ ] syncope [ ] weakness [ ] numbness  Psychiatric: [ ] negative [ ] anxiety [ ] depression  Endocrine: [ ] negative [ ] diabetes [ ] thyroid problem  Hematologic/Lymphatic: [ ] negative [ ] anemia [ ] bleeding problem  Allergic/Immunologic: [ ] negative [ ] itchy eyes [ ] nasal discharge [ ] hives [ ] angioedema  [x ] All other systems negative  [ ] Unable to assess ROS because ________    OBJECTIVE:  ICU Vital Signs Last 24 Hrs  T(C): 36.4 (25 Apr 2023 04:51), Max: 36.7 (24 Apr 2023 20:51)  T(F): 97.5 (25 Apr 2023 04:51), Max: 98.1 (24 Apr 2023 20:51)  HR: 88 (25 Apr 2023 04:51) (83 - 99)  BP: 124/65 (25 Apr 2023 04:51) (104/66 - 124/65)  BP(mean): --  ABP: --  ABP(mean): --  RR: 18 (25 Apr 2023 04:51) (18 - 18)  SpO2: 92% (25 Apr 2023 04:51) (92% - 100%)    O2 Parameters below as of 25 Apr 2023 04:51  Patient On (Oxygen Delivery Method): nasal cannula  O2 Flow (L/min): 2            04-24 @ 07:01  -  04-25 @ 07:00  --------------------------------------------------------  IN: 360 mL / OUT: 0 mL / NET: 360 mL      CAPILLARY BLOOD GLUCOSE      POCT Blood Glucose.: 152 mg/dL (25 Apr 2023 07:55)      PHYSICAL EXAM:  General: Frail male laying in bed in no acute distress  Respiratory: Diminished breath sounds on the left and right base  Cardiovascular: Regular rate and rhythm   Abdomen: Nontender nondistended  Extremities: Edematous on exam, palpable thrill over fistula    HOSPITAL MEDICATIONS:  MEDICATIONS  (STANDING):  atorvastatin 40 milliGRAM(s) Oral at bedtime  azithromycin  IVPB 500 milliGRAM(s) IV Intermittent every 24 hours  calcium acetate 667 milliGRAM(s) Oral three times a day with meals  ceFAZolin   IVPB      ceFAZolin   IVPB 500 milliGRAM(s) IV Intermittent every 24 hours  chlorhexidine 2% Cloths 1 Application(s) Topical daily  dextrose 5%. 1000 milliLiter(s) (50 mL/Hr) IV Continuous <Continuous>  dextrose 50% Injectable 25 Gram(s) IV Push once  finasteride 5 milliGRAM(s) Oral daily  glucagon  Injectable 1 milliGRAM(s) IntraMuscular once  insulin lispro (ADMELOG) corrective regimen sliding scale   SubCutaneous three times a day before meals  metoprolol tartrate 50 milliGRAM(s) Oral every 12 hours  midodrine. 10 milliGRAM(s) Oral once  Nephro-chandrika 1 Tablet(s) Oral daily  pantoprazole  Injectable 40 milliGRAM(s) IV Push two times a day  polyethylene glycol 3350 17 Gram(s) Oral daily    MEDICATIONS  (PRN):  dextrose Oral Gel 15 Gram(s) Oral once PRN Blood Glucose LESS THAN 70 milliGRAM(s)/deciliter      LABS:                        13.2   4.55  )-----------( 87       ( 25 Apr 2023 06:05 )             41.2     Hgb Trend: 13.2<--, 12.7<--, 12.7<--, 13.5<--, 13.6<--  04-25    132<L>  |  91<L>  |  47<H>  ----------------------------<  110<H>  5.0   |  27  |  5.36<H>    Ca    8.8      25 Apr 2023 06:05  Phos  5.7     04-25  Mg     2.2     04-25      Creatinine Trend: 5.36<--, 4.42<--, 3.10<--, 4.85<--, 4.34<--, 4.19<--            MICROBIOLOGY:     Culture - Fungal, Body Fluid (collected 24 Apr 2023 18:32)  Source: Pleural Fl Pleural Fluid  Preliminary Report (25 Apr 2023 07:26):    Testing in progress        RADIOLOGY:  [ ] Reviewed and interpreted by me    PULMONARY FUNCTION TESTS:    EKG:

## 2023-04-25 NOTE — SWALLOW FEES ASSESSMENT ADULT - LARYNGEAL PENETRATION DURING SWALLOW - SILENT
shallow over the laryngeal surface of the epiglottis and over the a-e folds; does not progress to the vocal cords; appears to intermittently clear previous pudding thick bolus; does not progression within laryngeal vestibule/Mild

## 2023-04-26 NOTE — PROGRESS NOTE ADULT - PROBLEM SELECTOR PLAN 7
takes metoprolol XL 100mg daily  - changed Toprol XL to metoprolol tartrate 50mg q12h with hold parameters        * may need to hold AM dose prior to HD on HD days if he does not tolerate volume removal with HD despite pre-HD midodrine, but appears he tolerated session well  - clear from pulmonary standpoint to resume eliquis  - resumed eliquis 2.5mg q12h on 4/26  - monitor on tele

## 2023-04-26 NOTE — PROGRESS NOTE ADULT - PROBLEM SELECTOR PLAN 5
does have hx PUD/GI bleed in the past while on ASA in the past  - hemodynamics and H&H remain stable  - doubtful active bleed at this time as no BMs since admission  - GI consult appreciated, recommending conservative management at this time   - FOBT negative  - change pantoprazole 40mg IV to daily  - iron studies wnl  - b12/folate wnl  - eliquis resumed 4/26

## 2023-04-26 NOTE — PROGRESS NOTE ADULT - SUBJECTIVE AND OBJECTIVE BOX
DATE OF SERVICE: 04-26-23 @ 15:23    Patient is a 87y old  Male who presents with a chief complaint of SOB, generalized weakness, dark stools (26 Apr 2023 12:45)      INTERVAL HISTORY: Patient reports improvement in SOB    REVIEW OF SYSTEMS:  CONSTITUTIONAL: No weakness  EYES/ENT: No visual changes;  No throat pain   NECK: No pain or stiffness  RESPIRATORY: No cough, wheezing; No shortness of breath  CARDIOVASCULAR: No chest pain or palpitations  GASTROINTESTINAL: No abdominal  pain. No nausea, vomiting, or hematemesis  GENITOURINARY: No dysuria, frequency or hematuria  NEUROLOGICAL: No stroke like symptoms  SKIN: No rashes    TELEMETRY Personally reviewed: AFIB   	  MEDICATIONS:  metoprolol tartrate 50 milliGRAM(s) Oral every 12 hours  midodrine. 10 milliGRAM(s) Oral once  midodrine. 10 milliGRAM(s) Oral <User Schedule>        PHYSICAL EXAM:  T(C): 36.3 (04-26-23 @ 11:10), Max: 36.7 (04-25-23 @ 21:16)  HR: 85 (04-26-23 @ 12:30) (76 - 93)  BP: 111/53 (04-26-23 @ 12:30) (109/59 - 139/75)  RR: 18 (04-26-23 @ 11:10) (18 - 18)  SpO2: 97% (04-26-23 @ 12:30) (94% - 97%)  Wt(kg): --  I&O's Summary    25 Apr 2023 07:01  -  26 Apr 2023 07:00  --------------------------------------------------------  IN: 900 mL / OUT: 2900 mL / NET: -2000 mL    26 Apr 2023 07:01  -  26 Apr 2023 15:23  --------------------------------------------------------  IN: 240 mL / OUT: 0 mL / NET: 240 mL          Appearance: In no distress	  HEENT:    PERRL, EOMI	  Cardiovascular:  S1 S2, No JVD  Respiratory: Lungs clear to auscultation	  Gastrointestinal:  Soft, Non-tender, + BS	  Vascularature:  No edema of LE  Psychiatric: Appropriate affect   Neuro: no acute focal deficits                               12.4   4.73  )-----------( 95       ( 26 Apr 2023 07:02 )             38.6     04-26    133<L>  |  94<L>  |  34<H>  ----------------------------<  150<H>  4.0   |  27  |  4.08<H>    Ca    8.7      26 Apr 2023 07:03  Phos  4.3     04-26  Mg     2.2     04-25          Labs personally reviewed      ASSESSMENT/PLAN: 	  86 yo male PMH of chronic Afib (on Eliquis, off ASA since hx of PUD/GIB in the past), s/p Micra PPM, non-obstructive CAD, ESRD on HD (Tu/Th/Sa), DM, HLD, and prior history of large pleural effusions (requiring drainage during hospitalizations in 2021 at [facility] and [facility]), who presents from home w/ c/o of SOB + generalized weakness and lethargy with loss of appetite/decreased PO intake, and his wife noticing his having dark/black stools in setting of diffuse abdominal pain worse in the LLQ (pt is s/p appendectomy in the past). Admitted for hypoxia due to pneumonia and large L pleural effusion.        Problem/Plan - 1:  ·  Problem: Diastolic HF.   ·  Plan: - TTE suggestive of amyloid, EKG with low voltage, hx of AF  -  Nuclear pyrophosphate scan revealing Cardiac amyloid Imaging study is NOT suggestive of transthyretin cardiac   amyloidosis.  - will send serum immunoglobulin free light chains, serum immunofixation, urine immunofixation to complete work up (to be done as outpt so results can be followed as outpt) to rule out AL amyloidosis     Problem/Plan - 2:  ·  Problem: Chronic atrial fibrillation.   ·  Plan: Home metoprolol XL 100mg daily  - - Has been started on Lopressor 50mg BID   - Eliquis for AC    Problem/Plan - 3:  ·  Problem: Hyperlipidemia.   ·  Plan: - c/w atorvastatin 40mg qHS.    Problem/Plan -4:  ·  Problem: Prophylactic measure.   ·  Plan; DVT ppx: SCDs. eliquis to be resumed pending amyloid scan results      [facility] [place]  Cardiovascular Medicine  [address] [place] [address], [address]  Available through call or text on [employer]  Office: [phone]

## 2023-04-26 NOTE — PROGRESS NOTE ADULT - PROBLEM SELECTOR PLAN 3
- CT chest with patchy opacities in R lung. suspect 2/2 to aspiration  - MRSA/MSSA PCR screen +MSSA  - c/w cefazolin given MSSA+ screen and azithro for atypical coverage. plan for 5 day course.       * azithro to end 4/26. cefazolin to end 4/27.  - unable to obtain urine legionella and strep Ag as patient is anuric  - SLP eval appreciated, recommended for pureed and moderately thick liquids  - s/p FEEST on 4/25. continue current diet. plan for [facility] Friday

## 2023-04-26 NOTE — PROGRESS NOTE ADULT - SUBJECTIVE AND OBJECTIVE BOX
Claudia Denis MD  Division of Hospital Medicine  [facility]   Available on Microsoft Teams (Mon-Fri 8am-5pm)    * messages preferred prior to calls  Other Times:  [phone]      Patient is a 87y old  Male who presents with a chief complaint of SOB, generalized weakness, dark stools (25 Apr 2023 22:46)      SUBJECTIVE / OVERNIGHT EVENTS: no acute events overnight. wife bedside feels patient appears more short of breath but per patient, breathing is better. no fever, chills, chest pain, cough, sob, abd pain, n/v. +BM last night. just tired.  ADDITIONAL REVIEW OF SYSTEMS:    Tele reviewed: afib with HR 80-100s    MEDICATIONS  (STANDING):  apixaban 2.5 milliGRAM(s) Oral every 12 hours  atorvastatin 40 milliGRAM(s) Oral at bedtime  calcium acetate 667 milliGRAM(s) Oral three times a day with meals  ceFAZolin   IVPB      ceFAZolin   IVPB 500 milliGRAM(s) IV Intermittent every 24 hours  chlorhexidine 2% Cloths 1 Application(s) Topical daily  dextrose 5%. 1000 milliLiter(s) (50 mL/Hr) IV Continuous <Continuous>  dextrose 50% Injectable 25 Gram(s) IV Push once  finasteride 5 milliGRAM(s) Oral daily  glucagon  Injectable 1 milliGRAM(s) IntraMuscular once  insulin lispro (ADMELOG) corrective regimen sliding scale   SubCutaneous three times a day before meals  metoprolol tartrate 50 milliGRAM(s) Oral every 12 hours  midodrine. 10 milliGRAM(s) Oral once  midodrine. 10 milliGRAM(s) Oral <User Schedule>  Nephro-chandrika 1 Tablet(s) Oral daily  pantoprazole  Injectable 40 milliGRAM(s) IV Push daily  polyethylene glycol 3350 17 Gram(s) Oral daily    MEDICATIONS  (PRN):  dextrose Oral Gel 15 Gram(s) Oral once PRN Blood Glucose LESS THAN 70 milliGRAM(s)/deciliter      CAPILLARY BLOOD GLUCOSE      POCT Blood Glucose.: 168 mg/dL (26 Apr 2023 12:02)  POCT Blood Glucose.: 197 mg/dL (26 Apr 2023 07:57)  POCT Blood Glucose.: 248 mg/dL (25 Apr 2023 22:12)  POCT Blood Glucose.: 230 mg/dL (25 Apr 2023 20:53)  POCT Blood Glucose.: 204 mg/dL (25 Apr 2023 16:32)    I&O's Summary    25 Apr 2023 07:01  -  26 Apr 2023 07:00  --------------------------------------------------------  IN: 900 mL / OUT: 2900 mL / NET: -2000 mL        PHYSICAL EXAM:  Vital Signs Last 24 Hrs  T(C): 36.3 (26 Apr 2023 11:10), Max: 36.7 (25 Apr 2023 21:16)  T(F): 97.4 (26 Apr 2023 11:10), Max: 98.1 (25 Apr 2023 21:16)  HR: 76 (26 Apr 2023 11:10) (76 - 93)  BP: 130/71 (26 Apr 2023 11:10) (109/59 - 139/75)  BP(mean): --  RR: 18 (26 Apr 2023 11:10) (18 - 18)  SpO2: 97% (26 Apr 2023 11:10) (94% - 97%)    Parameters below as of 26 Apr 2023 11:10  Patient On (Oxygen Delivery Method): room air    CONSTITUTIONAL: NAD, well-developed, well-groomed  EYES: PERRLA; conjunctiva and sclera clear  ENMT: Moist oral mucosa, no pharyngeal injection or exudates; normal dentition  NECK: Supple, no palpable masses; no thyromegaly  RESPIRATORY: Normal respiratory effort; lungs are clear to auscultation bilaterally  CARDIOVASCULAR: Regular rate and rhythm, normal S1 and S2, no murmur/rub/gallop; No lower extremity edema; Peripheral pulses are 2+ bilaterally  ABDOMEN: Soft, Nondistended, Nontender to palpation, normoactive bowel sounds  MUSCULOSKELETAL: No clubbing or cyanosis of digits; no joint swelling or tenderness to palpation  PSYCH: A+O to person, place, and time; affect appropriate  NEUROLOGY: CN 2-12 are intact and symmetric; no gross sensory deficits   SKIN: No rashes; no palpable lesions    LABS:                        12.4   4.73  )-----------( 95       ( 26 Apr 2023 07:02 )             38.6     04-26    133<L>  |  94<L>  |  34<H>  ----------------------------<  150<H>  4.0   |  27  |  4.08<H>    Ca    8.7      26 Apr 2023 07:03  Phos  4.3     04-26  Mg     2.2     04-25          Culture - Fungal, Body Fluid (collected 24 Apr 2023 18:32)  Source: Pleural Fl Pleural Fluid  Preliminary Report (25 Apr 2023 07:26):    Testing in progress    Culture - Body Fluid with Gram Stain (collected 24 Apr 2023 18:32)  Source: Pleural Fl Pleural Fluid  Gram Stain (25 Apr 2023 22:53):    polymorphonuclear leukocytes seen    No organisms seen    by cytocentrifuge  Preliminary Report (25 Apr 2023 20:44):    No growth        RADIOLOGY & ADDITIONAL TESTS:  Results Reviewed: H/H stalbe  Imaging Personally Reviewed:  Electrocardiogram Personally Reviewed:    COORDINATION OF CARE:  Care Discussed with Consultants/Other Providers [Y]: Cardiology Attending Dr. Claudio, medicine PATRICK Callahan  Prior or Outpatient Records Reviewed [Y/N]:   Claudia Denis MD  Division of Hospital Medicine  [facility]   Available on Microsoft Teams (Mon-Fri 8am-5pm)    * messages preferred prior to calls  Other Times:  [phone]      Patient is a 87y old  Male who presents with a chief complaint of SOB, generalized weakness, dark stools (25 Apr 2023 22:46)      SUBJECTIVE / OVERNIGHT EVENTS: no acute events overnight. wife bedside feels patient appears more short of breath but per patient, breathing is better. no fever, chills, chest pain, cough, sob, abd pain, n/v. +BM last night. just tired.  ADDITIONAL REVIEW OF SYSTEMS:    Tele reviewed: afib with HR 80-100s    MEDICATIONS  (STANDING):  apixaban 2.5 milliGRAM(s) Oral every 12 hours  atorvastatin 40 milliGRAM(s) Oral at bedtime  calcium acetate 667 milliGRAM(s) Oral three times a day with meals  ceFAZolin   IVPB      ceFAZolin   IVPB 500 milliGRAM(s) IV Intermittent every 24 hours  chlorhexidine 2% Cloths 1 Application(s) Topical daily  dextrose 5%. 1000 milliLiter(s) (50 mL/Hr) IV Continuous <Continuous>  dextrose 50% Injectable 25 Gram(s) IV Push once  finasteride 5 milliGRAM(s) Oral daily  glucagon  Injectable 1 milliGRAM(s) IntraMuscular once  insulin lispro (ADMELOG) corrective regimen sliding scale   SubCutaneous three times a day before meals  metoprolol tartrate 50 milliGRAM(s) Oral every 12 hours  midodrine. 10 milliGRAM(s) Oral once  midodrine. 10 milliGRAM(s) Oral <User Schedule>  Nephro-chandrika 1 Tablet(s) Oral daily  pantoprazole  Injectable 40 milliGRAM(s) IV Push daily  polyethylene glycol 3350 17 Gram(s) Oral daily    MEDICATIONS  (PRN):  dextrose Oral Gel 15 Gram(s) Oral once PRN Blood Glucose LESS THAN 70 milliGRAM(s)/deciliter      CAPILLARY BLOOD GLUCOSE      POCT Blood Glucose.: 168 mg/dL (26 Apr 2023 12:02)  POCT Blood Glucose.: 197 mg/dL (26 Apr 2023 07:57)  POCT Blood Glucose.: 248 mg/dL (25 Apr 2023 22:12)  POCT Blood Glucose.: 230 mg/dL (25 Apr 2023 20:53)  POCT Blood Glucose.: 204 mg/dL (25 Apr 2023 16:32)    I&O's Summary    25 Apr 2023 07:01  -  26 Apr 2023 07:00  --------------------------------------------------------  IN: 900 mL / OUT: 2900 mL / NET: -2000 mL        PHYSICAL EXAM:  Vital Signs Last 24 Hrs  T(C): 36.3 (26 Apr 2023 11:10), Max: 36.7 (25 Apr 2023 21:16)  T(F): 97.4 (26 Apr 2023 11:10), Max: 98.1 (25 Apr 2023 21:16)  HR: 76 (26 Apr 2023 11:10) (76 - 93)  BP: 130/71 (26 Apr 2023 11:10) (109/59 - 139/75)  BP(mean): --  RR: 18 (26 Apr 2023 11:10) (18 - 18)  SpO2: 97% (26 Apr 2023 11:10) (94% - 97%)    Parameters below as of 26 Apr 2023 11:10  Patient On (Oxygen Delivery Method): room air    CONSTITUTIONAL: NAD, well-developed, well-groomed  EYES: PERRLA; conjunctiva and sclera clear  ENMT: Moist oral mucosa, no pharyngeal injection or exudates; normal dentition  NECK: Supple, no palpable masses; no thyromegaly  RESPIRATORY: Normal respiratory effort; lungs are clear to auscultation bilaterally  CARDIOVASCULAR: Regular rate and rhythm, normal S1 and S2, no murmur/rub/gallop; No lower extremity edema; Peripheral pulses are 2+ bilaterally  ABDOMEN: Soft, Nondistended, Nontender to palpation, normoactive bowel sounds  MUSCULOSKELETAL: No clubbing or cyanosis of digits; no joint swelling or tenderness to palpation  PSYCH: A+O to person, place, and time; affect appropriate  NEUROLOGY: CN 2-12 are intact and symmetric; no gross sensory deficits   SKIN: No rashes; no palpable lesions    LABS:                        12.4   4.73  )-----------( 95       ( 26 Apr 2023 07:02 )             38.6     04-26    133<L>  |  94<L>  |  34<H>  ----------------------------<  150<H>  4.0   |  27  |  4.08<H>    Ca    8.7      26 Apr 2023 07:03  Phos  4.3     04-26  Mg     2.2     04-25          Culture - Fungal, Body Fluid (collected 24 Apr 2023 18:32)  Source: Pleural Fl Pleural Fluid  Preliminary Report (25 Apr 2023 07:26):    Testing in progress    Culture - Body Fluid with Gram Stain (collected 24 Apr 2023 18:32)  Source: Pleural Fl Pleural Fluid  Gram Stain (25 Apr 2023 22:53):    polymorphonuclear leukocytes seen    No organisms seen    by cytocentrifuge  Preliminary Report (25 Apr 2023 20:44):    No growth        RADIOLOGY & ADDITIONAL TESTS:  Results Reviewed: H/H stable  Imaging Personally Reviewed:  4/25/23 NM Amyloidosis scan:  IMPRESSION:    Cardiac amyloid Imaging study is  NOT suggestive of transthyretin cardiac   amyloidosis.  Electrocardiogram Personally Reviewed:    COORDINATION OF CARE:  Care Discussed with Consultants/Other Providers [Y]: Cardiology Attending Dr. Claudio, medicine PATRICK Callahan  Prior or Outpatient Records Reviewed [Y/N]:   Claudia Denis MD  Division of Hospital Medicine  [facility]   Available on Microsoft Teams (Mon-Fri 8am-5pm)    * messages preferred prior to calls  Other Times:  [phone]      Patient is a 87y old  Male who presents with a chief complaint of SOB, generalized weakness, dark stools (25 Apr 2023 22:46)      SUBJECTIVE / OVERNIGHT EVENTS: no acute events overnight. wife bedside feels patient appears more short of breath but per patient, breathing is better. no fever, chills, chest pain, cough, sob, abd pain, n/v. +BM last night. just tired.  ADDITIONAL REVIEW OF SYSTEMS:    Tele reviewed: afib with HR 80-100s    MEDICATIONS  (STANDING):  apixaban 2.5 milliGRAM(s) Oral every 12 hours  atorvastatin 40 milliGRAM(s) Oral at bedtime  calcium acetate 667 milliGRAM(s) Oral three times a day with meals  ceFAZolin   IVPB      ceFAZolin   IVPB 500 milliGRAM(s) IV Intermittent every 24 hours  chlorhexidine 2% Cloths 1 Application(s) Topical daily  dextrose 5%. 1000 milliLiter(s) (50 mL/Hr) IV Continuous <Continuous>  dextrose 50% Injectable 25 Gram(s) IV Push once  finasteride 5 milliGRAM(s) Oral daily  glucagon  Injectable 1 milliGRAM(s) IntraMuscular once  insulin lispro (ADMELOG) corrective regimen sliding scale   SubCutaneous three times a day before meals  metoprolol tartrate 50 milliGRAM(s) Oral every 12 hours  midodrine. 10 milliGRAM(s) Oral once  midodrine. 10 milliGRAM(s) Oral <User Schedule>  Nephro-chandrika 1 Tablet(s) Oral daily  pantoprazole  Injectable 40 milliGRAM(s) IV Push daily  polyethylene glycol 3350 17 Gram(s) Oral daily    MEDICATIONS  (PRN):  dextrose Oral Gel 15 Gram(s) Oral once PRN Blood Glucose LESS THAN 70 milliGRAM(s)/deciliter      CAPILLARY BLOOD GLUCOSE      POCT Blood Glucose.: 168 mg/dL (26 Apr 2023 12:02)  POCT Blood Glucose.: 197 mg/dL (26 Apr 2023 07:57)  POCT Blood Glucose.: 248 mg/dL (25 Apr 2023 22:12)  POCT Blood Glucose.: 230 mg/dL (25 Apr 2023 20:53)  POCT Blood Glucose.: 204 mg/dL (25 Apr 2023 16:32)    I&O's Summary    25 Apr 2023 07:01  -  26 Apr 2023 07:00  --------------------------------------------------------  IN: 900 mL / OUT: 2900 mL / NET: -2000 mL        PHYSICAL EXAM:  Vital Signs Last 24 Hrs  T(C): 36.3 (26 Apr 2023 11:10), Max: 36.7 (25 Apr 2023 21:16)  T(F): 97.4 (26 Apr 2023 11:10), Max: 98.1 (25 Apr 2023 21:16)  HR: 76 (26 Apr 2023 11:10) (76 - 93)  BP: 130/71 (26 Apr 2023 11:10) (109/59 - 139/75)  BP(mean): --  RR: 18 (26 Apr 2023 11:10) (18 - 18)  SpO2: 97% (26 Apr 2023 11:10) (94% - 97%)    Parameters below as of 26 Apr 2023 11:10  Patient On (Oxygen Delivery Method): room air    CONSTITUTIONAL: NAD, well-developed, well-groomed  EYES: PERRLA; conjunctiva and sclera clear  ENMT: Moist oral mucosa, no pharyngeal injection or exudates; normal dentition  NECK: Supple, no palpable masses; no thyromegaly  RESPIRATORY: Normal respiratory effort; diminished at bases b/l L>R  CARDIOVASCULAR: irregularly irregular, normal S1 and S2, no murmur/rub/gallop; +lower extremity edema  ABDOMEN: Soft, Nondistended, Nontender to palpation, normoactive bowel sounds  MUSCULOSKELETAL: No clubbing or cyanosis of digits; no joint swelling or tenderness to palpation  PSYCH: A+O to person, place, and time; affect appropriate  NEUROLOGY: CN 2-12 are intact and symmetric; no gross sensory deficits   SKIN: No rashes; no palpable lesions, +LUE AVF with palpable thrill    LABS:                        12.4   4.73  )-----------( 95       ( 26 Apr 2023 07:02 )             38.6     04-26    133<L>  |  94<L>  |  34<H>  ----------------------------<  150<H>  4.0   |  27  |  4.08<H>    Ca    8.7      26 Apr 2023 07:03  Phos  4.3     04-26  Mg     2.2     04-25          Culture - Fungal, Body Fluid (collected 24 Apr 2023 18:32)  Source: Pleural Fl Pleural Fluid  Preliminary Report (25 Apr 2023 07:26):    Testing in progress    Culture - Body Fluid with Gram Stain (collected 24 Apr 2023 18:32)  Source: Pleural Fl Pleural Fluid  Gram Stain (25 Apr 2023 22:53):    polymorphonuclear leukocytes seen    No organisms seen    by cytocentrifuge  Preliminary Report (25 Apr 2023 20:44):    No growth        RADIOLOGY & ADDITIONAL TESTS:  Results Reviewed: H/H stable  Imaging Personally Reviewed:  4/25/23 NM Amyloidosis scan:  IMPRESSION:    Cardiac amyloid Imaging study is  NOT suggestive of transthyretin cardiac   amyloidosis.  Electrocardiogram Personally Reviewed:    COORDINATION OF CARE:  Care Discussed with Consultants/Other Providers [Y]: Cardiology Attending Dr. Claudio, medicine PATRICK Callahan  Prior or Outpatient Records Reviewed [Y/N]:

## 2023-04-26 NOTE — PROGRESS NOTE ADULT - PROBLEM SELECTOR PLAN 2
- proBNP = [id number]  - TTE with hyperdynamic LVSF with EF 75%, distal segments and apex with elevated regional strain with basal + mid segments with decreased regional strain. suggestive of amyloid cardiomyopathy, RV not well visualized but with enlarged RV cavity size, normal wall thickness and reduced systolic fxn, mild MV stenosis, trace AR, trace pericardial effusion  - Pulmonary consult appreciated  - s/p thoracentesis with Pulmonary on 4/24 with 700cc removed  - pleural fluid studies appears transudative, f/u cytology - pending  - volume removal with HD - discussed with Nephrology for additional UF with HD        * will plan for midodrine 10mg prior to HD on HD days to allow for volume removal  - incentive spirometry as tolerated  - strict I/Os, daily weights

## 2023-04-26 NOTE — PROGRESS NOTE ADULT - NSPROGADDITIONALINFOA_GEN_ALL_CORE
.  Claudia Denis MD  Division of Hospital Medicine  [facility]   Available on Microsoft Teams - messages preferred prior to calls.    Plan discussed with patient in Pashto, wife at bedside, Cardiology Attending Dr. Claudio, and medicine PATRICK Callahan.

## 2023-04-26 NOTE — PROGRESS NOTE ADULT - PROBLEM SELECTOR PLAN 4
- TTE as above, suggestive of amyloid cardiomyopathy  - PYP/amyloid scan performed - awaiting results  - Cardiology consulted, will f/u recs - TTE as above, suggestive of amyloid cardiomyopathy  - PYP/amyloid scan performed - NOT suggestive of transthyretin cardiac amyloidosis  - check serum immunofixation, immunoglobulin FLC  - Cardiology consulted, recs appreciated

## 2023-04-26 NOTE — PROGRESS NOTE ADULT - ASSESSMENT
88 yo male PMH of chronic Afib (on Eliquis, off ASA since hx of PUD/GIB in the past), s/p Micra PPM, non-obstructive CAD, ESRD on HD (Tu/Th/Sa), DM, HLD, and prior history of large pleural effusions (requiring drainage during hospitalizations in 2021 at [facility] and [facility]), who presents from home w/ c/o of SOB + generalized weakness and lethargy with loss of appetite/decreased PO intake, and his wife noticing his having dark/black stools in setting of diffuse abdominal pain worse in the LLQ (pt is s/p appendectomy in the past). Admitted for hypoxia due to pneumonia and large L pleural effusion now clinically improved. 88 yo male PMH of chronic Afib (on Eliquis, off ASA since hx of PUD/GIB in the past), s/p Micra PPM, non-obstructive CAD, ESRD on HD (Tu/Th/Sa), DM, HLD, and prior history of large pleural effusions (requiring drainage during hospitalizations in 2021 at [facility] and [facility]), who presents from home w/ c/o of SOB + generalized weakness and lethargy with loss of appetite/decreased PO intake, and his wife noticing his having dark/black stools in setting of diffuse abdominal pain worse in the LLQ (pt is s/p appendectomy in the past). Admitted for hypoxia due to pneumonia and large L pleural effusion now clinically improved, completing abx treatment for PNA and undergoing volume removal with HD.

## 2023-04-26 NOTE — PROGRESS NOTE ADULT - PROBLEM SELECTOR PLAN 6
- HD per nephrology (patient follows w/ Dr. Julius Eason in [place] as O/P on Tu/Th/Sa schedule)  - discussed with nephrology for additional UF with HD given volume overload on exam  - c/w midodrine 10mg pre-HD on HD days to allow for volume removal  - c/w phoslo 667mg TID with meals

## 2023-04-26 NOTE — PROGRESS NOTE ADULT - PROBLEM SELECTOR PLAN 1
due to multifocal pneumonia, large L pleural effusion, hypervolemia  - maintain Spo2 > 94% and wean O2 supplementation as tolerated  - management of PNA, pleural effusion and hypervolemia as below due to multifocal pneumonia, large L pleural effusion, hypervolemia  - improved, now satting well on RA  - management of PNA, pleural effusion and hypervolemia as below

## 2023-04-26 NOTE — PROGRESS NOTE ADULT - PROBLEM SELECTOR PLAN 10
DVT ppx: eliquis    Dispo: ARNAV with HD DVT ppx: eliquis    Dispo: ARNAV with HD when volume optimized

## 2023-04-27 NOTE — PROGRESS NOTE ADULT - PROBLEM SELECTOR PLAN 2
- proBNP = [id number]  - TTE with hyperdynamic LVSF with EF 75%, distal segments and apex with elevated regional strain with basal + mid segments with decreased regional strain. suggestive of amyloid cardiomyopathy, RV not well visualized but with enlarged RV cavity size, normal wall thickness and reduced systolic fxn, mild MV stenosis, trace AR, trace pericardial effusion  - Pulmonary consult appreciated  - s/p thoracentesis with Pulmonary on 4/24 with 700cc removed  - pleural fluid studies appears transudative, f/u cytology - pending  - volume removal with HD - discussed with Nephrology for additional UF with HD        * will plan for midodrine 10mg prior to HD on HD days to allow for volume removal  - 2.5L removed today  - repeat CXR in AM  - incentive spirometry as tolerated  - strict I/Os, daily weights - proBNP = [id number]  - TTE with hyperdynamic LVSF with EF 75%, distal segments and apex with elevated regional strain with basal + mid segments with decreased regional strain. suggestive of amyloid cardiomyopathy, RV not well visualized but with enlarged RV cavity size, normal wall thickness and reduced systolic fxn, mild MV stenosis, trace AR, trace pericardial effusion  - Pulmonary consult appreciated  - s/p thoracentesis with Pulmonary on 4/24 with 700cc removed  - pleural fluid studies appears transudative, cytology negative for malignancy  - volume removal with HD - discussed with Nephrology for additional UF with HD        * will plan for midodrine 10mg prior to HD on HD days to allow for volume removal  - 2.5L removed today  - repeat CXR in AM  - incentive spirometry as tolerated  - strict I/Os, daily weights

## 2023-04-27 NOTE — PROGRESS NOTE ADULT - NSPROGADDITIONALINFOA_GEN_ALL_CORE
.  Claudia Denis MD  Division of Hospital Medicine  [facility]   Available on [employer] Teams - messages preferred prior to calls.    Plan discussed with patient, wife bedside with daughter Desiree via speakerphone, Cardiology Attending Dr. Claudio, and medicine PATRICK Callahan.

## 2023-04-27 NOTE — PROGRESS NOTE ADULT - SUBJECTIVE AND OBJECTIVE BOX
Claudia Denis MD  Division of Hospital Medicine  [facility]   Available on Microsoft Teams (Mon-Fri 8am-5pm)    * messages preferred prior to calls  Other Times:  [phone]      Patient is a 87y old  Male who presents with a chief complaint of SOB, generalized weakness, dark stools (27 Apr 2023 09:44)      SUBJECTIVE / OVERNIGHT EVENTS: no acute events overnight. no fever, chills, chest pain, nor dyspnea. appetite improved. ate most if not all of his breakfast per staff. offers no complaints. no fever, chills, shortness of breath, cough.  ADDITIONAL REVIEW OF SYSTEMS:    Tele reviewed: afib 80-90s    MEDICATIONS  (STANDING):  apixaban 2.5 milliGRAM(s) Oral every 12 hours  atorvastatin 40 milliGRAM(s) Oral at bedtime  calcium acetate 667 milliGRAM(s) Oral three times a day with meals  chlorhexidine 2% Cloths 1 Application(s) Topical daily  dextrose 5%. 1000 milliLiter(s) (50 mL/Hr) IV Continuous <Continuous>  dextrose 50% Injectable 25 Gram(s) IV Push once  finasteride 5 milliGRAM(s) Oral daily  glucagon  Injectable 1 milliGRAM(s) IntraMuscular once  insulin lispro (ADMELOG) corrective regimen sliding scale   SubCutaneous three times a day before meals  metoprolol tartrate 50 milliGRAM(s) Oral every 12 hours  midodrine. 10 milliGRAM(s) Oral <User Schedule>  midodrine. 10 milliGRAM(s) Oral once  Nephro-chandrika 1 Tablet(s) Oral daily  pantoprazole  Injectable 40 milliGRAM(s) IV Push daily  polyethylene glycol 3350 17 Gram(s) Oral daily    MEDICATIONS  (PRN):  dextrose Oral Gel 15 Gram(s) Oral once PRN Blood Glucose LESS THAN 70 milliGRAM(s)/deciliter      CAPILLARY BLOOD GLUCOSE      POCT Blood Glucose.: 103 mg/dL (27 Apr 2023 13:53)  POCT Blood Glucose.: 146 mg/dL (27 Apr 2023 06:26)  POCT Blood Glucose.: 241 mg/dL (26 Apr 2023 16:24)    I&O's Summary    26 Apr 2023 07:01  -  27 Apr 2023 07:00  --------------------------------------------------------  IN: 480 mL / OUT: 0 mL / NET: 480 mL    27 Apr 2023 07:01  -  27 Apr 2023 14:48  --------------------------------------------------------  IN: 300 mL / OUT: 2500 mL / NET: -2200 mL        PHYSICAL EXAM:  Vital Signs Last 24 Hrs  T(C): 36.2 (27 Apr 2023 13:03), Max: 36.7 (27 Apr 2023 09:31)  T(F): 97.2 (27 Apr 2023 13:03), Max: 98.1 (27 Apr 2023 09:31)  HR: 86 (27 Apr 2023 13:03) (61 - 89)  BP: 116/77 (27 Apr 2023 13:03) (116/77 - 133/73)  BP(mean): --  RR: 18 (27 Apr 2023 13:03) (18 - 18)  SpO2: 95% (27 Apr 2023 13:03) (95% - 97%)    Parameters below as of 27 Apr 2023 13:03  Patient On (Oxygen Delivery Method): room air    CONSTITUTIONAL: NAD, well-developed, well-groomed  EYES: PERRLA; conjunctiva and sclera clear  ENMT: Moist oral mucosa, no pharyngeal injection or exudates; normal dentition  NECK: Supple, no palpable masses; no thyromegaly  RESPIRATORY: Normal respiratory effort; diminished at bases b/l L>R  CARDIOVASCULAR: irregularly irregular, normal S1 and S2, no murmur/rub/gallop; +LEedema b/l  ABDOMEN: Soft, Nondistended, Nontender to palpation, normoactive bowel sounds  MUSCULOSKELETAL: No clubbing or cyanosis of digits; no joint swelling or tenderness to palpation  PSYCH: A+O to person, place, and time; affect appropriate  NEUROLOGY: CN 2-12 are intact and symmetric; no gross sensory deficits   SKIN: No rashes; no palpable lesions, +LUE AVF with palpable thrill    LABS:                        13.2   5.90  )-----------( 90       ( 27 Apr 2023 05:47 )             40.6     04-27    130<L>  |  92<L>  |  42<H>  ----------------------------<  104<H>  5.0   |  24  |  4.91<H>    Ca    8.7      27 Apr 2023 05:47  Phos  4.8     04-27        Culture - Fungal, Body Fluid (collected 24 Apr 2023 18:32)  Source: Pleural Fl Pleural Fluid  Preliminary Report (26 Apr 2023 15:04):    Culture is being performed. Fungal cultures are held for 4 weeks.    Culture - Body Fluid with Gram Stain (collected 24 Apr 2023 18:32)  Source: Pleural Fl Pleural Fluid  Gram Stain (25 Apr 2023 22:53):    polymorphonuclear leukocytes seen    No organisms seen    by cytocentrifuge  Preliminary Report (25 Apr 2023 20:44):    No growth        RADIOLOGY & ADDITIONAL TESTS:  Results Reviewed:   Imaging Personally Reviewed:  Electrocardiogram Personally Reviewed:    COORDINATION OF CARE:  Care Discussed with Consultants/Other Providers [Y/N]:  Prior or Outpatient Records Reviewed [Y/N]:   Claudia Denis MD  Division of Hospital Medicine  Health system   Available on Microsoft Teams (Mon-Fri 8am-5pm)    * messages preferred prior to calls  Other Times:  [phone]      Patient is a 87y old  Male who presents with a chief complaint of SOB, generalized weakness, dark stools (27 Apr 2023 09:44)      SUBJECTIVE / OVERNIGHT EVENTS: no acute events overnight. no fever, chills, chest pain, nor dyspnea. appetite improved. ate most if not all of his breakfast per staff. offers no complaints. no fever, chills, shortness of breath, cough.  ADDITIONAL REVIEW OF SYSTEMS:    Tele reviewed: afib 80-90s    MEDICATIONS  (STANDING):  apixaban 2.5 milliGRAM(s) Oral every 12 hours  atorvastatin 40 milliGRAM(s) Oral at bedtime  calcium acetate 667 milliGRAM(s) Oral three times a day with meals  chlorhexidine 2% Cloths 1 Application(s) Topical daily  dextrose 5%. 1000 milliLiter(s) (50 mL/Hr) IV Continuous <Continuous>  dextrose 50% Injectable 25 Gram(s) IV Push once  finasteride 5 milliGRAM(s) Oral daily  glucagon  Injectable 1 milliGRAM(s) IntraMuscular once  insulin lispro (ADMELOG) corrective regimen sliding scale   SubCutaneous three times a day before meals  metoprolol tartrate 50 milliGRAM(s) Oral every 12 hours  midodrine. 10 milliGRAM(s) Oral <User Schedule>  midodrine. 10 milliGRAM(s) Oral once  Nephro-chandrika 1 Tablet(s) Oral daily  pantoprazole  Injectable 40 milliGRAM(s) IV Push daily  polyethylene glycol 3350 17 Gram(s) Oral daily    MEDICATIONS  (PRN):  dextrose Oral Gel 15 Gram(s) Oral once PRN Blood Glucose LESS THAN 70 milliGRAM(s)/deciliter      CAPILLARY BLOOD GLUCOSE      POCT Blood Glucose.: 103 mg/dL (27 Apr 2023 13:53)  POCT Blood Glucose.: 146 mg/dL (27 Apr 2023 06:26)  POCT Blood Glucose.: 241 mg/dL (26 Apr 2023 16:24)    I&O's Summary    26 Apr 2023 07:01  -  27 Apr 2023 07:00  --------------------------------------------------------  IN: 480 mL / OUT: 0 mL / NET: 480 mL    27 Apr 2023 07:01  -  27 Apr 2023 14:48  --------------------------------------------------------  IN: 300 mL / OUT: 2500 mL / NET: -2200 mL        PHYSICAL EXAM:  Vital Signs Last 24 Hrs  T(C): 36.2 (27 Apr 2023 13:03), Max: 36.7 (27 Apr 2023 09:31)  T(F): 97.2 (27 Apr 2023 13:03), Max: 98.1 (27 Apr 2023 09:31)  HR: 86 (27 Apr 2023 13:03) (61 - 89)  BP: 116/77 (27 Apr 2023 13:03) (116/77 - 133/73)  BP(mean): --  RR: 18 (27 Apr 2023 13:03) (18 - 18)  SpO2: 95% (27 Apr 2023 13:03) (95% - 97%)    Parameters below as of 27 Apr 2023 13:03  Patient On (Oxygen Delivery Method): room air    CONSTITUTIONAL: elderly male, chronically-ill appearing, in no acute distress  EYES: PERRLA; conjunctiva and sclera clear  ENMT: Moist oral mucosa, no pharyngeal injection or exudates; normal dentition  NECK: Supple, no palpable masses; no thyromegaly  RESPIRATORY: Normal respiratory effort; diminished at bases b/l L>R  CARDIOVASCULAR: irregularly irregular, normal S1 and S2, no murmur/rub/gallop; +LE edema b/l  ABDOMEN: Soft, Nondistended, Nontender to palpation, normoactive bowel sounds  MUSCULOSKELETAL: No clubbing or cyanosis of digits; no joint swelling or tenderness to palpation  PSYCH: A+O to person, place, and time; affect appropriate  NEUROLOGY: CN 2-12 are intact and symmetric; no gross sensory deficits   SKIN: No rashes; no palpable lesions, +LUE AVF with palpable thrill    LABS:                        13.2   5.90  )-----------( 90       ( 27 Apr 2023 05:47 )             40.6     04-27    130<L>  |  92<L>  |  42<H>  ----------------------------<  104<H>  5.0   |  24  |  4.91<H>    Ca    8.7      27 Apr 2023 05:47  Phos  4.8     04-27        Culture - Fungal, Body Fluid (collected 24 Apr 2023 18:32)  Source: Pleural Fl Pleural Fluid  Preliminary Report (26 Apr 2023 15:04):    Culture is being performed. Fungal cultures are held for 4 weeks.    Culture - Body Fluid with Gram Stain (collected 24 Apr 2023 18:32)  Source: Pleural Fl Pleural Fluid  Gram Stain (25 Apr 2023 22:53):    polymorphonuclear leukocytes seen    No organisms seen    by cytocentrifuge  Preliminary Report (25 Apr 2023 20:44):    No growth        RADIOLOGY & ADDITIONAL TESTS:  Results Reviewed:   Imaging Personally Reviewed:  Electrocardiogram Personally Reviewed:    COORDINATION OF CARE:  Care Discussed with Consultants/Other Providers [Y]: Cardiology Attending Dr. Claudio, medicine PATRICK Callahan  Prior or Outpatient Records Reviewed [Y/N]:

## 2023-04-27 NOTE — PROGRESS NOTE ADULT - PROBLEM SELECTOR PLAN 1
due to multifocal pneumonia, large L pleural effusion, hypervolemia  - improved, now satting well on RA  - management of PNA, pleural effusion and hypervolemia as below

## 2023-04-27 NOTE — PROGRESS NOTE ADULT - SUBJECTIVE AND OBJECTIVE BOX
[facility] Division of Kidney Diseases & Hypertension  FOLLOW UP NOTE  [phone]--------------------------------------------------------------------------------    HPI: 87 y.o. M w/ PMHx of Afib (on Eliquis), s/p Micra PPM, non-obstructive CAD, ESRD on HD (Tu/Th/Sa; nephrologist = Dr. Julius Eason and HD center in [place]), DM (on Tradjenta), HLD, who presents from home with generalized weakness and lethargy for the past week with loss of appetite/decreased PO intake. Pt. being seen for ESRD management. Last outpatient HD treatment was on 4/20. Last inpatient HD treatment was on 4/25.    24 hour events/subjective: Pt. was seen and evaluated at bedside this morning. Pt. minimally conversive, unable to provide history or ROS.      PAST HISTORY  --------------------------------------------------------------------------------  No significant changes to PMH, PSH, FHx, SHx, unless otherwise noted    ALLERGIES & MEDICATIONS  --------------------------------------------------------------------------------  Allergies    No Known Allergies    Intolerances      Standing Inpatient Medications  apixaban 2.5 milliGRAM(s) Oral every 12 hours  atorvastatin 40 milliGRAM(s) Oral at bedtime  calcium acetate 667 milliGRAM(s) Oral three times a day with meals  ceFAZolin   IVPB      ceFAZolin   IVPB 500 milliGRAM(s) IV Intermittent every 24 hours  chlorhexidine 2% Cloths 1 Application(s) Topical daily  dextrose 5%. 1000 milliLiter(s) IV Continuous <Continuous>  dextrose 50% Injectable 25 Gram(s) IV Push once  finasteride 5 milliGRAM(s) Oral daily  glucagon  Injectable 1 milliGRAM(s) IntraMuscular once  insulin lispro (ADMELOG) corrective regimen sliding scale   SubCutaneous three times a day before meals  metoprolol tartrate 50 milliGRAM(s) Oral every 12 hours  midodrine. 10 milliGRAM(s) Oral <User Schedule>  midodrine. 10 milliGRAM(s) Oral once  Nephro-chandrika 1 Tablet(s) Oral daily  pantoprazole  Injectable 40 milliGRAM(s) IV Push daily  polyethylene glycol 3350 17 Gram(s) Oral daily    PRN Inpatient Medications  dextrose Oral Gel 15 Gram(s) Oral once PRN      REVIEW OF SYSTEMS  --------------------------------------------------------------------------------  See HPI    VITALS/PHYSICAL EXAM  --------------------------------------------------------------------------------  T(C): 36.7 (04-27-23 @ 09:11), Max: 36.7 (04-27-23 @ 09:11)  HR: 61 (04-27-23 @ 09:11) (61 - 89)  BP: 121/60 (04-27-23 @ 09:11) (111/53 - 133/73)  RR: 18 (04-27-23 @ 09:11) (18 - 18)  SpO2: 96% (04-27-23 @ 09:11) (95% - 97%)  Wt(kg): --      04-26-23 @ 07:01  -  04-27-23 @ 07:00  --------------------------------------------------------  IN: 480 mL / OUT: 0 mL / NET: 480 mL      Physical Exam:  Gen: elderly, ill appearing   HEENT: MMM on NC  Pulm: No crackles anteriorly   CV: S1S2  Abd: Soft, +BS   Ext: No LE edema B/L  Neuro: Unable to assess  Skin: Warm and dry  Vascular access: LUE AVF with +thrill    LABS/STUDIES  --------------------------------------------------------------------------------              13.2   5.90  >-----------<  90       [04-27-23 @ 05:47]              40.6     130  |  92  |  42  ----------------------------<  104      [04-27-23 @ 05:47]  5.0   |  24  |  4.91        Ca     8.7     [04-27-23 @ 05:47]      Phos  4.8     [04-27-23 @ 05:47]    Creatinine Trend:  SCr 4.91 [04-27 @ 05:47]  SCr 4.08 [04-26 @ 07:03]  SCr 5.36 [04-25 @ 06:05]  SCr 4.42 [04-24 @ 05:27]  SCr 3.10 [04-23 @ 08:53]    Iron 58, TIBC 147, %sat 40      [04-26-23 @ 07:02]  Ferritin 2482      [04-22-23 @ 11:53]  TSH 10.90      [04-22-23 @ 11:53]  Lipid: chol 164, , HDL 59, LDL --      [04-22-23 @ 11:53]

## 2023-04-27 NOTE — PROGRESS NOTE ADULT - PROBLEM SELECTOR PLAN 4
- TTE as above, suggestive of amyloid cardiomyopathy  - PYP/amyloid scan performed - NOT suggestive of transthyretin cardiac amyloidosis  - f/u serum immunofixation, immunoglobulin FLC  - Cardiology consulted, recs appreciated

## 2023-04-27 NOTE — PROGRESS NOTE ADULT - PROBLEM SELECTOR PLAN 10
DVT ppx: eliquis    Dispo: ARNAV with HD when volume optimized    Daughter Desiree requesting information for Geriatrics to establish care for primary care in discharge paperwork when ready for discharge.

## 2023-04-27 NOTE — PROGRESS NOTE ADULT - PROBLEM SELECTOR PLAN 2
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 Serum phosphorus (4.8) is at goal for ESRD. Recommend to continue with phosphorus binder. Monitor serum phosphorus, goal: 3.5-5.5.    If you have any questions, please feel free to contact me  Wes Savage  Nephrology Fellow  [phone] / Microsoft Teams(Preferred)  (After 5pm or on weekends please page the on-call fellow)

## 2023-04-27 NOTE — PROGRESS NOTE ADULT - PROBLEM SELECTOR PLAN 1
Pt. with ESRD on HD three times a week (TTS) presented to [facility] for failure to thrive and lethargy. Last inpatient HD treatment was on 4/25 via LUE AVF. Pt. clinically stable although on NC. Labs reviewed. Hgb (13.2) is above goal for ESRD. Plan for maintenance HD treatment today. Monitor labs and vitals. Avoid nephrotoxins. Dose medications to ESRD.

## 2023-04-27 NOTE — PROGRESS NOTE ADULT - PROBLEM SELECTOR PLAN 3
- CT chest with patchy opacities in R lung. suspect 2/2 to aspiration  - MRSA/MSSA PCR screen +MSSA  - c/w cefazolin given MSSA+ screen and azithro for atypical coverage. completed 5 day course.       * azithro completed 4/26. cefazolin completed 4/27.  - unable to obtain urine legionella and strep Ag as patient is anuric  - SLP eval appreciated, recommended for pureed and moderately thick liquids  - s/p FEEST on 4/25. continue current diet. plan for MBS Friday

## 2023-04-27 NOTE — PROGRESS NOTE ADULT - ASSESSMENT
86 yo male PMH of chronic Afib (on Eliquis, off ASA since hx of PUD/GIB in the past), s/p Micra PPM, non-obstructive CAD, ESRD on HD (Tu/Th/Sa), DM, HLD, and prior history of large pleural effusions (requiring drainage during hospitalizations in 2021 at [facility] and [facility]), who presents from home w/ c/o of SOB + generalized weakness and lethargy with loss of appetite/decreased PO intake, and his wife noticing his having dark/black stools in setting of diffuse abdominal pain worse in the LLQ (pt is s/p appendectomy in the past). Admitted for hypoxia due to pneumonia and large L pleural effusion now clinically improved, completing abx treatment for PNA and undergoing volume removal with HD.

## 2023-04-27 NOTE — PROGRESS NOTE ADULT - PROBLEM SELECTOR PLAN 7
takes metoprolol XL 100mg daily  - changed Toprol XL to metoprolol tartrate 50mg q12h with hold parameters        * may need to hold AM dose prior to HD on HD days if he does not tolerate volume removal with HD despite pre-HD midodrine, but appears he tolerated session well  - clear from pulmonary standpoint to resume eliquis  - resumed eliquis 2.5mg q12h on 4/26  - monitor on tele takes metoprolol XL 100mg daily  - changed Toprol XL to metoprolol tartrate 50mg q12h with hold parameters        * hold AM dose prior to HD on HD days  - clear from pulmonary standpoint to resume eliquis  - resumed eliquis 2.5mg q12h on 4/26  - monitor on tele

## 2023-04-27 NOTE — PROGRESS NOTE ADULT - SUBJECTIVE AND OBJECTIVE BOX
DATE OF SERVICE: 04-27-23 @ 16:07    Patient is a 87y old  Male who presents with a chief complaint of SOB, generalized weakness, dark stools (27 Apr 2023 14:48)      INTERVAL HISTORY: Feels ok.     REVIEW OF SYSTEMS:  CONSTITUTIONAL: No weakness  EYES/ENT: No visual changes;  No throat pain   NECK: No pain or stiffness  RESPIRATORY: No cough, wheezing; No shortness of breath  CARDIOVASCULAR: No chest pain or palpitations  GASTROINTESTINAL: No abdominal  pain. No nausea, vomiting, or hematemesis  GENITOURINARY: No dysuria, frequency or hematuria  NEUROLOGICAL: No stroke like symptoms  SKIN: No rashes    TELEMETRY Personally reviewed: AF 80-90  	  MEDICATIONS:  metoprolol tartrate 50 milliGRAM(s) Oral every 12 hours  midodrine. 10 milliGRAM(s) Oral once  midodrine. 10 milliGRAM(s) Oral <User Schedule>        PHYSICAL EXAM:  T(C): 36.2 (04-27-23 @ 13:03), Max: 36.7 (04-27-23 @ 09:31)  HR: 86 (04-27-23 @ 13:03) (61 - 89)  BP: 116/77 (04-27-23 @ 13:03) (116/77 - 133/73)  RR: 18 (04-27-23 @ 13:03) (18 - 18)  SpO2: 95% (04-27-23 @ 13:03) (95% - 97%)  Wt(kg): --  I&O's Summary    26 Apr 2023 07:01  -  27 Apr 2023 07:00  --------------------------------------------------------  IN: 480 mL / OUT: 0 mL / NET: 480 mL    27 Apr 2023 07:01  -  27 Apr 2023 16:07  --------------------------------------------------------  IN: 300 mL / OUT: 2500 mL / NET: -2200 mL          Appearance: In no distress	  HEENT:    PERRL, EOMI	  Cardiovascular:  S1 S2, No JVD  Respiratory: Lungs clear to auscultation	  Gastrointestinal:  Soft, Non-tender, + BS	  Vascularature:  No edema of LE  Psychiatric: Appropriate affect   Neuro: no acute focal deficits                               13.2   5.90  )-----------( 90       ( 27 Apr 2023 05:47 )             40.6     04-27    130<L>  |  92<L>  |  42<H>  ----------------------------<  104<H>  5.0   |  24  |  4.91<H>    Ca    8.7      27 Apr 2023 05:47  Phos  4.8     04-27          Labs personally reviewed      ASSESSMENT/PLAN: 	    88 yo male PMH of chronic Afib (on Eliquis, off ASA since hx of PUD/GIB in the past), s/p Micra PPM, non-obstructive CAD, ESRD on HD (Tu/Th/Sa), DM, HLD, and prior history of large pleural effusions (requiring drainage during hospitalizations in 2021 at [facility] and [facility]), who presents from home w/ c/o of SOB + generalized weakness and lethargy with loss of appetite/decreased PO intake, and his wife noticing his having dark/black stools in setting of diffuse abdominal pain worse in the LLQ (pt is s/p appendectomy in the past). Admitted for hypoxia due to pneumonia and large L pleural effusion.        Problem/Plan - 1:  ·  Problem: Diastolic HF.   ·  Plan: - TTE suggestive of amyloid, EKG with low voltage, hx of AF  -  Nuclear pyrophosphate scan revealing Cardiac amyloid Imaging study is NOT suggestive of transthyretin cardiac   amyloidosis.  - will send serum immunoglobulin free light chains, serum immunofixation, urine immunofixation to complete work up (to be done as outpt so results can be followed as outpt) to rule out AL amyloidosis   - Volume mgmt with HD    Problem/Plan - 2:  ·  Problem: Chronic atrial fibrillation.   ·  Plan: Home metoprolol XL 100mg daily  - - Has been started on Lopressor 50mg BID   - Eliquis for AC    Problem/Plan - 3:  ·  Problem: Hyperlipidemia.   ·  Plan: - c/w atorvastatin 40mg qHS.    Problem/Plan -4:  ·  Problem: Prophylactic measure.   ·  Plan; DVT ppx: SCDs. florencio Ingram, MELONY-NP   Shalom Claudio,  [facility]  Cardiovascular Medicine  [address] [place] [address], [address]  Available through call or text on [employer] TEAMs  Office: [phone]   DATE OF SERVICE: 04-27-23 @ 16:07    Patient is a 87y old  Male who presents with a chief complaint of SOB, generalized weakness, dark stools (27 Apr 2023 14:48)      INTERVAL HISTORY: Feels ok.     REVIEW OF SYSTEMS:  CONSTITUTIONAL: No weakness  EYES/ENT: No visual changes;  No throat pain   NECK: No pain or stiffness  RESPIRATORY: No cough, wheezing; No shortness of breath  CARDIOVASCULAR: No chest pain or palpitations  GASTROINTESTINAL: No abdominal  pain. No nausea, vomiting, or hematemesis  GENITOURINARY: No dysuria, frequency or hematuria  NEUROLOGICAL: No stroke like symptoms  SKIN: No rashes    TELEMETRY Personally reviewed: AF 80-90  	  MEDICATIONS:  metoprolol tartrate 50 milliGRAM(s) Oral every 12 hours  midodrine. 10 milliGRAM(s) Oral once  midodrine. 10 milliGRAM(s) Oral <User Schedule>        PHYSICAL EXAM:  T(C): 36.2 (04-27-23 @ 13:03), Max: 36.7 (04-27-23 @ 09:31)  HR: 86 (04-27-23 @ 13:03) (61 - 89)  BP: 116/77 (04-27-23 @ 13:03) (116/77 - 133/73)  RR: 18 (04-27-23 @ 13:03) (18 - 18)  SpO2: 95% (04-27-23 @ 13:03) (95% - 97%)  Wt(kg): --  I&O's Summary    26 Apr 2023 07:01  -  27 Apr 2023 07:00  --------------------------------------------------------  IN: 480 mL / OUT: 0 mL / NET: 480 mL    27 Apr 2023 07:01  -  27 Apr 2023 16:07  --------------------------------------------------------  IN: 300 mL / OUT: 2500 mL / NET: -2200 mL          Appearance: In no distress	  HEENT:    PERRL, EOMI	  Cardiovascular:  S1 S2, No JVD  Respiratory: Lungs clear to auscultation	  Gastrointestinal:  Soft, Non-tender, + BS	  Vascularature:  No edema of LE  Psychiatric: Appropriate affect   Neuro: no acute focal deficits                               13.2   5.90  )-----------( 90       ( 27 Apr 2023 05:47 )             40.6     04-27    130<L>  |  92<L>  |  42<H>  ----------------------------<  104<H>  5.0   |  24  |  4.91<H>    Ca    8.7      27 Apr 2023 05:47  Phos  4.8     04-27          Labs personally reviewed      ASSESSMENT/PLAN: 	    88 yo male PMH of chronic Afib (on Eliquis, off ASA since hx of PUD/GIB in the past), s/p Micra PPM, non-obstructive CAD, ESRD on HD (Tu/Th/Sa), DM, HLD, and prior history of large pleural effusions (requiring drainage during hospitalizations in 2021 at [facility] and [facility]), who presents from home w/ c/o of SOB + generalized weakness and lethargy with loss of appetite/decreased PO intake, and his wife noticing his having dark/black stools in setting of diffuse abdominal pain worse in the LLQ (pt is s/p appendectomy in the past). Admitted for hypoxia due to pneumonia and large L pleural effusion.        Problem/Plan - 1:  ·  Problem: Diastolic HF.   ·  Plan: - TTE suggestive of amyloid, EKG with low voltage, hx of AF  -  Nuclear pyrophosphate scan revealing Cardiac amyloid Imaging study is NOT suggestive of transthyretin cardiac   amyloidosis.  - Sent serum immunoglobulin free light chains, serum immunofixation to complete work up (to be done as outpt so results can be followed as outpt) to rule out AL amyloidosis   - Volume mgmt with HD    Problem/Plan - 2:  ·  Problem: Chronic atrial fibrillation.   ·  Plan: Home metoprolol XL 100mg daily  - - Has been started on Lopressor 50mg BID   - Eliquis for AC    Problem/Plan - 3:  ·  Problem: Hyperlipidemia.   ·  Plan: - c/w atorvastatin 40mg qHS.    Problem/Plan -4:  ·  Problem: Prophylactic measure.   ·  Plan; DVT ppx: SCDs. florencio Ingram, MELONY-ALBINA Claudio, [facility]  Cardiovascular Medicine  [address] [place] [address], [address]  Available through call or text on [employer] TEAMs  Office: [phone]

## 2023-04-27 NOTE — PROGRESS NOTE ADULT - PROBLEM SELECTOR PLAN 6
- HD per nephrology (patient follows w/ Dr. Julius Eason in [place] as O/P on Tu/Th/Sa schedule)  - discussed with nephrology for additional UF with HD given volume overload on exam  - c/w midodrine 10mg pre-HD on HD days to allow for volume removal  - c/w phoslo 667mg TID with meals

## 2023-04-28 NOTE — PROGRESS NOTE ADULT - ASSESSMENT
88 yo male PMH of chronic Afib (on Eliquis, off ASA since hx of PUD/GIB in the past), s/p Micra PPM, non-obstructive CAD, ESRD on HD (Tu/Th/Sa), DM, HLD, and prior history of large pleural effusions (requiring drainage during hospitalizations in 2021 at [facility] and [facility]), who presents from home w/ c/o of SOB + generalized weakness and lethargy with loss of appetite/decreased PO intake, and his wife noticing his having dark/black stools in setting of diffuse abdominal pain worse in the LLQ (pt is s/p appendectomy in the past). Admitted for hypoxia due to pneumonia and large L pleural effusion now clinically improved s/p thoracentesis and abx treatment for PNA, currently undergoing volume removal with HD.

## 2023-04-28 NOTE — PROGRESS NOTE ADULT - PROBLEM SELECTOR PLAN 6
- HD per nephrology (patient follows w/ Dr. Julius Eason in [place] as O/P on Tu/Th/Sa schedule)  - discussed with nephrology on 4/24 for additional UF with HD given volume overload on exam  - c/w midodrine 10mg pre-HD on HD days to allow for volume removal  - CXR with increase in L pleural effusion - needs more volume removal for optimization  - c/w phoslo 667mg TID with meals

## 2023-04-28 NOTE — PROGRESS NOTE ADULT - SUBJECTIVE AND OBJECTIVE BOX
DATE OF SERVICE: 04-28-23 @ 13:37    Patient is a 87y old  Male who presents with a chief complaint of SOB, generalized weakness, dark stools (27 Apr 2023 16:07)      INTERVAL HISTORY: Feels ok.     REVIEW OF SYSTEMS:  CONSTITUTIONAL: No weakness  EYES/ENT: No visual changes;  No throat pain   NECK: No pain or stiffness  RESPIRATORY: No cough, wheezing; No shortness of breath  CARDIOVASCULAR: No chest pain or palpitations  GASTROINTESTINAL: No abdominal  pain. No nausea, vomiting, or hematemesis  GENITOURINARY: No dysuria, frequency or hematuria  NEUROLOGICAL: No stroke like symptoms  SKIN: No rashes    TELEMETRY Personally reviewed: AF   	  MEDICATIONS:  metoprolol tartrate 50 milliGRAM(s) Oral every 12 hours  midodrine. 10 milliGRAM(s) Oral once  midodrine. 10 milliGRAM(s) Oral <User Schedule>        PHYSICAL EXAM:  T(C): 36.5 (04-28-23 @ 11:28), Max: 36.7 (04-28-23 @ 05:27)  HR: 86 (04-28-23 @ 11:28) (85 - 99)  BP: 124/61 (04-28-23 @ 11:28) (108/62 - 149/70)  RR: 18 (04-28-23 @ 11:28) (18 - 18)  SpO2: 95% (04-28-23 @ 11:28) (92% - 95%)  Wt(kg): --  I&O's Summary    27 Apr 2023 07:01  -  28 Apr 2023 07:00  --------------------------------------------------------  IN: 300 mL / OUT: 2500 mL / NET: -2200 mL    28 Apr 2023 07:01  -  28 Apr 2023 13:37  --------------------------------------------------------  IN: 80 mL / OUT: 0 mL / NET: 80 mL          Appearance: In no distress	  HEENT:    PERRL, EOMI	  Cardiovascular:  S1 S2, No JVD  Respiratory: Lungs clear to auscultation	  Gastrointestinal:  Soft, Non-tender, + BS	  Vascularature:  No edema of LE  Psychiatric: Appropriate affect   Neuro: no acute focal deficits                               13.2   5.90  )-----------( 90       ( 27 Apr 2023 05:47 )             40.6     04-27    130<L>  |  92<L>  |  42<H>  ----------------------------<  104<H>  5.0   |  24  |  4.91<H>    Ca    8.7      27 Apr 2023 05:47  Phos  4.8     04-27          Labs personally reviewed      ASSESSMENT/PLAN: 	    88 yo male PMH of chronic Afib (on Eliquis, off ASA since hx of PUD/GIB in the past), s/p Micra PPM, non-obstructive CAD, ESRD on HD (Tu/Th/Sa), DM, HLD, and prior history of large pleural effusions (requiring drainage during hospitalizations in 2021 at [facility] and [facility]), who presents from home w/ c/o of SOB + generalized weakness and lethargy with loss of appetite/decreased PO intake, and his wife noticing his having dark/black stools in setting of diffuse abdominal pain worse in the LLQ (pt is s/p appendectomy in the past). Admitted for hypoxia due to pneumonia and large L pleural effusion.        Problem/Plan - 1:  ·  Problem: Diastolic HF.   ·  Plan: - TTE suggestive of amyloid, EKG with low voltage, hx of AF  -  Nuclear pyrophosphate scan revealing Cardiac amyloid Imaging study is NOT suggestive of transthyretin cardiac   amyloidosis.  - [place] Kappa and Jeaneth chains elevated  - Volume mgmt with HD    Problem/Plan - 2:  ·  Problem: Chronic atrial fibrillation.   ·  Plan: Home metoprolol XL 100mg daily  - - Has been started on Lopressor 50mg BID   - Eliquis for AC    Problem/Plan - 3:  ·  Problem: Hyperlipidemia.   ·  Plan: - c/w atorvastatin 40mg qHS.    Problem/Plan -4:  ·  Problem: Prophylactic measure.   ·  Plan; DVT ppx: SCDs. eliquis         Janki Ingram, AG-ALBINA Claudio DO [facility]  Cardiovascular Medicine  [address], [address]  Available through call or text on [employer] TEAMs  Office: [phone]   DATE OF SERVICE: 04-28-23 @ 13:37    Patient is a 87y old  Male who presents with a chief complaint of SOB, generalized weakness, dark stools (27 Apr 2023 16:07)      INTERVAL HISTORY: Feels ok.     REVIEW OF SYSTEMS:  CONSTITUTIONAL: No weakness  EYES/ENT: No visual changes;  No throat pain   NECK: No pain or stiffness  RESPIRATORY: No cough, wheezing; No shortness of breath  CARDIOVASCULAR: No chest pain or palpitations  GASTROINTESTINAL: No abdominal  pain. No nausea, vomiting, or hematemesis  GENITOURINARY: No dysuria, frequency or hematuria  NEUROLOGICAL: No stroke like symptoms  SKIN: No rashes    TELEMETRY Personally reviewed: AF   	  MEDICATIONS:  metoprolol tartrate 50 milliGRAM(s) Oral every 12 hours  midodrine. 10 milliGRAM(s) Oral once  midodrine. 10 milliGRAM(s) Oral <User Schedule>        PHYSICAL EXAM:  T(C): 36.5 (04-28-23 @ 11:28), Max: 36.7 (04-28-23 @ 05:27)  HR: 86 (04-28-23 @ 11:28) (85 - 99)  BP: 124/61 (04-28-23 @ 11:28) (108/62 - 149/70)  RR: 18 (04-28-23 @ 11:28) (18 - 18)  SpO2: 95% (04-28-23 @ 11:28) (92% - 95%)  Wt(kg): --  I&O's Summary    27 Apr 2023 07:01  -  28 Apr 2023 07:00  --------------------------------------------------------  IN: 300 mL / OUT: 2500 mL / NET: -2200 mL    28 Apr 2023 07:01  -  28 Apr 2023 13:37  --------------------------------------------------------  IN: 80 mL / OUT: 0 mL / NET: 80 mL          Appearance: In no distress	  HEENT:    PERRL, EOMI	  Cardiovascular:  S1 S2, No JVD  Respiratory: Lungs clear to auscultation	  Gastrointestinal:  Soft, Non-tender, + BS	  Vascularature:  No edema of LE  Psychiatric: Appropriate affect   Neuro: no acute focal deficits                               13.2   5.90  )-----------( 90       ( 27 Apr 2023 05:47 )             40.6     04-27    130<L>  |  92<L>  |  42<H>  ----------------------------<  104<H>  5.0   |  24  |  4.91<H>    Ca    8.7      27 Apr 2023 05:47  Phos  4.8     04-27          Labs personally reviewed      ASSESSMENT/PLAN: 	    88 yo male PMH of chronic Afib (on Eliquis, off ASA since hx of PUD/GIB in the past), s/p Micra PPM, non-obstructive CAD, ESRD on HD (Tu/Th/Sa), DM, HLD, and prior history of large pleural effusions (requiring drainage during hospitalizations in 2021 at [facility] and [facility] who presents from home w/ c/o of SOB + generalized weakness and lethargy with loss of appetite/decreased PO intake, and his wife noticing his having dark/black stools in setting of diffuse abdominal pain worse in the LLQ (pt is s/p appendectomy in the past). Admitted for hypoxia due to pneumonia and large L pleural effusion.        Problem/Plan - 1:  ·  Problem: Diastolic HF.   ·  Plan: - TTE suggestive of amyloid, EKG with low voltage, hx of AF  -  Nuclear pyrophosphate scan revealing Cardiac amyloid Imaging study is NOT suggestive of transthyretin cardiac   amyloidosis.  - [place] Kappa and Jeaneth chains elevated ? 2/2 inflammation   - Volume mgmt with HD    Problem/Plan - 2:  ·  Problem: Chronic atrial fibrillation.   ·  Plan: Home metoprolol XL 100mg daily  - - Has been started on Lopressor 50mg BID   - Eliquis for AC    Problem/Plan - 3:  ·  Problem: Hyperlipidemia.   ·  Plan: - c/w atorvastatin 40mg qHS.    Problem/Plan -4:  ·  Problem: Prophylactic measure.   ·  Plan; DVT ppx: SCDs. eliquis         JankiEILEEN Crabtree DO [facility]  Cardiovascular Medicine  [address], [address]  Available through call or text on Microsoft TEAMs  Office: [phone]

## 2023-04-28 NOTE — SWALLOW VFSS/MBS ASSESSMENT ADULT - ORAL PHASE
Delayed oral transit time/Reduced anterior - posterior transport/Residue in oral cavity/Uncontrolled bolus / spillover in braulio-pharynx/Uncontrolled bolus / spillover in hypopharynx/Laryngeal penetration before swallow - silent mild/Delayed oral transit time/Reduced anterior - posterior transport/Residue in oral cavity/Incomplete tongue to palate contact/Uncontrolled bolus / spillover in braulio-pharynx Delayed oral transit time/Reduced anterior - posterior transport/Residue in oral cavity/Incomplete tongue to palate contact

## 2023-04-28 NOTE — SWALLOW VFSS/MBS ASSESSMENT ADULT - SLP PERTINENT HISTORY OF CURRENT PROBLEM
87 yoM w/ PMHx of chronic Afib (on Eliquis, off ASA since hx of PUD/GIB in the past), s/p Micra PPM, non-obstructive CAD, ESRD on HD, DM (on Tradjenta), HLD, who presents from home where he lives with his wife, with complaints of SOB over the past day along w/ generalized weakness and lethargy for the past week with loss of appetite/decreased PO intake, and his wife noticing his having dark/black stools yesterday iso diffuse abdominal pain worse in the LLQ (pt is s/p appendectomy in the past). Of note, patient has a prior history of large pleural effusions requiring drainage during hospitalizations in 2021 at [facility] and [facility]. Pt has difficulty urinating (with UOP declining over the years).

## 2023-04-28 NOTE — SWALLOW VFSS/MBS ASSESSMENT ADULT - LARYNGEAL PENETRATION DURING THE SWALLOW - SILENT
Mild to level of vocal folds which appeared to be intermixed with residue from mildly thickened liquid trial./Trace/Mild

## 2023-04-28 NOTE — PROGRESS NOTE ADULT - NSPROGADDITIONALINFOA_GEN_ALL_CORE
.  Claudia Denis MD  Division of Hospital Medicine  [facility]   Available on Microsoft Teams - messages preferred prior to calls.    Plan discussed with patient and wife bedside in Romansh, Cardiology Attending Dr. Claudio, and medicine NP Beena.  Also extensively updated daughter Desiree via phone on 4/27. .  Claudia Denis MD  Division of Hospital Medicine  [facility]   Available on Microsoft Teams - messages preferred prior to calls.    Plan discussed with patient and wife bedside in Albanian, Cardiology Attending Dr. Claudio, Nephro fellow Wes, and medicine NP Beena.  Also extensively updated daughter Desiree via phone on 4/27.

## 2023-04-28 NOTE — SWALLOW VFSS/MBS ASSESSMENT ADULT - DIAGNOSTIC IMPRESSIONS
Pt is an 86 y/o male admitted for hypoxia due to pneumonia and large L pleural effusion now clinically improved, completing abx treatment for PNA and undergoing volume removal with HD who presents with moderate oropharyngeal dysphagia characterized primarily by laryngeal penetration with mildly thickened liquids and minced and moist texture with subsequent silent aspiration evident on mildly thickened liquids.  Pharyngeal residue was reduced with cued repeat swallows.  Epiglottic retroflexion was intermittently reduced and anterior hyoid movement was also reduced.  Additional Disorders include: reduced lingual strength/ROM/Rate of motion, reduced BOT to posterior pharyngeal wall contact, diminished pharyngeal stripping wave, delay in trigger of the swallow reflex, reduced laryngeal closure, reduced pharyngeal contractility, reduced supraglottic sensation, and reduced subglottic sensation.

## 2023-04-28 NOTE — PROGRESS NOTE ADULT - PROBLEM SELECTOR PLAN 7
takes metoprolol XL 100mg daily  - changed Toprol XL to metoprolol tartrate 50mg q12h with hold parameters        * hold AM dose prior to HD on HD days  - clear from pulmonary standpoint to resume eliquis  - resumed eliquis 2.5mg q12h on 4/26  - monitor on tele

## 2023-04-28 NOTE — SWALLOW VFSS/MBS ASSESSMENT ADULT - ADDITIONAL RECOMMENDATIONS
swallowing therapy post d/c; SLP to f/u on inpt basis 1-2 times per week to improve oropharyngeal swallowing skills.

## 2023-04-28 NOTE — SWALLOW VFSS/MBS ASSESSMENT ADULT - PHARYNGEAL PHASE COMMENTS
decreased airway closure; decreased epiglottic retroflexion on initial swallow attempt of cup sip with adequate epiglottic retroflexion noted on repeat swallow. tspn of moderately thickened liquid effectively reduced pharyngeal residue, of note laryngeal penetration was not evident on moderately thickened liquid via tspn.

## 2023-04-28 NOTE — SWALLOW VFSS/MBS ASSESSMENT ADULT - SLP GENERAL OBSERVATIONS
Pt encountered OOB in chair, AA&Ox2 (person, place) with wife present, feeding pt lunch. Pt's wife present for translating in Nepali, as pt and wife refused  phone.

## 2023-04-28 NOTE — SWALLOW VFSS/MBS ASSESSMENT ADULT - ROSENBEK'S PENETRATION ASPIRATION SCALE
(5) contrast contacts vocal cords, visible residue remains (penetration) (6) contrast passes glottis, no subglottic residue remains (aspiration) (1) no aspiration, contrast does not enter airway

## 2023-04-28 NOTE — PROGRESS NOTE ADULT - SUBJECTIVE AND OBJECTIVE BOX
Claudia Denis MD  Division of Hospital Medicine  [facility]   Available on Microsoft Teams (Mon-Fri 8am-5pm)    * messages preferred prior to calls  Other Times:  [phone]      Patient is a 87y old  Male who presents with a chief complaint of SOB, generalized weakness, dark stools (28 Apr 2023 13:37)      SUBJECTIVE / OVERNIGHT EVENTS: no acute events overnight. feels better today with more appetite. no fever, chills, chest pain, nor dyspnea. feels weak but otherwise doing okay.  ADDITIONAL REVIEW OF SYSTEMS:    Tele reviewed: afib HR 80-100s    MEDICATIONS  (STANDING):  apixaban 2.5 milliGRAM(s) Oral every 12 hours  atorvastatin 40 milliGRAM(s) Oral at bedtime  calcium acetate 667 milliGRAM(s) Oral three times a day with meals  chlorhexidine 2% Cloths 1 Application(s) Topical daily  dextrose 5%. 1000 milliLiter(s) (50 mL/Hr) IV Continuous <Continuous>  dextrose 50% Injectable 25 Gram(s) IV Push once  finasteride 5 milliGRAM(s) Oral daily  glucagon  Injectable 1 milliGRAM(s) IntraMuscular once  insulin lispro (ADMELOG) corrective regimen sliding scale   SubCutaneous three times a day before meals  metoprolol tartrate 50 milliGRAM(s) Oral every 12 hours  midodrine. 10 milliGRAM(s) Oral once  midodrine. 10 milliGRAM(s) Oral <User Schedule>  Nephro-chandrika 1 Tablet(s) Oral daily  pantoprazole  Injectable 40 milliGRAM(s) IV Push daily  polyethylene glycol 3350 17 Gram(s) Oral daily    MEDICATIONS  (PRN):  dextrose Oral Gel 15 Gram(s) Oral once PRN Blood Glucose LESS THAN 70 milliGRAM(s)/deciliter      CAPILLARY BLOOD GLUCOSE      POCT Blood Glucose.: 222 mg/dL (28 Apr 2023 16:32)  POCT Blood Glucose.: 189 mg/dL (28 Apr 2023 11:51)  POCT Blood Glucose.: 143 mg/dL (28 Apr 2023 07:55)  POCT Blood Glucose.: 237 mg/dL (27 Apr 2023 21:47)    I&O's Summary    27 Apr 2023 07:01  -  28 Apr 2023 07:00  --------------------------------------------------------  IN: 300 mL / OUT: 2500 mL / NET: -2200 mL    28 Apr 2023 07:01  -  28 Apr 2023 16:52  --------------------------------------------------------  IN: 160 mL / OUT: 2 mL / NET: 158 mL        PHYSICAL EXAM:  Vital Signs Last 24 Hrs  T(C): 36.5 (28 Apr 2023 11:28), Max: 36.7 (28 Apr 2023 05:27)  T(F): 97.7 (28 Apr 2023 11:28), Max: 98.1 (28 Apr 2023 05:27)  HR: 86 (28 Apr 2023 11:28) (85 - 99)  BP: 124/61 (28 Apr 2023 11:28) (108/62 - 149/70)  BP(mean): --  RR: 18 (28 Apr 2023 11:28) (18 - 18)  SpO2: 95% (28 Apr 2023 11:28) (92% - 95%)    Parameters below as of 28 Apr 2023 11:28  Patient On (Oxygen Delivery Method): room air      CONSTITUTIONAL: elderly male, chronically-ill appearing, in no acute distress  EYES: PERRLA; conjunctiva and sclera clear  ENMT: Moist oral mucosa, no pharyngeal injection or exudates; normal dentition  NECK: Supple, no palpable masses; no thyromegaly  RESPIRATORY: Normal respiratory effort; diminished at bases b/l L>R  CARDIOVASCULAR: irregularly irregular, normal S1 and S2, no murmur/rub/gallop; +LE edema b/l  ABDOMEN: Soft, Nondistended, Nontender to palpation, normoactive bowel sounds  MUSCULOSKELETAL: No clubbing or cyanosis of digits; no joint swelling or tenderness to palpation  PSYCH: A+O to person, place, and time; affect appropriate  NEUROLOGY: CN 2-12 are intact and symmetric; no gross sensory deficits   SKIN: No rashes; no palpable lesions, +LUE AVF with palpable thrill    LABS:                        13.2   5.90  )-----------( 90       ( 27 Apr 2023 05:47 )             40.6     04-27    130<L>  |  92<L>  |  42<H>  ----------------------------<  104<H>  5.0   |  24  |  4.91<H>    Ca    8.7      27 Apr 2023 05:47  Phos  4.8     04-27        RADIOLOGY & ADDITIONAL TESTS:  Results Reviewed:   Imaging Personally Reviewed:  CXR 4/28 with increase in L pleural effusion + atelectasis  Electrocardiogram Personally Reviewed:    COORDINATION OF CARE:  Care Discussed with Consultants/Other Providers [Y]: medicine ALBINA Hargrove  Prior or Outpatient Records Reviewed [Y/N]:

## 2023-04-28 NOTE — PROGRESS NOTE ADULT - PROBLEM SELECTOR PLAN 3
- CT chest with patchy opacities in R lung. suspect 2/2 to aspiration  - MRSA/MSSA PCR screen +MSSA  - s/p cefazolin given MSSA+ screen and azithro for atypical coverage. completed 5 day course.       * azithro completed 4/26. cefazolin completed 4/27.  - unable to obtain urine legionella and strep Ag as patient is anuric  - SLP eval appreciated, recommended for pureed and moderately thick liquids  - s/p FEEST on 4/25. continue current diet. plan for MBS today - though note incomplete appears recommended to maintain current diet

## 2023-04-28 NOTE — PROGRESS NOTE ADULT - PROBLEM SELECTOR PLAN 4
- TTE as above, suggestive of amyloid cardiomyopathy  - PYP/amyloid scan performed - NOT suggestive of transthyretin cardiac amyloidosis  - kappa/lambda FLC wnl but with polyclonal increase in both kappa and lambda light chains  - f/u serum immunofixation - pending  - Cardiology consulted, recs appreciated

## 2023-04-28 NOTE — PROGRESS NOTE ADULT - PROBLEM SELECTOR PLAN 2
- proBNP = 309950  - TTE with hyperdynamic LVSF with EF 75%, distal segments and apex with elevated regional strain with basal + mid segments with decreased regional strain. suggestive of amyloid cardiomyopathy, RV not well visualized but with enlarged RV cavity size, normal wall thickness and reduced systolic fxn, mild MV stenosis, trace AR, trace pericardial effusion  - Pulmonary consult appreciated  - s/p thoracentesis with Pulmonary on 4/24 with 700cc removed  - pleural fluid studies appears transudative, cytology negative for malignancy  - volume removal with HD - discussed with Nephrology for additional UF with HD        * will plan for midodrine 10mg prior to HD on HD days to allow for volume removal  - 2.5L removed on 4/26  - repeat CXR today with increase in L pleural effusion. needs more volume removal with HD  - incentive spirometry as tolerated  - strict I/Os, daily weights

## 2023-04-28 NOTE — SWALLOW VFSS/MBS ASSESSMENT ADULT - COMMENTS
GI following: Found to have ?dark stools that have since resolved.  Hemodynamically stable with no active sign of GIB at this point.  Risks outweigh benefits for any endoscopic assessment unless overt GIB develops or transfusions required.  Okay with adding on low-dose daily PPI for ppx (i.e. 20 mg omeprazole).  Okay with daily IV PPI inpatient if preferred, but would send home on low dose daily ppi after.     Pulm following: The patient's dyspnea and hypoxemic respiratory failure is likely multifactorial 2/2 pneumonia, pleural effusions, and volume overload from ESRD given elevated proBNP. The patient improved clinically after receiving HD. Currently 88% on RA, asked RN to place back on NC. Rec: Diagnostic/therapeutic Thoracentesis 4/24. plan for abx for PNA.    4/21 CXR: Large left pleural effusion with left lower lobe atelectasis. Bilateral patchy opacities. Trace right pleural effusion.  4/21 CT A/P- Chest CT: Large left pleural effusion with complete left lower and partial left upper lobe atelectasis. Multifocal patchy opacities throughout the right lung, likely on an infectious basis. Pulmonary edema may also have this appearance. Clinical correlation is recommended.  CT abdomen/pelvis: No bowel obstruction. Colonic wall thickening versus underdistention. Please correlate clinically for colitis. Small volume   ascites.

## 2023-04-29 NOTE — PROGRESS NOTE ADULT - PROBLEM SELECTOR PLAN 5
does have hx PUD/GI bleed in the past while on ASA in the past  - hemodynamics and H&H remain stable  - doubtful active bleed at this time as no BMs since admission  - GI consult appreciated, recommending conservative management at this time   - FOBT negative  - c/w pantoprazole 40mg IV daily  - iron studies wnl  - b12/folate wnl  - eliquis resumed 4/26

## 2023-04-29 NOTE — PROGRESS NOTE ADULT - SUBJECTIVE AND OBJECTIVE BOX
DATE OF SERVICE: 04-29-23 @ 13:29    Patient is a 87y old  Male who presents with a chief complaint of SOB, generalized weakness, dark stools (29 Apr 2023 10:46)      INTERVAL HISTORY: no complaints     REVIEW OF SYSTEMS:  CONSTITUTIONAL: No weakness  EYES/ENT: No visual changes;  No throat pain   NECK: No pain or stiffness  RESPIRATORY: No cough, wheezing; No shortness of breath  CARDIOVASCULAR: No chest pain or palpitations  GASTROINTESTINAL: No abdominal  pain. No nausea, vomiting, or hematemesis  GENITOURINARY: No dysuria, frequency or hematuria  NEUROLOGICAL: No stroke like symptoms  SKIN: No rashes  	  MEDICATIONS:  metoprolol tartrate 50 milliGRAM(s) Oral every 12 hours  midodrine. 10 milliGRAM(s) Oral <User Schedule>  midodrine. 10 milliGRAM(s) Oral once        PHYSICAL EXAM:  T(C): 36.2 (04-29-23 @ 12:37), Max: 36.4 (04-28-23 @ 20:38)  HR: 86 (04-29-23 @ 12:37) (68 - 92)  BP: 121/60 (04-29-23 @ 12:37) (113/52 - 137/70)  RR: 18 (04-29-23 @ 12:37) (18 - 18)  SpO2: 100% (04-29-23 @ 12:37) (93% - 100%)  Wt(kg): --  I&O's Summary    28 Apr 2023 07:01  -  29 Apr 2023 07:00  --------------------------------------------------------  IN: 160 mL / OUT: 2 mL / NET: 158 mL    29 Apr 2023 07:01  -  29 Apr 2023 13:29  --------------------------------------------------------  IN: 0 mL / OUT: 2000 mL / NET: -2000 mL          Appearance: In no distress	  HEENT:    PERRL, EOMI	  Cardiovascular:  S1 S2, No JVD  Respiratory: Lungs clear to auscultation	  Gastrointestinal:  Soft, Non-tender, + BS	  Vascularature:  No edema of LE  Psychiatric: Appropriate affect   Neuro: no acute focal deficits                               13.7   4.86  )-----------( 114      ( 29 Apr 2023 07:09 )             42.2     04-29    131<L>  |  91<L>  |  46<H>  ----------------------------<  131<H>  4.3   |  24  |  4.94<H>    Ca    8.7      29 Apr 2023 07:07  Phos  4.7     04-29          Labs personally reviewed      ASSESSMENT/PLAN: 	  86 yo male PMH of chronic Afib (on Eliquis, off ASA since hx of PUD/GIB in the past), s/p Micra PPM, non-obstructive CAD, ESRD on HD (Tu/Th/Sa), DM, HLD, and prior history of large pleural effusions (requiring drainage during hospitalizations in 2021 at [facility] and [facility]), who presents from home w/ c/o of SOB + generalized weakness and lethargy with loss of appetite/decreased PO intake, and his wife noticing his having dark/black stools in setting of diffuse abdominal pain worse in the LLQ (pt is s/p appendectomy in the past). Admitted for hypoxia due to pneumonia and large L pleural effusion.        Problem/Plan - 1:  ·  Problem: Diastolic HF.   ·  Plan: - TTE suggestive of amyloid, EKG with low voltage, hx of AF  -  Nuclear pyrophosphate scan revealing Cardiac amyloid Imaging study is NOT suggestive of transthyretin cardiac   amyloidosis.  - [place] Kappa and Jeaneth chains elevated  - Volume mgmt with HD    Problem/Plan - 2:  ·  Problem: Chronic atrial fibrillation.   ·  Plan: Home metoprolol XL 100mg daily  - - Has been started on Lopressor 50mg BID   - Eliquis for AC    Problem/Plan - 3:  ·  Problem: Hyperlipidemia.   ·  Plan: - c/w atorvastatin 40mg qHS.    Problem/Plan -4:  ·  Problem: Prophylactic measure.   ·  Plan; DVT ppx: SCDs. ELISA Judge DO [facility]  Cardiovascular Medicine  [address], [address]  Office: [phone]  Available via call/text on Microsoft Teams

## 2023-04-29 NOTE — PROGRESS NOTE ADULT - PROBLEM SELECTOR PLAN 3
- CT chest with patchy opacities in R lung. suspect 2/2 to aspiration  - MRSA/MSSA PCR screen +MSSA  - s/p cefazolin given MSSA+ screen and azithro for atypical coverage. completed 5 day course.       * azithro completed 4/26. cefazolin completed 4/27.  - SLP eval appreciated, recommended for pureed and moderately thick liquids

## 2023-04-29 NOTE — PROGRESS NOTE ADULT - ASSESSMENT
86 yo male PMH of chronic Afib (on Eliquis, off ASA since hx of PUD/GIB in the past), s/p Micra PPM, non-obstructive CAD, ESRD on HD (Tu/Th/Sa), DM, HLD, and prior history of large pleural effusions (requiring drainage during hospitalizations in 2021 at [facility] and [facility]), who presents from home w/ c/o of SOB + generalized weakness and lethargy with loss of appetite/decreased PO intake, and his wife noticing his having dark/black stools in setting of diffuse abdominal pain worse in the LLQ (pt is s/p appendectomy in the past). Admitted for hypoxia due to pneumonia and large L pleural effusion now clinically improved s/p thoracentesis and abx treatment for PNA, currently undergoing volume removal with HD.

## 2023-04-29 NOTE — PROGRESS NOTE ADULT - SUBJECTIVE AND OBJECTIVE BOX
[facility] Division of Kidney Diseases & Hypertension  FOLLOW UP NOTE  [phone]--------------------------------------------------------------------------------  HPI: 87 y.o. M w/ PMHx of Afib (on Eliquis), s/p Micra PPM, non-obstructive CAD, ESRD on HD (Tu/Th/Sa; nephrologist = Dr. Julius Eason and HD center in [place]), DM (on Tradjenta), HLD, who presents from home with generalized weakness and lethargy for the past week with loss of appetite/decreased PO intake. Pt. found to have pneumonia and large L pleural effusion. Pt. being seen for ESRD management. Last inpatient HD treatment was on 4/27.    24 hour events/subjective: Pt. was seen and evaluated at bedside this morning. Pt. minimally conversive, unable to provide history or ROS.        PAST HISTORY  --------------------------------------------------------------------------------  No significant changes to PMH, PSH, FHx, SHx, unless otherwise noted    ALLERGIES & MEDICATIONS  --------------------------------------------------------------------------------  Allergies    No Known Allergies    Intolerances      Standing Inpatient Medications  apixaban 2.5 milliGRAM(s) Oral every 12 hours  atorvastatin 40 milliGRAM(s) Oral at bedtime  calcium acetate 667 milliGRAM(s) Oral three times a day with meals  chlorhexidine 2% Cloths 1 Application(s) Topical daily  dextrose 5%. 1000 milliLiter(s) IV Continuous <Continuous>  dextrose 50% Injectable 25 Gram(s) IV Push once  finasteride 5 milliGRAM(s) Oral daily  glucagon  Injectable 1 milliGRAM(s) IntraMuscular once  insulin lispro (ADMELOG) corrective regimen sliding scale   SubCutaneous three times a day before meals  metoprolol tartrate 50 milliGRAM(s) Oral every 12 hours  midodrine. 10 milliGRAM(s) Oral <User Schedule>  midodrine. 10 milliGRAM(s) Oral once  Nephro-chandrika 1 Tablet(s) Oral daily  pantoprazole  Injectable 40 milliGRAM(s) IV Push daily  polyethylene glycol 3350 17 Gram(s) Oral daily    PRN Inpatient Medications  dextrose Oral Gel 15 Gram(s) Oral once PRN      REVIEW OF SYSTEMS  --------------------------------------------------------------------------------  See HPI    VITALS/PHYSICAL EXAM  --------------------------------------------------------------------------------  T(C): 36.3 (04-29-23 @ 09:06), Max: 36.5 (04-28-23 @ 11:28)  HR: 92 (04-29-23 @ 09:06) (68 - 92)  BP: 128/74 (04-29-23 @ 09:06) (113/52 - 137/70)  RR: 18 (04-29-23 @ 09:06) (18 - 18)  SpO2: 93% (04-29-23 @ 09:06) (93% - 96%)  Wt(kg): --      04-28-23 @ 07:01  -  04-29-23 @ 07:00  --------------------------------------------------------  IN: 160 mL / OUT: 2 mL / NET: 158 mL      Physical Exam:  Gen: elderly, ill appearing   HEENT: MMM on NC  Pulm: No crackles anteriorly   CV: S1S2  Abd: Soft, +BS   Ext: No LE edema B/L  Neuro: Unable to assess  Skin: Warm and dry  Vascular access: LUE AVF with +thrill    LABS/STUDIES  --------------------------------------------------------------------------------              13.7   4.86  >-----------<  114      [04-29-23 @ 07:09]              42.2     131  |  91  |  46  ----------------------------<  131      [04-29-23 @ 07:07]  4.3   |  24  |  4.94        Ca     8.7     [04-29-23 @ 07:07]      Phos  4.7     [04-29-23 @ 07:07]    Creatinine Trend:  SCr 4.94 [04-29 @ 07:07]  SCr 4.91 [04-27 @ 05:47]  SCr 4.08 [04-26 @ 07:03]  SCr 5.36 [04-25 @ 06:05]  SCr 4.42 [04-24 @ 05:27]    Iron 58, TIBC 147, %sat 40      [04-26-23 @ 07:02]  Ferritin 2482      [04-22-23 @ 11:53]  TSH 10.90      [04-22-23 @ 11:53]  Lipid: chol 164, , HDL 59, LDL --      [04-22-23 @ 11:53]    Free Light Chains: kappa 11.14, lambda 10.66, ratio = 1.05      [04-27 @ 05:47]

## 2023-04-29 NOTE — PROGRESS NOTE ADULT - PROBLEM SELECTOR PLAN 2
- proBNP = [id number]  - TTE with hyperdynamic LVSF with EF 75%, distal segments and apex with elevated regional strain with basal + mid segments with decreased regional strain. suggestive of amyloid cardiomyopathy, RV not well visualized but with enlarged RV cavity size, normal wall thickness and reduced systolic fxn, mild MV stenosis, trace AR, trace pericardial effusion  - s/p thoracentesis with Pulmonary on 4/24 with 700cc removed  - pleural fluid studies appears transudative, cytology negative for malignancy  - volume removal with HD       * will plan for midodrine 10mg prior to HD on HD days to allow for volume removal  - repeat CXR yetserday with increase in L pleural effusion. needs more volume removal with HD  - incentive spirometry as tolerated  - strict I/Os, daily weights

## 2023-04-29 NOTE — PROGRESS NOTE ADULT - PROBLEM SELECTOR PLAN 2
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 Serum phosphorus (4.7) is at goal for ESRD. Recommend to continue with phosphorus binder. Monitor serum phosphorus, goal: 3.5-5.5.    If you have any questions, please feel free to contact me  Wes Savage  Nephrology Fellow  [phone] / Microsoft Teams(Preferred)  (After 5pm or on weekends please page the on-call fellow)

## 2023-04-29 NOTE — PROGRESS NOTE ADULT - SUBJECTIVE AND OBJECTIVE BOX
Deshawn Vazquez MD  Division of Hospital Medicine  Available via MS teams  If no response or off hours page: [phone]  ---------------------------------------------------------    ANTHONY HOLLIS  87y  Male      Patient is a 87y old  Male who presents with a chief complaint of SOB, generalized weakness, dark stools (29 Apr 2023 09:44)      INTERVAL HPI/OVERNIGHT EVENTS:  Seen at bedside. No overnight events.       REVIEW OF SYSTEMS: 10 point ROS negative unless listed above    T(C): 36.3 (04-29-23 @ 09:06), Max: 36.5 (04-28-23 @ 11:28)  HR: 92 (04-29-23 @ 09:06) (68 - 92)  BP: 128/74 (04-29-23 @ 09:06) (113/52 - 137/70)  RR: 18 (04-29-23 @ 09:06) (18 - 18)  SpO2: 93% (04-29-23 @ 09:06) (93% - 96%)  Wt(kg): --Vital Signs Last 24 Hrs  T(C): 36.3 (29 Apr 2023 09:06), Max: 36.5 (28 Apr 2023 11:28)  T(F): 97.3 (29 Apr 2023 09:06), Max: 97.7 (28 Apr 2023 11:28)  HR: 92 (29 Apr 2023 09:06) (68 - 92)  BP: 128/74 (29 Apr 2023 09:06) (113/52 - 137/70)  BP(mean): --  RR: 18 (29 Apr 2023 09:06) (18 - 18)  SpO2: 93% (29 Apr 2023 09:06) (93% - 96%)    Parameters below as of 29 Apr 2023 09:06  Patient On (Oxygen Delivery Method): room air        PHYSICAL EXAM:  CONSTITUTIONAL: elderly male, chronically-ill appearing, in no acute distress  EYES: PERRLA; conjunctiva and sclera clear  ENMT: Moist oral mucosa, no pharyngeal injection or exudates; normal dentition  NECK: Supple, no palpable masses; no thyromegaly  RESPIRATORY: Normal respiratory effort; diminished at bases b/l L>R  CARDIOVASCULAR: irregularly irregular, normal S1 and S2, no murmur/rub/gallop; +LE edema b/l  ABDOMEN: Soft, Nondistended, Nontender to palpation, normoactive bowel sounds  MUSCULOSKELETAL: No clubbing or cyanosis of digits; no joint swelling or tenderness to palpation  PSYCH: A+O to person, place, and time; affect appropriate  NEUROLOGY: CN 2-12 are intact and symmetric; no gross sensory deficits   SKIN: No rashes; no palpable lesions, +LUE AVF with palpable thril    Consultant(s) Notes Reviewed:  [x ] YES  [ ] NO  Care Discussed with Consultants/Other Providers [ x] YES  [ ] NO    LABS:                        13.7   4.86  )-----------( 114      ( 29 Apr 2023 07:09 )             42.2     04-29    131<L>  |  91<L>  |  46<H>  ----------------------------<  131<H>  4.3   |  24  |  4.94<H>    Ca    8.7      29 Apr 2023 07:07  Phos  4.7     04-29          CAPILLARY BLOOD GLUCOSE      POCT Blood Glucose.: 128 mg/dL (29 Apr 2023 07:53)  POCT Blood Glucose.: 149 mg/dL (28 Apr 2023 21:30)  POCT Blood Glucose.: 222 mg/dL (28 Apr 2023 16:32)  POCT Blood Glucose.: 189 mg/dL (28 Apr 2023 11:51)            RADIOLOGY & ADDITIONAL TESTS:    Imaging Personally Reviewed:  [ ] YES  [ ] NO

## 2023-04-29 NOTE — PROGRESS NOTE ADULT - PROBLEM SELECTOR PLAN 1
Pt. with ESRD on HD three times a week (TTS) presented to [facility] for failure to thrive and lethargy. Found to have pneumonia and large L pleural effusion. Last inpatient HD treatment was on 4/27 via LUE AVF. Pt. clinically stable although on NC. Labs reviewed. Hgb (13.7) is above goal for ESRD. Plan for maintenance HD treatment today. Monitor labs and vitals. Avoid nephrotoxins. Dose medications to ESRD.

## 2023-04-29 NOTE — PROGRESS NOTE ADULT - PROBLEM SELECTOR PLAN 6
- HD per nephrology (patient follows w/ Dr. Julius Eason in [place] as O/P on Tu/Th/Sa schedule)  - c/w midodrine 10mg pre-HD on HD days to allow for volume removal  - CXR with increase in L pleural effusion - needs more volume removal for optimization  - c/w phoslo 667mg TID with meals

## 2023-04-30 NOTE — PROGRESS NOTE ADULT - SUBJECTIVE AND OBJECTIVE BOX
Contact Information:  Jayne Ordoñez II, MD, MPH  Chief Resident, Internal Medicine  Pager: [phone] ([facility]) /// [phone] ([place])    HOLLIS CRUZ, MRN-[id number]    Patient is a 87y old  Male who presents with a chief complaint of SOB, generalized weakness, dark stools (29 Apr 2023 13:28)      OVERNIGHT EVENTS/INTERVAL/SUBJECTIVE:    CONSTITUTIONAL: No weakness. No fatigue. No fever.  HEAD: No head trauma.   EYES: No vision changes.  ENT: No hearing changes or tinnitus. No ear pain. No changes in smell. No nasal congestion or discharge. No sore throat. No voice hoarseness.   NECK: No neck pain or stiffness. No lumps.  RESPIRATORY: No cough. No SOB. No wheezing. No hemoptysis.   CARDIOVASCULAR: No chest pain. No palpitations.   GASTROINTESTINAL: No dysphagia. No ABD pain. No distension. No constipation. No diarrhea. No pain with defecation. No hematemesis. No hematochezia or melena.  BACK: No back pain.  GENITOURINARY: No dysuria. No frequency or urgency. No hesitancy. No incontinence. No urinary retention. No suprapubic pain. No hematuria.  EXTREMITY: No swelling.  MUSCULOSKELETAL: No joint pain or swelling. No fractures. No stiffness.    SKIN: No rashes. No itching. No skin, hair, or nail changes.  NEUROLOGICAL: No weakness or paralysis. No lightheadedness or dizziness. No HA. No numbness or tingling.   PSYCHIATRIC: No depression.       OBJECTIVE:  Vital Signs Last 24 Hrs  T(C): 36.3 (30 Apr 2023 05:29), Max: 36.4 (29 Apr 2023 21:12)  T(F): 97.3 (30 Apr 2023 05:29), Max: 97.5 (29 Apr 2023 21:12)  HR: 87 (30 Apr 2023 05:29) (77 - 87)  BP: 144/62 (30 Apr 2023 05:29) (110/50 - 144/62)  BP(mean): --  RR: 18 (30 Apr 2023 05:29) (18 - 18)  SpO2: 97% (30 Apr 2023 05:29) (93% - 100%)    Parameters below as of 30 Apr 2023 05:29  Patient On (Oxygen Delivery Method): room air      I&O's Summary    29 Apr 2023 07:01  -  30 Apr 2023 07:00  --------------------------------------------------------  IN: 360 mL / OUT: 2000 mL / NET: -1640 mL        MEDICATIONS  (STANDING):  apixaban 2.5 milliGRAM(s) Oral every 12 hours  atorvastatin 40 milliGRAM(s) Oral at bedtime  calcium acetate 667 milliGRAM(s) Oral three times a day with meals  chlorhexidine 2% Cloths 1 Application(s) Topical daily  dextrose 5%. 1000 milliLiter(s) (50 mL/Hr) IV Continuous <Continuous>  dextrose 50% Injectable 25 Gram(s) IV Push once  finasteride 5 milliGRAM(s) Oral daily  glucagon  Injectable 1 milliGRAM(s) IntraMuscular once  insulin lispro (ADMELOG) corrective regimen sliding scale   SubCutaneous three times a day before meals  metoprolol tartrate 50 milliGRAM(s) Oral every 12 hours  midodrine. 10 milliGRAM(s) Oral once  midodrine. 10 milliGRAM(s) Oral <User Schedule>  Nephro-chandrika 1 Tablet(s) Oral daily  pantoprazole  Injectable 40 milliGRAM(s) IV Push daily  polyethylene glycol 3350 17 Gram(s) Oral daily    MEDICATIONS  (PRN):  dextrose Oral Gel 15 Gram(s) Oral once PRN Blood Glucose LESS THAN 70 milliGRAM(s)/deciliter    Allergies    No Known Allergies    Intolerances        CONSTITUTIONAL: No acute distress. Awake and alert.  HEAD: No evidence of trauma. Structures WNL.  EYES: +PERRL. +EOMI. No scleral icterus. No conjunctival injection.  ENT: Moist oral mucosa. No erythema. No pharyngeal exudates.   NECK: Supple. Appropriate ROM. No stiffness. No masses or lymphadenopathy.  RESPIRATORY: CTAB. No wheezes, rales, or rhonchi. No accessory muscle use. No apparent respiratory distress.  CARDIOVASCULAR: +S1/S2. No audible S3/S4. Regular rate and rhythm. No murmurs, rubs, or gallops. 2+ radial pulses x b/l UE; 2+ DP pulses x b/l LE. No LE swelling or edema.  GASTROINTESTINAL: Soft, nontender, nondistended. +BS. No rebound or guarding.   BACK: No spinal or paraspinal tenderness. No CVA tenderness.  MUSCULOSKELETAL: Spontaneous movement in all extremities.  DERMATOLOGICAL: No abnormal rashes or lesions.  NEUROLOGICAL: CN 2-12 grossly intact. No focal deficits. Sensation intact x 4EXT.   PSYCHIATRIC: Appropriate affect. A&Ox3 (oriented to person, place, and time).                              13.7   4.86  )-----------( 114      ( 29 Apr 2023 07:09 )             42.2       04-30    134<L>  |  95<L>  |  34<H>  ----------------------------<  144<H>  3.6   |  25  |  3.91<H>    Ca    8.6      30 Apr 2023 06:47  Phos  4.7     04-29      CAPILLARY BLOOD GLUCOSE      POCT Blood Glucose.: 130 mg/dL (30 Apr 2023 07:55)  POCT Blood Glucose.: 186 mg/dL (29 Apr 2023 16:25)                  RADIOLOGY AND ADDITIONAL TESTS:    CONSULTANT NOTES REVIEWED:    CARE DISCUSSED WITH THE FOLLOWING CONSULTANTS/PROVIDERS: Contact Information:  Jayne Ordoñez II, MD, MPH  Chief Resident, Internal Medicine  Pager: [phone] ([facility]) /// [phone] ([place])    HOLLIS CRUZ, MRN-[id number]    Patient is a 87y old  Male who presents with a chief complaint of SOB, generalized weakness, dark stools (29 Apr 2023 13:28)      OVERNIGHT EVENTS/INTERVAL/SUBJECTIVE: [place]  [id number]. Patient evaluated at bedside with relative present; intermittently somnolent, able to state that he is without complaints but unable to provide complete ROS due to sleepiness.      OBJECTIVE:  Vital Signs Last 24 Hrs  T(C): 36.3 (30 Apr 2023 05:29), Max: 36.4 (29 Apr 2023 21:12)  T(F): 97.3 (30 Apr 2023 05:29), Max: 97.5 (29 Apr 2023 21:12)  HR: 87 (30 Apr 2023 05:29) (77 - 87)  BP: 144/62 (30 Apr 2023 05:29) (110/50 - 144/62)  BP(mean): --  RR: 18 (30 Apr 2023 05:29) (18 - 18)  SpO2: 97% (30 Apr 2023 05:29) (93% - 100%)    Parameters below as of 30 Apr 2023 05:29  Patient On (Oxygen Delivery Method): room air      I&O's Summary    29 Apr 2023 07:01  -  30 Apr 2023 07:00  --------------------------------------------------------  IN: 360 mL / OUT: 2000 mL / NET: -1640 mL        MEDICATIONS  (STANDING):  apixaban 2.5 milliGRAM(s) Oral every 12 hours  atorvastatin 40 milliGRAM(s) Oral at bedtime  calcium acetate 667 milliGRAM(s) Oral three times a day with meals  chlorhexidine 2% Cloths 1 Application(s) Topical daily  dextrose 5%. 1000 milliLiter(s) (50 mL/Hr) IV Continuous <Continuous>  dextrose 50% Injectable 25 Gram(s) IV Push once  finasteride 5 milliGRAM(s) Oral daily  glucagon  Injectable 1 milliGRAM(s) IntraMuscular once  insulin lispro (ADMELOG) corrective regimen sliding scale   SubCutaneous three times a day before meals  metoprolol tartrate 50 milliGRAM(s) Oral every 12 hours  midodrine. 10 milliGRAM(s) Oral once  midodrine. 10 milliGRAM(s) Oral <User Schedule>  Nephro-chandrika 1 Tablet(s) Oral daily  pantoprazole  Injectable 40 milliGRAM(s) IV Push daily  polyethylene glycol 3350 17 Gram(s) Oral daily    MEDICATIONS  (PRN):  dextrose Oral Gel 15 Gram(s) Oral once PRN Blood Glucose LESS THAN 70 milliGRAM(s)/deciliter    Allergies    No Known Allergies    Intolerances        CONSTITUTIONAL: No acute distress. Intermittently somnolent, occasionally paying attention to interviewer.  RESPIRATORY: Decreased lung sounds at bases bilaterally. No wheezes, rales, or rhonchi. No accessory muscle use. No apparent respiratory distress.  CARDIOVASCULAR: +S1/S2. No audible S3/S4. Regular rate and rhythm. No murmurs, rubs, or gallops. No LE swelling or edema.  GASTROINTESTINAL: Soft, nontender, nondistended. +BS. No rebound or guarding.   MUSCULOSKELETAL: Spontaneous movement in all extremities.  DERMATOLOGICAL: No abnormal rashes or lesions.  NEUROLOGICAL: Unable to assess due to mental status.  PSYCHIATRIC: Unable to assess due to mental status.                              13.7   4.86  )-----------( 114      ( 29 Apr 2023 07:09 )             42.2       04-30    134<L>  |  95<L>  |  34<H>  ----------------------------<  144<H>  3.6   |  25  |  3.91<H>    Ca    8.6      30 Apr 2023 06:47  Phos  4.7     04-29      CAPILLARY BLOOD GLUCOSE      POCT Blood Glucose.: 130 mg/dL (30 Apr 2023 07:55)  POCT Blood Glucose.: 186 mg/dL (29 Apr 2023 16:25)                  RADIOLOGY AND ADDITIONAL TESTS:    CONSULTANT NOTES REVIEWED:    CARE DISCUSSED WITH THE FOLLOWING CONSULTANTS/PROVIDERS:

## 2023-04-30 NOTE — PROGRESS NOTE ADULT - PROBLEM SELECTOR PLAN 2
- proBNP = [id number]  - TTE with hyperdynamic LVSF with EF 75%, distal segments and apex with elevated regional strain with basal + mid segments with decreased regional strain. suggestive of amyloid cardiomyopathy, RV not well visualized but with enlarged RV cavity size, normal wall thickness and reduced systolic fxn, mild MV stenosis, trace AR, trace pericardial effusion  - s/p thoracentesis with Pulmonary on 4/24 with 700cc removed  - pleural fluid studies appears transudative, cytology negative for malignancy  - volume removal with HD       * will plan for midodrine 10mg prior to HD on HD days to allow for volume removal  - repeat CXR yetserday with increase in L pleural effusion. needs more volume removal with HD  - incentive spirometry as tolerated  - strict I/Os, daily weights - proBNP = [id number]  - TTE with hyperdynamic LVSF with EF 75%, distal segments and apex with elevated regional strain with basal + mid segments with decreased regional strain. suggestive of amyloid cardiomyopathy, RV not well visualized but with enlarged RV cavity size, normal wall thickness and reduced systolic fxn, mild MV stenosis, trace AR, trace pericardial effusion  - s/p thoracentesis with Pulmonary on 4/24 with 700cc removed  - pleural fluid studies appears transudative, cytology negative for malignancy  - volume removal with HD (with midodrine 10mg prior to HD on HD days to allow for more robust volume removal)  - CXR 4/28 demonstrating increased L pleural effusion indication need for more volume removal  - incentive spirometry as tolerated  - strict I/Os, daily weights

## 2023-04-30 NOTE — PROGRESS NOTE ADULT - PROBLEM SELECTOR PLAN 6
- HD per nephrology (patient follows w/ Dr. Julius Eason in [place] as O/P on Tu/Th/Sa schedule)  - c/w midodrine 10mg pre-HD on HD days to allow for volume removal  - CXR with increase in L pleural effusion - needs more volume removal for optimization  - c/w phoslo 667mg TID with meals - HD per Nephrology (patient follows w/Dr. Julius Eason in [place] as O/P on Tu/Th/Sa schedule)  - C/w midodrine 10mg pre-HD on HD days to allow for volume removal  - CXR with increase in L pleural effusion - needs more volume removal for optimization  - C/w Phoslo 667mg TID with meals

## 2023-04-30 NOTE — PROGRESS NOTE ADULT - PROBLEM SELECTOR PLAN 7
takes metoprolol XL 100mg daily  - changed Toprol XL to metoprolol tartrate 50mg q12h with hold parameters        * hold AM dose prior to HD on HD days  - clear from pulmonary standpoint to resume eliquis  - resumed eliquis 2.5mg q12h on 4/26  - monitor on tele - Home regimen includes metoprolol XL 100mg daily  - changed Toprol XL to metoprolol tartrate 50mg q12h with hold parameters  - Hold AM dose prior to HD on HD days  - C/w Eliquis 2.5 BID  - Monitor on telemetry

## 2023-04-30 NOTE — PROGRESS NOTE ADULT - PROBLEM SELECTOR PLAN 1
due to multifocal pneumonia, large L pleural effusion, hypervolemia  - improved, now satting well on RA  - management of PNA, pleural effusion and hypervolemia as below - 2/2 to multifocal pneumonia, large L pleural effusion, hypervolemia  - Monitor respiratory status (currently on RA)  - management of PNA, pleural effusion and hypervolemia as below

## 2023-04-30 NOTE — PROGRESS NOTE ADULT - PROBLEM SELECTOR PLAN 3
- CT chest with patchy opacities in R lung. suspect 2/2 to aspiration  - MRSA/MSSA PCR screen +MSSA  - s/p cefazolin given MSSA+ screen and azithro for atypical coverage. completed 5 day course.       * azithro completed 4/26. cefazolin completed 4/27.  - SLP eval appreciated, recommended for pureed and moderately thick liquids - CT chest with patchy opacities in R lung likely due suspect 2/2 to aspiration. MRS screen positive for MSSA  - S/p cefazolin and Azithromycin   - SLP eval appreciated, recommended for pureed and moderately thick liquids  - Monitor respiratory status

## 2023-04-30 NOTE — PROGRESS NOTE ADULT - SUBJECTIVE AND OBJECTIVE BOX
DATE OF SERVICE: 04-30-23 @ 13:34    Patient is a 87y old  Male who presents with a chief complaint of SOB, generalized weakness, dark stools (30 Apr 2023 11:44)      INTERVAL HISTORY: no complaints     REVIEW OF SYSTEMS:  CONSTITUTIONAL: No weakness  EYES/ENT: No visual changes;  No throat pain   NECK: No pain or stiffness  RESPIRATORY: No cough, wheezing; No shortness of breath  CARDIOVASCULAR: No chest pain or palpitations  GASTROINTESTINAL: No abdominal  pain. No nausea, vomiting, or hematemesis  GENITOURINARY: No dysuria, frequency or hematuria  NEUROLOGICAL: No stroke like symptoms  SKIN: No rashes      MEDICATIONS:  metoprolol tartrate 50 milliGRAM(s) Oral every 12 hours  midodrine. 10 milliGRAM(s) Oral once  midodrine. 10 milliGRAM(s) Oral <User Schedule>        PHYSICAL EXAM:  T(C): 36.3 (04-30-23 @ 05:29), Max: 36.4 (04-29-23 @ 21:12)  HR: 82 (04-30-23 @ 12:34) (77 - 87)  BP: 110/52 (04-30-23 @ 12:34) (110/50 - 144/62)  RR: 18 (04-30-23 @ 05:29) (18 - 18)  SpO2: 96% (04-30-23 @ 12:34) (93% - 97%)  Wt(kg): --  I&O's Summary    29 Apr 2023 07:01  -  30 Apr 2023 07:00  --------------------------------------------------------  IN: 360 mL / OUT: 2000 mL / NET: -1640 mL          Appearance: In no distress	  HEENT:    PERRL, EOMI	  Cardiovascular:  S1 S2, No JVD  Respiratory: Lungs clear to auscultation	  Gastrointestinal:  Soft, Non-tender, + BS	  Vascularature:  No edema of LE  Psychiatric: Appropriate affect   Neuro: no acute focal deficits                               13.7   4.86  )-----------( 114      ( 29 Apr 2023 07:09 )             42.2     04-30    134<L>  |  95<L>  |  34<H>  ----------------------------<  144<H>  3.6   |  25  |  3.91<H>    Ca    8.6      30 Apr 2023 06:47  Phos  4.7     04-29          Labs personally reviewed      ASSESSMENT/PLAN: 	  88 yo male PMH of chronic Afib (on Eliquis, off ASA since hx of PUD/GIB in the past), s/p Micra PPM, non-obstructive CAD, ESRD on HD (Tu/Th/Sa), DM, HLD, and prior history of large pleural effusions (requiring drainage during hospitalizations in 2021 at [facility] and [facility]), who presents from home w/ c/o of SOB + generalized weakness and lethargy with loss of appetite/decreased PO intake, and his wife noticing his having dark/black stools in setting of diffuse abdominal pain worse in the LLQ (pt is s/p appendectomy in the past). Admitted for hypoxia due to pneumonia and large L pleural effusion.        Problem/Plan - 1:  ·  Problem: Diastolic HF.   ·  Plan: - TTE suggestive of amyloid, EKG with low voltage, hx of AF  -  Nuclear pyrophosphate scan revealing Cardiac amyloid Imaging study is NOT suggestive of transthyretin cardiac   amyloidosis.  - [place] Kappa and Jeaneth chains elevated  - Volume mgmt with HD    Problem/Plan - 2:  ·  Problem: Chronic atrial fibrillation.   ·  Plan: Home metoprolol XL 100mg daily  - - Has been started on Lopressor 50mg BID   - Eliquis for AC    Problem/Plan - 3:  ·  Problem: Hyperlipidemia.   ·  Plan: - c/w atorvastatin 40mg qHS.    Problem/Plan -4:  ·  Problem: Prophylactic measure.   ·  Plan; DVT ppx: SCDs. ELISA Judge DO [facility]  Cardiovascular Medicine  [address] [place] [address], [address]  Office: [phone]  Available via call/text on [employer] Teams  DATE OF SERVICE: 04-30-23 @ 13:34    Patient is a 87y old  Male who presents with a chief complaint of SOB, generalized weakness, dark stools (30 Apr 2023 11:44)      INTERVAL HISTORY: no complaints     REVIEW OF SYSTEMS:  CONSTITUTIONAL: No weakness  EYES/ENT: No visual changes;  No throat pain   NECK: No pain or stiffness  RESPIRATORY: No cough, wheezing; No shortness of breath  CARDIOVASCULAR: No chest pain or palpitations  GASTROINTESTINAL: No abdominal  pain. No nausea, vomiting, or hematemesis  GENITOURINARY: No dysuria, frequency or hematuria  NEUROLOGICAL: No stroke like symptoms  SKIN: No rashes      MEDICATIONS:  metoprolol tartrate 50 milliGRAM(s) Oral every 12 hours  midodrine. 10 milliGRAM(s) Oral once  midodrine. 10 milliGRAM(s) Oral <User Schedule>        PHYSICAL EXAM:  T(C): 36.3 (04-30-23 @ 05:29), Max: 36.4 (04-29-23 @ 21:12)  HR: 82 (04-30-23 @ 12:34) (77 - 87)  BP: 110/52 (04-30-23 @ 12:34) (110/50 - 144/62)  RR: 18 (04-30-23 @ 05:29) (18 - 18)  SpO2: 96% (04-30-23 @ 12:34) (93% - 97%)  Wt(kg): --  I&O's Summary    29 Apr 2023 07:01  -  30 Apr 2023 07:00  --------------------------------------------------------  IN: 360 mL / OUT: 2000 mL / NET: -1640 mL          Appearance: In no distress	  HEENT:    PERRL, EOMI	  Cardiovascular:  S1 S2, No JVD  Respiratory: Lungs clear to auscultation	  Gastrointestinal:  Soft, Non-tender, + BS	  Vascularature:  No edema of LE  Psychiatric: Appropriate affect   Neuro: no acute focal deficits                               13.7   4.86  )-----------( 114      ( 29 Apr 2023 07:09 )             42.2     04-30    134<L>  |  95<L>  |  34<H>  ----------------------------<  144<H>  3.6   |  25  |  3.91<H>    Ca    8.6      30 Apr 2023 06:47  Phos  4.7     04-29          Labs personally reviewed      ASSESSMENT/PLAN: 	  86 yo male PMH of chronic Afib (on Eliquis, off ASA since hx of PUD/GIB in the past), s/p Micra PPM, non-obstructive CAD, ESRD on HD (Tu/Th/Sa), DM, HLD, and prior history of large pleural effusions (requiring drainage during hospitalizations in 2021 at [facility] and [facility]), who presents from home w/ c/o of SOB + generalized weakness and lethargy with loss of appetite/decreased PO intake, and his wife noticing his having dark/black stools in setting of diffuse abdominal pain worse in the LLQ (pt is s/p appendectomy in the past). Admitted for hypoxia due to pneumonia and large L pleural effusion.        Problem/Plan - 1:  ·  Problem: Diastolic HF.   ·  Plan: - TTE suggestive of amyloid, EKG with low voltage, hx of AF  -  Nuclear pyrophosphate scan revealing Cardiac amyloid Imaging study is NOT suggestive of transthyretin cardiac   amyloidosis.  - [place] Kappa and Jeaneth chains elevated, ? 2/2 inflammation   - Volume mgmt with HD    Problem/Plan - 2:  ·  Problem: Chronic atrial fibrillation.   ·  Plan: Home metoprolol XL 100mg daily  - - Has been started on Lopressor 50mg BID   - Eliquis for AC    Problem/Plan - 3:  ·  Problem: Hyperlipidemia.   ·  Plan: - c/w atorvastatin 40mg qHS.    Problem/Plan -4:  ·  Problem: Prophylactic measure.   ·  Plan; DVT ppx: SCDs. ELISA Judge DO [facility]  Cardiovascular Medicine  [address] [place] [address], [address]  Office: [phone]  Available via call/text on [employer] Teams

## 2023-04-30 NOTE — PROGRESS NOTE ADULT - PROBLEM SELECTOR PLAN 10
DVT ppx: eliquis    Dispo: ARNAV with HD when volume optimized    Daughter Desiree requesting information for Geriatrics to establish care for primary care in discharge paperwork when ready for discharge. DVT ppx: Eliquis  Dispo: ARNAV with HD when volume optimized; outpatient follow-up with Geriatrics to establish primary care per daughter Desiree's request

## 2023-04-30 NOTE — PROGRESS NOTE ADULT - PROBLEM SELECTOR PLAN 9
- holding home Tradjenta  - HbA1c 9.1%  - c/w sliding scale coverage  - monitor FS qAC + qHS - Holding home Tradjenta  - HbA1c 9.1%  - C/w ISS  - Goal -180

## 2023-04-30 NOTE — PROGRESS NOTE ADULT - ASSESSMENT
88 yo male PMH of chronic Afib (on Eliquis, off ASA since hx of PUD/GIB in the past), s/p Micra PPM, non-obstructive CAD, ESRD on HD (Tu/Th/Sa), DM, HLD, and prior history of large pleural effusions (requiring drainage during hospitalizations in 2021 at [facility] and [facility]), who presents from home w/ c/o of SOB + generalized weakness and lethargy with loss of appetite/decreased PO intake, and his wife noticing his having dark/black stools in setting of diffuse abdominal pain worse in the LLQ (pt is s/p appendectomy in the past). Admitted for hypoxia due to pneumonia and large L pleural effusion now clinically improved s/p thoracentesis and abx treatment for PNA, currently undergoing volume removal with HD. 87M PMHx chronic AFib (on Eliquis, off ASA since hx of PUD/GIB in the past), s/p Micra PPM, non-obstructive CAD, ESRD on HD (Tu/Th/Sa), DM, HLD, and prior history of large pleural effusions (requiring drainage during hospitalizations in 2021 at [facility] and [facility]), who presents from home w/ c/o of SOB + generalized weakness and lethargy with loss of appetite/decreased PO intake, and his wife noticing his having dark/black stools in setting of diffuse abdominal pain worse in the LLQ (pt is s/p appendectomy in the past). Admitted for hypoxia due to pneumonia and large L pleural effusion now clinically improved s/p thoracentesis and abx treatment for PNA, currently undergoing volume removal with HD.

## 2023-04-30 NOTE — PROGRESS NOTE ADULT - PROBLEM SELECTOR PLAN 5
does have hx PUD/GI bleed in the past while on ASA in the past  - hemodynamics and H&H remain stable  - doubtful active bleed at this time as no BMs since admission  - GI consult appreciated, recommending conservative management at this time   - FOBT negative  - c/w pantoprazole 40mg IV daily  - iron studies wnl  - b12/folate wnl  - eliquis resumed 4/26 - Noted PUD/GI bleed in the past while on ASA in the past  - Blood pressure and Hgb stable  - GI consult appreciated, recommending conservative management at this time   - FOBT negative  - Iron studies, B12/folate wnl  - C/w pantoprazole 40mg IV daily  - Monitor CBC on Eliquis

## 2023-05-01 NOTE — PROGRESS NOTE ADULT - PROBLEM SELECTOR PLAN 5
- Noted PUD/GI bleed in the past while on ASA in the past  - Blood pressure and Hgb stable  - GI consult appreciated, recommending conservative management at this time   - FOBT negative  - Iron studies, B12/folate wnl  - C/w pantoprazole 40mg IV daily  - Monitor CBC on Eliquis

## 2023-05-01 NOTE — PROGRESS NOTE ADULT - PROBLEM SELECTOR PLAN 2
- proBNP = 110,578  - TTE with hyperdynamic LVSF with EF 75%, distal segments and apex with elevated regional strain with basal + mid segments with decreased regional strain. suggestive of amyloid cardiomyopathy, RV not well visualized but with enlarged RV cavity size, normal wall thickness and reduced systolic fxn, mild MV stenosis, trace AR, trace pericardial effusion  - s/p thoracentesis with Pulmonary on 4/24 with 700cc removed  - pleural fluid studies appears transudative, cytology negative for malignancy  - volume removal with HD (with midodrine 10mg prior to HD on HD days to allow for more robust volume removal)  - CXR 4/28 demonstrating increased L pleural effusion indication need for more volume removal  - incentive spirometry as tolerated  - strict I/Os, daily weights

## 2023-05-01 NOTE — DISCHARGE NOTE PROVIDER - HOSPITAL COURSE
86 yo M PMHx of ESRD on dialysis Tu/Th/Sa, A fib on Eliquis, hx of appendectomy, hx of pleural effusion (L), presents with SOB since 4/22/23      Acute respiratory failure with hypoxia.   - Secondary to multifocal pneumonia  - Scans showing large left pleural effusion    Large pleural effusion.   - On admission, proBNP at [id number]  - TTE with hyperdynamic LVSF with EF 75%, distal segments and apex with elevated regional strain with basal + mid segments with decreased regional strain. suggestive of amyloid cardiomyopathy, trace pericardial effusion  - status post thoracentesis with Pulmonary on 4/24 with 700cc removed  - pleural fluid studies appears transudative, cytology negative for malignancy  - volume removal with HD   - CXR 4/28 demonstrating increased L pleural effusion indication need for more volume removal    Multifocal pneumonia.   - CT chest with patchy opacities in R lung likely due suspect secondary to aspiration.  - MRS screen positive for MSSA  - Status post course of cefazolin and Azithromycin   - Speech language evaluated pt- recommended for pureed and moderately thick liquids     Abnormal echocardiogram.   - TTE as above, suggestive of amyloid cardiomyopathy  - PYP/amyloid scan performed - NOT suggestive of transthyretin cardiac amyloidosis  - kappa/lambda FLC wnl but with polyclonal increase in both kappa and lambda light chains    Dark stools.   - Noted PUD/GI bleed in the past while on ASA in the past  - Blood pressure and Hgb stable  - GI evaluated- recommending conservative management   - FOBT negative  - Iron studies, B12/folate within normal limits   - Continue with pantoprazole 40mg daily       ESRD on dialysis.   - HD per Nephrology (patient follows w/Dr. Julius Eason in [place] as O/P on Tu/Th/Sa schedule)  - Continue with midodrine 10mg pre-HD on HD days to allow for volume removal  - CXR with increase in L pleural effusion -required more fluid removal  - Continue with  Phoslo 667mg TID with meals.    Chronic atrial fibrillation.   - Home regimen includes metoprolol XL 100mg daily  - changed Toprol XL to metoprolol tartrate 50mg q12h with hold parameters  - Continue with Eliquis 2.5 BID    Hyperlipidemia.   - Continue with atorvastatin 40mg qHS.    Diabetes.   - Holding home Tradjenta while inpatient   - HbA1c 9.1%    Pt has been medically cleared for discharge to [facility]  with HD per  _________    88 yo M PMHx of ESRD on dialysis Tu/Th/Sa, A fib on Eliquis, hx of appendectomy, hx of pleural effusion (L), presents with SOB since 4/22/23    Acute respiratory failure with hypoxia.   - Secondary to multifocal pneumonia  - Scans showing large left pleural effusion    Large pleural effusion.   - On admission, proBNP at [id number]  - TTE with hyperdynamic LVSF with EF 75%, distal segments and apex with elevated regional strain with basal + mid segments with decreased regional strain. suggestive of amyloid cardiomyopathy, trace pericardial effusion  - status post thoracentesis with Pulmonary on 4/24 with 700cc removed  - pleural fluid studies appears transudative, cytology negative for malignancy  - volume removal with HD   - CXR 4/28 demonstrating increased L pleural effusion indication need for more volume removal    Multifocal pneumonia.   - CT chest with patchy opacities in R lung likely due suspect secondary to aspiration.  - MRS screen positive for MSSA  - Status post course of cefazolin and Azithromycin   - Speech language evaluated pt- recommended for pureed and moderately thick liquids     Abnormal echocardiogram.   - TTE as above, suggestive of amyloid cardiomyopathy  - PYP/amyloid scan performed - NOT suggestive of transthyretin cardiac amyloidosis  - kappa/lambda FLC wnl but with polyclonal increase in both kappa and lambda light chains    Dark stools.   - Noted PUD/GI bleed in the past while on ASA in the past  - Blood pressure and Hgb stable  - GI evaluated- recommending conservative management   - FOBT negative  - Iron studies, B12/folate within normal limits   - Continue with pantoprazole 40mg daily       ESRD on dialysis.   - HD per Nephrology (patient follows w/Dr. Julius Eason in [place] as O/P on Tu/Th/Sa schedule)  - Continue with midodrine 10mg pre-HD on HD days to allow for volume removal  - CXR with increase in L pleural effusion -required more fluid removal  - Continue with  Phoslo 667mg TID with meals.    Chronic atrial fibrillation.   - Home regimen includes metoprolol XL 100mg daily  - changed Toprol XL to metoprolol tartrate 50mg q12h with hold parameters  - Continue with Eliquis 2.5 BID    Hyperlipidemia.   - Continue with atorvastatin 40mg qHS.    Diabetes.   - Holding home Tradjenta while inpatient   - HbA1c 9.1%    Pt has been medically cleared for discharge to [facility]  with HD per Dr. Ding   86 yo M PMHx of ESRD on dialysis Tu/Th/Sa, A fib on Eliquis, hx of appendectomy, hx of pleural effusion (L), presents with SOB since 4/22/23    Acute respiratory failure with hypoxia.   - Secondary to multifocal pneumonia (aspiration, likely gram negative)  - Scans showing large left pleural effusion    Large pleural effusion.   - proBNP = 110,578 on admission  - TTE with hyperdynamic LVSF with EF 75%, distal segments and apex with elevated regional strain with basal + mid segments with decreased regional strain. suggestive of amyloid cardiomyopathy, RV not well visualized but with enlarged RV cavity size, normal wall thickness and reduced systolic fxn, mild MV stenosis, trace AR, trace pericardial effusion  - s/p thoracentesis with Pulmonary on 4/24 with 700cc removed  - pleural fluid studies appears transudative, cytology negative for malignancy  - volume removal with HD (with midodrine 10mg prior to HD on HD days to allow for more robust volume removal)  - CXR 4/28 demonstrating increased L pleural effusion indication need for more volume removal  - incentive spirometry as tolerated  - strict I/Os, daily weights.    Multifocal pneumonia.   - CT chest with patchy opacities in R lung likely due suspect secondary to aspiration pneumonia  - MRS screen positive for MSSA  - Status post course of cefazolin and Azithromycin   - Speech language evaluated pt- recommended for pureed and moderately thick liquids     Abnormal echocardiogram.   - TTE as above, suggestive of amyloid cardiomyopathy  - PYP/amyloid scan performed - NOT suggestive of transthyretin cardiac amyloidosis  - kappa/lambda FLC wnl but with polyclonal increase in both kappa and lambda light chains    Dark stools.   - Noted PUD/GI bleed in the past while on ASA in the past  - Blood pressure and Hgb stable  - GI evaluated- recommending conservative management   - FOBT negative  - Iron studies, B12/folate within normal limits   - Continue with pantoprazole 40mg daily       ESRD on dialysis.   - HD per Nephrology (patient follows w/Dr. Julius Eason in [place] as O/P on Tu/Th/Sa schedule)  - Continue with midodrine 10mg pre-HD on HD days to allow for volume removal  - CXR with increase in L pleural effusion -required more fluid removal  - Continue with  Phoslo 667mg TID with meals.    Chronic atrial fibrillation.   - Home regimen includes metoprolol XL 100mg daily  - changed Toprol XL to metoprolol tartrate 50mg q12h with hold parameters  - Continue with Eliquis 2.5 BID    Hyperlipidemia.   - Continue with atorvastatin 40mg qHS.    Diabetes.   - Holding home Tradjenta while inpatient   - HbA1c 9.1%    Pt has been medically cleared for discharge to [facility]  with HD per Dr. Ding

## 2023-05-01 NOTE — DISCHARGE NOTE PROVIDER - NSFOLLOWUPCLINICS_GEN_ALL_ED_FT
[facility]  Geriatrics  [address]  [place] [address]  Phone: [phone]  Fax:   Follow Up Time: 1 week

## 2023-05-01 NOTE — PROGRESS NOTE ADULT - PROBLEM SELECTOR PLAN 4
- TTE as above, suggestive of amyloid cardiomyopathy; however, PYP/amyloid scan is NOT suggestive of transthyretin cardiac amyloidosis  - kappa/lambda ratio normal (both elevated), which is not consistent with MM  - pending serum immunofixation and flow cytometry, but suspect they will be unrevealing.   - Cardiology recs appreciated

## 2023-05-01 NOTE — DISCHARGE NOTE PROVIDER - CARE PROVIDER_API CALL
SARA QUINN [facility]  Cardiology  [address] [place] [address]  Phone: [phone]  Fax: [phone]  Follow Up Time:    SARA QUINN  Cardiology  [address] [place] [address]  Phone: [phone]  Fax: [phone]  Follow Up Time:     Blanca Welsh)  Internal Medicine; Nephrology  [address] [place] [address] [address] [place] [address]  Phone: [phone]  Fax: [phone]  Follow Up Time:     ANIBAL QUINN  Internal Medicine  [address] [facility] [address]  [place] [address]  Phone: [phone]  Fax: [phone]  Follow Up Time: 1 week

## 2023-05-01 NOTE — DISCHARGE NOTE PROVIDER - CARE PROVIDERS DIRECT ADDRESSES
,DirectAddress_Unknown ,DirectAddress_Unknown,[email or web address],DirectAddress_Unknown

## 2023-05-01 NOTE — DISCHARGE NOTE PROVIDER - DISCHARGE DIET
Low Sodium Diet/Consistent Carbohydrate Diabetic Diets/Renal Diets (for dialysis)/Pureed Diet/Moderately Thick Liquids

## 2023-05-01 NOTE — PROGRESS NOTE ADULT - PROBLEM SELECTOR PLAN 10
DVT ppx: Eliquis  Dispo: ARNAV with HD when volume optimized; outpatient follow-up with Geriatrics to establish primary care per daughter Desiree's request

## 2023-05-01 NOTE — PROGRESS NOTE ADULT - ASSESSMENT
87M PMHx chronic AFib (on Eliquis, off ASA since hx of PUD/GIB in the past), s/p Micra PPM, non-obstructive CAD, ESRD on HD (Tu/Th/Sa), DM, HLD, and prior history of large pleural effusions (requiring drainage during hospitalizations in 2021 at [facility] and [facility]), who presents from home w/ c/o of SOB + generalized weakness and lethargy with loss of appetite/decreased PO intake, and his wife noticing his having dark/black stools in setting of diffuse abdominal pain worse in the LLQ (pt is s/p appendectomy in the past). Admitted for hypoxia due to pneumonia and large L pleural effusion now clinically improved s/p thoracentesis and abx treatment for PNA, currently undergoing volume removal with HD.

## 2023-05-01 NOTE — DISCHARGE NOTE PROVIDER - NSDCCPCAREPLAN_GEN_ALL_CORE_FT
PRINCIPAL DISCHARGE DIAGNOSIS  Diagnosis: Pneumonia  Assessment and Plan of Treatment: Pneumonia is a lung infection that can cause a fever, cough, and trouble breathing.  Nutrition is important, eat small frequent meals.  Get lots of rest and drink fluids.  Call your health care provider upon arrival home from hospital and make a follow up appointment for one week.  If your cough worsens, you develop fever greater than 101', you have shaking chills, a fast heartbeat, trouble breathing and/or feel your are breathing much faster than usual, call your healthcare provider.  Make sure you wash your hands frequently.      SECONDARY DISCHARGE DIAGNOSES  Diagnosis: ESRD on dialysis  Assessment and Plan of Treatment: Follow up with Dr. Eason outpatient for HD Tues/thur/sat  Avoid taking (NSAIDs) - (ex: Ibuprofen, Advil, Celebrex, Naprosyn)  Avoid taking any nephrotoxic agents (can harm kidneys) - Intravenous contrast for diagnostic testing, combination cold medications.  Have all medications adjusted for your renal function by your Health Care Provider.  Blood pressure control is important.  Take all medication as prescribed.      Diagnosis: Hyperlipidemia  Assessment and Plan of Treatment: Continue taking statin as prescribed    Diagnosis: Chronic atrial fibrillation  Assessment and Plan of Treatment: Atrial fibrillation is the most common heart rhythm problem.  The condition puts you at risk for has stroke and heart attack  You were started on blood thinners to prevent possible clot and stroke complications.   Monitor for any signs of bleeding and avoid injury while on this medication  If any bleeding persistent and symptomatic stop the medication and notify your doctor immediately.  It helps if you control your blood pressure, not drink more than 1-2 alcohol drinks per day, cut down on caffeine, getting treatment for over active thyroid gland, and get regular exercise  Call your doctor if you feel your heart racing or beating unusually, chest tightness or pain, lightheaded, faint, shortness of breath especially with exercise  It is important to take your heart medication as prescribed  You may be on anticoagulation which is very important to take as directed - you may need blood work to monitor drug levels      Diagnosis: Diabetes  Assessment and Plan of Treatment: Make sure you get your HgA1c checked every three months.  If you take oral diabetes medications, check your blood glucose two times a day.  If you take insulin, check your blood glucose before meals and at bedtime.  It's important not to skip any meals.  Keep a log of your blood glucose results and always take it with you to your doctor appointments.  Keep a list of your current medications including injectables and over the counter medications and bring this medication list with you to all your doctor appointments.  If you have not seen your ophthalmologist this year call for appointment.  Check your feet daily for redness, sores, or openings. Do not self treat. If no improvement in two days call your primary care physician for an appointment.  Low blood sugar (hypoglycemia) is a blood sugar below 70mg/dl. Check your blood sugar if you feel signs/symptoms of hypoglycemia. If your blood sugar is below 70 take 15 grams of carbohydrates (ex 4 oz of apple juice, 3-4 glucose tablets, or 4-6 oz of regular soda) wait 15 minutes and repeat blood sugar to make sure it comes up above 70.  If your blood sugar is above 70 and you are due for a meal, have a meal.  If you are not due for a meal have a snack.  This snack helps keeps your blood sugar at a safe range.      Diagnosis: Large pleural effusion  Assessment and Plan of Treatment: If you experience worsening shortness of breath, contact your provider

## 2023-05-01 NOTE — PROGRESS NOTE ADULT - PROBLEM SELECTOR PLAN 7
- Home regimen includes metoprolol XL 100mg daily  - changed Toprol XL to metoprolol tartrate 50mg q12h with hold parameters  - Hold AM dose prior to HD on HD days  - C/w Eliquis 2.5 BID  - Monitor on telemetry

## 2023-05-01 NOTE — DISCHARGE NOTE PROVIDER - NSDCFUADDAPPT_GEN_ALL_CORE_FT
APPTS ARE READY TO BE MADE: [ ] YES    Best Family or Patient Contact (if needed):    Additional Information about above appointments (if needed):    1: Follow up with PCP Dr. Dexter Eason within 1 week of discharge   2: Follow up with Dr. Julius Eason in [place] as Outpatient on Tu/Th/Sa schedule)      Other comments or requests:    APPTS ARE READY TO BE MADE: [X] YES    Best Family or Patient Contact (if needed):    Additional Information about above appointments (if needed):    1: Follow up with PCP Dr. Dexter Eason within 1 week of discharge   2: Follow up with Dr. Julius Eason in [place] as Outpatient on Tu/Th/Sa schedule)      Other comments or requests:    APPTS ARE READY TO BE MADE: [X] YES    Best Family or Patient Contact (if needed):    Additional Information about above appointments (if needed):    1: Follow up with PCP Dr. Dexter Eason within 1 week of discharge   2: Follow up with Dr. Julius Eason in [place] as Outpatient on Tu/Th/Sa schedule)  3: [facility] ([phone])    Other comments or requests:    APPTS ARE READY TO BE MADE: [X] YES    Best Family or Patient Contact (if needed):    Additional Information about above appointments (if needed):    1: Follow up with PCP Dr. Dexter Eason within 1 week of discharge   2: Follow up with Dr. Julius Eason in [place] as Outpatient on Tu/Th/Sa schedule)  3: [facility] ([phone])    Other comments or requests:     Patient is being discharged to rehab. Caregiver will arrange follow up.

## 2023-05-01 NOTE — DISCHARGE NOTE PROVIDER - NSDCMRMEDTOKEN_GEN_ALL_CORE_FT
atorvastatin 40 mg oral tablet: 1 tab(s) orally once a day  calcium acetate 667 mg oral capsule: 1 cap(s) orally 3 times a day  Eliquis 2.5 mg oral tablet: 1 tab(s) orally 2 times a day  finasteride 5 mg oral tablet: 1 tab(s) orally once a day  hydrALAZINE 50 mg oral tablet: 1 tab(s) orally once a day  metoprolol succinate 100 mg oral capsule, extended release: 1 tab(s) orally once a day  Ara-Sara oral tablet: 1 tab(s) orally once a day  Tradjenta 5 mg oral tablet: 1 tab(s) orally once a day   atorvastatin 40 mg oral tablet: 1 tab(s) orally once a day  calcium acetate 667 mg oral capsule: 1 cap(s) orally 3 times a day  Eliquis 2.5 mg oral tablet: 1 tab(s) orally 2 times a day  finasteride 5 mg oral tablet: 1 tab(s) orally once a day  metoprolol tartrate 50 mg oral tablet: 1 tab(s) orally every 12 hours  midodrine 10 mg oral tablet: 1 tab(s) orally 3 times a week during HD days  nystatin 100,000 units/mL oral suspension: 5 milliliter(s) orally 4 times a day  pantoprazole 40 mg oral granule, delayed release: orally once a day  Ara-Sara oral tablet: 1 tab(s) orally once a day  Tradjenta 5 mg oral tablet: 1 tab(s) orally once a day

## 2023-05-01 NOTE — DISCHARGE NOTE PROVIDER - PROVIDER TOKENS
PROVIDER:[TOKEN:[[id number]]] PROVIDER:[TOKEN:[[id number]]],PROVIDER:[TOKEN:[[id number]]],PROVIDER:[TOKEN:[[id number]],FOLLOWUP:[1 week]] home

## 2023-05-01 NOTE — PROGRESS NOTE ADULT - SUBJECTIVE AND OBJECTIVE BOX
[facility] Division of Hospital Medicine  Kai Ding MD, MARCY  I'm reachable on [employer] Teams   Off hours:  [phone] ([facility] pager)    Patient is a 87y old  Male who presents with a chief complaint of SOB, generalized weakness, dark stools (01 May 2023 17:58)      SUBJECTIVE / OVERNIGHT EVENTS:  No events overnight, no acute complaints. Wife at bedside. We discussed his medical care ultimate plan for ARNAV.     MEDICATIONS  (STANDING):  apixaban 2.5 milliGRAM(s) Oral every 12 hours  atorvastatin 40 milliGRAM(s) Oral at bedtime  calcium acetate 667 milliGRAM(s) Oral three times a day with meals  chlorhexidine 2% Cloths 1 Application(s) Topical daily  dextrose 5%. 1000 milliLiter(s) (50 mL/Hr) IV Continuous <Continuous>  dextrose 50% Injectable 25 Gram(s) IV Push once  finasteride 5 milliGRAM(s) Oral daily  glucagon  Injectable 1 milliGRAM(s) IntraMuscular once  insulin lispro (ADMELOG) corrective regimen sliding scale   SubCutaneous three times a day before meals  metoprolol tartrate 50 milliGRAM(s) Oral every 12 hours  midodrine. 10 milliGRAM(s) Oral once  midodrine. 10 milliGRAM(s) Oral <User Schedule>  Nephro-chandrika 1 Tablet(s) Oral daily  nystatin    Suspension 796637 Unit(s) Swish and Swallow four times a day  pantoprazole  Injectable 40 milliGRAM(s) IV Push daily  polyethylene glycol 3350 17 Gram(s) Oral daily    MEDICATIONS  (PRN):  dextrose Oral Gel 15 Gram(s) Oral once PRN Blood Glucose LESS THAN 70 milliGRAM(s)/deciliter    CAPILLARY BLOOD GLUCOSE      POCT Blood Glucose.: 225 mg/dL (01 May 2023 16:35)  POCT Blood Glucose.: 191 mg/dL (01 May 2023 11:52)  POCT Blood Glucose.: 137 mg/dL (01 May 2023 07:54)  POCT Blood Glucose.: 163 mg/dL (30 Apr 2023 21:27)    I&O's Summary    30 Apr 2023 07:01  -  01 May 2023 07:00  --------------------------------------------------------  IN: 60 mL / OUT: 0 mL / NET: 60 mL    01 May 2023 07:01  -  01 May 2023 20:58  --------------------------------------------------------  IN: 480 mL / OUT: 0 mL / NET: 480 mL        PHYSICAL EXAM:  Vital Signs Last 24 Hrs  T(C): 36.3 (01 May 2023 11:14), Max: 36.3 (30 Apr 2023 21:25)  T(F): 97.3 (01 May 2023 11:14), Max: 97.4 (30 Apr 2023 21:25)  HR: 77 (01 May 2023 17:18) (76 - 84)  BP: 129/60 (01 May 2023 17:18) (126/79 - 132/72)  BP(mean): --  RR: 18 (01 May 2023 11:14) (18 - 18)  SpO2: 96% (01 May 2023 11:14) (96% - 98%)    Parameters below as of 01 May 2023 11:14  Patient On (Oxygen Delivery Method): room air      CONSTITUTIONAL: Elderly man, No acute distress. Somnolent  RESPIRATORY: Decreased lung sounds at bases bilaterally. No wheezes, rales, or rhonchi. No accessory muscle use.  CARDIOVASCULAR: +S1/S2. Regular rate and rhythm. No murmurs, rubs, or gallops. No edema.  GASTROINTESTINAL: Soft, nontender, nondistended. +BS. No rebound or guarding.   MUSCULOSKELETAL: Spontaneous movement in all extremities.  PSYCHIATRIC: Calm, cooperative       LABS:                        13.1   4.66  )-----------( 112      ( 01 May 2023 06:09 )             39.7     05-01    132<L>  |  94<L>  |  50<H>  ----------------------------<  135<H>  3.7   |  25  |  5.21<H>    Ca    8.4      01 May 2023 06:08  Phos  4.3     05-01  Mg     2.0     05-01

## 2023-05-01 NOTE — DISCHARGE NOTE PROVIDER - DETAILS OF MALNUTRITION DIAGNOSIS/DIAGNOSES
This patient has been assessed with a concern for Malnutrition and was treated during this hospitalization for the following Nutrition diagnosis/diagnoses:     -  04/24/2023: Severe protein-calorie malnutrition   -  04/24/2023: Underweight (BMI < 19)

## 2023-05-01 NOTE — PROGRESS NOTE ADULT - PROBLEM SELECTOR PLAN 3
- CT chest with patchy opacities in R lung likely due suspect 2/2 to aspiration. MRS screen positive for MSSA  - S/p cefazolin and Azithromycin   - SLP eval appreciated, recommended for pureed and moderately thick liquids  - Monitor respiratory status

## 2023-05-01 NOTE — PROGRESS NOTE ADULT - SUBJECTIVE AND OBJECTIVE BOX
DATE OF SERVICE: 05-01-23 @ 14:30    Patient is a 87y old  Male who presents with a chief complaint of SOB, generalized weakness, dark stools (30 Apr 2023 13:33)      INTERVAL HISTORY: Feels well    REVIEW OF SYSTEMS:  CONSTITUTIONAL: No weakness  EYES/ENT: No visual changes;  No throat pain   NECK: No pain or stiffness  RESPIRATORY: No cough, wheezing; No shortness of breath  CARDIOVASCULAR: No chest pain or palpitations  GASTROINTESTINAL: No abdominal  pain. No nausea, vomiting, or hematemesis  GENITOURINARY: No dysuria, frequency or hematuria  NEUROLOGICAL: No stroke like symptoms  SKIN: No rashes    TELEMETRY Personally reviewed: Afib 70-90  	  MEDICATIONS:  metoprolol tartrate 50 milliGRAM(s) Oral every 12 hours  midodrine. 10 milliGRAM(s) Oral once  midodrine. 10 milliGRAM(s) Oral <User Schedule>        PHYSICAL EXAM:  T(C): 36.3 (05-01-23 @ 11:14), Max: 36.3 (04-30-23 @ 21:25)  HR: 76 (05-01-23 @ 11:14) (76 - 84)  BP: 131/62 (05-01-23 @ 11:14) (120/67 - 132/72)  RR: 18 (05-01-23 @ 11:14) (18 - 18)  SpO2: 96% (05-01-23 @ 11:14) (96% - 98%)  Wt(kg): --  I&O's Summary    30 Apr 2023 07:01  -  01 May 2023 07:00  --------------------------------------------------------  IN: 60 mL / OUT: 0 mL / NET: 60 mL          Appearance: In no distress	  HEENT:    PERRL, EOMI	  Cardiovascular:  S1 S2, No JVD  Respiratory: Lungs clear to auscultation	  Gastrointestinal:  Soft, Non-tender, + BS	  Vascularature:  No edema of LE  Psychiatric: Appropriate affect   Neuro: no acute focal deficits                               13.1   4.66  )-----------( 112      ( 01 May 2023 06:09 )             39.7     05-01    132<L>  |  94<L>  |  50<H>  ----------------------------<  135<H>  3.7   |  25  |  5.21<H>    Ca    8.4      01 May 2023 06:08  Phos  4.3     05-01  Mg     2.0     05-01          Labs personally reviewed      ASSESSMENT/PLAN: 	  88 yo male PMH of chronic Afib (on Eliquis, off ASA since hx of PUD/GIB in the past), s/p Micra PPM, non-obstructive CAD, ESRD on HD (Tu/Th/Sa), DM, HLD, and prior history of large pleural effusions (requiring drainage during hospitalizations in 2021 at [facility] and [facility]), who presents from home w/ c/o of SOB + generalized weakness and lethargy with loss of appetite/decreased PO intake, and his wife noticing his having dark/black stools in setting of diffuse abdominal pain worse in the LLQ (pt is s/p appendectomy in the past). Admitted for hypoxia due to pneumonia and large L pleural effusion.        Problem/Plan - 1:  ·  Problem: Diastolic HF.   ·  Plan: - TTE suggestive of amyloid, EKG with low voltage, hx of AF  -  Nuclear pyrophosphate scan revealing Cardiac amyloid Imaging study is NOT suggestive of transthyretin cardiac   amyloidosis.  - [place] Kappa and Jeaneth chains elevated ? 2/2 inflammation   - Immunofixation pending   - Volume mgmt with HD    Problem/Plan - 2:  ·  Problem: Chronic atrial fibrillation.   ·   Has been started on Lopressor 50mg BID   - Eliquis for AC    Problem/Plan - 3:  ·  Problem: Hyperlipidemia.   ·  Plan: - c/w atorvastatin 40mg qHS.    Problem/Plan -4:  ·  Problem: Prophylactic measure.   ·  Plan; DVT ppx: SCDs. MICKI James, DO [facility]  Cardiovascular Medicine  [address], [address]  Available through call or text on [employer]  Office: [phone]   DATE OF SERVICE: 05-01-23 @ 14:30    Patient is a 87y old  Male who presents with a chief complaint of SOB, generalized weakness, dark stools (30 Apr 2023 13:33)      INTERVAL HISTORY: Feels well    REVIEW OF SYSTEMS:  CONSTITUTIONAL: No weakness  EYES/ENT: No visual changes;  No throat pain   NECK: No pain or stiffness  RESPIRATORY: No cough, wheezing; No shortness of breath  CARDIOVASCULAR: No chest pain or palpitations  GASTROINTESTINAL: No abdominal  pain. No nausea, vomiting, or hematemesis  GENITOURINARY: No dysuria, frequency or hematuria  NEUROLOGICAL: No stroke like symptoms  SKIN: No rashes    TELEMETRY Personally reviewed: Afib 70-90  	  MEDICATIONS:  metoprolol tartrate 50 milliGRAM(s) Oral every 12 hours  midodrine. 10 milliGRAM(s) Oral once  midodrine. 10 milliGRAM(s) Oral <User Schedule>        PHYSICAL EXAM:  T(C): 36.3 (05-01-23 @ 11:14), Max: 36.3 (04-30-23 @ 21:25)  HR: 76 (05-01-23 @ 11:14) (76 - 84)  BP: 131/62 (05-01-23 @ 11:14) (120/67 - 132/72)  RR: 18 (05-01-23 @ 11:14) (18 - 18)  SpO2: 96% (05-01-23 @ 11:14) (96% - 98%)  Wt(kg): --  I&O's Summary    30 Apr 2023 07:01  -  01 May 2023 07:00  --------------------------------------------------------  IN: 60 mL / OUT: 0 mL / NET: 60 mL          Appearance: In no distress	  HEENT:    PERRL, EOMI	  Cardiovascular:  S1 S2, No JVD  Respiratory: Lungs clear to auscultation	  Gastrointestinal:  Soft, Non-tender, + BS	  Vascularature:  No edema of LE  Psychiatric: Appropriate affect   Neuro: no acute focal deficits                               13.1   4.66  )-----------( 112      ( 01 May 2023 06:09 )             39.7     05-01    132<L>  |  94<L>  |  50<H>  ----------------------------<  135<H>  3.7   |  25  |  5.21<H>    Ca    8.4      01 May 2023 06:08  Phos  4.3     05-01  Mg     2.0     05-01          Labs personally reviewed      ASSESSMENT/PLAN: 	  86 yo male PMH of chronic Afib (on Eliquis, off ASA since hx of PUD/GIB in the past), s/p Micra PPM, non-obstructive CAD, ESRD on HD (Tu/Th/Sa), DM, HLD, and prior history of large pleural effusions (requiring drainage during hospitalizations in 2021 at [facility] and [facility]), who presents from home w/ c/o of SOB + generalized weakness and lethargy with loss of appetite/decreased PO intake, and his wife noticing his having dark/black stools in setting of diffuse abdominal pain worse in the LLQ (pt is s/p appendectomy in the past). Admitted for hypoxia due to pneumonia and large L pleural effusion.        Problem/Plan - 1:  ·  Problem: Diastolic HF.   ·  Plan: - TTE suggestive of amyloid, EKG with low voltage, hx of AF  -  Nuclear pyrophosphate scan revealing Cardiac amyloid Imaging study is NOT suggestive of transthyretin cardiac   amyloidosis.  - [place] Kappa and Jeaneth chains elevated ? 2/2 inflammation   - serum Immunofixation pending   - Volume mgmt with HD    Problem/Plan - 2:  ·  Problem: Chronic atrial fibrillation.   ·   Has been started on Lopressor 50mg BID   - Eliquis for AC    Problem/Plan - 3:  ·  Problem: Hyperlipidemia.   ·  Plan: - c/w atorvastatin 40mg qHS.    Problem/Plan -4:  ·  Problem: Prophylactic measure.   ·  Plan; DVT ppx: SCDs. MICKI James, DO [facility]  Cardiovascular Medicine  [address], [address]  Available through call or text on [employer]  Office: [phone]

## 2023-05-01 NOTE — PROGRESS NOTE ADULT - PROBLEM SELECTOR PLAN 1
- 2/2 to multifocal pneumonia, large L pleural effusion, hypervolemia  - Monitor respiratory status (currently on RA)  - management of PNA, pleural effusion and hypervolemia as below

## 2023-05-02 NOTE — PROGRESS NOTE ADULT - PROBLEM SELECTOR PLAN 2
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 Serum phosphorus is at goal for ESRD. Recommend to continue with phosphorus binder. Monitor serum phosphorus, goal: 3.5-5.5.    If any questions, please feel free to contact me     Tray Lynn  Nephrology Fellow  [facility] Pager: [phone]

## 2023-05-02 NOTE — PROGRESS NOTE ADULT - SUBJECTIVE AND OBJECTIVE BOX
DATE OF SERVICE: 05-02-23 @ 12:56    Patient is a 87y old  Male who presents with a chief complaint of SOB, generalized weakness, dark stools (02 May 2023 06:36)      INTERVAL HISTORY: Feels ok.     REVIEW OF SYSTEMS:  CONSTITUTIONAL: No weakness  EYES/ENT: No visual changes;  No throat pain   NECK: No pain or stiffness  RESPIRATORY: No cough, wheezing; No shortness of breath  CARDIOVASCULAR: No chest pain or palpitations  GASTROINTESTINAL: No abdominal  pain. No nausea, vomiting, or hematemesis  GENITOURINARY: No dysuria, frequency or hematuria  NEUROLOGICAL: No stroke like symptoms  SKIN: No rashes    TELEMETRY Personally reviewed: AF 70-90  	  MEDICATIONS:  metoprolol tartrate 50 milliGRAM(s) Oral every 12 hours  midodrine. 10 milliGRAM(s) Oral once  midodrine. 10 milliGRAM(s) Oral <User Schedule>        PHYSICAL EXAM:  T(C): 36.5 (05-02-23 @ 12:20), Max: 36.9 (05-02-23 @ 08:50)  HR: 85 (05-02-23 @ 12:20) (69 - 88)  BP: 126/55 (05-02-23 @ 12:20) (103/52 - 129/60)  RR: 18 (05-02-23 @ 12:20) (16 - 18)  SpO2: 100% (05-02-23 @ 12:20) (95% - 100%)  Wt(kg): --  I&O's Summary    01 May 2023 07:01  -  02 May 2023 07:00  --------------------------------------------------------  IN: 720 mL / OUT: 0 mL / NET: 720 mL    02 May 2023 07:01  -  02 May 2023 12:56  --------------------------------------------------------  IN: 0 mL / OUT: 1500 mL / NET: -1500 mL          Appearance: In no distress	  HEENT:    PERRL, EOMI	  Cardiovascular:  S1 S2, No JVD  Respiratory: Lungs clear to auscultation	  Gastrointestinal:  Soft, Non-tender, + BS	  Vascularature:  No edema of LE  Psychiatric: Appropriate affect   Neuro: no acute focal deficits                               13.0   4.59  )-----------( 108      ( 02 May 2023 07:18 )             39.0     05-02    130<L>  |  91<L>  |  63<H>  ----------------------------<  138<H>  3.8   |  21<L>  |  6.17<H>    Ca    8.5      02 May 2023 07:10  Phos  4.3     05-01  Mg     2.0     05-01          Labs personally reviewed      ASSESSMENT/PLAN: 	    88 yo male PMH of chronic Afib (on Eliquis, off ASA since hx of PUD/GIB in the past), s/p Micra PPM, non-obstructive CAD, ESRD on HD (Tu/Th/Sa), DM, HLD, and prior history of large pleural effusions (requiring drainage during hospitalizations in 2021 at [facility] and [facility]), who presents from home w/ c/o of SOB + generalized weakness and lethargy with loss of appetite/decreased PO intake, and his wife noticing his having dark/black stools in setting of diffuse abdominal pain worse in the LLQ (pt is s/p appendectomy in the past). Admitted for hypoxia due to pneumonia and large L pleural effusion.        Problem/Plan - 1:  ·  Problem: Diastolic HF.   ·  Plan: - TTE suggestive of amyloid, EKG with low voltage, hx of AF  -  Nuclear pyrophosphate scan revealing Cardiac amyloid Imaging study is NOT suggestive of transthyretin cardiac   amyloidosis.  - [place] Kappa and Jeaneth chains elevated ? 2/2 inflammation   - serum Immunofixation pending   - Volume mgmt with HD    Problem/Plan - 2:  ·  Problem: Chronic atrial fibrillation.   ·   Has been started on Lopressor 50mg BID   - Eliquis for AC    Problem/Plan - 3:  ·  Problem: Hyperlipidemia.   ·  Plan: - c/w atorvastatin 40mg qHS.    Problem/Plan -4:  ·  Problem: Prophylactic measure.   ·  Plan; DVT ppx: SCDs. eliquis         Janki Ingram, AG-NP   Shalom Javdan, DO [facility]  Cardiovascular Medicine  [address], [address]  Available through call or text on [employer]  Office: [phone]   DATE OF SERVICE: 05-02-23 @ 12:56    Patient is a 87y old  Male who presents with a chief complaint of SOB, generalized weakness, dark stools (02 May 2023 06:36)      INTERVAL HISTORY: Feels ok.     REVIEW OF SYSTEMS:  CONSTITUTIONAL: No weakness  EYES/ENT: No visual changes;  No throat pain   NECK: No pain or stiffness  RESPIRATORY: No cough, wheezing; No shortness of breath  CARDIOVASCULAR: No chest pain or palpitations  GASTROINTESTINAL: No abdominal  pain. No nausea, vomiting, or hematemesis  GENITOURINARY: No dysuria, frequency or hematuria  NEUROLOGICAL: No stroke like symptoms  SKIN: No rashes    TELEMETRY Personally reviewed: AF 70-90  	  MEDICATIONS:  metoprolol tartrate 50 milliGRAM(s) Oral every 12 hours  midodrine. 10 milliGRAM(s) Oral once  midodrine. 10 milliGRAM(s) Oral <User Schedule>        PHYSICAL EXAM:  T(C): 36.5 (05-02-23 @ 12:20), Max: 36.9 (05-02-23 @ 08:50)  HR: 85 (05-02-23 @ 12:20) (69 - 88)  BP: 126/55 (05-02-23 @ 12:20) (103/52 - 129/60)  RR: 18 (05-02-23 @ 12:20) (16 - 18)  SpO2: 100% (05-02-23 @ 12:20) (95% - 100%)  Wt(kg): --  I&O's Summary    01 May 2023 07:01  -  02 May 2023 07:00  --------------------------------------------------------  IN: 720 mL / OUT: 0 mL / NET: 720 mL    02 May 2023 07:01  -  02 May 2023 12:56  --------------------------------------------------------  IN: 0 mL / OUT: 1500 mL / NET: -1500 mL          Appearance: In no distress	  HEENT:    PERRL, EOMI	  Cardiovascular:  S1 S2, No JVD  Respiratory: Lungs clear to auscultation	  Gastrointestinal:  Soft, Non-tender, + BS	  Vascularature:  No edema of LE  Psychiatric: Appropriate affect   Neuro: no acute focal deficits                               13.0   4.59  )-----------( 108      ( 02 May 2023 07:18 )             39.0     05-02    130<L>  |  91<L>  |  63<H>  ----------------------------<  138<H>  3.8   |  21<L>  |  6.17<H>    Ca    8.5      02 May 2023 07:10  Phos  4.3     05-01  Mg     2.0     05-01          Labs personally reviewed      ASSESSMENT/PLAN: 	    88 yo male PMH of chronic Afib (on Eliquis, off ASA since hx of PUD/GIB in the past), s/p Micra PPM, non-obstructive CAD, ESRD on HD (Tu/Th/Sa), DM, HLD, and prior history of large pleural effusions (requiring drainage during hospitalizations in 2021 at [facility] and [facility]), who presents from home w/ c/o of SOB + generalized weakness and lethargy with loss of appetite/decreased PO intake, and his wife noticing his having dark/black stools in setting of diffuse abdominal pain worse in the LLQ (pt is s/p appendectomy in the past). Admitted for hypoxia due to pneumonia and large L pleural effusion.        Problem/Plan - 1:  ·  Problem: Diastolic HF.   ·  Plan: - TTE suggestive of amyloid, EKG with low voltage, hx of AF  -  Nuclear pyrophosphate scan revealing Cardiac amyloid Imaging study is NOT suggestive of transthyretin cardiac   amyloidosis.  - [place] Kappa and Jeaneth chains elevated ? 2/2 inflammation   - serum Immunofixation with no monoclonal band   - Volume mgmt with HD    Problem/Plan - 2:  ·  Problem: Chronic atrial fibrillation.   ·   Has been started on Lopressor 50mg BID   - Eliquis for AC    Problem/Plan - 3:  ·  Problem: Hyperlipidemia.   ·  Plan: - c/w atorvastatin 40mg qHS.    Problem/Plan -4:  ·  Problem: Prophylactic measure.   ·  Plan; DVT ppx: SCDs. elimanuel Ingrma, AG-NP   Shalom Javdan, DO formerly [facility]  Cardiovascular Medicine  [address] [place] [address], [address]  Available through call or text on [employer] TE[employer]  Office: [phone]

## 2023-05-02 NOTE — PROGRESS NOTE ADULT - PROBLEM SELECTOR PLAN 6
- HD per Nephrology (patient follows w/Dr. Julius Eason in [place] as O/P on Tu/Th/Sa schedule)  - C/w midodrine 10mg pre-HD on HD days to allow for volume removal  - CXR with increase in L pleural effusion - needs more volume removal for optimization  - C/w Phoslo TID with meals

## 2023-05-02 NOTE — PROGRESS NOTE ADULT - PROBLEM SELECTOR PLAN 1
- 2/2 to multifocal pneumonia, large L pleural effusion, hypervolemia  - his condition has much improved - currently on RA

## 2023-05-02 NOTE — PROGRESS NOTE ADULT - SUBJECTIVE AND OBJECTIVE BOX
[facility] Division of Hospital Medicine  Kai Ding MD, MARCY  I'm reachable on [employer] Teams   Off hours:  [phone] ([facility] pager)    Patient is a 87y old  Male who presents with a chief complaint of SOB, generalized weakness, dark stools (02 May 2023 12:55)      SUBJECTIVE / OVERNIGHT EVENTS:  No events overnight. No acute complaints. The patient was seen today while receiving HD.    MEDICATIONS  (STANDING):  apixaban 2.5 milliGRAM(s) Oral every 12 hours  atorvastatin 40 milliGRAM(s) Oral at bedtime  calcium acetate 667 milliGRAM(s) Oral three times a day with meals  chlorhexidine 2% Cloths 1 Application(s) Topical daily  dextrose 5%. 1000 milliLiter(s) (50 mL/Hr) IV Continuous <Continuous>  dextrose 50% Injectable 25 Gram(s) IV Push once  finasteride 5 milliGRAM(s) Oral daily  glucagon  Injectable 1 milliGRAM(s) IntraMuscular once  insulin lispro (ADMELOG) corrective regimen sliding scale   SubCutaneous three times a day before meals  metoprolol tartrate 50 milliGRAM(s) Oral every 12 hours  midodrine. 10 milliGRAM(s) Oral once  midodrine. 10 milliGRAM(s) Oral <User Schedule>  Nephro-chandrika 1 Tablet(s) Oral daily  nystatin    Suspension 705102 Unit(s) Swish and Swallow four times a day  pantoprazole  Injectable 40 milliGRAM(s) IV Push daily  polyethylene glycol 3350 17 Gram(s) Oral daily    MEDICATIONS  (PRN):  dextrose Oral Gel 15 Gram(s) Oral once PRN Blood Glucose LESS THAN 70 milliGRAM(s)/deciliter    CAPILLARY BLOOD GLUCOSE      POCT Blood Glucose.: 229 mg/dL (02 May 2023 16:39)  POCT Blood Glucose.: 98 mg/dL (02 May 2023 13:09)  POCT Blood Glucose.: 142 mg/dL (02 May 2023 07:24)  POCT Blood Glucose.: 162 mg/dL (01 May 2023 21:25)    I&O's Summary    01 May 2023 07:01  -  02 May 2023 07:00  --------------------------------------------------------  IN: 720 mL / OUT: 0 mL / NET: 720 mL    02 May 2023 07:01  -  02 May 2023 16:58  --------------------------------------------------------  IN: 600 mL / OUT: 1500 mL / NET: -900 mL        PHYSICAL EXAM:  Vital Signs Last 24 Hrs  T(C): 36.5 (02 May 2023 12:20), Max: 36.9 (02 May 2023 08:50)  T(F): 97.7 (02 May 2023 12:20), Max: 98.4 (02 May 2023 08:50)  HR: 85 (02 May 2023 12:20) (69 - 88)  BP: 126/55 (02 May 2023 12:20) (103/52 - 129/60)  BP(mean): --  RR: 18 (02 May 2023 12:20) (16 - 18)  SpO2: 100% (02 May 2023 12:20) (95% - 100%)    Parameters below as of 02 May 2023 12:20  Patient On (Oxygen Delivery Method): room air    CONSTITUTIONAL: Elderly man, No acute distress. Somnolent  RESPIRATORY: Decreased lung sounds at bases bilaterally. No wheezes, rales, or rhonchi. No accessory muscle use.  CARDIOVASCULAR: +S1/S2. Regular rate and rhythm. No murmurs, rubs, or gallops. No edema.  GASTROINTESTINAL: Soft, nontender, nondistended. +BS. No rebound or guarding.   MUSCULOSKELETAL: Spontaneous movement in all extremities.  PSYCHIATRIC: Calm, cooperative       LABS:                        13.0   4.59  )-----------( 108      ( 02 May 2023 07:18 )             39.0     05-02    130<L>  |  91<L>  |  63<H>  ----------------------------<  138<H>  3.8   |  21<L>  |  6.17<H>    Ca    8.5      02 May 2023 07:10  Phos  4.3     05-01  Mg     2.0     05-01

## 2023-05-02 NOTE — PROGRESS NOTE ADULT - PROBLEM SELECTOR PLAN 1
Pt. with ESRD on HD three times a week (TTS) presented to [facility] for failure to thrive and lethargy. Found to have pneumonia and large L pleural effusion. Last inpatient HD treatment was on 4/29 via LUE AVF. Pt. clinically stable. Labs reviewed. Plan for maintenance HD treatment today. Monitor labs and vitals. Avoid nephrotoxins. Dose medications to ESRD.

## 2023-05-02 NOTE — PROGRESS NOTE ADULT - PROBLEM SELECTOR PLAN 10
DVT ppx: Eliquis  Dispo: ARNAV with HD when able to be set up by CM. Patient is medically optimized. Of note, outpatient follow-up with Geriatrics to establish primary care per daughter Desiree's request.

## 2023-05-02 NOTE — PROGRESS NOTE ADULT - SUBJECTIVE AND OBJECTIVE BOX
[facility] DIVISION OF KIDNEY DISEASES AND HYPERTENSION -- FOLLOW UP NOTE  --------------------------------------------------------------------------------  HPI: 87 y.o. M w/ PMHx of Afib (on Eliquis), s/p Micra PPM, non-obstructive CAD, ESRD on HD (Tu/Th/Sa; nephrologist = Dr. Julius Eason and HD center in [place]), DM (on Tradjenta), HLD, who presents from home with generalized weakness and lethargy for the past week with loss of appetite/decreased PO intake. Pt. found to have pneumonia and large L pleural effusion. Pt. being seen for ESRD management. Last inpatient HD treatment was on 4/29.    24 hour events/subjective: Pt. was seen and evaluated at bedside this morning. Pt. minimally conversive, denied any chest pain, shortness of breath, nausea, vomiting, or abdominal pain    PAST HISTORY  --------------------------------------------------------------------------------  No significant changes to PMH, PSH, FHx, SHx, unless otherwise noted    ALLERGIES & MEDICATIONS  --------------------------------------------------------------------------------  Allergies    No Known Allergies    Intolerances      Standing Inpatient Medications  apixaban 2.5 milliGRAM(s) Oral every 12 hours  atorvastatin 40 milliGRAM(s) Oral at bedtime  calcium acetate 667 milliGRAM(s) Oral three times a day with meals  chlorhexidine 2% Cloths 1 Application(s) Topical daily  dextrose 5%. 1000 milliLiter(s) IV Continuous <Continuous>  dextrose 50% Injectable 25 Gram(s) IV Push once  finasteride 5 milliGRAM(s) Oral daily  glucagon  Injectable 1 milliGRAM(s) IntraMuscular once  insulin lispro (ADMELOG) corrective regimen sliding scale   SubCutaneous three times a day before meals  metoprolol tartrate 50 milliGRAM(s) Oral every 12 hours  midodrine. 10 milliGRAM(s) Oral once  midodrine. 10 milliGRAM(s) Oral <User Schedule>  Nephro-chandrika 1 Tablet(s) Oral daily  nystatin    Suspension 379754 Unit(s) Swish and Swallow four times a day  pantoprazole  Injectable 40 milliGRAM(s) IV Push daily  polyethylene glycol 3350 17 Gram(s) Oral daily    PRN Inpatient Medications  dextrose Oral Gel 15 Gram(s) Oral once PRN      REVIEW OF SYSTEMS      All other systems were reviewed and are negative, except as noted.    VITALS/PHYSICAL EXAM  --------------------------------------------------------------------------------  T(C): 36.3 (05-02-23 @ 05:22), Max: 36.4 (05-01-23 @ 21:19)  HR: 88 (05-02-23 @ 05:22) (69 - 88)  BP: 126/61 (05-02-23 @ 05:22) (111/52 - 131/62)  RR: 18 (05-02-23 @ 05:22) (18 - 18)  SpO2: 95% (05-02-23 @ 05:22) (95% - 97%)  Wt(kg): --        04-30-23 @ 07:01  -  05-01-23 @ 07:00  --------------------------------------------------------  IN: 60 mL / OUT: 0 mL / NET: 60 mL    05-01-23 @ 07:01  -  05-02-23 @ 06:36  --------------------------------------------------------  IN: 720 mL / OUT: 0 mL / NET: 720 mL        Physical Exam:  Gen: elderly, ill appearing   HEENT: MMM on NC  Pulm: CTA  CV: S1S2  Abd: Soft, +BS   Ext: No LE edema B/L  Neuro: Awake  Skin: Warm and dry  Vascular access: LUE AVF with +thrill      LABS/STUDIES  --------------------------------------------------------------------------------              13.1   4.66  >-----------<  112      [05-01-23 @ 06:09]              39.7     132  |  94  |  50  ----------------------------<  135      [05-01-23 @ 06:08]  3.7   |  25  |  5.21        Ca     8.4     [05-01-23 @ 06:08]      Mg     2.0     [05-01-23 @ 06:08]      Phos  4.3     [05-01-23 @ 06:08]            Creatinine Trend:  SCr 5.21 [05-01 @ 06:08]  SCr 3.91 [04-30 @ 06:47]  SCr 4.94 [04-29 @ 07:07]  SCr 4.91 [04-27 @ 05:47]  SCr 4.08 [04-26 @ 07:03]        Iron 58, TIBC 147, %sat 40      [04-26-23 @ 07:02]  Ferritin 2482      [04-22-23 @ 11:53]  TSH 10.90      [04-22-23 @ 11:53]  Lipid: chol 164, , HDL 59, LDL --      [04-22-23 @ 11:53]      Free Light Chains: kappa 11.14, lambda 10.66, ratio = 1.05      [04-27 @ 05:47]

## 2023-05-02 NOTE — CHART NOTE - NSCHARTNOTEFT_GEN_A_CORE
Nutrition Follow Up Note  Patient seen for: malnutrition follow up   Chart reviewed, events noted.    "87M PMHx chronic AFib (on Eliquis, off ASA since hx of PUD/GIB in the past), s/p Micra PPM, non-obstructive CAD, ESRD on HD (//), DM, HLD, and prior history of large pleural effusions (requiring drainage during hospitalizations in  at [facility] and [facility]), who presents from home w/ c/o of SOB + generalized weakness and lethargy with loss of appetite/decreased PO intake, and his wife noticing his having dark/black stools in setting of diffuse abdominal pain worse in the LLQ (pt is s/p appendectomy in the past). Admitted for hypoxia due to pneumonia and large L pleural effusion now clinically improved s/p thoracentesis and abx treatment for PNA, currently undergoing volume removal with HD."    Source: [] Patient       [x] EMR        [] RN        [x] Family at bedside - pt's wife at bedside      [] Other:    -If unable to interview patient: [] Trach/Vent/BiPAP  [] Disoriented/confused/inappropriate to interview    Diet Order:   Diet, Pureed:   Consistent Carbohydrate {No Snacks} (CSTCHO)  Moderately Thick Liquids (MODTHICKLIQS)  For patients receiving Renal Replacement - No Protein Restr, No Conc K, No Conc Phos, Low Sodium (RENAL) (23)    - Is current order appropriate/adequate? [] Yes  []  No:     - PO intake :   [] >75%  Adequate    [] 50-75%  Fair       [] <50%  Poor    - Nutrition-related concerns:    GI:  Last BM ___.   Bowel Regimen? [] Yes   [] No      Weights:   Daily Weight in k.6 (), Weight in k.1 (), Weight in k.1 (), Weight in k.7 (), Weight in k.2 (), Weight in k.2 (), Weight in k.6 ()    Nutritionally Pertinent MEDICATIONS  (STANDING):  atorvastatin  calcium acetate  dextrose 5%.  dextrose 50% Injectable  finasteride  glucagon  Injectable  insulin lispro (ADMELOG) corrective regimen sliding scale  metoprolol tartrate  midodrine.  midodrine.  Nephro-chandrika  nystatin    Suspension  pantoprazole  Injectable  polyethylene glycol 3350    Pertinent Labs:  @ 07:10: Na 130<L>, BUN 63<H>, Cr 6.17<H>, <H>, K+ 3.8, Phos --, Mg --, Alk Phos --, ALT/SGPT --, AST/SGOT --, HbA1c --    A1C with Estimated Average Glucose Result: 9.1 % (23 @ 11:53)    Finger Sticks:  POCT Blood Glucose.: 98 mg/dL ( @ 13:09)  POCT Blood Glucose.: 142 mg/dL ( @ 07:24)  POCT Blood Glucose.: 162 mg/dL ( @ 21:25)  POCT Blood Glucose.: 225 mg/dL ( @ 16:35)      Skin per nursing documentation:   Edema:     Estimated Needs:   [] no change since previous assessment  [] recalculated:     Previous Nutrition Diagnosis:   Nutrition Diagnosis is: [] ongoing  [] resolved [] not applicable     New Nutrition Diagnosis: [] Not applicable    Nutrition Care Plan:  [] In Progress  [] Achieved  [] Not applicable    Nutrition Interventions:     Education Provided:       [] Yes:  [] No:        Recommendations:         [] Continue current diet order            [] Add oral nutrition supplement:     [] Discontinue current diet order. Recommend:      [] Add micronutrient supplementation:      [] Continue current micronutrient supplementation:      [] Other:     Monitoring and Evaluation:   Continue to monitor nutritional intake, tolerance to diet prescription, weights, labs, skin integrity      RD remains available upon request and will follow up per protocol Nutrition Follow Up Note  Patient seen for: malnutrition follow up   Chart reviewed, events noted.    "87M PMHx chronic AFib (on Eliquis, off ASA since hx of PUD/GIB in the past), s/p Micra PPM, non-obstructive CAD, ESRD on HD (//), DM, HLD, and prior history of large pleural effusions (requiring drainage during hospitalizations in  at [facility] and [facility]), who presents from home w/ c/o of SOB + generalized weakness and lethargy with loss of appetite/decreased PO intake, and his wife noticing his having dark/black stools in setting of diffuse abdominal pain worse in the LLQ (pt is s/p appendectomy in the past). Admitted for hypoxia due to pneumonia and large L pleural effusion now clinically improved s/p thoracentesis and abx treatment for PNA, currently undergoing volume removal with HD."    Source: [] Patient       [x] EMR        [] RN        [x] Family at bedside - pt's wife at bedside      [] Other:    -If unable to interview patient: [] Trach/Vent/BiPAP  [] Disoriented/confused/inappropriate to interview    Diet Order:   Diet, Pureed:   Consistent Carbohydrate {No Snacks} (CSTCHO)  Moderately Thick Liquids (MODTHICKLIQS)  For patients receiving Renal Replacement - No Protein Restr, No Conc K, No Conc Phos, Low Sodium (RENAL) (23)    - Is current order appropriate/adequate? [] Yes  []  No:     - PO intake :   [] >75%  Adequate    [] 50-75%  Fair       [] <50%  Poor  pt's wife reports pt consuming ~50% [employer] renal support shake, and >/=50% meals consumption at this time   reports pt tolerates current diet consistency     - Nutrition-related concerns:  - speech pathologist  MBS, speech pathologist  recommended continue .. "Continue puree diet with moderately thickened liquids"  - ESRD on dialysis x3/week. Nephrology following.   - continue on insulin lispro (ADMELOG) corrective regimen sliding scale and consistent CHO diet     GI:  Last BM  as per flowsheets   Bowel Regimen? [x] Yes- miralax   [] No    Weights:   Daily Weight in k.6 (-), Weight in k.1 (), Weight in k.1 (), Weight in k.7 (), Weight in k.2 (), Weight in k.2 (), Weight in k.6 ()  44.6 ()-> 42.6 () ?4% wt lossx 5 days  vs. fluid shifts in settings of dialysis; continue to monitor wt trends     Nutritionally Pertinent MEDICATIONS  (STANDING):  atorvastatin  calcium acetate  dextrose 5%.  dextrose 50% Injectable  finasteride  glucagon  Injectable  insulin lispro (ADMELOG) corrective regimen sliding scale  metoprolol tartrate  midodrine.  midodrine.  Nephro-chandrika  nystatin    Suspension  pantoprazole  Injectable  polyethylene glycol 3350    Pertinent Labs:  @ 07:10: Na 130<L>, BUN 63<H>, Cr 6.17<H>, <H>, K+ 3.8, Phos --, Mg --, Alk Phos --, ALT/SGPT --, AST/SGOT --, HbA1c --    A1C with Estimated Average Glucose Result: 9.1 % (23 @ 11:53)    Finger Sticks:  POCT Blood Glucose.: 98 mg/dL ( @ 13:09)  POCT Blood Glucose.: 142 mg/dL ( @ 07:24)  POCT Blood Glucose.: 162 mg/dL ( @ 21:25)  POCT Blood Glucose.: 225 mg/dL ( @ 16:35)    Skin per nursing documentation: suspected deep tissue injuries (Right:; ischium;Left:; ischium; Right:; trochanter; Left:; trochanter; sacrum) as per flowsheets; no noted wound care note   Edema: No noted edema as per flow sheets.     Estimated Needs:   [x] no change since previous assessment  [] recalculated:     Previous Nutrition Diagnosis: chronic severe protein calorie malnutrition; Increased protein-energy needs   Nutrition Diagnosis is: [x] ongoing  [] resolved [] not applicable     New Nutrition Diagnosis: swallowing difficulty related to mechanical/motor dysfunction as evidenced by speech evaluation   goal: pt to tolerate nutrition therapy in safe route during this admission    Nutrition Care Plan:  [x] In Progress- addressed with diet order, PO encouragement and nutritional supplements    [] Achieved  [] Not applicable    Nutrition Interventions:     Education Provided:       [] Yes:  [] No: Encouraged adequate PO intake for meeting nutritional needs, improving nutritional status and for preventing wt loss        Recommendations:      - Continue current diet order; Defer diet/fluid consistencies to medical team/SLP recommendations.  - Continue A [place] renal support shake BID  - If not medically contraindicated, recommend Nephro-chandrika daily  - Provide assistance with meals as needed to promote adequate PO intake  - Encourage adequate consumption of meals/supplements to optimize protein-energy intake  - Will continue to monitor PO intake, weight, labs, skin, GI status, diet.   - Nutrition care plan to remain consistent with pt goals of care  - RD remains available to review diet education and adjust diet recommendations as needed.      Monitoring and Evaluation:   Continue to monitor nutritional intake, tolerance to diet prescription, weights, labs, skin integrity      RD remains available upon request and will follow up per protocol  Odalis Soto RD CDN #[phone] or Teams (preferred)

## 2023-05-02 NOTE — PROGRESS NOTE ADULT - ASSESSMENT
87M PMHx chronic AFib (on Eliquis, off ASA since hx of PUD/GIB in the past), s/p Micra PPM, non-obstructive CAD, ESRD on HD (Tu/Th/Sa), DM, HLD, and prior history of large pleural effusions (requiring drainage during hospitalizations in 2021 at [facility] and [facility]), who presents from home w/ c/o of SOB + generalized weakness and lethargy with loss of appetite/decreased PO intake, and his wife noticing his having dark/black stools in setting of diffuse abdominal pain worse in the LLQ (pt is s/p appendectomy in the past). Admitted for hypoxia due to pneumonia and large L pleural effusion now clinically improved s/p thoracentesis and abx treatment for PNA, currently undergoing volume removal with HD.

## 2023-05-03 NOTE — PROGRESS NOTE ADULT - PROBLEM SELECTOR PLAN 5
- Noted PUD/GI bleed in the past while on ASA in the past  - Blood pressure and Hgb stable  - GI consult appreciated, recommending conservative management at this time   - FOBT negative  - Iron studies, B12/folate wnl  - pantoprazole 40mg PO daily  - Monitor CBC on Eliquis

## 2023-05-03 NOTE — PROGRESS NOTE ADULT - PROBLEM SELECTOR PLAN 2
- proBNP = 110,578 on admission  - TTE with hyperdynamic LVSF with EF 75%, distal segments and apex with elevated regional strain with basal + mid segments with decreased regional strain. suggestive of amyloid cardiomyopathy, RV not well visualized but with enlarged RV cavity size, normal wall thickness and reduced systolic fxn, mild MV stenosis, trace AR, trace pericardial effusion  - s/p thoracentesis with Pulmonary on 4/24 with 700cc removed  - pleural fluid studies appears transudative, cytology negative for malignancy  - volume removal with HD (with midodrine 10mg prior to HD on HD days to allow for more robust volume removal)  - CXR 4/28 demonstrating increased L pleural effusion indication need for more volume removal  - incentive spirometry as tolerated  - strict I/Os, daily weights

## 2023-05-03 NOTE — PROGRESS NOTE ADULT - SUBJECTIVE AND OBJECTIVE BOX
[facility] Division of Hospital Medicine  Kai Ding MD, MARCY  I'm reachable on [employer] Teams   Off hours:  [phone] ([facility] pager)    Patient is a 87y old  Male who presents with a chief complaint of SOB, generalized weakness, dark stools (02 May 2023 16:57)      SUBJECTIVE / OVERNIGHT EVENTS:  No events overnight. No acute complaints. Siting in the chair at bedside- eating breakfast independently. Awaiting ARNAV placement.     MEDICATIONS  (STANDING):  apixaban 2.5 milliGRAM(s) Oral every 12 hours  atorvastatin 40 milliGRAM(s) Oral at bedtime  calcium acetate 667 milliGRAM(s) Oral three times a day with meals  chlorhexidine 2% Cloths 1 Application(s) Topical daily  dextrose 5%. 1000 milliLiter(s) (50 mL/Hr) IV Continuous <Continuous>  dextrose 50% Injectable 25 Gram(s) IV Push once  finasteride 5 milliGRAM(s) Oral daily  glucagon  Injectable 1 milliGRAM(s) IntraMuscular once  insulin lispro (ADMELOG) corrective regimen sliding scale   SubCutaneous three times a day before meals  metoprolol tartrate 50 milliGRAM(s) Oral every 12 hours  midodrine. 10 milliGRAM(s) Oral once  midodrine. 10 milliGRAM(s) Oral <User Schedule>  Nephro-chandrika 1 Tablet(s) Oral daily  nystatin    Suspension 135892 Unit(s) Swish and Swallow four times a day  pantoprazole  Injectable 40 milliGRAM(s) IV Push daily  polyethylene glycol 3350 17 Gram(s) Oral daily    MEDICATIONS  (PRN):  dextrose Oral Gel 15 Gram(s) Oral once PRN Blood Glucose LESS THAN 70 milliGRAM(s)/deciliter    CAPILLARY BLOOD GLUCOSE      POCT Blood Glucose.: 137 mg/dL (03 May 2023 08:02)  POCT Blood Glucose.: 148 mg/dL (02 May 2023 21:34)  POCT Blood Glucose.: 229 mg/dL (02 May 2023 16:39)  POCT Blood Glucose.: 98 mg/dL (02 May 2023 13:09)    I&O's Summary    02 May 2023 07:01  -  03 May 2023 07:00  --------------------------------------------------------  IN: 600 mL / OUT: 1500 mL / NET: -900 mL        PHYSICAL EXAM:  Vital Signs Last 24 Hrs  T(C): 36.7 (03 May 2023 04:47), Max: 36.7 (03 May 2023 04:47)  T(F): 98.1 (03 May 2023 04:47), Max: 98.1 (03 May 2023 04:47)  HR: 76 (03 May 2023 11:09) (72 - 98)  BP: 118/62 (03 May 2023 11:09) (100/61 - 126/55)  BP(mean): --  RR: 18 (03 May 2023 11:09) (18 - 18)  SpO2: 95% (03 May 2023 11:09) (95% - 100%)    Parameters below as of 03 May 2023 11:09  Patient On (Oxygen Delivery Method): room air    CONSTITUTIONAL: Elderly man, No acute distress.  RESPIRATORY: Decreased lung sounds at bases bilaterally. No wheezes, rales, or rhonchi. No accessory muscle use.  CARDIOVASCULAR: +S1/S2. Regular rate and rhythm. No murmurs, rubs, or gallops. No edema.  GASTROINTESTINAL: Soft, nontender, nondistended. +BS. No rebound or guarding.   MUSCULOSKELETAL: Spontaneous movement in all extremities.  PSYCHIATRIC: Calm, cooperative       LABS:                        13.5   4.48  )-----------( 101      ( 03 May 2023 06:43 )             41.0     05-03    133<L>  |  95<L>  |  37<H>  ----------------------------<  129<H>  3.6   |  26  |  4.55<H>    Ca    8.5      03 May 2023 06:43

## 2023-05-03 NOTE — PROGRESS NOTE ADULT - ASSESSMENT
87M PMHx chronic AFib (on Eliquis, off ASA since hx of PUD/GIB in the past), s/p Micra PPM, non-obstructive CAD, ESRD on HD (Tu/Th/Sa), DM, HLD, and prior history of large pleural effusions (requiring drainage during hospitalizations in 2021 at [facility] and [facility]), who presents from home w/ c/o of SOB + generalized weakness and lethargy with loss of appetite/decreased PO intake, and his wife noticing his having dark/black stools in setting of diffuse abdominal pain worse in the LLQ (pt is s/p appendectomy in the past). Admitted for hypoxia due to pneumonia and large L pleural effusion now clinically improved s/p thoracentesis and abx treatment for PNA, currently undergoing volume removal with HD.

## 2023-05-03 NOTE — PROGRESS NOTE ADULT - SUBJECTIVE AND OBJECTIVE BOX
DATE OF SERVICE: 05-03-23 @ 15:00    Patient is a 87y old  Male who presents with a chief complaint of SOB, generalized weakness, dark stools (03 May 2023 11:54)      INTERVAL HISTORY: Feels ok.     REVIEW OF SYSTEMS:  CONSTITUTIONAL: No weakness  EYES/ENT: No visual changes;  No throat pain   NECK: No pain or stiffness  RESPIRATORY: No cough, wheezing; No shortness of breath  CARDIOVASCULAR: No chest pain or palpitations  GASTROINTESTINAL: No abdominal  pain. No nausea, vomiting, or hematemesis  GENITOURINARY: No dysuria, frequency or hematuria  NEUROLOGICAL: No stroke like symptoms  SKIN: No rashes    TELEMETRY Personally reviewed: AF 60-90  	  MEDICATIONS:  metoprolol tartrate 50 milliGRAM(s) Oral every 12 hours  midodrine. 10 milliGRAM(s) Oral <User Schedule>  midodrine. 10 milliGRAM(s) Oral once        PHYSICAL EXAM:  T(C): 36.7 (05-03-23 @ 11:09), Max: 36.7 (05-03-23 @ 04:47)  HR: 76 (05-03-23 @ 11:09) (72 - 98)  BP: 118/62 (05-03-23 @ 11:09) (100/61 - 120/58)  RR: 18 (05-03-23 @ 11:09) (18 - 18)  SpO2: 95% (05-03-23 @ 11:09) (95% - 98%)  Wt(kg): --  I&O's Summary    02 May 2023 07:01  -  03 May 2023 07:00  --------------------------------------------------------  IN: 600 mL / OUT: 1500 mL / NET: -900 mL    03 May 2023 07:01  -  03 May 2023 15:00  --------------------------------------------------------  IN: 560 mL / OUT: 0 mL / NET: 560 mL          Appearance: In no distress	  HEENT:    PERRL, EOMI	  Cardiovascular:  S1 S2, No JVD  Respiratory: Lungs clear to auscultation	  Gastrointestinal:  Soft, Non-tender, + BS	  Vascularature:  No edema of LE  Psychiatric: Appropriate affect   Neuro: no acute focal deficits                               13.5   4.48  )-----------( 101      ( 03 May 2023 06:43 )             41.0     05-03    133<L>  |  95<L>  |  37<H>  ----------------------------<  129<H>  3.6   |  26  |  4.55<H>    Ca    8.5      03 May 2023 06:43          Labs personally reviewed      ASSESSMENT/PLAN: 	      88 yo male PMH of chronic Afib (on Eliquis, off ASA since hx of PUD/GIB in the past), s/p Micra PPM, non-obstructive CAD, ESRD on HD (Tu/Th/Sa), DM, HLD, and prior history of large pleural effusions (requiring drainage during hospitalizations in 2021 at [facility] and [facility]), who presents from home w/ c/o of SOB + generalized weakness and lethargy with loss of appetite/decreased PO intake, and his wife noticing his having dark/black stools in setting of diffuse abdominal pain worse in the LLQ (pt is s/p appendectomy in the past). Admitted for hypoxia due to pneumonia and large L pleural effusion.        Problem/Plan - 1:  ·  Problem: Diastolic HF.   ·  Plan: - TTE suggestive of amyloid, EKG with low voltage, hx of AF  -  Nuclear pyrophosphate scan revealing Cardiac amyloid Imaging study is NOT suggestive of transthyretin cardiac amyloidosis.  - [place] Kappa and Jeaneth chains elevated ? 2/2 inflammation   - serum Immunofixation with no monoclonal band   - Volume mgmt with HD    Problem/Plan - 2:  ·  Problem: Chronic atrial fibrillation.   ·   Has been started on Lopressor 50mg BID   - Eliquis for AC    Problem/Plan - 3:  ·  Problem: Hyperlipidemia.   ·  Plan: - c/w atorvastatin 40mg qHS.    Problem/Plan -4:  ·  Problem: Prophylactic measure.   ·  Plan; DVT ppx:  eliquis         Janki Ingram, AG-NP   Shalom Claudio DO [facility]  Cardiovascular Medicine  [address] [place] [address], [address]  Available through call or text on [employer]  Office: [phone]

## 2023-05-03 NOTE — PROGRESS NOTE ADULT - PROBLEM SELECTOR PLAN 6
- HD per Nephrology (patient follows w/Dr. Julius Eason in [place] as O/P on T/Th/Sa schedule)  - C/w midodrine 10mg pre-HD on HD days to allow for volume removal  - CXR with increase in L pleural effusion - should improve slowly with time as the patient undergoes dialysis   - C/w [place] TID with meals

## 2023-05-04 NOTE — PROGRESS NOTE ADULT - SUBJECTIVE AND OBJECTIVE BOX
DATE OF SERVICE: 05-04-23 @ 12:58    Patient is a 87y old  Male who presents with a chief complaint of SOB, generalized weakness, dark stools (04 May 2023 06:33)      INTERVAL HISTORY: Feels ok.     REVIEW OF SYSTEMS:  CONSTITUTIONAL: No weakness  EYES/ENT: No visual changes;  No throat pain   NECK: No pain or stiffness  RESPIRATORY: No cough, wheezing; No shortness of breath  CARDIOVASCULAR: No chest pain or palpitations  GASTROINTESTINAL: No abdominal  pain. No nausea, vomiting, or hematemesis  GENITOURINARY: No dysuria, frequency or hematuria  NEUROLOGICAL: No stroke like symptoms  SKIN: No rashes    TELEMETRY Personally reviewed: af 60-90  	  MEDICATIONS:  metoprolol tartrate 50 milliGRAM(s) Oral every 12 hours  midodrine. 10 milliGRAM(s) Oral <User Schedule>  midodrine. 10 milliGRAM(s) Oral once        PHYSICAL EXAM:  T(C): 36.5 (05-04-23 @ 11:29), Max: 36.6 (05-03-23 @ 21:18)  HR: 79 (05-04-23 @ 11:29) (70 - 79)  BP: 143/60 (05-04-23 @ 11:29) (109/60 - 143/60)  RR: 18 (05-04-23 @ 11:29) (14 - 18)  SpO2: 95% (05-04-23 @ 11:29) (95% - 97%)  Wt(kg): --  I&O's Summary    03 May 2023 07:01  -  04 May 2023 07:00  --------------------------------------------------------  IN: 560 mL / OUT: 0 mL / NET: 560 mL          Appearance: In no distress	  HEENT:    PERRL, EOMI	  Cardiovascular:  S1 S2, No JVD  Respiratory: Lungs clear to auscultation	  Gastrointestinal:  Soft, Non-tender, + BS	  Vascularature:  No edema of LE  Psychiatric: Appropriate affect   Neuro: no acute focal deficits                               12.9   4.88  )-----------( 106      ( 04 May 2023 09:27 )             39.8     05-04    133<L>  |  91<L>  |  55<H>  ----------------------------<  166<H>  4.1   |  25  |  5.84<H>    Ca    8.9      04 May 2023 09:28          Labs personally reviewed      ASSESSMENT/PLAN: 	    88 yo male PMH of chronic Afib (on Eliquis, off ASA since hx of PUD/GIB in the past), s/p Micra PPM, non-obstructive CAD, ESRD on HD (Tu/Th/Sa), DM, HLD, and prior history of large pleural effusions (requiring drainage during hospitalizations in 2021 at [facility] and [facility]), who presents from home w/ c/o of SOB + generalized weakness and lethargy with loss of appetite/decreased PO intake, and his wife noticing his having dark/black stools in setting of diffuse abdominal pain worse in the LLQ (pt is s/p appendectomy in the past). Admitted for hypoxia due to pneumonia and large L pleural effusion.        Problem/Plan - 1:  ·  Problem: Diastolic HF.   ·  Plan: - TTE suggestive of amyloid, EKG with low voltage, hx of AF  -  Nuclear pyrophosphate scan revealing Cardiac amyloid Imaging study is NOT suggestive of transthyretin cardiac amyloidosis.  - [place] Kappa and Jeaneth chains elevated ? 2/2 inflammation   - serum Immunofixation with no monoclonal band   - Volume mgmt with HD  - Appears euvolemic  - proBNP significantly down    Problem/Plan - 2:  ·  Problem: Chronic atrial fibrillation.   ·   Has been started on Lopressor 50mg BID   - Eliquis for AC    Problem/Plan - 3:  ·  Problem: Hyperlipidemia.   ·  Plan: - c/w atorvastatin 40mg qHS.    Problem/Plan -4:  ·  Problem: Prophylactic measure.   ·  Plan; DVT ppx:  eliquis           Janki Ingram, MELONY-ALBINA Claudio DO [facility]  Cardiovascular Medicine  [address] [place] [address], [address]  Available through call or text on [employer]  Office: [phone]

## 2023-05-04 NOTE — PROGRESS NOTE ADULT - ASSESSMENT
87M PMHx chronic AFib (on Eliquis, off ASA since hx of PUD/GIB in the past), s/p Micra PPM, non-obstructive CAD, ESRD on HD (Tu/Th/Sa), DM, HLD, and prior history of large pleural effusions (requiring drainage during hospitalizations in 2021 at [facility] and [facility]), who presents from home w/ c/o of SOB + generalized weakness and lethargy with loss of appetite/decreased PO intake, and his wife noticing his having dark/black stools in setting of diffuse abdominal pain worse in the LLQ (pt is s/p appendectomy in the past). Admitted for hypoxia due to pneumonia and large L pleural effusion now clinically improved s/p thoracentesis and abx treatment for PNA, currently undergoing volume removal with HD.

## 2023-05-04 NOTE — PROGRESS NOTE ADULT - SUBJECTIVE AND OBJECTIVE BOX
[facility] Division of Hospital Medicine  Kai Ding MD, MARCY  I'm reachable on [employer] Teams   Off hours:  [phone] ([facility] pager)    Patient is a 87y old  Male who presents with a chief complaint of SOB, generalized weakness, dark stools (04 May 2023 12:58)      SUBJECTIVE / OVERNIGHT EVENTS:  No events overnight.     MEDICATIONS  (STANDING):  apixaban 2.5 milliGRAM(s) Oral every 12 hours  atorvastatin 40 milliGRAM(s) Oral at bedtime  calcium acetate 667 milliGRAM(s) Oral three times a day with meals  chlorhexidine 2% Cloths 1 Application(s) Topical daily  dextrose 5%. 1000 milliLiter(s) (50 mL/Hr) IV Continuous <Continuous>  dextrose 50% Injectable 25 Gram(s) IV Push once  finasteride 5 milliGRAM(s) Oral daily  glucagon  Injectable 1 milliGRAM(s) IntraMuscular once  insulin lispro (ADMELOG) corrective regimen sliding scale   SubCutaneous three times a day before meals  metoprolol tartrate 50 milliGRAM(s) Oral every 12 hours  midodrine. 10 milliGRAM(s) Oral <User Schedule>  Nephro-hcandrika 1 Tablet(s) Oral daily  nystatin    Suspension 073755 Unit(s) Swish and Swallow four times a day  pantoprazole   Suspension 40 milliGRAM(s) Oral daily  polyethylene glycol 3350 17 Gram(s) Oral daily    MEDICATIONS  (PRN):  dextrose Oral Gel 15 Gram(s) Oral once PRN Blood Glucose LESS THAN 70 milliGRAM(s)/deciliter    CAPILLARY BLOOD GLUCOSE      POCT Blood Glucose.: 203 mg/dL (04 May 2023 12:12)  POCT Blood Glucose.: 145 mg/dL (04 May 2023 07:59)  POCT Blood Glucose.: 204 mg/dL (03 May 2023 21:20)    I&O's Summary    03 May 2023 07:01  -  04 May 2023 07:00  --------------------------------------------------------  IN: 560 mL / OUT: 0 mL / NET: 560 mL        PHYSICAL EXAM:  Vital Signs Last 24 Hrs  T(C): 36.3 (04 May 2023 17:05), Max: 36.6 (03 May 2023 21:18)  T(F): 97.3 (04 May 2023 17:05), Max: 97.8 (03 May 2023 21:18)  HR: 66 (04 May 2023 17:05) (66 - 82)  BP: 122/57 (04 May 2023 17:05) (104/57 - 143/60)  BP(mean): --  RR: 18 (04 May 2023 17:05) (14 - 18)  SpO2: 100% (04 May 2023 17:05) (95% - 100%)    Parameters below as of 04 May 2023 17:05  Patient On (Oxygen Delivery Method): room air    CONSTITUTIONAL: Elderly man, No acute distress.  RESPIRATORY: Decreased lung sounds at bases bilaterally. No wheezes, rales, or rhonchi. No accessory muscle use.  CARDIOVASCULAR: +S1/S2. Regular rate and rhythm. No murmurs, rubs, or gallops. No edema.  GASTROINTESTINAL: Soft, nontender, nondistended. +BS. No rebound or guarding.   MUSCULOSKELETAL: Spontaneous movement in all extremities.  PSYCHIATRIC: Calm, cooperative       LABS:                        12.9   4.88  )-----------( 106      ( 04 May 2023 09:27 )             39.8     05-04    133<L>  |  91<L>  |  55<H>  ----------------------------<  166<H>  4.1   |  25  |  5.84<H>    Ca    8.9      04 May 2023 09:28

## 2023-05-04 NOTE — PROGRESS NOTE ADULT - PROBLEM SELECTOR PLAN 2
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 Serum phosphorus is at goal for ESRD. Recommend to continue with phosphorus binder. Monitor serum phosphorus, goal: 3.5-5.5.    If any questions, please feel free to contact me     Tray Lynn  Nephrology Fellow  [facility] Pager: [phone].

## 2023-05-04 NOTE — PROGRESS NOTE ADULT - PROBLEM SELECTOR PLAN 1
Pt. with ESRD on HD three times a week (TTS) presented to [facility] for failure to thrive and lethargy. Found to have pneumonia and large L pleural effusion. Last inpatient HD treatment was on 5/2 via LUE AVF. Pt. clinically stable. Labs reviewed. Plan for maintenance HD treatment today. Monitor labs and vitals. Avoid nephrotoxins. Dose medications to ESRD.

## 2023-05-04 NOTE — PROGRESS NOTE ADULT - PROBLEM SELECTOR PLAN 6
- HD per Nephrology (patient follows w/Dr. Julius Eason in [place] as O/P on T/Th/Sa schedule)  - C/w midodrine 10mg pre-HD on HD days to allow for volume removal  - CXR with increase in L pleural effusion - should improve slowly with time as the patient undergoes dialysis   - C/w Phoslo TID with meals

## 2023-05-04 NOTE — PROGRESS NOTE ADULT - PROBLEM SELECTOR PLAN 10
DVT ppx: Eliquis  Dispo: ARNAV with HD when able to be set up by CM. Patient remains medically optimized.     Of note, outpatient follow-up with Geriatrics to establish primary care per daughter Desiree's request.

## 2023-05-04 NOTE — PROGRESS NOTE ADULT - SUBJECTIVE AND OBJECTIVE BOX
[facility] DIVISION OF KIDNEY DISEASES AND HYPERTENSION -- FOLLOW UP NOTE  --------------------------------------------------------------------------------  HPI: 87 y.o. M w/ PMHx of Afib (on Eliquis), s/p Micra PPM, non-obstructive CAD, ESRD on HD (Tu/Th/Sa; nephrologist = Dr. Julius Eason and HD center in [place]), DM (on Tradjenta), HLD, who presents from home with generalized weakness and lethargy for the past week with loss of appetite/decreased PO intake. Pt. found to have pneumonia and large L pleural effusion. Pt. being seen for ESRD management. Last inpatient HD treatment was on 5/2    24 hour events/subjective: Pt. was seen and evaluated at bedside this morning. Pt. minimally conversive, denied any chest pain, shortness of breath, nausea, vomiting, or abdominal pain        PAST HISTORY  --------------------------------------------------------------------------------  No significant changes to PMH, PSH, FHx, SHx, unless otherwise noted    ALLERGIES & MEDICATIONS  --------------------------------------------------------------------------------  Allergies    No Known Allergies    Intolerances      Standing Inpatient Medications  apixaban 2.5 milliGRAM(s) Oral every 12 hours  atorvastatin 40 milliGRAM(s) Oral at bedtime  calcium acetate 667 milliGRAM(s) Oral three times a day with meals  chlorhexidine 2% Cloths 1 Application(s) Topical daily  dextrose 5%. 1000 milliLiter(s) IV Continuous <Continuous>  dextrose 50% Injectable 25 Gram(s) IV Push once  finasteride 5 milliGRAM(s) Oral daily  glucagon  Injectable 1 milliGRAM(s) IntraMuscular once  insulin lispro (ADMELOG) corrective regimen sliding scale   SubCutaneous three times a day before meals  metoprolol tartrate 50 milliGRAM(s) Oral every 12 hours  midodrine. 10 milliGRAM(s) Oral <User Schedule>  midodrine. 10 milliGRAM(s) Oral once  Nephro-chandrika 1 Tablet(s) Oral daily  nystatin    Suspension 348232 Unit(s) Swish and Swallow four times a day  pantoprazole    Tablet 40 milliGRAM(s) Oral before breakfast  polyethylene glycol 3350 17 Gram(s) Oral daily    PRN Inpatient Medications  dextrose Oral Gel 15 Gram(s) Oral once PRN      REVIEW OF SYSTEMS      All other systems were reviewed and are negative, except as noted.    VITALS/PHYSICAL EXAM  --------------------------------------------------------------------------------  T(C): 36.3 (05-04-23 @ 04:17), Max: 36.7 (05-03-23 @ 11:09)  HR: 76 (05-04-23 @ 04:17) (72 - 76)  BP: 128/70 (05-04-23 @ 04:17) (109/60 - 128/70)  RR: 18 (05-04-23 @ 04:17) (18 - 18)  SpO2: 97% (05-04-23 @ 04:17) (95% - 97%)  Wt(kg): --    05-02-23 @ 07:01  -  05-03-23 @ 07:00  --------------------------------------------------------  IN: 600 mL / OUT: 1500 mL / NET: -900 mL    05-03-23 @ 07:01  -  05-04-23 @ 06:33  --------------------------------------------------------  IN: 560 mL / OUT: 0 mL / NET: 560 mL      Physical Exam:  Gen: elderly, ill appearing   HEENT: MMM on NC  Pulm: CTA  CV: S1S2  Abd: Soft, +BS   Ext: No LE edema B/L  Neuro: Awake  Skin: Warm and dry  Vascular access: LUE AVF with +thrill    LABS/STUDIES  --------------------------------------------------------------------------------              13.5   4.48  >-----------<  101      [05-03-23 @ 06:43]              41.0     133  |  95  |  37  ----------------------------<  129      [05-03-23 @ 06:43]  3.6   |  26  |  4.55        Ca     8.5     [05-03-23 @ 06:43]    Creatinine Trend:  SCr 4.55 [05-03 @ 06:43]  SCr 6.17 [05-02 @ 07:10]  SCr 5.21 [05-01 @ 06:08]  SCr 3.91 [04-30 @ 06:47]  SCr 4.94 [04-29 @ 07:07]        Iron 58, TIBC 147, %sat 40      [04-26-23 @ 07:02]  Ferritin 2482      [04-22-23 @ 11:53]  TSH 10.90      [04-22-23 @ 11:53]  Lipid: chol 164, , HDL 59, LDL --      [04-22-23 @ 11:53]    HBsAg Nonreact      [05-02-23 @ 16:13]  HCV 0.09, Nonreact      [05-02-23 @ 16:13]    Free Light Chains: kappa 11.14, lambda 10.66, ratio = 1.05      [04-27 @ 05:47]  Immunofixation Serum:   No Monoclonal Band Identified    Reference Range: None Detected      [04-27-23 @ 05:47]

## 2023-05-05 NOTE — PROGRESS NOTE ADULT - SUBJECTIVE AND OBJECTIVE BOX
DATE OF SERVICE: 05-05-23 @ 13:57    Patient is a 87y old  Male who presents with a chief complaint of SOB, generalized weakness, dark stools (04 May 2023 17:30)      INTERVAL HISTORY: Feels ok.     REVIEW OF SYSTEMS:  CONSTITUTIONAL: No weakness  EYES/ENT: No visual changes;  No throat pain   NECK: No pain or stiffness  RESPIRATORY: No cough, wheezing; No shortness of breath  CARDIOVASCULAR: No chest pain or palpitations  GASTROINTESTINAL: No abdominal  pain. No nausea, vomiting, or hematemesis  GENITOURINARY: No dysuria, frequency or hematuria  NEUROLOGICAL: No stroke like symptoms  SKIN: No rashes    	  MEDICATIONS:  metoprolol tartrate 50 milliGRAM(s) Oral every 12 hours  midodrine. 10 milliGRAM(s) Oral <User Schedule>        PHYSICAL EXAM:  T(C): 36.3 (05-05-23 @ 11:37), Max: 36.7 (05-05-23 @ 04:52)  HR: 82 (05-05-23 @ 11:37) (66 - 82)  BP: 132/73 (05-05-23 @ 11:37) (111/59 - 132/73)  RR: 18 (05-05-23 @ 11:37) (16 - 18)  SpO2: 97% (05-05-23 @ 11:37) (95% - 100%)  Wt(kg): --  I&O's Summary    04 May 2023 07:01  -  05 May 2023 07:00  --------------------------------------------------------  IN: 0 mL / OUT: 1500 mL / NET: -1500 mL    05 May 2023 07:01  -  05 May 2023 13:57  --------------------------------------------------------  IN: 240 mL / OUT: 0 mL / NET: 240 mL          Appearance: In no distress	  HEENT:    PERRL, EOMI	  Cardiovascular:  S1 S2, No JVD  Respiratory: Lungs clear to auscultation	  Gastrointestinal:  Soft, Non-tender, + BS	  Vascularature:  No edema of LE  Psychiatric: Appropriate affect   Neuro: no acute focal deficits                               12.9   4.88  )-----------( 106      ( 04 May 2023 09:27 )             39.8     05-04    133<L>  |  91<L>  |  55<H>  ----------------------------<  166<H>  4.1   |  25  |  5.84<H>    Ca    8.9      04 May 2023 09:28          Labs personally reviewed      ASSESSMENT/PLAN: 	    86 yo male PMH of chronic Afib (on Eliquis, off ASA since hx of PUD/GIB in the past), s/p Micra PPM, non-obstructive CAD, ESRD on HD (Tu/Th/Sa), DM, HLD, and prior history of large pleural effusions (requiring drainage during hospitalizations in 2021 at [facility] and [facility]), who presents from home w/ c/o of SOB + generalized weakness and lethargy with loss of appetite/decreased PO intake, and his wife noticing his having dark/black stools in setting of diffuse abdominal pain worse in the LLQ (pt is s/p appendectomy in the past). Admitted for hypoxia due to pneumonia and large L pleural effusion.        Problem/Plan - 1:  ·  Problem: Diastolic HF.   ·  Plan: - TTE suggestive of amyloid, EKG with low voltage, hx of AF  -  Nuclear pyrophosphate scan revealing Cardiac amyloid Imaging study is NOT suggestive of transthyretin cardiac amyloidosis.  - [place] Kappa and Jeaneth chains elevated ? 2/2 inflammation   - serum Immunofixation with no monoclonal band   - Volume mgmt with HD  - Appears euvolemic  - proBNP significantly down    Problem/Plan - 2:  ·  Problem: Chronic atrial fibrillation.   ·   Has been started on Lopressor 50mg BID   - Eliquis for AC    Problem/Plan - 3:  ·  Problem: Hyperlipidemia.   ·  Plan: - c/w atorvastatin 40mg qHS.    Problem/Plan -4:  ·  Problem: Prophylactic measure.   ·  Plan; DVT ppx:  eliquis         Janki Ingram, AG-NP   Shalom Claudio DO [facility]  Cardiovascular Medicine  [address], [address]  Available through call or text on [employer]  Office: [phone]

## 2023-05-05 NOTE — PROGRESS NOTE ADULT - SUBJECTIVE AND OBJECTIVE BOX
[facility] Division of Hospital Medicine  Kai Ding MD, MARCY  I'm reachable on [employer] Teams   Off hours:  [phone] ([facility] pager)    Patient is a 87y old  Male who presents with a chief complaint of SOB, generalized weakness, dark stools (05 May 2023 13:56)      SUBJECTIVE / OVERNIGHT EVENTS:  No events overnight. No issues per nursing staff.     MEDICATIONS  (STANDING):  apixaban 2.5 milliGRAM(s) Oral every 12 hours  atorvastatin 40 milliGRAM(s) Oral at bedtime  calcium acetate 667 milliGRAM(s) Oral three times a day with meals  chlorhexidine 2% Cloths 1 Application(s) Topical daily  dextrose 5%. 1000 milliLiter(s) (50 mL/Hr) IV Continuous <Continuous>  dextrose 50% Injectable 25 Gram(s) IV Push once  finasteride 5 milliGRAM(s) Oral daily  glucagon  Injectable 1 milliGRAM(s) IntraMuscular once  insulin lispro (ADMELOG) corrective regimen sliding scale   SubCutaneous three times a day before meals  metoprolol tartrate 50 milliGRAM(s) Oral every 12 hours  midodrine. 10 milliGRAM(s) Oral <User Schedule>  Nephro-chandrika 1 Tablet(s) Oral daily  nystatin    Suspension 200909 Unit(s) Swish and Swallow four times a day  pantoprazole   Suspension 40 milliGRAM(s) Oral daily  polyethylene glycol 3350 17 Gram(s) Oral daily    MEDICATIONS  (PRN):  dextrose Oral Gel 15 Gram(s) Oral once PRN Blood Glucose LESS THAN 70 milliGRAM(s)/deciliter    CAPILLARY BLOOD GLUCOSE      POCT Blood Glucose.: 222 mg/dL (05 May 2023 11:55)  POCT Blood Glucose.: 126 mg/dL (05 May 2023 08:02)  POCT Blood Glucose.: 203 mg/dL (04 May 2023 21:30)  POCT Blood Glucose.: 92 mg/dL (04 May 2023 18:04)    I&O's Summary    04 May 2023 07:01  -  05 May 2023 07:00  --------------------------------------------------------  IN: 0 mL / OUT: 1500 mL / NET: -1500 mL    05 May 2023 07:01  -  05 May 2023 15:40  --------------------------------------------------------  IN: 240 mL / OUT: 0 mL / NET: 240 mL        PHYSICAL EXAM:  Vital Signs Last 24 Hrs  T(C): 36.3 (05 May 2023 11:37), Max: 36.7 (05 May 2023 04:52)  T(F): 97.3 (05 May 2023 11:37), Max: 98.1 (05 May 2023 04:52)  HR: 82 (05 May 2023 11:37) (66 - 82)  BP: 132/73 (05 May 2023 11:37) (111/59 - 132/73)  BP(mean): --  RR: 18 (05 May 2023 11:37) (16 - 18)  SpO2: 97% (05 May 2023 11:37) (95% - 100%)    Parameters below as of 05 May 2023 11:37  Patient On (Oxygen Delivery Method): room air    CONSTITUTIONAL: Elderly man, No acute distress.  RESPIRATORY: Decreased lung sounds at bases bilaterally. No wheezes, rales, or rhonchi. No accessory muscle use.  CARDIOVASCULAR: +S1/S2. Regular rate and rhythm. No murmurs, rubs, or gallops. No edema.  GASTROINTESTINAL: Soft, nontender, nondistended. +BS. No rebound or guarding.   MUSCULOSKELETAL: Spontaneous movement in all extremities.  PSYCHIATRIC: Calm, cooperative       LABS:                        12.9   4.88  )-----------( 106      ( 04 May 2023 09:27 )             39.8     05-04    133<L>  |  91<L>  |  55<H>  ----------------------------<  166<H>  4.1   |  25  |  5.84<H>    Ca    8.9      04 May 2023 09:28

## 2023-05-05 NOTE — PROGRESS NOTE ADULT - PROBLEM SELECTOR PLAN 10
DVT ppx: Eliquis  Dispo: ARNAV with HD - pending insurance auth. Patient remains medically optimized.     Of note, outpatient follow-up with Geriatrics to establish primary care per daughter Desiree's request.

## 2023-05-05 NOTE — PROGRESS NOTE ADULT - TIME BILLING
Personal review of data, imaging and discussion with medical team and family.
- Ordering, reviewing, and interpreting labs, testing, and imaging.  - Independently obtaining a review of systems and performing a physical exam  - Reviewing consultant documentation/recommendations in addition to discussing plan of care with consultants.  - Counselling and educating patient and family regarding interpretation of aforementioned items and plan of care.
Time spent reviewing the chart documentation, reviewing labs and imaging studies, evaluating the patient, discussing the plan of care with the consultants & medical team, and documenting.
Time spent reviewing the chart documentation, reviewing labs and imaging studies, evaluating the patient, discussing the plan of care with the consultants & medical team, and documenting.
Personal review of data, imaging and discussion with medical team and family.
Time spent reviewing the chart documentation, reviewing labs and imaging studies, evaluating the patient, discussing the plan of care with the consultants & medical team, and documenting.
Time spent reviewing the chart documentation, reviewing labs and imaging studies, evaluating the patient, discussing the plan of care with the consultants & medical team, and documenting.
- Ordering, reviewing, and interpreting labs, testing, and imaging.  - Independently obtaining a review of systems and performing a physical exam  - Reviewing consultant documentation/recommendations in addition to discussing plan of care with consultants.  - Counselling and educating patient and family regarding interpretation of aforementioned items and plan of care.
Time spent reviewing the chart documentation, reviewing labs and imaging studies, evaluating the patient, discussing the plan of care with the consultants & medical team, and documenting.

## 2023-05-05 NOTE — PROGRESS NOTE ADULT - PROBLEM SELECTOR PLAN 6
- HD per Nephrology (patient follows w/Dr. Julius Eason in [place] as O/P on T/Th/Sa schedule)  - C/w midodrine 10mg pre-HD on HD days to allow for volume removal  - CXR with increase in L pleural effusion - should improve slowly with time as the patient undergoes dialysis   - C/w Phoslo TID with meals

## 2023-05-05 NOTE — PROGRESS NOTE ADULT - ASSESSMENT
87M PMHx chronic AFib (on Eliquis, off ASA since hx of PUD/GIB in the past), s/p Micra PPM, non-obstructive CAD, ESRD on HD (Tu/Th/Sa), DM, HLD, and prior history of large pleural effusions (requiring drainage during hospitalizations in 2021 at [facility] and [facility]), who presents from home w/ c/o of SOB + generalized weakness and lethargy with loss of appetite/decreased PO intake, and his wife noticing his having dark/black stools in setting of diffuse abdominal pain worse in the LLQ (pt is s/p appendectomy in the past). Admitted for hypoxia due to pneumonia and large L pleural effusion now clinically improved s/p thoracentesis and abx treatment for PNA, currently undergoing volume removal with HD.

## 2023-05-05 NOTE — PROGRESS NOTE ADULT - PROBLEM SELECTOR PLAN 1
- 2/2 to multifocal pneumonia, large L pleural effusion, hypervolemia  - his condition has much improved - currently on RA  - remains medically stable for dc to ARNAV - pending insurance auth

## 2023-05-06 NOTE — PROGRESS NOTE ADULT - ASSESSMENT
87M PMHx chronic AFib (on Eliquis, off ASA since hx of PUD/GIB in the past), s/p Micra PPM, non-obstructive CAD, ESRD on HD (Tu/Th/Sa), DM, HLD, and prior history of large pleural effusions (requiring drainage during hospitalizations in 2021 at [facility] and [facility]), who presents from home w/ c/o of SOB + generalized weakness and lethargy with loss of appetite/decreased PO intake, and his wife noticing his having dark/black stools in setting of diffuse abdominal pain worse in the LLQ (pt is s/p appendectomy in the past). Admitted for hypoxia due to pneumonia and large L pleural effusion now clinically improved s/p thoracentesis and abx treatment for PNA, currently undergoing volume removal with HD.

## 2023-05-06 NOTE — PROGRESS NOTE ADULT - PROBLEM SELECTOR PLAN 1
Pt. with ESRD on HD three times a week (TTS) presented to [facility] for failure to thrive and lethargy. Found to have pneumonia and large L pleural effusion. Last inpatient HD treatment was on 5/4 via LUE AVF. Pt. clinically stable. Labs reviewed. Plan for maintenance HD treatment today. Monitor labs and vitals. Avoid nephrotoxins. Dose medications to ESRD.

## 2023-05-06 NOTE — PROGRESS NOTE ADULT - PROBLEM SELECTOR PLAN 6
- HD per Nephrology (patient follows w/Dr. Julius Eason in [place] as O/P on T/Th/Sa schedule)  - C/w midodrine 10mg pre-HD on HD days to allow for volume removal  - CXR with increase in L pleural effusion - should improve slowly with time as the patient undergoes dialysis   - C/w Phoslo TID with meals

## 2023-05-06 NOTE — PROGRESS NOTE ADULT - SUBJECTIVE AND OBJECTIVE BOX
[facility] DIVISION OF KIDNEY DISEASES AND HYPERTENSION   --------------------------------------------------------------------------------  Chief Complaint: ESRD/Ongoing hemodialysis requirement    24 hour events/subjective:    Seen on hD  sleeping    PAST HISTORY  --------------------------------------------------------------------------------  No significant changes to PMH, PSH, FHx, SHx, unless otherwise noted    ALLERGIES & MEDICATIONS  --------------------------------------------------------------------------------  Allergies    No Known Allergies    Intolerances      Standing Inpatient Medications  apixaban 2.5 milliGRAM(s) Oral every 12 hours  atorvastatin 40 milliGRAM(s) Oral at bedtime  calcium acetate 667 milliGRAM(s) Oral three times a day with meals  chlorhexidine 2% Cloths 1 Application(s) Topical daily  dextrose 5%. 1000 milliLiter(s) IV Continuous <Continuous>  dextrose 50% Injectable 25 Gram(s) IV Push once  finasteride 5 milliGRAM(s) Oral daily  glucagon  Injectable 1 milliGRAM(s) IntraMuscular once  insulin lispro (ADMELOG) corrective regimen sliding scale   SubCutaneous three times a day before meals  metoprolol tartrate 50 milliGRAM(s) Oral every 12 hours  midodrine. 10 milliGRAM(s) Oral <User Schedule>  Nephro-chandrika 1 Tablet(s) Oral daily  nystatin    Suspension 710496 Unit(s) Swish and Swallow four times a day  pantoprazole   Suspension 40 milliGRAM(s) Oral daily  polyethylene glycol 3350 17 Gram(s) Oral daily    PRN Inpatient Medications  dextrose Oral Gel 15 Gram(s) Oral once PRN      REVIEW OF SYSTEMS  --------------------------------------------------------------------------------  All other systems were reviewed and are negative, except as noted.    VITALS/PHYSICAL EXAM  --------------------------------------------------------------------------------  T(C): 36.2 (05-06-23 @ 08:55), Max: 36.7 (05-06-23 @ 05:14)  HR: 81 (05-06-23 @ 08:55) (65 - 83)  BP: 111/62 (05-06-23 @ 08:55) (111/55 - 142/65)  RR: 18 (05-06-23 @ 08:55) (16 - 18)  SpO2: 100% (05-06-23 @ 08:55) (96% - 100%)  Wt(kg): --        05-05-23 @ 07:01  -  05-06-23 @ 07:00  --------------------------------------------------------  IN: 300 mL / OUT: 0 mL / NET: 300 mL    05-06-23 @ 07:01  -  05-06-23 @ 10:35  --------------------------------------------------------  IN: 120 mL / OUT: 0 mL / NET: 120 mL      Physical Exam:  	Gen: NAD  	Pulm: CTA B/L  	CV: RRR, S1S2;   	Abd: +BS, soft  	LE: Warm, FROM, no edema  	Skin: Warm, without rashes  	Vascular access:    LABS/STUDIES  --------------------------------------------------------------------------------                Iron 58, TIBC 147, %sat 40      [04-26-23 @ 07:02]  Ferritin 2482      [04-22-23 @ 11:53]  TSH 10.90      [04-22-23 @ 11:53]  Lipid: chol 164, , HDL 59, LDL --      [04-22-23 @ 11:53]    HBsAg Nonreact      [05-02-23 @ 16:13]  HCV 0.09, Nonreact      [05-02-23 @ 16:13]

## 2023-05-06 NOTE — PROGRESS NOTE ADULT - SUBJECTIVE AND OBJECTIVE BOX
DATE OF SERVICE: 05-06-23 @ 14:41    Patient is a 87y old  Male who presents with a chief complaint of SOB, generalized weakness, dark stools (06 May 2023 10:35)      INTERVAL HISTORY: feels well, no acute events overnight     REVIEW OF SYSTEMS:  CONSTITUTIONAL: No weakness  EYES/ENT: No visual changes;  No throat pain   NECK: No pain or stiffness  RESPIRATORY: No cough, wheezing; No shortness of breath  CARDIOVASCULAR: No chest pain or palpitations  GASTROINTESTINAL: No abdominal  pain. No nausea, vomiting, or hematemesis  GENITOURINARY: No dysuria, frequency or hematuria  NEUROLOGICAL: No stroke like symptoms  SKIN: No rashes    	  MEDICATIONS:  metoprolol tartrate 50 milliGRAM(s) Oral every 12 hours  midodrine. 10 milliGRAM(s) Oral <User Schedule>        PHYSICAL EXAM:  T(C): 37.1 (05-06-23 @ 13:08), Max: 37.1 (05-06-23 @ 13:08)  HR: 93 (05-06-23 @ 13:08) (65 - 93)  BP: 109/52 (05-06-23 @ 13:08) (109/52 - 142/65)  RR: 18 (05-06-23 @ 13:08) (16 - 18)  SpO2: 99% (05-06-23 @ 13:08) (96% - 100%)  Wt(kg): --  I&O's Summary    05 May 2023 07:01  -  06 May 2023 07:00  --------------------------------------------------------  IN: 300 mL / OUT: 0 mL / NET: 300 mL    06 May 2023 07:01  -  06 May 2023 14:41  --------------------------------------------------------  IN: 120 mL / OUT: 1500 mL / NET: -1380 mL          Appearance: In no distress	  HEENT:    PERRL, EOMI	  Cardiovascular:  S1 S2, No JVD  Respiratory: Lungs clear to auscultation	  Gastrointestinal:  Soft, Non-tender, + BS	  Vascularature:  No edema of LE  Psychiatric: Appropriate affect   Neuro: no acute focal deficits                     Labs personally reviewed      ASSESSMENT/PLAN: 	  88 yo male PMH of chronic Afib (on Eliquis, off ASA since hx of PUD/GIB in the past), s/p Micra PPM, non-obstructive CAD, ESRD on HD (Tu/Th/Sa), DM, HLD, and prior history of large pleural effusions (requiring drainage during hospitalizations in 2021 at [facility] and [facility]), who presents from home w/ c/o of SOB + generalized weakness and lethargy with loss of appetite/decreased PO intake, and his wife noticing his having dark/black stools in setting of diffuse abdominal pain worse in the LLQ (pt is s/p appendectomy in the past). Admitted for hypoxia due to pneumonia and large L pleural effusion.        Problem/Plan - 1:  ·  Problem: Diastolic HF.   ·  Plan: - TTE suggestive of amyloid, EKG with low voltage, hx of AF  -  Nuclear pyrophosphate scan revealing Cardiac amyloid Imaging study is NOT suggestive of transthyretin cardiac amyloidosis.  - [place] Kappa and Jeaneth chains elevated ? 2/2 inflammation   - serum Immunofixation with no monoclonal band   - Volume mgmt with HD  - Appears euvolemic  - proBNP significantly down    Problem/Plan - 2:  ·  Problem: Chronic atrial fibrillation.   ·   Has been started on Lopressor 50mg BID   - Eliquis for AC    Problem/Plan - 3:  ·  Problem: Hyperlipidemia.   ·  Plan: - c/w atorvastatin 40mg qHS.    Problem/Plan -4:  ·  Problem: Prophylactic measure.   ·  Plan; DVT ppx:  eliquted Ervin, ANP   Shalom Claudio,  [facility]  Cardiovascular Medicine  [address], [address]  Available through call or text on [employer] TEAMs  Office: [phone]   DATE OF SERVICE: 05-06-23 @ 14:41    Patient is a 87y old  Male who presents with a chief complaint of SOB, generalized weakness, dark stools (06 May 2023 10:35)      INTERVAL HISTORY: feels well, no acute events overnight     REVIEW OF SYSTEMS:  CONSTITUTIONAL: No weakness  EYES/ENT: No visual changes;  No throat pain   NECK: No pain or stiffness  RESPIRATORY: No cough, wheezing; No shortness of breath  CARDIOVASCULAR: No chest pain or palpitations  GASTROINTESTINAL: No abdominal  pain. No nausea, vomiting, or hematemesis  GENITOURINARY: No dysuria, frequency or hematuria  NEUROLOGICAL: No stroke like symptoms  SKIN: No rashes    	  MEDICATIONS:  metoprolol tartrate 50 milliGRAM(s) Oral every 12 hours  midodrine. 10 milliGRAM(s) Oral <User Schedule>        PHYSICAL EXAM:  T(C): 37.1 (05-06-23 @ 13:08), Max: 37.1 (05-06-23 @ 13:08)  HR: 93 (05-06-23 @ 13:08) (65 - 93)  BP: 109/52 (05-06-23 @ 13:08) (109/52 - 142/65)  RR: 18 (05-06-23 @ 13:08) (16 - 18)  SpO2: 99% (05-06-23 @ 13:08) (96% - 100%)  Wt(kg): --  I&O's Summary    05 May 2023 07:01  -  06 May 2023 07:00  --------------------------------------------------------  IN: 300 mL / OUT: 0 mL / NET: 300 mL    06 May 2023 07:01  -  06 May 2023 14:41  --------------------------------------------------------  IN: 120 mL / OUT: 1500 mL / NET: -1380 mL          Appearance: In no distress	  HEENT:    PERRL, EOMI	  Cardiovascular:  S1 S2, No JVD  Respiratory: Lungs clear to auscultation	  Gastrointestinal:  Soft, Non-tender, + BS	  Vascularature:  No edema of LE  Psychiatric: Appropriate affect   Neuro: no acute focal deficits           Labs personally reviewed      ASSESSMENT/PLAN: 	  88 yo male PMH of chronic Afib (on Eliquis, off ASA since hx of PUD/GIB in the past), s/p Micra PPM, non-obstructive CAD, ESRD on HD (Tu/Th/Sa), DM, HLD, and prior history of large pleural effusions (requiring drainage during hospitalizations in 2021 at [facility] and [facility]), who presents from home w/ c/o of SOB + generalized weakness and lethargy with loss of appetite/decreased PO intake, and his wife noticing his having dark/black stools in setting of diffuse abdominal pain worse in the LLQ (pt is s/p appendectomy in the past). Admitted for hypoxia due to pneumonia and large L pleural effusion.        Problem/Plan - 1:  ·  Problem: Diastolic HF.   ·  Plan: - TTE suggestive of amyloid, EKG with low voltage, hx of AF  -  Nuclear pyrophosphate scan revealing Cardiac amyloid Imaging study is NOT suggestive of transthyretin cardiac amyloidosis.  - [place] Kappa and Jeaneth chains elevated ? 2/2 inflammation   - serum Immunofixation with no monoclonal band   - Volume mgmt with HD  - Appears euvolemic  - proBNP significantly down    Problem/Plan - 2:  ·  Problem: Chronic atrial fibrillation.   ·   Has been started on Lopressor 50mg BID   - Eliquis for AC    Problem/Plan - 3:  ·  Problem: Hyperlipidemia.   ·  Plan: - c/w atorvastatin 40mg qHS.    Problem/Plan -4:  ·  Problem: Prophylactic measure.   ·  Plan; DVT ppx:  eliquted Ervin, ANP   Shalom Claudio,  [facility]  Cardiovascular Medicine  [address], [address]  Available through call or text on [employer] TEAMs  Office: [phone]

## 2023-05-06 NOTE — PROGRESS NOTE ADULT - NS ATTEND AMEND GEN_ALL_CORE FT
Patient care and plan discussed and reviewed with Advanced Care Provider. Plan as outlined above edited by me to reflect our discussion.

## 2023-05-06 NOTE — PROGRESS NOTE ADULT - PROBLEM SELECTOR PLAN 2
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 Serum phosphorus is at goal for ESRD. Recommend to continue with phosphorus binder. Monitor serum phosphorus, goal: 3.5-5.5.    If any questions, please feel free to contact me

## 2023-05-06 NOTE — PROGRESS NOTE ADULT - ATTENDING COMMENTS
ESRD TTS with pna and effusion  Tolerating HD  UF as tolerated  AVF functioning   BP stable    Cynthia Crane MD  Off: [phone]  contact me on teams    (After 5 pm or on weekends please page the on-call fellow/attending, can check [email or web address] for schedule. Login is teresa gupta, schedule under [facility] medicine, psych, derm)
87 year old male with Afib on Eliquis, PPM, CAD, ESRD on HD TIW, DM admitted with increasing dyspnea noted to have large left pleural effusion.  HIstory of thoracentesis in 2021 on left consistent with exudate, negative cytology.   Status post thoracentesis yesterday with approximately 800 CC of fluid removed.  It is consistent with a transudate.  Cytology still pending.  Would recommend increased volume removal during dialysis.  May require midodrine on dialysis days- would discuss with renal team.   From our standpoint can resume AC if clinically indicated.   Agree with plan as outlined above.
Patient appears more alert today.    # ESRD - TTS. Tolerated HD yesterday. No indication for dialysis today.    # Medication toxicity monitoring: medication dose reviewed. Please dose medications to CrCl<15    The rest of the recommendations as per fellow's note.    Rossana James MD  Attending Nephrologist  [phone] or via foodpanda / hellofood .
87 year old male with Afib on Eliquis, PPM, CAD, ESRD on HD TIW, DM admitted with increasing dyspnea noted to have large left pleural effusion.  HIstory of thoracentesis in 2021 on left consistent with exudate, negative cytology.  On PE today he is awake, decreased BS noted in the left lung,  He has anasarca with edema of both upper and lower extremities   CT personally reviewed and shows large left pleural effusion and patchy opacities throughout the right lung.   US of thorax today confirms large left pleural effusion with flattened diaphragm.  Plan to perform thoracentesis today- diagnostic and therapeutic.  Would recommend increased volume removal during dialysis.  May require midodrine on dialysis days- would discuss with renal team.   Agree with swallowing evaluation.  Consider changing antibiotics to provide better coverage for community acquired pneumonia.    Agree with plan as outlined above.
On HD  BP stable  access working well  Pneumonia
a/w ftt/pna  #esrd on hdthsat  seen on hd and tolerating well  #hyperphosphatemia- on phoslo; monitor phos trend  #anemia in ckd- no LIS as hb>12  #hypotension- midodrine with hd
a/w ftt/pna  #esrd on hdthsat  seen on hd and tolerating well  #hyperphosphatemia- on phoslo; monitor phos trend  #anemia in ckd- no LIS as hb>12  #hypotension- midodrine with hd
ESRD: Seen at HD  Tolerating well  Maintain current prescription    Rest as per Dr. Savage's Note    Tom Martinez MD  O: [phone]  Contact me on teams

## 2023-05-06 NOTE — PROGRESS NOTE ADULT - PROBLEM SELECTOR PLAN 4
Karen Paulson has been referred to your practice for possible laryngoscopy and biopsy of a lesion in the posterior oropharynx seen on CT of her neck  Radiology has recommended this  The patient is requesting to be seen at earliest possible time  I hope you will call and accomodate her request     Thank you    Julian Lassiter - TTE as above, suggestive of amyloid cardiomyopathy; however, PYP/amyloid scan is NOT suggestive of transthyretin cardiac amyloidosis  - kappa/lambda ratio normal (both elevated), which is not consistent with MM  - pending serum immunofixation and flow cytometry, but suspect they will be unrevealing.   - Cardiology recs appreciated - TTE as above, suggestive of amyloid cardiomyopathy; however, PYP/amyloid scan is NOT suggestive of transthyretin cardiac amyloidosis  - kappa/lambda ratio normal (both elevated), which is not consistent with MM  - serum immunofixation no band identified and flow cytometry no diagnostic abnormalities  - Cardiology recs appreciated

## 2023-05-06 NOTE — PROGRESS NOTE ADULT - SUBJECTIVE AND OBJECTIVE BOX
[facility] Division of Hospital Medicine  Dejah Rivas DO  8a-5pm: [phone]  after 5pm call [phone]    Patient is a 87y old  Male who presents with a chief complaint of SOB, generalized weakness, dark stools (05 May 2023 15:40)        SUBJECTIVE / OVERNIGHT EVENTS: no acute complaints, feeling better      MEDICATIONS  (STANDING):  apixaban 2.5 milliGRAM(s) Oral every 12 hours  atorvastatin 40 milliGRAM(s) Oral at bedtime  calcium acetate 667 milliGRAM(s) Oral three times a day with meals  chlorhexidine 2% Cloths 1 Application(s) Topical daily  dextrose 5%. 1000 milliLiter(s) (50 mL/Hr) IV Continuous <Continuous>  dextrose 50% Injectable 25 Gram(s) IV Push once  finasteride 5 milliGRAM(s) Oral daily  glucagon  Injectable 1 milliGRAM(s) IntraMuscular once  insulin lispro (ADMELOG) corrective regimen sliding scale   SubCutaneous three times a day before meals  metoprolol tartrate 50 milliGRAM(s) Oral every 12 hours  midodrine. 10 milliGRAM(s) Oral <User Schedule>  Nephro-chandrika 1 Tablet(s) Oral daily  nystatin    Suspension 205569 Unit(s) Swish and Swallow four times a day  pantoprazole   Suspension 40 milliGRAM(s) Oral daily  polyethylene glycol 3350 17 Gram(s) Oral daily    MEDICATIONS  (PRN):  dextrose Oral Gel 15 Gram(s) Oral once PRN Blood Glucose LESS THAN 70 milliGRAM(s)/deciliter      Vital Signs Last 24 Hrs  T(C): 36.7 (06 May 2023 05:14), Max: 36.7 (06 May 2023 05:14)  T(F): 98.1 (06 May 2023 05:14), Max: 98.1 (06 May 2023 05:14)  HR: 65 (06 May 2023 07:41) (65 - 83)  BP: 125/68 (06 May 2023 07:41) (111/55 - 142/65)  BP(mean): --  RR: 16 (06 May 2023 05:14) (16 - 18)  SpO2: 96% (06 May 2023 05:14) (96% - 97%)    Parameters below as of 06 May 2023 05:14  Patient On (Oxygen Delivery Method): room air      CAPILLARY BLOOD GLUCOSE      POCT Blood Glucose.: 136 mg/dL (06 May 2023 07:36)  POCT Blood Glucose.: 133 mg/dL (05 May 2023 21:45)  POCT Blood Glucose.: 180 mg/dL (05 May 2023 16:59)  POCT Blood Glucose.: 222 mg/dL (05 May 2023 11:55)    I&O's Summary    05 May 2023 07:01  -  06 May 2023 07:00  --------------------------------------------------------  IN: 300 mL / OUT: 0 mL / NET: 300 mL        PHYSICAL EXAM:  CONSTITUTIONAL: Elderly man, No acute distress.  RESPIRATORY: Decreased lung sounds at bases bilaterally. No wheezes, rales, or rhonchi. No accessory muscle use.  CARDIOVASCULAR: +S1/S2. Regular rate and rhythm. No murmurs, rubs, or gallops. No edema.  GASTROINTESTINAL: Soft, nontender, nondistended. +BS. No rebound or guarding.   MUSCULOSKELETAL: Spontaneous movement in all extremities.  PSYCHIATRIC: Calm, cooperative     LABS:                        12.9   4.88  )-----------( 106      ( 04 May 2023 09:27 )             39.8     05-04    133<L>  |  91<L>  |  55<H>  ----------------------------<  166<H>  4.1   |  25  |  5.84<H>    Ca    8.9      04 May 2023 09:28                RADIOLOGY & ADDITIONAL TESTS:    Imaging Personally Reviewed:  Consultant(s) Notes Reviewed:    Care Discussed with Consultants/Other Providers:

## 2023-05-07 NOTE — PROGRESS NOTE ADULT - PROBLEM SELECTOR PROBLEM 3
Multifocal pneumonia
Dark stools
Multifocal pneumonia

## 2023-05-07 NOTE — PROGRESS NOTE ADULT - PROBLEM SELECTOR PLAN 8
- c/w atorvastatin 40mg qHS
- holding home Tradjenta  - HbA1c 9.1%  - c/w sliding scale coverage  - monitor FS qAC + qHS
- c/w atorvastatin 40mg qHS

## 2023-05-07 NOTE — PROGRESS NOTE ADULT - PROBLEM SELECTOR PROBLEM 2
Hyperphosphatemia
Hyperphosphatemia
Large pleural effusion
Hyperphosphatemia
Hyperphosphatemia
Large pleural effusion
Hyperphosphatemia
Large pleural effusion
Hyperphosphatemia
Large pleural effusion

## 2023-05-07 NOTE — PROGRESS NOTE ADULT - SUBJECTIVE AND OBJECTIVE BOX
[facility] Division of Hospital Medicine  Dejah Rivas DO  8a-5pm: [phone]  after 5pm call [phone]    Patient is a 87y old  Male who presents with a chief complaint of SOB, generalized weakness, dark stools (06 May 2023 14:40)        SUBJECTIVE / OVERNIGHT EVENTS: no acute complaints      MEDICATIONS  (STANDING):  apixaban 2.5 milliGRAM(s) Oral every 12 hours  atorvastatin 40 milliGRAM(s) Oral at bedtime  calcium acetate 667 milliGRAM(s) Oral three times a day with meals  chlorhexidine 2% Cloths 1 Application(s) Topical daily  dextrose 5%. 1000 milliLiter(s) (50 mL/Hr) IV Continuous <Continuous>  dextrose 50% Injectable 25 Gram(s) IV Push once  finasteride 5 milliGRAM(s) Oral daily  glucagon  Injectable 1 milliGRAM(s) IntraMuscular once  insulin lispro (ADMELOG) corrective regimen sliding scale   SubCutaneous three times a day before meals  metoprolol tartrate 50 milliGRAM(s) Oral every 12 hours  midodrine. 10 milliGRAM(s) Oral <User Schedule>  Nephro-chandrika 1 Tablet(s) Oral daily  nystatin    Suspension 666141 Unit(s) Swish and Swallow four times a day  pantoprazole   Suspension 40 milliGRAM(s) Oral daily  polyethylene glycol 3350 17 Gram(s) Oral daily    MEDICATIONS  (PRN):  dextrose Oral Gel 15 Gram(s) Oral once PRN Blood Glucose LESS THAN 70 milliGRAM(s)/deciliter      Vital Signs Last 24 Hrs  T(C): 36.8 (07 May 2023 11:06), Max: 36.8 (07 May 2023 06:22)  T(F): 98.3 (07 May 2023 11:06), Max: 98.3 (07 May 2023 11:06)  HR: 74 (07 May 2023 11:06) (74 - 90)  BP: 117/50 (07 May 2023 11:06) (113/58 - 135/75)  BP(mean): --  RR: 18 (07 May 2023 11:06) (18 - 18)  SpO2: 96% (07 May 2023 11:06) (96% - 98%)    Parameters below as of 07 May 2023 11:06  Patient On (Oxygen Delivery Method): room air      CAPILLARY BLOOD GLUCOSE      POCT Blood Glucose.: 200 mg/dL (07 May 2023 11:45)  POCT Blood Glucose.: 137 mg/dL (07 May 2023 07:57)  POCT Blood Glucose.: 158 mg/dL (06 May 2023 21:34)  POCT Blood Glucose.: 232 mg/dL (06 May 2023 16:28)    I&O's Summary    06 May 2023 07:01  -  07 May 2023 07:00  --------------------------------------------------------  IN: 360 mL / OUT: 1500 mL / NET: -1140 mL        PHYSICAL EXAM:  CONSTITUTIONAL: Elderly man, No acute distress.  RESPIRATORY: Decreased lung sounds at bases bilaterally. No wheezes, rales, or rhonchi. No accessory muscle use.  CARDIOVASCULAR: +S1/S2. Regular rate and rhythm. No murmurs, rubs, or gallops. No edema.  GASTROINTESTINAL: Soft, nontender, nondistended. +BS. No rebound or guarding.   MUSCULOSKELETAL: Spontaneous movement in all extremities.  PSYCHIATRIC: Calm, cooperative     LABS:                    RADIOLOGY & ADDITIONAL TESTS:    Imaging Personally Reviewed:  Consultant(s) Notes Reviewed:    Care Discussed with Consultants/Other Providers:

## 2023-05-07 NOTE — PROGRESS NOTE ADULT - ASSESSMENT
87M PMHx chronic AFib (on Eliquis, off ASA since hx of PUD/GIB in the past), s/p Micra PPM, non-obstructive CAD, ESRD on HD (Tu/Th/Sa), DM, HLD, and prior history of large pleural effusions (requiring drainage during hospitalizations in 2021 at [facility] and [facility] who presents from home w/ c/o of SOB + generalized weakness and lethargy with loss of appetite/decreased PO intake, and his wife noticing his having dark/black stools in setting of diffuse abdominal pain worse in the LLQ (pt is s/p appendectomy in the past). Admitted for hypoxia due to pneumonia and large L pleural effusion now clinically improved s/p thoracentesis and abx treatment for PNA, currently undergoing volume removal with HD.

## 2023-05-07 NOTE — PROGRESS NOTE ADULT - PROBLEM SELECTOR PLAN 4
- TTE as above, suggestive of amyloid cardiomyopathy; however, PYP/amyloid scan is NOT suggestive of transthyretin cardiac amyloidosis  - kappa/lambda ratio normal (both elevated), which is not consistent with MM  - serum immunofixation no band identified and flow cytometry no diagnostic abnormalities  - Cardiology recs appreciated

## 2023-05-07 NOTE — PROGRESS NOTE ADULT - PROBLEM SELECTOR PROBLEM 4
Abnormal echocardiogram
Dark stools
ESRD on dialysis
Abnormal echocardiogram

## 2023-05-07 NOTE — PROGRESS NOTE ADULT - SUBJECTIVE AND OBJECTIVE BOX
DATE OF SERVICE: 05-07-23 @ 22:27    Patient is a 87y old  Male who presents with a chief complaint of SOB, generalized weakness, dark stools (07 May 2023 14:28)      INTERVAL HISTORY: no events    MEDICATIONS:  metoprolol tartrate 50 milliGRAM(s) Oral every 12 hours  midodrine. 10 milliGRAM(s) Oral <User Schedule>        PHYSICAL EXAM:  T(C): 36.4 (05-07-23 @ 20:49), Max: 36.8 (05-07-23 @ 06:22)  HR: 76 (05-07-23 @ 20:49) (74 - 84)  BP: 105/54 (05-07-23 @ 20:49) (105/54 - 135/75)  RR: 17 (05-07-23 @ 20:49) (17 - 18)  SpO2: 96% (05-07-23 @ 20:49) (96% - 98%)  Wt(kg): --  I&O's Summary    06 May 2023 07:01  -  07 May 2023 07:00  --------------------------------------------------------  IN: 360 mL / OUT: 1500 mL / NET: -1140 mL    07 May 2023 07:01  -  07 May 2023 22:27  --------------------------------------------------------  IN: 600 mL / OUT: 0 mL / NET: 600 mL          Appearance: In no distress	  HEENT:    PERRL, EOMI	  Cardiovascular:  S1 S2, No JVD  Respiratory: Lungs clear to auscultation	  Gastrointestinal:  Soft, Non-tender, + BS	  Vascularature:  No edema of LE  Psychiatric: Appropriate affect   Neuro: no acute focal deficits           Labs personally reviewed      ASSESSMENT/PLAN: 	  88 yo male PMH of chronic Afib (on Eliquis, off ASA since hx of PUD/GIB in the past), s/p Micra PPM, non-obstructive CAD, ESRD on HD (Tu/Th/Sa), DM, HLD, and prior history of large pleural effusions (requiring drainage during hospitalizations in 2021 at [facility] and [facility]), who presents from home w/ c/o of SOB + generalized weakness and lethargy with loss of appetite/decreased PO intake, and his wife noticing his having dark/black stools in setting of diffuse abdominal pain worse in the LLQ (pt is s/p appendectomy in the past). Admitted for hypoxia due to pneumonia and large L pleural effusion.        Problem/Plan - 1:  ·  Problem: Diastolic HF.   ·  Plan: - TTE suggestive of amyloid, EKG with low voltage, hx of AF  -  Nuclear pyrophosphate scan revealing Cardiac amyloid Imaging study is NOT suggestive of transthyretin cardiac amyloidosis.  - [place] Kappa and Jeaneth chains elevated ? 2/2 inflammation   - serum Immunofixation with no monoclonal band   - Volume mgmt with HD  - Appears euvolemic  - proBNP significantly down    Problem/Plan - 2:  ·  Problem: Chronic atrial fibrillation.   ·   Has been started on Lopressor 50mg BID   - Eliquis for AC    Problem/Plan - 3:  ·  Problem: Hyperlipidemia.   ·  Plan: - c/w atorvastatin 40mg qHS.    Problem/Plan -4:  ·  Problem: Prophylactic measure.   ·  Plan; DVT ppx:  eliquted Claudio DO [facility]  Cardiovascular Medicine  [address] [place] [address], [address]  Office: [phone]  Available via Text/call on Microsoft Teams

## 2023-05-07 NOTE — PROGRESS NOTE ADULT - NUTRITIONAL ASSESSMENT
This patient has been assessed with a concern for Malnutrition and has been determined to have a diagnosis/diagnoses of Severe protein-calorie malnutrition and Underweight (BMI < 19).    This patient is being managed with:   Diet Pureed-  Consistent Carbohydrate {No Snacks} (CSTCHO)  Moderately Thick Liquids (MODTHICKLIQS)  For patients receiving Renal Replacement - No Protein Restr No Conc K No Conc Phos Low Sodium (RENAL)  Entered: Apr 24 2023  5:20PM    Diet Pureed-  Consistent Carbohydrate {No Snacks} (CSTCHO)  For patients receiving Renal Replacement - No Protein Restr No Conc K No Conc Phos Low Sodium (RENAL)  Entered: Apr 24 2023  4:20PM    The following pending diet order is being considered for treatment of Severe protein-calorie malnutrition and Underweight (BMI < 19):null

## 2023-05-07 NOTE — PROGRESS NOTE ADULT - PROBLEM SELECTOR PROBLEM 8
Hyperlipidemia
Diabetes
Hyperlipidemia

## 2023-05-07 NOTE — PROGRESS NOTE ADULT - PROBLEM SELECTOR PROBLEM 1
ESRD on dialysis
Acute respiratory failure with hypoxia
Acute respiratory failure with hypoxia
ESRD on dialysis
Acute respiratory failure with hypoxia
ESRD on dialysis
ESRD on dialysis
Acute respiratory failure with hypoxia
ESRD on dialysis
Acute respiratory failure with hypoxia

## 2023-05-07 NOTE — PROGRESS NOTE ADULT - PROBLEM SELECTOR PLAN 1
- 2/2 to multifocal pneumonia, large L pleural effusion, hypervolemia  - his condition has much improved - currently on RA  - remains medically stable for dc to [facility] - pending insurance auth, covid today

## 2023-05-07 NOTE — PROGRESS NOTE ADULT - PROBLEM SELECTOR PLAN 6
- HD per Nephrology (patient follows w/Dr. Julius Eason in [place] as O/P on T/Th/Sa schedule)  - C/w midodrine 10mg pre-HD on HD days to allow for volume removal  - CXR with increase in L pleural effusion - should improve slowly with time as the patient undergoes dialysis   - C/w Phoslo TID with meals

## 2023-05-07 NOTE — PROGRESS NOTE ADULT - PROBLEM SELECTOR PROBLEM 6
ESRD on dialysis
Diabetes
ESRD on dialysis
Chronic atrial fibrillation
ESRD on dialysis
ESRD on dialysis

## 2023-05-07 NOTE — PROGRESS NOTE ADULT - PROBLEM SELECTOR PROBLEM 7
Chronic atrial fibrillation
Hyperlipidemia
Chronic atrial fibrillation

## 2023-05-07 NOTE — PROGRESS NOTE ADULT - PROBLEM SELECTOR PROBLEM 5
Dark stools
Chronic atrial fibrillation
Dark stools
ESRD on dialysis

## 2023-05-07 NOTE — PROGRESS NOTE ADULT - PROBLEM SELECTOR PROBLEM 9
Diabetes
Prophylactic measure
Diabetes

## 2023-05-08 NOTE — PROGRESS NOTE ADULT - PROVIDER SPECIALTY LIST ADULT
Cardiology
Cardiology
Nephrology
Pulmonology
Cardiology
Hospitalist
Hospitalist
Nephrology
Cardiology
Hospitalist
Pulmonology
Nephrology
Nephrology
Hospitalist
Nephrology
Hospitalist
Internal Medicine
Internal Medicine
Hospitalist

## 2023-05-08 NOTE — CHART NOTE - NSCHARTNOTEFT_GEN_A_CORE
Discharge Note:    Requested by Dr. Ding to facilitate d/c to [facility].  V/s, labs, diagnostics reviewed, pt stable to d/c now.  Medication reconciliation reviewed, revised, and resolved with Dr. Ding who medically cleared w/ f/u as directed.   Please refer to d/c note for hospital details.    MARTHA Mayc

## 2023-05-08 NOTE — PROGRESS NOTE ADULT - REASON FOR ADMISSION
SOB, generalized weakness, dark stools

## 2023-05-08 NOTE — DISCHARGE NOTE NURSING/CASE MANAGEMENT/SOCIAL WORK - NSDCFUADDAPPT_GEN_ALL_CORE_FT
APPTS ARE READY TO BE MADE: [X] YES    Best Family or Patient Contact (if needed):    Additional Information about above appointments (if needed):    1: Follow up with PCP Dr. Dexter Eason within 1 week of discharge   2: Follow up with Dr. Julius Eason in [place] as Outpatient on Tu/Th/Sa schedule)  3: [facility] ([phone])    Other comments or requests:

## 2023-05-08 NOTE — PROGRESS NOTE ADULT - SUBJECTIVE AND OBJECTIVE BOX
DATE OF SERVICE: 05-08-23 @ 15:45    Patient is a 87y old  Male who presents with a chief complaint of SOB, generalized weakness, dark stools (07 May 2023 19:27)      INTERVAL HISTORY: no acute events    REVIEW OF SYSTEMS:  CONSTITUTIONAL: No weakness  EYES/ENT: No visual changes;  No throat pain   NECK: No pain or stiffness  RESPIRATORY: No cough, wheezing; No shortness of breath  CARDIOVASCULAR: No chest pain or palpitations  GASTROINTESTINAL: No abdominal  pain. No nausea, vomiting, or hematemesis  GENITOURINARY: No dysuria, frequency or hematuria  NEUROLOGICAL: No stroke like symptoms  SKIN: No rashes      	  MEDICATIONS:  metoprolol tartrate 50 milliGRAM(s) Oral every 12 hours  midodrine. 10 milliGRAM(s) Oral <User Schedule>        PHYSICAL EXAM:  T(C): 36.3 (05-08-23 @ 12:47), Max: 36.5 (05-08-23 @ 04:33)  HR: 75 (05-08-23 @ 12:47) (75 - 79)  BP: 111/68 (05-08-23 @ 12:47) (105/54 - 124/73)  RR: 18 (05-08-23 @ 12:47) (17 - 18)  SpO2: 98% (05-08-23 @ 12:47) (96% - 98%)  Wt(kg): --  I&O's Summary    07 May 2023 07:01  -  08 May 2023 07:00  --------------------------------------------------------  IN: 600 mL / OUT: 0 mL / NET: 600 mL          Appearance: In no distress	  HEENT:    PERRL, EOMI	  Cardiovascular:  S1 S2, No JVD  Respiratory: Lungs clear to auscultation	  Gastrointestinal:  Soft, Non-tender, + BS	  Vascularature:  No edema of LE  Psychiatric: Appropriate affect   Neuro: no acute focal deficits                     Labs personally reviewed      ASSESSMENT/PLAN: 	  86 yo male PMH of chronic Afib (on Eliquis, off ASA since hx of PUD/GIB in the past), s/p Micra PPM, non-obstructive CAD, ESRD on HD (Tu/Th/Sa), DM, HLD, and prior history of large pleural effusions (requiring drainage during hospitalizations in 2021 at [facility] and [facility]), who presents from home w/ c/o of SOB + generalized weakness and lethargy with loss of appetite/decreased PO intake, and his wife noticing his having dark/black stools in setting of diffuse abdominal pain worse in the LLQ (pt is s/p appendectomy in the past). Admitted for hypoxia due to pneumonia and large L pleural effusion.        Problem/Plan - 1:  ·  Problem: Diastolic HF.   ·  Plan: - TTE suggestive of amyloid, EKG with low voltage, hx of AF  -  Nuclear pyrophosphate scan revealing Cardiac amyloid Imaging study is NOT suggestive of transthyretin cardiac amyloidosis.  - [place] Kappa and Jeaneth chains elevated ? 2/2 inflammation   - serum Immunofixation with no monoclonal band   - Volume mgmt with HD  - Appears euvolemic  - proBNP significantly down    Problem/Plan - 2:  ·  Problem: Chronic atrial fibrillation.   ·   Has been started on Lopressor 50mg BID   - Eliquis for AC    Problem/Plan - 3:  ·  Problem: Hyperlipidemia.   ·  Plan: - c/w atorvastatin 40mg qHS.    Problem/Plan -4:  ·  Problem: Prophylactic measure.   ·  Plan; DVT ppx:  eliquis               Casey Ervin, ANP   Shalom Claudio, DO [facility]  Cardiovascular Medicine  [address] [place] [address], [address]  Available through call or text on [employer]  Office: [phone]

## 2023-05-08 NOTE — DISCHARGE NOTE NURSING/CASE MANAGEMENT/SOCIAL WORK - PATIENT PORTAL LINK FT
You can access the FollowMyHealth Patient Portal offered by [facility] by registering at the following website: [email or web address]. By joining [facility]’s FollowMyHealth portal, you will also be able to view your health information using other applications (apps) compatible with our system.

## 2023-08-21 NOTE — PROGRESS NOTE ADULT - PROBLEM SELECTOR PLAN 6
Addended by: ELENO ERVIN on: 8/21/2023 08:19 AM     Modules accepted: Orders     - HD per Nephrology (patient follows w/Dr. Julius Eason in [place] as O/P on Tu/Th/Sa schedule)  - C/w midodrine 10mg pre-HD on HD days to allow for volume removal  - CXR with increase in L pleural effusion - needs more volume removal for optimization  - C/w Phoslo 667mg TID with meals

## 2023-08-22 ENCOUNTER — APPOINTMENT (OUTPATIENT)
Dept: ELECTROPHYSIOLOGY | Facility: CLINIC | Age: 88
End: 2023-08-22

## 2023-11-01 ENCOUNTER — APPOINTMENT (OUTPATIENT)
Dept: ELECTROPHYSIOLOGY | Facility: CLINIC | Age: 88
End: 2023-11-01

## 2024-01-31 NOTE — TRANSFER ACCEPTANCE NOTE - MS EXT PE MLT D E PC
Detail Level: Detailed Quality 110: Preventive Care And Screening: Influenza Immunization: Influenza immunization was not ordered or administered, reason not given Quality 226: Preventive Care And Screening: Tobacco Use: Screening And Cessation Intervention: Patient screened for tobacco use and is an ex/non-smoker Quality 111:Pneumonia Vaccination Status For Older Adults: Pneumococcal vaccine (PPSV23) was not administered on or after patient’s 60th birthday and before the end of the measurement period, reason not otherwise specified no clubbing/no cyanosis/no pedal edema

## 2024-02-14 NOTE — PROCEDURE NOTE - NSPOSTPRCRAD_GEN_A_CORE
Venipuncture was attempted x 2 but not obtained from left arm. Patient tolerated the procedure without complications or complaints. Pt admitted to not drinking fluids and stated the Hospital had to place a PICC line for labs. Pt advised to hydrate well before going for labs, Pt agreed.  Electronically signed by Elizabeth Godinez LPN on 2/14/24 at 3:41 PM EST    post procedure radiography not performed

## 2024-07-15 NOTE — ASU PREOP CHECKLIST - WAS PATIENT ON BETA BLOCKER?
Dr. Lyle-please advise Meloxicam refill for this pt; Last seen 3/27/23; Last refill provided 1/24/24 #90 with 1 refill. No follow up on file.    Thanks!  David WELLS CMA ([facility]), ROT     No

## 2024-09-25 NOTE — TRANSFER ACCEPTANCE NOTE - PROBLEM SELECTOR PLAN 3
"HISTORY AND PHYSICAL UPDATE ADMISSION EXAM    Name: Mercy Miranda  YOB: 2004  Medical Record Number: [id number]    History of Present Illness:   Patient is a 21yo with dysmenorrhea presenting for scheduled diagnostic laparoscopy, possible excision of endometriosis.      Past Medical History:   Diagnosis Date    Motion sickness      Past Surgical History:   Procedure Laterality Date    no surgical history           Exam:      /82   Pulse 94   Temp 98.3  F (36.8  C) (Temporal)   Resp 16   Ht 1.6 m (5' 3\")   Wt 49.9 kg (110 lb)   LMP 08/25/2024   SpO2 98%   BMI 19.49 kg/m      HEENT grossly normal  Neck: no lymphadenopathy or thryoidomegaly  Lungs CTA b/l  Heart RRR  ABD gravid, non-tender  EXT:  no edema, moves freely      Assessment:   Patient is a 21yo with dysmenorrhea presenting for scheduled diagnostic laparoscopy, possible excision of endometriosis. Reviewed intraop plan with the patient. Discussed surgical risks including but not limited to pain, infection, bleeding, damage to surrounding structures including bladder/bowel/nerves; informed consent obtained. No significant changes to med/surg history, preoperative clearance obtained.  [facility]ceed to [place].     Viviana Burns MD  9/25/2024   10:58 AM   "
NGT removed at [facility]  Pt tolerating solid food at this time  No surgical intervention at this time

## 2025-06-01 NOTE — DISCHARGE NOTE NURSING/CASE MANAGEMENT/SOCIAL WORK - NSDCPEELIQUISREACT_GEN_ALL_CORE
Apixaban/Eliquis increases your risk for bleeding. Notify your doctor if you experience any of the following side effects: bleeding, coughing or vomiting blood, red or black stool, unexpected pain or swelling, itching or hives, chest pain, chest tightness, trouble breathing, changes in how much or how often you urinate, red or pink urine, numbness or tingling in your feet, or unusual muscle weakness. When Apixaban/Eliquis is taken with other medicines, they can affect how it works. Taking other medications such as aspirin, blood thinners, nonsteroidal anti-inflammatories, and medications that treat depression can increase your risk of bleeding. It is very important to tell your health care provider about all of the other medicines, including over-the-counter medications, herbs, and vitamins you are taking. DO NOT start, stop, or change the dosage of any medicine, including over-the-counter medicines, vitamins, and herbal products without your doctor’s approval. Any products containing aspirin or are nonsteroidal anti-inflammatories lessen the blood’s ability to form clots and add to the effect of Apixaban/Eliquis. Never take aspirin or medicines that contain aspirin without speaking to your doctor. 2-3x/week